# Patient Record
Sex: FEMALE | URBAN - METROPOLITAN AREA
[De-identification: names, ages, dates, MRNs, and addresses within clinical notes are randomized per-mention and may not be internally consistent; named-entity substitution may affect disease eponyms.]

---

## 2021-01-28 ENCOUNTER — IMMUNIZATIONS (OUTPATIENT)
Dept: FAMILY MEDICINE CLINIC | Facility: HOSPITAL | Age: 61
End: 2021-01-28

## 2021-01-28 DIAGNOSIS — Z23 ENCOUNTER FOR IMMUNIZATION: Primary | ICD-10-CM

## 2021-01-28 PROCEDURE — 91300 SARS-COV-2 / COVID-19 MRNA VACCINE (PFIZER-BIONTECH) 30 MCG: CPT

## 2021-01-28 PROCEDURE — 0001A SARS-COV-2 / COVID-19 MRNA VACCINE (PFIZER-BIONTECH) 30 MCG: CPT

## 2021-02-18 ENCOUNTER — IMMUNIZATIONS (OUTPATIENT)
Dept: FAMILY MEDICINE CLINIC | Facility: HOSPITAL | Age: 61
End: 2021-02-18

## 2021-02-18 DIAGNOSIS — Z23 ENCOUNTER FOR IMMUNIZATION: Primary | ICD-10-CM

## 2021-02-18 PROCEDURE — 91300 SARS-COV-2 / COVID-19 MRNA VACCINE (PFIZER-BIONTECH) 30 MCG: CPT

## 2021-02-18 PROCEDURE — 0002A SARS-COV-2 / COVID-19 MRNA VACCINE (PFIZER-BIONTECH) 30 MCG: CPT

## 2021-12-06 ENCOUNTER — IMMUNIZATIONS (OUTPATIENT)
Dept: FAMILY MEDICINE CLINIC | Facility: HOSPITAL | Age: 61
End: 2021-12-06

## 2021-12-06 DIAGNOSIS — Z23 ENCOUNTER FOR IMMUNIZATION: Primary | ICD-10-CM

## 2021-12-06 PROCEDURE — 91300 COVID-19 PFIZER VACC 0.3 ML: CPT

## 2021-12-06 PROCEDURE — 0001A COVID-19 PFIZER VACC 0.3 ML: CPT

## 2023-01-03 ENCOUNTER — OFFICE VISIT (OUTPATIENT)
Dept: URGENT CARE | Facility: CLINIC | Age: 63
End: 2023-01-03

## 2023-01-03 VITALS
DIASTOLIC BLOOD PRESSURE: 75 MMHG | WEIGHT: 259 LBS | OXYGEN SATURATION: 96 % | SYSTOLIC BLOOD PRESSURE: 189 MMHG | BODY MASS INDEX: 44.22 KG/M2 | HEIGHT: 64 IN | RESPIRATION RATE: 18 BRPM | TEMPERATURE: 99.8 F | HEART RATE: 83 BPM

## 2023-01-03 DIAGNOSIS — J06.9 VIRAL URI WITH COUGH: Primary | ICD-10-CM

## 2023-01-03 RX ORDER — LEVOTHYROXINE SODIUM 0.03 MG/1
25 TABLET ORAL DAILY
COMMUNITY
Start: 2022-10-22

## 2023-01-03 RX ORDER — ATENOLOL 50 MG/1
50 TABLET ORAL DAILY
COMMUNITY
Start: 2022-10-25

## 2023-01-03 RX ORDER — DIPHENOXYLATE HYDROCHLORIDE AND ATROPINE SULFATE 2.5; .025 MG/1; MG/1
1 TABLET ORAL DAILY
COMMUNITY

## 2023-01-03 RX ORDER — PANTOPRAZOLE SODIUM 40 MG/1
TABLET, DELAYED RELEASE ORAL
COMMUNITY
Start: 2022-12-03

## 2023-01-03 RX ORDER — FLECAINIDE ACETATE 50 MG/1
50 TABLET ORAL 2 TIMES DAILY
COMMUNITY
Start: 2022-09-12

## 2023-01-03 RX ORDER — FLUTICASONE PROPIONATE 50 MCG
2 SPRAY, SUSPENSION (ML) NASAL DAILY
Qty: 11.1 ML | Refills: 0 | Status: SHIPPED | OUTPATIENT
Start: 2023-01-03

## 2023-01-03 RX ORDER — BENZONATATE 200 MG/1
200 CAPSULE ORAL 3 TIMES DAILY PRN
Qty: 20 CAPSULE | Refills: 0 | Status: SHIPPED | OUTPATIENT
Start: 2023-01-03

## 2023-01-03 RX ORDER — AMLODIPINE BESYLATE 10 MG/1
10 TABLET ORAL DAILY
COMMUNITY
Start: 2022-11-14

## 2023-01-03 RX ORDER — LOSARTAN POTASSIUM 100 MG/1
TABLET ORAL
COMMUNITY
Start: 2022-10-25

## 2023-01-03 NOTE — PROGRESS NOTES
[facility] [facility]        NAME: Leona Matos is a 60 y o  female  : 1960    MRN: [id number]  DATE: January 3, 2023  TIME: 8:46 AM    Assessment and Plan   Viral URI with cough [J06 9]  1  Viral URI with cough  fluticasone (FLONASE) 50 mcg/act nasal spray    benzonatate (TESSALON) 200 MG capsule        Informed patient that symptoms are most likely due to viral origin  No signs of infection during examination  Lungs were clear to auscultation  Informed patient to continue with at home remedies and to take prescribed Flonase and Tessalon Perles  Informed to go to the ER symptoms worsen  Repeat blood pressure was 158/78 in the right arm  PT states that she did not take her blood pressure medication this morning  Patient Instructions     Take prescribed medications as instructed  Drink plenty of fluids and rest   Tea with honey for throat/cough relief  Daily multivitamin  Vitamin D3 2000 IU daily  Vitamin C 1000 mg twice daily  May try over-the-counter Mucinex for congestion  Follow up with PCP in 3-5 days  Proceed to  ER if symptoms worsen  Chief Complaint     Chief Complaint   Patient presents with   • Cough   • Fever     Cough is a dry coughing nothing coming up  Fevers have been 99 8 she did take 2 tylenol before coming as well  Symptoms started yesterday morning  History of Present Illness       16-year-old female presents with fever and dry cough that began yesterday morning  Fever was only 99 8F  PT took 2 Tylenol before coming in today  She also admits to a runny nose  PT has been drinking tea with honey  Denies any shortness of breath, chest pain, abdominal pain, nausea, vomiting, diarrhea, sore throat, ear pain  Review of Systems   Review of Systems   Constitutional: Positive for chills and fever  HENT: Positive for postnasal drip and rhinorrhea  Negative for congestion, ear discharge, ear pain, sinus pressure, sinus pain and sore throat      Eyes: Negative  Respiratory: Positive for cough  Negative for shortness of breath  Cardiovascular: Negative  Gastrointestinal: Negative  Musculoskeletal: Negative  Skin: Negative  Neurological: Negative  Current Medications       Current Outpatient Medications:   •  amLODIPine (NORVASC) 10 mg tablet, Take 10 mg by mouth daily, Disp: , Rfl:   •  atenolol (TENORMIN) 50 mg tablet, Take 50 mg by mouth daily, Disp: , Rfl:   •  benzonatate (TESSALON) 200 MG capsule, Take 1 capsule (200 mg total) by mouth 3 (three) times a day as needed for cough, Disp: 20 capsule, Rfl: 0  •  flecainide (TAMBOCOR) 50 mg tablet, Take 50 mg by mouth 2 (two) times a day, Disp: , Rfl:   •  fluticasone (FLONASE) 50 mcg/act nasal spray, 2 sprays into each nostril daily, Disp: 11 1 mL, Rfl: 0  •  levothyroxine 25 mcg tablet, Take 25 mcg by mouth daily, Disp: , Rfl:   •  losartan (COZAAR) 100 MG tablet, TAKE 1 TABLET BY MOUTH EVERY DAY AT NIGHT, Disp: , Rfl:   •  multivitamin (THERAGRAN) TABS, Take 1 tablet by mouth daily, Disp: , Rfl:   •  pantoprazole (PROTONIX) 40 mg tablet, TAKE 1 TABLET BY MOUTH ONCE A DAY 30 MINUTES BEFORE BREAKFAST, Disp: , Rfl:     Current Allergies     Allergies as of 01/03/2023   • (No Known Allergies)            The following portions of the patient's history were reviewed and updated as appropriate: allergies, current medications, past family history, past medical history, past social history, past surgical history and problem list      Past Medical History:   Diagnosis Date   • Disease of thyroid gland    • Hypertension        History reviewed  No pertinent surgical history  History reviewed  No pertinent family history  Medications have been verified          Objective   BP (!) 189/75   Pulse 83   Temp 99 8 °F (37 7 °C)   Resp 18   Ht 5' 4" (1 626 m)   Wt 117 kg (259 lb)   SpO2 96%   BMI 44 46 kg/m²        Physical Exam     Physical Exam  Constitutional:       General: She is not in acute distress  Appearance: Normal appearance  HENT:      Head: Normocephalic and atraumatic  Right Ear: Tympanic membrane, ear canal and external ear normal       Left Ear: Tympanic membrane, ear canal and external ear normal       Nose: Congestion and rhinorrhea present  Mouth/Throat:      Mouth: Mucous membranes are moist       Pharynx: Oropharynx is clear  No oropharyngeal exudate or posterior oropharyngeal erythema  Eyes:      Conjunctiva/sclera: Conjunctivae normal    Cardiovascular:      Rate and Rhythm: Normal rate and regular rhythm  Pulses: Normal pulses  Heart sounds: Normal heart sounds  No murmur heard  No friction rub  No gallop  Pulmonary:      Effort: Pulmonary effort is normal  No respiratory distress  Breath sounds: Normal breath sounds  No stridor  No wheezing, rhonchi or rales  Chest:      Chest wall: No tenderness  Musculoskeletal:      Cervical back: Normal range of motion and neck supple  Skin:     General: Skin is warm and dry  Capillary Refill: Capillary refill takes less than 2 seconds  Findings: No rash  Neurological:      General: No focal deficit present  Mental Status: She is alert

## 2023-01-03 NOTE — PATIENT INSTRUCTIONS
Take prescribed medications as instructed  Drink plenty of fluids and rest   Tea with honey for throat/cough relief  Daily multivitamin  Vitamin D3 2000 IU daily  Vitamin C 1000 mg twice daily  May try over-the-counter Mucinex for congestion  Follow up with PCP in 3-5 days  Proceed to  ER if symptoms worsen  Viral Syndrome   WHAT YOU NEED TO KNOW:   Viral syndrome is a term used for symptoms of an infection caused by a virus  Viruses are spread easily from person to person through the air and on shared items  DISCHARGE INSTRUCTIONS:   Call your local emergency number (911 in the [address] [place] [address],[address]) or have someone else call if:   You have a seizure  You cannot be woken  You have chest pain or trouble breathing  Return to the emergency department if:   You have a stiff neck, a bad headache, and sensitivity to light  You feel weak, dizzy, or confused  You stop urinating or urinate a lot less than usual     You cough up blood or thick yellow or green mucus  You have severe abdominal pain or your abdomen is larger than usual     Call your doctor if:   Your symptoms do not get better with treatment or get worse after 3 days  You have a rash or ear pain  You have burning when you urinate  You have questions or concerns about your condition or care  Medicines: You may  need any of the following:  Acetaminophen  decreases pain and fever  It is available without a doctor's order  Ask how much to take and how often to take it  Follow directions  Read the labels of all other medicines you are using to see if they also contain acetaminophen, or ask your doctor or pharmacist  Acetaminophen can cause liver damage if not taken correctly  Do not use more than 4 grams (4,000 milligrams) total of acetaminophen in one day  NSAIDs , such as ibuprofen, help decrease swelling, pain, and fever  NSAIDs can cause stomach bleeding or kidney problems in certain people   If you take blood thinner medicine, always ask your healthcare provider if NSAIDs are safe for you  Always read the medicine label and follow directions  Cold medicine  helps decrease swelling, control a cough, and relieve chest or nasal congestion  Saline nasal spray  helps decrease nasal congestion  Take your medicine as directed  Contact your healthcare provider if you think your medicine is not helping or if you have side effects  Tell him of her if you are allergic to any medicine  Keep a list of the medicines, vitamins, and herbs you take  Include the amounts, and when and why you take them  Bring the list or the pill bottles to follow-up visits  Carry your medicine list with you in case of an emergency  Manage your symptoms:   Drink liquids as directed to prevent dehydration  Ask how much liquid to drink each day and which liquids are best for you  Ask if you should drink an oral rehydration solution (ORS)  An ORS has the right amounts of water, salts, and sugar you need to replace body fluids  This may help prevent dehydration caused by vomiting or diarrhea  Do not drink liquids with caffeine  Liquids with caffeine can make dehydration worse  Get plenty of rest to help your body heal   Take naps throughout the day  Ask your healthcare provider when you can return to work and your normal activities  Use a cool mist humidifier to help you breathe easier  Ask your healthcare provider how to use a cool mist humidifier  Eat honey or use cough drops for a sore throat  Cough drops are available without a doctor's order  Follow directions for taking cough drops  Do not smoke or be close to anyone who is smoking  Nicotine and other chemicals in cigarettes and cigars can cause lung damage  Smoking can also delay healing  Ask your healthcare provider for information if you currently smoke and need help to quit  E-cigarettes or smokeless tobacco still contain nicotine   Talk to your healthcare provider before you use these products  Prevent the spread of germs:       Wash your hands often  Wash your hands several times each day  Wash after you use the bathroom, change a child's diaper, and before you prepare or eat food  Use soap and water every time  Rub your soapy hands together, lacing your fingers  Wash the front and back of your hands, and in between your fingers  Use the fingers of one hand to scrub under the fingernails of the other hand  Wash for at least 20 seconds  Rinse with warm, running water for several seconds  Then dry your hands with a clean towel or paper towel  Use hand  that contains alcohol if soap and water are not available  Do not touch your eyes, nose, or mouth without washing your hands first          Cover a sneeze or cough  Use a tissue that covers your mouth and nose  Throw the tissue away in a trash can right away  Use the bend of your arm if a tissue is not available  Wash your hands well with soap and water or use a hand   Stay away from others while you are sick  Avoid crowds as much as possible  Ask about vaccines you may need  Talk to your healthcare provider about your vaccine history  He or she will tell you which vaccines you need, and when to get them  Get the influenza (flu) vaccine as soon as recommended each year  The flu vaccine is available starting in September or October  Flu viruses change, so it is important to get a flu vaccine every year  Get the pneumonia vaccine if recommended  This vaccine is usually recommended every 5 years  Your provider will tell you when to get this vaccine, if needed  Follow up with your doctor as directed:  Write down your questions so you remember to ask them during your visits  © Copyright [employer] 2022 Information is for End User's use only and may not be sold, redistributed or otherwise used for commercial purposes   All illustrations and images included in CareNotes® are the copyrighted property of [employer]  or [employer]  The above information is an  only  It is not intended as medical advice for individual conditions or treatments  Talk to your doctor, nurse or pharmacist before following any medical regimen to see if it is safe and effective for you

## 2023-01-06 ENCOUNTER — OFFICE VISIT (OUTPATIENT)
Dept: URGENT CARE | Facility: CLINIC | Age: 63
End: 2023-01-06

## 2023-01-06 VITALS
HEART RATE: 78 BPM | RESPIRATION RATE: 19 BRPM | DIASTOLIC BLOOD PRESSURE: 70 MMHG | SYSTOLIC BLOOD PRESSURE: 160 MMHG | OXYGEN SATURATION: 98 % | TEMPERATURE: 100.3 F

## 2023-01-06 DIAGNOSIS — R05.1 ACUTE COUGH: Primary | ICD-10-CM

## 2023-01-06 RX ORDER — AMOXICILLIN 500 MG/1
500 CAPSULE ORAL EVERY 12 HOURS SCHEDULED
Qty: 10 CAPSULE | Refills: 0 | Status: SHIPPED | OUTPATIENT
Start: 2023-01-06 | End: 2023-01-11

## 2023-01-06 NOTE — LETTER
[facility] [address] [id number]  No information on file  January 6, 2023    Patient: Gerardo Cruz  YOB: 1960    Gerardo Cruz was seen and evaluated at our [facility]  Please note if Covid and Flu tests are negative, they may return to work when fever free for 24 hours without the use of a fever reducing agent  If Covid or Flu test is positive, they may return to work on 1/9/2023, as this is 5 days from the onset of symptoms  Upon return, they must then adhere to strict masking for an additional 5 days      Sincerely,    [employer]

## 2023-01-06 NOTE — PATIENT INSTRUCTIONS
If flu/covid negative start antibiotics  If positive DO NOT start the antibiotics  You have been prescribed an antibiotic  Take antibiotic as directed for the full duration  Do not stop the antibiotics just because you are feeling better  Side effects of antibiotics include diarrhea  Eat yogurt or take a probiotic or acidophilus tablet while taking this medication to help prevent diarrhea and replenish good gut bacteria  Influenza Home Care Guidelines    Your healthcare provider and/or public health staff have evaluated you and have determined that you do not need to remain in the hospital at this time  At this time you can be isolated at home where you will be monitored by staff from your local or state health department  You should carefully follow the prevention and isolation steps below until a healthcare provider or local or state health department says that you can return to your normal activities  Stay home except to get medical care    People who are mildly ill with influenza are able to isolate at home during their illness  You should restrict activities outside your home, except for getting medical care  Do not go to work, school, or public areas  Avoid using public transportation, ride-sharing, or taxis  Separate yourself from other people in your home    People: As much as possible, you should stay in a specific room and away from other people in your home  Also, you should use a separate bathroom, if available  Call ahead before visiting your doctor    If you have a medical appointment, call the healthcare provider and tell them that you have or may have influenza  This will help the healthcare provider’s office take steps to keep other people from getting infected or exposed  Wear a facemask    You should wear a facemask when you are around other people (e g , sharing a room or vehicle) or pets and before you enter a healthcare provider’s office   If you are not able to wear a facemask (for example, because it causes trouble breathing), then people who live with you should not stay in the same room with you, or they should wear a facemask if they enter your room  Cover your coughs and sneezes    Cover your mouth and nose with a tissue when you cough or sneeze  Throw used tissues in a lined trash can  Immediately wash your hands with soap and water for at least 20 seconds or, if soap and water are not available, clean your hands with an alcohol-based hand  that contains at least 60% alcohol  Clean your hands often    Wash your hands often with soap and water for at least 20 seconds, especially after blowing your nose, coughing, or sneezing; going to the bathroom; and before eating or preparing food  If soap and water are not readily available, use an alcohol-based hand  with at least 60% alcohol, covering all surfaces of your hands and rubbing them together until they feel dry  Soap and water are the best option if hands are visibly dirty  Avoid touching your eyes, nose, and mouth with unwashed hands  Avoid sharing personal household items    You should not share dishes, drinking glasses, cups, eating utensils, towels, or bedding with other people or pets in your home  After using these items, they should be washed thoroughly with soap and water  Clean all “high-touch” surfaces everyday    High touch surfaces include counters, tabletops, doorknobs, bathroom fixtures, toilets, phones, keyboards, tablets, and bedside tables  Also, clean any surfaces that may have blood, stool, or body fluids on them  Use a household cleaning spray or wipe, according to the label instructions  Labels contain instructions for safe and effective use of the cleaning product including precautions you should take when applying the product, such as wearing gloves and making sure you have good ventilation during use of the product      Monitor your symptoms    Seek prompt medical attention if your illness is worsening (e g , difficulty breathing)  Before seeking care, call your healthcare provider and tell them that you have, or are being evaluated for, influenza  Put on a facemask before you enter the facility  These steps will help the healthcare provider’s office to keep other people in the office or waiting room from getting infected or exposed  Ask your healthcare provider to call the local or [facility] health department  Persons who are placed under active monitoring or facilitated self-monitoring should follow instructions provided by their local health department or occupational health professionals, as appropriate  If you have a medical emergency and need to call 911, notify the dispatch personnel that you have, or are being evaluated for influenza  If possible, put on a facemask before emergency medical services arrive  Discontinuing home isolation    Patients with confirmed influenza should remain under home isolation precautions until the following conditions are met:    They have had no fever for at least 24 hours (that is one full day of no fever without the use medicine that reduces fevers i e: acetaminophen (Tylenol) and ibuprofen (motrin) )  AND  other symptoms have improved (for example, when their cough or shortness of breath have improved)

## 2023-01-06 NOTE — PROGRESS NOTES
[facility]        NAME: Jorge Mejia is a 60 y o  female  : 1960    MRN: [id number]  DATE: 2023  TIME: 5:46 PM    Assessment and Plan   Acute cough [R05 1]  1  Acute cough  Covid/Flu-Office Collect    amoxicillin (AMOXIL) 500 mg capsule        Will send COVID flu given patient's symptoms are consistent with influenza virus  If negative patient may start the amoxicillin as prescribed as symptoms could be from sinusitis  Patient Instructions       Follow up with PCP in 3-5 days  Proceed to  ER if symptoms worsen  Chief Complaint     Chief Complaint   Patient presents with   • Fever   • Sore Throat     Started 1/2 coughing productive (yellow), and sinus drainage (yellow)  No earache, vomiting, or diarrhea  Home COVID test 1/2 (negative)   OTC tylenol  Requests note for work   • Cough         History of Present Illness       Patient is a 60year old female who presents to the office today for nasal congestion  Was seen here on 1/3/2022 and was given nasal spray and coughing pills  States that yesterday the drainage changed from clear to yellow drainage  Also has fever now Tmax 103 4  states she has had fever since the 2nd  Had been taking tylenol  Also has cough, rhinorrhea, sore throat  Denies ear pain, n/v/d, headache  Denies sob or chest pain  Cough is non productive but in the morning it is productive  Denies sick contacts at home or at work  Taking tylenol, regular medications, flonase, tessalon  Review of Systems   Review of Systems   Constitutional: Positive for fever  Negative for chills  HENT: Positive for congestion, rhinorrhea and sore throat  Negative for ear pain  Respiratory: Positive for cough  Negative for shortness of breath and wheezing  Cardiovascular: Negative for chest pain and palpitations  Gastrointestinal: Negative for diarrhea, nausea, rectal pain and vomiting  Musculoskeletal: Positive for myalgias     Neurological: Negative for headaches  All other systems reviewed and are negative  Current Medications       Current Outpatient Medications:   •  amLODIPine (NORVASC) 10 mg tablet, Take 10 mg by mouth daily, Disp: , Rfl:   •  amoxicillin (AMOXIL) 500 mg capsule, Take 1 capsule (500 mg total) by mouth every 12 (twelve) hours for 5 days, Disp: 10 capsule, Rfl: 0  •  atenolol (TENORMIN) 50 mg tablet, Take 50 mg by mouth daily, Disp: , Rfl:   •  benzonatate (TESSALON) 200 MG capsule, Take 1 capsule (200 mg total) by mouth 3 (three) times a day as needed for cough, Disp: 20 capsule, Rfl: 0  •  flecainide (TAMBOCOR) 50 mg tablet, Take 50 mg by mouth 2 (two) times a day, Disp: , Rfl:   •  fluticasone (FLONASE) 50 mcg/act nasal spray, 2 sprays into each nostril daily, Disp: 11 1 mL, Rfl: 0  •  levothyroxine 25 mcg tablet, Take 25 mcg by mouth daily, Disp: , Rfl:   •  losartan (COZAAR) 100 MG tablet, TAKE 1 TABLET BY MOUTH EVERY DAY AT NIGHT, Disp: , Rfl:   •  multivitamin (THERAGRAN) TABS, Take 1 tablet by mouth daily, Disp: , Rfl:   •  pantoprazole (PROTONIX) 40 mg tablet, TAKE 1 TABLET BY MOUTH ONCE A DAY 30 MINUTES BEFORE BREAKFAST, Disp: , Rfl:   •  rivaroxaban (XARELTO) 20 mg tablet, Take 20 mg by mouth daily, Disp: , Rfl:     Current Allergies     Allergies as of 01/06/2023   • (No Known Allergies)            The following portions of the patient's history were reviewed and updated as appropriate: allergies, current medications, past family history, past medical history, past social history, past surgical history and problem list      Past Medical History:   Diagnosis Date   • Atrial fibrillation ([facility] [address] )    • Disease of thyroid gland    • Hypertension        History reviewed  No pertinent surgical history  History reviewed  No pertinent family history  Medications have been verified          Objective   /70   Pulse 78   Temp 100 3 °F (37 9 °C)   Resp 19   SpO2 98%        Physical Exam     Physical Exam  Vitals and nursing note reviewed  Constitutional:       General: She is not in acute distress  Appearance: She is well-developed and normal weight  She is not ill-appearing or toxic-appearing  HENT:      Right Ear: Tympanic membrane normal       Left Ear: Tympanic membrane normal       Nose: Congestion present  Right Turbinates: Enlarged  Left Turbinates: Enlarged  Right Sinus: No maxillary sinus tenderness or frontal sinus tenderness  Left Sinus: No maxillary sinus tenderness or frontal sinus tenderness  Mouth/Throat:      Mouth: Mucous membranes are moist       Pharynx: Posterior oropharyngeal erythema present  Comments: Posterior pharynx erythema with postnasal drip noted  No tonsillar swelling or exudate noted  Cardiovascular:      Rate and Rhythm: Normal rate  Rhythm irregular  Pulses: Normal pulses  Heart sounds: Normal heart sounds  Pulmonary:      Effort: Pulmonary effort is normal       Breath sounds: Normal breath sounds  Lymphadenopathy:      Cervical: No cervical adenopathy  Skin:     General: Skin is warm  Capillary Refill: Capillary refill takes less than 2 seconds  Neurological:      General: No focal deficit present  Mental Status: She is alert

## 2023-01-07 LAB
FLUAV RNA RESP QL NAA+PROBE: NEGATIVE
FLUBV RNA RESP QL NAA+PROBE: NEGATIVE
SARS-COV-2 RNA RESP QL NAA+PROBE: POSITIVE

## 2023-01-10 ENCOUNTER — TELEPHONE (OUTPATIENT)
Dept: URGENT CARE | Facility: CLINIC | Age: 63
End: 2023-01-10

## 2023-01-10 NOTE — TELEPHONE ENCOUNTER
Patient called to discuss positive COVID results  Informed patient that she is COVID-positive  Patient had questions about returning to work  Patient currently denies having a fever thus patient may return to work as indicated on the return to work note provided to her  Instructed patient that if she were to continue with fever which is a temperature greater than 100 8 °F she should call her family doctor for further evaluation and follow-up

## 2024-08-20 ENCOUNTER — HOSPITAL ENCOUNTER (INPATIENT)
Facility: HOSPITAL | Age: 64
LOS: 27 days | Discharge: HOME HEALTH | DRG: 057 | End: 2024-09-16
Attending: STUDENT IN AN ORGANIZED HEALTH CARE EDUCATION/TRAINING PROGRAM | Admitting: FAMILY MEDICINE
Payer: MEDICARE

## 2024-08-20 DIAGNOSIS — Z78.9 IMPAIRED MOBILITY AND ACTIVITIES OF DAILY LIVING: ICD-10-CM

## 2024-08-20 DIAGNOSIS — I10 PRIMARY HYPERTENSION: ICD-10-CM

## 2024-08-20 DIAGNOSIS — I48.0 PAROXYSMAL ATRIAL FIBRILLATION (HCC): ICD-10-CM

## 2024-08-20 DIAGNOSIS — Z91.89 AT RISK FOR RETENTION OF URINE: ICD-10-CM

## 2024-08-20 DIAGNOSIS — E03.9 HYPOTHYROIDISM, UNSPECIFIED TYPE: ICD-10-CM

## 2024-08-20 DIAGNOSIS — Z91.89 AT HIGH RISK FOR SKIN BREAKDOWN: ICD-10-CM

## 2024-08-20 DIAGNOSIS — Z74.09 IMPAIRED MOBILITY AND ACTIVITIES OF DAILY LIVING: ICD-10-CM

## 2024-08-20 DIAGNOSIS — Z91.89 AT RISK FOR VENOUS THROMBOEMBOLISM (VTE): ICD-10-CM

## 2024-08-20 DIAGNOSIS — Z91.89 AT RISK FOR CONSTIPATION: ICD-10-CM

## 2024-08-20 DIAGNOSIS — M25.511 RIGHT SHOULDER PAIN: ICD-10-CM

## 2024-08-20 DIAGNOSIS — I63.9 ACUTE CVA (CEREBROVASCULAR ACCIDENT) (HCC): Primary | ICD-10-CM

## 2024-08-20 DIAGNOSIS — K21.9 GASTROESOPHAGEAL REFLUX DISEASE WITHOUT ESOPHAGITIS: ICD-10-CM

## 2024-08-20 PROBLEM — D32.9 MENINGIOMA (HCC): Status: ACTIVE | Noted: 2024-08-20

## 2024-08-20 PROBLEM — A49.8 CLOSTRIDIUM DIFFICILE INFECTION: Status: ACTIVE | Noted: 2024-08-20

## 2024-08-20 PROBLEM — G47.33 OSA (OBSTRUCTIVE SLEEP APNEA): Status: ACTIVE | Noted: 2024-08-20

## 2024-08-20 PROCEDURE — 1124F ACP DISCUSS-NO DSCNMKR DOCD: CPT | Performed by: PHYSICIAN ASSISTANT

## 2024-08-20 PROCEDURE — 99222 1ST HOSP IP/OBS MODERATE 55: CPT | Performed by: FAMILY MEDICINE

## 2024-08-20 PROCEDURE — 99223 1ST HOSP IP/OBS HIGH 75: CPT | Performed by: PHYSICAL MEDICINE & REHABILITATION

## 2024-08-20 RX ORDER — ATORVASTATIN CALCIUM 40 MG/1
40 TABLET, FILM COATED ORAL EVERY EVENING
Status: DISCONTINUED | OUTPATIENT
Start: 2024-08-20 | End: 2024-09-16 | Stop reason: HOSPADM

## 2024-08-20 RX ORDER — LOSARTAN POTASSIUM 50 MG/1
100 TABLET ORAL DAILY
Status: DISCONTINUED | OUTPATIENT
Start: 2024-08-21 | End: 2024-09-16 | Stop reason: HOSPADM

## 2024-08-20 RX ORDER — ACETAMINOPHEN 325 MG/1
650 TABLET ORAL EVERY 6 HOURS PRN
Status: DISCONTINUED | OUTPATIENT
Start: 2024-08-20 | End: 2024-09-16 | Stop reason: HOSPADM

## 2024-08-20 RX ORDER — PANTOPRAZOLE SODIUM 40 MG/1
40 TABLET, DELAYED RELEASE ORAL
Status: DISCONTINUED | OUTPATIENT
Start: 2024-08-21 | End: 2024-09-16 | Stop reason: HOSPADM

## 2024-08-20 RX ORDER — LEVOTHYROXINE SODIUM 25 UG/1
25 TABLET ORAL
Status: DISCONTINUED | OUTPATIENT
Start: 2024-08-21 | End: 2024-09-16 | Stop reason: HOSPADM

## 2024-08-20 RX ORDER — NIFEDIPINE 30 MG/1
60 TABLET, EXTENDED RELEASE ORAL
Status: DISCONTINUED | OUTPATIENT
Start: 2024-08-20 | End: 2024-09-10

## 2024-08-20 RX ORDER — SPIRONOLACTONE 25 MG/1
25 TABLET ORAL DAILY
Status: DISCONTINUED | OUTPATIENT
Start: 2024-08-21 | End: 2024-09-16 | Stop reason: HOSPADM

## 2024-08-20 RX ORDER — CARVEDILOL 25 MG/1
25 TABLET ORAL 2 TIMES DAILY WITH MEALS
Status: DISCONTINUED | OUTPATIENT
Start: 2024-08-20 | End: 2024-09-10

## 2024-08-20 RX ORDER — VANCOMYCIN HYDROCHLORIDE 125 MG/1
125 CAPSULE ORAL 4 TIMES DAILY
Status: COMPLETED | OUTPATIENT
Start: 2024-08-20 | End: 2024-08-26

## 2024-08-20 RX ORDER — BISACODYL 10 MG
10 SUPPOSITORY, RECTAL RECTAL DAILY PRN
Status: DISCONTINUED | OUTPATIENT
Start: 2024-08-20 | End: 2024-09-16 | Stop reason: HOSPADM

## 2024-08-20 RX ADMIN — VANCOMYCIN HYDROCHLORIDE 125 MG: 125 CAPSULE ORAL at 18:00

## 2024-08-20 RX ADMIN — HYDRALAZINE HYDROCHLORIDE 75 MG: 50 TABLET ORAL at 17:29

## 2024-08-20 RX ADMIN — RIVAROXABAN 20 MG: 20 TABLET, FILM COATED ORAL at 17:29

## 2024-08-20 RX ADMIN — VANCOMYCIN HYDROCHLORIDE 125 MG: 125 CAPSULE ORAL at 20:50

## 2024-08-20 RX ADMIN — NIFEDIPINE 60 MG: 30 TABLET, FILM COATED, EXTENDED RELEASE ORAL at 17:29

## 2024-08-20 RX ADMIN — CARVEDILOL 25 MG: 25 TABLET, FILM COATED ORAL at 17:29

## 2024-08-20 RX ADMIN — HYDRALAZINE HYDROCHLORIDE 75 MG: 50 TABLET ORAL at 20:59

## 2024-08-20 RX ADMIN — ATORVASTATIN CALCIUM 40 MG: 40 TABLET, FILM COATED ORAL at 17:29

## 2024-08-20 NOTE — CONSULTS
PHYSICAL MEDICINE AND REHABILITATION [facility] REHAB CONSULT  Maye Lilly 64 y.o. female MRN: [id number]  Unit/Bed#: [facility] 969-01 Encounter: [id number]     Rehab Diagnosis: Impairment of mobility, safety and Activities of Daily Living (ADLs) due to Stroke:  01.2  Right Body Involvement (Left Brain)  Etiologic Diagnosis:     History of Present Illness:   Maye Lilly is a 64 y.o. female with PMH of afib (prev on Eliquis, now on Xarelto), HTN, hypothyroidism, DIANELYS, morbid obesity, GERD who presented to the [facility] with sudden onset R sided weakness with tingling down RUE. She also had slurred speech and a sudden headache and emesis. Denied chest pain, SOB, abd pain, visual deficits on admission. CBC, CMP, trop, urinalysis, tox screen negative. CTA of head and neck negative for LVO. CT C/A/P with  no acute abnormalities except mild sigmoid diverticulitis and thyromegaly. CTH wthout acute infarction or hemorrhage, though calcified meningioma noted along the falx without mass effect. MRI brain w/o contrast showed L pontine infarct. CT of thoracolumbar area without acute fx or dislocations.  Pt with significantly elevated BP on admission (230-240 systolic). No TNK given out of window and no obvious infarct + already on anticoagulation. She was continued on xarelto.  Hospital course complicate by diarrhea, pt found to be C Diff positive, started on vancomycin.    Subjective:   Pt endorses persistent complete weakness of the RUE and moderate weakness of the RLE, but otherwise denies acute complaints including headache, pain, loss of sensation, bowel/bladder incontinence, diarrhea, abd pain, chest pain, SOB. She notes diarrhea has improved since starting antibiotics and bowel movements are now soft, 1-2x per day. Pt notes that she was independent with ADLs prior to stroke, and that she can live with son or daughter nearby upon discharge if needed.    Review of Systems: A 10-point review of  systems was performed. Negative except as listed above.    Plan:     * Acute CVA (cerebrovascular accident) (HCC)  Assessment & Plan  - admitted to acute care with slurred speech, headache, tingling in R arm  - 0/5 RUE strength, 1-2/5 in RLE  - CTH with meningioma along falx, but no infarct/hemorrhage. CTA with no LVO. MRI with L pontine infarct.  - Did not receive tPA/TNKase, did not undergo thrombectomy  - echo with LVH, normal EF, mild diastolic dysfunction    Plan:  - fall precautions  - Monitor for neurogenic bowel and bladder  - Promote regular sleep/wake cycles and proper sleep hygiene  - Monitor closely for hemiparetic shoulder pain and post-stroke pain  -  Neutral resting wrist splint for the RUE and bilateral multipodus boots to be worn at night and while in bed during the day to prevent developing spasticity and contracture  - Maintain adequate nutrition  - Avoid sedating and anticholinergic medications as possible  - Maintain normotension, s/p permissive hypertension  - Continue Xarelto 20mg daily  - Continue Atorvastatin 40 mg qHs for secondary prevention        HTN (hypertension)  Assessment & Plan  - pt reportedly nonadherent to HTN med regimen due to financial constraints  - management per IM  - continue coreg 25mg BID, nifedipine 60mg daily, aldactone 25mg daily, valsartan 160 daily,     Paroxysmal atrial fibrillation (HCC)  Assessment & Plan  - prev on Eliquis  - switched to Xarelto in acute care    Plan:  - continue Xarelto 20mg daily  - continue coreg    Clostridium difficile infection  Assessment & Plan  - pt with diarrhea in acute care  - Cdiff positive PCR  - started PO vanc in acute care    Plan:  - continue PO vanc for total 2 week course  - continue contact isolation precautions  - monitor for BM    Hypothyroidism  Assessment & Plan  - continue levothyroxine 25mcg daily    Meningioma (HCC)  Assessment & Plan  - [id number] meningioma along falx, no mass effect or vasogenic edema  - continue to  monitor, outpatient follow up with neurology as needed.    GERD (gastroesophageal reflux disease)  Assessment & Plan  - continue protonix 40mg BID        Health Maintenance  #Delirium/Sleep: At risk. Optimize sleep/wake, pain, bowel, bladder management. Avoid deliriogenic meds and physical restraint. Environmental/behavioral interventions.   #Pain: Tylenol 975mg q8h  #BP: coreg 25mg BID, nifedipine 60mg daily, aldactone 25mg daily, valsartan 160 daily  #Bowel: senokot daily; hold in setting of Cdiff  #Bladder: NA  #Skin/Pressure Injury Prevention: Turn Q2hr in bed, with weight shifts [id number] in wheelchair. Float heels in bed.  #DVT Prophylaxis: Xarelto 20mg daily  #GI Prophylaxis: Protonix 40mg BID   Drug regimen reviewed, all potential adverse effects identified and addressed:    Scheduled Meds:  No current facility-administered medications on file prior to encounter.     Current Outpatient Medications on File Prior to Encounter   Medication Sig Dispense Refill    amLODIPine (NORVASC) 10 mg tablet Take 10 mg by mouth daily      atenolol (TENORMIN) 50 mg tablet Take 50 mg by mouth daily      benzonatate (TESSALON) 200 MG capsule Take 1 capsule (200 mg total) by mouth 3 (three) times a day as needed for cough 20 capsule 0    flecainide (TAMBOCOR) 50 mg tablet Take 50 mg by mouth 2 (two) times a day      fluticasone (FLONASE) 50 mcg/act nasal spray 2 sprays into each nostril daily 11.1 mL 0    levothyroxine 25 mcg tablet Take 25 mcg by mouth daily      losartan (COZAAR) 100 MG tablet TAKE 1 TABLET BY MOUTH EVERY DAY AT NIGHT      multivitamin (THERAGRAN) TABS Take 1 tablet by mouth daily      pantoprazole (PROTONIX) 40 mg tablet TAKE 1 TABLET BY MOUTH ONCE A DAY 30 MINUTES BEFORE BREAKFAST      rivaroxaban (XARELTO) 20 mg tablet Take 20 mg by mouth daily            Restrictions include:   Fall precautions      Functional History/Home Set-up - Prior to Admission:    Independent with mobility, transfers,  "ADLs    Functional Status Upon Admission to [facility]:  Mobility: max assist bed mobility  Transfers: max assist stairs, bed to chair  ADLs: mod assist bathing, dependent UBD/LBD,  min assist eating, total toileting     Physical Exam:  Temp:  [97.9 °F (36.6 °C)] 97.9 °F (36.6 °C)  HR:  [67] 67  Resp:  [19] 19  BP: (169)/(78) 169/78  SpO2:  [95 %] 95 %    General: GEN: Patient seen resting comfortably in bed, NAD  HEENT: NCAT; EOMI; mucous membranes moist  CV: no extremity edema  PULM: Breathing comfortably on room air  ABD: Non-distended  SKIN: mild erythematous, scaly rash on b/l feet (R > L), nontender, sometimes pruritic  NEURO:  A&Ox3, answering questions appropriately to context; able to follow multi-step commands with clear consistency  Speech is mildly dysarthric but organized  CN II-XII intact except for CNVII (Mild R sided facial droop), EOMI, PERRL, sensation to light touch in >2 locations on face intact and equal b/l, hearing intact to soft sound b/l, pharynx elevated with midline uvula, shoulder shrug intact, midline tongue  Sensation intact to light touch in large dermatomes b/l  Strength (MMT, R/L): See table below  Reflexes 3+  in RUE and RLE and 2+ in LUE and LLE, performed on biceps, triceps, brachioradialis, patella  5 beats ankle clonus on RLE  No dysmetria on finger-to-nose      MMT:   Strength:   Right  Left  Site  Right  Left  Site    0 5  S Ab: Shoulder Abductors  1  5  HF: Hip Flexors    0 5  EF: Elbow Flexors  2  5 KF: Knee Flexors    0  5  EE: Elbow Extensors  2  5  KE: Knee Extensors    0  5  WE: Wrist Extensors  1  5  DR: Dorsi Flexors    0  5  FF: Finger Flexors  1  5  PF: Plantar Flexors    0  5  HI: Hand Intrinsics  1  5  EHL: Extensor Hallucis Longus     Laboratory:          Invalid input(s): \"PLTCT\"  Results from last 7 days   Lab Units 08/16/24  1705   BUN mg/dL 24   SODIUM mmol/L 137   POTASSIUM mmol/L 4.2   CHLORIDE mmol/L 103   CREATININE mg/dL 0.79            Wt Readings from Last 1 " "Encounters:   08/20/24 123 kg (270 lb 11.6 oz)     Estimated body mass index is 46.47 kg/m² as calculated from the following:    Height as of this encounter: 5' 4\" (1.626 m).    Weight as of this encounter: 123 kg (270 lb 11.6 oz).    Imaging: reviewed     Rehabilitation Prognosis: good     Tolerance for three hours of therapy a day: good     Family/Patient Goals:  Patient/family's goals: Return to previous home/apartment.    Patient will receive PT and OT 90 minutes each per day, five days per week to achieve rehab goals or participate in 900 minutes of therapy within a 7 day week period.    Mobility Goals: [place]  Transfer Goals: [place]  Activities of Daily Living (ADLs) Goals: [place]    Discharge Planning:  Rehabilitation and discharge goals discussed with the patient and/or family.    Case Managment and Social Work to review patient/family resources and to coordinate Discharge Planning.    Estimated length of stay: 10 to 14 days    Patient and Family Education and Training:  Rehabilitation and discharge goals discussed with the patient and/or family.  Patient/family education/training needs to be discussed in weekly team meeting.    Equipment/DME needs: Physical Therapy to perform pressure mapping/wheelchair cushion evaluation when patient is able to sit. and Therapy teams to assess and evaluate for additional equipment/DME needs throughout rehabilitation stay    Past Medical History:   Past Surgical History:   Family History:   Social history:   Past Medical History:   Diagnosis Date    Atrial fibrillation (HCC)     Disease of thyroid gland     Hypertension     No past surgical history on file.  No family history on file.   Social History     Socioeconomic History    Marital status:      Spouse name: Not on file    Number of children: Not on file    Years of education: Not on file    Highest education level: Not on file   Occupational History    Not on file   Tobacco Use    Smoking " status: Never    Smokeless tobacco: Never   Vaping Use    Vaping status: Never Used   Substance and Sexual Activity    Alcohol use: Never    Drug use: Never    Sexual activity: Not on file   Other Topics Concern    Not on file   Social History Narrative    Not on file     Social Determinants of Health     Financial Resource Strain: Low Risk  (8/10/2024)    Received from [facility]    Overall Financial Resource Strain (CARDIA)     Difficulty of Paying Living Expenses: Not hard at all   Food Insecurity: No Food Insecurity (8/10/2024)    Received from [facility]    Hunger Vital Sign     Worried About Running Out of Food in the Last Year: Never true     Ran Out of Food in the Last Year: Never true   Transportation Needs: No Transportation Needs (8/10/2024)    Received from [facility]    PRAPARE - Transportation     Lack of Transportation (Medical): No     Lack of Transportation (Non-Medical): No   Physical Activity: Not on file   Stress: Not on file   Social Connections: Not on file   Intimate Partner Violence: Not At Risk (8/10/2024)    Received from [facility]    Humiliation, Afraid, Rape, and Kick questionnaire     Fear of Current or Ex-Partner: No     Emotionally Abused: No     Physically Abused: No     Sexually Abused: No   Housing Stability: High Risk (8/10/2024)    Received from [facility]    Housing Stability Vital Sign     Unable to Pay for Housing in the Last Year: Yes     Number of Times Moved in the Last Year: 1     Homeless in the Last Year: No          Current Medical Diagnosis Allergies   Patient Active Problem List   Diagnosis    Acute CVA (cerebrovascular accident) (HCC)    Clostridium difficile infection    Paroxysmal atrial fibrillation (HCC)    HTN (hypertension)    DIANELYS (obstructive sleep apnea)    Hypothyroidism    GERD (gastroesophageal reflux disease)    Meningioma (HCC)    No Known Allergies        Medical  Necessity Criteria for [facility] Admission:  atrial fibrillation, uncontrolled HTN, hypothyroidism, DIANELYS . In addition, the preadmission screen, post-admission physical evaluation, overall plan of care and admissions order demonstrate a reasonable expectation that the following criteria were met at the time of admission to the [facility].  The patient requires active and ongoing therapeutic intervention of multiple therapy disciplines (physical therapy, occupational therapy, speech-language pathology, or prosthetics/orthotics), one of which is physical or occupational therapy.    Patient requires an intensive rehabilitation therapy program, as defined in Chapter 1, section 110.2.2 of the CMS Medicare Policy Manual. This intensive rehabilitation therapy program will consist of at least 3 hours of therapy per day at least 5 days per week or at least 15 hours of intensive rehabilitation therapy within a 7 consecutive day period, beginning with the date of admission to the [facility].    The patient is reasonably expected to actively participate in, and benefit significantly from, the intensive rehabilitation therapy program as defined in Chapter 1, section 110.2.2 of the CMS Medicare Policy Manual at this time of admission to the [facility]. She can reasonably be expected to make measurable improvement (that will be of practical value to improve the patient’s functional capacity or adaptation to impairments) as a result of the rehabilitation treatment, as defined in section 110.3, and such improvement can be expected to be made within the prescribed period of time. As noted in the CMS Medicare Policy Manual, the patient need not be expected to achieve complete independence in the domain of self-care nor be expected to return to his or her prior level of functioning in order to meet this standard.  The patient must require physician supervision by a rehabilitation physician. As such, a rehabilitation physician will conduct face-to-face visits with  the patient at least 3 days per week throughout the patient’s stay in the AR[facility] to assess the patient both medically and functionally, as well as to modify the course of treatment as needed to maximize the patient’s capacity to benefit from the rehabilitation process.  The patient requires an intensive and coordinated interdisciplinary approach to providing rehabilitation, as defined in Chapter 1, section 110.2.5 of the CMS Medicare Policy Manual. This will be achieved through periodic team conferences, conducted at least once in a 7-day period, and comprising of an interdisciplinary team of medical professionals consisting of: a rehabilitation physician, registered nurse,  and/or , and a licensed/certified therapist from each therapy discipline involved in treating the patient.       Case discussed with attending Dr. Lanre King MD  Physical Medicine and Rehabilitation, PGY-2

## 2024-08-20 NOTE — ASSESSMENT & PLAN NOTE
- pt with diarrhea in acute care  - Cdiff positive PCR  - started PO vanc in acute care    Plan:  - PO vanc course completed   - continue contact isolation precautions per [facility] guidelines, but pt now able to go to [facility] gym

## 2024-08-20 NOTE — H&P
[facility]  H&P  Name: Maye Lilly 64 y.o. female I MRN: [id number]  Unit/Bed#: -01 I Date of Admission: 8/20/2024   Date of Service: 8/20/2024 I Hospital Day: 0      Assessment & Plan   * Acute CVA (cerebrovascular accident) (HCC)  Assessment & Plan  Presented with right sided weakness, slurred speech, headache and vomiting  MRI = left pontine infarct  Neuro felt CVA was from atrial fibrillation in setting of non-compliance with anticoagulation  Currently now on Xarelto 20mg qd, Lipitor 40mg qd  Followup with Neuro as OP.  Stevo Baires from [facility] from that group was listed on the DC summary    Clostridium difficile infection  Assessment & Plan  New during hospital stay  Continue Vancomycin 125mg QID x 23 more doses    Paroxysmal atrial fibrillation (HCC)  Assessment & Plan  Was seeing cardiologist in [place] and taking Eliquis but she has not seen him in 2 yrs and stopped all her meds >1 yr ago  EKG in the hospital showed NSR  Needs to followup as OP with Cardiology = Dr Beltre  Continue Coreg  Not to go back on Flecainide    HTN (hypertension)  Assessment & Plan  Home:  no meds  Here:  Coreg 25mg BID/Hydralazine 75 mg q8hrs/Nifedipine 60mg daily at dinner time, Aldactone 25mg daily, Valsartan 160 mg qd which will be changed to dose equivalent ARB that we have here.  Will watch BP  Followup with IM as OP.  On DC summary was listed to see Megan DORSEY from that group    DIANELYS (obstructive sleep apnea)  Assessment & Plan  In past was on CPAP    Hypothyroidism  Assessment & Plan  Continue Levothyroxine 25 mcg qd  This was the dose that she was supposed to be taking at home    GERD (gastroesophageal reflux disease)  Assessment & Plan  Continue Protonix    Meningioma (HCC)  Assessment & Plan  Incidental finding   OP followup         Subjective/HPI:    Maye Lilly is a 64 year old patient with history of HTN, PAF, hypothyroidism, GERD and DIANELYS who presented with  right sided weakness, slurred speech, headache and vomiting.CT head and CTA had no acute findings.  MRI revealed left pontine infarct.  She is supposed to be taking medications for her HTN and Eliquis as an anticoagulant for her PAF but she stopped taking her medications well over a year ago due to insurance loss.  Neurology saw in consult.  They felt the CVA occurred in the setting of PAF and medication noncompliance.  For her HTN, she was placed on Valsartan 160 mg daily, Coreg 25mg BID, Hydralazine 75 mg q 8 hours, Aldactone 25mg daily and Procardia 60mg daily at dinnertime.  ECHO showed a LVEF of 65% with mild diastolic dysfunction.  During the course of her admission, she developed diarrhea and tested positive for Cdiff.  She is currently on PO Vancomycin for treatment.      Patient is referred to the [facility] for ongoing rehabilitation needs.      Currently, patient denies CP, SOB, dizziness, N/V/D.  Says stools are soft now.        Review of Systems: A 10 point ROS was performed; negative except as noted above.       Social History:    Substance Use History:   Social History     Substance and Sexual Activity   Alcohol Use Never     Social History     Tobacco Use   Smoking Status Never   Smokeless Tobacco Never     Social History     Substance and Sexual Activity   Drug Use Never       Past Medical History:    Past Medical History:   Diagnosis Date    Atrial fibrillation (HCC)     Disease of thyroid gland     Hypertension          Review of Scheduled Meds:  Current Facility-Administered Medications   Medication Dose Route Frequency Provider Last Rate    acetaminophen  650 mg Oral Q6H PRN WENCESLAO Patel      atorvastatin  40 mg Oral QPM WENCESLAO Patel      bisacodyl  10 mg Rectal Daily PRN Dougie King MD      carvedilol  25 mg Oral BID With Meals WENCESLAO Patel      hydrALAZINE  75 mg Oral Q8H [facility] WENCESLAO Patel      [START ON 8/21/2024] levothyroxine  25  "mcg Oral Early Morning WENCESLAO Patel      [START ON 8/21/2024] losartan  100 mg Oral Daily WENCESLAO Patel      [START ON 8/21/2024] multivitamin stress formula  1 tablet Oral Daily WENCESLAO Patel      NIFEdipine  60 mg Oral Daily With Dinner WENCESLAO Patel      [START ON 8/21/2024] pantoprazole  40 mg Oral Early Morning WENCESLAO Patel      rivaroxaban  20 mg Oral Daily With Dinner WECNESLAO Patel      [START ON 8/21/2024] spironolactone  25 mg Oral Daily WENCESLAO Patel      vancomycin  125 mg Oral 4x Daily WENCESLAO Patel         Physical Exam:  Temp:  [97.9 °F (36.6 °C)] 97.9 °F (36.6 °C)  HR:  [67] 67  Resp:  [19] 19  BP: (169)/(78) 169/78  SpO2:  [95 %] 95 %    General Appearance: no distress, nontoxic appearing  HEENT:  External ear normal.  Nose normal w/o drainage. Mucous membranes are moist. Oropharynx is clear. Conjunctiva clear w/o icterus or redness.  Neck:  Supple, normal ROM  Lungs: BBS without crackles/wheeze/rhonchi; respirations unlabored with normal inspiratory/expiratory effort.  No retractions noted.  On RA  CV: regular rate and rhythm; no rubs/murmurs/gallops, PMI normal   ABD: Abdomen is soft.  Bowel sounds all quadrants.  Nontender with no distention.    EXT: no edema  Skin: normal turgor, normal texture.  Has small adam dry rash, non-itchy top of right foot and side of left foot  Psych: affect normal, mood normal  Neuro: AAO; +mild dysarthria.         Laboratory:          Invalid input(s): \"PLTCT\"   8/16:  Sodium 137, K+ 4.2, BUN/creat 24/0.79, , WBC 9.2, hemoglobin 13.6, platelet 232.    HbA1C 5.8 Results from last 7 days   Lab Units 08/16/24  1705   BUN mg/dL 24   SODIUM mmol/L 137   POTASSIUM mmol/L 4.2   CHLORIDE mmol/L 103   CREATININE mg/dL 0.79            Wt Readings from Last 1 Encounters:   08/20/24 123 kg (270 lb 11.6 oz)     Estimated body mass index is 46.47 kg/m² as " "calculated from the following:    Height as of this encounter: 5' 4\" (1.626 m).    Weight as of this encounter: 123 kg (270 lb 11.6 oz).    Imaging:  No orders to display       Level 1 - Full Code    Counseling / Coordination of Care:   Total floor / unit time spent today 70 minutes and greater than 50% of total time was spent with the patient and / or family counseling and / or coordination of care.  A description of the counseling / coordination of care: review of inpatient records, examination of the patient and explanation of the plan of care to the patient.      ** Please Note: Fluency Direct voice to text software may have been used in the creation of this document. **        "

## 2024-08-20 NOTE — ASSESSMENT & PLAN NOTE
- admitted to acute care with slurred speech, headache, tingling in R arm  - on admission: 0/5 RUE strength, 1-2/5 in RLE. On 8/29, pt seen wiggling fingers of R hand against at least gravity.  - CTH with meningioma along falx, but no infarct/hemorrhage. CTA with no LVO. MRI with L pontine infarct.  - Did not receive tPA/TNKase, did not undergo thrombectomy  - echo with LVH, normal EF, mild diastolic dysfunction    Plan:  - fall precautions  - Monitor for neurogenic bowel and bladder  - Promote regular sleep/wake cycles and proper sleep hygiene  - Monitor closely for hemiparetic shoulder pain and post-stroke pain  -  Neutral resting wrist splint for the RUE and bilateral multipodus boots to be worn at night and while in bed during the day to prevent developing spasticity and contracture  - Maintain adequate nutrition  - Avoid sedating and anticholinergic medications as possible  - Maintain normotension, s/p permissive hypertension  - Continue Xarelto 20mg daily  - Continue Atorvastatin 40 mg qHs for secondary prevention  - multipodus boots for RLE

## 2024-08-20 NOTE — ASSESSMENT & PLAN NOTE
Was seeing cardiologist in [place] and taking Eliquis but she has not seen him in 2 yrs and stopped all her meds >1 yr ago  EKG in the hospital showed NSR  Needs to followup as OP with Cardiology = Dr Beltre  Continue Coreg  Not to go back on Flecainide

## 2024-08-20 NOTE — ASSESSMENT & PLAN NOTE
- [id number] meningioma along falx, no mass effect or vasogenic edema  - continue to monitor, outpatient follow up with neurology as needed.

## 2024-08-20 NOTE — ASSESSMENT & PLAN NOTE
Presented with right sided weakness, slurred speech, headache and vomiting  MRI = left pontine infarct  Neuro felt CVA was from atrial fibrillation in setting of non-compliance with anticoagulation  Currently now on Xarelto 20mg qd, Lipitor 40mg qd  Followup with Neuro as OP.  [facility] from [facility] was listed on the DC summary

## 2024-08-20 NOTE — ASSESSMENT & PLAN NOTE
- prev on Eliquis  - switched to Xarelto in acute care    Plan:  - continue Xarelto 20mg daily  - continue coreg

## 2024-08-20 NOTE — ASSESSMENT & PLAN NOTE
Home:  no meds  Here:  Coreg 25mg BID/Hydralazine 75 mg q8hrs/Nifedipine 60mg daily at dinner time, Aldactone 25mg daily, Valsartan 160 mg qd which will be changed to dose equivalent ARB that we have here.  Will watch BP  Followup with IM as OP.  On DC summary was listed to see Megan DORSEY from that group

## 2024-08-21 PROCEDURE — 97530 THERAPEUTIC ACTIVITIES: CPT

## 2024-08-21 PROCEDURE — 92523 SPEECH SOUND LANG COMPREHEN: CPT

## 2024-08-21 PROCEDURE — 99232 SBSQ HOSP IP/OBS MODERATE 35: CPT | Performed by: PHYSICAL MEDICINE & REHABILITATION

## 2024-08-21 PROCEDURE — 97163 PT EVAL HIGH COMPLEX 45 MIN: CPT

## 2024-08-21 PROCEDURE — NC001 PR NO CHARGE: Performed by: PHYSICAL MEDICINE & REHABILITATION

## 2024-08-21 PROCEDURE — 97167 OT EVAL HIGH COMPLEX 60 MIN: CPT

## 2024-08-21 PROCEDURE — 94660 CPAP INITIATION&MGMT: CPT

## 2024-08-21 PROCEDURE — 99232 SBSQ HOSP IP/OBS MODERATE 35: CPT | Performed by: NURSE PRACTITIONER

## 2024-08-21 PROCEDURE — 97535 SELF CARE MNGMENT TRAINING: CPT

## 2024-08-21 PROCEDURE — 92522 EVALUATE SPEECH PRODUCTION: CPT

## 2024-08-21 PROCEDURE — 94760 N-INVAS EAR/PLS OXIMETRY 1: CPT

## 2024-08-21 RX ORDER — LIDOCAINE 50 MG/G
1 PATCH TOPICAL DAILY
Status: DISCONTINUED | OUTPATIENT
Start: 2024-08-21 | End: 2024-09-16 | Stop reason: HOSPADM

## 2024-08-21 RX ORDER — BENZOCAINE/MENTHOL 6 MG-10 MG
LOZENGE MUCOUS MEMBRANE 2 TIMES DAILY
Status: DISPENSED | OUTPATIENT
Start: 2024-08-21 | End: 2024-08-28

## 2024-08-21 RX ADMIN — HYDRALAZINE HYDROCHLORIDE 75 MG: 50 TABLET ORAL at 06:28

## 2024-08-21 RX ADMIN — VANCOMYCIN HYDROCHLORIDE 125 MG: 125 CAPSULE ORAL at 17:56

## 2024-08-21 RX ADMIN — HYDROCORTISONE: 1 CREAM TOPICAL at 18:02

## 2024-08-21 RX ADMIN — NIFEDIPINE 60 MG: 30 TABLET, FILM COATED, EXTENDED RELEASE ORAL at 17:56

## 2024-08-21 RX ADMIN — RIVAROXABAN 20 MG: 20 TABLET, FILM COATED ORAL at 17:56

## 2024-08-21 RX ADMIN — VANCOMYCIN HYDROCHLORIDE 125 MG: 125 CAPSULE ORAL at 09:26

## 2024-08-21 RX ADMIN — CARVEDILOL 25 MG: 25 TABLET, FILM COATED ORAL at 17:56

## 2024-08-21 RX ADMIN — ATORVASTATIN CALCIUM 40 MG: 40 TABLET, FILM COATED ORAL at 17:56

## 2024-08-21 RX ADMIN — B-COMPLEX W/ C & FOLIC ACID TAB 1 TABLET: TAB at 09:26

## 2024-08-21 RX ADMIN — SPIRONOLACTONE 25 MG: 25 TABLET, FILM COATED ORAL at 09:26

## 2024-08-21 RX ADMIN — VANCOMYCIN HYDROCHLORIDE 125 MG: 125 CAPSULE ORAL at 13:19

## 2024-08-21 RX ADMIN — VANCOMYCIN HYDROCHLORIDE 125 MG: 125 CAPSULE ORAL at 21:51

## 2024-08-21 RX ADMIN — HYDRALAZINE HYDROCHLORIDE 75 MG: 50 TABLET ORAL at 21:51

## 2024-08-21 RX ADMIN — CARVEDILOL 25 MG: 25 TABLET, FILM COATED ORAL at 09:26

## 2024-08-21 RX ADMIN — HYDRALAZINE HYDROCHLORIDE 75 MG: 50 TABLET ORAL at 13:19

## 2024-08-21 RX ADMIN — LOSARTAN POTASSIUM 100 MG: 50 TABLET, FILM COATED ORAL at 09:26

## 2024-08-21 RX ADMIN — PANTOPRAZOLE SODIUM 40 MG: 20 TABLET, DELAYED RELEASE ORAL at 06:28

## 2024-08-21 RX ADMIN — Medication 2 G: at 18:03

## 2024-08-21 RX ADMIN — LIDOCAINE 1 PATCH: 700 PATCH TOPICAL at 18:02

## 2024-08-21 RX ADMIN — LEVOTHYROXINE SODIUM 25 MCG: 25 TABLET ORAL at 06:28

## 2024-08-21 NOTE — PROGRESS NOTES
PT ARC GOALS        08/21/24 1600   PT Transfer Goal   Roll left and right Goal 04. Supervision or touching assistance- [place] provides VERBAL CUES or supervision throughout activity.   Sit to lying Goal 03. Partial/moderate assistance - [place] does less than half the effort. [place] lifts or holds trunk or limbs and provides more than half the effort.   Lying to sitting on side of bed Goal 03. Partial/moderate assistance - [place] does less than half the effort. [place] lifts or holds trunk or limbs and provides more than half the effort.   Sit to stand Goal 03. Partial/moderate assistance - [place] does less than half the effort. [place] lifts or holds trunk or limbs and provides more than half the effort.   Chair/bed-to-chair transfer Goal 03. Partial/moderate assistance - [place] does less than half the effort. [place] lifts or holds trunk or limbs and provides more than half the effort.   Car Transfer Goal 02. Substantial/maximal assistance - [place] does MORE THAN HALF the effort. [place] lifts or holds trunk or limbs and provides more than half the effort.   Assistive Device   (LRAD)   Status Target goal - four weeks   Locomotion Goal   Primary discharge mode of locomotion Both   Target Walk Distance 50 ft   Assist Device   (LRAD)   Walk 10 feet Goal 03. Partial/moderate assistance - [place] does less than half the effort. [place] lifts or holds trunk or limbs and provides more than half the effort.  ([place])   Walk 50 feet with 2 turns Goal 03. Partial/moderate assistance - [place] does less than half the effort. [place] lifts or holds trunk or limbs and provides more than half the effort.   Walk 150 feet Goal 88. Not attempted due to medical condition or safety concerns  ([place])   Walking 10 feet on uneven surface 88. Not attempted due to medical condition or safety concerns   Walking Goal Status Target goal - four weeks   Wheel 50 feet with 2 turns Goal 05. Setup or clean-up assistance - [place] SETS UP or CLEANS UP,  patient completes activity. [place] assists only prior to or following the activity.   Wheel 150 feet Goal 05. Setup or clean-up assistance - [place] SETS UP or CLEANS UP, patient completes activity. [place] assists only prior to or following the activity.   Wheelchair Goal Status Target goal - four weeks   Stairs Goal   1 step or curb goal 03. Partial/moderate assistance - [place] does less than half the effort. [place] lifts or holds trunk or limbs and provides more than half the effort.   4 steps Goal 03. Partial/moderate assistance - [place] does less than half the effort. [place] lifts or holds trunk or limbs and provides more than half the effort.   12 steps Goal 03. Partial/moderate assistance - [place] does less than half the effort. [place] lifts or holds trunk or limbs and provides more than half the effort.   Number of Stairs 12   Technique Non-reciprocal   Hand Rail   (single HR)   Status Target goal - four weeks   Object Retrieval Goal   Picking up object Goal 03. Partial/moderate assistance - [place] does less than half the effort. [place] lifts or holds trunk or limbs and provides more than half the effort.   Assistive Device  Reacher   Small Object Picked Up marker

## 2024-08-21 NOTE — PROGRESS NOTES
OCCUPATIONAL THERAPY   ACUTE REHAB EVALUATION TAA    Active Problem List:   Patient Active Problem List   Diagnosis    Acute CVA (cerebrovascular accident) (HCC)    Clostridium difficile infection    Paroxysmal atrial fibrillation (HCC)    HTN (hypertension)    DIANELYS (obstructive sleep apnea)    Hypothyroidism    GERD (gastroesophageal reflux disease)    Meningioma (HCC)     Past Medical Hx:   Past Medical History:   Diagnosis Date    Atrial fibrillation (HCC)     Disease of thyroid gland     Hypertension      Past Surgical Hx: No past surgical history on file.     08/21/24 3045   Patient Data   Rehab Impairment a   Support System   Name Pt reports that she resides alone. Has supportive local daughter, and son who lives in [place]. she states that her son will be moving here to assist and is able to work from home. he is coming next week. Pt states that his daughter and son in [facility] are bothe nurses and feel comfortable with performing self care. pt reports that she enjoys watching movies, and going out to eat with friends. pt reports that she was currently working at [employer].   Home Setup   Type of Home Single Level  ([facility] home)   Method of Entry Stairs   Number of Stairs   (4+4)   First Floor Bathroom Full;Tub;Shower;Combo   Prior IADL Participation   Money Management   (IND)   Meal Preparation Full Participation   Laundry Full Participation   Home Cleaning Full Participation   Prior Level of Function   Self-Care 3. Independent - Patient completed the activities by him/herself, with or without an assistive device, with no assistance from a helper.   Indoor-Mobility (Ambulation) 3. Independent - Patient completed the activities by him/herself, with or without an assistive device, with no assistance from a helper.   Stairs 3. Independent - Patient completed the activities by him/herself, with or without an assistive device, with no assistance from a helper.   Functional Cognition 3. Independent - Patient completed the  activities by him/herself, with or without an assistive device, with no assistance from a helper.   Falls in the Last Year   Number of falls in the past 12 months 0   Patient Preference   Nickname (Patient Preference) Nista   Restrictions/Precautions   Precautions Bed/chair alarms;Fall Risk;Contact/isolation;Supervision on toilet/commode   Pain Assessment   Pain Assessment Tool 0-10   Pain Score 2   Pain Location/Orientation Orientation: Right;Location: Knee   Hospital Pain Intervention(s) Repositioned   Eating Assessment   Type of Assistance Needed Physical assistance   Physical Assistance Level Total assistance   Comment in chair mode. R UE use. pt able to manage self feeding with L UE after set up.   Eating CARE Score 1   Oral Hygiene   Type of Assistance Needed Physical assistance   Physical Assistance Level Total assistance   Comment in chair mode. R UE use. pt able to manage oral care with L UE and set up and direct supervision   Oral Hygiene CARE Score 1   Shower/Bathe Self   Type of Assistance Needed Physical assistance   Physical Assistance Level Total assistance   Comment Ax2 bed level. pt able to wash face and abdomen. difficulty with cross body reaching to R UE   Shower/Bathe Self CARE Score 1   Dressing/Undressing Clothing   Type of Assistance Needed Physical assistance   Physical Assistance Level 76% or more   Comment bed level. able to bring shirt overhead with L UE after assitance for threading.   Upper Body Dressing CARE Score 2   Type of Assistance Needed Physical assistance   Physical Assistance Level Total assistance   Comment AX2 rolling for donning underwear   Lower Body Dressing CARE Score 1   Putting On/Taking Off Footwear   Type of Assistance Needed Physical assistance   Physical Assistance Level Total assistance   Putting On/Taking Off Footwear CARE Score 1   Toileting Hygiene   Type of Assistance Needed Physical assistance   Physical Assistance Level Total assistance   Toileting Hygiene  CARE Score 1   Toilet Transfer   Type of Assistance Needed Physical assistance   Physical Assistance Level Total assistance   Comment AX2 bed level bed pan   Toilet Transfer CARE Score 1   Transfer Bed/Chair/Wheelchair   Comment limited with time constraints to complete ADL. did utlize chair mode for all activites and eating.   Reason if not Attempted Safety concerns   Chair/Bed-to-Chair Transfer CARE Score 88   Roll Left and Right   Type of Assistance Needed Physical assistance   Physical Assistance Level Total assistance   Comment Ax2   Roll Left and Right CARE Score 1   Sit to Stand   Reason if not Attempted Safety concerns   Sit to Stand CARE Score 88   PROM - RUE   R Shoulder Flexion WFL   R Shoulder Extension WFL   R Shoulder ABduction WFL   R Shoulder ADduction WFL   R Shoulder Internal Rotation WFL   R Shoulder External Rotation WFL   R Elbow Extension WFL   R Elbow Extension WFL   R Wrist Flexion WFL   R Wrist Extension limited 1/2 range   R Digit Extension 3/4 range   Strength - RUE   R Gross Arm Strength 0/5  (able to compelte some mshoulder shrugs,)   R Shoulder Flexion 0/5   R Shoulder Extension 1/5   R Shoulder ABduction 0/5   R Shoulder ADduction 0/5   R Shoulder Internal Rotation 0/5   R Elbow Flexion 0/5   R Elbow Extension 0/5   R Wrist Flexion 0/5   R Wrist Extension 0/5   R Wrist ABduction 0/5   R Wrist ADduction 0/5   R Digit Flexion 0/5   R Digit Extension 0/5   Tone - RUE   R Wrist Flexion Hypertonia  (digits)   LUE Assessment   LUE Assessment WFL   Sensation   Light Touch Partial deficits in the RUE;Partial deficits in the RLE   Cognition   Overall Cognitive Status WFL   Vision   Vision Comments wears glasses all the time   Perception   Inattention/Neglect Cues to attend to right side of body   Objective Measure   OT Measure(s) a   Discharge Information   Patient's Rehab Expectations To walk again and be independent   Impressions Pt is a 64 y.o. female seen for OT evaluation following  admission to \[facility]\"" for  acute left pontine infarct . OT evaluation and assessment of strength, ADL function, and functional transfers completed. Prior to admission Pt was completing ADLs at independent with use of NO DME. Pt was completing IADLs independently. Pt lives with lives alone and is going to have family support but this may be limited. Entry to the home a large barrier. Personal factors affecting the patient at this time include: co morbidities/Other health conditions, inaccessible home, Lives alone, Decreased caregiver support, and Body habitus. Pt is currently limited due to the following deficits impacting occupational performance, Slow processing, Impaired standing balance, Impaired righting reactions, impaired motor planning, impaired trunk control, Impaired sitting balance, Impaired sitting tolerance, Unilateral weakness or paralysis of Upper limb , Unilateral weakness or paralysis of Lower limb ,  Reduced sensation, Ismael Neglect to body, Generalized weakness, impaired GMC/FMC, Impaired UE AROM, and Impaired UE strength. The patient has shown a decline from prior level of function. The patient participates in identification of personal goals to address in Occupational Therapy. Recommend skilled OT services for I/ADL retraining to support the ability to safely participate in daily occupations safely and independently in the most least restrictive way. Pt demonstrates Good rehab potential to meet LTG's of minimal assist with use of wheelchair. Anticipate  4week(s) length of stay. Occupational Therapy plan of care  will address the following areas: ADL re-training, fxnl xfers, fxnl attention, standing tolerance, standing balance, UE NMR, UE strengthening, UE endurance, FMC/GMC, FMS/GMS, DME training/education, family training/education, EC techniques/education, healthy coping education, leisure pursuits, body awareness, sitting balance, core/trunk control, and midline awareness   OT Therapy Minutes   OT  Time In 0715   OT Time Out 0840   OT Total Time (minutes) 85   OT Mode of treatment - Individual (minutes) 85   OT Mode of treatment - Concurrent (minutes) 0   OT Mode of treatment - Group (minutes) 0   OT Mode of treatment - Co-treat (minutes) 0   OT Mode of Treatment - Total time(minutes) 85 minutes   OT Cumulative Minutes 85   Cumulative Minutes   Cumulative therapy minutes 85

## 2024-08-21 NOTE — PCC PHYSICAL THERAPY
Date: 9/11/2024  Date of Evaluation: 8/1/2024  Estimated Length of Stay: 4 weeks     Barriers to Discharge Home: 8 ABEL at her daughter's home in [place], decreased caregiver support (will need to clarify with family availability to assist at discharge),   PT Interventions to address Barriers: therapeutic exercises, transfer training, Standing tolerance, identifying optimal equipment  Equipments Needs: TBD pending progress  PT Plan of Care for the Week: Progressed away from using AFOContinue to work on stair navigation each day as that is the biggest barrier to patient returning home. Son identified new alternate way of entry that may be more appropriate for patient. Also plan to reassess bed mobility and WC mobility. Continue to work on NPP/NMR  Family Training Needed: Occurred on Sunday 9/9 focusing on hands on training for car transfer, observation for stair management. Son coming in Friday, 9/13 for family training on stair management. Plan to schedule another family training  Discharge Planning: TBD pending progress, but patient's goal is to discharge to her daughter's home in [place].

## 2024-08-21 NOTE — PROGRESS NOTES
08/21/24 0930   Patient Data   Rehab Impairment Impairment of mobility, safety and Activities of Daily Living (ADLs) due to Stroke:  01.2  Right Body Involvement (Left Brain)   Etiologic Diagnosis CVA   Date of Onset 08/10/24   Support System   Name Viviane, daughter, who lives locally in [place] and is a RN at [employer]. Has son in [place] who will be flying int   Home Setup   Type of Home Single Level   Method of Entry Stairs   Number of Stairs 8   Home Modification Comment Patient reports she plans to discharge to her daughter's home in [place]. The above information reflects the daughter's set up. Pt's home is in [place] and she had 16 ABEL with single handrail. Reports having a first foor apartment that she has to fix up that she can eventually move into   [employer]   Vocation Work Full Time  (as a  at [email or web address])   Transportation    Prior Level of Function   Self-Care 3. Independent - Patient completed the activities by him/herself, with or without an assistive device, with no assistance from a helper.   Indoor-Mobility (Ambulation) 3. Independent - Patient completed the activities by him/herself, with or without an assistive device, with no assistance from a helper.   Stairs 3. Independent - Patient completed the activities by him/herself, with or without an assistive device, with no assistance from a helper.   Functional Cognition 3. Independent - Patient completed the activities by him/herself, with or without an assistive device, with no assistance from a helper.   Prior Assistance Needed for   (none)   Prior Device Used Z. None of the above   Falls in the Last Year   Number of falls in the past 12 months 0   Patient Preference   Nickname (Patient Preference) (S)  Nista   Restrictions/Precautions   Precautions Bed/chair alarms;Fall Risk;Contact/isolation;Supervision on toilet/commode  (C diff precautions. On antibiotics until Monday. Per policy, will need to be  treated in her room until then.)   Pain Assessment   Pain Assessment Tool 0-10   Pain Score No Pain   Transfer Bed/Chair/Wheelchair   Type of Assistance Needed Physical assistance   Physical Assistance Level Total assistance   Comment Mod/MaxAx2 for slide board transfer to the Left EOB<>tilit in space wheelchair attempted to transfer to the R multiple times but unsuccessful.   Chair/Bed-to-Chair Transfer CARE Score 1   Roll Left and Right   Type of Assistance Needed Physical assistance   Physical Assistance Level Total assistance   Comment Ax2   Roll Left and Right CARE Score 1   Sit to Lying   Type of Assistance Needed Physical assistance   Physical Assistance Level Total assistance   Comment MaxAx2 for management of trunk and LEs with HOB flat   Sit to Lying CARE Score 1   Lying to Sitting on Side of Bed   Type of Assistance Needed Physical assistance   Physical Assistance Level Total assistance   Comment MaxAx2 for management of trunk and LEs with HOB flat   Lying to Sitting on Side of Bed CARE Score 1   Sit to Stand   Type of Assistance Needed Physical assistance   Physical Assistance Level Total assistance   Comment ModAx2 but progressed to MaxAx2 as pt fatigued, R knee blocked, watch R ankle as it inverts in standing. would benefit from R AFO. Asked pt to have daughter bring in sneakers. Pt reports daughter will be going to her home on Friday to get some of her belongings   Sit to Stand CARE Score 1   Picking Up Object   Reason if not Attempted Safety concerns   Picking Up Object CARE Score 88   Car Transfer   Reason if not Attempted Safety concerns   Car Transfer CARE Score 88   Walk 10 Feet   Reason if not Attempted Safety concerns   Walk 10 Feet CARE Score 88   Walk 50 Feet with Two Turns   Reason if not Attempted Safety concerns   Walk 50 Feet with Two Turns CARE Score 88   Walk 150 Feet   Reason if not Attempted Safety concerns   Walk 150 Feet CARE Score 88   Walking 10 Feet on Uneven Surfaces   Reason if  not Attempted Safety concerns   Walking 10 Feet on Uneven Surfaces CARE Score 88   Wheel 50 Feet with Two Turns   Reason if not Attempted Safety concerns   Wheel 50 Feet with Two Turns CARE Score 88   Wheel 150 Feet   Reason if not Attempted Safety concerns   Wheel 150 Feet CARE Score 88   Curb or Single Stair   Reason if not Attempted Safety concerns   1 Step (Curb) CARE Score 88   4 Steps   Reason if not Attempted Safety concerns   4 Steps CARE Score 88   12 Steps   Reason if not Attempted Safety concerns   12 Steps CARE Score 88   Comprehension   QI: Comprehension 4. [place]: Clear comprehension without cues or repetitions   Expression   QI: Expression 4. Express complex messages without difficulty and with speech that is clear and easy to understan   RLE Assessment   RLE Assessment X   Strength RLE   R Hip Flexion 1/5   R Knee Flexion 2-/5   R Knee Extension 2-/5   LLE Assessment   LLE Assessment   (grossly 4/5)   Cognition   Overall Cognitive Status WFL   Arousal/Participation Alert;Cooperative   Attention Within functional limits   Orientation Level Oriented X4   Following Commands Follows all commands and directions without difficulty   Objective Measure   PT Findings Increased time required to identify appropriate equipment and rearrange room for mobility. Trialed use of tilit in space WC with roho cushion. Pt will benefit from wider chair for OOB schedule as lateral aspects of thighs come in contact with hard armrest surface. Requested order for Kreg Bed due to decreased mobility and risk for skin breakdown. Kreg Bed will always allow us to utilize chair mode and work on standing tolerance. Patient remains therapy only transfers for now. Plan for cotreat session tomorrow to further assess best transfer status for other staff members.   Therapeutic Exercise   Therapeutic Exercise/Activity Sitting EOB x 15 minutes with CGA level of assist; Standing EOB with ModA from therapist, proactively blocking R knee  VCs for upright posture ( hips under shoulders)   Discharge Information   Vocational Plan Full Time   Barriers to Return to Vocation Transportation Issues;Strength;Endurance   Patient's Discharge Plan To discharge to daughter's home upon discharge with assistance from family   Patient's Rehab Expectations To walk again. Get my independence back   Barriers to Discharge Home Unsafe Home Setup;Decreased Strength;Decreased Endurance;Safety Considerations   Impressions Patient is a 64 year old male/female who [facility] to [facility] following hospitalization for CVA. PMH significant for afib (prev on Eliquis, now on Xarelto), HTN, hypothyroidism, DIANELYS, morbid obesity, GERD . Prior to admission, patient was independent for ADLs, + ,  walking without assistive device. Patient lives with Alone in a apartment  with 16 ABEL. On initial evaluation, patient presents decreased strength, decreased ROM, imbalance, decreased endurance, fatigue, right hemiparesis, delayed righting reactions, and motor planning deficits resulting in increased assistance for all functional mobility. Patient requires Total Assistance  for bed mobility and Total Assistance  for transfers, and unable to safely assess ambulation or stair navigation at this time.  Patient is high risk for falls secondary to deficits found on initial evaluation and via outcome measures. Patient will benefit from physical therapy services to address the deficits identified on evaluation and to maximize safety and independence with all functional mobility and to decrease caregiver burden. Barriers to discharge home at this time include: limited caregiver support/ inaccessible home environment/high risk for falls. Patient is a good rehabilitation candidate. Patient's estimated length of stay is 4 weeks with goals set for  [place] . PT plan of care to focus on bed mobility/transfers/gait training/stair navigation/strength training/balance training/improving activity tolerance.    PT Therapy Minutes   PT Time In 0930   PT Time Out 1100   PT Total Time (minutes) 90   PT Mode of treatment - Individual (minutes) 90   PT Mode of treatment - Concurrent (minutes) 0   PT Mode of treatment - Group (minutes) 0   PT Mode of treatment - Co-treat (minutes) 0   PT Mode of Treatment - Total time(minutes) 90 minutes   PT Cumulative Minutes 90   Cumulative Minutes   Cumulative therapy minutes 175     Mirta Mccord, PT, DPT       Physical Therapy POC over next several days:   Patient will need to be treated in her room due to actively being treated for C diff. Patient is on antibiotics until Monday. Per hospital policy, pt can not be treated outside her room until antibiotics are finished.  -Co treat planned for 8/22 with goal of identifying safest transfer method for other staff members. Slide board vs Sophie vs Smooth  if and when pt is too fatigued. Assess swap out technique for toileting    - Family to bring in personal belongings on Friday or Saturday, which should include a pair of shoes. Once family brings in shoes, pt would benefit from R AFO as R foot inverts when in standing.  - Work with Occupational Therapy to create an out of bed schedule  - Requested Kreg Bed to be able to work on standing tolerance/weight bearing through RLE.   - Provide patient with supine HEP that she can complete in the room outside of therapy sessions

## 2024-08-21 NOTE — CONSULTS
See consult note dated 8/20/24.     Dk De Dios MD  Attending Physician  Physical Medicine and Rehabilitation  [facility]

## 2024-08-21 NOTE — ASSESSMENT & PLAN NOTE
Home:  no meds  Here:  Coreg 25mg BID/Hydralazine 75 mg q8hrs/Nifedipine 60mg daily at dinner time, Aldactone 25mg daily, Valsartan 160 mg qd which will be changed to dose equivalent ARB that we have here.  Stable; no changes today 8/21/24  Followup with IM as OP.  On DC summary was listed to see Megan DORSEY from that group

## 2024-08-21 NOTE — PROGRESS NOTES
SLP COGNITIVE AND MOTOR SPEECH EVALUATION      24 2216   Pain Assessment   Pain Assessment Tool 0-10   Pain Score No Pain   Restrictions/Precautions   Precautions Bed/chair alarms;Cognitive;Fall Risk;Contact/isolation;Supervision on toilet/commode   Cognitive Linguisitic Assessments   The [employer] [facility] ([facility]) Pt completed the [facility] cognitive assessment. Pt total score was 25 which as compared to those with high school education correlates with mild neurocognitive disorder and is indicative of dementia. Breakdown of scores as follows:    Orientation: 3/3  Functional problem solving with math: 3/3  Divergent naming (timed 1 minute- animals): 2/3 (12 animals total)  Word recall: 4/5  Mental flexibility with number order: 1/2  Clock Drawin/4   Following directions given shapes: 2/2  Story recall comprehension questions:     Total score: 25/30    Based on score, patient will benefit from further skilled SLP services to maximize overall cognitive lingusitic communication abilities for increased independence and decreased burden of care.    Comprehension   Assist Devices Glasses   Auditory Basic;Complex   Visual Basic;Complex   Findings Please see SLP tx note for further details.   QI: Comprehension 4. Undestands: Clear comprehension without cues or repetitions   Comprehension (FIM) 5 - Needs help/cues, repetition only RARELY ( < 10% of the time)   Expression   Verbal Basic   Non-Verbal Basic;Complex   Intelligibility Sentence  (noted difficulty at times at sentence level but can be understood for the most part in English; family reports harder to understand at sentence level in Arabic)   Findings Please see SLP tx note for further details.   QI: Expression 3. Exhibits some difficulty with expressing needs and ideas (e.g., some words or finishing thoughts) or speech is not clear   Expression (FIM) 3 - Needs to repeat parts of sentences   Social Interaction   Cooperation with staff    Participation Individual   Behaviors observed Appropriate   Findings Please see SLP tx note for further details.   Social Interaction (FIM) 5 - Interacts appropriately with others 90% of time   Problem Solving   Complex Manages finances;Manages discharge planning;Manages medications;Manages return to work   Routine Manages call bell;Manges precautions;Manages ADL   Findings Please see SLP tx note for further details.   Problem solving (FIM) 4 - Solves basic problems 75-89% of time   Memory   Remember Routine Yes   Initiates Tasks Yes   Short-Term Impaired   Long Term Intact   Recalls Precaution Yes   Findings Please see SLP tx note for further details.   Memory (FIM) 4 - Recognizes/recalls/performs 75-89%   Verbal Expression   Repetition Sentences;Impairment   Sentence Level Impairment   Error Types Delayed   Written Expression   Dominant Hand Right  (Using L hand due to decrease URE)   Written Expression X   Speech/Swallow Mechanism Exam   Labial Symmetry Abnormal symmetry right   Labial Strength WFL   Labial ROM Reduced right  (mildly reduced)   Facial Symmetry Right droop  (mild)   Facial Strength WFL   Facial ROM WFL   Lingual Symmetry WFL   Motor Speech Evaluation   Formal Motor Speech Evaluation Patient Name: Maye Lilly    Today's Date: 8/21/2024     Problem List  Principal Problem:    Acute CVA (cerebrovascular accident) (HCC)  Active Problems:    Clostridium difficile infection    Paroxysmal atrial fibrillation (HCC)    HTN (hypertension)    DIANELYS (obstructive sleep apnea)    Hypothyroidism    GERD (gastroesophageal reflux disease)    Meningioma (HCC)      Past Medical History  Past Medical History:   Diagnosis Date    Atrial fibrillation (HCC)     Disease of thyroid gland     Hypertension        Past Surgical History  No past surgical history on file.      Impressions:  At this time pt is demonstrating speech intelligibility to be mildly impaired at the sentence/conversational level. Pt noting to  elicit the following errors distorted speech sounds, decreased breath support and fast rate of speech.  Pt will benefit from SLP services at this time to improve overall speech intelligibility while on the acute rehab center.      HISTORY AND PHYSICAL:         Speech and Swallowing Mechanism Exam   Facial: right facial droop  Labial: decreased ROM right side  Lingual: WFL  Velum: unable to visualize  Mandible: adequate ROM  Dentition: adequate  Respiratory Support: on RA    Respiration and Phonation  Maximum Phonation Time  (Normal 15-20 seconds for healthy older adult with standard deviation of 5.5-6):  13 seconds  Able to sustain phonation counting from 1 to 20   Vocal Quality:  clear/adequate, breathy, and weak     Articulation:  Single Syllable Words: precise articulation  Multisyllabic Words: imprecise articulation (slurred blends noted for longer words)  Phrase Level:precise articulation  Sentence Level: precise articulation  Conversational Speech:  imprecise articulation    Diadochokinesis:   Puh puh puh (normal should be 3.0 to 5.5 repetitions/seconds): 9 repetitions in 8 seconds  tuh tuh tuh (normal should exceed 25 reps in 10 seconds):  15 repetitions in 11 seconds  kuh kuh kuh (normal should exceed 25 reps in 10 seconds): 27 repetitions in 18 seconds  puh tuh kuh (pattycake or buttercup)- (normal should exceed 8 reps in 10 seconds) 28 repetitions in 29 seconds.     Resonation: Normal nasality    S/S Oral apraxia:  None    S/S Verbal Apraxia:  None      INTELLIGIBILITY at the WORD LEVEL: intelligible       INTELLIGIBILITY at the PHRASE LEVEL:  intelligible     INTELLIGIBILITY at the SENTENCE LEVEL:  mild deficits noted; WWS and slurring of blends.      INTELLIGIBILITY in CONVERSATIONAL SPEECH:  mild deficits noted; slurring of blends and fast ROS    Conversation:  Decreased breath support for speech  Decreased coordination of respiration with speech attempts  Imprecise articulation  Rapid rate    Respiration/Phonation WFL   Vocal Quality WFL   Dysarthria Yes  (Mild)   Mild Dystharia Impaired resp support and coordination with speech   Intelligibility Intelligibility reduced   Intelligibility Rating 75%-89%;Conversation level intelligibility;Sentence level intelligibility;Word level intelligibility   Speech/Language/Cognition Assessment   Treatment Assessment Pt was awake and alert upon arrival of SLP for skilled ST evaluation assessing current cognitive linguistic skills. Pt demonstrated orientation x4 to month, year, current location and reason for hospitalization. Pt reports PTH, she was I with all ADLs including cooking, cleaning, driving, laundry, bill management and medication management. Pt voiced that she lived alone P[place] but daughter lives near to help. Pt reports now he daughter will be moving in to help her with daily ADLs and her son is coming from [place] to assist as well. Pt reports she was not taking her medication as she should have prior and is aware now of importance of medication management. Pt reports she was taking 7 pills a day and was using a 2x a day pill box. She also noted that she uses checks and online bill payment to complete her bills. Pt current works as a  at [employer] and notes her goal for ST is to 'get the words out better and get back to how I was before.' Pt reports she uses glasses daily and is R handed however, due to URE weakness, has been writing with her L hand.       The SLUMs was completed  and the pt had a total score of  25 which compared to those with high school education correlates with mild neurocognitive disorder and is indicative of mild neurocognitive disorder. Compared to the formalized testing and informal analysis, pt noted deficits with working memory, STM, word recall, critical thinking and higher level problem solving. Additionally, skilled ST can assist w/ ADL such as medication management using cognitive skills necessary to complete. Additionally,  motor speech testing was completed to assess pt's speech production due to pt vocalizing deficits w/ speech since stroke. Pt voiced that it has since improved since entering hospital however, does not feel it is up to how it was before. Pt noting to elicit the following errors distorted speech sounds, decreased breath support and fast rate of speech. At this time pt is demonstrating speech intelligibility to be mildly impaired at the sentence/conversational level. Pt voiced feeling 'much looser with my mouth muscles after doing all those things' after evaluation and SLP provided pt with OME's and reviewed with pt various exercises that she can do at her leisure as pt is showing increase independence and comprehension and can complete OME's on own. Will continue to monitor and provide support with OME's as well as provide strategies targeted at motor speech disorder.       Therapist educated pt on rehab process, goal setting and ELOS which pt verbalized understanding At this time, pt continues to benefit from ongoing skilled SLP services to maximize overall cognitive linguistic skills and motor speech skills in attempts to decrease caregiver burden over time.   SLP Therapy Minutes   SLP Time In 1330   SLP Time Out 1430   SLP Total Time (minutes) 60   SLP Mode of treatment - Individual (minutes) 60   SLP Mode of treatment - Concurrent (minutes) 0   SLP Mode of treatment - Group (minutes) 0   SLP Mode of treatment - Co-treat (minutes) 0   SLP Mode of Treatment - Total time(minutes) 60 minutes   SLP Cumulative Minutes 60   Therapy Time missed   Time missed? No

## 2024-08-21 NOTE — PROGRESS NOTES
Physical Medicine and Rehabilitation Progress Note  Maye Lilly 64 y.o. female MRN: [id number]  Unit/Bed#: -01 Encounter: [id number]    Etiology/Reason for Admission: Impairment of mobility, safety, Activities of Daily Living (ADLs), and cognitive/communication skills due to Stroke:  01.2  Right Body Involvement (Left Brain)    Interval History: Seen face-to-face at bedside. No acute events overnight. She is having some right knee pain, she describes a history of chronic right knee pain and knee osteoarthritis that she was received hyaluronic acid injections for in the outpatient setting. She is having some mild soreness at rest after therapies, but it is improved. She says she has used lidocaine patches with success in the past, this was ordered today. Vital signs reviewed, stable and WNL. Voiding spontaneously, continent, low PVRs, <50 cc consistently. Last BM 8/20/24, continent.     Assessment/Plan -     MsNic Lilly is a 63 yo woman with a past medical history notable for HTN and atrial fibrillation who presented to the [facility] on [facility]10[facility]24 with acute dysarthria, facial droop and right hemiparesis. A stroke alert was activated, CT head wo contrast was negative for acute findings, CTA of the head/neck showed no evidence of large vessel occlusion and patent cervical carotid and vertebral arteries. MRI of the brain revealed an acute left pontine infarct. Course complicated by severe hypertension requiring multiple agents to obtain control.     - Acute left pontine ischemic infarct:   Clinically presented on 8/10/24 with right hemiparesis, dysarthria and facial droop  Stroke etiology: HTN, small vessel disease  MR brain on 8/11/24 revealed a small area of acute/subacute ischemia within the central luz to the left of midline without acute hemorrhage  Did not receive tPA/TNKase, did not undergo thrombectomy  DAPT regimen: None  Atorvastatin 40 mg qHs for secondary prevention  Monitor for neurogenic  bowel and bladder  Promote regular sleep/wake cycles and proper sleep hygiene  Monitor closely for right hemiparetic shoulder pain and post-stroke pain  Neutral resting wrist splint for the and bilateral multipodus boots to be worn at night and while in bed during the day to prevent developing spasticity and contracture  Maintain adequate nutrition, nutrition consult  Avoid sedating and anticholinergic medications as possible  Maintain normotension, s/p permissive hypertension  Outpatient neurovascular neurology follow-up  - C. Difficile infection: OSH-acquired; vancomycin PO  - Paroxysmal atrial fibrillation: carvedilol for rate control, rivaroxaban for embolic stroke risk reduction; outpatient cardiology follow-up  - HTN: was not taking medications at home, reportedly due to cost; carvedilol, hydralazine, nifedipine, spironolactone, losartan; appreciate IM management  - Hypothyroidism: levothyroxine  - Therapies: PT, OT and SLP  - Right knee osteoarthritis: lidocaine patch daily, Voltaren topical BID, tylenol PRN  - Bowel: Monitor closely for neurogenic bowel. Will follow BMs and adjust bowel regimen to target soft, formed stools q1-2 days, per pt's regular schedule.  - Bladder: Monitor closely for neurogenic bladder. Will follow UOP and encourage spontaneous voids. If unable to void spontaneously, will monitor with PVR bladder scans and initiate ISC regimen.  - Skin: Monitor for breakdown, frequent turns, Kreg specialty bed for pressure offloading  - Sleep: Practice effective sleep hygiene (e.g., consistent night and morning routine, quiet, dark room at night, no electronic devices/screens in bed, avoid large meals/caffeine before bed)  - VTE prophylaxis: Rivaroxaban  - Disposition: Pending initial interdisciplinary team meeting, home with assist, ELOS 14-21 days    Dk De Dios MD  Attending Physician  Physical Medicine and Rehabilitation  [facility]    Review of Systems:  A 10 point  ROS was performed; negative except as noted above.       Current Facility-Administered Medications:     acetaminophen (TYLENOL) tablet 650 mg, 650 mg, Oral, Q6H PRN, WENCESLAO Patel    atorvastatin (LIPITOR) tablet 40 mg, 40 mg, Oral, QPM, WENCESLAO Patel, 40 mg at 08/20/24 1729    bisacodyl (DULCOLAX) rectal suppository 10 mg, 10 mg, Rectal, Daily PRN, Dougie King MD    carvedilol (COREG) tablet 25 mg, 25 mg, Oral, BID With Meals, WENCESLAO Patel, 25 mg at 08/21/24 0926    hydrALAZINE (APRESOLINE) tablet 75 mg, 75 mg, Oral, Q8H BARB, WENCESLAO Patel, 75 mg at 08/21/24 1319    hydrocortisone 1 % cream, , Topical, BID, WENCESLAO Patel    levothyroxine tablet 25 mcg, 25 mcg, Oral, Early Morning, WENCESLAO Patel, 25 mcg at 08/21/24 0628    losartan (COZAAR) tablet 100 mg, 100 mg, Oral, Daily, WENCESLAO Patel, 100 mg at 08/21/24 0926    multivitamin stress formula tablet 1 tablet, 1 tablet, Oral, Daily, WENCESLAO Patel, 1 tablet at 08/21/24 0926    NIFEdipine (PROCARDIA XL) 24 hr tablet 60 mg, 60 mg, Oral, Daily With Dinner, WENCESLAO Patel, 60 mg at 08/20/24 1729    pantoprazole (PROTONIX) EC tablet 40 mg, 40 mg, Oral, Early Morning, WENCESLAO Patel, 40 mg at 08/21/24 0628    rivaroxaban (XARELTO) tablet 20 mg, 20 mg, Oral, Daily With Dinner, WENCESLAO Patel, 20 mg at 08/20/24 1729    spironolactone (ALDACTONE) tablet 25 mg, 25 mg, Oral, Daily, WENCESLAO Patel, 25 mg at 08/21/24 0926    vancomycin (VANCOCIN) capsule 125 mg, 125 mg, Oral, 4x Daily, WENCESLAO Patel, 125 mg at 08/21/24 1319    Physical Exam:  Temp:  [97.9 °F (36.6 °C)-98.3 °F (36.8 °C)] 97.9 °F (36.6 °C)  HR:  [61-68] 63  Resp:  [16-18] 18  BP: (128-196)/(56-86) 128/56  SpO2:  [91 %-95 %] 93 %    GEN: Patient seen resting comfortably in hospital bed, NAD  HEENT: NCAT; right sided facial droop at  "rest, mucous membranes moist  CV: No extremity edema  PULM: Breathing comfortably on room air  ABD: Non-distended  SKIN: No obvious rashes or lesions on exposed skin  MSK: Mild tenderness to palpation along the right knee joint line bilaterally without overlying effusion, erythema.  NEURO:  Awake and alert, answering questions appropriately to context; able to follow simple commands with clear consistency  Speech is fluent and organized, but mildly dysarthric  RUE is flaccid, moving RLE spontaneously    Laboratory:  Labs reviewed, none new        Invalid input(s): \"PLTCT\"  Results from last 7 days   Lab Units 08/16/24  1705   BUN mg/dL 24   SODIUM mmol/L 137   POTASSIUM mmol/L 4.2   CHLORIDE mmol/L 103   CREATININE mg/dL 0.79            Diagnostic Studies: Reviewed, no new imaging   No orders to display     Total time spent:  35 minutes, with more than 50% spent counseling/coordinating care. Counseling includes discussion with patient re: progress in therapies, functional issues observed by therapy staff, and discussion with patient his/her current medical state/wellbeing. Coordination of patient's care was performed in conjunction with Internal Medicine service to monitor patient's labs, vitals, and management of their comorbidities.     "

## 2024-08-21 NOTE — CASE MANAGEMENT
Met w/pt and reviewed rehab routine and cm role. Pt resides alone in [place] but states if she is not able to return home alone she will dc to her daughter jonnie' home which is located at [address] [place] [place] [address].  Her daughter is an RN and works at [employer] but is currently on vacation. Pt has no prior experience with hhc or outpt therapy or dme. Pt works fulltime as a  at [employer]. Pt confirmed medicare as her primary insurance and states she was working on a secondary when the stroke occurred. Cm reviewed team mtg process and potential los. Pt uses [facility] for pharmacy services. Cm to follow up post team tomorrow.

## 2024-08-21 NOTE — ASSESSMENT & PLAN NOTE
Was seeing cardiologist in [place] and taking Eliquis but she has not seen him in 2 yrs and stopped all her meds >1 yr ago  EKG in the hospital showed NSR  Needs to followup as OP with Cardiology = Dr Patt Zhang Coreg  Was not placed back on Flecainide  Now on Xarelto and not Eliquis  RRR by exam today 8/21/24  Sent price check to her pharmacy today 8/21/24

## 2024-08-21 NOTE — ASSESSMENT & PLAN NOTE
New during hospital stay  Says no BMs so far today 8/21/24  Continue Vancomycin 125mg QID.  LD will be 8/26/24 0900

## 2024-08-21 NOTE — TREATMENT PLAN
Individualized Plan of Care - [facility] [place]  Maye Lilly 64 y.o. female MRN: [id number]  Unit/Bed#: -01 Encounter: [id number]     PATIENT INFORMATION  ADMISSION DATE: 8/20/2024  3:02 PM DENISE CATEGORY: Stroke:  01.2  Right Body Involvement (Left Brain)    ADMISSION DIAGNOSIS: Stroke due to stenosis of left posterior cerebral artery (HCC) [I63.532]  EXPECTED LOS: 14-21 days     MEDICAL/FUNCTIONAL PROGNOSIS  Based on my assessment of the patient's medical conditions and current functional status, the prognosis for attaining medical and functional goals or the IRF stay is:  Fair    Medical Goals: Patient will be medically stable for discharge to [facility] upon completion of rehab program and Patient will be able to manage medical conditions and comorbid conditions with medications and follow up upon completion of rehab program    ANTICIPATED DISCHARGE DISPOSITION AND SERVICES  COMMUNITY SETTING: Home - Assistance    Is a 24-hr caregiver available? No  Has discharge plan been discussed with primary caregiver?  No    ANTICIPATED FOLLOW-UP SERVICE:   Home Health Services: PT, OT, and SLP    DISCIPLINE SPECIFIC PLANS:  Required Disciplines & Services: Rehabillitation Nursing, Case Management, Dietay/Nutrition, and Psychology    REQUIRED THERAPY:  Therapy Hours per Day Days per Week Total Days   Physical Therapy 1 5-6 14-21   Occupational Therapy 1 5-6 14-21   Speech/Language Therapy 1 4-5 14-21   NOTE: Additional therapy time(s) may be added as appropriate to meet patient needs and to achieve functional goals.    ANTICIPATED FUNCTIONAL OUTCOMES:  ADL: Patient will require assist with ADLs with least restrictive device upon completion of rehab program   Bladder/Bowel:  Min assist with the least restrictive device     Transfers: Patient will require assist with transfers with least restrictive device upon completion of rehab program   Locomotion: Patient will require assist  with locomotion with least restrictive device upon completion of rehab program   Cognitive: Patient will require supervision for basic and complex tasks upon completion of rehab program     DISCHARGE PLANNING NEEDS  Equipment needs: Discharge needs to be reviewed with team    REHAB ANTICIPATED PARTICIPATION RESTRICTIONS:  None    Dk De Dios MD  Attending Physician  Physical Medicine and Rehabilitation  [facility]

## 2024-08-21 NOTE — ASSESSMENT & PLAN NOTE
Continue Levothyroxine 25 mcg qd  This was the dose that she was supposed to be taking at home  Will get TSH/free T4 reflex 8/22/24

## 2024-08-21 NOTE — PROGRESS NOTES
[facility]  Progress Note  Name: Maye Lilly I  MRN: [id number]  Unit/Bed#: -01 I Date of Admission: 8/20/2024   Date of Service: 8/21/2024 I Hospital Day: 1    Assessment & Plan   * Acute CVA (cerebrovascular accident) (HCC)  Assessment & Plan  Presented with right sided weakness, slurred speech, headache and vomiting  MRI = left pontine infarct  Neuro felt CVA was from atrial fibrillation in setting of non-compliance with anticoagulation  Currently now on Xarelto 20mg qd, Lipitor 40mg qd  Followup with Neuro as OP.  Stevo Baires from [facility] from that group was listed on the DC summary    Clostridium difficile infection  Assessment & Plan  New during hospital stay  Says no BMs so far today 8/21/24  Continue Vancomycin 125mg QID.  LD will be 8/26/24 0900    Paroxysmal atrial fibrillation (HCC)  Assessment & Plan  Was seeing cardiologist in [place] and taking Eliquis but she has not seen him in 2 yrs and stopped all her meds >1 yr ago  EKG in the hospital showed NSR  Needs to followup as OP with Cardiology = Dr Beltre  Continue Coreg  Was not placed back on Flecainide  Now on Xarelto and not Eliquis  RRR by exam today 8/21/24  Sent price check to her pharmacy today 8/21/24    HTN (hypertension)  Assessment & Plan  Home:  no meds  Here:  Coreg 25mg BID/Hydralazine 75 mg q8hrs/Nifedipine 60mg daily at dinner time, Aldactone 25mg daily, Valsartan 160 mg qd which will be changed to dose equivalent ARB that we have here.  Stable; no changes today 8/21/24  Followup with IM as OP.  On DC summary was listed to see Megan DORSEY from that group    DIANELYS (obstructive sleep apnea)  Assessment & Plan  In past was on CPAP  Can d/w PCP when discharged    Hypothyroidism  Assessment & Plan  Continue Levothyroxine 25 mcg qd  This was the dose that she was supposed to be taking at home  Will get TSH/free T4 reflex 8/22/24    GERD (gastroesophageal reflux disease)  Assessment &  Plan  Continue Protonix    Meningioma (HCC)  Assessment & Plan  Incidental finding   OP followup           The above assessment and plan was reviewed and updated as determined by my evaluation of the patient on 8/21/2024.    Labs:       Results from last 7 days   Lab Units 08/16/24  1705   SODIUM mmol/L 137   POTASSIUM mmol/L 4.2   CHLORIDE mmol/L 103   CO2 mmol/L 27   BUN mg/dL 24   CREATININE mg/dL 0.79   CALCIUM mg/dL 9.2                   Imaging  No orders to display       Review of Scheduled Meds:  Current Facility-Administered Medications   Medication Dose Route Frequency Provider Last Rate    acetaminophen  650 mg Oral Q6H PRN Lucila Schulz, WENCESLAO      atorvastatin  40 mg Oral QPM Lucila Schulz, WENCESLAO      bisacodyl  10 mg Rectal Daily PRN Dougie King MD      carvedilol  25 mg Oral BID With Meals WENCESLAO Patel      hydrALAZINE  75 mg Oral Q8H BARB Lucila Schulz, WENCESLAO      hydrocortisone   Topical BID WENCESLAO Patel      levothyroxine  25 mcg Oral Early Morning Lucila Schulz, CHADNP      losartan  100 mg Oral Daily Lucila Schulz, CHADNP      multivitamin stress formula  1 tablet Oral Daily Lucila Schulz, WENCESLAO      NIFEdipine  60 mg Oral Daily With Dinner Lucila Schulz, WENCESLAO      pantoprazole  40 mg Oral Early Morning Lucila Schulz, WENCESLAO      rivaroxaban  20 mg Oral Daily With Dinner Lucila Schulz, WENCESLAO      spironolactone  25 mg Oral Daily Lucila Schulz, CHADNP      vancomycin  125 mg Oral 4x Daily Lucila Schulz, WENCESLAO         Subjective/ HPI: Patient seen and examined. Patients overnight issues or events were reviewed with nursing staff. New or overnight issues include the following:     No new or overnight issues.  Offers no complaints    ROS:   A 10 point ROS was performed; negative except as noted above.        *Labs /Radiology studies Reviewed  *Medications  reviewed and reconciled as  needed  *Please refer to order section for additional ordered labs studies      Physical Examination:  Vitals:   Vitals:    08/21/24 0628 08/21/24 0629 08/21/24 0927 08/21/24 1319   BP: 142/62 142/62 160/72 136/64   BP Location:  Left arm Left arm Left arm   Pulse:  68  61   Resp:  16     Temp:  98.2 °F (36.8 °C)     TempSrc:  Tympanic     SpO2:  95%     Weight:       Height:           General Appearance: no distress, non toxic appearing  HEENT: PERRLA, conjuctiva normal; oropharynx clear; mucous membranes moist   Neck:  Supple, normal ROM  Lungs: CTA, normal respiratory effort, no retractions, expiratory effort normal  CV: regular rate and rhythm; no rubs/murmurs/gallops, PMI normal   ABD: soft; ND/NT; +BS  EXT: no edema  Skin: normal turgor, normal texture  Psych: affect normal, mood normal  Neuro: AAO           The above physical exam was reviewed and updated as determined by my evaluation of the patient on 8/21/2024.    Invasive Devices       None                      VTE Pharmacologic Prophylaxis: Xarelto  Code Status: Level 1 - Full Code  Current Length of Stay: 1 day(s)    Total floor / unit time spent today 30 minutes  Coordination of patient's care was performed in conjunction with consulting services. Time invested included review of patient's labs, vitals, and management of their comorbidities with continued monitoring, examination of patient as well as answering patient questions, documenting her findings and creating progress note in electronic medical record,  ordering appropriate diagnostic testing.       ** Please Note:  voice to text software may have been used in the creation of this document. Although proof errors in transcription or interpretation are a potential of such software**

## 2024-08-21 NOTE — PROGRESS NOTES
SLP TAA     08/21/24 5770   Patient Data   Rehab Impairment Impairment of mobility, safety and Activities of Daily Living (ADLs) due to Stroke:  01.2  Right Body Involvement (Left Brain)   Etiologic Diagnosis CVA   Date of Onset 08/10/24   Support System   Name Kyung Pan   Restrictions/Precautions   Precautions Bed/chair alarms;Cognitive;Fall Risk;Contact/isolation;Supervision on toilet/commode   Pain Assessment   Pain Assessment Tool 0-10   Pain Score No Pain   Comprehension   Assist Devices Glasses   Auditory Basic;Complex   Visual Basic;Complex   Findings Please see SLP tx note for further details.   QI: Comprehension 4. [place]: Clear comprehension without cues or repetitions   Comprehension (FIM) 5 - Needs help/cues, repetition only RARELY ( < 10% of the time)   Expression   Verbal Basic   Non-Verbal Basic;Complex   Intelligibility Sentence  (noted difficulty at times at sentence level but can be understood for the most part in English; family reports harder to understand at sentence level in [place])   Findings Please see SLP tx note for further details.   QI: Expression 3. Exhibits some difficulty with expressing needs and ideas (e.g., some words or finishing thoughts) or speech is not clear   Expression (FIM) 3 - Needs to repeat parts of sentences   Social Interaction   Cooperation with staff   Participation Individual   Behaviors observed Appropriate   Findings Please see SLP tx note for further details.   Social Interaction (FIM) 5 - Interacts appropriately with others 90% of time   Problem Solving   Complex Manages finances;Manages discharge planning;Manages medications;Manages return to work   Routine Manages call bell;[place] precautions;Manages ADL   Findings Please see SLP tx note for further details.   Problem solving (FIM) 4 - Solves basic problems 75-89% of time   Memory   Remember Routine Yes   Initiates Tasks Yes   Short-Term Impaired   Long Term Intact   Recalls Precaution Yes   Findings  Please see SLP tx note for further details.   Memory (FIM) 4 - Recognizes/recalls/performs 75-89%   Discharge Information   Vocational Plan Full Time;Return to work   Barriers to Return to Vocation Transportation Issues;Strength;Endurance   Patient's Discharge Plan home with family support and assistance   Patient's Rehab Expectations ''get the words out better and get back to how I was before'   Barriers to Discharge Home Unsafe Home Setup;Decreased Strength;Decreased Endurance;Safety Considerations;Pain   Impressions Pt is a 65 y/o female with a PMH of afib, HTN, hypothyroidism, DIANELYS, morbid obesity and GERD who presented to [facility]10[facility] due to R sided weakness with tingling down RUE, slurred speech, sudden headache and emesis. MRI of brain showed L pontine infarct and not TNK given as out of window and no obvious infarct + already on anticoagulation. Pt is being seen by skilled ST for cognitive and speech therapy to target overall cognitive linguistic communication skills and expressive speech skills.     Pt is a good rehab candidate to achieve supervision upon anticipated discharge home w/ family support/supervision. Current barriers include: mild dysarthria, working memory, STM, word recall, critical thinking and higher level problem solving  and decrease independence overall ADLs which will impact pt's safety awareness and functional mobility. Pt estimated length of stay ~3-4 weeks in [facility]. Additionally, skilled ST can assist w/ ADL such as medication management using cognitive skills necessary to complete. Therapist educated pt on rehab process, goal setting and ELOS which pt verbalized understanding. Pt will benefit from skilled SLP services to maximize overall speech and cognitive abilities at this time to decrease burden of care for family at time of discharge.    SLP Therapy Minutes   SLP Time In 1330   SLP Time Out 1430   SLP Total Time (minutes) 60   SLP Mode of treatment - Individual (minutes) 60    SLP Mode of treatment - Concurrent (minutes) 0   SLP Mode of treatment - Group (minutes) 0   SLP Mode of treatment - Co-treat (minutes) 0   SLP Mode of Treatment - Total time(minutes) 60 minutes   SLP Cumulative Minutes 60   Cumulative Minutes   Cumulative therapy minutes 235

## 2024-08-21 NOTE — ASSESSMENT & PLAN NOTE
Presented with right sided weakness, slurred speech, headache and vomiting  MRI = left pontine infarct  Neuro felt CVA was from atrial fibrillation in setting of non-compliance with anticoagulation  Currently now on Xarelto 20mg qd, Lipitor 40mg qd  Followup with Neuro as OP.  [facility] from [facility] group was listed on the DC summary

## 2024-08-21 NOTE — PCC SPEECH THERAPY
As of 8/21/2024: Pt was evaluated by skilled ST for cognitive linguistic skills and dysarthria. In regards to cognition, pt completed the SLUMS cognitive assessment upon evaluation. Pt total score was 25/30 which as compared to those with high school education correlates with mild neurocognitive disorder and is indicative of dementia.  Pt presenting with the following barriers: decrease STM, working memory, word recall, critical thinking and higher level problem solving thought organization and expressive language/word retrieval which, which impact pt's overall safety, functional cognitive communication skills as well as functional mobility. The following interventions are used to target these barriers, including verbal problem solving task, verbal working memory tasks, visual memory recall tasks, drawing conclusions activities, written sequencing tasks, verbal sequencing tasks, written financial management tasks, tangible money tasks, verbal review of current medications, written review of medications, tangible medication management task, written calendar tasks, tangible scheduling tasks , written health management tasks, verbal health management tasks, visual attention tasks, functional reading tasks , and family education/training, as well as verbal command following activities, written command following activities, reading comprehension at sentence level, reading comprehension at paragraph level, written expression at word level, written expression at sentence level , divergent naming: concrete, divergent naming: abstract , word deduction with phrase, and word deduction with clue words. In regards to dysarthria, pt is demonstrating speech intelligibility to be mildly impaired at the sentence/conversational level. Barrier include: distorted speech sounds, decreased breath support and fast rate of speech. Interventions to be targeted include: OME's, verbal repetition of sentence, orthographic repetition of  sentences, review of speech strategies and breath/support cueing.     This week, plan to target and implement cognitive and speech interventions as well as discussing baseline with daughter regarding ADLs and  pt's ability to complete IADL tasks such as medication management, finance management, ability to recognize and solve unsafe situations. Recommendations for discharge are pending pt progress but suspect some level of increased support to be needed, along with continued skilled SLP services (OP vs HHC) on discharge. Based on current evaluation, pt is recommended for skilled SLP services during acute rehab stay targeting both expressive/receptive language and cognitive linguistic skills to maximize functioning and level of independence on discharge.     Current level of function:  Comprehension: Supervision  Expression:Mod A  Social Interaction:Supervision  Problem Solving:Min A   Memory:Min A    Update from week: 8/27/2024: Pt continues to be followed for skilled SLP services focusing on cognitive linguistic skills where she is making progress towards goals this week. Continued cognitive linguistic and speech barriers which present include: decreased STM recall, working memory, attention, critical thinking, higher level thought organization, recall of multiple steps and processing, along with occasional word retrieval difficulties and minimal dysarthria.  Current deficits impact pt's overall safety, functional cognitive communication skills as well as functional mobility due to decreased carryover. The following interventions are used to target these barriers, including verbal problem solving task, verbal working memory tasks, visual memory recall tasks, drawing conclusions activities, written sequencing tasks, verbal sequencing tasks, verbal reasoning tasks, higher level deductive reasoning activities, written financial management tasks, tangible money tasks, verbal review of current medications, written  review of medications, tangible medication management task, written calendar tasks, tangible scheduling tasks ,  written health management tasks, verbal health management tasks, functional reading tasks , time management activities, and family education/training, as well as speech drills and word deduction tasks.    Current level of functioning:   Comprehension: supervision  Expression: supervision  Social Interaction: supervision to mod I  Problem Solving: min A to supervision  Memory: min A    This week, plan to continue to target executive functions skills including those required for completion of IADLs, higher level word retrieval tasks and speech drills. Continue family education as appropriate during rehab stay. At this time, pt is recommended for continued skilled SLP services focusing on higher level cognitive linguistic skills, along with speech and expressive language skills to maximize independence and safety.     Update from week: 9/3/204. Pt is being followed for cognitive linguistic tx and speech/apraxia tx sessions to where pt is making steady progress this week.     Continued cognitive barriers which present include: decreased attention, ST memory recall , problem solving, reasoning, sequencing, organization of thoughts, judgement, and slower processing, which still impacts pt's overall safety, functional cognitive communication skills as well as functional mobility. The following interventions are used to target these barriers, including verbal working memory tasks, drawing conclusions activities, categorization tasks , verbal reasoning tasks, tangible money tasks, verbal review of current medications, written review of medications, tangible medication management task, verbal health management tasks, functional reading tasks , time management activities, and family education/training. Continued speech barriers which present include: decreased intelligibility decreased at conversational level,  imprecise articulation , and faster rate of speech which still impacts pt's overall functional cognitive communication skills. The following interventions are used to target these barriers, including  ongoing review of OME's, speech drills as needed as well as engaging in spontaneous speech production and monitoring use of speech strategies .     Current level of functioning:   Comprehension: supervision  Expression: supervision  Social Interaction: mod I   Executive functions: min A   Memory: min A    For this upcoming week, it was established that pt would like to focus on targeting higher level cognitive linguistic tasks, but monitoring functional I ADL tasks, such as medication and financial management skills. Also will [place] back to review of speech strategies, OME's to maximize overall speech intelligibility. Will plan for family training/education when appropriate. At this time, pt will continue to benefit from skilled cognitive linguistic tx and speech tx session to maximize overall functional independence given overall cognitive linguistic skills and speech intelligibility in attempts to decreased caregiver burden over time.       Update from week: 9/10/2024. Pt is being followed for cognitive linguistic tx sessions to where pt is making steady and good progress this week.     Continued cognitive barriers which present include: decreased attention, ST memory recall , reasoning, organization of thoughts, and slower processing, which still impacts pt's overall safety, functional cognitive communication skills as well as functional mobility. The following interventions are used to target these barriers, including higher level deductive reasoning activities, written financial management tasks, verbal review of current medications, written review of medications, written calendar tasks, tangible scheduling tasks ,  written health management tasks, verbal health management tasks, and family education/training.      Current level of functioning:   Comprehension: supervision  Expression: supervision  Social Interaction: mod I   Executive functions: supervision-min A  Memory: min A    Family training/education has been initiated with pt's family (son, DIL, dtr's boyfriend) to where review of ST services was completed in addition to demonstration of how higher level cognitive linguistic skills are still impacted at this time. Pt would benefit from supervision and monitoring in compliance given I ADL tasks, including medication management, finances and calendar/scheduling skills upon discharge. While recommendations at time of discharge are for home services, pt would more so benefit from OP ST services and would initiate after completion of home services. This week, focus for continue to target higher level cognitive tasks, ongoing education and review of medications and Stroke education. At this time, pt will continue to benefit from skilled cognitive linguistic tx session to maximize overall functional independence given overall cognitive linguistic skills in attempts to decreased caregiver burden over time.

## 2024-08-21 NOTE — PROGRESS NOTES
Occupational Therapy Long Term Goals      08/21/24 0715   Rehab Team Goals   ADL Team Goal Patient will require assist with ADLs with least restrictive device upon completion of rehab program   Eating Goal   Eating Goal 06. Independent - Patient completes the activity by him/herself with no assistance from a helper.  (L hand use.)   Status Ongoing;Target goal - four weeks   Grooming Goal   Oral Hygiene Goal 05. Setup or clean-up assistance - [place] SETS UP or CLEANS UP, patient completes activity. [place] assists only prior to or following the activity.   Status Ongoing;Target goal - four weeks   Bathing Goal   Shower/bathe self Goal 03. Partial/moderate assistance - [place] does less than half the effort. [place] lifts or holds trunk or limbs and provides more than half the effort.   Status Ongoing;Target goal - four weeks   Upper Body Dressing Goal   Upper body dressing Goal 04. TOUCHING/ STEADYING assistance as patient completes activity.   Status Ongoing;Target goal - four weeks   Lower Body Dressing Goal   Lower body dressing Goal 03. Partial/moderate assistance - [place] does less than half the effort. [place] lifts or holds trunk or limbs and provides more than half the effort.   Putting on/taking off footwear Goal 03. Partial/moderate assistance - [place] does less than half the effort. [place] lifts or holds trunk or limbs and provides more than half the effort.   Status Ongoing;Target goal - four weeks   Toileting Transfer Goal   Toilet transfer Goal 03. Partial/moderate assistance - [place] does less than half the effort. [place] lifts or holds trunk or limbs and provides more than half the effort.   Status Ongoing;Target goal - four weeks   Toileting Goal   Toileting hygiene Goal 03. Partial/moderate assistance - [place] does less than half the effort. [place] lifts or holds trunk or limbs and provides more than half the effort.   Status Ongoing;Target goal - four weeks

## 2024-08-22 LAB
ANION GAP SERPL CALCULATED.3IONS-SCNC: 8 MMOL/L (ref 4–13)
BASOPHILS # BLD AUTO: 0.08 THOUSANDS/ΜL (ref 0–0.1)
BASOPHILS NFR BLD AUTO: 1 % (ref 0–1)
BUN SERPL-MCNC: 30 MG/DL (ref 5–25)
CALCIUM SERPL-MCNC: 9.4 MG/DL (ref 8.4–10.2)
CHLORIDE SERPL-SCNC: 105 MMOL/L (ref 96–108)
CO2 SERPL-SCNC: 26 MMOL/L (ref 21–32)
CREAT SERPL-MCNC: 0.79 MG/DL (ref 0.6–1.3)
EOSINOPHIL # BLD AUTO: 0.21 THOUSAND/ΜL (ref 0–0.61)
EOSINOPHIL NFR BLD AUTO: 2 % (ref 0–6)
ERYTHROCYTE [DISTWIDTH] IN BLOOD BY AUTOMATED COUNT: 13.5 % (ref 11.6–15.1)
GFR SERPL CREATININE-BSD FRML MDRD: 78 ML/MIN/1.73SQ M
GLUCOSE P FAST SERPL-MCNC: 129 MG/DL (ref 65–99)
GLUCOSE SERPL-MCNC: 129 MG/DL (ref 65–140)
HCT VFR BLD AUTO: 42.3 % (ref 34.8–46.1)
HGB BLD-MCNC: 13.6 G/DL (ref 11.5–15.4)
IMM GRANULOCYTES # BLD AUTO: 0.03 THOUSAND/UL (ref 0–0.2)
IMM GRANULOCYTES NFR BLD AUTO: 0 % (ref 0–2)
LYMPHOCYTES # BLD AUTO: 1.73 THOUSANDS/ΜL (ref 0.6–4.47)
LYMPHOCYTES NFR BLD AUTO: 20 % (ref 14–44)
MCH RBC QN AUTO: 30.1 PG (ref 26.8–34.3)
MCHC RBC AUTO-ENTMCNC: 32.2 G/DL (ref 31.4–37.4)
MCV RBC AUTO: 94 FL (ref 82–98)
MONOCYTES # BLD AUTO: 0.46 THOUSAND/ΜL (ref 0.17–1.22)
MONOCYTES NFR BLD AUTO: 5 % (ref 4–12)
NEUTROPHILS # BLD AUTO: 6.1 THOUSANDS/ΜL (ref 1.85–7.62)
NEUTS SEG NFR BLD AUTO: 72 % (ref 43–75)
NRBC BLD AUTO-RTO: 0 /100 WBCS
PLATELET # BLD AUTO: 297 THOUSANDS/UL (ref 149–390)
PMV BLD AUTO: 10.6 FL (ref 8.9–12.7)
POTASSIUM SERPL-SCNC: 4.3 MMOL/L (ref 3.5–5.3)
RBC # BLD AUTO: 4.52 MILLION/UL (ref 3.81–5.12)
SODIUM SERPL-SCNC: 139 MMOL/L (ref 135–147)
TSH SERPL DL<=0.05 MIU/L-ACNC: 3.96 UIU/ML (ref 0.45–4.5)
WBC # BLD AUTO: 8.61 THOUSAND/UL (ref 4.31–10.16)

## 2024-08-22 PROCEDURE — 94760 N-INVAS EAR/PLS OXIMETRY 1: CPT

## 2024-08-22 PROCEDURE — 97530 THERAPEUTIC ACTIVITIES: CPT

## 2024-08-22 PROCEDURE — 97542 WHEELCHAIR MNGMENT TRAINING: CPT

## 2024-08-22 PROCEDURE — 99233 SBSQ HOSP IP/OBS HIGH 50: CPT | Performed by: PHYSICAL MEDICINE & REHABILITATION

## 2024-08-22 PROCEDURE — 80048 BASIC METABOLIC PNL TOTAL CA: CPT | Performed by: NURSE PRACTITIONER

## 2024-08-22 PROCEDURE — 94660 CPAP INITIATION&MGMT: CPT

## 2024-08-22 PROCEDURE — 97130 THER IVNTJ EA ADDL 15 MIN: CPT

## 2024-08-22 PROCEDURE — 85025 COMPLETE CBC W/AUTO DIFF WBC: CPT | Performed by: NURSE PRACTITIONER

## 2024-08-22 PROCEDURE — 99232 SBSQ HOSP IP/OBS MODERATE 35: CPT | Performed by: NURSE PRACTITIONER

## 2024-08-22 PROCEDURE — 92507 TX SP LANG VOICE COMM INDIV: CPT

## 2024-08-22 PROCEDURE — 97535 SELF CARE MNGMENT TRAINING: CPT

## 2024-08-22 PROCEDURE — 97112 NEUROMUSCULAR REEDUCATION: CPT

## 2024-08-22 PROCEDURE — 97129 THER IVNTJ 1ST 15 MIN: CPT

## 2024-08-22 PROCEDURE — 84443 ASSAY THYROID STIM HORMONE: CPT | Performed by: NURSE PRACTITIONER

## 2024-08-22 RX ADMIN — LEVOTHYROXINE SODIUM 25 MCG: 25 TABLET ORAL at 06:15

## 2024-08-22 RX ADMIN — VANCOMYCIN HYDROCHLORIDE 125 MG: 125 CAPSULE ORAL at 08:15

## 2024-08-22 RX ADMIN — HYDRALAZINE HYDROCHLORIDE 75 MG: 50 TABLET ORAL at 21:37

## 2024-08-22 RX ADMIN — CARVEDILOL 25 MG: 25 TABLET, FILM COATED ORAL at 08:13

## 2024-08-22 RX ADMIN — NIFEDIPINE 60 MG: 30 TABLET, FILM COATED, EXTENDED RELEASE ORAL at 17:36

## 2024-08-22 RX ADMIN — ATORVASTATIN CALCIUM 40 MG: 40 TABLET, FILM COATED ORAL at 17:36

## 2024-08-22 RX ADMIN — VANCOMYCIN HYDROCHLORIDE 125 MG: 125 CAPSULE ORAL at 21:37

## 2024-08-22 RX ADMIN — RIVAROXABAN 20 MG: 20 TABLET, FILM COATED ORAL at 17:36

## 2024-08-22 RX ADMIN — LOSARTAN POTASSIUM 100 MG: 50 TABLET, FILM COATED ORAL at 08:13

## 2024-08-22 RX ADMIN — VANCOMYCIN HYDROCHLORIDE 125 MG: 125 CAPSULE ORAL at 17:36

## 2024-08-22 RX ADMIN — HYDRALAZINE HYDROCHLORIDE 75 MG: 50 TABLET ORAL at 06:15

## 2024-08-22 RX ADMIN — SPIRONOLACTONE 25 MG: 25 TABLET, FILM COATED ORAL at 08:13

## 2024-08-22 RX ADMIN — B-COMPLEX W/ C & FOLIC ACID TAB 1 TABLET: TAB at 08:13

## 2024-08-22 RX ADMIN — HYDROCORTISONE: 1 CREAM TOPICAL at 08:15

## 2024-08-22 RX ADMIN — Medication 2 G: at 08:15

## 2024-08-22 RX ADMIN — HYDRALAZINE HYDROCHLORIDE 75 MG: 50 TABLET ORAL at 13:45

## 2024-08-22 RX ADMIN — CARVEDILOL 25 MG: 25 TABLET, FILM COATED ORAL at 17:36

## 2024-08-22 RX ADMIN — PANTOPRAZOLE SODIUM 40 MG: 20 TABLET, DELAYED RELEASE ORAL at 06:15

## 2024-08-22 RX ADMIN — VANCOMYCIN HYDROCHLORIDE 125 MG: 125 CAPSULE ORAL at 11:48

## 2024-08-22 RX ADMIN — LIDOCAINE 1 PATCH: 700 PATCH TOPICAL at 17:38

## 2024-08-22 RX ADMIN — HYDROCORTISONE 1 APPLICATION: 1 CREAM TOPICAL at 17:39

## 2024-08-22 NOTE — CONSULTS
Patient reported she avoids added salt and sugar at home - stated she is familiar with heart healthy diet. Has Stroke Binder wtih Low Chol Low Sodium diet information- brief verbal review provided Declined any additional written diet education Pleased with meals. Patient was then sceduled for ST bedside evaluation - RD visit then concluded.

## 2024-08-22 NOTE — PROGRESS NOTES
"   08/22/24 0830   Pain Assessment   Pain Assessment Tool 0-10   Pain Score No Pain   Restrictions/Precautions   Precautions Bed/chair alarms;Fall Risk;Contact/isolation;Supervision on toilet/commode  (C-diff precautions)   Weight Bearing Restrictions No   ROM Restrictions No   Braces or Orthoses Other (Comment)  (AFO used this session +sneakers for all functional transfers; sling for transfers)   Lifestyle   Autonomy \"Today was so much better than yesterday\"   Shower/Bathe Self   Type of Assistance Needed Physical assistance   Physical Assistance Level Total assistance   Comment sponge bathing completed bed level in chair mode and supine. Ax2 when rolling to bathe daria/rear. pt able to bathe face, chest and RUE   Shower/Bathe Self CARE Score 1   Upper Body Dressing   Type of Assistance Needed Physical assistance   Physical Assistance Level 76% or more   Comment seated EOB, pt able to thread head; requires assist for threading RUE, GREG and adjusting over trunk; TA to don bra.; with feet flat on floor, pt able to sit supported via LUE or briefly unsupported with CS; no significant lateral lean noted and no LOB noted.   Upper Body Dressing CARE Score 2   Lower Body Dressing   Type of Assistance Needed Physical assistance   Physical Assistance Level Total assistance   Comment TA for donning tab brief; pt wore a dress today, however anticipate that pt would require Ax2 for rolling for CM if wearing pants   Lower Body Dressing CARE Score 1   Putting On/Taking Off Footwear   Type of Assistance Needed Physical assistance   Physical Assistance Level Total assistance   Comment to don socks and sneakers; R AFO   Putting On/Taking Off Footwear CARE Score 1   Sit to Stand   Type of Assistance Needed Physical assistance;Verbal cues   Physical Assistance Level Total assistance   Comment Mod A x2 (first STS was mod Ax1, Min Ax1 however by end of repetition, required consistent Mod Ax2) from tilt in space with use of bed rail; R " knee block and gait belt donned. VCs for anterior weight shifting   Sit to Stand CARE Score 1   Bed-Chair Transfer   Type of Assistance Needed Physical assistance   Physical Assistance Level Total assistance   Comment ModA x2 SBT bed to and from tilt in space w/c; did better today with anterior weight shifting and bumping across board. used sneakers and AFO which also made a big difference with pt reporting increased ease of transfers this date compared to yesterday.   Chair/Bed-to-Chair Transfer CARE Score 1   Functional Standing Tolerance   Time 1:15, 2 min and 3 min   Activity static standing at bedrail   Comments pt engages in static standing trials at bed rail in prep for use of BSC using swap out tech. pt was edu on reasoning behind standing at bed rail with focus on transitioning to use of commode vs bed pan during the day with pt very receptive and motivated to use BSC. discussed with pt that if pt remains consistent tomorrow with time trials during standing then pt can switch to swap out Ax2 for toileting IF pt OOB in tilt in space or recliner. Mod A x2 to complete STS from w/c to bed rail; gait belt donned; sneakers donned and R AFO donned.   Therapeutic Exercise - ROM   UE-ROM Yes   ROM- Right Upper Extremities   R Shoulder PROM;Flexion;Prolonged stretch;ABduction   R Elbow PROM;Prolonged stretch;Elbow flexion;Elbow extension   R Wrist PROM;Prolonged stretch;Wrist flexion;Wrist extension   R Hand PROM;Prolonged stretch;Thumb;Index finger;Long finger;Ring finger;Little finger   RUE ROM Comment supine in bed; tolerates brief PROM to RUE; slight ROM restrictions noted in digits possibly from increased edema   Neuromuscular Education   Comments (S)  would benefit from assessment/use of e-stim to RUE for motor recovery.   Cognition   Overall Cognitive Status WFL   Arousal/Participation Alert;Cooperative   Attention Within functional limits   Orientation Level Oriented X4   Memory Within functional limits    Following Commands Follows all commands and directions without difficulty   Additional Activities   Additional Activities Comments (S)  would benefit from OOB therapy schedule to increase pt OOB tolerance in recliner and/or tilt in space w/c.   Activity Tolerance   Activity Tolerance Patient tolerated treatment well   Assessment   Treatment Assessment pt engages in 90 minute co-treat session with PT focusing on bed level ADL, bed mobility, sitting balance, SBT to tilt in space w/c and standing tolerance at bed rail. see above for full func details. pt reports feeling down this morning when she woke, but after cotreat session, reports feeling so good and was pleased with tasks completed in session. pt very motivated to return home/daughters house; reports sneakers provided her with much needed support (she did not have yesterday) as well as use of R AFO for transfers and standing. completed static standing trials at bed rail in prep for use of commode via swap out tech. plan to complete actual swap out tech in tomorrow sessions in prep for progressing pt white board when OOB. plan to also incorporate an OOB schedule in order to build up pt tolerance to sitting OOB in recliner or tilt in space w/c. pt remains limited by R sided weakness, impaired strength/endurance/activity tolerance, impaired sitting/standing balance, warranting continued skilled care to focus on ADL retraining bed level vs chair mode, SBT, sitting payal/bal, core strengthening, standing tolerance for toileting tasks, RUE NMR, in order to decrease burden of care at d/c.   Prognosis Good   Problem List Decreased strength;Decreased range of motion;Decreased endurance;Impaired balance;Decreased mobility;Decreased coordination;Impaired tone;Obesity;Decreased skin integrity;Pain   Plan   Treatment/Interventions ADL retraining;Functional transfer training;Therapeutic exercise;Endurance training;Patient/family training;Equipment eval/education;Compensatory  technique education   OT Therapy Minutes   OT Time In 0830   OT Time Out 1000   OT Total Time (minutes) 90   OT Mode of treatment - Individual (minutes) 0   OT Mode of treatment - Concurrent (minutes) 0   OT Mode of treatment - Group (minutes) 0   OT Mode of treatment - Co-treat (minutes) 90   OT Mode of Treatment - Total time(minutes) 90 minutes   OT Cumulative Minutes 175   Therapy Time missed   Time missed? No

## 2024-08-22 NOTE — ASSESSMENT & PLAN NOTE
Home:  no meds  Here:  Coreg 25mg BID/Hydralazine 75 mg q8hrs/Nifedipine 60mg daily at dinner time, Aldactone 25mg daily, Valsartan 160 mg qd which will be changed to dose equivalent ARB that we have here.  Stable; no changes today 8/22/24  Followup with IM as OP.  On DC summary was listed to see Megan DORSEY from that group

## 2024-08-22 NOTE — TEAM CONFERENCE
Acute RehabilitationTeam Conference Note  Date: 8/22/2024   Time: 09:34 A.M.       Patient Name:  Maye Lilly       Medical Record Number: [id number]   YOB: 1960  Sex: Female          Room/Bed:  [facility]9/[facility] 969-01  Payor Info:  Payor: MEDICARE / Plan: MEDICARE A AND B / Product Type: Medicare A & B Fee for Service /      Admitting Diagnosis: Stroke due to stenosis of left posterior cerebral artery (HCC) [I63.532]   Admit Date/Time:  8/20/2024  3:02 PM  Admission Comments: No comment available     Primary Diagnosis:  Acute CVA (cerebrovascular accident) (HCC)  Principal Problem: Acute CVA (cerebrovascular accident) (HCC)    Patient Active Problem List    Diagnosis Date Noted    Acute CVA (cerebrovascular accident) (HCC) 08/20/2024    Clostridium difficile infection 08/20/2024    Paroxysmal atrial fibrillation (HCC) 08/20/2024    HTN (hypertension) 08/20/2024    DIANELYS (obstructive sleep apnea) 08/20/2024    Hypothyroidism 08/20/2024    GERD (gastroesophageal reflux disease) 08/20/2024    Meningioma (HCC) 08/20/2024       Physical Therapy:    Weight Bearing Status: Full Weight Bearing  Transfers: Maximum Assistance, Assist of 2  Bed Mobility: Assist of 2  Amulation Distance (ft):  (not able to safely assess)  Wheelchair Mobility Distance:  (not able to assess)  Assistive Device for Stairs:  (not bale to safely assess)  Discharge Recommendations:  (TBD pending progress, reteam)    Date: 28/22/2024  Date of Evaluation: 8/1/2024  Estimated Length of Stay: 4 weeks     Barriers to Discharge Home: 16 ABEL at her home in [facility], 8 ABEL at her daughter's home in [place], decreased caregiver support (will need to clarify with family availability to assist at discharge), significant assist for all functional mobility   PT Interventions to address Barriers: therapeutic exercises, transfer training, Standing tolerance, identifying optimal equipment  Equipments Needs: TBD pending progress  PT Plan of Care  for the Week: co treat with OT to identify safest transfer method for pt with other staff members, Utilize Kreg bed for standing tolerance.weight bearing through affected LLE, establish HEP pt can perform in her room,   Family Training Needed: Yes, but will not occur until closer to discharge   Discharge Planning: TBD pending progress, but patient's goal is to discharge to her daughter's home in [place].       Occupational Therapy:  Eating: Total Assistance  Grooming: Total Assistance, Assist of 2  Bathing: Total Assistance, Assist of 2  Bathing: Total Assistance, Assist of 2  Upper Body Dressing: Maximum Assistance  Lower Body Dressing: Total Assistance, Assist of 2  Toileting: Total Assistance, Assist of 2  Tub/Shower Transfer: Total Assistance, Assist of 2  Toilet Transfer: Total Assistance, Assist of 2  Cognition: Exceptions to WNL (scored mild in ST cogntive assessment)  Orientation: Person, Place, Situation  Discharge Recommendations: Other (home with assistance vs [id number])       08/22/24: Pt is a 64 y.o. female seen for OT evaluation following admission to [facility] for acute left pontine infarct . OT evaluation and assessment of strength, ADL function, and functional transfers completed. Prior to admission Pt was completing ADLs at independent with use of NO DME. Pt was completing IADLs independently. Pt lives with lives alone and is going to have family support but this may be limited. Entry to the home a large barrier. Personal factors affecting the patient at this time include: co morbidities/Other health conditions, inaccessible home, Lives alone, Decreased caregiver support, and Body habitus. Pt is currently limited due to the following deficits impacting occupational performance, Slow processing, Impaired standing balance, Impaired righting reactions, impaired motor planning, impaired trunk control, Impaired sitting balance, Impaired sitting tolerance, Unilateral weakness or paralysis of Upper limb ,  Unilateral weakness or paralysis of Lower limb , Reduced sensation, Ismael Neglect to body, Generalized weakness, impaired GMC/FMC, Impaired UE AROM, and Impaired UE strength. The patient has shown a decline from prior level of function. The patient participates in identification of personal goals to address in Occupational Therapy. Recommend skilled OT services for I/ADL retraining to support the ability to safely participate in daily occupations safely and independently in the most least restrictive way. Pt demonstrates Good rehab potential to meet LTG's of minimal assist with use of wheelchair. Anticipate 4week(s) length of stay. Occupational Therapy plan of care will address the following areas: ADL re-training, fxnl xfers, fxnl attention, standing tolerance, standing balance, UE NMR, UE strengthening, UE endurance, FMC/GMC, FMS/GMS, DME training/education, family training/education, EC techniques/education, healthy coping education, leisure pursuits, body awareness, sitting balance, core/trunk control, and midline awareness     Speech Therapy:  Mode of Communication: Verbal  Speech/Language: Dysarthia (mild dysarthria)  Cognition: Exceptions to WNL  Cognition: Decreased Memory, Decreased Executive Functions, Decreased Attention  Orientation: Person, Place, Time, Situation  Discharge Recommendations:  (pending patient progress)  As of 8/21/2024: Pt was evaluated by skilled ST for cognitive linguistic skills and dysarthria. In regards to cognition, pt completed the SLUMS cognitive assessment upon evaluation. Pt total score was 25/30 which as compared to those with high school education correlates with mild neurocognitive disorder and is indicative of dementia.  Pt presenting with the following barriers: decrease STM, working memory, word recall, critical thinking and higher level problem solving thought organization and expressive language/word retrieval which, which impact pt's overall safety, functional cognitive  communication skills as well as functional mobility. The following interventions are used to target these barriers, including verbal problem solving task, verbal working memory tasks, visual memory recall tasks, drawing conclusions activities, written sequencing tasks, verbal sequencing tasks, written financial management tasks, tangible money tasks, verbal review of current medications, written review of medications, tangible medication management task, written calendar tasks, tangible scheduling tasks , written health management tasks, verbal health management tasks, visual attention tasks, functional reading tasks , and family education/training, as well as verbal command following activities, written command following activities, reading comprehension at sentence level, reading comprehension at paragraph level, written expression at word level, written expression at sentence level , divergent naming: concrete, divergent naming: abstract , word deduction with phrase, and word deduction with clue words. In regards to dysarthria, pt is demonstrating speech intelligibility to be mildly impaired at the sentence/conversational level. Barrier include: distorted speech sounds, decreased breath support and fast rate of speech. Interventions to be targeted include: OME's, verbal repetition of sentence, orthographic repetition of sentences, review of speech strategies and breath/support cueing.     This week, plan to target and implement cognitive and speech interventions as well as discussing baseline with daughter regarding ADLs and  pt's ability to complete IADL tasks such as medication management, finance management, ability to recognize and solve unsafe situations. Recommendations for discharge are pending pt progress but suspect some level of increased support to be needed, along with continued skilled SLP services (OP vs HHC) on discharge. Based on current evaluation, pt is recommended for skilled SLP services  during acute rehab stay targeting both expressive/receptive language and cognitive linguistic skills to maximize functioning and level of independence on discharge.     Current level of function:  Comprehension: Supervision  Expression:Mod A  Social Interaction:Supervision  Problem Solving:Min A   Memory:Min A        Nursing Notes:  Appetite: Good  Diet Type: Cardiac, Other (Specify) (4GM NA)                      Diet Patient/Family Education Complete: No                         Type of Wound Patient/Family Education: No  Bladder: Incontinent     Bladder Patient/Family Education: No  Bowel: Incontinent     Bowel Patient/Family Education: No  Pain Location/Orientation: Orientation: Right, Location: Knee (Chronic knee arthritis)  Pain Score: 0                       Hospital Pain Intervention(s): Repositioned (AAROM)  Pain Patient/Family Education: No  Medication Management/Safety  Safe Administration: No  Reason for non-safe administration: will need further education  Medication Patient/Family Education Complete: No    64 year old patient with history of HTN, PAF, hypothyroidism, GERD and DIANELYS who presented with right sided weakness, slurred speech, headache and vomiting.CT head and CTA had no acute findings.  MRI revealed left pontine infarct.  She is supposed to be taking medications for her HTN and Eliquis as an anticoagulant for her PAF but she stopped taking her medications well over a year ago due to insurance loss.  Neurology saw in consult.  They felt the CVA occurred in the setting of PAF and medication noncompliance.  For her HTN, she was placed on Valsartan 160 mg daily, Coreg 25mg BID, Hydralazine 75 mg q 8 hours, Aldactone 25mg daily and Procardia 60mg daily at dinnertime.  ECHO showed a LVEF of 65% with mild diastolic dysfunction.  During the course of her admission, she developed diarrhea and tested positive for Cdiff.  She is currently on PO Vancomycin for treatment.        Case Management:      Discharge Planning  Living Arrangements: Lives Alone  Support Systems: Self, Son, Daughter  Assistance Needed: none prior  Type of Current Residence: Private residence  Current Home Care Services: No  Pt admitted s/p stroke and has deficits in her ue and le as well as speech. Pt lives alone and was working full time at a [employer] store. Pt has a supportive daughter who pt states she can stay with on dc. Pt aware of potential  los and cm is following to assist w/dc planning recommendations.     Is the patient actively participating in therapies? yes  List any modifications to the treatment plan:     Barriers Interventions   Lives alone, right in attention Progress to safest goal to return home, alternative living arrangement on dc, visual attention acitivities, vor, scanning activities   cdiff Vanco initiated    Ue and le hemiparesis effecting transfers Trialing afo,    Balance righitng reactions effecting standing and transfers Neuro re ed, kreg bed, use of ad as able, use of nu step   Activity tolerance Energy conservation education     Is the patient making expected progress toward goals? yes  List any update or changes to goals:     Medical Goals: Patient will be medically stable for discharge to [facility] restrictive envrionment upon completion of rehab program and Patient will be able to manage medical conditions and comorbid conditions with medications and follow up upon completion of rehab program    Weekly Team Goals:   Rehab Team Goals  ADL Team Goal: Patient will require assist with ADLs with least restrictive device upon completion of rehab program  Transfer Team Goal: Patient will require assist with transfers with least restrictive device upon completion of rehab program  Locomotion Team Goal: Patient will require assist with locomotion with least restrictive device upon completion of rehab program  Cognitive Team Goal: Patient will require supervision for basic and complex tasks upon completion of  rehab program    Discussion: pt presents with the above barriers and the team is utilizing the stated interventions to progress pts funcitonal abilities. In addition pt has dysarthria and speech is utilizing language exercises. Pt will undergo medication and financial mamgt exercises for potential independent living. Pt has 16 gus at home and 8 at her daughters to navigate on dc. Pt is currently a total a for adls at bed level only. Pt is total for transfers and is not ambulatory at this time. Team is estimating 4 week los with min to mod a goals.     Anticipated Discharge Date:  reteam  [facility] Team Members Present:The following team members are supervising care for this patient and were present during this Weekly Team Conference.    Physician: Dr. Lanre MD  : Wanda Hopkins MSW  Registered Nurse: Dottie Lang RN  Physical Therapist: Cinthia Lo DPT  Occupational Therapist: Megan Acuna MS, OTR/L  Speech Therapist: Rossy Rodríguez MA, CCC-SLP

## 2024-08-22 NOTE — PROGRESS NOTES
08/22/24 1400   Pain Assessment   Pain Assessment Tool 0-10   Pain Score No Pain   Restrictions/Precautions   Precautions Bed/chair alarms;Cognitive;Fall Risk;Supervision on toilet/commode;Contact/isolation  (c diff precautions; mild dysarthria)   Comprehension   Comprehension (FIM) 5 - Needs help/cues, repetition only RARELY ( < 10% of the time)   Expression   Expression (FIM) 3 - Needs to repeat parts of sentences   Social Interaction   Social Interaction (FIM) 5 - Interacts appropriately with others 90% of time   Problem Solving   Problem solving (FIM) 4 - Solves basic problems 75-89% of time   Memory   Memory (FIM) 4 - Recognizes/recalls/performs 75-89%   Speech/Language/Cognition Assessment   Treatment Assessment Pt was seen in room for skilled ST session targeting speech and cognitive linguistic communication skills. Upon entering room, pt was on the phone with her daughter. After ending call, pt voiced that she is returning from [facility] and will be back tomorrow and her son is set to return Saturday from [place]. Pt voiced that she is happy to have her kids near and is agreeable to have them help her with most ADLs. Focus of session was on pt's speech as well as cognitive linguistic skills.     Began session w/ review of OME's as SLP provided during evaluation and reviewed parts with pt. SLP and pt reviewed each exercise for lingual, labial and buccal muscles and SLP provided explanation at length regarding oral placement and movement. SLP also discussed at length with pt appropriate form and noted where pt may feel 'stretch' for each exercise. Pt asked questions regarding placement of articulators and if accuracy was being placed on correct  exercises. SLP provided education and noted to pt the importance of using mirror to help w/ visual feedback. After, SLP provided polysyllabic phrases for pt to practice which pt was noted to have difficulty w/ /s/ and /r/ blends w/ need for verbal repetition and phonemic  cues to sound out and each syllable of longer word. Pt noted difficulty at times w/ blends and over time, increase accuracy with slowing ROS and increasing pausing. SLP provided additional speech strategies orthographically such as using a normal volume when speaking, decrease background noise and promote a calm and quiet environment and giving pt more time to find the words such as taking advantage of alternative ways to communicate. Pt noted to have improvement over time with task and noted to benefit most from initial completed of OME's to 'stretch out mouth muscles.'    Next half of session targeted cognitive linguistic skills, specifically medication and bill management to assess pt's current skills with iADLs. Due to URE weakness as pt is R hand dominant, pt complete task w/ L hand. Pt noted to have difficulty writing words out and SLP noted that pt could [place] answers and write the number of each questions next to the answer. Pt noted to benefit from task as it worked on writing however, with nondominant hand pt noted increase frustration but was able to complete task. First tasks, pt was given 2 mock bills and asked to used visuals of mock bills to answer orthographic questions. Pt was able to complete task w/ 20/20 accuracy which no verbal cues needed. Pt voiced that mock bills were similar to her own at home and noted no difficulty reading or comprehension information. Then, pt was given a mock medication label and asked to answer questions given label. Pt was able to complete task w/ 6/7 accuracy which increased to 7/7 accuracy once given verbal cues. Pt noted improvement with task, noting fatigue by end of task requiring min A w/ verbal cues. Overall, pt showed accurate problem solving, reasoning and critical thinking. Will continue to review bill management with mock checks in future sessions as well as continued review with medication and tangible medication to ensure carryover of ADL.  At this time,  pt continues to benefit from ongoing skilled SLP services to maximize overall cognitive linguistic skills in attempts to decrease caregiver burden over time.   SLP Therapy Minutes   SLP Time In 1400   SLP Time Out 1500   SLP Total Time (minutes) 60   SLP Mode of treatment - Individual (minutes) 60   SLP Mode of treatment - Concurrent (minutes) 0   SLP Mode of treatment - Group (minutes) 0   SLP Mode of treatment - Co-treat (minutes) 0   SLP Mode of Treatment - Total time(minutes) 60 minutes   SLP Cumulative Minutes 120   Therapy Time missed   Time missed? No

## 2024-08-22 NOTE — PROGRESS NOTES
[facility]  Progress Note  Name: Maye Lilly I  MRN: [id number]  Unit/Bed#: -01 I Date of Admission: 8/20/2024   Date of Service: 8/22/2024 I Hospital Day: 2    Assessment & Plan   * Acute CVA (cerebrovascular accident) (HCC)  Assessment & Plan  Presented with right sided weakness, slurred speech, headache and vomiting  MRI = left pontine infarct  Neuro felt CVA was from atrial fibrillation in setting of non-compliance with anticoagulation  Currently now on Xarelto 20mg qd, Lipitor 40mg qd  Followup with Neuro as OP.  Stevo Baires from [facility] from that group was listed on the DC summary    Clostridium difficile infection  Assessment & Plan  New during hospital stay  Says no BMs so far today 8/21/24  Continue Vancomycin 125mg QID.  LD will be 8/26/24 0900    Paroxysmal atrial fibrillation (HCC)  Assessment & Plan  Was seeing cardiologist in [place] and taking Xarelto but she has not seen him in 2 yrs and stopped all her meds >1 yr ago  EKG in the hospital showed NSR  Needs to followup as OP with Cardiology = Dr Bletre  Continue Coreg  Was not placed back on Flecainide  Now on Xarelto   RRR by exam today 8/22/24  Sent price check to her pharmacy on 8/21/24 for the Xarelto and is $661.99  Will send price check on Eliquis but doubtful that will be much better.  If not, she will need to be converted to Coumadin with a therapeutic Lovenox bridge.  She did tell me she previously was on the Xarelto and had a coupon number so she could get it for $10.00/month.  Will try and see about getting that number    HTN (hypertension)  Assessment & Plan  Home:  no meds  Here:  Coreg 25mg BID/Hydralazine 75 mg q8hrs/Nifedipine 60mg daily at dinner time, Aldactone 25mg daily, Valsartan 160 mg qd which will be changed to dose equivalent ARB that we have here.  Stable; no changes today 8/22/24  Followup with IM as OP.  On DC summary was listed to see Megan DORSEY from that  group    DIANELYS (obstructive sleep apnea)  Assessment & Plan  In past was on CPAP  Can d/w PCP when discharged    Hypothyroidism  Assessment & Plan  Continue Levothyroxine 25 mcg qd  This was the dose that she was supposed to be taking at home and they placed her back on it in the hospital on 8/12/24 but there was no TSH drawn.  Had an abnormal TSH of 5.1 on 4/23/20 presumably not on Levothyroxine at that time since thereafter on 2/15/22 was normal  TSH 8/22/24 normal at 3.95  Will continue current dose and have her get a TSH in 6 weeks = @9/23    GERD (gastroesophageal reflux disease)  Assessment & Plan  Continue Protonix    Meningioma (HCC)  Assessment & Plan  Incidental finding   OP followup           The above assessment and plan was reviewed and updated as determined by my evaluation of the patient on 8/22/2024.    Labs:   Results from last 7 days   Lab Units 08/22/24  0657   WBC Thousand/uL 8.61   HEMOGLOBIN g/dL 13.6   HEMATOCRIT % 42.3   PLATELETS Thousands/uL 297     Results from last 7 days   Lab Units 08/22/24  0657 08/16/24  1705   SODIUM mmol/L 139 137   POTASSIUM mmol/L 4.3 4.2   CHLORIDE mmol/L 105 103   CO2 mmol/L 26 27   BUN mg/dL 30* 24   CREATININE mg/dL 0.79 0.79   CALCIUM mg/dL 9.4 9.2                   Imaging  No orders to display       Review of Scheduled Meds:  Current Facility-Administered Medications   Medication Dose Route Frequency Provider Last Rate    acetaminophen  650 mg Oral Q6H PRN WECNESLAO Patel      atorvastatin  40 mg Oral QPM WENCESLAO Patel      bisacodyl  10 mg Rectal Daily PRN Dougie King MD      carvedilol  25 mg Oral BID With Meals WENCESLAO Patel      Diclofenac Sodium  2 g Topical BID Dk De Dios MD      hydrALAZINE  75 mg Oral Q8H [facility] WENCESLAO Patel      hydrocortisone   Topical BID WENCESLAO Patel      levothyroxine  25 mcg Oral Early Morning WENCESLAO Patel      lidocaine  1 patch  Topical Daily Dk De Dios MD      losartan  100 mg Oral Daily WENCESLAO Patel      multivitamin stress formula  1 tablet Oral Daily WENCESLAO Patel      NIFEdipine  60 mg Oral Daily With Dinner WENCESLAO Patel      pantoprazole  40 mg Oral Early Morning WENCESLAO Patel      rivaroxaban  20 mg Oral Daily With Dinner WENCESLAO Patel      spironolactone  25 mg Oral Daily Lucila Schulz, WENCESLAO      vancomycin  125 mg Oral 4x Daily WENCESLAO Patel         Subjective/ HPI: Patient seen and examined. Patients overnight issues or events were reviewed with nursing staff. New or overnight issues include the following:     No new or overnight issues.  Offers no complaints    ROS:   A 10 point ROS was performed; negative except as noted above.        *Labs /Radiology studies Reviewed  *Medications  reviewed and reconciled as needed  *Please refer to order section for additional ordered labs studies      Physical Examination:  Vitals:   Vitals:    08/21/24 2353 08/22/24 0535 08/22/24 0814 08/22/24 1453   BP:  124/57 155/70 126/59   BP Location:  Right arm Left arm Left arm   Pulse:  65 73 66   Resp:  18  18   Temp:  98 °F (36.7 °C)  97.9 °F (36.6 °C)   TempSrc:  Oral  Oral   SpO2: 93% 96%  92%   Weight:       Height:           General Appearance: no distress, nontoxic appearing  HEENT:  External ear normal.  Nose normal w/o drainage. Mucous membranes are moist. Oropharynx is clear. Conjunctiva clear w/o icterus or redness.  Neck:  Supple, normal ROM  Lungs: BBS without crackles/wheeze/rhonchi; respirations unlabored with normal inspiratory/expiratory effort.  No retractions noted.  On RA  CV: regular rate and rhythm; no rubs/murmurs/gallops, PMI normal   ABD: Abdomen is soft.  Bowel sounds all quadrants.  Nontender with no distention.    EXT: no edema  Skin: normal turgor, normal texture  Psych: affect normal, mood normal  Neuro: AAO          The  above physical exam was reviewed and updated as determined by my evaluation of the patient on 8/22/2024.    Invasive Devices       None                      VTE Pharmacologic Prophylaxis: Xarelto  Code Status: Level 1 - Full Code  Current Length of Stay: 2 day(s)    Total floor / unit time spent today 30 minutes  Coordination of patient's care was performed in conjunction with consulting services. Time invested included review of patient's labs, vitals, and management of their comorbidities with continued monitoring, examination of patient as well as answering patient questions, documenting her findings and creating progress note in electronic medical record,  ordering appropriate diagnostic testing.       ** Please Note:  voice to text software may have been used in the creation of this document. Although proof errors in transcription or interpretation are a potential of such software**

## 2024-08-22 NOTE — ASSESSMENT & PLAN NOTE
Presented with right sided weakness, slurred speech, headache and vomiting  MRI = left pontine infarct  Neuro felt CVA was from atrial fibrillation in setting of non-compliance with anticoagulation  Currently now on Xarelto 20mg qd, Lipitor 40mg qd  Followup with Neuro as OP.  [facility] from [facility] was listed on the DC summary

## 2024-08-22 NOTE — PCC OCCUPATIONAL THERAPY
08/22/24: Pt is a 64 y.o. female seen for OT evaluation following admission to [facility] for acute left pontine infarct . OT evaluation and assessment of strength, ADL function, and functional transfers completed. Prior to admission Pt was completing ADLs at independent with use of NO DME. Pt was completing IADLs independently. Pt lives with lives alone and is going to have family support but this may be limited. Entry to the home a large barrier. Personal factors affecting the patient at this time include: co morbidities/Other health conditions, inaccessible home, Lives alone, Decreased caregiver support, and Body habitus. Pt is currently limited due to the following deficits impacting occupational performance, Slow processing, Impaired standing balance, Impaired righting reactions, impaired motor planning, impaired trunk control, Impaired sitting balance, Impaired sitting tolerance, Unilateral weakness or paralysis of Upper limb , Unilateral weakness or paralysis of Lower limb , Reduced sensation, Ismael Neglect to body, Generalized weakness, impaired GMC/FMC, Impaired UE AROM, and Impaired UE strength. The patient has shown a decline from prior level of function. The patient participates in identification of personal goals to address in Occupational Therapy. Recommend skilled OT services for I/ADL retraining to support the ability to safely participate in daily occupations safely and independently in the most least restrictive way. Pt demonstrates Good rehab potential to meet LTG's of minimal assist with use of wheelchair. Anticipate 4week(s) length of stay. Occupational Therapy plan of care will address the following areas: ADL re-training, fxnl xfers, fxnl attention, standing tolerance, standing balance, UE NMR, UE strengthening, UE endurance, FMC/GMC, FMS/GMS, DME training/education, family training/education, EC techniques/education, healthy coping education, leisure pursuits, body awareness, sitting balance,  core/trunk control, and midline awareness     08/27/24: Pt is demonstrating fair progress with occupational therapy and is progressing toward long term goals for ADL, IADL, and functional transfers/mobility. Pts long term goals for ADLs are Minimum assistance and Moderate assistance with Ismael-walker and wheelchair. Pt is currently Dependent  for ADLs. Pt continues to present with impairments in activity tolerance, endurance, standing balance/tolerance, sitting balance/tolerance, UE strength, UE ROM, FMC, and GMC. Occupational performance remains limited by fatigue, abnormal tone, (R) hemiparesis, decreased caregiver support, risk for falls, and home environment. Family training/education will be required prior to D/C. Pt will continue to benefit from skilled acute rehab OT services to address above mentioned barriers and maximize functional independence in baseline areas of occupation to meet established treatment goals with overall decreased burden of care. Plan of care to continue to focus on ADL Retraining , LB Dressing, UB dressing, Ismael Dressing Techniques, IADL training , Functional Transfers, Functional Cognition, Standing tolerance, Standing balance , UE NMR right, midline awareness, Fine motor coordination, Gross motor coordination, Fine motor strengthening , Gross motor strengthening , DME training/education, Family training/education, Energy conservation training/education, healthy coping education, Leisure and social pursuits, community re-integration, return to work simulation, sitting balance, and Core/trunk control/strengthening . Goals for the upcoming week are: intense NMR to RUE    Anticipate Re-team at this time.      9/3/24 update:  Pt is demonstrating good progress with occupational therapy and is progressing toward long term goals for ADL, IADL, and functional transfers/mobility. Pts long term goals for ADLs are Minimum-moderate assistance with Ismael-walker and wheelchair. Pt is currently  Substantial/maximal assistance  for ADLs. Pt continues to present with impairments in activity tolerance, endurance, standing balance/tolerance, UE strength, UE ROM, FMC, and GMC. Occupational performance remains limited by fatigue, pain, (R) hemiparesis, decreased caregiver support, risk for falls, and home environment. Family training/education will be required prior to D/C. Pt will continue to benefit from skilled acute rehab OT services to address above mentioned barriers and maximize functional independence in baseline areas of occupation to meet established treatment goals with overall decreased burden of care. Plan of care to continue to focus on ADL Retraining , Ismael Dressing Techniques, Functional Transfers, UE NMR right, Fine motor coordination, Gross motor coordination, Fine motor strengthening , Gross motor strengthening , DME training/education, Family training/education, healthy coping education, Leisure and social pursuits, and Core/trunk control/strengthening . Goals for the upcoming week are: progress pt to Moderate A for bathing/dressing as well as consistantly perform SPT with HW at Min-mod A x1 and to focus on toileting to increase independence with CM up/down and address func reach for hygiene.     Anticipate Discharge date to be set.       9/10/24 update:  Pt is demonstrating good progress with occupational therapy and is progressing toward long term goals for ADL, IADL, and functional transfers/mobility. Pts long term goals for ADLs are Minimum/moderate assistance with Ismael-walker. Pt is currently Partial/moderate assistance  for ADLs. Pt continues to present with impairments in activity tolerance, standing balance/tolerance, UE strength, UE ROM, FMC, and GMC. Occupational performance remains limited by fatigue, (R) hemiparesis, risk for falls, and home environment. Family training/education completed with pt's son and SERGO however may benefit from ongoing family training pending how pt  progresses. Pt will continue to benefit from skilled acute rehab OT services to address above mentioned barriers and maximize functional independence in baseline areas of occupation to meet established treatment goals with overall decreased burden of care. Plan of care to continue to focus on ADL Retraining , Ismael Dressing Techniques, Functional Transfers, Standing balance , UE NMR right, Fine motor coordination, Gross motor coordination, Fine motor strengthening , Gross motor strengthening , DME training/education, Family training/education, healthy coping education, Leisure and social pursuits, and community re-integration. Goals for the upcoming week are: continue to focus on RUE NMR, focus on toileting tasks specifically rear hygiene to increase IND as well as identify appropriate DME for safe d/c home.     Anticipate tentative d/c date for Monday, 9/16/24 pending stair negotiation--recommend home OT services

## 2024-08-22 NOTE — PROGRESS NOTES
Physical Medicine and Rehabilitation Progress Note  Maye Lilly 64 y.o. female MRN: [id number]  Unit/Bed#: -01 Encounter: [id number]    To Review:   Maye Lilly is a 64 y.o. female with PMH of afib (prev on Eliquis, now on Xarelto), HTN, hypothyroidism, DIANELYS, morbid obesity, GERD who presented to the [facility] with sudden onset R sided weakness with tingling down RUE. She also had slurred speech and a sudden headache and emesis. Denied chest pain, SOB, abd pain, visual deficits on admission. CBC, CMP, trop, urinalysis, tox screen negative. CTA of head and neck negative for LVO. CT C/A/P with  no acute abnormalities except mild sigmoid diverticulitis and thyromegaly. CTH wthout acute infarction or hemorrhage, though calcified meningioma noted along the falx without mass effect. MRI brain w/o contrast showed L pontine infarct. CT of thoracolumbar area without acute fx or dislocations.  Pt with significantly elevated BP on admission (230-240 systolic). No TNK given out of window and no obvious infarct + already on anticoagulation. She was continued on xarelto.  Hospital course complicate by diarrhea, pt found to be C Diff positive, started on vancomycin.    Chief Complaint: R-hemibody paresis    Interval History/Subjective:  Pt endorses stable paresis of RUE and RLE. Notes that bowel movements have improved to 1-2x/day and soft instead of watery in consistency now. She notes she is eating, sleeping, passing bowel/bladder movements without issue. She notes therapy is progressing well. Denies worsening weakness, loss of sensation, headache, bowel/bladder incontinence.    ROS:  As noted in subjective above    Today's Changes:  Multipodus boots ordered for RLE    Assessment/Plan:    * Acute CVA (cerebrovascular accident) (HCC)  Assessment & Plan  - admitted to acute care with slurred speech, headache, tingling in R arm  - 0/5 RUE strength, 1-2/5 in RLE  - CTH with meningioma  along falx, but no infarct/hemorrhage. CTA with no LVO. MRI with L pontine infarct.  - Did not receive tPA/TNKase, did not undergo thrombectomy  - echo with LVH, normal EF, mild diastolic dysfunction    Plan:  - fall precautions  - Monitor for neurogenic bowel and bladder  - Promote regular sleep/wake cycles and proper sleep hygiene  - Monitor closely for hemiparetic shoulder pain and post-stroke pain  -  Neutral resting wrist splint for the RUE and bilateral multipodus boots to be worn at night and while in bed during the day to prevent developing spasticity and contracture  - Maintain adequate nutrition  - Avoid sedating and anticholinergic medications as possible  - Maintain normotension, s/p permissive hypertension  - Continue Xarelto 20mg daily  - Continue Atorvastatin 40 mg qHs for secondary prevention  - multipodus boots for RLE        HTN (hypertension)  Assessment & Plan  - pt reportedly nonadherent to HTN med regimen due to financial constraints  - management per IM  - continue coreg 25mg BID, nifedipine 60mg daily, aldactone 25mg daily, valsartan 160 daily,     Paroxysmal atrial fibrillation (HCC)  Assessment & Plan  - prev on Eliquis  - switched to Xarelto in acute care    Plan:  - continue Xarelto 20mg daily  - continue coreg    Clostridium difficile infection  Assessment & Plan  - pt with diarrhea in acute care  - Cdiff positive PCR  - started PO vanc in acute care    Plan:  - continue PO vanc for total 2 week course  - continue contact isolation precautions  - monitor for BM    Hypothyroidism  Assessment & Plan  - continue levothyroxine 25mcg daily    Meningioma (HCC)  Assessment & Plan  - [id number] meningioma along falx, no mass effect or vasogenic edema  - continue to monitor, outpatient follow up with neurology as needed.    GERD (gastroesophageal reflux disease)  Assessment & Plan  - continue protonix 40mg BID            Objective:    Allergies per EMR    Physical Exam:  Temp:  [97.7 °F (36.5  °C)-98 °F (36.7 °C)] 98 °F (36.7 °C)  HR:  [63-73] 73  Resp:  [18] 18  BP: (124-155)/(56-70) 155/70  Oxygen Therapy  SpO2: 96 %      Gen: No acute distress, Well-nourished, well-appearing.  HEENT: Moist mucus membranes  Cardiovascular: No LE edema  Pulmonary: Non-labored beathing  : No dexter  GI: non-distended.   MSK: ROM is full in all extremities. No effusions or deformities. Bulk is symmetric.   Integumentary: Skin is warm, dry. No rashes or ulcers.  Neuro: Alert and oriented, Speech is mildly dysarthric but organized, comprehension are intact. Mild R sided facial droop.  Appropriate to questioning.   Psych: Normal mood and affect.     Diagnostic Studies: reviewed, no new imaging  No results found.    Laboratory:    Results from last 7 days   Lab Units 08/22/24  0657   HEMOGLOBIN g/dL 13.6   HEMATOCRIT % 42.3   WBC Thousand/uL 8.61     Results from last 7 days   Lab Units 08/22/24  0657 08/16/24  1705   BUN mg/dL 30* 24   POTASSIUM mmol/L 4.3 4.2   CHLORIDE mmol/L 105 103   CREATININE mg/dL 0.79 0.79            Patient Active Problem List   Diagnosis    Acute CVA (cerebrovascular accident) (HCC)    Clostridium difficile infection    Paroxysmal atrial fibrillation (HCC)    HTN (hypertension)    DIANELYS (obstructive sleep apnea)    Hypothyroidism    GERD (gastroesophageal reflux disease)    Meningioma (HCC)         Medications  Current Facility-Administered Medications   Medication Dose Route Frequency Provider Last Rate    acetaminophen  650 mg Oral Q6H PRN WENCESLAO Patel      atorvastatin  40 mg Oral QPM WENCESLAO Patel      bisacodyl  10 mg Rectal Daily PRN Dougie King MD      carvedilol  25 mg Oral BID With Meals WENCESLAO Patel      Diclofenac Sodium  2 g Topical BID Dk De Dios MD      hydrALAZINE  75 mg Oral Q8H [facility] WENCESLAO Patle      hydrocortisone   Topical BID WENCESLAO Patel      levothyroxine  25 mcg Oral Early Morning Lucila Aceves  WENCESLAO Schulz      lidocaine  1 patch Topical Daily Dk De Dios MD      losartan  100 mg Oral Daily WENCESLAO Patel      multivitamin stress formula  1 tablet Oral Daily WENCESLAO Patel      NIFEdipine  60 mg Oral Daily With Dinner WENCESLAO Patel      pantoprazole  40 mg Oral Early Morning WENCESLAO Patel      rivaroxaban  20 mg Oral Daily With Dinner WENCESLAO Patel      spironolactone  25 mg Oral Daily Lucila Schulz, WENCESLAO      vancomycin  125 mg Oral 4x Daily WENCESLAO Patel            Case discussed with attending Dr. Lanre King MD  Physical Medicine and Rehabilitation, PGY-2

## 2024-08-22 NOTE — PCC CARE MANAGEMENT
Pt is  making good progress towards goals. Family training has occurred one time with pts son and the team wishes to bring him in again as pt continues to advance.  Pt is hopeful to return  home and the current recommendation is for contd therapy at home. Cm following to assist w/dc  recommendations.

## 2024-08-22 NOTE — PROGRESS NOTES
"   08/22/24 1230   Pain Assessment   Pain Assessment Tool 0-10   Pain Score No Pain   Restrictions/Precautions   Precautions Bed/chair alarms;Fall Risk;Contact/isolation;Pain;Supervision on toilet/commode   Weight Bearing Restrictions No   ROM Restrictions No   Braces or Orthoses Other (Comment)  (AFO used this session +sneakers for all functional transfers; sling for transfers)   Lifestyle   Autonomy \"I had such a good day today\"   Upper Body Dressing   Type of Assistance Needed Physical assistance   Physical Assistance Level 76% or more   Comment pt able to assist with doffing dress by unthreading head and assisting in unthreading RUE; TA to doff bra.   Upper Body Dressing CARE Score 2   Lower Body Dressing   Type of Assistance Needed Physical assistance   Physical Assistance Level Total assistance   Comment TA to don tab brief via rolling side to side   Lower Body Dressing CARE Score 1   Putting On/Taking Off Footwear   Type of Assistance Needed Physical assistance   Physical Assistance Level Total assistance   Comment TA to don socks and sneakers   Putting On/Taking Off Footwear CARE Score 1   Bed-Chair Transfer   Type of Assistance Needed Physical assistance   Physical Assistance Level Total assistance   Comment Mod Ax2 SBT bed to and from manual w/c; PT obtained a 20in manual w/c to trial during therapy sessions; placed ROHO cushion on w/c and trialed for comfort as well as w/c propulsion. see PT note for more details regarding w/c propulsion. from vision/cog stand point, pt able to manage wide turns, U turns and straight aways with increased time and LUE/LLE for propulsion. plan to continue to use manual w/c during sessions, but if pt would like to sit out of bed--use tilt in space at this time. SBT TO L SIDE ONLY   Chair/Bed-to-Chair Transfer CARE Score 1   Cognition   Overall Cognitive Status WFL   Arousal/Participation Alert;Cooperative   Attention Within functional limits   Orientation Level Oriented X4 "   Memory Within functional limits   Following Commands Follows all commands and directions without difficulty   Activity Tolerance   Activity Tolerance Patient tolerated treatment well   Assessment   Treatment Assessment pt engages in brief 30 minute co-treat session with PT focusing on SBT, OOB tolerance in 20 in manual w/c and w/c propulsion. see above for full func details and see PT note for details regarding w/c propulsion. pt continues to demo slow progress toward OT goals. ongoing Ax2 for SBT however does engage in assistance when transferring to L side. do not transfer to R side at this time due to safety reasons. trialed use of 20 in manual w/c with pt tolerating short in it and during w/c propulsion. plan to continue to utilize during therapy sessions, but when pt is safe to sit OOB, plan to use recliner or tilt in space only. plan to trial swap out to [facility] during tomorrows session to assess safety and progression to complete with nursing staff. pt continues to report having a good day today and feels really good after the therapy sessions she's had today.  pt remains limited by R sided weakness, impaired strength/endurance/activity tolerance, impaired sitting/standing balance, warranting continued skilled care to focus on ADL retraining bed level vs chair mode, SBT, sitting payal/bal, core strengthening, standing tolerance for toileting tasks, RUE NMR, in order to decrease burden of care at d/c.   Prognosis Good   Problem List Decreased strength;Decreased endurance;Impaired balance;Decreased mobility;Decreased coordination;Pain   Plan   Treatment/Interventions ADL retraining;Functional transfer training;Therapeutic exercise;Endurance training;Patient/family training;Equipment eval/education;Compensatory technique education   OT Therapy Minutes   OT Time In 1230   OT Time Out 1300   OT Total Time (minutes) 30   OT Mode of treatment - Individual (minutes) 0   OT Mode of treatment - Concurrent (minutes) 0   OT  Mode of treatment - Group (minutes) 0   OT Mode of treatment - Co-treat (minutes) 30   OT Mode of Treatment - Total time(minutes) 30 minutes   OT Cumulative Minutes 205   Therapy Time missed   Time missed? No

## 2024-08-22 NOTE — PROGRESS NOTES
"   08/22/24 0831   Pain Assessment   Pain Score No Pain  (at rest. 8/10 pain on R knee with initial flexion, after reps of AAROM pain was better)   Pain Location/Orientation Orientation: Right;Location: Knee  (Chronic knee arthritis)   Hospital Pain Intervention(s) Repositioned  (AAROM)   Restrictions/Precautions   Precautions Bed/chair alarms;Fall Risk;Cognitive;Supervision on toilet/commode;Contact/isolation  (C-diff precautions)   Braces or Orthoses   (AFO used this session +sneakers for all functional transfers)   Cognition   Arousal/Participation Alert;Cooperative   Attention Within functional limits   Following Commands Follows all commands and directions without difficulty   Subjective   Subjective \"I feel that there is hope\"   Roll Left and Right   Type of Assistance Needed Physical assistance   Physical Assistance Level Total assistance   Comment total assist, with bedrail can be max A x 1   Roll Left and Right CARE Score 1   Sit to Lying   Type of Assistance Needed Physical assistance   Physical Assistance Level Total assistance   Comment A x 2   Sit to Lying CARE Score 1   Lying to Sitting on Side of Bed   Type of Assistance Needed Physical assistance   Physical Assistance Level Total assistance   Comment A x 2 for upperbody   Lying to Sitting on Side of Bed CARE Score 1   Sit to Stand   Type of Assistance Needed Physical assistance   Physical Assistance Level Total assistance   Comment mod A x 2 pulling up from bedrail, R knee bliosking provided but was able to engae quads with verbal cues . Stood X 3 trials   Sit to Stand CARE Score 1   Bed-Chair Transfer   Type of Assistance Needed Physical assistance   Physical Assistance Level Total assistance   Comment A x 2 slide board transfers to the L, one infront blocking and repositioning R LE and one behind helping to boost. Pt. able to intitiate movement. Used chuch pads over board to protect pt's skin   Chair/Bed-to-Chair Transfer CARE Score 1   Transfer " Bed/Chair/Wheelchair   Adaptive Equipment Transfer Board   Car Transfer   Reason if not Attempted Safety concerns   Car Transfer CARE Score 88   Walk 10 Feet   Reason if not Attempted Safety concerns   Walk 10 Feet CARE Score 88   Walk 50 Feet with Two Turns   Reason if not Attempted Safety concerns   Walk 50 Feet with Two Turns CARE Score 88   Walk 150 Feet   Reason if not Attempted Safety concerns   Walk 150 Feet CARE Score 88   Walking 10 Feet on Uneven Surfaces   Reason if not Attempted Safety concerns   Walking 10 Feet on Uneven Surfaces CARE Score 88   Ambulation   Primary Mode of Locomotion Prior to Admission Walk   Wheel 50 Feet with Two Turns   Reason if not Attempted Safety concerns   Wheel 50 Feet with Two Turns CARE Score 88   Wheel 150 Feet   Reason if not Attempted Safety concerns   Wheel 150 Feet CARE Score 88   Wheelchair mobility   Findings Pt. currently on tilt in space w/c, will assess sitting on regual w/c for next session if safe/ appropriate   Curb or Single Stair   Reason if not Attempted Safety concerns   1 Step (Curb) CARE Score 88   4 Steps   Reason if not Attempted Safety concerns   4 Steps CARE Score 88   12 Steps   Reason if not Attempted Safety concerns   12 Steps CARE Score 88   Picking Up Object   Reason if not Attempted Safety concerns   Picking Up Object CARE Score 88   Therapeutic Interventions   Flexibility AAROM hip + knee flex , severe pain on first bend then improved after several reps , gentle R heel cord stretching   Balance sitting EOB to complete upperbody dressing with OT and leaning outside of midline to place slide board and reposition heather pads. standing using bedrail for support, with standing tolerance as follows: 1 min and 15 secs, 2 mins and 3 mins .   Other Comments   Comments (S)  Pt. will benefit from a multipodus boot for bed to dec risk of contracture and skin breakdown. Nurse made aware. For the next PT sessions, PT will cont to focus on: core strengthening,  inc independence in SBT, R LE active movement , standing at // bars , pregait activities and w/c mobility using regualr w/c. Also worked on OOB sitting tolerance on titl in space up to 2 hrs.   Assessment   Treatment Assessment Pt. engaged in PT/OT session and was able to tolerate above activities . PT tx  focused on bed mobility while OT were completing ADL task with pt. , sitting balance at EOB and as well as functional transfers including SBT and sit to stand pulling from bedrail. Although pt. cont to need A x 2 to complete most transfers ,Pt. demonstrates improved initiation of bed mobility transfers as well as stability in sitting EOB as well as standing up to 3 mins . pt. also able to sit at EOM with CS at best without falling. Pt. expressed that she feels a lot better with the progress she is showing today. WIll cont to POC in the PM   Problem List Decreased strength;Decreased endurance;Impaired balance;Decreased mobility;Decreased coordination;Pain   Barriers to Discharge Inaccessible home environment;Decreased caregiver support   PT Barriers   Functional Limitation Car transfers;Ramp negotiation;Stair negotiation;Standing;Transfers;Walking;Wheelchair management   Plan   Treatment/Interventions Functional transfer training;LE strengthening/ROM;Elevations;Therapeutic exercise;Endurance training;Patient/family training;Equipment eval/education;Bed mobility;Gait training   Discharge Recommendation   Rehab Resource Intensity Level, PT   (TBD)   Equipment Recommended   (TBD)   PT Therapy Minutes   PT Time In 0830   PT Time Out 1000   PT Total Time (minutes) 90   PT Mode of treatment - Individual (minutes) 0   PT Mode of treatment - Concurrent (minutes) 0   PT Mode of treatment - Group (minutes) 0   PT Mode of treatment - Co-treat (minutes) 90   PT Mode of Treatment - Total time(minutes) 90 minutes   PT Cumulative Minutes 180   Therapy Time missed   Time missed? No

## 2024-08-22 NOTE — PROGRESS NOTES
"   08/22/24 1231   Pain Assessment   Pain Score No Pain   Restrictions/Precautions   Precautions Bed/chair alarms;Fall Risk;Cognitive;Supervision on toilet/commode;Contact/isolation  (C diff precautions)   Braces or Orthoses   (AFO used this session +sneakers for all functional transfers, multipodus boots when in bed.)   Cognition   Arousal/Participation Alert;Cooperative   Attention Within functional limits   Following Commands Follows all commands and directions without difficulty   Subjective   Subjective \"I feel tired in a good way\"   Sit to Lying   Type of Assistance Needed Physical assistance   Physical Assistance Level Total assistance   Comment A x 2   Sit to Lying CARE Score 1   Lying to Sitting on Side of Bed   Type of Assistance Needed Physical assistance   Physical Assistance Level Total assistance   Comment A x 2   Lying to Sitting on Side of Bed CARE Score 1   Bed-Chair Transfer   Type of Assistance Needed Physical assistance   Physical Assistance Level Total assistance   Comment SBT to the L ,   Chair/Bed-to-Chair Transfer CARE Score 1   Transfer Bed/Chair/Wheelchair   Adaptive Equipment Transfer Board   Wheel 50 Feet with Two Turns   Type of Assistance Needed Physical assistance   Physical Assistance Level 25% or less   Comment assist with obstacles/ turns but otherwise CS   Wheel 50 Feet with Two Turns CARE Score 3   Wheel 150 Feet   Type of Assistance Needed Physical assistance   Physical Assistance Level 25% or less   Comment assist with obstacles/ turns but otherwise CS   Wheel 150 Feet CARE Score 3   Wheelchair mobility   Does the patient use a wheelchair? 1. Yes   Type of Wheelchair Used 1. Manual   Method Left upper extremity;Left lower extremity   Assistance Provided For Remove Leg Rest;Replace Leg Rest;Remove armrests;Replace armrests;Locking Brakes   Distance Level Surface (feet) 150 ft   Findings (S)  Will use 20 x 16 w/c for therapy only, if sitting OOB in room , will need to use tilt in " space w/c.   Other Comments   Comments (S)  PT will cont to focus on: core strengthening, inc independence in SBT, R LE active movement , standing at // bars , pregait activities and w/c mobility using regualr w/c. Also worked on OOB sitting tolerance on titl in space up to 2 hrs.   Assessment   Treatment Assessment Pt. seen for short PT/OT treatment with focused again on OOB transfers using SB and to trial w/c propulsion using a manual regular 20 X1 6 w/c . Able to get pt out of room briefly for w/c mobility as long as she is not in contact with other objects/ people in the hallway. Pt. able to propel w/c using L UE and LE at CS level and only needing min A for turns. Pt. demonstrates some R LE motion and able to kick R LE to take socks off. Pt. again assisted back to bed at end of session with no complaints reported. Cont with POC as tolerated .   Problem List Decreased strength;Decreased endurance;Impaired balance;Decreased mobility;Decreased coordination;Pain   Barriers to Discharge Inaccessible home environment;Decreased caregiver support   PT Barriers   Functional Limitation Car transfers;Ramp negotiation;Stair negotiation;Standing;Transfers;Walking;Wheelchair management   Discharge Recommendation   Rehab Resource Intensity Level, PT   (TBD)   Equipment Recommended   (TBD)   PT Therapy Minutes   PT Time In 1230   PT Time Out 1300   PT Total Time (minutes) 30   PT Mode of treatment - Individual (minutes) 0   PT Mode of treatment - Concurrent (minutes) 0   PT Mode of treatment - Group (minutes) 0   PT Mode of treatment - Co-treat (minutes) 30   PT Mode of Treatment - Total time(minutes) 30 minutes   PT Cumulative Minutes 210   Therapy Time missed   Time missed? No

## 2024-08-22 NOTE — MALNUTRITION/BMI
This medical record reflects one or more clinical indicators suggestive of morbid obesity.                                  BMI Findings:  Adult BMI Classifications: Morbid Obesity 45-49.9        Body mass index is 46.47 kg/m².     See Nutrition note dated 08/22/2024   for additional details.  Completed nutrition assessment is viewable in the nutrition documentation.

## 2024-08-22 NOTE — ASSESSMENT & PLAN NOTE
Continue Levothyroxine 25 mcg qd  This was the dose that she was supposed to be taking at home and they placed her back on it in the hospital on 8/12/24 but there was no TSH drawn.  Had an abnormal TSH of 5.1 on 4/23/20 presumably not on Levothyroxine at that time since thereafter on 2/15/22 was normal  TSH 8/22/24 normal at 3.95  Will continue current dose and have her get a TSH in 6 weeks = @9/23

## 2024-08-22 NOTE — RESTORATIVE TECHNICIAN NOTE
Restorative Technician Note      Patient Name: Maye Lilly     Restorative Tech Visit Date: 08/22/24  Note Type: Bracing, Initial consult  Patient Position Upon Consult: Supine  Mobility / Activity Provided: assisted pt with applying multipodus boot on RLE; pt educated on multipodus boot  Brace Applied: Multipodous Boot  Additional Brace Ordered: No  Patient Position When Brace Applied: Supine  Bracing Recommendations: Recommendation (comment) (where multipodus boot while supine during day and night)  Education Provided: Yes  Patient Position at End of Consult: Supine; All needs within reach; Bed/Chair alarm activated  Nurse Communication: Nurse aware of consult, application of brace    Please contact [facility] Restorative tech on Epic Secure Chat  in regards to bracing instruction and/or adjustment.     Argentina Betancourt, Restorative Technician

## 2024-08-22 NOTE — PCC NURSING
64 year old patient with history of HTN, PAF, hypothyroidism, GERD and DIANELYS who presented with right sided weakness, slurred speech, headache and vomiting.CT head and CTA had no acute findings.  MRI revealed left pontine infarct.  She is supposed to be taking medications for her HTN and Eliquis as an anticoagulant for her PAF but she stopped taking her medications well over a year ago due to insurance loss.  Neurology saw in consult.  They felt the CVA occurred in the setting of PAF and medication noncompliance.  For her HTN, she was placed on Valsartan 160 mg daily, Coreg 25mg BID, Hydralazine 75 mg q 8 hours, Aldactone 25mg daily and Procardia 60mg daily at dinnertime.  ECHO showed a LVEF of 65% with mild diastolic dysfunction.  During the course of her admission, she developed diarrhea and tested positive for Cdiff.  She is currently on PO Vancomycin for treatment.    Acute CVA - Presented with right sided weakness, slurred speech, headache and vomiting. MRI = left pontine infarct. Neuro felt CVA was from atrial fibrillation in setting of non-compliance with anticoagulation. Currently now on Xarelto 20mg qd, Lipitor 40mg qd. Followup with Neuro as OP.  Stevo Baires from [facility] from that group was listed on the DC summary. Clostridium difficile infection - New during hospital stay. Says no BMs so far today 8/21/24. Continue Vancomycin 125mg QID.  LD will be 8/26/24 0900. Paroxysmal atrial fibrillation - Was seeing cardiologist in [place] and taking Xarelto but she has not seen him in 2 yrs and stopped all her meds >1 yr ago. EKG in the hospital showed NSR. Needs to followup as OP with Cardiology = Dr Beltre. Continue Coreg. Was not placed back on Flecainide. Now on Xarelto. RRR by exam today 8/22/24. Sent price check to her pharmacy on 8/21/24 for the Xarelto and is $661.99. Will send price check on Eliquis but doubtful that will be much better.  If not, she will need to be converted to Coumadin with a  therapeutic Lovenox bridge.  She did tell me she previously was on the Xarelto and had a coupon number so she could get it for $10.00/month.  Will try and see about getting that number. HTN - Home:  no meds. Here:  Coreg 25mg BID/Hydralazine 75 mg q8hrs/Nifedipine 60mg daily at dinner time, Aldactone 25mg daily, Valsartan 160 mg qd which will be changed to dose equivalent ARB that we have here. Stable; no changes today 8/22/24. Followup with IM as OP.  On DC summary was listed to see Megan DORSEY from that group. DIANELYS - In past was on CPAP. Can d/w PCP when discharged. Hypothyroidism - Continue Levothyroxine 25 mcg qd. This was the dose that she was supposed to be taking at home and they placed her back on it in the hospital on 8/12/24 but there was no TSH drawn.  Had an abnormal TSH of 5.1 on 4/23/20 presumably not on Levothyroxine at that time since thereafter on 2/15/22 was normal. TSH 8/22/24 normal at 3.95. Will continue current dose and have her get a TSH in 6 weeks = @9/23. GERD: Continue Protonix. Meningioma - Incidental finding. OP followup.    Pt was a new admission 8/20. Pt is [facility] for safety. Therapy transfers only. RUE is flaccid and RLE is semi-flaccid. Pt has residual R facial droop and slurred speech. Pt wears CPAP at night. _Pt is on contact for C. Difficle which she is on PO vanco. Pt is incontinent at times of bowel and bladder. Last bowel movement was 8/20. Remains on cardiac and 4 gram sodium diet with thin liquids - on aspiration precautions and requires chair mode for all meals. Pt has rash on b/l feet - hydrocortisone is ordered and has been helping. Pt is now on kreg bed.     8/28/24- This week we will encourage independence with ADLs.  We will continue to monitor labs and vital signs.  We will continue to  educate pt/family about repositioning to prevent skin breakdown.  We will assist w/repositioning and perform routine skin checks.  We will continue to monitor for adequate pain control.   We will monitor for constipation and medicate pt as ordered.  We will increase safety awareness and keep pt free from falls.    9/3/24- will cont to encourage independence with ADLs.  Will continue to monitor labs and vital signs.  Will cont with repositioning to prevent skin breakdown and decrease pain in RLE shoulder.  Will continue with Q2 repositioning  and skin checks.  Will continue to monitor for constipation and medicate as needed.  Will cont to improve safety awareness and keep pt free from falls.     9/10- Cont on xarelto.  Using cpap at hs.  Patient was not on any medications prior to admission. Since admission she has been started on Coreg 25mg BID, Hydralazine 75 mg q8hrs, Nifedipine 60mg daily, Aldactone 25mg daily and Losartan 100mg qd.  Pts shoulder pain much improved.

## 2024-08-22 NOTE — ASSESSMENT & PLAN NOTE
Was seeing cardiologist in [place] and taking Xarelto but she has not seen him in 2 yrs and stopped all her meds >1 yr ago  EKG in the hospital showed NSR  Needs to followup as OP with Cardiology = Dr Beltre  Continue Coreg  Was not placed back on Flecainide  Now on Xarelto   RRR by exam today 8/22/24  Sent price check to her pharmacy on 8/21/24 for the Xarelto and is $661.99  Will send price check on Eliquis but doubtful that will be much better.  If not, she will need to be converted to Coumadin with a therapeutic Lovenox bridge.  She did tell me she previously was on the Xarelto and had a coupon number so she could get it for $10.00/month.  Will try and see about getting that number

## 2024-08-23 PROCEDURE — 97112 NEUROMUSCULAR REEDUCATION: CPT

## 2024-08-23 PROCEDURE — 94760 N-INVAS EAR/PLS OXIMETRY 1: CPT

## 2024-08-23 PROCEDURE — 99232 SBSQ HOSP IP/OBS MODERATE 35: CPT | Performed by: PHYSICAL MEDICINE & REHABILITATION

## 2024-08-23 PROCEDURE — 97535 SELF CARE MNGMENT TRAINING: CPT

## 2024-08-23 PROCEDURE — 97130 THER IVNTJ EA ADDL 15 MIN: CPT

## 2024-08-23 PROCEDURE — 97530 THERAPEUTIC ACTIVITIES: CPT

## 2024-08-23 PROCEDURE — 94660 CPAP INITIATION&MGMT: CPT

## 2024-08-23 PROCEDURE — 99232 SBSQ HOSP IP/OBS MODERATE 35: CPT | Performed by: NURSE PRACTITIONER

## 2024-08-23 PROCEDURE — 92507 TX SP LANG VOICE COMM INDIV: CPT

## 2024-08-23 PROCEDURE — 97129 THER IVNTJ 1ST 15 MIN: CPT

## 2024-08-23 RX ADMIN — PANTOPRAZOLE SODIUM 40 MG: 20 TABLET, DELAYED RELEASE ORAL at 05:19

## 2024-08-23 RX ADMIN — LOSARTAN POTASSIUM 100 MG: 50 TABLET, FILM COATED ORAL at 08:26

## 2024-08-23 RX ADMIN — LEVOTHYROXINE SODIUM 25 MCG: 25 TABLET ORAL at 05:19

## 2024-08-23 RX ADMIN — CARVEDILOL 25 MG: 25 TABLET, FILM COATED ORAL at 16:18

## 2024-08-23 RX ADMIN — VANCOMYCIN HYDROCHLORIDE 125 MG: 125 CAPSULE ORAL at 08:27

## 2024-08-23 RX ADMIN — HYDRALAZINE HYDROCHLORIDE 75 MG: 50 TABLET ORAL at 05:19

## 2024-08-23 RX ADMIN — HYDROCORTISONE: 1 CREAM TOPICAL at 08:27

## 2024-08-23 RX ADMIN — RIVAROXABAN 20 MG: 20 TABLET, FILM COATED ORAL at 16:19

## 2024-08-23 RX ADMIN — Medication 2 G: at 08:28

## 2024-08-23 RX ADMIN — ATORVASTATIN CALCIUM 40 MG: 40 TABLET, FILM COATED ORAL at 17:19

## 2024-08-23 RX ADMIN — B-COMPLEX W/ C & FOLIC ACID TAB 1 TABLET: TAB at 08:26

## 2024-08-23 RX ADMIN — SPIRONOLACTONE 25 MG: 25 TABLET, FILM COATED ORAL at 08:27

## 2024-08-23 RX ADMIN — HYDRALAZINE HYDROCHLORIDE 75 MG: 50 TABLET ORAL at 20:48

## 2024-08-23 RX ADMIN — CARVEDILOL 25 MG: 25 TABLET, FILM COATED ORAL at 08:26

## 2024-08-23 RX ADMIN — HYDRALAZINE HYDROCHLORIDE 75 MG: 50 TABLET ORAL at 14:12

## 2024-08-23 RX ADMIN — VANCOMYCIN HYDROCHLORIDE 125 MG: 125 CAPSULE ORAL at 17:20

## 2024-08-23 RX ADMIN — NIFEDIPINE 60 MG: 30 TABLET, FILM COATED, EXTENDED RELEASE ORAL at 16:18

## 2024-08-23 RX ADMIN — LIDOCAINE 1 PATCH: 700 PATCH TOPICAL at 16:17

## 2024-08-23 RX ADMIN — Medication 2 G: at 20:42

## 2024-08-23 RX ADMIN — VANCOMYCIN HYDROCHLORIDE 125 MG: 125 CAPSULE ORAL at 12:20

## 2024-08-23 RX ADMIN — VANCOMYCIN HYDROCHLORIDE 125 MG: 125 CAPSULE ORAL at 20:48

## 2024-08-23 RX ADMIN — HYDROCORTISONE: 1 CREAM TOPICAL at 17:19

## 2024-08-23 NOTE — ASSESSMENT & PLAN NOTE
New during hospital stay  Says no BMs so far today 8/21/24  Continue Vancomycin 125mg QID.  LD will be 8/26/24 0900  Stools are currently soft; no diarrhea

## 2024-08-23 NOTE — PROGRESS NOTES
08/23/24 1031   Pain Assessment   Pain Assessment Tool 0-10   Pain Score No Pain   Restrictions/Precautions   Precautions Bed/chair alarms;Cognitive;Fall Risk;Supervision on toilet/commode;Contact/isolation  (c diff precautions)   Weight Bearing Restrictions No   ROM Restrictions No   Braces or Orthoses Other (Comment)  (No Value  AFO used this session +sneakers for all functional transfers, multipodus boots when in bed.)   Cognition   Overall Cognitive Status WFL   Arousal/Participation Alert;Cooperative   Attention Within functional limits   Orientation Level Oriented X4   Memory Within functional limits   Following Commands Follows all commands and directions without difficulty   Roll Left and Right   Type of Assistance Needed Physical assistance;Verbal cues;Adaptive equipment   Physical Assistance Level Total assistance   Roll Left and Right CARE Score 1   Lying to Sitting on Side of Bed   Type of Assistance Needed Physical assistance;Verbal cues;Adaptive equipment   Physical Assistance Level Total assistance   Comment A x2   Lying to Sitting on Side of Bed CARE Score 1   Sit to Stand   Type of Assistance Needed Physical assistance;Verbal cues;Adaptive equipment   Physical Assistance Level Total assistance   Comment ModAx2, R knee blocked, R AFO and B shoes donned at bed rail and hallway rail   Sit to Stand CARE Score 1   Bed-Chair Transfer   Type of Assistance Needed Physical assistance;Verbal cues;Adaptive equipment   Physical Assistance Level Total assistance   Comment SBT to L, swap out method standing at bed rail into WC/recliner   Chair/Bed-to-Chair Transfer CARE Score 1   Transfer Bed/Chair/Wheelchair   Adaptive Equipment Transfer Board   Car Transfer   Reason if not Attempted Safety concerns   Car Transfer CARE Score 88   Walk 10 Feet   Reason if not Attempted Safety concerns   Walk 10 Feet CARE Score 88   Ambulation   Primary Mode of Locomotion Prior to Admission Walk   Wheel 50 Feet with Two Turns    Type of Assistance Needed Verbal cues;Supervision;Physical assistance   Physical Assistance Level 25% or less   Comment assist with obstacles, overall CS   Wheel 50 Feet with Two Turns CARE Score 3   Wheelchair mobility   Does the patient use a wheelchair? 1. Yes   Type of Wheelchair Used 1. Manual   Method Left upper extremity;Left lower extremity   Assistance Provided For Locking Brakes;Remove Leg Rest;Replace Leg Rest;Remove armrests;Replace armrests   Distance Level Surface (feet) 50 ft  (x2)   Curb or Single Stair   Reason if not Attempted Safety concerns   1 Step (Curb) CARE Score 88   Toilet Transfer   Type of Assistance Needed Physical assistance;Verbal cues;Adaptive equipment   Physical Assistance Level Total assistance   Comment swap out method, pt stood holding bed rail, R knee blocked and second person placed BSC behind pt. A for clothing management and hygiene.   Toilet Transfer CARE Score 1   Toilet Transfer   Surface Assessed Bedside Commode   Limitations Noted In Endurance;Safety   Assessment   Treatment Assessment Pt participated in 60 min PT/OT co-treat with increased focus on standing tolerance, increased act tolerance, WC mobility, increased functional transfers and general conditioning. Pt noted increased ability to A with SBT this session with noted increased ant weight shift. A to RLE is required during transfer as pt is unable to engage it I at this time. Pt was able to perform a swap out to BSC/WC/recliner. Board updated to reflect pt's transfer status, PCA's updated on new transfer status as well by TONIE Solorio/NASEEM. Pt was left in her recliner end of session for lunch. Alarm was placed and feet on resting stool attached to recliner. Plan is to get pt back into bed after lunch with OT again. Cont POC as tolerated with cont focus on core strengthening, increased LE strengthening/ROM, increased act tolerance, increased standing tolerance and improved functional transfers to decrease burden  of care.   Problem List Decreased strength;Decreased endurance;Impaired balance;Decreased mobility;Decreased coordination;Pain   Barriers to Discharge Inaccessible home environment;Decreased caregiver support   PT Barriers   Functional Limitation Car transfers;Ramp negotiation;Stair negotiation;Standing;Transfers;Walking;Wheelchair management   Plan   Treatment/Interventions LE strengthening/ROM;Functional transfer training;Endurance training;Therapeutic exercise;Cognitive reorientation;Patient/family training;Bed mobility;Gait training   Progress Progressing toward goals   PT Therapy Minutes   PT Time In 1030   PT Time Out 1130   PT Total Time (minutes) 60   PT Mode of treatment - Individual (minutes) 0   PT Mode of treatment - Concurrent (minutes) 0   PT Mode of treatment - Group (minutes) 0   PT Mode of treatment - Co-treat (minutes) 60   PT Mode of Treatment - Total time(minutes) 60 minutes   PT Cumulative Minutes 270

## 2024-08-23 NOTE — PROGRESS NOTES
"   08/23/24 1030   Pain Assessment   Pain Assessment Tool 0-10   Pain Score No Pain   Restrictions/Precautions   Precautions Bed/chair alarms;Cognitive;Fall Risk;Contact/isolation;Supervision on toilet/commode   Weight Bearing Restrictions No   ROM Restrictions No   Braces or Orthoses Other (Comment)  (AFO used this session +sneakers for all functional transfers, multipodus boots when in bed.)   Lifestyle   Autonomy \"This is so great\" referring to use of BSC   Eating   Type of Assistance Needed Set-up / clean-up   Physical Assistance Level No physical assistance   Comment seated in recliner chair; set up lunch meal for pt and then pt able to self feed with LUE   Eating CARE Score 5   Putting On/Taking Off Footwear   Type of Assistance Needed Physical assistance   Physical Assistance Level Total assistance   Comment TA for donning footwear   Putting On/Taking Off Footwear CARE Score 1   Sit to Stand   Type of Assistance Needed Physical assistance   Physical Assistance Level Total assistance   Comment Mod A x2 for STS from EOB, w/c, commode   Sit to Stand CARE Score 1   Bed-Chair Transfer   Type of Assistance Needed Physical assistance   Physical Assistance Level Total assistance   Comment Mod A x2 SBT bed to manual standard w/c   Chair/Bed-to-Chair Transfer CARE Score 1   Toileting Hygiene   Type of Assistance Needed Physical assistance   Physical Assistance Level Total assistance   Comment TA x2 in stance with R knee block and gait belt donned; TA for hygiene and donning brief.   Toileting Hygiene CARE Score 1   Toilet Transfer   Type of Assistance Needed Physical assistance   Physical Assistance Level Total assistance   Comment TA x2 for swap out tech standing at bed rail. 1 person providing standing assist with R knee block and 1 person performing swap out of commode, w/c. pt able to satnd long enough for swap out; stands long enough for hygiene and donning brief, then sits briefly on commode to regain strength, " then stands additional time for swap out of commode back to w/c.   Toilet Transfer CARE Score 1   Cognition   Overall Cognitive Status WFL   Arousal/Participation Alert;Cooperative   Attention Within functional limits   Orientation Level Oriented X4   Memory Within functional limits   Following Commands Follows all commands and directions without difficulty   Activity Tolerance   Activity Tolerance Patient tolerated treatment well   Assessment   Treatment Assessment pt engages in 60 minute skilled OT/PT cotreat session focusing on toileting, SBT OOB, swap out transfer to [facility], RUE and positioning in recliner chair for lunch meal. see above for full func details. pt remains a good candidate for cotreat sessions as pt remains heavy assist for SBT, standing balance for self care tasks, significant R side weakness, warranting skilled clinicians for safe completion of sessions. would benefit from partial individual sessions for ADLs and then partial cotreats for OOB tasks/transfers/standing tasks. pt continues to demo slow and steady progress; trialed swap out tech to [facility] with good success, completed Ax2 and upgraded pt to complete with nursing staff WHEN OOB, otherwise can use bed pan when in bed at this time. plan to continue to focus on increasing OOB tolerance as pt remains highly motivated to sit in recliner during the day. RUE still remains flaccid, very minimal upper trap movement. also plan to trial estim in upcoming sessions for motor recovery with pt eager to complete. pt remains highly limited by RUE sided weakness, impaired standing payal/bal, impaired activity tolerance, warranting continued skilled care to focus on ADL retraining chair mode vs supine, SBT, static standing for toileting/dressing tasks, RUE NMR, stroke education, in order to increase strength/endurance for ADLs/transfers.   Prognosis Good   Problem List Decreased strength;Decreased endurance;Impaired balance;Decreased  mobility;Decreased coordination;Pain   Plan   Treatment/Interventions ADL retraining;Functional transfer training;Therapeutic exercise;Endurance training;Patient/family training;Equipment eval/education;Compensatory technique education   OT Therapy Minutes   OT Time In 1030   OT Time Out 1130   OT Total Time (minutes) 60   OT Mode of treatment - Individual (minutes) 0   OT Mode of treatment - Concurrent (minutes) 0   OT Mode of treatment - Group (minutes) 0   OT Mode of treatment - Co-treat (minutes) 60   OT Mode of Treatment - Total time(minutes) 60 minutes   OT Cumulative Minutes 265   Therapy Time missed   Time missed? No

## 2024-08-23 NOTE — ASSESSMENT & PLAN NOTE
Was seeing cardiologist in [place] and taking Xarelto but she has not seen him in 2 yrs and stopped all her meds >1 yr ago  EKG in the hospital showed NSR  Needs to followup as OP with Cardiology = Dr Beltre  Continue Coreg  Was not placed back on Flecainide  Now on Xarelto   RRR by exam today 24  Sent price check to her pharmacy on 24 for the Xarelto and is $661.99  Sent price check on Eliquis but doubtful that will be much better.  If not, she will possibly need to be converted to Coumadin with a therapeutic Lovenox bridge.  She did tell me she previously was on the Xarelto and had a coupon number so she could get it for $10.00/month.  also said the coupon number did not .  Will try and see about getting that number = d/w CM and they are looking in to it

## 2024-08-23 NOTE — PROGRESS NOTES
[facility]  Progress Note  Name: Maye Lilly I  MRN: [id number]  Unit/Bed#: -01 I Date of Admission: 2024   Date of Service: 2024 I Hospital Day: 3    Assessment & Plan   * Acute CVA (cerebrovascular accident) (HCC)  Assessment & Plan  Presented with right sided weakness, slurred speech, headache and vomiting  MRI = left pontine infarct  Neuro felt CVA was from atrial fibrillation in setting of non-compliance with anticoagulation  Currently now on Xarelto 20mg qd, Lipitor 40mg qd  Followup with Neuro as OP.  Stevo Baires from [facility] from that group was listed on the DC summary    Clostridium difficile infection  Assessment & Plan  New during hospital stay  Says no BMs so far today 24  Continue Vancomycin 125mg QID.  LD will be 24 0900  Stools are currently soft; no diarrhea    Paroxysmal atrial fibrillation (HCC)  Assessment & Plan  Was seeing cardiologist in [place] and taking Xarelto but she has not seen him in 2 yrs and stopped all her meds >1 yr ago  EKG in the hospital showed NSR  Needs to followup as OP with Cardiology = Dr Beltre  Continue Coreg  Was not placed back on Flecainide  Now on Xarelto   RRR by exam today 24  Sent price check to her pharmacy on 24 for the Xarelto and is $661.99  Sent price check on Eliquis but doubtful that will be much better.  If not, she will possibly need to be converted to Coumadin with a therapeutic Lovenox bridge.  She did tell me she previously was on the Xarelto and had a coupon number so she could get it for $10.00/month.  also said the coupon number did not .  Will try and see about getting that number = d/w CM and they are looking in to it    HTN (hypertension)  Assessment & Plan  Home:  no meds  Here:  Coreg 25mg BID/Hydralazine 75 mg q8hrs/Nifedipine 60mg daily at dinner time/Aldactone 25mg daily/Losartan 100mg qd  Stable; no changes today 24  Followup with IM as OP.  On DC  summary was listed to see Megan DORSEY from that group    DIANELYS (obstructive sleep apnea)  Assessment & Plan  In past was on CPAP  Can d/w PCP when discharged    Hypothyroidism  Assessment & Plan  Continue Levothyroxine 25 mcg qd  This was the dose that she was supposed to be taking at home and they placed her back on it in the hospital on 8/12/24 but there was no TSH drawn.  Had an abnormal TSH of 5.1 on 4/23/20 presumably not on Levothyroxine at that time since thereafter on 2/15/22 was normal  TSH 8/22/24 normal at 3.95  Will continue current dose and have her get a TSH in 6 weeks = @9/23    GERD (gastroesophageal reflux disease)  Assessment & Plan  Continue Protonix    Meningioma (HCC)  Assessment & Plan  Incidental finding   OP followup           The above assessment and plan was reviewed and updated as determined by my evaluation of the patient on 8/23/2024.    Labs:   Results from last 7 days   Lab Units 08/22/24  0657   WBC Thousand/uL 8.61   HEMOGLOBIN g/dL 13.6   HEMATOCRIT % 42.3   PLATELETS Thousands/uL 297     Results from last 7 days   Lab Units 08/22/24  0657 08/16/24  1705   SODIUM mmol/L 139 137   POTASSIUM mmol/L 4.3 4.2   CHLORIDE mmol/L 105 103   CO2 mmol/L 26 27   BUN mg/dL 30* 24   CREATININE mg/dL 0.79 0.79   CALCIUM mg/dL 9.4 9.2                   Imaging  No orders to display       Review of Scheduled Meds:  Current Facility-Administered Medications   Medication Dose Route Frequency Provider Last Rate    acetaminophen  650 mg Oral Q6H PRN WENCESLAO Patel      atorvastatin  40 mg Oral QPM WENCESLAO Patel      bisacodyl  10 mg Rectal Daily PRN Dougie King MD      carvedilol  25 mg Oral BID With Meals WENCESLAO Patel      Diclofenac Sodium  2 g Topical BID Dk De Dios MD      hydrALAZINE  75 mg Oral Q8H [facility] WENCESLAO Patel      hydrocortisone   Topical BID WENCESLAO Patel      levothyroxine  25 mcg Oral Early Morning  WENCESLAO Patel      lidocaine  1 patch Topical Daily Dk De Dios MD      losartan  100 mg Oral Daily WENCESLAO Patel      multivitamin stress formula  1 tablet Oral Daily WENCESLAO Patel      NIFEdipine  60 mg Oral Daily With Dinner WENCESLAO Patel      pantoprazole  40 mg Oral Early Morning WENCESLAO Patel      rivaroxaban  20 mg Oral Daily With Dinner WENCESLAO Patel      spironolactone  25 mg Oral Daily WENCESLAO Patel      vancomycin  125 mg Oral 4x Daily WENCESLAO Patel         Subjective/ HPI: Patient seen and examined. Patients overnight issues or events were reviewed with nursing staff. New or overnight issues include the following:     No new or overnight issues.  Offers no complaints    ROS:   A 10 point ROS was performed; negative except as noted above.        *Labs /Radiology studies Reviewed  *Medications  reviewed and reconciled as needed  *Please refer to order section for additional ordered labs studies      Physical Examination:  Vitals:   Vitals:    08/22/24 2052 08/22/24 2346 08/23/24 0422 08/23/24 0505   BP: 127/60   112/55   BP Location: Right arm   Left arm   Pulse: 73   71   Resp: 16   17   Temp: 98.3 °F (36.8 °C)   97.8 °F (36.6 °C)   TempSrc: Oral   Oral   SpO2: 96% 97% 98% 92%   Weight:       Height:           General Appearance: no distress, non toxic appearing  HEENT: PERRLA, conjuctiva normal; oropharynx clear; mucous membranes moist   Neck:  Supple, normal ROM  Lungs: CTA, normal respiratory effort, no retractions, expiratory effort normal  CV: regular rate and rhythm; no rubs/murmurs/gallops, PMI normal   ABD: soft; ND/NT; +BS  EXT: no edema  Skin: normal turgor, normal texture  Psych: affect normal, mood normal  Neuro: AAO           The above physical exam was reviewed and updated as determined by my evaluation of the patient on 8/23/2024.    Invasive Devices       None                       VTE Pharmacologic Prophylaxis: Xarelto  Code Status: Level 1 - Full Code  Current Length of Stay: 3 day(s)    Total floor / unit time spent today 30 minutes  Coordination of patient's care was performed in conjunction with consulting services. Time invested included review of patient's labs, vitals, and management of their comorbidities with continued monitoring, examination of patient as well as answering patient questions, documenting her findings and creating progress note in electronic medical record,  ordering appropriate diagnostic testing.       ** Please Note:  voice to text software may have been used in the creation of this document. Although proof errors in transcription or interpretation are a potential of such software**

## 2024-08-23 NOTE — ASSESSMENT & PLAN NOTE
Home:  no meds  Here:  Coreg 25mg BID/Hydralazine 75 mg q8hrs/Nifedipine 60mg daily at dinner time/Aldactone 25mg daily/Losartan 100mg qd  Stable; no changes today 8/23/24  Followup with IM as OP.  On DC summary was listed to see Megan DORSEY from that group

## 2024-08-23 NOTE — PROGRESS NOTES
08/23/24 1231   Pain Assessment   Pain Assessment Tool 0-10   Pain Score No Pain   Restrictions/Precautions   Precautions Bed/chair alarms;Cognitive;Fall Risk;Supervision on toilet/commode;Contact/isolation   Weight Bearing Restrictions No   ROM Restrictions No   Braces or Orthoses Other (Comment)  (AFO used this session +sneakers for all functional transfers, multipodus boots when in bed.)   Cognition   Overall Cognitive Status WFL   Arousal/Participation Alert;Cooperative   Attention Within functional limits   Orientation Level Oriented X4   Memory Within functional limits   Following Commands Follows all commands and directions without difficulty   Sit to Stand   Type of Assistance Needed Physical assistance;Verbal cues;Adaptive equipment   Physical Assistance Level Total assistance   Comment ModAx2, R knee blocked, R AFO and B shoes donned at bed rail   Sit to Stand CARE Score 1   Bed-Chair Transfer   Type of Assistance Needed Physical assistance;Verbal cues;Adaptive equipment   Physical Assistance Level Total assistance   Comment SBT OOB; to L side: mod A x2; to R side Max Ax2   Chair/Bed-to-Chair Transfer CARE Score 1   Transfer Bed/Chair/Wheelchair   Adaptive Equipment Transfer Board   Car Transfer   Reason if not Attempted Safety concerns   Car Transfer CARE Score 88   Walk 10 Feet   Reason if not Attempted Safety concerns   Walk 10 Feet CARE Score 88   Ambulation   Primary Mode of Locomotion Prior to Admission Walk   Does the patient walk? 1. No, and walking goal is clinically indicated.   Wheelchair mobility   Does the patient use a wheelchair? 1. Yes   Type of Wheelchair Used 1. Manual   Method Left upper extremity;Left lower extremity   Curb or Single Stair   Reason if not Attempted Safety concerns   1 Step (Curb) CARE Score 88   Picking Up Object   Reason if not Attempted Safety concerns   Picking Up Object CARE Score 88   Toilet Transfer   Type of Assistance Needed Physical assistance;Verbal  cues;Adaptive equipment   Physical Assistance Level Total assistance   Comment swap out method, pt stood holding bed rail, R knee blocked and second person placed BSC behind pt. Then sits briefly on commode to regain strength, then stands additional time for swap out of commode back to w/c.   Toilet Transfer CARE Score 1   Toilet Transfer   Limitations Noted In Endurance;Safety   Assessment   Treatment Assessment Brief 30 min session with co-treat with OT. Pt requested the BSC prior to transfer back to bed. Swap out method was performed with BSC and WC once finished on BSC. Pt performed a SBT to her R side from WC to get into bed. Pt required increased A with R side transfer as she has difficulty offloading on R hip. Pt cont to require A x2 for bed mobility as only trace contraction seen in RLE. Although pt's R knee is blocked when standing she prefers light pressure blocking 2/2 ongoing knee pain with pressure. Pt will cont POC as tolerated with increased focus on WC mobility, functional transfers, act tolerance, safety awareness and amb when able.   Problem List Decreased strength;Decreased endurance;Impaired balance;Decreased mobility;Decreased coordination;Pain   Barriers to Discharge Inaccessible home environment;Decreased caregiver support   PT Barriers   Functional Limitation Car transfers;Ramp negotiation;Stair negotiation;Standing;Transfers;Walking;Wheelchair management   Plan   Treatment/Interventions Functional transfer training;LE strengthening/ROM;Endurance training;Therapeutic exercise;Gait training;Patient/family training;Bed mobility   Progress Progressing toward goals   PT Therapy Minutes   PT Time In 1230   PT Time Out 1300   PT Total Time (minutes) 30   PT Mode of treatment - Individual (minutes) 0   PT Mode of treatment - Concurrent (minutes) 0   PT Mode of treatment - Group (minutes) 0   PT Mode of treatment - Co-treat (minutes) 30   PT Mode of Treatment - Total time(minutes) 30 minutes   PT  Cumulative Minutes 300   Therapy Time missed   Time missed? No

## 2024-08-23 NOTE — PLAN OF CARE
Problem: PAIN - ADULT  Goal: Verbalizes/displays adequate comfort level or baseline comfort level  Description: Interventions:  - Encourage patient to monitor pain and request assistance  - Assess pain using appropriate pain scale  - Administer analgesics based on type and severity of pain and evaluate response  - Implement non-pharmacological measures as appropriate and evaluate response  - Consider cultural and social influences on pain and pain management  - Notify physician/advanced practitioner if interventions unsuccessful or patient reports new pain  Outcome: Progressing     Problem: INFECTION - ADULT  Goal: Absence or prevention of progression during hospitalization  Description: INTERVENTIONS:  - Assess and monitor for signs and symptoms of infection  - Monitor lab/diagnostic results  - Monitor all insertion sites, i.e. indwelling lines, tubes, and drains  - Monitor endotracheal if appropriate and nasal secretions for changes in amount and color  - [place] appropriate cooling/warming therapies per order  - Administer medications as ordered  - Instruct and encourage patient and family to use good hand hygiene technique  - Identify and instruct in appropriate isolation precautions for identified infection/condition  Outcome: Progressing     Problem: SAFETY ADULT  Goal: Patient will remain free of falls  Description: INTERVENTIONS:  - Educate patient/family on patient safety including physical limitations  - Instruct patient to call for assistance with activity   - Consult OT/PT to assist with strengthening/mobility   - Keep Call bell within reach  - Keep bed low and locked with side rails adjusted as appropriate  - Keep care items and personal belongings within reach  - Initiate and maintain comfort rounds  - Make Fall Risk Sign visible to staff  - Offer Toileting every 2 Hours, in advance of need  - Initiate/Maintain bed/chair alarm  - Obtain necessary fall risk management equipment: alarm  - Apply  yellow socks and bracelet for high fall risk patients  - Consider moving patient to room near nurses station  Outcome: Progressing

## 2024-08-23 NOTE — PROGRESS NOTES
Physical Medicine and Rehabilitation Progress Note  Maye Lilly 64 y.o. female MRN: [id number]  Unit/Bed#: -01 Encounter: [id number]    To Review:   Maye Lilly is a 64 y.o. female with PMH of afib (prev on Eliquis, now on Xarelto), HTN, hypothyroidism, DIANELYS, morbid obesity, GERD who presented to the [facility] with sudden onset R sided weakness with tingling down RUE. She also had slurred speech and a sudden headache and emesis. Denied chest pain, SOB, abd pain, visual deficits on admission. CBC, CMP, trop, urinalysis, tox screen negative. CTA of head and neck negative for LVO. CT C/A/P with  no acute abnormalities except mild sigmoid diverticulitis and thyromegaly. CTH wthout acute infarction or hemorrhage, though calcified meningioma noted along the falx without mass effect. MRI brain w/o contrast showed L pontine infarct. CT of thoracolumbar area without acute fx or dislocations.  Pt with significantly elevated BP on admission (230-240 systolic). No TNK given out of window and no obvious infarct + already on anticoagulation. She was continued on xarelto.  Hospital course complicate by diarrhea, pt found to be C Diff positive, started on vancomycin.    Chief Complaint: R-hemibody paresis    Interval History/Subjective:  Pt endorses stable paresis of RUE and RLE. Notes that bowel movements have improved to 1-2x/day but notes she had 1 episode of watery stool this morning. She notes she is eating, sleeping, passing bowel/bladder movements without issue. She notes therapy is progressing well. Denies worsening weakness, loss of sensation, headache, bowel/bladder incontinence, choking/coughing with PO intake.  Pt adjusting to new bed but notes it is comfortable. Denies soreness along lumbosacral area or heels.    ROS:  As noted in subjective above    Today's Changes:  No changes.    Assessment/Plan:    * Acute CVA (cerebrovascular accident) (HCC)  Assessment & Plan  -  admitted to acute care with slurred speech, headache, tingling in R arm  - 0/5 RUE strength, 1-2/5 in RLE  - CTH with meningioma along falx, but no infarct/hemorrhage. CTA with no LVO. MRI with L pontine infarct.  - Did not receive tPA/TNKase, did not undergo thrombectomy  - echo with LVH, normal EF, mild diastolic dysfunction    Plan:  - fall precautions  - Monitor for neurogenic bowel and bladder  - Promote regular sleep/wake cycles and proper sleep hygiene  - Monitor closely for hemiparetic shoulder pain and post-stroke pain  -  Neutral resting wrist splint for the RUE and bilateral multipodus boots to be worn at night and while in bed during the day to prevent developing spasticity and contracture  - Maintain adequate nutrition  - Avoid sedating and anticholinergic medications as possible  - Maintain normotension, s/p permissive hypertension  - Continue Xarelto 20mg daily  - Continue Atorvastatin 40 mg qHs for secondary prevention  - multipodus boots for RLE        HTN (hypertension)  Assessment & Plan  - pt reportedly nonadherent to HTN med regimen due to financial constraints  - management per IM  - continue coreg 25mg BID, nifedipine 60mg daily, aldactone 25mg daily, valsartan 160 daily,     Paroxysmal atrial fibrillation (HCC)  Assessment & Plan  - prev on Eliquis  - switched to Xarelto in acute care    Plan:  - continue Xarelto 20mg daily  - continue coreg    Clostridium difficile infection  Assessment & Plan  - pt with diarrhea in acute care  - Cdiff positive PCR  - started PO vanc in acute care    Plan:  - continue PO vanc for total 2 week course  - continue contact isolation precautions  - monitor for BM    Hypothyroidism  Assessment & Plan  - continue levothyroxine 25mcg daily    Meningioma (HCC)  Assessment & Plan  - [id number] meningioma along falx, no mass effect or vasogenic edema  - continue to monitor, outpatient follow up with neurology as needed.    GERD (gastroesophageal reflux  disease)  Assessment & Plan  - continue protonix 40mg BID            Objective:    Allergies per EMR    Physical Exam:  Temp:  [97.8 °F (36.6 °C)-98.3 °F (36.8 °C)] 97.8 °F (36.6 °C)  HR:  [66-73] 71  Resp:  [16-18] 17  BP: (112-128)/(55-60) 112/55  Oxygen Therapy  SpO2: 92 %      Gen: No acute distress, Well-nourished, well-appearing.  HEENT: Moist mucus membranes  Cardiovascular: No LE edema  Pulmonary: Non-labored beathing  : No dexter  GI: non-distended.   MSK: ROM is full in all extremities. No effusions or deformities. Bulk is symmetric.   Integumentary: Skin is warm, dry. No rashes or ulcers.  Neuro: Alert and oriented, Speech is mildly dysarthric but organized, comprehension are intact. Mild R sided facial droop.  Appropriate to questioning.   Psych: Normal mood and affect.     Diagnostic Studies: reviewed, no new imaging  No results found.    Laboratory:    Results from last 7 days   Lab Units 08/22/24  0657   HEMOGLOBIN g/dL 13.6   HEMATOCRIT % 42.3   WBC Thousand/uL 8.61     Results from last 7 days   Lab Units 08/22/24  0657 08/16/24  1705   BUN mg/dL 30* 24   POTASSIUM mmol/L 4.3 4.2   CHLORIDE mmol/L 105 103   CREATININE mg/dL 0.79 0.79            Functional Update:  Physical Therapy Occupational Therapy Speech Therapy   Weight Bearing Status: Full Weight Bearing  Transfers: Maximum Assistance, Assist of 2  Bed Mobility: Assist of 2  Amulation Distance (ft):  (not able to safely assess)  Wheelchair Mobility Distance:  (not able to assess)  Assistive Device for Stairs:  (not bale to safely assess)  Discharge Recommendations:  (TBD pending progress, reteam)   Eating: Total Assistance  Grooming: Total Assistance, Assist of 2  Bathing: Total Assistance, Assist of 2  Bathing: Total Assistance, Assist of 2  Upper Body Dressing: Maximum Assistance  Lower Body Dressing: Total Assistance, Assist of 2  Toileting: Total Assistance, Assist of 2  Tub/Shower Transfer: Total Assistance, Assist of 2  Toilet Transfer:  Total Assistance, Assist of 2  Cognition: Exceptions to WNL (scored mild in ST cogntive assessment)  Orientation: Person, Place, Situation   Mode of Communication: Verbal  Speech/Language: Dysarthia (mild dysarthria)  Cognition: Exceptions to WNL  Cognition: Decreased Memory, Decreased Executive Functions, Decreased Attention  Orientation: Person, Place, Time, Situation  Discharge Recommendations:  (pending patient progress)         Patient Active Problem List   Diagnosis    Acute CVA (cerebrovascular accident) (HCC)    Clostridium difficile infection    Paroxysmal atrial fibrillation (HCC)    HTN (hypertension)    DIANELYS (obstructive sleep apnea)    Hypothyroidism    GERD (gastroesophageal reflux disease)    Meningioma (HCC)         Medications  Current Facility-Administered Medications   Medication Dose Route Frequency Provider Last Rate    acetaminophen  650 mg Oral Q6H PRN WENCESLAO Patel      atorvastatin  40 mg Oral QPM WENCESLAO Patel      bisacodyl  10 mg Rectal Daily PRN Dougie King MD      carvedilol  25 mg Oral BID With Meals WENCESLAO Patel      Diclofenac Sodium  2 g Topical BID Dk De Dios MD      hydrALAZINE  75 mg Oral Q8H BARB WENCESLAO Patel      hydrocortisone   Topical BID WENCESLAO Patel      levothyroxine  25 mcg Oral Early Morning WENCESLAO Patel      lidocaine  1 patch Topical Daily Dk De Dios MD      losartan  100 mg Oral Daily WENCESLAO Patel      multivitamin stress formula  1 tablet Oral Daily WENCESLAO Patel      NIFEdipine  60 mg Oral Daily With Dinner WENCESLAO Patel      pantoprazole  40 mg Oral Early Morning WENCESLAO Patel      rivaroxaban  20 mg Oral Daily With Dinner WENCESLAO Patel      spironolactone  25 mg Oral Daily WENCESLAO Patel      vancomycin  125 mg Oral 4x Daily WENCESLAO Patel            Case discussed  with attending Dr. Lanre King MD  Physical Medicine and Rehabilitation, PGY-2

## 2024-08-23 NOTE — PROGRESS NOTES
"   08/23/24 1230   Pain Assessment   Pain Assessment Tool 0-10   Pain Score No Pain   Restrictions/Precautions   Precautions Bed/chair alarms;Cognitive;Fall Risk;Contact/isolation;Spinal precautions   Weight Bearing Restrictions No   ROM Restrictions No   Braces or Orthoses Other (Comment)  (AFO used this session +sneakers for all functional transfers, multipodus boots when in bed.)   Lifestyle   Autonomy \"I think I need to have a bowel movement\"   Sit to Stand   Type of Assistance Needed Physical assistance;Verbal cues   Physical Assistance Level Total assistance   Comment Mod A x2 with R knee block and gait belt donned and sneakers   Sit to Stand CARE Score 1   Bed-Chair Transfer   Type of Assistance Needed Physical assistance   Physical Assistance Level Total assistance   Comment SBT OOB; to L side: mod A x2; to R side Max Ax2   Chair/Bed-to-Chair Transfer CARE Score 1   Toileting Hygiene   Type of Assistance Needed Physical assistance   Physical Assistance Level Total assistance   Comment TA x2 in stance with R knee block and gait belt donned; TA for hygiene and donning brief.   Toileting Hygiene CARE Score 1   Toilet Transfer   Type of Assistance Needed Physical assistance   Physical Assistance Level Total assistance   Comment TA x2 for swap out tech standing at bed rail. 1 person providing standing assist with R knee block and 1 person performing swap out of commode, w/c. pt able to satnd long enough for swap out; stands long enough for hygiene and donning brief, then sits briefly on commode to regain strength, then stands additional time for swap out of commode back to w/c.   Toilet Transfer CARE Score 1   Cognition   Overall Cognitive Status WFL   Arousal/Participation Alert;Cooperative   Attention Within functional limits   Orientation Level Oriented X4   Memory Within functional limits   Following Commands Follows all commands and directions without difficulty   Activity Tolerance   Activity Tolerance " Patient tolerated treatment well   Assessment   Treatment Assessment pt engages in brief 30 minute skilled cotreat with focus on toileting, SBT and swap out tech to BSC. see above for full func details. pt continues to demo slow and progress toward OT goals. trialed SBT to R side with pt requiring Max Ax2, does better going to L side at mod Ax2--improvement in clearing buttocks and anterior weight shifting. upgraded pt to Ax2 SBT to LEFT SIDE ONLY. updated white board and sticky note as well as nursing staff. recommend continued skilled care to focus on ADL retraining, SBT, standing for toileting tasks, RUE NMR, stroke education, core strengthening, in order to decrease burden of care at d/c. pt remains a good candidate for cotreat sessions as pt remains heavy assist for SBT, standing balance for self care tasks, significant R side weakness, warranting skilled clinicians for safe completion of sessions. would benefit from partial individual sessions for ADLs and then partial cotreats for OOB tasks/transfers/standing tasks.   Prognosis Good   Problem List Decreased strength;Decreased endurance;Impaired balance;Decreased mobility;Decreased coordination;Pain   Plan   Treatment/Interventions ADL retraining;Functional transfer training;Therapeutic exercise;Endurance training;Patient/family training;Equipment eval/education;Compensatory technique education   OT Therapy Minutes   OT Time In 1230   OT Time Out 1300   OT Total Time (minutes) 30   OT Mode of treatment - Individual (minutes) 0   OT Mode of treatment - Concurrent (minutes) 0   OT Mode of treatment - Group (minutes) 0   OT Mode of treatment - Co-treat (minutes) 30   OT Mode of Treatment - Total time(minutes) 30 minutes   OT Cumulative Minutes 295   Therapy Time missed   Time missed? No

## 2024-08-23 NOTE — ASSESSMENT & PLAN NOTE
Presented with right sided weakness, slurred speech, headache and vomiting  MRI = left pontine infarct  Neuro felt CVA was from atrial fibrillation in setting of non-compliance with anticoagulation  Currently now on Xarelto 20mg qd, Lipitor 40mg qd  Followup with Neuro as OP.  [facility]

## 2024-08-23 NOTE — CASE MANAGEMENT
Met w/pt and reviewed team mtg update and informed of potential 4 week los based on progress. Pt asked cm to contact [facility] directly to secure her discount number that the pharmacy uses for her prescription. Cm will attempt but due to HIPAA may not be able to get that info.     1348 cm phoned [facility] at [phone] and s/w orestes. Unfortunately cm is not permitted to receive info due to HIPAA.  Pt and or a representative that the patient has on her account needs to call. Cm provided phone number to pt for her to call and secure her card number for program assist.     1444 cm called to pts room who reported her program assistance plan is no longer active for the [facility]. Pt provided cm with website, [email or web address] to secure application which needs physician signature to apply for assistance. Cm printed application and is assisting in completion.

## 2024-08-23 NOTE — PROGRESS NOTES
ST Updates:   Recommendations: 24/hr supervision for and mobility due to safety concern; currently CS-mod I for money management;   Continued Services: yes-type TBD pending progress/discharge  Discharge Date: -  Family Education/Training: pending; daughter returning from [facility] on 8/23 and son arriving from [place] on 8/24  Continued Treatment Plan: higher level cognition (memory strategies, STM, 2-step directions, word finding, medication management, bill management )       08/23/24 0930   Pain Assessment   Pain Assessment Tool 0-10   Pain Score No Pain   Restrictions/Precautions   Precautions Bed/chair alarms;Cognitive;Fall Risk;Contact/isolation;Supervision on toilet/commode  (c diff precautions)   Comprehension   Comprehension (FIM) 5 - Needs help/cues, repetition only RARELY ( < 10% of the time)   Expression   Expression (FIM) 5 - Needs help/cues only RARELY (< 10% of the time)   Social Interaction   Social Interaction (FIM) 5 - Interacts appropriately with others 90% of time   Problem Solving   Problem solving (FIM) 4 - Solves basic problems 75-89% of time   Memory   Memory (FIM) 4 - Recognizes/recalls/performs 75-89%   Speech/Language/Cognition Assessment   Treatment Assessment Pt was seen in room for skilled ST session targeting cognitive linguistic communication skills and speech skills. Upon arrival, pt's daughter's boyfriend was present but left, with pt noting that the rest of her family is set to arrive in the next day.     First half of session targeted speech skills. Pt voiced that she continued to target OME's 1-2x a day and was able to review polysyllabic phrases. Pt voiced 'I began reading them to myself in my head but realized I had to practice aloud and that definitely was harder on my speech.' SLP and pt reviewed words that pt found most difficult as well as additional words on list and pt was ~75% accurate overall with production. Pt was able to recall 2/4 speech strategies previously reviewed in  last session and SLP provided verbal cues to increased to 4/4 accuracy. Pt noted to use strategies throughout task but needed frequent verbal cues to slow ROS, use breaks in between and pausing. Pt noted decrease breath/speech pattern requiring frequent verbal cues and mod A by SLP. Pt noted difficulty with blends such as /tr/, /kr/, /st/ and /sp/. Pt needed for verbal cues to implement dysarthria strategies such as articulation drills to enhance speech articulation, tongue strengthening to enhance tongue mobility and breath control practice to increase breath support. SLP also educated pt on use of incentive spirometer to assist with breathing pattern. At the end of the session, SLP provided pt with Nauruan polysyllabic phrases and sentences as this is pt's L2 and pt voiced dysarthria, Nauruan > english.     Next half of session focused on cognitive linguistic skills-memory strategies, LTM, STM, remote memory, problem solving and reasoning. SLP began with  introduction to memory strategies. SLP provided education to 4 memory strategies: writing, association, repetition and picture. SLP reviewed each memory strategy, a functional example for each in daily life, how each can be used in therapy and examples of memory strategies to improve STM and LTM. SLP discussed use of memory book to assisted in writing down information or writing down important appointments. Additionally, SLP discussed association, in which associating by linking it to a new thing or something you already know. SLP discussed repetition such as repeating aloud such as repeating directions PT or OT provides in therapy aloud or in head to ensure pt recalls. To 'picture it', SLP noted to pt that they can create a mental image, like a cartoon or a movie or try recall an image in their mind already in the past to reference and associate. Additional internal reminders discussed included rehearsal, chunking, acronyms and personal meaning. SLP provided  visualization and orthographic visuals for pt while educating pt on various memory strategies. Will continue to provide continued education on various memory strategies and implement memory strategies in structured tasks to functionally use.    After initial review of memory strategies, targeted functional use with following verbal directions with orthographic items. First task, pt was given a sheet with FO5 items from 1-10 and SLP provided verbal directions for each number for each different FO4 words. Pt was asked to recall verbal information and then follow directions by completing each 2-step direction. Pt was able to complete task by recalling the verbal 2-step directions in 7/10 trials requiring repetition to increase accuracy to 10/10 accuracy. Pt was then able to follow verbal 2-step directions for each task with 12/20 accuracy, increasing to 20/20 accuracy once given verbal cues. Pt noted difficulty with STM requiring min-mod A w/ repetition by SLP to recall information over time as initially, attention and recall was increased but over time in task, fatigued and had difficulty observed with recall. Additionally, comprehension was fleeting throughout task w/ pt requiring min-mod A by SLP to comprehend 2 step direction (Ex: Place a star above the fruit and a triangle below the items you wear on your feet). Pt noted to benefit most from increase time and repetition but with continued practice to increase independence, pt can improve. Next task targeted auditory verbal directions as well problem solving and reasoning. Pt was given verbal directions in which pt was asked to attend to directions and recall information as well as inference the correct answer based on the verbal directions (Ex: If green means go, [place] the items that are typically cold, if green does not, put a triangle to the right of the items that are typically hot.). Pt was observed to recall the verbal directions accurately, noting decrease  w/ STM with 6/10 accuracy, increasing to 10/10 accuracy once given verbal cues and repetition of auditory directions. Pt was able to choose the correct items based on what pt was able to recall and comprehend in 5/10 accuracy which increased to 10/10 once given semantic cues. As pt noted to have decrease STM, recalling verbally presented directions, pt noted to have difficulty comprehending higher level verbal directions, requiring verbal and semantic cues to increase accuracy and comprehension. Pt was noted to have increase PS and reasoning skills as pt was noted to present with an understanding of questions and directions verbally provided with 8/10 accuracy. Although pt presented with increase PS and reasoning, comprehension and memory were decreased which affected pt's recall and attention with task. Pt voiced enjoying of task as it was difficult and pt was required to do multiple steps at once, pt noted 'You made me really think and I know I can do better if I really used those strategies you gave me because I will admit I didn't really use them as much as I should have now looking at this.' SLP praised pt for awareness and noted continued ST to help increase overall cognitive skills.    Last task targeted higher level word finding as well as problem solving and reasoning. Pt was given a chart w/ 2 clues written on each side and asked to solve the clues. The word on the left will always have on letter that is not found in the word on the right. Pt was then asked to place the remaining letter in the middle column to spell a hidden word. Pt was able to complete task w/ 14/18 accuracy which increased to 18/18 accuracy once given verbal cues. Pt noted to show improvement in word finding and presented w/ mild deficits in problem solving. Pt benefit from min A w/ verbal and semantic cues overall. Task targeted higher level thinking skills and pt showed overall improvement over time in session. Recommend continued  targeting of word finding and higher level cognition.  At this time, pt continues to benefit from ongoing skilled SLP services to maximize overall cognitive linguistic skills in attempts to decrease caregiver burden over time.   SLP Therapy Minutes   SLP Time In 0930   SLP Time Out 1030   SLP Total Time (minutes) 60   SLP Mode of treatment - Individual (minutes) 60   SLP Mode of treatment - Concurrent (minutes) 0   SLP Mode of treatment - Group (minutes) 0   SLP Mode of treatment - Co-treat (minutes) 0   SLP Mode of Treatment - Total time(minutes) 60 minutes   SLP Cumulative Minutes 180   Therapy Time missed   Time missed? No

## 2024-08-24 PROCEDURE — 99232 SBSQ HOSP IP/OBS MODERATE 35: CPT | Performed by: PHYSICIAN ASSISTANT

## 2024-08-24 PROCEDURE — 97112 NEUROMUSCULAR REEDUCATION: CPT

## 2024-08-24 PROCEDURE — 94660 CPAP INITIATION&MGMT: CPT

## 2024-08-24 PROCEDURE — 94760 N-INVAS EAR/PLS OXIMETRY 1: CPT

## 2024-08-24 PROCEDURE — 97542 WHEELCHAIR MNGMENT TRAINING: CPT

## 2024-08-24 PROCEDURE — 97530 THERAPEUTIC ACTIVITIES: CPT

## 2024-08-24 PROCEDURE — 97535 SELF CARE MNGMENT TRAINING: CPT

## 2024-08-24 RX ADMIN — SPIRONOLACTONE 25 MG: 25 TABLET, FILM COATED ORAL at 08:21

## 2024-08-24 RX ADMIN — NIFEDIPINE 60 MG: 30 TABLET, FILM COATED, EXTENDED RELEASE ORAL at 17:25

## 2024-08-24 RX ADMIN — Medication 2 G: at 08:20

## 2024-08-24 RX ADMIN — VANCOMYCIN HYDROCHLORIDE 125 MG: 125 CAPSULE ORAL at 21:53

## 2024-08-24 RX ADMIN — CARVEDILOL 25 MG: 25 TABLET, FILM COATED ORAL at 08:19

## 2024-08-24 RX ADMIN — HYDROCORTISONE: 1 CREAM TOPICAL at 17:26

## 2024-08-24 RX ADMIN — HYDRALAZINE HYDROCHLORIDE 75 MG: 50 TABLET ORAL at 05:53

## 2024-08-24 RX ADMIN — ATORVASTATIN CALCIUM 40 MG: 40 TABLET, FILM COATED ORAL at 17:25

## 2024-08-24 RX ADMIN — LOSARTAN POTASSIUM 100 MG: 50 TABLET, FILM COATED ORAL at 08:19

## 2024-08-24 RX ADMIN — B-COMPLEX W/ C & FOLIC ACID TAB 1 TABLET: TAB at 08:19

## 2024-08-24 RX ADMIN — VANCOMYCIN HYDROCHLORIDE 125 MG: 125 CAPSULE ORAL at 12:37

## 2024-08-24 RX ADMIN — VANCOMYCIN HYDROCHLORIDE 125 MG: 125 CAPSULE ORAL at 17:26

## 2024-08-24 RX ADMIN — RIVAROXABAN 20 MG: 20 TABLET, FILM COATED ORAL at 17:25

## 2024-08-24 RX ADMIN — PANTOPRAZOLE SODIUM 40 MG: 20 TABLET, DELAYED RELEASE ORAL at 05:53

## 2024-08-24 RX ADMIN — HYDRALAZINE HYDROCHLORIDE 75 MG: 50 TABLET ORAL at 21:53

## 2024-08-24 RX ADMIN — HYDRALAZINE HYDROCHLORIDE 75 MG: 50 TABLET ORAL at 13:52

## 2024-08-24 RX ADMIN — HYDROCORTISONE: 1 CREAM TOPICAL at 08:20

## 2024-08-24 RX ADMIN — CARVEDILOL 25 MG: 25 TABLET, FILM COATED ORAL at 17:25

## 2024-08-24 RX ADMIN — LIDOCAINE 1 PATCH: 700 PATCH TOPICAL at 17:25

## 2024-08-24 RX ADMIN — LEVOTHYROXINE SODIUM 25 MCG: 25 TABLET ORAL at 05:53

## 2024-08-24 RX ADMIN — VANCOMYCIN HYDROCHLORIDE 125 MG: 125 CAPSULE ORAL at 08:19

## 2024-08-24 NOTE — PROGRESS NOTES
"   08/24/24 1231   Pain Assessment   Pain Score No Pain   Restrictions/Precautions   Precautions Bed/chair alarms;Fall Risk;Supervision on toilet/commode;Contact/isolation;Cognitive  (C diff precautions (have to be treated in room))   Braces or Orthoses   (sling on R UE and AFO + sneakers with all mobility, multipodus boot on in bed)   Cognition   Arousal/Participation Alert;Cooperative   Attention Within functional limits   Memory Within functional limits   Following Commands Follows all commands and directions without difficulty   Subjective   Subjective \"Putting weight on my right leg really tired me out\"   Sit to Lying   Type of Assistance Needed Physical assistance   Physical Assistance Level Total assistance   Comment A x 2 for LE and trunk   Sit to Lying CARE Score 1   Sit to Stand   Type of Assistance Needed Physical assistance   Physical Assistance Level Total assistance   Comment mod -max A x 1 pulling from bedrail for swap out with second person CGA for safety, soft R knee blocking. Also did standing at EOB using hemiwalker with mod A X 1 , standing tolerance of 1 minute X 2 rounds   Sit to Stand CARE Score 1   Bed-Chair Transfer   Type of Assistance Needed Physical assistance   Physical Assistance Level Total assistance   Comment SBT to the R, pt. did a lot of scooting by herself only needing min to mod A X 1 with 2nd person stabilizing equipement. Kre bed was deflated and on low position.   Chair/Bed-to-Chair Transfer CARE Score 1   Transfer Bed/Chair/Wheelchair   Adaptive Equipment Transfer Board   Car Transfer   Reason if not Attempted Safety concerns   Car Transfer CARE Score 88   Toilet Transfer   Type of Assistance Needed Physical assistance   Physical Assistance Level Total assistance   Comment swap out from recliner to BS with mod A X 1 and 2nd person CGA, pt. able to maintain standing with min A x 1 with 2nd person swapping equipement. Able to stand for toileting and hygiene with no LOB noted " with R knee soft blocking   Toilet Transfer CARE Score 1   Toilet Transfer   Surface Assessed Bedside Commode   Therapeutic Interventions   Balance Static standing tolerance using HW X 1 min X 2 rounds. Core strengthening sitting at EOM while OT is working R UE WB and motion   Other Comments   Comments (S)  PT will cont to focus on : R LE active movement, standing and pre gait activities. Will benefit from using BWSS when out of contact isolation as well as nu step. Also cont to work on  w/c mobility. Also cont to work on standing using HW and trial SPT to the L if safe   Assessment   Treatment Assessment Pt. cont to participate well this session although was tired from this morning activities. PT/OT cont with co treat with PT focused on standing balance and tolerance, SBT and core strengthening . While OT focused on toileting task, and UE dressing (see OT notes for details). Pt. initially was unsteady with standing for toilet transfers but after repositoning foot, did much better and was able to tolerate all standing for toileting task. Pt. also did well using HW standing from EOB with soft R knee blocking provided. Pt. able to complete SBT to the R with pt. only needing mod A X 1 with 2nd person stabilizing equipment. Overall pt. is demonstrating good progress in therapy. Pt. assisted back to bed at end of session with all needs in place. Cont with PT POC with focus treatment outlined above.   Problem List Decreased strength;Decreased endurance;Impaired balance;Decreased mobility;Decreased coordination   Barriers to Discharge Inaccessible home environment;Decreased caregiver support   PT Barriers   Functional Limitation Car transfers;Stair negotiation;Standing;Transfers;Walking;Wheelchair management   Discharge Recommendation   Rehab Resource Intensity Level, PT   (TBD pending progress)   Equipment Recommended   (TBD pending progress)   PT Therapy Minutes   PT Time In 1230   PT Time Out 1330   PT Total Time  (minutes) 60   PT Mode of treatment - Individual (minutes) 0   PT Mode of treatment - Concurrent (minutes) 0   PT Mode of treatment - Group (minutes) 0   PT Mode of treatment - Co-treat (minutes) 60   PT Mode of Treatment - Total time(minutes) 60 minutes   PT Cumulative Minutes 420   Therapy Time missed   Time missed? No

## 2024-08-24 NOTE — ASSESSMENT & PLAN NOTE
Presented with right sided weakness, slurred speech, headache and vomiting  MRI = left pontine infarct  Neuro felt CVA was from atrial fibrillation in setting of non-compliance with anticoagulation  Currently now on Xarelto 20mg qd, Lipitor 40mg qd  Followup with Neuro as OP.  [facility]

## 2024-08-24 NOTE — PROGRESS NOTES
"   08/24/24 1030   Pain Assessment   Pain Score No Pain   Restrictions/Precautions   Precautions Bed/chair alarms;Fall Risk;Supervision on toilet/commode;Contact/isolation;Cognitive   Braces or Orthoses   (sling on R UE and AFO + sneakers with all mobility, multipodus boot on in bed)   Cognition   Arousal/Participation Alert;Cooperative   Attention Within functional limits   Memory Within functional limits   Following Commands Follows all commands and directions without difficulty   Subjective   Subjective \"I am ready \"   Lying to Sitting on Side of Bed   Type of Assistance Needed Physical assistance   Physical Assistance Level 51%-75%   Comment with HOB elevated, pt. able to initiate moving R LE OOB going to the R side, with use bedrail pt. able to initiate sitting up with HOB elevated but still needs mod A to sit up completely   Lying to Sitting on Side of Bed CARE Score 2   Sit to Stand   Type of Assistance Needed Physical assistance   Physical Assistance Level Total assistance   Comment mostly can be completed mod to max A x 1 with 2nd person standby or CGA for safety, pulling fro bedrail using L UE, also able to push up from w/c but needed inc assistance with this.   Sit to Stand CARE Score 1   Bed-Chair Transfer   Type of Assistance Needed Physical assistance   Physical Assistance Level Total assistance   Comment SBT to the L verbal cues for technique, max A X 1 with 2nd person CGA and stabilizing equipement   Chair/Bed-to-Chair Transfer CARE Score 1   Transfer Bed/Chair/Wheelchair   Adaptive Equipment Transfer Board   Car Transfer   Reason if not Attempted Safety concerns   Car Transfer CARE Score 88   Walk 10 Feet   Reason if not Attempted Safety concerns   Walk 10 Feet CARE Score 88   Walk 50 Feet with Two Turns   Reason if not Attempted Safety concerns   Walk 50 Feet with Two Turns CARE Score 88   Walk 150 Feet   Reason if not Attempted Safety concerns   Walk 150 Feet CARE Score 88   Walking 10 Feet on " Uneven Surfaces   Reason if not Attempted Safety concerns   Walking 10 Feet on Uneven Surfaces CARE Score 88   Ambulation   Does the patient walk? 1. No, and walking goal is clinically indicated.   Wheel 50 Feet with Two Turns   Type of Assistance Needed Verbal cues;Physical assistance   Physical Assistance Level 25% or less   Comment CS except for turns needing min A   Wheel 50 Feet with Two Turns CARE Score 3   Wheelchair mobility   Does the patient use a wheelchair? 1. Yes   Type of Wheelchair Used 1. Manual   Method Left upper extremity;Left lower extremity   Assistance Provided For Locking Brakes;Obstacles;Remove Leg Rest;Replace Leg Rest;Remove armrests;Replace armrests   Distance Level Surface (feet) 100 ft   Curb or Single Stair   Reason if not Attempted Safety concerns   1 Step (Curb) CARE Score 88   4 Steps   Reason if not Attempted Safety concerns   4 Steps CARE Score 88   12 Steps   Reason if not Attempted Safety concerns   12 Steps CARE Score 88   Therapeutic Interventions   Neuromuscular Re-Education Pre gait activities standing bedside with L UE support on bedrail, weight shifting in standing with tactile cues and mild R knee block while OT holding on to gait belt for balance, Proceeeded to R LE advancement with L LE in stance AAROM to advance R foot forward (pt demonstrates some movement forward but max A to move foot backwards, then next was R LE in stance with max A for R LE blocking while advancing L LE forwards and back, did this circuit x 2 rounds but on 2nd round, pt. started to get fatigue with R knee bucling increasing needing to have rest and to reposition.   Assessment   Treatment Assessment Pt. engaged in 60 mins PT/OT session with PT focused on transfers training, w/c mobility and pre gait standing activities at bedside . Pt. tolerated session well but did demonstrae fatigue towards 2nd round of pre gait activities. Pt. cont to need max A in holding R LE in stance with max verbal cues for  core, gluts and quads activation. Pt. demonstrates improved sit to stand and standing balance overall with no buklcing noted if just standing in place. Did swap out at end of session to recliner for OOB sitting for lunch. WIll cont with POC in the PM.   Problem List Decreased strength;Decreased endurance;Impaired balance;Decreased mobility;Decreased coordination   PT Barriers   Functional Limitation Car transfers;Stair negotiation;Standing;Transfers;Walking;Wheelchair management   Plan   Treatment/Interventions Functional transfer training;LE strengthening/ROM;Elevations;Therapeutic exercise;Endurance training;Patient/family training;Equipment eval/education;Bed mobility;Gait training   Discharge Recommendation   Rehab Resource Intensity Level, PT   (TBD pending progress)   Equipment Recommended   (TBD pending progress)   PT Therapy Minutes   PT Time In 1030   PT Time Out 1130   PT Total Time (minutes) 60   PT Mode of treatment - Individual (minutes) 0   PT Mode of treatment - Concurrent (minutes) 0   PT Mode of treatment - Group (minutes) 0   PT Mode of treatment - Co-treat (minutes) 60   PT Mode of Treatment - Total time(minutes) 60 minutes   PT Cumulative Minutes 360   Therapy Time missed   Time missed? No

## 2024-08-24 NOTE — PROGRESS NOTES
[facility]  Progress Note  Name: Maye Lilly I  MRN: [id number]  Unit/Bed#: -01 I Date of Admission: 8/20/2024   Date of Service: 8/24/2024 I Hospital Day: 4    Assessment & Plan   * Acute CVA (cerebrovascular accident) (HCC)  Assessment & Plan  Presented with right sided weakness, slurred speech, headache and vomiting  MRI = left pontine infarct  Neuro felt CVA was from atrial fibrillation in setting of non-compliance with anticoagulation  Currently now on Xarelto 20mg qd, Lipitor 40mg qd  Followup with Neuro as OP.  Stevo Baires from [facility] from that group was listed on the DC summary    Meningioma (HCC)  Assessment & Plan  Incidental finding   OP followup    GERD (gastroesophageal reflux disease)  Assessment & Plan  Continue Protonix    Hypothyroidism  Assessment & Plan  Continue Levothyroxine 25 mcg qd  This was the dose that she was supposed to be taking at home and they placed her back on it in the hospital on 8/12/24 but there was no TSH drawn.  Had an abnormal TSH of 5.1 on 4/23/20 presumably not on Levothyroxine at that time since thereafter on 2/15/22 was normal  TSH 8/22/24 normal at 3.95  Will continue current dose and have her get a TSH in 6 weeks = @9/23    DIANELYS (obstructive sleep apnea)  Assessment & Plan  In past was on CPAP  Can d/w PCP when discharged    HTN (hypertension)  Assessment & Plan  Home:  no meds  Here:  Coreg 25mg BID/Hydralazine 75 mg q8hrs/Nifedipine 60mg daily at dinner time/Aldactone 25mg daily/Losartan 100mg qd  Stable; no changes today 8/23/24  Followup with IM as OP.  On DC summary was listed to see Megan DORSEY from that group    Paroxysmal atrial fibrillation (HCC)  Assessment & Plan  Was seeing cardiologist in [place] and taking Xarelto but she has not seen him in 2 yrs and stopped all her meds >1 yr ago  EKG in the hospital showed NSR  Needs to followup as OP with Cardiology = Dr Beltre  Continue Coreg  Was not placed  back on Flecainide  Now on Xarelto   RRR by exam today 8/23/24  Sent price check to her pharmacy on 8/21/24 for the Xarelto and is $661.99  Sent price check on Eliquis but doubtful that will be much better.  If not, she will possibly need to be converted to Coumadin with a therapeutic Lovenox bridge.  Coupon application to be completed. Determination of coverage should take 24 hours.    Clostridium difficile infection  Assessment & Plan  New during hospital stay  Says no BMs so far today 8/21/24  Continue Vancomycin 125mg QID.  LD will be 8/26/24 0900  Stools are currently soft; no diarrhea             The above assessment and plan was reviewed and updated as determined by my evaluation of the patient on 8/24/2024.    Labs:   Results from last 7 days   Lab Units 08/22/24  0657   WBC Thousand/uL 8.61   HEMOGLOBIN g/dL 13.6   HEMATOCRIT % 42.3   PLATELETS Thousands/uL 297     Results from last 7 days   Lab Units 08/22/24  0657   SODIUM mmol/L 139   POTASSIUM mmol/L 4.3   CHLORIDE mmol/L 105   CO2 mmol/L 26   BUN mg/dL 30*   CREATININE mg/dL 0.79   CALCIUM mg/dL 9.4                   Imaging  No orders to display       Review of Scheduled Meds:  Current Facility-Administered Medications   Medication Dose Route Frequency Provider Last Rate    acetaminophen  650 mg Oral Q6H PRN WENCESLAO Patel      atorvastatin  40 mg Oral QPM WENCESLAO Patel      bisacodyl  10 mg Rectal Daily PRN Dougie King MD      carvedilol  25 mg Oral BID With Meals WENCESLAO Patel      Diclofenac Sodium  2 g Topical BID Dk De Dios MD      hydrALAZINE  75 mg Oral Q8H formerly [facility] WENCESLAO Patel      hydrocortisone   Topical BID WENCESLAO Patel      levothyroxine  25 mcg Oral Early Morning WENCESLAO Patel      lidocaine  1 patch Topical Daily Dk De Dios MD      losartan  100 mg Oral Daily WENCESLAO Patel      multivitamin stress formula  1 tablet Oral Daily  WENCESLAO Patel      NIFEdipine  60 mg Oral Daily With Dinner WENCESLAO Patel      pantoprazole  40 mg Oral Early Morning WENCESLAO Patel      rivaroxaban  20 mg Oral Daily With Dinner Lucila Schulz, WENCESLAO      spironolactone  25 mg Oral Daily WENCESLAO Patel      vancomycin  125 mg Oral 4x Daily WENCESLAO Patel         Subjective/ HPI: Patient seen and examined. Patients overnight issues or events were reviewed with nursing staff. New or overnight issues include the following:     Pt seen in her room after therapy. She states that she is doing well.    ROS:   A 10 point ROS was performed; negative except as noted above.        *Labs /Radiology studies Reviewed  *Medications  reviewed and reconciled as needed  *Please refer to order section for additional ordered labs studies      Physical Examination:  Vitals:   Vitals:    08/24/24 0400 08/24/24 0550 08/24/24 0553 08/24/24 0820   BP:  142/67 142/67 151/67   BP Location:  Right arm     Pulse:  64  70   Resp:  18     Temp:  98.2 °F (36.8 °C)     TempSrc:  Oral     SpO2: 98% 100%     Weight:       Height:           General Appearance: NAD; pleasant  HEENT: PERRLA, conjuctiva normal; mucous membranes moist; face symmetrical  Neck:  Supple  Lungs: clear bilaterally, normal respiratory effort, no retractions, expiratory effort normal, on room air  CV: regular rate and rhythm, no murmurs rubs or gallops noted   ABD: soft non tender, +BS x4  EXT: DP pulses intact, no lymphadenopathy, no edema  Skin: normal turgor, normal texture, no rash  Psych: affect normal, mood normal  Neuro: Awake and alert. Rt hemiparesis. +dysarthria.     The above physical exam was reviewed and updated as determined by my evaluation of the patient on 8/24/2024.    Invasive Devices       None                      VTE Pharmacologic Prophylaxis: Xarelto  Code Status: Level 1 - Full Code  Current Length of Stay: 4 day(s)    Total floor  / unit time spent today  35 minutes   Coordination of patient's care was performed in conjunction with consulting services. Time invested included review of patient's labs, vitals, and management of their comorbidities with continued monitoring, examination of patient as well as answering patient questions, documenting her findings and creating progress note in electronic medical record,  ordering appropriate diagnostic testing.       ** Please Note:  voice to text software may have been used in the creation of this document. Although proof errors in transcription or interpretation are a potential of such software**

## 2024-08-24 NOTE — PROGRESS NOTES
08/24/24 1031   Pain Assessment   Pain Assessment Tool 0-10   Pain Score No Pain   Restrictions/Precautions   Precautions Bed/chair alarms;Fall Risk;Supervision on toilet/commode;Contact/isolation;Cognitive   Braces or Orthoses Other (Comment)  (sling on R UE and AFO + sneakers with all mobility, multipodus boot on in bed)   Putting On/Taking Off Footwear   Type of Assistance Needed Physical assistance   Physical Assistance Level Total assistance   Comment dependent to don socks/sneakers and R AFO   Putting On/Taking Off Footwear CARE Score 1   Lying to Sitting on Side of Bed   Type of Assistance Needed Physical assistance   Physical Assistance Level 51%-75%   Comment HOB slightly elevated, pt able to initiate some movement for advancing RLE out to right side of bed, use of bed rail and modA still for trunk support to bring self fully upright   Lying to Sitting on Side of Bed CARE Score 2   Sit to Stand   Type of Assistance Needed Physical assistance   Physical Assistance Level Total assistance   Comment modA/maxAx1 with 2nd person CGA, R knee blocked, pulling up from bed rail, trialed pushing from WC to stand and then hold onto bed rail to simulate movement that will be required for use of hemiwalker/AD as pt progresses, gait belt donned for all transfers   Sit to Stand CARE Score 1   Bed-Chair Transfer   Type of Assistance Needed Physical assistance   Physical Assistance Level Total assistance   Comment SBT to the L bed >WC, maxAx1 w/ 2nd person CGA and stabilizing equipment, maxAx1 swap out in stance at bed rail to sit in recliner at end of session to be up OOB for lunch   Chair/Bed-to-Chair Transfer CARE Score 1   Functional Standing Tolerance   Comments OT providing modA/maxA pending level of fatigue for pt to maintain standing balance while holding onto bed rail L hand and working to  R foot with PT Martha assisting RLE movement. Pt also engaged in weight shifting R/L with VC to shift to R side more.  Gait belt donned for safety, pt with max difficulty WB through RLE to  LLE and fatigues quickly after 10 reps. Pt seated and completes 2 trials in stance lifting RLE/LLE. See PT note for detail.   Cognition   Overall Cognitive Status WFL   Arousal/Participation Alert;Cooperative   Attention Within functional limits   Orientation Level Oriented X4   Memory Within functional limits   Following Commands Follows all commands and directions without difficulty   Activity Tolerance   Activity Tolerance Patient tolerated treatment well   Assessment   Treatment Assessment Pt participated in 60 min OT/PT co tx session with OT focus on standing tolerance/standing balance, fxnl transfers, and optimal positioning of RUE when seated OOB in recliner at end of session to eat lunch. Pt modA/maxAx1 with 2nd person for safety for all fxnl transfers. Pt continues to require VC for anterior weight shift and placement of L hand to assist to push up to stand. Pt fatigues easily though highly motivated to participate. Pt continues to benefit from OT services to maximize rehab potential and reduce caregiver burden.   Prognosis Good   Problem List Decreased strength;Decreased endurance;Impaired balance;Decreased mobility;Decreased coordination   Barriers to Discharge Inaccessible home environment;Decreased caregiver support   Plan   Treatment/Interventions ADL retraining;Functional transfer training;Endurance training;Patient/family training;Bed mobility   Progress Progressing toward goals   Discharge Recommendation   Rehab Resource Intensity Level, OT   (pending pt progress)   OT Therapy Minutes   OT Time In 1030   OT Time Out 1130   OT Total Time (minutes) 60   OT Mode of treatment - Individual (minutes) 0   OT Mode of treatment - Concurrent (minutes) 0   OT Mode of treatment - Group (minutes) 0   OT Mode of treatment - Co-treat (minutes) 60   OT Mode of Treatment - Total time(minutes) 60 minutes   OT Cumulative Minutes 355    Therapy Time missed   Time missed? No

## 2024-08-24 NOTE — ASSESSMENT & PLAN NOTE
Was seeing cardiologist in [place] and taking Xarelto but she has not seen him in 2 yrs and stopped all her meds >1 yr ago  EKG in the hospital showed NSR  Needs to followup as OP with Cardiology = Dr Beltre  Continue Coreg  Was not placed back on Flecainide  Now on Xarelto   RRR by exam today 8/23/24  Sent price check to her pharmacy on 8/21/24 for the Xarelto and is $661.99  Sent price check on Eliquis but doubtful that will be much better.  If not, she will possibly need to be converted to Coumadin with a therapeutic Lovenox bridge.  Coupon application to be completed. Determination of coverage should take 24 hours.

## 2024-08-24 NOTE — PROGRESS NOTES
"   08/24/24 1230   Pain Assessment   Pain Assessment Tool 0-10   Pain Score No Pain   Restrictions/Precautions   Precautions Bed/chair alarms;Cognitive;Fall Risk;Contact/isolation;Supervision on toilet/commode   Braces or Orthoses Other (Comment)  (sling on R UE and AFO + sneakers with all mobility, multipodus boot on in bed)   Lifestyle   Autonomy \"It feels good to go on the toilet, I feel so much better than using the bed pan\". \"exercising is the best part of my day, I look forward to this\".   Upper Body Dressing   Type of Assistance Needed Physical assistance   Physical Assistance Level 51%-75%   Comment [facility] with VC to drive L elbow back, inc time warranted though pt able to complete and encouraged to fully pull sleeve off of RUE. Pt able to doff sports bra with inc time in same manner. [facility] Ruperto to fully thread RUE through sleeve and VC for fwd flexion to allow gravity to A for dressing RUE. Pt would benefit from further instruction for hemiplegia dressing technique.   Upper Body Dressing CARE Score 2   Lower Body Dressing   Type of Assistance Needed Physical assistance   Physical Assistance Level Total assistance   Comment TA to don tab brief maxAx1 in stance at bed rail after soiling brief, 2nd person to safely manage brief   Lower Body Dressing CARE Score 1   Sit to Lying   Type of Assistance Needed Physical assistance   Physical Assistance Level Total assistance   Comment Ax2, assist for repositioning 2* limited R body proprioception   Sit to Lying CARE Score 1   Sit to Stand   Type of Assistance Needed Physical assistance   Physical Assistance Level Total assistance   Comment modA/maxAx1 standing from bed, trialed HW in stance, pt requires VC to push up with L hand from bed and then grab onto HW, sit to stand 2x and in stance with HW for prolonged period about 1-2 mins each stand   Sit to Stand CARE Score 1   Bed-Chair Transfer   Type of Assistance Needed " "Physical assistance   Physical Assistance Level Total assistance   Comment SBT WC>back to bed and positioned in chair mode at end of Tx session   Chair/Bed-to-Chair Transfer CARE Score 1   Toileting Hygiene   Type of Assistance Needed Physical assistance   Physical Assistance Level Total assistance   Comment TAx2 in stance at bed rail, dependent for rear hygiene following BM   Toileting Hygiene CARE Score 1   Toileting   Able to Pull Clothing down no, up no.   Manage Hygiene Bladder;Bowel   Limitations Noted In Balance;Safety;ROM;LE Strength;UE Strength   Toilet Transfer   Type of Assistance Needed Physical assistance   Physical Assistance Level Total assistance   Comment swap out with pt maxAx1 in stance at bed rail   Toilet Transfer CARE Score 1   Toilet Transfer   Surface Assessed Platform Commode   ROM- Right Upper Extremities   RUE ROM Comment seated EOB to complete 1x10 shoulder elevation/depression   Neuromuscular Education   Weight Bearing Technique Yes   RUE Weight Bearing Extended arm seated   Response to Weight Bearing Technique pt seated EOB R hand placed on SB surface, OT blocking at elbow to encourage elbow extension and WB through RUE while pt completed fxnl reach through all planes of motion with LUE, pt tolerates and notes \"I feel the muscles being used\" in RUE. Trace activation noted in tricep/bicep though distally pt remains severely limited.   Cognition   Overall Cognitive Status WFL   Arousal/Participation Alert;Cooperative   Attention Within functional limits   Orientation Level Oriented X4   Memory Within functional limits   Following Commands Follows all commands and directions without difficulty   Activity Tolerance   Activity Tolerance Patient tolerated treatment well   Assessment   Treatment Assessment Pt participated in 60 min skilled OT/PT co tx with focus on toileting, fxnl transfers, stand tolerance, and RUE NMR. See above note for detail. Pt demonstrating good progress toward goals. " Pt continues to fatigue and requires rest breaks to manage. Pt continues to benefit from ongoing OT services with focus on RUE NMR, stand tolerance, fxnl transfer training, standing balance, and hemiplegic dressing techniques to maximize pt rehab potential and reduce caregiver burden.   Prognosis Good   Problem List Decreased strength;Decreased range of motion;Decreased endurance;Impaired balance;Decreased mobility;Decreased coordination;Impaired sensation;Impaired tone   Barriers to Discharge Inaccessible home environment;Decreased caregiver support   Plan   Treatment/Interventions ADL retraining;Functional transfer training;Endurance training;Patient/family training;Bed mobility;Compensatory technique education   Progress Progressing toward goals   Discharge Recommendation   Rehab Resource Intensity Level, OT   (pending pt progress)   OT Therapy Minutes   OT Time In 1230   OT Time Out 1330   OT Total Time (minutes) 60   OT Mode of treatment - Individual (minutes) 0   OT Mode of treatment - Concurrent (minutes) 0   OT Mode of treatment - Group (minutes) 0   OT Mode of treatment - Co-treat (minutes) 60   OT Mode of Treatment - Total time(minutes) 60 minutes   OT Cumulative Minutes 415   Therapy Time missed   Time missed? No      4

## 2024-08-25 PROCEDURE — 99232 SBSQ HOSP IP/OBS MODERATE 35: CPT | Performed by: PHYSICIAN ASSISTANT

## 2024-08-25 PROCEDURE — 94660 CPAP INITIATION&MGMT: CPT

## 2024-08-25 PROCEDURE — 97530 THERAPEUTIC ACTIVITIES: CPT

## 2024-08-25 PROCEDURE — 94760 N-INVAS EAR/PLS OXIMETRY 1: CPT

## 2024-08-25 RX ADMIN — PANTOPRAZOLE SODIUM 40 MG: 20 TABLET, DELAYED RELEASE ORAL at 05:13

## 2024-08-25 RX ADMIN — HYDRALAZINE HYDROCHLORIDE 75 MG: 50 TABLET ORAL at 14:23

## 2024-08-25 RX ADMIN — VANCOMYCIN HYDROCHLORIDE 125 MG: 125 CAPSULE ORAL at 08:51

## 2024-08-25 RX ADMIN — LOSARTAN POTASSIUM 100 MG: 50 TABLET, FILM COATED ORAL at 08:51

## 2024-08-25 RX ADMIN — Medication 2 G: at 08:51

## 2024-08-25 RX ADMIN — B-COMPLEX W/ C & FOLIC ACID TAB 1 TABLET: TAB at 08:51

## 2024-08-25 RX ADMIN — HYDRALAZINE HYDROCHLORIDE 75 MG: 50 TABLET ORAL at 05:13

## 2024-08-25 RX ADMIN — HYDROCORTISONE: 1 CREAM TOPICAL at 08:51

## 2024-08-25 RX ADMIN — ATORVASTATIN CALCIUM 40 MG: 40 TABLET, FILM COATED ORAL at 17:21

## 2024-08-25 RX ADMIN — LIDOCAINE 1 PATCH: 700 PATCH TOPICAL at 16:02

## 2024-08-25 RX ADMIN — NIFEDIPINE 60 MG: 30 TABLET, FILM COATED, EXTENDED RELEASE ORAL at 16:02

## 2024-08-25 RX ADMIN — CARVEDILOL 25 MG: 25 TABLET, FILM COATED ORAL at 16:02

## 2024-08-25 RX ADMIN — VANCOMYCIN HYDROCHLORIDE 125 MG: 125 CAPSULE ORAL at 17:21

## 2024-08-25 RX ADMIN — HYDRALAZINE HYDROCHLORIDE 75 MG: 50 TABLET ORAL at 21:13

## 2024-08-25 RX ADMIN — SPIRONOLACTONE 25 MG: 25 TABLET, FILM COATED ORAL at 08:51

## 2024-08-25 RX ADMIN — VANCOMYCIN HYDROCHLORIDE 125 MG: 125 CAPSULE ORAL at 21:13

## 2024-08-25 RX ADMIN — LEVOTHYROXINE SODIUM 25 MCG: 25 TABLET ORAL at 05:13

## 2024-08-25 RX ADMIN — HYDROCORTISONE: 1 CREAM TOPICAL at 17:21

## 2024-08-25 RX ADMIN — ACETAMINOPHEN 650 MG: 325 TABLET ORAL at 07:52

## 2024-08-25 RX ADMIN — CARVEDILOL 25 MG: 25 TABLET, FILM COATED ORAL at 07:51

## 2024-08-25 RX ADMIN — VANCOMYCIN HYDROCHLORIDE 125 MG: 125 CAPSULE ORAL at 11:25

## 2024-08-25 RX ADMIN — RIVAROXABAN 20 MG: 20 TABLET, FILM COATED ORAL at 16:02

## 2024-08-25 NOTE — ASSESSMENT & PLAN NOTE
Presented with right sided weakness, slurred speech, headache and vomiting  MRI = left pontine infarct  Neuro felt CVA was from atrial fibrillation in setting of non-compliance with anticoagulation  Currently now on Xarelto 20mg qd, Lipitor 40mg qd  Followup with Neuro as OP.  [facility] summary

## 2024-08-25 NOTE — PROGRESS NOTES
"   08/25/24 1230   Pain Assessment   Pain Assessment Tool 0-10   Pain Score No Pain   Restrictions/Precautions   Precautions Bed/chair alarms;Cognitive;Fall Risk;Contact/isolation;Supervision on toilet/commode   Weight Bearing Restrictions No   ROM Restrictions No   Cognition   Overall Cognitive Status WFL   Arousal/Participation Alert;Cooperative   Attention Within functional limits   Orientation Level Oriented X4   Memory Within functional limits   Following Commands Follows all commands and directions without difficulty   Subjective   Subjective \"Do you think I could get on the commode?\"   Sit to Lying   Type of Assistance Needed Physical assistance   Physical Assistance Level Total assistance   Comment assist x2 for trunk and LE mgt   Sit to Lying CARE Score 1   Lying to Sitting on Side of Bed   Type of Assistance Needed Physical assistance   Physical Assistance Level 51%-75%   Comment HOB elevated with use of bed rails   Lying to Sitting on Side of Bed CARE Score 2   Sit to Stand   Type of Assistance Needed Physical assistance   Physical Assistance Level 76% or more   Comment maxA x1 from w/c and pulling on bed rail   Sit to Stand CARE Score 2   Bed-Chair Transfer   Type of Assistance Needed Physical assistance   Physical Assistance Level Total assistance   Comment sliding board transfer bed to w/c 2-person assist; stand pivot w/c to bed 2-person assist   Chair/Bed-to-Chair Transfer CARE Score 1   Transfer Bed/Chair/Wheelchair   Limitations Noted In Balance;LE Strength   Adaptive Equipment Transfer Board   Toilet Transfer   Type of Assistance Needed Physical assistance   Physical Assistance Level Total assistance   Comment commode swap from w/c to [facility] with modA x1, CGA for sit to stand, Ruperto for standing with 2nd person swapping commode; remianed standing for hygiene and CM   Toilet Transfer CARE Score 1   Toilet Transfer   Surface Assessed Bedside Commode   Limitations Noted In Balance;Endurance;Safety "   Assessment   Treatment Assessment Savannah presented in bed agreeable to PT, but requesting to use commode. PCA present to assist PT. Patient able to direct care. Sliding board transfer completed for bed to w/c with RUE sling and AFO donned. Pt completed multiple sit to stands at bed rail for hygiene and CM with modA x1. Ezio trialed stand pivot to her L for w/c to bed transfer requiring 2 person assist. Pt able to advance LLE, but requires maxA to move RLE. Patient positioned in bed at end of session with pillows and wedges and per pt request bed in chair mode with feet elevated. Savannah would cotninue to benefit from skilled PT interventions for transfer training, gfait training, LE strengthening, and abalnce training to reduce burden of care to allow for safe discahrge home.   Problem List Decreased strength;Decreased range of motion;Decreased endurance;Impaired balance;Decreased mobility;Decreased coordination   Barriers to Discharge Inaccessible home environment;Decreased caregiver support   Plan   Treatment/Interventions Functional transfer training;LE strengthening/ROM;Elevations;Therapeutic exercise;Endurance training;Patient/family training;Gait training   Progress Progressing toward goals   PT Therapy Minutes   PT Time In 1230   PT Time Out 1300   PT Total Time (minutes) 30   PT Mode of treatment - Individual (minutes) 30   PT Mode of treatment - Concurrent (minutes) 0   PT Mode of treatment - Group (minutes) 0   PT Mode of treatment - Co-treat (minutes) 0   PT Mode of Treatment - Total time(minutes) 30 minutes   PT Cumulative Minutes 450   Therapy Time missed   Time missed? No

## 2024-08-25 NOTE — PROGRESS NOTES
[facility]  Progress Note  Name: Maye Lilly I  MRN: [id number]  Unit/Bed#: -01 I Date of Admission: 8/20/2024   Date of Service: 8/25/2024 I Hospital Day: 5    Assessment & Plan   * Acute CVA (cerebrovascular accident) (HCC)  Assessment & Plan  Presented with right sided weakness, slurred speech, headache and vomiting  MRI = left pontine infarct  Neuro felt CVA was from atrial fibrillation in setting of non-compliance with anticoagulation  Currently now on Xarelto 20mg qd, Lipitor 40mg qd  Followup with Neuro as OP.  Stevo Baires from [facility] from that group was listed on the DC summary    Meningioma (HCC)  Assessment & Plan  Incidental finding   OP followup    GERD (gastroesophageal reflux disease)  Assessment & Plan  Continue Protonix    Hypothyroidism  Assessment & Plan  Continue Levothyroxine 25 mcg qd  This was the dose that she was supposed to be taking at home and they placed her back on it in the hospital on 8/12/24 but there was no TSH drawn.  Had an abnormal TSH of 5.1 on 4/23/20 presumably not on Levothyroxine at that time since thereafter on 2/15/22 was normal  TSH 8/22/24 normal at 3.95  Will continue current dose and have her get a TSH in 6 weeks = @9/23    DIANELYS (obstructive sleep apnea)  Assessment & Plan  In past was on CPAP  Can d/w PCP when discharged    HTN (hypertension)  Assessment & Plan  Home:  no meds  Here:  Coreg 25mg BID/Hydralazine 75 mg q8hrs/Nifedipine 60mg daily at dinner time/Aldactone 25mg daily/Losartan 100mg qd  Stable; no changes today  Followup with IM as OP.  On DC summary was listed to see Megan DORSEY from that group    Paroxysmal atrial fibrillation (HCC)  Assessment & Plan  Was seeing cardiologist in [place] and taking Xarelto but she has not seen him in 2 yrs and stopped all her meds >1 yr ago  EKG in the hospital showed NSR  Needs to followup as OP with Cardiology = Dr Beltre  Continue Coreg  Was not placed back on  Flecainide  Now on Xarelto   RRR by exam today 8/23/24  Sent price check to her pharmacy on 8/21/24 for the Xarelto and is $661.99  Sent price check on Eliquis but doubtful that will be much better.  If not, she will possibly need to be converted to Coumadin with a therapeutic Lovenox bridge.  Coupon application for Xarelto to be completed. Determination of coverage should take 24 hours.    Clostridium difficile infection  Assessment & Plan  New during hospital stay  Says no BMs so far today 8/21/24  Continue Vancomycin 125mg QID.  LD will be 8/26/24 0900  Stools are currently soft; no diarrhea             The above assessment and plan was reviewed and updated as determined by my evaluation of the patient on 8/25/2024.    Labs:   Results from last 7 days   Lab Units 08/22/24  0657   WBC Thousand/uL 8.61   HEMOGLOBIN g/dL 13.6   HEMATOCRIT % 42.3   PLATELETS Thousands/uL 297     Results from last 7 days   Lab Units 08/22/24  0657   SODIUM mmol/L 139   POTASSIUM mmol/L 4.3   CHLORIDE mmol/L 105   CO2 mmol/L 26   BUN mg/dL 30*   CREATININE mg/dL 0.79   CALCIUM mg/dL 9.4                   Imaging  No orders to display       Review of Scheduled Meds:  Current Facility-Administered Medications   Medication Dose Route Frequency Provider Last Rate    acetaminophen  650 mg Oral Q6H PRN WENCESLAO Patel      atorvastatin  40 mg Oral QPM WENCESLAO Patel      bisacodyl  10 mg Rectal Daily PRN Dougie King MD      carvedilol  25 mg Oral BID With Meals WENCESLAO Patel      Diclofenac Sodium  2 g Topical BID Dk De Dios MD      hydrALAZINE  75 mg Oral Q8H [facility] WENCESLAO Patel      hydrocortisone   Topical BID WENCESLAO Patel      levothyroxine  25 mcg Oral Early Morning WENCESLAO Patel      lidocaine  1 patch Topical Daily Dk De Dios MD      losartan  100 mg Oral Daily WENCESLAO Patel      multivitamin stress formula  1 tablet Oral  Daily WENCESLAO Patel      NIFEdipine  60 mg Oral Daily With Dinner WENCESLAO Patel      pantoprazole  40 mg Oral Early Morning WENCESLAO Patel      rivaroxaban  20 mg Oral Daily With Dinner Lucila Schulz, WENCESLAO      spironolactone  25 mg Oral Daily WENCESLAO Patel      vancomycin  125 mg Oral 4x Daily WENCESLAO Patel         Subjective/ HPI: Patient seen and examined. Patients overnight issues or events were reviewed with nursing staff. New or overnight issues include the following:     Pt seen in her room. She states that she is doing well. She denies any current complaints.    ROS:   A 10 point ROS was performed; negative except as noted above.        *Labs /Radiology studies Reviewed  *Medications  reviewed and reconciled as needed  *Please refer to order section for additional ordered labs studies      Physical Examination:  Vitals:   Vitals:    08/24/24 2056 08/25/24 0420 08/25/24 0511 08/25/24 1300   BP: 116/55  131/59 121/59   BP Location: Right arm  Left arm Left arm   Pulse: 67  68 68   Resp: 20  16 18   Temp: 98.1 °F (36.7 °C)  97.9 °F (36.6 °C) 98.9 °F (37.2 °C)   TempSrc: Oral  Oral Oral   SpO2: 92% 94% 93% 93%   Weight:       Height:           General Appearance: NAD; pleasant  HEENT: PERRLA, conjuctiva normal; mucous membranes moist; face symmetrical  Neck:  Supple  Lungs: clear bilaterally, normal respiratory effort, no retractions, expiratory effort normal, on room air  CV: regular rate and rhythm, no murmurs rubs or gallops noted   ABD: soft non tender, +BS x4  EXT: DP pulses intact, no lymphadenopathy, no edema  Skin: normal turgor, normal texture, no rash  Psych: affect normal, mood normal  Neuro: AAOx3. + dysarthria. Rt hemiparesis     The above physical exam was reviewed and updated as determined by my evaluation of the patient on 8/25/2024.    Invasive Devices       None                      VTE Pharmacologic Prophylaxis:  Xarelto  Code Status: Level 1 - Full Code  Current Length of Stay: 5 day(s)    Total floor / unit time spent today  35 minutes   Coordination of patient's care was performed in conjunction with consulting services. Time invested included review of patient's labs, vitals, and management of their comorbidities with continued monitoring, examination of patient as well as answering patient questions, documenting her findings and creating progress note in electronic medical record,  ordering appropriate diagnostic testing.       ** Please Note:  voice to text software may have been used in the creation of this document. Although proof errors in transcription or interpretation are a potential of such software**

## 2024-08-25 NOTE — ASSESSMENT & PLAN NOTE
Was seeing cardiologist in [place] and taking Xarelto but she has not seen him in 2 yrs and stopped all her meds >1 yr ago  EKG in the hospital showed NSR  Needs to followup as OP with Cardiology = Dr Beltre  Continue Coreg  Was not placed back on Flecainide  Now on Xarelto   RRR by exam today 8/23/24  Sent price check to her pharmacy on 8/21/24 for the Xarelto and is $661.99  Sent price check on Eliquis but doubtful that will be much better.  If not, she will possibly need to be converted to Coumadin with a therapeutic Lovenox bridge.  Coupon application for Xarelto to be completed. Determination of coverage should take 24 hours.

## 2024-08-25 NOTE — PROGRESS NOTES
"Occupational Therapy Treatment Note         08/25/24 1335   Pain Assessment   Pain Assessment Tool 0-10   Pain Score No Pain   Assessment   Treatment Assessment Skilled OT tx session, initiated stroke education. See below for details. Will need to continue to review stroke 101. Pt reports her mood has overall been well, she was feeling a little \"down\" one day but therapy participation made her feel better. Educated on pastoral care services as resource which currently she declined but pt states she will inform staff if she were to begin feeling depressed or anxious. Pt reporting her isolation should be removed tomorrow, will clarify with team as per notes last day will be tomorrow at 9 am. She is looking forward to coming to therapy gym. Continue OT plan of care with goals to initiate NMES R UE and neuromuscular re-education through weight bearing and mat work this week   OT Therapy Minutes   OT Time In 1335   OT Time Out 1405   OT Total Time (minutes) 30   OT Mode of treatment - Individual (minutes) 30   OT Mode of treatment - Concurrent (minutes) 0   OT Mode of treatment - Group (minutes) 0   OT Mode of treatment - Co-treat (minutes) 0   OT Mode of Treatment - Total time(minutes) 30 minutes   OT Cumulative Minutes 445   Therapy Time missed   Time missed? No         Stroke Education Series    Pt participated in skilled Stroke Education Series in an individual setting to address the topic of Purpose of Rehab post stroke in both verbal and written formats. Education within this session included a review of the individual roles of the rehab team, functions of the acute rehab center, and neuro rehabilitation treatment strategies. The goal of this education session was to provide the patient with an understanding of the overall importance of the therapy process. This section reviews neuroplasticity principles that includes how staff incorporate specificity, intensity, repetition and salience into their treatment plans, " and how patients can incorporate these same principles into their home exercise program. This allows the patient to connect neuro rehabilitation treatment strategies to his or her individual therapy process. The patients are engaged in conversation to incorporate activities they like to do into therapy sessions. Following education, the patient's response to education is: verbalizes understanding.      Additional topics to individualize this section of stroke education include: adapting to stroke , upper and lower extremity orthotics and/or bracing , and involvement of therapy reducing risk of another stroke.      Stroke Education Series    Began this - will need to continue review. Book page marked in pt's room.     Pt participated in skilled Stroke Education Series in an individual setting to address the topic of Stroke 101: Understanding the Basics of Stroke in both verbal and written formats. Education within this session reviewed the basic structural and functional components of the brain and included information on the causes of stroke, related signs/symptoms, risk factors, and the process of stroke rehabilitation. The goal of this education was to provide the patient with general understanding of how the brain functions and how a stroke can impact his/her functional mobility and independence. In addition, the intention of this education is to provide the patient with the information to reduce the risk of a second stroke. Following education, pt's response to education is: verbalizes understanding.    To individualize the education, the following topics were included based upon the patients' past and current medical history: atrial fibrillation.  Additional topics that were covered include weakness, decreased coordination, depression, and anxiety.

## 2024-08-25 NOTE — PLAN OF CARE
Problem: PAIN - ADULT  Goal: Verbalizes/displays adequate comfort level or baseline comfort level  Description: Interventions:  - Encourage patient to monitor pain and request assistance  - Assess pain using appropriate pain scale  - Administer analgesics based on type and severity of pain and evaluate response  - Implement non-pharmacological measures as appropriate and evaluate response  - Consider cultural and social influences on pain and pain management  - Notify physician/advanced practitioner if interventions unsuccessful or patient reports new pain  8/24/2024 2221 by Megan Ayoub RN  Outcome: Progressing  8/24/2024 2221 by Megan Ayoub RN  Outcome: Progressing

## 2024-08-25 NOTE — ASSESSMENT & PLAN NOTE
Home:  no meds  Here:  Coreg 25mg BID/Hydralazine 75 mg q8hrs/Nifedipine 60mg daily at dinner time/Aldactone 25mg daily/Losartan 100mg qd  Stable; no changes today  Followup with IM as OP.  On DC summary was listed to see Megan DORSEY from that group

## 2024-08-26 LAB
ANION GAP SERPL CALCULATED.3IONS-SCNC: 10 MMOL/L (ref 4–13)
BASOPHILS # BLD AUTO: 0.06 THOUSANDS/ΜL (ref 0–0.1)
BASOPHILS NFR BLD AUTO: 1 % (ref 0–1)
BUN SERPL-MCNC: 23 MG/DL (ref 5–25)
CALCIUM SERPL-MCNC: 10 MG/DL (ref 8.4–10.2)
CHLORIDE SERPL-SCNC: 103 MMOL/L (ref 96–108)
CO2 SERPL-SCNC: 27 MMOL/L (ref 21–32)
CREAT SERPL-MCNC: 0.69 MG/DL (ref 0.6–1.3)
EOSINOPHIL # BLD AUTO: 0.23 THOUSAND/ΜL (ref 0–0.61)
EOSINOPHIL NFR BLD AUTO: 3 % (ref 0–6)
ERYTHROCYTE [DISTWIDTH] IN BLOOD BY AUTOMATED COUNT: 13.5 % (ref 11.6–15.1)
GFR SERPL CREATININE-BSD FRML MDRD: 91 ML/MIN/1.73SQ M
GLUCOSE P FAST SERPL-MCNC: 92 MG/DL (ref 65–99)
GLUCOSE SERPL-MCNC: 92 MG/DL (ref 65–140)
HCT VFR BLD AUTO: 42.2 % (ref 34.8–46.1)
HGB BLD-MCNC: 13.6 G/DL (ref 11.5–15.4)
IMM GRANULOCYTES # BLD AUTO: 0.03 THOUSAND/UL (ref 0–0.2)
IMM GRANULOCYTES NFR BLD AUTO: 0 % (ref 0–2)
LYMPHOCYTES # BLD AUTO: 1.43 THOUSANDS/ΜL (ref 0.6–4.47)
LYMPHOCYTES NFR BLD AUTO: 18 % (ref 14–44)
MCH RBC QN AUTO: 29.6 PG (ref 26.8–34.3)
MCHC RBC AUTO-ENTMCNC: 32.2 G/DL (ref 31.4–37.4)
MCV RBC AUTO: 92 FL (ref 82–98)
MONOCYTES # BLD AUTO: 0.48 THOUSAND/ΜL (ref 0.17–1.22)
MONOCYTES NFR BLD AUTO: 6 % (ref 4–12)
NEUTROPHILS # BLD AUTO: 5.7 THOUSANDS/ΜL (ref 1.85–7.62)
NEUTS SEG NFR BLD AUTO: 72 % (ref 43–75)
NRBC BLD AUTO-RTO: 0 /100 WBCS
PLATELET # BLD AUTO: 293 THOUSANDS/UL (ref 149–390)
PMV BLD AUTO: 10.6 FL (ref 8.9–12.7)
POTASSIUM SERPL-SCNC: 4.3 MMOL/L (ref 3.5–5.3)
RBC # BLD AUTO: 4.6 MILLION/UL (ref 3.81–5.12)
SODIUM SERPL-SCNC: 140 MMOL/L (ref 135–147)
WBC # BLD AUTO: 7.93 THOUSAND/UL (ref 4.31–10.16)

## 2024-08-26 PROCEDURE — 97112 NEUROMUSCULAR REEDUCATION: CPT

## 2024-08-26 PROCEDURE — 80048 BASIC METABOLIC PNL TOTAL CA: CPT | Performed by: NURSE PRACTITIONER

## 2024-08-26 PROCEDURE — 97130 THER IVNTJ EA ADDL 15 MIN: CPT

## 2024-08-26 PROCEDURE — 94660 CPAP INITIATION&MGMT: CPT

## 2024-08-26 PROCEDURE — 94760 N-INVAS EAR/PLS OXIMETRY 1: CPT

## 2024-08-26 PROCEDURE — 99233 SBSQ HOSP IP/OBS HIGH 50: CPT | Performed by: PHYSICAL MEDICINE & REHABILITATION

## 2024-08-26 PROCEDURE — 97530 THERAPEUTIC ACTIVITIES: CPT

## 2024-08-26 PROCEDURE — 99232 SBSQ HOSP IP/OBS MODERATE 35: CPT | Performed by: NURSE PRACTITIONER

## 2024-08-26 PROCEDURE — 97129 THER IVNTJ 1ST 15 MIN: CPT

## 2024-08-26 PROCEDURE — 85025 COMPLETE CBC W/AUTO DIFF WBC: CPT | Performed by: NURSE PRACTITIONER

## 2024-08-26 RX ADMIN — NIFEDIPINE 60 MG: 30 TABLET, FILM COATED, EXTENDED RELEASE ORAL at 17:17

## 2024-08-26 RX ADMIN — CARVEDILOL 25 MG: 25 TABLET, FILM COATED ORAL at 17:17

## 2024-08-26 RX ADMIN — HYDROCORTISONE: 1 CREAM TOPICAL at 08:00

## 2024-08-26 RX ADMIN — ATORVASTATIN CALCIUM 40 MG: 40 TABLET, FILM COATED ORAL at 17:17

## 2024-08-26 RX ADMIN — LEVOTHYROXINE SODIUM 25 MCG: 25 TABLET ORAL at 05:38

## 2024-08-26 RX ADMIN — HYDRALAZINE HYDROCHLORIDE 75 MG: 50 TABLET ORAL at 05:38

## 2024-08-26 RX ADMIN — VANCOMYCIN HYDROCHLORIDE 125 MG: 125 CAPSULE ORAL at 08:06

## 2024-08-26 RX ADMIN — HYDRALAZINE HYDROCHLORIDE 75 MG: 50 TABLET ORAL at 14:16

## 2024-08-26 RX ADMIN — HYDRALAZINE HYDROCHLORIDE 75 MG: 50 TABLET ORAL at 21:11

## 2024-08-26 RX ADMIN — LIDOCAINE 1 PATCH: 700 PATCH TOPICAL at 17:17

## 2024-08-26 RX ADMIN — CARVEDILOL 25 MG: 25 TABLET, FILM COATED ORAL at 08:00

## 2024-08-26 RX ADMIN — SPIRONOLACTONE 25 MG: 25 TABLET, FILM COATED ORAL at 11:56

## 2024-08-26 RX ADMIN — HYDROCORTISONE: 1 CREAM TOPICAL at 17:17

## 2024-08-26 RX ADMIN — Medication 2 G: at 08:00

## 2024-08-26 RX ADMIN — ACETAMINOPHEN 650 MG: 325 TABLET ORAL at 09:34

## 2024-08-26 RX ADMIN — B-COMPLEX W/ C & FOLIC ACID TAB 1 TABLET: TAB at 08:00

## 2024-08-26 RX ADMIN — LOSARTAN POTASSIUM 100 MG: 50 TABLET, FILM COATED ORAL at 08:00

## 2024-08-26 RX ADMIN — RIVAROXABAN 20 MG: 20 TABLET, FILM COATED ORAL at 17:17

## 2024-08-26 RX ADMIN — PANTOPRAZOLE SODIUM 40 MG: 20 TABLET, DELAYED RELEASE ORAL at 05:38

## 2024-08-26 NOTE — PROGRESS NOTES
08/26/24 8070   Pain Assessment   Pain Assessment Tool 0-10   Pain Score No Pain   Restrictions/Precautions   Precautions Bed/chair alarms;Cognitive;Fall Risk;Supervision on toilet/commode   Comprehension   Comprehension (FIM) 5 - Needs help/cues, repetition only RARELY ( < 10% of the time)   Expression   Expression (FIM) 5 - Needs help/cues only RARELY (< 10% of the time)   Social Interaction   Social Interaction (FIM) 6 - Interacts appropriately with others BUT requires extra  time   Problem Solving   Problem solving (FIM) 4 - Solves basic problems 75-89% of time   Memory   Memory (FIM) 4 - Recognizes/recalls/performs 75-89%   Speech/Language/Cognition Assessment   Treatment Assessment Pt participated in skilled SLP session focusing on cognitive linguistic skills. As this SLP is new to pt's care team, engaged in brief rapport building which also focused on LTM recall and review of PLOF. Pt presented with functional expression and comprehension skills, as well as attention which was appropriate during discussion. Pt also recalled types of tasks that she previously completed during ST sessions and those which challenged her.     Targeting STM and working memory, engaged in review of memory strategies to which pt recalled strategy of making associations. SLP re-educated pt on additional strategies such as repetition, think aloud, writing down info and visualization, while also providing examples of how to incorporate these into daily routines. Pt receptive to all info. Verbally presented with Fo4 words, pt recalled words based on word order in 7/11 trials, improving to 9/11 when given another opportunity to respond and furthermore to 10/11 with repetition of stimuli. Given a mock menu to target reading comprehension and attention, pt independently read all info and answered 5/5 comprehension questions accurately, noting pt to self identify and correct an initial error in one instance. Engaged in a one-step  auditory directions task, pt was verbally presented with directions, along with written Fo5 words. Pt accurately identified all words based on directions for 8/10 trials, benefiting from min verbal cues in which she was then able to promptly locate remaining target words. Of note, pt was mod I for actually following the directions, as she required the cuing for the problem solving portion of task. Lastly, targeting higher level word retrieval and problem solving, pt engaged in a scrambled words task in which she was given scrambled letters, paired with three different descriptions per set of letters. Pt independently determined described items in 37/47 opportunities, benefiting primarily from additional semantic cues and at times from being provided with the first letter of a target word.     Across tasks, pt continues to present with good insight into her performance, as she is often able to verbalize which tasks were more difficult. At this time, pt is recommended for further skilled SLP services focusing on higher level cognitive linguistic and language skills in order to maximize level of independence and safety on discharge.    SLP Therapy Minutes   SLP Time In 1345   SLP Time Out 1445   SLP Total Time (minutes) 60   SLP Mode of treatment - Individual (minutes) 60   SLP Mode of treatment - Concurrent (minutes) 0   SLP Mode of treatment - Group (minutes) 0   SLP Mode of treatment - Co-treat (minutes) 0   SLP Mode of Treatment - Total time(minutes) 60 minutes   SLP Cumulative Minutes 240   Therapy Time missed   Time missed? No

## 2024-08-26 NOTE — PROGRESS NOTES
Physical Medicine and Rehabilitation Progress Note  Maye Lilly 64 y.o. female MRN: [id number]  Unit/Bed#: -01 Encounter: [id number]    To Review:   Maye Lilly is a 64 y.o. female with PMH of afib (prev on Eliquis, now on Xarelto), HTN, hypothyroidism, DIANELYS, morbid obesity, GERD who presented to [facility] with sudden onset R sided weakness with tingling down RUE. She also had slurred speech and a sudden headache and emesis. Denied chest pain, SOB, abd pain, visual deficits on admission. CBC, CMP, trop, urinalysis, tox screen negative. CTA of head and neck negative for LVO. CT C/A/P with  no acute abnormalities except mild sigmoid diverticulitis and thyromegaly. CTH wthout acute infarction or hemorrhage, though calcified meningioma noted along the falx without mass effect. MRI brain w/o contrast showed L pontine infarct. CT of thoracolumbar area without acute fx or dislocations.  Pt with significantly elevated BP on admission (230-240 systolic). No TNK given out of window and no obvious infarct + already on anticoagulation. She was continued on xarelto.  Hospital course complicate by diarrhea, pt found to be C Diff positive, started on vancomycin.    Chief Complaint: R-hemibody paresis    Interval History/Subjective:  Pt endorses stable/mildly improving paresis of RUE and RLE. She notes she is eating, sleeping, passing bowel/bladder movements without issue. She notes therapy is progressing well. Denies worsening weakness, loss of sensation, headache, bowel/bladder incontinence, choking/coughing with PO intake. Denies diarrhea at this time, PO vanc course completed.    ROS:  As noted in subjective above    Today's Changes:  No changes.    Assessment/Plan:    * Acute CVA (cerebrovascular accident) (HCC)  Assessment & Plan  - admitted to acute care with slurred speech, headache, tingling in R arm  - 0/5 RUE strength, 1-2/5 in RLE  - CTH with meningioma along falx, but no  infarct/hemorrhage. CTA with no LVO. MRI with L pontine infarct.  - Did not receive tPA/TNKase, did not undergo thrombectomy  - echo with LVH, normal EF, mild diastolic dysfunction    Plan:  - fall precautions  - Monitor for neurogenic bowel and bladder  - Promote regular sleep/wake cycles and proper sleep hygiene  - Monitor closely for hemiparetic shoulder pain and post-stroke pain  -  Neutral resting wrist splint for the RUE and bilateral multipodus boots to be worn at night and while in bed during the day to prevent developing spasticity and contracture  - Maintain adequate nutrition  - Avoid sedating and anticholinergic medications as possible  - Maintain normotension, s/p permissive hypertension  - Continue Xarelto 20mg daily  - Continue Atorvastatin 40 mg qHs for secondary prevention  - multipodus boots for RLE        HTN (hypertension)  Assessment & Plan  - pt reportedly nonadherent to HTN med regimen due to financial constraints  - management per IM  - continue coreg 25mg BID, nifedipine 60mg daily, aldactone 25mg daily, valsartan 160 daily,     Paroxysmal atrial fibrillation (HCC)  Assessment & Plan  - prev on Eliquis  - switched to Xarelto in acute care    Plan:  - continue Xarelto 20mg daily  - continue coreg    Clostridium difficile infection  Assessment & Plan  - pt with diarrhea in acute care  - Cdiff positive PCR  - started PO vanc in acute care    Plan:  - PO vanc course completed   - continue contact isolation precautions per [facility] guidelines, but pt now able to go to [facility] gym    Hypothyroidism  Assessment & Plan  - continue levothyroxine 25mcg daily    Meningioma (HCC)  Assessment & Plan  - [id number] meningioma along falx, no mass effect or vasogenic edema  - continue to monitor, outpatient follow up with neurology as needed.    GERD (gastroesophageal reflux disease)  Assessment & Plan  - continue protonix 40mg BID            Objective:    Allergies per EMR    Physical Exam:  Temp:  [97.5 °F (36.4  °C)-98.2 °F (36.8 °C)] 98.2 °F (36.8 °C)  HR:  [67-70] 67  Resp:  [18] 18  BP: (133-135)/(63-64) 135/64  Oxygen Therapy  SpO2: 97 %      Gen: No acute distress, Well-nourished, well-appearing.  HEENT: Moist mucus membranes  Cardiovascular: No LE edema  Pulmonary: Non-labored beathing  : No dexter  GI: non-distended.   MSK: ROM is full in all extremities. No effusions or deformities. Bulk is symmetric.   Integumentary: Skin is warm, dry. No rashes or ulcers.  Neuro: Alert and oriented, Speech is mildly dysarthric but organized, comprehension are intact. Mild R sided facial droop.  Appropriate to questioning.   Psych: Normal mood and affect.     Diagnostic Studies: reviewed, no new imaging  No results found.    Laboratory:    Results from last 7 days   Lab Units 08/26/24  0555 08/22/24  0657   HEMOGLOBIN g/dL 13.6 13.6   HEMATOCRIT % 42.2 42.3   WBC Thousand/uL 7.93 8.61     Results from last 7 days   Lab Units 08/26/24  0555 08/22/24  0657   BUN mg/dL 23 30*   POTASSIUM mmol/L 4.3 4.3   CHLORIDE mmol/L 103 105   CREATININE mg/dL 0.69 0.79            Functional Update:  Physical Therapy Occupational Therapy Speech Therapy   Weight Bearing Status: Full Weight Bearing  Transfers: Maximum Assistance, Assist of 2  Bed Mobility: Assist of 2  Amulation Distance (ft):  (not able to safely assess)  Wheelchair Mobility Distance:  (not able to assess)  Assistive Device for Stairs:  (not bale to safely assess)  Discharge Recommendations:  (TBD pending progress, reteam)   Eating: Total Assistance  Grooming: Total Assistance, Assist of 2  Bathing: Total Assistance, Assist of 2  Bathing: Total Assistance, Assist of 2  Upper Body Dressing: Maximum Assistance  Lower Body Dressing: Total Assistance, Assist of 2  Toileting: Total Assistance, Assist of 2  Tub/Shower Transfer: Total Assistance, Assist of 2  Toilet Transfer: Total Assistance, Assist of 2  Cognition: Exceptions to WNL (scored mild in ST cogntive assessment)  Orientation:  Person, Place, Situation   Mode of Communication: Verbal  Speech/Language: Dysarthia (mild dysarthria)  Cognition: Exceptions to WNL  Cognition: Decreased Memory, Decreased Executive Functions, Decreased Attention  Orientation: Person, Place, Time, Situation  Discharge Recommendations:  (pending patient progress)         Patient Active Problem List   Diagnosis    Acute CVA (cerebrovascular accident) (HCC)    Clostridium difficile infection    Paroxysmal atrial fibrillation (HCC)    HTN (hypertension)    DIANELYS (obstructive sleep apnea)    Hypothyroidism    GERD (gastroesophageal reflux disease)    Meningioma (HCC)         Medications  Current Facility-Administered Medications   Medication Dose Route Frequency Provider Last Rate    acetaminophen  650 mg Oral Q6H PRN WENCESLAO Patel      atorvastatin  40 mg Oral QPM WENCESLAO Patel      bisacodyl  10 mg Rectal Daily PRN Dougie King MD      carvedilol  25 mg Oral BID With Meals WENCESLAO Patel      Diclofenac Sodium  2 g Topical BID Dk De Dios MD      hydrALAZINE  75 mg Oral Q8H BARB WENCESLAO Patel      hydrocortisone   Topical BID WENCESLAO Patel      levothyroxine  25 mcg Oral Early Morning Lucila Schulz, WENCESLAO      lidocaine  1 patch Topical Daily Dk De Dios MD      losartan  100 mg Oral Daily WENCESLAO Patel      multivitamin stress formula  1 tablet Oral Daily WENCESLAO Patel      NIFEdipine  60 mg Oral Daily With Dinner WENCESLAO Patel      pantoprazole  40 mg Oral Early Morning WENCESLAO Patel      rivaroxaban  20 mg Oral Daily With Dinner WENCESLAO Patel      spironolactone  25 mg Oral Daily WENCESLAO Patel            Case discussed with attending Dr. Lanre King MD  Physical Medicine and Rehabilitation, PGY-2

## 2024-08-26 NOTE — PROGRESS NOTES
[facility] Occupational Therapy Daily Note  Patient Active Problem List   Diagnosis    Acute CVA (cerebrovascular accident) (HCC)    Clostridium difficile infection    Paroxysmal atrial fibrillation (HCC)    HTN (hypertension)    DIANELYS (obstructive sleep apnea)    Hypothyroidism    GERD (gastroesophageal reflux disease)    Meningioma (HCC)       Past Medical History:   Diagnosis Date    Atrial fibrillation (HCC)     Disease of thyroid gland     Hypertension      Occupational Therapy LTG's  Eating: Eating Goal: 06. Independent - Patient completes the activity by him/herself with no assistance from a helper. (L hand use.)  Oral Care: Oral Hygiene Goal: 05. Setup or clean-up assistance - [place] SETS UP or CLEANS UP, patient completes activity. [place] assists only prior to or following the activity.   Bathing: Shower/bathe self Goal: 03. Partial/moderate assistance - [place] does less than half the effort. [place] lifts or holds trunk or limbs and provides more than half the effort.  UB Dressing: Upper body dressing Goal: 04. TOUCHING/ STEADYING assistance as patient completes activity.  LB dressing:  Lower body dressing Goal: 03. Partial/moderate assistance - [place] does less than half the effort. [place] lifts or holds trunk or limbs and provides more than half the effort.  Footwear:  Putting on/taking off footwear Goal: 03. Partial/moderate assistance - [place] does less than half the effort. [place] lifts or holds trunk or limbs and provides more than half the effort.   Toileting: Toileting hygiene Goal: 03. Partial/moderate assistance - [place] does less than half the effort. [place] lifts or holds trunk or limbs and provides more than half the effort.  Toilet Transfer: Toilet transfer Goal: 03. Partial/moderate assistance - [place] does less than half the effort. [place] lifts or holds trunk or limbs and provides more than half the effort.  IADL's:    Medication management:      Discharge Plan:  Home with family/friends support   "24/7 support, vs SNF pending progress  DME:   Pt owns: NO DME  Recommended: TBD  Need to order: TBD  Ordered: TBD   08/26/24 1000   Pain Assessment   Pain Assessment Tool 0-10   Pain Score No Pain   Restrictions/Precautions   Precautions Bed/chair alarms;Fall Risk;Supervision on toilet/commode  (R UE shoudler precautions for hemiplegic shoudler, support or use sling)   Lifestyle   Autonomy \"this is so great.\"   Bed-Chair Transfer   Type of Assistance Needed Physical assistance   Physical Assistance Level Total assistance   Comment Ax2 present for slideboard to L . VALERIE, block R LE   Chair/Bed-to-Chair Transfer CARE Score 1   Toileting Hygiene   Type of Assistance Needed Physical assistance   Physical Assistance Level Total assistance   Comment Ax2 in stance at bed rail. pt dependent at this time   Toileting Hygiene CARE Score 1   Toilet Transfer   Type of Assistance Needed Physical assistance   Physical Assistance Level Total assistance   Comment Ax2 swap out at bed rail.   Toilet Transfer CARE Score 1   Neuromuscular Education   Functional Movement Patterns Initiated education on use of NMES with focus on neuroplastic principles. Pt agreeable at this time. In bed with shoulder supported NMES trialed for forearm extensors. Complete 2 lead channel, 10:10 upwards to 40Hz for muscular activation. Pt engages in visual feedback with opening hand. Progressed to cycling forearm extensors and forearm flexors with reciprocal setting, 10:10, 40 hz. Flexors requires some palpation/pressure to inc activation through adipose tissue. Completed 15 min cycling open close hand with PROM for facilitation of finger flexion. Still noted with stiff joints and limited PROM. NMES progressed to standing tasks at table top with focus on shoulder stabilizer on ant and post delt, co-contract with forearm extensors. Pt engages in standing session with PT counterpart focus piedad on shoudler shurgging in stance. Facilitation of  RUE weight bearing " in extension at table top. Progressed from added hand support to flat table top. 2 point of contact on elbow and hand. In stance pt engages in attempted shoulder flexion to hit table with her hand able to complete 2 times with great effort. NMES present for 20 min session. Tasks then progressed to use of mobile arm support. Gravity eliminated position allowed pt to complete forward thrust and extension. Cycled push pull 20 reps. Trialed biceps activation for hand to mouth, trace activation noted.   Comments Set pt up with guided imagery pod casts for increased neural recovery at times of rest. Set up 2 UE pod casts and 2 LB pod casts.   Splinting   Splinting Comments Dr De Dios present for session, educated potential need for resting hand splint at night. Will obtain orders.   Assessment   Treatment Assessment Pt participated in skilled OT treatment session with treatment focus on R attention, standing tolerance, standing balance, and UE NMR. Pt tolerated session well, with good participation. Today improved R shoulder AROM for elevation, abduction in gravity eliminated. Session still focusing on  R hand edema reduction tech with use of PROM, and NMES. Will cont to need to reduce hand swelling with cont positioning. Fluidizer pillow placed from tortious kit for R UE positioning and elevations. Cont to focus on proximal shoulder control in gravity eliminated positions, cont to facilitate distal control with NMES and PROM, and weight bearing. Will need to obtain resting hand splint for night time use for joint protection and PROM extension.   Prognosis Good   OT Therapy Minutes   OT Time In 1000   OT Time Out 1145   OT Total Time (minutes) 105   OT Mode of treatment - Individual (minutes) 30   OT Mode of treatment - Concurrent (minutes) 0   OT Mode of treatment - Group (minutes) 0   OT Mode of treatment - Co-treat (minutes) 75   OT Mode of Treatment - Total time(minutes) 105 minutes   OT Cumulative Minutes 550

## 2024-08-26 NOTE — ASSESSMENT & PLAN NOTE
Presented with right sided weakness, slurred speech, headache and vomiting  MRI = left pontine infarct  Neuro felt CVA was from atrial fibrillation in setting of non-compliance with anticoagulation  Currently now on Xarelto 20mg qd, Lipitor 40mg qd  Followup with Neuro as OP.  [facility] on the [facility] summary

## 2024-08-26 NOTE — PLAN OF CARE
Problem: PAIN - ADULT  Goal: Verbalizes/displays adequate comfort level or baseline comfort level  Description: Interventions:  - Encourage patient to monitor pain and request assistance  - Assess pain using appropriate pain scale  - Administer analgesics based on type and severity of pain and evaluate response  - Implement non-pharmacological measures as appropriate and evaluate response  - Consider cultural and social influences on pain and pain management  - Notify physician/advanced practitioner if interventions unsuccessful or patient reports new pain  Outcome: Progressing     Problem: INFECTION - ADULT  Goal: Absence or prevention of progression during hospitalization  Description: INTERVENTIONS:  - Assess and monitor for signs and symptoms of infection  - Monitor lab/diagnostic results  - Monitor all insertion sites, i.e. indwelling lines, tubes, and drains  - Monitor endotracheal if appropriate and nasal secretions for changes in amount and color  - [place] appropriate cooling/warming therapies per order  - Administer medications as ordered  - Instruct and encourage patient and family to use good hand hygiene technique  - Identify and instruct in appropriate isolation precautions for identified infection/condition  Outcome: Progressing     Problem: SAFETY ADULT  Goal: Patient will remain free of falls  Description: INTERVENTIONS:  - Educate patient/family on patient safety including physical limitations  - Instruct patient to call for assistance with activity   - Consult OT/PT to assist with strengthening/mobility   - Keep Call bell within reach  - Keep bed low and locked with side rails adjusted as appropriate  - Keep care items and personal belongings within reach  - Initiate and maintain comfort rounds  - Make Fall Risk Sign visible to staff  - Offer Toileting every 2 Hours, in advance of need  - Initiate/Maintain bed/chair alarm  - Obtain necessary fall risk management equipment: alarm  - Apply  yellow socks and bracelet for high fall risk patients  - Consider moving patient to room near nurses station  Outcome: Progressing

## 2024-08-26 NOTE — PROGRESS NOTES
[facility]  Progress Note  Name: Maye Lilly I  MRN: [id number]  Unit/Bed#: -01 I Date of Admission: 8/20/2024   Date of Service: 8/26/2024 I Hospital Day: 6    Assessment & Plan   * Acute CVA (cerebrovascular accident) (HCC)  Assessment & Plan  Presented with right sided weakness, slurred speech, headache and vomiting  MRI = left pontine infarct  Neuro felt CVA was from atrial fibrillation in setting of non-compliance with anticoagulation  Currently now on Xarelto 20mg qd, Lipitor 40mg qd  Followup with Neuro as OP.  Stevo Baires from [facility] from that group was listed on the DC summary    Clostridium difficile infection  Assessment & Plan  New during hospital stay  Says no BMs so far today 8/21/24  Continue Vancomycin 125mg QID.  LD was 8/26/24 at 0900  Diarrhea has resolved    Paroxysmal atrial fibrillation (HCC)  Assessment & Plan  Was seeing cardiologist in [place] and taking Xarelto but she has not seen him in 2 yrs and stopped all her meds >1 yr ago  EKG in the hospital showed NSR  Needs to followup as OP with Cardiology = Dr Beltre  Continue Coreg  Was not placed back on Flecainide  Now on Xarelto   RRR by exam today 8/26/24  Sent price check to her pharmacy on 8/21/24 for the Xarelto and is $661.99  Sent price check on Eliquis but doubtful that will be much better.  If not, she will possibly need to be converted to Coumadin with a therapeutic Lovenox bridge.  Coupon application for Xarelto to be completed. Determination of coverage should take 24 hours.    HTN (hypertension)  Assessment & Plan  Home:  no meds  Here:  Coreg 25mg BID/Hydralazine 75 mg q8hrs/Nifedipine 60mg daily at dinner time/Aldactone 25mg daily/Losartan 100mg qd  Stable; no changes today  Followup with IM as OP.  On DC summary was listed to see Megan DORSEY from that group    DIANELYS (obstructive sleep apnea)  Assessment & Plan  In past was on CPAP  Can d/w PCP when  discharged    Hypothyroidism  Assessment & Plan  Continue Levothyroxine 25 mcg qd  This was the dose that she was supposed to be taking at home and they placed her back on it in the hospital on 8/12/24 but there was no TSH drawn.  Had an abnormal TSH of 5.1 on 4/23/20 presumably not on Levothyroxine at that time since thereafter on 2/15/22 was normal  TSH 8/22/24 normal at 3.95  Will continue current dose and have her get a TSH in 6 weeks = @9/23    GERD (gastroesophageal reflux disease)  Assessment & Plan  Continue Protonix    Meningioma (HCC)  Assessment & Plan  Incidental finding   OP followup           The above assessment and plan was reviewed and updated as determined by my evaluation of the patient on 8/26/2024.    Labs:   Results from last 7 days   Lab Units 08/26/24  0555 08/22/24  0657   WBC Thousand/uL 7.93 8.61   HEMOGLOBIN g/dL 13.6 13.6   HEMATOCRIT % 42.2 42.3   PLATELETS Thousands/uL 293 297     Results from last 7 days   Lab Units 08/26/24  0555 08/22/24  0657   SODIUM mmol/L 140 139   POTASSIUM mmol/L 4.3 4.3   CHLORIDE mmol/L 103 105   CO2 mmol/L 27 26   BUN mg/dL 23 30*   CREATININE mg/dL 0.69 0.79   CALCIUM mg/dL 10.0 9.4                   Imaging  No orders to display       Review of Scheduled Meds:  Current Facility-Administered Medications   Medication Dose Route Frequency Provider Last Rate    acetaminophen  650 mg Oral Q6H PRN WENCESLAO Patel      atorvastatin  40 mg Oral QPM WENCESLAO Patel      bisacodyl  10 mg Rectal Daily PRN Dougie King MD      carvedilol  25 mg Oral BID With Meals WENCESLAO Patel      Diclofenac Sodium  2 g Topical BID Dk De Dios MD      hydrALAZINE  75 mg Oral Q8H BARB WENCESLAO Patel      hydrocortisone   Topical BID WENCESLAO Patel      levothyroxine  25 mcg Oral Early Morning WENCESLAO Patel      lidocaine  1 patch Topical Daily Dk De Dios MD      losartan  100 mg Oral Daily  WENCESLAO Patel      multivitamin stress formula  1 tablet Oral Daily WENCESLAO Patel      NIFEdipine  60 mg Oral Daily With Dinner WENCESLAO Patel      pantoprazole  40 mg Oral Early Morning WENCESLAO Patel      rivaroxaban  20 mg Oral Daily With Dinner WENCESLAO Patel      spironolactone  25 mg Oral Daily WENCESLAO Patel         Subjective/ HPI: Patient seen and examined. Patients overnight issues or events were reviewed with nursing staff. New or overnight issues include the following:     No new or overnight issues.  Offers no complaints    ROS:   A 10 point ROS was performed; negative except as noted above.        *Labs /Radiology studies Reviewed  *Medications  reviewed and reconciled as needed  *Please refer to order section for additional ordered labs studies      Physical Examination:  Vitals:   Vitals:    08/26/24 0405 08/26/24 0503 08/26/24 1416 08/26/24 1423   BP:  135/64 127/58 125/59   BP Location:  Left arm  Left arm   Pulse:  67  64   Resp:  18  19   Temp:  98.2 °F (36.8 °C)  98 °F (36.7 °C)   TempSrc:  Oral  Oral   SpO2: 96% 97%  91%   Weight:       Height:           General Appearance: no distress, non toxic appearing  HEENT: PERRLA, conjuctiva normal; oropharynx clear; mucous membranes moist   Neck:  Supple, normal ROM  Lungs: CTA, normal respiratory effort, no retractions, expiratory effort normal  CV: regular rate and rhythm; no rubs/murmurs/gallops, PMI normal   ABD: soft; ND/NT; +BS  EXT: no edema  Skin: normal turgor, normal texture  Psych: affect normal, mood normal  Neuro: AAO           The above physical exam was reviewed and updated as determined by my evaluation of the patient on 8/26/2024.    Invasive Devices       None                      VTE Pharmacologic Prophylaxis: Xarelto  Code Status: Level 1 - Full Code  Current Length of Stay: 6 day(s)    Total floor / unit time spent today 30 minutes  Coordination of  patient's care was performed in conjunction with consulting services. Time invested included review of patient's labs, vitals, and management of their comorbidities with continued monitoring, examination of patient as well as answering patient questions, documenting her findings and creating progress note in electronic medical record,  ordering appropriate diagnostic testing.       ** Please Note:  voice to text software may have been used in the creation of this document. Although proof errors in transcription or interpretation are a potential of such software**

## 2024-08-26 NOTE — PROGRESS NOTES
08/26/24 1030   Pain Assessment   Pain Assessment Tool 0-10   Pain Score No Pain   Restrictions/Precautions   Precautions Bed/chair alarms;Cognitive;Fall Risk;Contact/isolation;Pain;Supervision on toilet/commode   Weight Bearing Restrictions No   ROM Restrictions No   Braces or Orthoses Other (Comment)  (AFO used this session +sneakers for all functional transfers; sling for transfers)   Cognition   Overall Cognitive Status WFL   Arousal/Participation Alert;Cooperative   Attention Within functional limits   Orientation Level Oriented X4   Memory Within functional limits   Following Commands Follows all commands and directions without difficulty   Lying to Sitting on Side of Bed   Type of Assistance Needed Physical assistance;Verbal cues;Adaptive equipment   Physical Assistance Level 51%-75%   Comment A to RLE and at trunk   Lying to Sitting on Side of Bed CARE Score 2   Sit to Stand   Type of Assistance Needed Physical assistance;Verbal cues   Physical Assistance Level 26%-50%   Comment overall MODA   Sit to Stand CARE Score 3   Bed-Chair Transfer   Type of Assistance Needed Physical assistance;Verbal cues;Adaptive equipment   Physical Assistance Level Total assistance   Comment MIN/MODA with SB, second person A with RLE to L.   Chair/Bed-to-Chair Transfer CARE Score 1   Transfer Bed/Chair/Wheelchair   Adaptive Equipment Transfer Board   Car Transfer   Reason if not Attempted Safety concerns   Car Transfer CARE Score 88   Walk 10 Feet   Reason if not Attempted Safety concerns   Walk 10 Feet CARE Score 88   Ambulation   Primary Mode of Locomotion Prior to Admission Walk   Wheel 50 Feet with Two Turns   Type of Assistance Needed Verbal cues;Supervision   Wheel 50 Feet with Two Turns CARE Score 4   Wheelchair mobility   Does the patient use a wheelchair? 1. Yes   Type of Wheelchair Used 1. Manual   Method Left upper extremity;Left lower extremity   Assistance Provided For Locking Brakes;Obstacles;Remove Leg  Rest;Replace Leg Rest;Replace armrests;Remove armrests   Distance Level Surface (feet) 140 ft   Toilet Transfer   Type of Assistance Needed Physical assistance;Verbal cues   Physical Assistance Level Total assistance   Comment commode swap from w/c to C with modA x1, CGA for sit to stand, [place] for standing with 2nd person swapping commode; remianed standing for hygiene and CM   Toilet Transfer CARE Score 1   Toilet Transfer   Surface Assessed Bedside Commode   Therapeutic Interventions   Neuromuscular Re-Education standing tolerance at high/low table with LUE WB x8.5 min and x6 min 40 sec. While standing pt worked on weight shifting, unilateral support and LLE marching 3x5 reps. While standing OT A to RUE(see note for more detail).   Assessment   Treatment Assessment Pt participated in PT/OT co-treat. Pt noted improved standing balance as well as functional transfers this session. Pt's standing tolerance is slowly improving and now that we have clearance to leave her room we can begin attempting to amb in // bars. Pt will trail standing and amb next session. Plan is to see her capabilities and possibly attempt BWSS next session. Pt will cont POC as tolerated with cont focus on act tolerance, increased WB to RLE, trial gait as able, increased functional transfers and RLE.   Problem List Decreased strength;Decreased range of motion;Decreased endurance;Impaired balance;Decreased mobility;Decreased coordination   Barriers to Discharge Inaccessible home environment;Decreased caregiver support   PT Barriers   Functional Limitation Car transfers;Stair negotiation;Standing;Transfers;Walking;Wheelchair management   Plan   Treatment/Interventions Functional transfer training;LE strengthening/ROM;Therapeutic exercise;Endurance training;Patient/family training;Bed mobility;Gait training   Progress Progressing toward goals   PT Therapy Minutes   PT Time In 1030   PT Time Out 1145   PT Total Time (minutes) 75   PT Mode of  treatment - Individual (minutes) 0   PT Mode of treatment - Concurrent (minutes) 0   PT Mode of treatment - Group (minutes) 0   PT Mode of treatment - Co-treat (minutes) 75   PT Mode of Treatment - Total time(minutes) 75 minutes   PT Cumulative Minutes 525   Therapy Time missed   Time missed? No

## 2024-08-26 NOTE — ASSESSMENT & PLAN NOTE
New during hospital stay  Says no BMs so far today 8/21/24  Continue Vancomycin 125mg QID.  LD was 8/26/24 at 0900  Diarrhea has resolved

## 2024-08-26 NOTE — ASSESSMENT & PLAN NOTE
Was seeing cardiologist in [place] and taking Xarelto but she has not seen him in 2 yrs and stopped all her meds >1 yr ago  EKG in the hospital showed NSR  Needs to followup as OP with Cardiology = Dr Beltre  Continue Coreg  Was not placed back on Flecainide  Now on Xarelto   RRR by exam today 8/26/24  Sent price check to her pharmacy on 8/21/24 for the Xarelto and is $661.99  Sent price check on Eliquis but doubtful that will be much better.  If not, she will possibly need to be converted to Coumadin with a therapeutic Lovenox bridge.  Coupon application for Xarelto to be completed. Determination of coverage should take 24 hours.

## 2024-08-27 PROCEDURE — 94660 CPAP INITIATION&MGMT: CPT

## 2024-08-27 PROCEDURE — 94760 N-INVAS EAR/PLS OXIMETRY 1: CPT

## 2024-08-27 PROCEDURE — 97112 NEUROMUSCULAR REEDUCATION: CPT

## 2024-08-27 PROCEDURE — 97130 THER IVNTJ EA ADDL 15 MIN: CPT

## 2024-08-27 PROCEDURE — 99232 SBSQ HOSP IP/OBS MODERATE 35: CPT | Performed by: PHYSICAL MEDICINE & REHABILITATION

## 2024-08-27 PROCEDURE — 97530 THERAPEUTIC ACTIVITIES: CPT

## 2024-08-27 PROCEDURE — 97129 THER IVNTJ 1ST 15 MIN: CPT

## 2024-08-27 PROCEDURE — 99232 SBSQ HOSP IP/OBS MODERATE 35: CPT | Performed by: NURSE PRACTITIONER

## 2024-08-27 RX ADMIN — LEVOTHYROXINE SODIUM 25 MCG: 25 TABLET ORAL at 06:01

## 2024-08-27 RX ADMIN — HYDRALAZINE HYDROCHLORIDE 75 MG: 50 TABLET ORAL at 06:01

## 2024-08-27 RX ADMIN — SPIRONOLACTONE 25 MG: 25 TABLET, FILM COATED ORAL at 07:49

## 2024-08-27 RX ADMIN — HYDRALAZINE HYDROCHLORIDE 75 MG: 50 TABLET ORAL at 21:45

## 2024-08-27 RX ADMIN — CARVEDILOL 25 MG: 25 TABLET, FILM COATED ORAL at 07:48

## 2024-08-27 RX ADMIN — NIFEDIPINE 60 MG: 30 TABLET, FILM COATED, EXTENDED RELEASE ORAL at 16:23

## 2024-08-27 RX ADMIN — B-COMPLEX W/ C & FOLIC ACID TAB 1 TABLET: TAB at 07:49

## 2024-08-27 RX ADMIN — HYDROCORTISONE: 1 CREAM TOPICAL at 07:51

## 2024-08-27 RX ADMIN — ACETAMINOPHEN 650 MG: 325 TABLET ORAL at 11:51

## 2024-08-27 RX ADMIN — ATORVASTATIN CALCIUM 40 MG: 40 TABLET, FILM COATED ORAL at 17:16

## 2024-08-27 RX ADMIN — Medication 2 G: at 07:51

## 2024-08-27 RX ADMIN — HYDRALAZINE HYDROCHLORIDE 75 MG: 50 TABLET ORAL at 14:47

## 2024-08-27 RX ADMIN — HYDROCORTISONE: 1 CREAM TOPICAL at 17:17

## 2024-08-27 RX ADMIN — CARVEDILOL 25 MG: 25 TABLET, FILM COATED ORAL at 16:23

## 2024-08-27 RX ADMIN — LIDOCAINE 1 PATCH: 700 PATCH TOPICAL at 16:23

## 2024-08-27 RX ADMIN — PANTOPRAZOLE SODIUM 40 MG: 20 TABLET, DELAYED RELEASE ORAL at 06:01

## 2024-08-27 RX ADMIN — LOSARTAN POTASSIUM 100 MG: 50 TABLET, FILM COATED ORAL at 07:50

## 2024-08-27 RX ADMIN — RIVAROXABAN 20 MG: 20 TABLET, FILM COATED ORAL at 16:23

## 2024-08-27 NOTE — PROGRESS NOTES
"OT daily treatment note       08/27/24 1000   Pain Assessment   Pain Assessment Tool 0-10   Pain Score No Pain   Restrictions/Precautions   Precautions Bed/chair alarms;Cognitive;Fall Risk;Supervision on toilet/commode   Lifestyle   Autonomy \"This was a really good session\"   Eating   Type of Assistance Needed Set-up / clean-up   Physical Assistance Level No physical assistance   Eating CARE Score 5   Putting On/Taking Off Footwear   Type of Assistance Needed Physical assistance   Physical Assistance Level Total assistance   Comment socks, sneakers and R AFO. switched out AFO to wider medium sized one to allow for more comfort around the calf as original one was cutting in.   Putting On/Taking Off Footwear CARE Score 1   Lying to Sitting on Side of Bed   Type of Assistance Needed Physical assistance   Physical Assistance Level 51%-75%   Comment A for RLE mgmt with HOB slightly elevated   Lying to Sitting on Side of Bed CARE Score 2   Sit to Stand   Type of Assistance Needed Physical assistance   Physical Assistance Level 26%-50%   Comment min/mod Ax1 with R HHA, R knee block. no knee buckling noted.   Sit to Stand CARE Score 3   Bed-Chair Transfer   Type of Assistance Needed Physical assistance   Physical Assistance Level Total assistance   Comment Min A x1, CGA of 2nd SBT to the LEFT. at end of session completed swap out from WC > recliner via pull to stand at bedrail with Min Ax1 and 2nd person managing equipment.   Chair/Bed-to-Chair Transfer CARE Score 1   Neuromuscular Education   RUE Weight Bearing Extended arm standing   Response to Weight Bearing Technique pt engaged in standing activity at tabletop focusing on RUE WBing with elbow extended. Pt fearful of putting weight through R side of body but even with minimal weight shifting, G WBing was noted. Later in session, pt had NMES saebo GO attached on R triceps to further engage in elbow extension with better outcomes as pt was heavily WBing through the RUE. " on the 10 second down time, OT did have to provide mod A for elbow stabilization. this activity focused on NMR and NPP to increase pts UE ROM to improve IND with ADL tasks. task also foucsed on standing tolerance, standing balance with unilateral release and endurance training.   Comments pt s/u with saebo go on R wrist extendors and focused on repetitive wrist extension with guided imagery to help control wrist flexion when the rest break begins. completed ~50 reps focusing on the NPP of repetition.   Splinting   Splinting Comments (S)  order placed for RHS but Medium R RHS was not available. Need to f/u with restorative tech to obtain splint.   Cognition   Overall Cognitive Status WFL   Arousal/Participation Alert;Cooperative   Attention Within functional limits   Orientation Level Oriented X4   Memory Within functional limits   Following Commands Follows one step commands without difficulty   Additional Activities   Additional Activities Comments switched WC from 20x16 to 22 x 18 for better fit. pt appreciative of this and feels more comfortable with positioning.   Activity Tolerance   Activity Tolerance Patient tolerated treatment well   Assessment   Treatment Assessment Pt participated in skilled OT session with focus on functional transfer training, UE strengthening/ROM, and neuro re-ed. See flowsheet for details of session and current functional status. Pt is limited by weakness, decreased ROM, impaired balance, decreased endurance, increased fall risk, decreased ADLS, decreased IADLS, decreased activity tolerance, and decreased strength and requires skilled OT services to increase independence and safety with ADL completion in prep for DC home. Plan to continue ADL retraining, functional transfer training, UE strengthening/ROM, endurance training, Pt/caregiver education, equipment evaluation/education, neuro re-ed, fine motor coordination activities, compensatory technique education, continued education,  energy conservation, and activity engagement  to address barriers mentioned above.   Prognosis Good   Problem List Decreased strength;Decreased range of motion;Decreased endurance;Impaired balance;Decreased mobility;Decreased coordination   Barriers to Discharge Inaccessible home environment;Decreased caregiver support   Plan   Treatment/Interventions ADL retraining;Functional transfer training;Therapeutic exercise;Endurance training;Patient/family training;Compensatory technique education   Progress Progressing toward goals   OT Therapy Minutes   OT Time In 1000   OT Time Out 1130   OT Total Time (minutes) 90   OT Mode of treatment - Individual (minutes) 90   OT Mode of treatment - Concurrent (minutes) 0   OT Mode of treatment - Group (minutes) 0   OT Mode of treatment - Co-treat (minutes) 0   OT Mode of Treatment - Total time(minutes) 90 minutes   OT Cumulative Minutes 640   Therapy Time missed   Time missed? No

## 2024-08-27 NOTE — PROGRESS NOTES
Physical Medicine and Rehabilitation Progress Note  Maye Lilly 64 y.o. female MRN: [id number]  Unit/Bed#: -01 Encounter: [id number]    To Review:   Maye Lilly is a 64 y.o. female with PMH of afib (prev on Eliquis, now on Xarelto), HTN, hypothyroidism, DIANELYS, morbid obesity, GERD who presented to the [facility] with sudden onset R sided weakness with tingling down RUE. She also had slurred speech and a sudden headache and emesis. Denied chest pain, SOB, abd pain, visual deficits on admission. CBC, CMP, trop, urinalysis, tox screen negative. CTA of head and neck negative for LVO. CT C/A/P with  no acute abnormalities except mild sigmoid diverticulitis and thyromegaly. CTH wthout acute infarction or hemorrhage, though calcified meningioma noted along the falx without mass effect. MRI brain w/o contrast showed L pontine infarct. CT of thoracolumbar area without acute fx or dislocations.  Pt with significantly elevated BP on admission (230-240 systolic). No TNK given out of window and no obvious infarct + already on anticoagulation. She was continued on xarelto.  Hospital course complicate by diarrhea, pt found to be C Diff positive, started on vancomycin.    Chief Complaint: R-hemibody paresis    Interval History/Subjective:  Pt endorses stable/mildly improving paresis of RUE and RLE. She notes she is eating, sleeping, passing bowel/bladder movements without issue. She notes therapy is progressing well. Denies worsening weakness, loss of sensation, headache, bowel/bladder incontinence, choking/coughing with PO intake. Denies diarrhea at this time, PO vanc course completed.    ROS:  As noted in subjective above    Today's Changes:  No changes.    Assessment/Plan:    * Acute CVA (cerebrovascular accident) (HCC)  Assessment & Plan  - admitted to acute care with slurred speech, headache, tingling in R arm  - 0/5 RUE strength, 1-2/5 in RLE  - CTH with meningioma along falx, but no  infarct/hemorrhage. CTA with no LVO. MRI with L pontine infarct.  - Did not receive tPA/TNKase, did not undergo thrombectomy  - echo with LVH, normal EF, mild diastolic dysfunction    Plan:  - fall precautions  - Monitor for neurogenic bowel and bladder  - Promote regular sleep/wake cycles and proper sleep hygiene  - Monitor closely for hemiparetic shoulder pain and post-stroke pain  -  Neutral resting wrist splint for the RUE and bilateral multipodus boots to be worn at night and while in bed during the day to prevent developing spasticity and contracture  - Maintain adequate nutrition  - Avoid sedating and anticholinergic medications as possible  - Maintain normotension, s/p permissive hypertension  - Continue Xarelto 20mg daily  - Continue Atorvastatin 40 mg qHs for secondary prevention  - multipodus boots for RLE        HTN (hypertension)  Assessment & Plan  - pt reportedly nonadherent to HTN med regimen due to financial constraints  - management per IM  - continue coreg 25mg BID, nifedipine 60mg daily, aldactone 25mg daily, valsartan 160 daily,     Paroxysmal atrial fibrillation (HCC)  Assessment & Plan  - prev on Eliquis  - switched to Xarelto in acute care    Plan:  - continue Xarelto 20mg daily  - continue coreg    Clostridium difficile infection  Assessment & Plan  - pt with diarrhea in acute care  - Cdiff positive PCR  - started PO vanc in acute care    Plan:  - PO vanc course completed   - continue contact isolation precautions per [facility] guidelines, but pt now able to go to [facility] gym    Hypothyroidism  Assessment & Plan  - continue levothyroxine 25mcg daily    Meningioma (HCC)  Assessment & Plan  - [id number] meningioma along falx, no mass effect or vasogenic edema  - continue to monitor, outpatient follow up with neurology as needed.    GERD (gastroesophageal reflux disease)  Assessment & Plan  - continue protonix 40mg BID            Objective:    Allergies per EMR    Physical Exam:  Temp:  [97.9 °F (36.6  °C)-98 °F (36.7 °C)] 97.9 °F (36.6 °C)  HR:  [61-74] 74  Resp:  [18-19] 18  BP: (105-144)/(51-67) 144/67  Oxygen Therapy  SpO2: 94 %      Gen: No acute distress, Well-nourished, well-appearing.  HEENT: Moist mucus membranes  Cardiovascular: No LE edema  Pulmonary: Non-labored beathing  : No dexter  GI: non-distended.   MSK: ROM is full in all extremities. No effusions or deformities. Bulk is symmetric.   Integumentary: Skin is warm, dry. No rashes or ulcers.  Neuro: Alert and oriented, Speech is mildly dysarthric but organized, comprehension are intact. Mild R sided facial droop.  Appropriate to questioning.   Psych: Normal mood and affect.     Diagnostic Studies: reviewed, no new imaging  No results found.    Laboratory:    Results from last 7 days   Lab Units 08/26/24  0555 08/22/24  0657   HEMOGLOBIN g/dL 13.6 13.6   HEMATOCRIT % 42.2 42.3   WBC Thousand/uL 7.93 8.61     Results from last 7 days   Lab Units 08/26/24  0555 08/22/24  0657   BUN mg/dL 23 30*   POTASSIUM mmol/L 4.3 4.3   CHLORIDE mmol/L 103 105   CREATININE mg/dL 0.69 0.79            Functional Update:  Physical Therapy Occupational Therapy Speech Therapy   Weight Bearing Status: Full Weight Bearing  Transfers: Maximum Assistance, Assist of 2  Bed Mobility: Assist of 2  Amulation Distance (ft):  (not able to safely assess)  Wheelchair Mobility Distance:  (not able to assess)  Assistive Device for Stairs:  (not bale to safely assess)  Discharge Recommendations:  (TBD pending progress, reteam)   Eating: Set up  Grooming: Total Assistance, Assist of 2  Bathing: Total Assistance, Assist of 2  Bathing: Total Assistance, Assist of 2  Upper Body Dressing: Maximum Assistance  Lower Body Dressing: Total Assistance, Assist of 2  Toileting: Total Assistance, Assist of 2  Tub/Shower Transfer: Total Assistance, Assist of 2  Toilet Transfer: Total Assistance, Assist of 2  Cognition: Exceptions to WNL (scored mild in ST cogntive assessment)  Orientation: Person,  Place, Situation   Mode of Communication: Verbal  Speech/Language: Dysarthia (mild dysarthria)  Cognition: Exceptions to WNL  Cognition: Decreased Memory, Decreased Executive Functions, Decreased Attention  Orientation: Person, Place, Time, Situation  Discharge Recommendations:  (pending patient progress)         Patient Active Problem List   Diagnosis    Acute CVA (cerebrovascular accident) (HCC)    Clostridium difficile infection    Paroxysmal atrial fibrillation (HCC)    HTN (hypertension)    DIANELYS (obstructive sleep apnea)    Hypothyroidism    GERD (gastroesophageal reflux disease)    Meningioma (HCC)         Medications  Current Facility-Administered Medications   Medication Dose Route Frequency Provider Last Rate    acetaminophen  650 mg Oral Q6H PRN WENCESLAO Patel      atorvastatin  40 mg Oral QPM WENCESLAO Patel      bisacodyl  10 mg Rectal Daily PRN Dougie King MD      carvedilol  25 mg Oral BID With Meals WENCESLAO Patel      Diclofenac Sodium  2 g Topical BID Dk De Dios MD      hydrALAZINE  75 mg Oral Q8H BARB WENCESLAO Patel      hydrocortisone   Topical BID WENCESLAO Patel      levothyroxine  25 mcg Oral Early Morning Lucila Schulz, WENCESLAO      lidocaine  1 patch Topical Daily Dk De Dios MD      losartan  100 mg Oral Daily WENCESLAO Patel      multivitamin stress formula  1 tablet Oral Daily WENCESLAO Patel      NIFEdipine  60 mg Oral Daily With Dinner WENCESLAO Patel      pantoprazole  40 mg Oral Early Morning WENCESLAO Patel      rivaroxaban  20 mg Oral Daily With Dinner WENCESLAO Patel      spironolactone  25 mg Oral Daily WENCESLAO Patel            Case discussed with attending Dr. Lanre King MD  Physical Medicine and Rehabilitation, PGY-2

## 2024-08-27 NOTE — ASSESSMENT & PLAN NOTE
New during hospital stay  Says no BMs so far today 8/21/24  Continue Vancomycin 125mg QID.  LD was 8/26/24 at 0900  Diarrhea has resolved and stools are formed

## 2024-08-27 NOTE — HOSPITAL COURSE
Subjective/HPI:     Maye Lilly is a 64 year old patient with history of HTN, PAF, hypothyroidism, GERD and DIANELYS who presented with right sided weakness, slurred speech, headache and vomiting.CT head and CTA had no acute findings.  MRI revealed left pontine infarct.  She is supposed to be taking medications for her HTN and Eliquis as an anticoagulant for her PAF but she stopped taking her medications well over a year ago due to insurance loss.  Neurology saw in consult.  They felt the CVA occurred in the setting of PAF and medication noncompliance.  For her HTN, she was placed on Valsartan 160 mg daily, Coreg 25mg BID, Hydralazine 75 mg q 8 hours, Aldactone 25mg daily and Procardia 60mg daily at dinnertime.  ECHO showed a LVEF of 65% with mild diastolic dysfunction.  During the course of her admission, she developed diarrhea and tested positive for Cdiff.  She is s/p PO Vancomycin for treatment

## 2024-08-27 NOTE — PROGRESS NOTES
08/27/24 1230   Pain Assessment   Pain Assessment Tool 0-10   Pain Score No Pain   Restrictions/Precautions   Precautions Bed/chair alarms;Cognitive;Fall Risk;Supervision on toilet/commode   Weight Bearing Restrictions No   ROM Restrictions No   Braces or Orthoses Other (Comment)   General   Change In Medical/Functional Status AFO and sneakers for all transfes, RUE sling for transfers   Cognition   Overall Cognitive Status WFL   Arousal/Participation Alert;Cooperative   Attention Within functional limits   Orientation Level Oriented X4   Memory Within functional limits   Following Commands Follows one step commands without difficulty   Sit to Stand   Type of Assistance Needed Physical assistance   Physical Assistance Level 26%-50%   Comment Min/ModAx1 with L hemiwalker, VCs to push up from handrail; initially blocked R knee but no knee buckilng noted   Sit to Stand CARE Score 3   Bed-Chair Transfer   Type of Assistance Needed Physical assistance   Physical Assistance Level Total assistance   Comment Trialed SPT with no AD (therapist in front of pt, blocking R knee but no knee buckling noted) and SPT with hemiwalker. Requires ModA/MaxAx1 with second person providing Haile/CGA. Requiring Mod to Max A for RLE placement when turning, max VCs for sequencing of hemiwalker.   Chair/Bed-to-Chair Transfer CARE Score 1   Walk 10 Feet   Type of Assistance Needed Physical assistance   Physical Assistance Level Total assistance   Comment MaxA for advancing RLE with R AFO and black theraband flexion assist wrap and to prevent scissoring, WC follow for safety as patient fatigues quickly   Walk 10 Feet CARE Score 1   Walk 50 Feet with Two Turns   Reason if not Attempted Safety concerns   Walk 50 Feet with Two Turns CARE Score 88   Walk 150 Feet   Reason if not Attempted Safety concerns   Walk 150 Feet CARE Score 88   Walking 10 Feet on Uneven Surfaces   Reason if not Attempted Safety concerns   Walking 10 Feet on Uneven Surfaces  CARE Score 88   Ambulation   Primary Mode of Locomotion Prior to Admission Walk   Distance Walked (feet) 20 ft  (15 ft)   Assist Device Ismael Walker   Gait Pattern Ataxic;Inconsistant Jasmyne;Slow Jasmyne;Decreased foot clearance;R foot drag;R hemiparesis;R knee jonathan;Step to;Step through;Scissoring;Decreased R stance;Improper weight shift   Limitations Noted In Balance;Device Management;Endurance;Heel Strike;Posture;Safety;Sequencing;Speed;Strength;Swing   Provided Assistance with: Balance;Direction;Limb Advancement;Limb Stabilization   Walk Assist Level Maximum Assist;Chair Follow   Findings Initially started with ambulation along L handrail in hallway but progressed to ambulation with L hemiwalker. Along L handrail, patient ambulated 2x 20 ft Used R AFO and black theraband wrap for flexion assist of RLE. Attempted to create more tension on lateral aspect of wrap to pull leg into more abduction/to help minimize scissoring. Also trialed use of kota AFO vs plastic AFO. No major difference noted. Patient reports that Kota was too large for her shoe.   Does the patient walk? 2. Yes   Wheel 150 Feet   Reason if not Attempted Safety concerns   Wheel 150 Feet CARE Score 88   Curb or Single Stair   Reason if not Attempted Safety concerns   1 Step (Curb) CARE Score 88   4 Steps   Reason if not Attempted Safety concerns   4 Steps CARE Score 88   Picking Up Object   Reason if not Attempted Safety concerns   Picking Up Object CARE Score 88   Toilet Transfer   Type of Assistance Needed Physical assistance   Physical Assistance Level Total assistance   Comment Swap out technique at bed rail; Haile to stand from recliner at bed rail with second person swapping out for commode   Toilet Transfer CARE Score 1   Therapeutic Interventions   Neuromuscular Re-Education At bottom of  steps, had pt try to place RLE on 4 inch step. Pt required MaxA to place RLE on and off of step x5 trials   Other Multiple bouts of standing while  clothing management and tolieting hygiene were being completed. Initially blocked R knee in standing but not blucking noted.   Assessment   Treatment Assessment Patient participated in 90 minute skilled physical therapy session focusing on transfers and progression of gait training as tolerated. Pt was able to progress to ambulating with hemiwalker. Continues to require assistane of advancement of RLE and WC follow for safety. Pt fatigued quickly and requires frequent rest breaks. Assessed stand pivot transfers with no AD and SPT with hemiwalker as goal is to transition off the slide board with other staff, but it is not appropriate. PT to continue to work on SPT transfers. PT was left up in recliner with all needs within reach, call bell within reach and bed/chair alarm on. Plan is for patient to return to bed after PT session at 1500.   Problem List Decreased strength;Decreased range of motion;Decreased endurance;Impaired balance;Decreased mobility;Decreased coordination   Barriers to Discharge Inaccessible home environment;Decreased caregiver support   PT Barriers   Functional Limitation Car transfers;Stair negotiation;Standing;Transfers;Walking;Wheelchair management   Plan   Treatment/Interventions Functional transfer training;LE strengthening/ROM;Therapeutic exercise;Endurance training;Patient/family training;Equipment eval/education;Bed mobility;Gait training   Progress Progressing toward goals   Discharge Recommendation   Rehab Resource Intensity Level, PT   (TBD pending progress)   Equipment Recommended   (TBD pending progress)   PT Therapy Minutes   PT Time In 1230   PT Time Out 1400   PT Total Time (minutes) 90   PT Mode of treatment - Individual (minutes) 90   PT Mode of treatment - Concurrent (minutes) 0   PT Mode of treatment - Group (minutes) 0   PT Mode of treatment - Co-treat (minutes) 0   PT Mode of Treatment - Total time(minutes) 90 minutes   PT Cumulative Minutes 660   Therapy Time missed   Time  missed? No   Mirta Mccord, PT, DPT

## 2024-08-27 NOTE — ASSESSMENT & PLAN NOTE
Home:  no meds  Here:  Coreg 25mg BID/Hydralazine 75 mg q8hrs/Nifedipine 60mg daily at dinner time/Aldactone 25mg daily/Losartan 100mg qd  Stable; no changes today 8/27/24  Followup with IM as OP.  On DC summary was listed to see Megan DORSEY from that group

## 2024-08-27 NOTE — PROGRESS NOTES
[facility]  Progress Note  Name: Maye Lilly I  MRN: [id number]  Unit/Bed#: -01 I Date of Admission: 8/20/2024   Date of Service: 8/27/2024 I Hospital Day: 7    Assessment & Plan   * Acute CVA (cerebrovascular accident) (HCC)  Assessment & Plan  Presented with right sided weakness, slurred speech, headache and vomiting  MRI = left pontine infarct  Neuro felt CVA was from atrial fibrillation in setting of non-compliance with anticoagulation  Currently now on Xarelto 20mg qd, Lipitor 40mg qd  Followup with Neuro as OP.  Stevo Baires from [facility] from that group was listed on the DC summary    Clostridium difficile infection  Assessment & Plan  New during hospital stay  Says no BMs so far today 8/21/24  Continue Vancomycin 125mg QID.  LD was 8/26/24 at 0900  Diarrhea has resolved and stools are formed    Paroxysmal atrial fibrillation (HCC)  Assessment & Plan  Was seeing cardiologist in [place] and taking Xarelto but she has not seen him in 2 yrs and stopped all her meds >1 yr ago  EKG in the hospital showed NSR  Needs to followup as OP with Cardiology = Dr Beltre  Continue Coreg  Was not placed back on Flecainide  Now on Xarelto   RRR by exam today 8/26/24  Sent price check to her pharmacy on 8/21/24 for the Xarelto and is $661.99  Sent price check on Eliquis but doubtful that will be much better.  If not, she will possibly need to be converted to Coumadin with a therapeutic Lovenox bridge.  Coupon application for Xarelto supposedly completed. Pt says will be no cost.  Will have to give the coupon code number to the OP pharmacy.    HTN (hypertension)  Assessment & Plan  Home:  no meds  Here:  Coreg 25mg BID/Hydralazine 75 mg q8hrs/Nifedipine 60mg daily at dinner time/Aldactone 25mg daily/Losartan 100mg qd  Stable; no changes today 8/27/24  Followup with IM as OP.  On DC summary was listed to see Megan DORSEY from that group    DIANELYS (obstructive sleep  apnea)  Assessment & Plan  In past was on CPAP  Can d/w PCP when discharged    Hypothyroidism  Assessment & Plan  Continue Levothyroxine 25 mcg qd  This was the dose that she was supposed to be taking at home and they placed her back on it in the hospital on 8/12/24 but there was no TSH drawn.  Had an abnormal TSH of 5.1 on 4/23/20 presumably not on Levothyroxine at that time since thereafter on 2/15/22 was normal  TSH 8/22/24 normal at 3.95  Will continue current dose and have her get a TSH in 6 weeks = @9/23    GERD (gastroesophageal reflux disease)  Assessment & Plan  Continue Protonix    Meningioma (HCC)  Assessment & Plan  Incidental finding   OP followup           The above assessment and plan was reviewed and updated as determined by my evaluation of the patient on 8/27/2024.    Labs:   Results from last 7 days   Lab Units 08/26/24  0555 08/22/24  0657   WBC Thousand/uL 7.93 8.61   HEMOGLOBIN g/dL 13.6 13.6   HEMATOCRIT % 42.2 42.3   PLATELETS Thousands/uL 293 297     Results from last 7 days   Lab Units 08/26/24  0555 08/22/24  0657   SODIUM mmol/L 140 139   POTASSIUM mmol/L 4.3 4.3   CHLORIDE mmol/L 103 105   CO2 mmol/L 27 26   BUN mg/dL 23 30*   CREATININE mg/dL 0.69 0.79   CALCIUM mg/dL 10.0 9.4                   Imaging  No orders to display       Review of Scheduled Meds:  Current Facility-Administered Medications   Medication Dose Route Frequency Provider Last Rate    acetaminophen  650 mg Oral Q6H PRN WENCESLAO Patel      atorvastatin  40 mg Oral QPM WENCESLAO Patel      bisacodyl  10 mg Rectal Daily PRN Dougie King MD      carvedilol  25 mg Oral BID With Meals WENCESLAO Patel      Diclofenac Sodium  2 g Topical BID Dk De Dios MD      hydrALAZINE  75 mg Oral Q8H Replaced by [facility] WENCESLAO Patel      hydrocortisone   Topical BID WENCESLAO Patel      levothyroxine  25 mcg Oral Early Morning WENCESLAO Patel      lidocaine  1 patch  Topical Daily Dk De Dios MD      losartan  100 mg Oral Daily WENCESLAO Patel      multivitamin stress formula  1 tablet Oral Daily WENCESLAO Patel      NIFEdipine  60 mg Oral Daily With Dinner Lucila Schulz, WENCESLAO      pantoprazole  40 mg Oral Early Morning WENCESLAO Patel      rivaroxaban  20 mg Oral Daily With Dinner Lucila Schulz, WENCESLAO      spironolactone  25 mg Oral Daily WENCESLAO Patel         Subjective/ HPI: Patient seen and examined. Patients overnight issues or events were reviewed with nursing staff. New or overnight issues include the following:     No new or overnight issues.  Offers no complaints    ROS:   A 10 point ROS was performed; negative except as noted above.        *Labs /Radiology studies Reviewed  *Medications  reviewed and reconciled as needed  *Please refer to order section for additional ordered labs studies      Physical Examination:  Vitals:   Vitals:    08/26/24 2345 08/27/24 0405 08/27/24 0600 08/27/24 0744   BP:   138/62 144/67   BP Location:   Right arm Left arm   Pulse:   68 74   Resp:   18    Temp:   97.9 °F (36.6 °C)    TempSrc:   Oral    SpO2: 95% 93% 94%    Weight:       Height:           General Appearance: no distress, nontoxic appearing  HEENT:  External ear normal.  Nose normal w/o drainage. Mucous membranes are moist. Oropharynx is clear. Conjunctiva clear w/o icterus or redness.  Neck:  Supple, normal ROM  Lungs: BBS without crackles/wheeze/rhonchi; respirations unlabored with normal inspiratory/expiratory effort.  No retractions noted.  On RA  CV: regular rate and rhythm; no rubs/murmurs/gallops, PMI normal   ABD: Abdomen is soft.  Bowel sounds all quadrants.  Nontender with no distention.    EXT: no edema  Skin: normal turgor, normal texture  Psych: affect normal, mood normal  Neuro: AAO          The above physical exam was reviewed and updated as determined by my evaluation of the patient on  8/27/2024.    Invasive Devices       None                      VTE Pharmacologic Prophylaxis: Xarelto  Code Status: Level 1 - Full Code  Current Length of Stay: 7 day(s)    Total floor / unit time spent today 30 minutes  Coordination of patient's care was performed in conjunction with consulting services. Time invested included review of patient's labs, vitals, and management of their comorbidities with continued monitoring, examination of patient as well as answering patient questions, documenting her findings and creating progress note in electronic medical record,  ordering appropriate diagnostic testing.       ** Please Note:  voice to text software may have been used in the creation of this document. Although proof errors in transcription or interpretation are a potential of such software**

## 2024-08-27 NOTE — ASSESSMENT & PLAN NOTE
Was seeing cardiologist in [place] and taking Xarelto but she has not seen him in 2 yrs and stopped all her meds >1 yr ago  EKG in the hospital showed NSR  Needs to followup as OP with Cardiology = Dr Patt Zahng Coreg  Was not placed back on Flecainide  Now on Xarelto   RRR by exam today 8/26/24  Sent price check to her pharmacy on 8/21/24 for the Xarelto and is $661.99  Sent price check on Eliquis but doubtful that will be much better.  If not, she will possibly need to be converted to Coumadin with a therapeutic Lovenox bridge.  Coupon application for Xarelto supposedly completed. Pt says will be no cost.  Will have to give the coupon code number to the OP pharmacy.

## 2024-08-27 NOTE — PROGRESS NOTES
08/27/24 1500   Pain Assessment   Pain Assessment Tool 0-10   Pain Score No Pain   Restrictions/Precautions   Precautions Bed/chair alarms;Fall Risk;Supervision on toilet/commode   Weight Bearing Restrictions No   ROM Restrictions No   Cognition   Overall Cognitive Status WFL   Arousal/Participation Alert;Cooperative   Attention Within functional limits   Orientation Level Oriented X4   Memory Within functional limits   Following Commands Follows one step commands without difficulty   Sit to Lying   Type of Assistance Needed Physical assistance   Physical Assistance Level Total assistance   Comment Min/ModA for RLE management, second person guiding trunk to the bed.   Sit to Lying CARE Score 1   Sit to Stand   Type of Assistance Needed Physical assistance   Physical Assistance Level 25% or less   Comment Haile for STS from recliner with no AD, PT on R side   Sit to Stand CARE Score 3   Bed-Chair Transfer   Type of Assistance Needed Physical assistance   Physical Assistance Level Total assistance   Comment Initially attempted SPT with hemiwalker, but pt too fatigue and R knee buckling noted. Min/ModAx1 for slide board transfer with second person close stand by for safety   Chair/Bed-to-Chair Transfer CARE Score 1   Therapeutic Interventions   Other Sitting Balance EOB while PT assisted pt changing into hospital gown. CGA when sitting EOB able to maintain midline posture   Assessment   Treatment Assessment Patient participated in brief 30 minute skilled physical therapy session. Session focused on assisting patient back into bed therefore working on transfers and bed mobility. due to fatigue from early PT session, SBT was utilized as R knee buckling was noted in standing. Patient was returned to bed and positioned in chair mode with proper positioning of RUE and RLE. All needs where within patient's reach on L side and bed alarm on.   Problem List Decreased strength;Decreased range of motion;Decreased  endurance;Impaired balance;Decreased mobility;Decreased coordination   Barriers to Discharge Inaccessible home environment;Decreased caregiver support   PT Barriers   Functional Limitation Car transfers;Stair negotiation;Standing;Transfers;Walking;Wheelchair management   Plan   Treatment/Interventions Functional transfer training;LE strengthening/ROM;Elevations;Therapeutic exercise;Endurance training;Patient/family training;Equipment eval/education;Bed mobility;Gait training   Progress Progressing toward goals   Discharge Recommendation   Rehab Resource Intensity Level, PT   (TBD pending progress)   PT Therapy Minutes   PT Time In 1500   PT Time Out 1530   PT Total Time (minutes) 30   PT Mode of treatment - Individual (minutes) 30   PT Mode of treatment - Concurrent (minutes) 0   PT Mode of treatment - Group (minutes) 0   PT Mode of treatment - Co-treat (minutes) 0   PT Mode of Treatment - Total time(minutes) 30 minutes   PT Cumulative Minutes 690   Therapy Time missed   Time missed? No     Mirta Mccord, PT, DPT

## 2024-08-27 NOTE — PROGRESS NOTES
08/27/24 0805   Pain Assessment   Pain Assessment Tool 0-10   Pain Score No Pain   Restrictions/Precautions   Precautions Bed/chair alarms;Cognitive;Fall Risk;Supervision on toilet/commode   Comprehension   Comprehension (FIM) 5 - Needs help/cues, repetition only RARELY ( < 10% of the time)   Expression   Expression (FIM) 5 - Needs help/cues only RARELY (< 10% of the time)   Social Interaction   Social Interaction (FIM) 6 - Interacts appropriately with others BUT requires extra  time   Problem Solving   Problem solving (FIM) 5 - Solves basic problems 90% of time   Memory   Memory (FIM) 4 - Recognizes/recalls/performs 75-89%   Speech/Language/Cognition Assessment   Treatment Assessment Pt participated in skilled SLP session focusing on cognitive linguistic skills. Pt demo accurate STM recall of yesterday's session and previous SLP tasks. Pt also appears with good understanding of SLP role and rationale for therapy at this time. As pt was independent with medication management prior to admission, beginning of session focused on this area. Pt stated that she was previously taking prescription medications, however, ~8 months ago after a hospitalization, there was an issue with her insurance and that she lost insurance. She stated that after this time, she no longer had prescription coverage either, therefore, had stopped taking meds. She reports that once her insurance is in effect again, that she will be taking her medications. She reports previously using pill boxes and will continue to do so if recommended. When SLP reviewed pt's current prescription med list, pt with minimal recall/knowledge of purpose of each med. SLP reviewed the name, timing, frequency and purpose of each med with pt and also created a medication reference sheet with this information for pt to keep. Of note, currently, pt has medications to take 5x throughout the day. Plan to review pt's med list again later in pt's stay to check for any  changes with plan to later engage in mock tangible med management task, pending pt's physical progress of RUE as well. Pt demo appropriate attention and engagement during this task and also asked logical questions.  Presented with a mock prescription label, pt independently read comprehension questions and was 7/7 accurate in her responses and given another mock label, was 8/8 accurate in answering questions. Presented with a question regarding timing of meds based on written instructions, pt benefited from min verbal cues to problem solving.    Engaged in an auditory comprehension task which also required completion of multiple steps that were embedded with multiple concepts, pt was verbally presented with a two step direction and Fo5 words. Pt accurately completed all steps and concepts for 7/10 trials. For the three other trials, pt noted to miss one of the six concepts which were included in each trial. When the direction was repeated, pt able to identify and correct errors. Pt insightful into her errors but also recalled her progress with this task in comparison to completing the 2-step direction task a few sessions ago.Targeting higher level deduction, pt engaged in a logical solutions task where she was to determine a specific date given 5-6 written clues. Pt accurately determined 4/5 dates independently while demonstrating logical thought processing as she at times used think aloud strategy. In final trial, pt benefited from only min verbal cue x1 for guidance as she required a reminder of a clue that she had already completed to regain train of thought. Pt verbalized that she does enjoy the more challenging tasks as she recognizes that this will assist her in returning to her cognitive linguistic baseline.     Pt is making good progress towards goals and will continue to benefit from further skilled SLP services at this time targeting higher level cognitive linguistic skills, along with higher level  language/word retrieval skills with goal to return to baseline level of functioning by time of discharge.   SLP Therapy Minutes   SLP Time In 0805   SLP Time Out 0905   SLP Total Time (minutes) 60   SLP Mode of treatment - Individual (minutes) 60   SLP Mode of treatment - Concurrent (minutes) 0   SLP Mode of treatment - Group (minutes) 0   SLP Mode of treatment - Co-treat (minutes) 0   SLP Mode of Treatment - Total time(minutes) 60 minutes   SLP Cumulative Minutes 300   Therapy Time missed   Time missed? No

## 2024-08-27 NOTE — PLAN OF CARE
Problem: PAIN - ADULT  Goal: Verbalizes/displays adequate comfort level or baseline comfort level  Description: Interventions:  - Encourage patient to monitor pain and request assistance  - Assess pain using appropriate pain scale  - Administer analgesics based on type and severity of pain and evaluate response  - Implement non-pharmacological measures as appropriate and evaluate response  - Consider cultural and social influences on pain and pain management  - Notify physician/advanced practitioner if interventions unsuccessful or patient reports new pain  Outcome: Progressing     Problem: INFECTION - ADULT  Goal: Absence or prevention of progression during hospitalization  Description: INTERVENTIONS:  - Assess and monitor for signs and symptoms of infection  - Monitor lab/diagnostic results  - Monitor all insertion sites, i.e. indwelling lines, tubes, and drains  - Monitor endotracheal if appropriate and nasal secretions for changes in amount and color  - [place] appropriate cooling/warming therapies per order  - Administer medications as ordered  - Instruct and encourage patient and family to use good hand hygiene technique  - Identify and instruct in appropriate isolation precautions for identified infection/condition  Outcome: Progressing     Problem: SAFETY ADULT  Goal: Patient will remain free of falls  Description: INTERVENTIONS:  - Educate patient/family on patient safety including physical limitations  - Instruct patient to call for assistance with activity   - Consult OT/PT to assist with strengthening/mobility   - Keep Call bell within reach  - Keep bed low and locked with side rails adjusted as appropriate  - Keep care items and personal belongings within reach  - Initiate and maintain comfort rounds  - Make Fall Risk Sign visible to staff  - Offer Toileting every  Hours, in advance of need  - Initiate/Maintain alarm  - Obtain necessary fall risk management equipment:   - Apply yellow socks and  bracelet for high fall risk patients  - Consider moving patient to room near nurses station  Outcome: Progressing  Goal: Maintain or return to baseline ADL function  Description: INTERVENTIONS:  -  Assess patient's ability to carry out ADLs; assess patient's baseline for ADL function and identify physical deficits which impact ability to perform ADLs (bathing, care of mouth/teeth, toileting, grooming, dressing, etc.)  - Assess/evaluate cause of self-care deficits   - Assess range of motion  - Assess patient's mobility; develop plan if impaired  - Assess patient's need for assistive devices and provide as appropriate  - Encourage maximum independence but intervene and supervise when necessary  - Involve family in performance of ADLs  - Assess for home care needs following discharge   - Consider OT consult to assist with ADL evaluation and planning for discharge  - Provide patient education as appropriate  Outcome: Progressing  Goal: Maintains/Returns to pre admission functional level  Description: INTERVENTIONS:  - Perform AM-PAC 6 Click Basic Mobility/ Daily Activity assessment daily.  - Set and communicate daily mobility goal to care team and patient/family/caregiver.   - Collaborate with rehabilitation services on mobility goals if consulted  - Perform Range of Motion  times a day.  - Reposition patient every  hours.  - Dangle patient  times a day  - Stand patient  times a day  - Ambulate patient  times a day  - Out of bed to chair  times a day   - Out of bed for meals  times a day  - Out of bed for toileting  - Record patient progress and toleration of activity level   Outcome: Progressing     Problem: DISCHARGE PLANNING  Goal: Discharge to home or other facility with appropriate resources  Description: INTERVENTIONS:  - Identify barriers to discharge w/patient and caregiver  - Arrange for needed discharge resources and transportation as appropriate  - Identify discharge learning needs (meds, wound care, etc.)  -  Arrange for interpretive services to assist at discharge as needed  - Refer to Case Management Department for coordinating discharge planning if the patient needs post-hospital services based on physician/advanced practitioner order or complex needs related to functional status, cognitive ability, or social support system  Outcome: Progressing     Problem: Prexisting or High Potential for Compromised Skin Integrity  Goal: Skin integrity is maintained or improved  Description: INTERVENTIONS:  - Identify patients at risk for skin breakdown  - Assess and monitor skin integrity  - Assess and monitor nutrition and hydration status  - Monitor labs   - Assess for incontinence   - Turn and reposition patient  - Assist with mobility/ambulation  - Relieve pressure over bony prominences  - Avoid friction and shearing  - Provide appropriate hygiene as needed including keeping skin clean and dry  - Evaluate need for skin moisturizer/barrier cream  - Collaborate with interdisciplinary team   - Patient/family teaching  - Consider wound care consult   Outcome: Progressing

## 2024-08-27 NOTE — ASSESSMENT & PLAN NOTE
Presented with right sided weakness, slurred speech, headache and vomiting  MRI = left pontine infarct  Neuro felt CVA was from atrial fibrillation in setting of non-compliance with anticoagulation  Currently now on Xarelto 20mg qd, Lipitor 40mg qd  Followup with Neuro as OP.  [facility] the DC summary

## 2024-08-28 PROCEDURE — 97130 THER IVNTJ EA ADDL 15 MIN: CPT

## 2024-08-28 PROCEDURE — 97535 SELF CARE MNGMENT TRAINING: CPT

## 2024-08-28 PROCEDURE — 94760 N-INVAS EAR/PLS OXIMETRY 1: CPT

## 2024-08-28 PROCEDURE — 97112 NEUROMUSCULAR REEDUCATION: CPT

## 2024-08-28 PROCEDURE — 97530 THERAPEUTIC ACTIVITIES: CPT

## 2024-08-28 PROCEDURE — 99232 SBSQ HOSP IP/OBS MODERATE 35: CPT | Performed by: PHYSICAL MEDICINE & REHABILITATION

## 2024-08-28 PROCEDURE — 94660 CPAP INITIATION&MGMT: CPT

## 2024-08-28 PROCEDURE — 97129 THER IVNTJ 1ST 15 MIN: CPT

## 2024-08-28 RX ADMIN — RIVAROXABAN 20 MG: 20 TABLET, FILM COATED ORAL at 17:10

## 2024-08-28 RX ADMIN — NIFEDIPINE 60 MG: 30 TABLET, FILM COATED, EXTENDED RELEASE ORAL at 17:10

## 2024-08-28 RX ADMIN — LEVOTHYROXINE SODIUM 25 MCG: 25 TABLET ORAL at 05:16

## 2024-08-28 RX ADMIN — Medication 2 G: at 08:16

## 2024-08-28 RX ADMIN — SPIRONOLACTONE 25 MG: 25 TABLET, FILM COATED ORAL at 08:16

## 2024-08-28 RX ADMIN — ACETAMINOPHEN 650 MG: 325 TABLET ORAL at 11:57

## 2024-08-28 RX ADMIN — LOSARTAN POTASSIUM 100 MG: 50 TABLET, FILM COATED ORAL at 08:16

## 2024-08-28 RX ADMIN — HYDRALAZINE HYDROCHLORIDE 75 MG: 50 TABLET ORAL at 15:05

## 2024-08-28 RX ADMIN — HYDRALAZINE HYDROCHLORIDE 75 MG: 50 TABLET ORAL at 21:49

## 2024-08-28 RX ADMIN — CARVEDILOL 25 MG: 25 TABLET, FILM COATED ORAL at 08:16

## 2024-08-28 RX ADMIN — B-COMPLEX W/ C & FOLIC ACID TAB 1 TABLET: TAB at 08:16

## 2024-08-28 RX ADMIN — LIDOCAINE 1 PATCH: 700 PATCH TOPICAL at 17:10

## 2024-08-28 RX ADMIN — CARVEDILOL 25 MG: 25 TABLET, FILM COATED ORAL at 17:10

## 2024-08-28 RX ADMIN — HYDRALAZINE HYDROCHLORIDE 75 MG: 50 TABLET ORAL at 05:16

## 2024-08-28 RX ADMIN — PANTOPRAZOLE SODIUM 40 MG: 20 TABLET, DELAYED RELEASE ORAL at 05:16

## 2024-08-28 RX ADMIN — ATORVASTATIN CALCIUM 40 MG: 40 TABLET, FILM COATED ORAL at 17:10

## 2024-08-28 NOTE — PLAN OF CARE
Problem: PAIN - ADULT  Goal: Verbalizes/displays adequate comfort level or baseline comfort level  Description: Interventions:  - Encourage patient to monitor pain and request assistance  - Assess pain using appropriate pain scale  - Administer analgesics based on type and severity of pain and evaluate response  - Implement non-pharmacological measures as appropriate and evaluate response  - Consider cultural and social influences on pain and pain management  - Notify physician/advanced practitioner if interventions unsuccessful or patient reports new pain  Outcome: Progressing     Problem: INFECTION - ADULT  Goal: Absence or prevention of progression during hospitalization  Description: INTERVENTIONS:  - Assess and monitor for signs and symptoms of infection  - Monitor lab/diagnostic results  - Monitor all insertion sites, i.e. indwelling lines, tubes, and drains  - Monitor endotracheal if appropriate and nasal secretions for changes in amount and color  - [place] appropriate cooling/warming therapies per order  - Administer medications as ordered  - Instruct and encourage patient and family to use good hand hygiene technique  - Identify and instruct in appropriate isolation precautions for identified infection/condition  Outcome: Progressing     Problem: SAFETY ADULT  Goal: Patient will remain free of falls  Description: INTERVENTIONS:  - Educate patient/family on patient safety including physical limitations  - Instruct patient to call for assistance with activity   - Consult OT/PT to assist with strengthening/mobility   - Keep Call bell within reach  - Keep bed low and locked with side rails adjusted as appropriate  - Keep care items and personal belongings within reach  - Initiate and maintain comfort rounds  - Make Fall Risk Sign visible to staff  - Offer Toileting every  Hours, in advance of need  - Initiate/Maintain alarm  - Obtain necessary fall risk management equipment:   - Apply yellow socks and  bracelet for high fall risk patients  - Consider moving patient to room near nurses station  Outcome: Progressing  Goal: Maintain or return to baseline ADL function  Description: INTERVENTIONS:  -  Assess patient's ability to carry out ADLs; assess patient's baseline for ADL function and identify physical deficits which impact ability to perform ADLs (bathing, care of mouth/teeth, toileting, grooming, dressing, etc.)  - Assess/evaluate cause of self-care deficits   - Assess range of motion  - Assess patient's mobility; develop plan if impaired  - Assess patient's need for assistive devices and provide as appropriate  - Encourage maximum independence but intervene and supervise when necessary  - Involve family in performance of ADLs  - Assess for home care needs following discharge   - Consider OT consult to assist with ADL evaluation and planning for discharge  - Provide patient education as appropriate  Outcome: Progressing  Goal: Maintains/Returns to pre admission functional level  Description: INTERVENTIONS:  - Perform AM-PAC 6 Click Basic Mobility/ Daily Activity assessment daily.  - Set and communicate daily mobility goal to care team and patient/family/caregiver.   - Collaborate with rehabilitation services on mobility goals if consulted  - Perform Range of Motion  times a day.  - Reposition patient every  hours.  - Dangle patient  times a day  - Stand patient  times a day  - Ambulate patient  times a day  - Out of bed to chair  times a day   - Out of bed for meals  times a day  - Out of bed for toileting  - Record patient progress and toleration of activity level   Outcome: Progressing     Problem: DISCHARGE PLANNING  Goal: Discharge to home or other facility with appropriate resources  Description: INTERVENTIONS:  - Identify barriers to discharge w/patient and caregiver  - Arrange for needed discharge resources and transportation as appropriate  - Identify discharge learning needs (meds, wound care, etc.)  -  Arrange for interpretive services to assist at discharge as needed  - Refer to Case Management Department for coordinating discharge planning if the patient needs post-hospital services based on physician/advanced practitioner order or complex needs related to functional status, cognitive ability, or social support system  Outcome: Progressing     Problem: Prexisting or High Potential for Compromised Skin Integrity  Goal: Skin integrity is maintained or improved  Description: INTERVENTIONS:  - Identify patients at risk for skin breakdown  - Assess and monitor skin integrity  - Assess and monitor nutrition and hydration status  - Monitor labs   - Assess for incontinence   - Turn and reposition patient  - Assist with mobility/ambulation  - Relieve pressure over bony prominences  - Avoid friction and shearing  - Provide appropriate hygiene as needed including keeping skin clean and dry  - Evaluate need for skin moisturizer/barrier cream  - Collaborate with interdisciplinary team   - Patient/family teaching  - Consider wound care consult   Outcome: Progressing

## 2024-08-28 NOTE — RESTORATIVE TECHNICIAN NOTE
Restorative Technician Note      Patient Name: Maye Lilly     Restorative Tech Visit Date: 08/28/24  Note Type: Bracing, Initial consult  Patient Position Upon Consult: Bedside chair  Brace Applied: Resting Hand Splint (Right; medium)  Patient Position When Brace Applied: Seated  Education Provided: Yes; Family or social support of family present for education by provider  Patient Position at End of Consult: Bedside chair; All needs within reach  Nurse Communication: Nurse aware of consult, application of brace    Please contact [facility] PT Restorative tech on Epic Secure Chat  in regards to bracing instruction and/or adjustment.    Mp Crawford, Restorative Technician

## 2024-08-28 NOTE — PROGRESS NOTES
Physical Medicine and Rehabilitation Progress Note  Maye Lilly 64 y.o. female MRN: [id number]  Unit/Bed#: -01 Encounter: [id number]    Etiology/Reason for Admission: Impairment of mobility, safety, Activities of Daily Living (ADLs), and cognitive/communication skills due to Stroke:  01.2  Right Body Involvement (Left Brain)    Interval History: Seen face-to-face at bedside. No acute events overnight. No current concerns. She is currently feeling well and enjoyed therapy sessions this morning. Vital signs reviewed, stable and WNL. Voiding spontaneously, continent. Last BM 8/27/24, continent.     Assessment/Plan -     Functional Update:  Physical Therapy Occupational Therapy Speech Therapy   Weight Bearing Status: Full Weight Bearing  Transfers: Assist of 2 (Haile for slide board transfers with second person SBA for safety; ModA/MaxAx1 with second person Haile/CGA for SPT transfers)  Bed Mobility: Assist of 2  Amulation Distance (ft): 20 feet  Ambulation: Total Assistance (MaxA for advancemnet of RLE and to prevent scissoring with WC follow for safety)  Assistive Device for Ambulation: Ismael Walker  Wheelchair Mobility Distance: 150 ft  Wheelchair Mobility: Minimal Assistance  Assistive Device for Stairs:  (not bale to safely assess)  Stair Assistance:  (not appropriate at this time)  Discharge Recommendations:  (TBD pending progress/reteam)   Eating:  (full s/u)  Grooming: Total Assistance, Assist of 2  Bathing: Total Assistance, Assist of 2  Bathing: Total Assistance, Assist of 2  Upper Body Dressing: Moderate Assistance  Lower Body Dressing: Total Assistance, Assist of 2  Toileting: Total Assistance, Assist of 2  Tub/Shower Transfer: Total Assistance, Assist of 2  Toilet Transfer: Total Assistance, Assist of 2  Cognition: Exceptions to WNL  Cognition: Decreased Memory  Orientation: Person, Place, Situation   Mode of Communication: Verbal  Speech/Language: Dysarthia  Cognition: Exceptions to  WNL  Cognition: Decreased Memory, Decreased Executive Functions, Decreased Attention  Orientation: Person, Place, Time, Situation  Discharge Recommendations: Home with:  DC Home with:: 24 Hour Supervision, 24 Hour Assisteance, Family Support     Ms. Maye Lilly is a 65 yo woman with a past medical history notable for HTN and atrial fibrillation who presented to the [facility] on 8/10/24 with acute dysarthria, facial droop and right hemiparesis. A stroke alert was activated, CT head wo contrast was negative for acute findings, CTA of the head/neck showed no evidence of large vessel occlusion and patent cervical carotid and vertebral arteries. MRI of the brain revealed an acute left pontine infarct. Course complicated by severe hypertension requiring multiple agents to obtain control.     - Acute left pontine ischemic infarct:   Clinically presented on 8/10/24 with right hemiparesis, dysarthria and facial droop  Stroke etiology: HTN, small vessel disease  MR brain on 8/11/24 revealed a small area of acute/subacute ischemia within the central luz to the left of midline without acute hemorrhage  Did not receive tPA/TNKase, did not undergo thrombectomy  DAPT regimen: None  Atorvastatin 40 mg qHs for secondary prevention  Monitor for neurogenic bowel and bladder  Promote regular sleep/wake cycles and proper sleep hygiene  Monitor closely for right hemiparetic shoulder pain and post-stroke pain  Neutral resting wrist splint for the and RLE multipodus boot to be worn at night and while in bed during the day to prevent developing spasticity and contracture  Maintain adequate nutrition, nutrition consult  Avoid sedating and anticholinergic medications as possible  Maintain normotension, s/p permissive hypertension  Outpatient neurovascular neurology follow-up  - C. Difficile infection: OSH-acquired; vancomycin PO completed 8/26/24  - Paroxysmal atrial fibrillation: carvedilol for rate control, rivaroxaban for embolic stroke  risk reduction; outpatient cardiology follow-up  - HTN: was not taking medications at home, reportedly due to cost; carvedilol, hydralazine, nifedipine, spironolactone, losartan; appreciate IM management  - Hypothyroidism: levothyroxine  - Therapies: PT, OT and SLP  - Right knee osteoarthritis: lidocaine patch daily, Voltaren topical BID, tylenol PRN  - Bowel: Monitor closely for neurogenic bowel. Will follow BMs and adjust bowel regimen to target soft, formed stools q1-2 days, per pt's regular schedule.  - Bladder: Monitor closely for neurogenic bladder. Will follow UOP and encourage spontaneous voids. If unable to void spontaneously, will monitor with PVR bladder scans and initiate ISC regimen.  - Skin: Monitor for breakdown, frequent turns, Kreg specialty bed for pressure offloading  - Sleep: Practice effective sleep hygiene (e.g., consistent night and morning routine, quiet, dark room at night, no electronic devices/screens in bed, avoid large meals/caffeine before bed)  - VTE prophylaxis: Rivaroxaban  - Disposition: Pending follow-up (re-team) interdisciplinary team meeting, home with assist, ELOS 28 days    Dk De Dios MD  Attending Physician  Physical Medicine and Rehabilitation  [facility]    Review of Systems:  A 10 point ROS was performed; negative except as noted above.       Current Facility-Administered Medications:     acetaminophen (TYLENOL) tablet 650 mg, 650 mg, Oral, Q6H PRN, WENCESLAO Patel, 650 mg at 08/28/24 1157    atorvastatin (LIPITOR) tablet 40 mg, 40 mg, Oral, QPM, WENCESLAO Patel, 40 mg at 08/27/24 1716    bisacodyl (DULCOLAX) rectal suppository 10 mg, 10 mg, Rectal, Daily PRN, Dougie King MD    carvedilol (COREG) tablet 25 mg, 25 mg, Oral, BID With Meals, WENCESLAO Patel, 25 mg at 08/28/24 0816    Diclofenac Sodium (VOLTAREN) 1 % topical gel 2 g, 2 g, Topical, BID, Dk De Dios MD, 2 g at 08/28/24 0816     hydrALAZINE (APRESOLINE) tablet 75 mg, 75 mg, Oral, Q8H BARB, Lucila Schulz, CHADNP, 75 mg at 08/28/24 0516    levothyroxine tablet 25 mcg, 25 mcg, Oral, Early Morning, Lucila Schulz, CRNP, 25 mcg at 08/28/24 0516    lidocaine (LIDODERM) 5 % patch 1 patch, 1 patch, Topical, Daily, Dk De Dios MD, 1 patch at 08/27/24 1623    losartan (COZAAR) tablet 100 mg, 100 mg, Oral, Daily, Lucila Schulz, CHADNP, 100 mg at 08/28/24 0816    multivitamin stress formula tablet 1 tablet, 1 tablet, Oral, Daily, WENCESLAO Patel, 1 tablet at 08/28/24 0816    NIFEdipine (PROCARDIA XL) 24 hr tablet 60 mg, 60 mg, Oral, Daily With Dinner, WENCESLAO Patel, 60 mg at 08/27/24 1623    pantoprazole (PROTONIX) EC tablet 40 mg, 40 mg, Oral, Early Morning, Lucila Schulz, CHADNP, 40 mg at 08/28/24 0516    rivaroxaban (XARELTO) tablet 20 mg, 20 mg, Oral, Daily With Dinner, Lucila Schulz, CRNP, 20 mg at 08/27/24 1623    spironolactone (ALDACTONE) tablet 25 mg, 25 mg, Oral, Daily, Lucila Schulz CRNP, 25 mg at 08/28/24 0816    Physical Exam:  Temp:  [97.6 °F (36.4 °C)-98.3 °F (36.8 °C)] 97.6 °F (36.4 °C)  HR:  [63-67] 65  Resp:  [16] 16  BP: (113-137)/(54-63) 130/62  SpO2:  [94 %-96 %] 96 %    GEN: Patient seen resting comfortably in bedside recliner, NAD  HEENT: NCAT; right sided facial droop at rest, mucous membranes moist  CV: No extremity edema  PULM: Breathing comfortably on room air  ABD: Non-distended  SKIN: No obvious rashes or lesions on exposed skin  NEURO:  Awake and alert, answering questions appropriately to context; able to follow simple commands with clear consistency  Speech is fluent and organized, but mildly dysarthric  RUE with some proximal strength, no movement in the RUE distal to the elbow, moving RLE spontaneously    Laboratory:  Labs reviewed, none new  Results from last 7 days   Lab Units 08/26/24  0555 08/22/24  0657   HEMOGLOBIN g/dL 13.6 13.6   HEMATOCRIT  % 42.2 42.3   WBC Thousand/uL 7.93 8.61     Results from last 7 days   Lab Units 08/26/24  0555 08/22/24  0657   BUN mg/dL 23 30*   SODIUM mmol/L 140 139   POTASSIUM mmol/L 4.3 4.3   CHLORIDE mmol/L 103 105   CREATININE mg/dL 0.69 0.79            Diagnostic Studies: Reviewed, no new imaging   No orders to display     Total time spent:  35 minutes, with more than 50% spent counseling/coordinating care. Counseling includes discussion with patient re: progress in therapies, functional issues observed by therapy staff, and discussion with patient his/her current medical state/wellbeing. Coordination of patient's care was performed in conjunction with Internal Medicine service to monitor patient's labs, vitals, and management of their comorbidities.

## 2024-08-28 NOTE — PROGRESS NOTES
08/28/24 1030   Pain Assessment   Pain Assessment Tool 0-10   Comprehension   Comprehension (FIM) 5 - Understands basic directions and conversation   Expression   Expression (FIM) 5 - Needs help/cues only RARELY (< 10% of the time)   Social Interaction   Social Interaction (FIM) 6 - Interacts appropriately with others BUT requires extra  time   Problem Solving   Problem solving (FIM) 4 - Solves basic problems 75-89% of time   Memory   Memory (FIM) 4 - Recognizes/recalls/performs 75-89%   Speech/Language/Cognition Assessment   Treatment Assessment SLP entering while pt's daughter's SO present to visit the pt. SLP introduced self, role, and objectives. Pt agreeable to engage. SLP inquired first to assess pt's recent memory what she completed in prior ST sessions. Pt able to recall with ease. Pt and SLP then engaging in brief rapport as SLP is new to pt's care. Pt demonstrating adequate discourse using appropriate syntax, and demonstrating 100% intelligibility. She is observed to have a slightly increased ROS and slightly reduced rate of breath support, though not significant for interfering with communication success. She is observed to continue with mild R facial droop though also not interfering with speech clarity. Patient then engaged in cognitive flexibility and problem solving task, similar to ones she completed yesterday though with greater challenge. The first two sets she was given 3 clues to identify f4 pairs (e.g. pairing person to occupation or age based on provided clues). She was explained that this skill of problem solving and integrating information when making decision is vital and frequently needed in many fxl situations; SLP offering examples. Pt was able to complete set one with modA via verbal cues to cross off other options for the given name when identifying a definite match both vertically and horizontally on the problem solving grid. For set two, she was able to complete with Ruperto via  similar cues. The next set of cognitive puzzle continued with request for f4 match but where more vague and abstract clues were provided, as well as greater amount of clues (six). She required maxA to complete via verbal cues similarly used in earlier sets. Following this, pt and SLP engaged in stroke education: type of stroke, sxs (BE FAST), and risk factors (rajinder those specific to patient). Patient was aware of her type of stroke, stating this was something she did review already briefly with therapy team. SLP discussed the other major type of stroke as well, however. SLP then reviewed BE FAST, discussing which symptoms pt recalls experiencing herself more in depth. This provided a transition into patient's specific risk factors, as knowing these sxs is especially important given patient's increased risk for stroke given her recent incident. Patient expressing understanding regarding provided education. Finally, pt engaging in reasoning and problem solving task where she was given situation and asked to consider the factors in deciding how to handle the situation. She was only able to complete one set as time did not allow for additional items to be completed. Her response was vague and did not consider important factors that would need to be considered for a successful situation. Patient would continue to benefit from skilled ST services targeting reasoning and problem solving necessary for obtaining independence at the level she was at PTA and for reducing burden of care for family at time of discharge.    SLP Therapy Minutes   SLP Time In 1030   SLP Time Out 1130   SLP Total Time (minutes) 60   SLP Mode of treatment - Individual (minutes) 60   SLP Mode of treatment - Concurrent (minutes) 0   SLP Mode of treatment - Group (minutes) 0   SLP Mode of treatment - Co-treat (minutes) 0   SLP Mode of Treatment - Total time(minutes) 60 minutes   SLP Cumulative Minutes 360   Therapy Time missed   Time missed? No

## 2024-08-28 NOTE — TEAM CONFERENCE
Acute RehabilitationTeam Conference Note  Date: 8/28/2024   Time: 11:30 AM       Patient Name:  Maye Lilly       Medical Record Number: [id number]   YOB: 1960  Sex: Female          Room/Bed:  [facility]9/[facility] 969-01  Payor Info:  Payor: MEDICARE / Plan: MEDICARE A AND B / Product Type: Medicare A & B Fee for Service /      Admitting Diagnosis: Stroke due to stenosis of left posterior cerebral artery (HCC) [I63.532]   Admit Date/Time:  8/20/2024  3:02 PM  Admission Comments: No comment available     Primary Diagnosis:  Acute CVA (cerebrovascular accident) (HCC)  Principal Problem: Acute CVA (cerebrovascular accident) (HCC)    Patient Active Problem List    Diagnosis Date Noted    Acute CVA (cerebrovascular accident) (HCC) 08/20/2024    Clostridium difficile infection 08/20/2024    Paroxysmal atrial fibrillation (HCC) 08/20/2024    HTN (hypertension) 08/20/2024    DIANELYS (obstructive sleep apnea) 08/20/2024    Hypothyroidism 08/20/2024    GERD (gastroesophageal reflux disease) 08/20/2024    Meningioma (HCC) 08/20/2024       Physical Therapy:    Weight Bearing Status: Full Weight Bearing  Transfers: Assist of 2 (Haile for slide board transfers with second person SBA for safety; ModA/MaxAx1 with second person Haile/CGA for SPT transfers)  Bed Mobility: Assist of 2  Amulation Distance (ft): 20 feet  Ambulation: Total Assistance (MaxA for [employer] of RLE and to prevent scissoring with WC follow for safety)  Assistive Device for Ambulation: Ismael Walker  Wheelchair Mobility Distance: 150 ft  Wheelchair Mobility: Minimal Assistance  Assistive Device for Stairs:  (not bale to safely assess)  Stair Assistance:  (not appropriate at this time)  Discharge Recommendations:  (TBD pending progress/reteam)    Date: 8/28/2024  Date of Evaluation: 8/1/2024  Estimated Length of Stay: 4 weeks     Barriers to Discharge Home: 16 ABEL at her home in [place], 8 ABEL at her daughter's home in [facility] caregiver  support (will need to clarify with family availability to assist at discharge), significant assist for all functional mobility   PT Interventions to address Barriers: therapeutic exercises, transfer training, Standing tolerance, identifying optimal equipment  Equipments Needs: TBD pending progress  PT Plan of Care for the Week: continue to work on ambulation with hemiwalker, use of BWSS as time allows, RLE strengthening/stretching; transfer training with focus on SPT transfers   Family Training Needed: Yes, but will not occur until closer to discharge   Discharge Planning: TBD pending progress, but patient's goal is to discharge to her daughter's home in [place].       Occupational Therapy:  Eating:  (full s/u)  Grooming: Total Assistance, Assist of 2  Bathing: Total Assistance, Assist of 2  Bathing: Total Assistance, Assist of 2  Upper Body Dressing: Moderate Assistance  Lower Body Dressing: Total Assistance, Assist of 2  Toileting: Total Assistance, Assist of 2  Tub/Shower Transfer: Total Assistance, Assist of 2  Toilet Transfer: Total Assistance, Assist of 2  Cognition: Exceptions to WNL  Cognition: Decreased Memory  Orientation: Person, Place, Situation  Discharge Recommendations: Home with:  DC Home with:: 24 Hour Assistance, 24 Hour Supervision, Family Support       08/22/24: Pt is a 64 y.o. female seen for OT evaluation following admission to [facility] for acute left pontine infarct . OT evaluation and assessment of strength, ADL function, and functional transfers completed. Prior to admission Pt was completing ADLs at independent with use of NO DME. Pt was completing IADLs independently. Pt lives with lives alone and is going to have family support but this may be limited. Entry to the home a large barrier. Personal factors affecting the patient at this time include: co morbidities/Other health conditions, inaccessible home, Lives alone, Decreased caregiver support, and Body habitus. Pt is currently limited due  to the following deficits impacting occupational performance, Slow processing, Impaired standing balance, Impaired righting reactions, impaired motor planning, impaired trunk control, Impaired sitting balance, Impaired sitting tolerance, Unilateral weakness or paralysis of Upper limb , Unilateral weakness or paralysis of Lower limb , Reduced sensation, Ismael Neglect to body, Generalized weakness, impaired GMC/FMC, Impaired UE AROM, and Impaired UE strength. The patient has shown a decline from prior level of function. The patient participates in identification of personal goals to address in Occupational Therapy. Recommend skilled OT services for I/ADL retraining to support the ability to safely participate in daily occupations safely and independently in the most least restrictive way. Pt demonstrates Good rehab potential to meet LTG's of minimal assist with use of wheelchair. Anticipate 4week(s) length of stay. Occupational Therapy plan of care will address the following areas: ADL re-training, fxnl xfers, fxnl attention, standing tolerance, standing balance, UE NMR, UE strengthening, UE endurance, FMC/GMC, FMS/GMS, DME training/education, family training/education, EC techniques/education, healthy coping education, leisure pursuits, body awareness, sitting balance, core/trunk control, and midline awareness     08/27/24: Pt is demonstrating fair progress with occupational therapy and is progressing toward long term goals for ADL, IADL, and functional transfers/mobility. Pts long term goals for ADLs are Minimum assistance and Moderate assistance with Ismael-walker and wheelchair. Pt is currently Dependent  for ADLs. Pt continues to present with impairments in activity tolerance, endurance, standing balance/tolerance, sitting balance/tolerance, UE strength, UE ROM, FMC, and GMC. Occupational performance remains limited by fatigue, abnormal tone, (R) hemiparesis, decreased caregiver support, risk for falls, and home  environment. Family training/education will be required prior to D/C. Pt will continue to benefit from skilled acute rehab OT services to address above mentioned barriers and maximize functional independence in baseline areas of occupation to meet established treatment goals with overall decreased burden of care. Plan of care to continue to focus on ADL Retraining , LB Dressing, UB dressing, Ismael Dressing Techniques, IADL training , Functional Transfers, Functional Cognition, Standing tolerance, Standing balance , UE NMR right, midline awareness, Fine motor coordination, Gross motor coordination, Fine motor strengthening , Gross motor strengthening , DME training/education, Family training/education, Energy conservation training/education, healthy coping education, Leisure and social pursuits, community re-integration, return to work simulation, sitting balance, and Core/trunk control/strengthening . Goals for the upcoming week are: intense NMR to RUE    Anticipate Re-team at this time.      Speech Therapy:  Mode of Communication: Verbal  Speech/Language: Dysarthia  Cognition: Exceptions to WNL  Cognition: Decreased Memory, Decreased Executive Functions, Decreased Attention  Orientation: Person, Place, Time, Situation  Discharge Recommendations: Home with:  DC Home with:: 24 Hour Supervision, 24 Hour Assisteance, Family Support  As of 8/21/2024: Pt was evaluated by skilled ST for cognitive linguistic skills and dysarthria. In regards to cognition, pt completed the SLUMS cognitive assessment upon evaluation. Pt total score was 25/30 which as compared to those with high school education correlates with mild neurocognitive disorder and is indicative of dementia.  Pt presenting with the following barriers: decrease STM, working memory, word recall, critical thinking and higher level problem solving thought organization and expressive language/word retrieval which, which impact pt's overall safety, functional cognitive  communication skills as well as functional mobility. The following interventions are used to target these barriers, including verbal problem solving task, verbal working memory tasks, visual memory recall tasks, drawing conclusions activities, written sequencing tasks, verbal sequencing tasks, written financial management tasks, tangible money tasks, verbal review of current medications, written review of medications, tangible medication management task, written calendar tasks, tangible scheduling tasks , written health management tasks, verbal health management tasks, visual attention tasks, functional reading tasks , and family education/training, as well as verbal command following activities, written command following activities, reading comprehension at sentence level, reading comprehension at paragraph level, written expression at word level, written expression at sentence level , divergent naming: concrete, divergent naming: abstract , word deduction with phrase, and word deduction with clue words. In regards to dysarthria, pt is demonstrating speech intelligibility to be mildly impaired at the sentence/conversational level. Barrier include: distorted speech sounds, decreased breath support and fast rate of speech. Interventions to be targeted include: OME's, verbal repetition of sentence, orthographic repetition of sentences, review of speech strategies and breath/support cueing.     This week, plan to target and implement cognitive and speech interventions as well as discussing baseline with daughter regarding ADLs and  pt's ability to complete IADL tasks such as medication management, finance management, ability to recognize and solve unsafe situations. Recommendations for discharge are pending pt progress but suspect some level of increased support to be needed, along with continued skilled SLP services (OP vs HHC) on discharge. Based on current evaluation, pt is recommended for skilled SLP services  during acute rehab stay targeting both expressive/receptive language and cognitive linguistic skills to maximize functioning and level of independence on discharge.     Current level of function:  Comprehension: Supervision  Expression:Mod A  Social Interaction:Supervision  Problem Solving:Min A   Memory:Min A    Update from week: 8/27/2024: Pt continues to be followed for skilled SLP services focusing on cognitive linguistic skills where she is making progress towards goals this week. Continued cognitive linguistic and speech barriers which present include: decreased STM recall, working memory, attention, critical thinking, higher level thought organization, recall of multiple steps and processing, along with occasional word retrieval difficulties and minimal dysarthria.  Current deficits impact pt's overall safety, functional cognitive communication skills as well as functional mobility due to decreased carryover. The following interventions are used to target these barriers, including verbal problem solving task, verbal working memory tasks, visual memory recall tasks, drawing conclusions activities, written sequencing tasks, verbal sequencing tasks, verbal reasoning tasks, higher level deductive reasoning activities, written financial management tasks, tangible money tasks, verbal review of current medications, written review of medications, tangible medication management task, written calendar tasks, tangible scheduling tasks ,  written health management tasks, verbal health management tasks, functional reading tasks , time management activities, and family education/training, as well as speech drills and word deduction tasks.    Current level of functioning:   Comprehension: supervision  Expression: supervision  Social Interaction: supervision to mod I  Problem Solving: min A to supervision  Memory: min A    This week, plan to continue to target executive functions skills including those required for  completion of IADLs, higher level word retrieval tasks and speech drills. Continue family education as appropriate during rehab stay. At this time, pt is recommended for continued skilled SLP services focusing on higher level cognitive linguistic skills, along with speech and expressive language skills to maximize independence and safety.     Nursing Notes:  Appetite: Good  Diet Type: Cardiac, Other (Specify) (4 gram sodium)                      Diet Patient/Family Education Complete: No                         Type of Wound Patient/Family Education: No  Bladder: Incontinent     Bladder Patient/Family Education: No  Bowel: Incontinent     Bowel Patient/Family Education: No  Pain Location/Orientation: Orientation: Left, Location: Shoulder  Pain Score: 0                       Hospital Pain Intervention(s): Medication (See MAR), Repositioned, Elevated  Pain Patient/Family Education: No  Medication Management/Safety  Safe Administration: No  Reason for non-safe administration: will need further education  Medication Patient/Family Education Complete: No    64 year old patient with history of HTN, PAF, hypothyroidism, GERD and DIANELYS who presented with right sided weakness, slurred speech, headache and vomiting.CT head and CTA had no acute findings.  MRI revealed left pontine infarct.  She is supposed to be taking medications for her HTN and Eliquis as an anticoagulant for her PAF but she stopped taking her medications well over a year ago due to insurance loss.  Neurology saw in consult.  They felt the CVA occurred in the setting of PAF and medication noncompliance.  For her HTN, she was placed on Valsartan 160 mg daily, Coreg 25mg BID, Hydralazine 75 mg q 8 hours, Aldactone 25mg daily and Procardia 60mg daily at dinnertime.  ECHO showed a LVEF of 65% with mild diastolic dysfunction.  During the course of her admission, she developed diarrhea and tested positive for Cdiff.  She is currently on PO Vancomycin for  treatment.    Acute CVA - Presented with right sided weakness, slurred speech, headache and vomiting. MRI = left pontine infarct. Neuro felt CVA was from atrial fibrillation in setting of non-compliance with anticoagulation. Currently now on Xarelto 20mg qd, Lipitor 40mg qd. Followup with Neuro as OP.  Stevo Baires from [facility] from that group was listed on the DC summary. Clostridium difficile infection - New during hospital stay. Says no BMs so far today 8/21/24. Continue Vancomycin 125mg QID.  LD will be 8/26/24 0900. Paroxysmal atrial fibrillation - Was seeing cardiologist in [place] and taking Xarelto but she has not seen him in 2 yrs and stopped all her meds >1 yr ago. EKG in the hospital showed NSR. Needs to followup as OP with Cardiology = Dr Beltre. Continue Coreg. Was not placed back on Flecainide. Now on Xarelto. RRR by exam today 8/22/24. Sent price check to her pharmacy on 8/21/24 for the Xarelto and is $661.99. Will send price check on Eliquis but doubtful that will be much better.  If not, she will need to be converted to Coumadin with a therapeutic Lovenox bridge.  She did tell me she previously was on the Xarelto and had a coupon number so she could get it for $10.00/month.  Will try and see about getting that number. HTN - Home:  no meds. Here:  Coreg 25mg BID/Hydralazine 75 mg q8hrs/Nifedipine 60mg daily at dinner time, Aldactone 25mg daily, Valsartan 160 mg qd which will be changed to dose equivalent ARB that we have here. Stable; no changes today 8/22/24. Followup with IM as OP.  On DC summary was listed to see Megan DORSEY from that group. DIANELYS - In past was on CPAP. Can d/w PCP when discharged. Hypothyroidism - Continue Levothyroxine 25 mcg qd. This was the dose that she was supposed to be taking at home and they placed her back on it in the hospital on 8/12/24 but there was no TSH drawn.  Had an abnormal TSH of 5.1 on 4/23/20 presumably not on Levothyroxine at that time since thereafter on  2/15/22 was normal. TSH 8/22/24 normal at 3.95. Will continue current dose and have her get a TSH in 6 weeks = @9/23. GERD: Continue Protonix. Meningioma - Incidental finding. OP followup.    Pt was a new admission 8/20. Pt is AG for safety. Therapy transfers only. RUE is flaccid and RLE is semi-flaccid. Pt has residual R facial droop and slurred speech. Pt wears CPAP at night. _Pt is on contact for C. Difficle which she is on PO vanco. Pt is incontinent at times of bowel and bladder. Last bowel movement was 8/20. Remains on cardiac and 4 gram sodium diet with thin liquids - on aspiration precautions and requires chair mode for all meals. Pt has rash on b/l feet - hydrocortisone is ordered and has been helping. Pt is now on kreg bed.     8/28/24- This week we will encourage independence with ADLs.  We will continue to monitor labs and vital signs.  We will continue to  educate pt/family about repositioning to prevent skin breakdown.  We will assist w/repositioning and perform routine skin checks.  We will continue to monitor for adequate pain control.  We will monitor for constipation and medicate pt as ordered.  We will increase safety awareness and keep pt free from falls.    Case Management:     Discharge Planning  Living Arrangements: Lives Alone  Support Systems: Self, Son, Daughter  Assistance Needed: none prior  Type of Current Residence: Private residence  Current Home Care Services: No  Pt is participating well with therapy and making small gains. Pt is aware of dc options to go home or to her daughters home, however due to gus her daughters home pt may require subacute setting on dc. Cm working on application for pt to receive discounted/fee xeralto.     Is the patient actively participating in therapies? yes  List any modifications to the treatment plan:     Barriers Interventions   Lives alone, stairs at the daughters home, decreased caregiver support (son potentially arriving on dc to support) Stair  negotiation training when stable to do so.    Ue motor neurons on the right Use of splint, therapy exercises   Activity tolerance, endurance,  Energy conservation education, rest breaks   Higher level cog deficits, word finding deficits Med mgtm, education. Training, higher level word retrienval activities, and speech drills sec to dysarthria   Right kaylyn Nmes, neuro re ed     Is the patient making expected progress toward goals? yes  List any update or changes to goals:     Medical Goals: Patient will be medically stable for discharge to [facility] envrionment upon completion of rehab program and Patient will be able to manage medical conditions and comorbid conditions with medications and follow up upon completion of rehab program    Weekly Team Goals:   Rehab Team Goals  ADL Team Goal: Patient will require assist with ADLs with least restrictive device upon completion of rehab program  Transfer Team Goal: Patient will require assist with transfers with least restrictive device upon completion of rehab program  Locomotion Team Goal: Patient will require assist with locomotion with least restrictive device upon completion of rehab program  Cognitive Team Goal: Patient will require supervision for basic and complex tasks upon completion of rehab program    Discussion: pt presents with the above barriers and the team is utilizing the stated interventions to improve functional ability.  Pt had increased standing tolerance today to participate in bean bag toss. Pt has severe deficits from stroke resulting in overall total a x2 for adls and transfers. Pt is supervision to min a for cog function. Pt reports her son is scheduled to come from [place] on dc and stay with her at her daughters home to provide assist. However pt needs to navigate at least 8 steps to enter her daughters home.     Anticipated Discharge Date: reteam   [facility] Team Members Present:The following team members are supervising care for this patient and  were present during this Weekly Team Conference.    Physician: Dr. Lanre MD  : Wanda Hopkins MSW  Registered Nurse: Alessai Rivas RN  Physical Therapist: Mirta Mccord DPT  Occupational Therapist: Megan Acuna MS, OTR/L  Speech Therapist: Rossy Rodríguez MA, CCC-SLP

## 2024-08-28 NOTE — CASE MANAGEMENT
Met w/pt and reviewed team update and assisted in completing the xeralto medication assistance program. Pt confirmed her son is staying at her daughters home and has already arrived. Cm reviewed stairs as a big barrier to pts dc and she agreed. Pt stated her leg is moving for walking but not for lifting to do the stairs. Cm reviewed the option of going to a subacute setting if she is unable to do stairs by the time dc nears. Pt aware and reviewed the copay at day 21. Cm did explain medicare will pay 100% for the first 20 days. Pt aware, cm confirmed the first plan is for pt to dc to home. Pt aware another team meeting will be occurring next week .    [id number] patient assistance program application for xeralto completed and faxed to michelle at [phone]. A copy  made and provided to pt.

## 2024-08-28 NOTE — PLAN OF CARE
Problem: PAIN - ADULT  Goal: Verbalizes/displays adequate comfort level or baseline comfort level  Description: Interventions:  - Encourage patient to monitor pain and request assistance  - Assess pain using appropriate pain scale  - Administer analgesics based on type and severity of pain and evaluate response  - Implement non-pharmacological measures as appropriate and evaluate response  - Consider cultural and social influences on pain and pain management  - Notify physician/advanced practitioner if interventions unsuccessful or patient reports new pain  Outcome: Progressing     Problem: INFECTION - ADULT  Goal: Absence or prevention of progression during hospitalization  Description: INTERVENTIONS:  - Assess and monitor for signs and symptoms of infection  - Monitor lab/diagnostic results  - Monitor all insertion sites, i.e. indwelling lines, tubes, and drains  - Monitor endotracheal if appropriate and nasal secretions for changes in amount and color  - [place] appropriate cooling/warming therapies per order  - Administer medications as ordered  - Instruct and encourage patient and family to use good hand hygiene technique  - Identify and instruct in appropriate isolation precautions for identified infection/condition  Outcome: Progressing     Problem: SAFETY ADULT  Goal: Patient will remain free of falls  Description: INTERVENTIONS:  - Educate patient/family on patient safety including physical limitations  - Instruct patient to call for assistance with activity   - Consult OT/PT to assist with strengthening/mobility   - Keep Call bell within reach  - Keep bed low and locked with side rails adjusted as appropriate  - Keep care items and personal belongings within reach  - Initiate and maintain comfort rounds  - Make Fall Risk Sign visible to staff  - Offer Toileting every  Hours, in advance of need  - Initiate/Maintain alarm  - Obtain necessary fall risk management equipment:   - Apply yellow socks and  bracelet for high fall risk patients  - Consider moving patient to room near nurses station  Outcome: Progressing  Goal: Maintain or return to baseline ADL function  Description: INTERVENTIONS:  -  Assess patient's ability to carry out ADLs; assess patient's baseline for ADL function and identify physical deficits which impact ability to perform ADLs (bathing, care of mouth/teeth, toileting, grooming, dressing, etc.)  - Assess/evaluate cause of self-care deficits   - Assess range of motion  - Assess patient's mobility; develop plan if impaired  - Assess patient's need for assistive devices and provide as appropriate  - Encourage maximum independence but intervene and supervise when necessary  - Involve family in performance of ADLs  - Assess for home care needs following discharge   - Consider OT consult to assist with ADL evaluation and planning for discharge  - Provide patient education as appropriate  Outcome: Progressing  Goal: Maintains/Returns to pre admission functional level  Description: INTERVENTIONS:  - Perform AM-PAC 6 Click Basic Mobility/ Daily Activity assessment daily.  - Set and communicate daily mobility goal to care team and patient/family/caregiver.   - Collaborate with rehabilitation services on mobility goals if consulted  - Perform Range of Motion  times a day.  - Reposition patient every  hours.  - Dangle patient  times a day  - Stand patient  times a day  - Ambulate patient  times a day  - Out of bed to chair  times a day   - Out of bed for meals  times a day  - Out of bed for toileting  - Record patient progress and toleration of activity level   Outcome: Progressing     Problem: DISCHARGE PLANNING  Goal: Discharge to home or other facility with appropriate resources  Description: INTERVENTIONS:  - Identify barriers to discharge w/patient and caregiver  - Arrange for needed discharge resources and transportation as appropriate  - Identify discharge learning needs (meds, wound care, etc.)  -  Arrange for interpretive services to assist at discharge as needed  - Refer to Case Management Department for coordinating discharge planning if the patient needs post-hospital services based on physician/advanced practitioner order or complex needs related to functional status, cognitive ability, or social support system  Outcome: Progressing     Problem: Prexisting or High Potential for Compromised Skin Integrity  Goal: Skin integrity is maintained or improved  Description: INTERVENTIONS:  - Identify patients at risk for skin breakdown  - Assess and monitor skin integrity  - Assess and monitor nutrition and hydration status  - Monitor labs   - Assess for incontinence   - Turn and reposition patient  - Assist with mobility/ambulation  - Relieve pressure over bony prominences  - Avoid friction and shearing  - Provide appropriate hygiene as needed including keeping skin clean and dry  - Evaluate need for skin moisturizer/barrier cream  - Collaborate with interdisciplinary team   - Patient/family teaching  - Consider wound care consult   Outcome: Progressing

## 2024-08-28 NOTE — PROGRESS NOTES
"OT daily treatment note       08/28/24 0900   Pain Assessment   Pain Assessment Tool 0-10   Pain Score No Pain   Restrictions/Precautions   Precautions Bed/chair alarms;Cognitive;Fall Risk;Contact/isolation;Supervision on toilet/commode   Lifestyle   Autonomy \"It felt so good to sit at the edge of the bed like this\"   Oral Hygiene   Type of Assistance Needed Supervision   Physical Assistance Level No physical assistance   Comment seated in WC at sink,  required full setup of toothbrush. able to brush all parts of mouth w/o physical assist incl. rinsing out and wiping  mouth dry. LUE used throughout entirety of this activity.   Oral Hygiene CARE Score 4   Grooming   Findings assisted pt with using shampoo cap as per pt request   Shower/Bathe Self   Type of Assistance Needed Physical assistance   Physical Assistance Level Total assistance   Comment pt completed SB seated EOB which is an improvement compared to previous sessions that were completed bed level. pt able to bathe half of upper body and upper legs requiring A for L UB and distal LE. Ax2 required in stance with use of HW for daria bathing and donning pants over hips. most likely couldve been completed with Ax1 as pt was steady in stance but 2nd person present just from a safety perspective. if pt cont with G balance in stance, will attempt to complete with Ax1. pt is really hoping to be able to shower within the next week or so.   Shower/Bathe Self CARE Score 1   Upper Body Dressing   Type of Assistance Needed Physical assistance   Physical Assistance Level 51%-75%   Comment max A for donning overhead sports bra. introduced kaylyn dressing technique for donning shirt which pt was able to thread RUE but OT did need to assist with maintaining shirt on RUE. pt able to thread LUE and overhead with ease.   Upper Body Dressing CARE Score 2   Lower Body Dressing   Type of Assistance Needed Physical assistance   Physical Assistance Level Total assistance   Comment TA x " 2 to don tabbed brief, pants and to don over hips in stance with HW   Lower Body Dressing CARE Score 1   Putting On/Taking Off Footwear   Type of Assistance Needed Physical assistance   Physical Assistance Level Total assistance   Comment socks, sneakers and R AFO   Putting On/Taking Off Footwear CARE Score 1   Sit to Lying   Type of Assistance Needed Physical assistance   Physical Assistance Level 76% or more   Comment A for BLE and to reposition trunk   Sit to Lying CARE Score 2   Lying to Sitting on Side of Bed   Type of Assistance Needed Physical assistance   Physical Assistance Level 51%-75%   Comment RLE and trunk mgmt   Lying to Sitting on Side of Bed CARE Score 2   Sit to Stand   Type of Assistance Needed Physical assistance   Physical Assistance Level 25% or less   Comment R HHA, R knee block and HW. completed ~8 STS this session all at min A/CG. once in stance, pt even requested for OT to not hold onto R hand as she fills steady in stance. no R knee buckling noted but did maintain knee block just in case   Sit to Stand CARE Score 3   Bed-Chair Transfer   Type of Assistance Needed Physical assistance   Physical Assistance Level Total assistance   Comment Ax2 swap out; pt able to stand with HW with min A/CG and 2nd person manages equipment.   Chair/Bed-to-Chair Transfer CARE Score 1   Functional Standing Tolerance   Time 2 min x 3   Activity bean bag toss   Comments pt engaged in bean bag toss activity foucsing on RUE NMR, fxl standing tolerance, dynamic standing balance both with unilateral release and unsupported and endurance/fxl activity tolerance. pt used HW to stand with Min A/CG. RUE HHA initially but pt requested to let go but R knee block remained. OT would intermittently release RUE but OT wanted pt to have WBing for NMR.  pt then used LUE to toss bean bags onto board. pt with G balance, no LOB or knee buckling noted. pt completed x3 with good carryover. pt also reported this activity being fun and  meaninful to her.   Cognition   Overall Cognitive Status WFL   Arousal/Participation Alert;Cooperative   Attention Within functional limits   Orientation Level Oriented X4   Memory Within functional limits   Following Commands Follows one step commands without difficulty   Additional Activities   Additional Activities Comments applied R arm trough to WC to maintain G arm positioning at rest. OT also spoke to [employer] and they reported they will be present today to size pt for RHS.   Activity Tolerance   Activity Tolerance Patient tolerated treatment well   Assessment   Treatment Assessment Pt participated in skilled OT session with focus on ADL retraining, functional transfer training, and neuro re-ed. See flowsheet for details of session and current functional status. Pt is limited by weakness, decreased ROM, decreased endurance, increased fall risk, decreased ADLS, decreased IADLS, decreased activity tolerance, impaired tone, decreased cognition, and decreased strength and requires skilled OT services to increase independence and safety with ADL completion in prep for DC home. Plan to continue ADL retraining, functional transfer training, UE strengthening/ROM, endurance training, cognitive reorientation, Pt/caregiver education, equipment evaluation/education, neuro re-ed, compensatory technique education, continued education, energy conservation, and activity engagement  to address barriers mentioned above.   Prognosis Good   Problem List Decreased strength;Decreased range of motion;Decreased endurance;Impaired balance;Decreased mobility;Decreased coordination   Barriers to Discharge Inaccessible home environment;Decreased caregiver support   Plan   Treatment/Interventions ADL retraining;Functional transfer training;Therapeutic exercise;Endurance training;Patient/family training;Equipment eval/education;Compensatory technique education   Progress Progressing toward goals   OT Therapy Minutes   OT Time In  0900   OT Time Out 1030   OT Total Time (minutes) 90   OT Mode of treatment - Individual (minutes) 90   OT Mode of treatment - Concurrent (minutes) 0   OT Mode of treatment - Group (minutes) 0   OT Mode of treatment - Co-treat (minutes) 0   OT Mode of Treatment - Total time(minutes) 90 minutes   OT Cumulative Minutes 730   Therapy Time missed   Time missed? No

## 2024-08-28 NOTE — PROGRESS NOTES
08/28/24 1300   Pain Assessment   Pain Assessment Tool 0-10   Pain Score No Pain   Restrictions/Precautions   Precautions Bed/chair alarms;Cognitive;Fall Risk;Contact/isolation;Supervision on toilet/commode   Weight Bearing Restrictions No   ROM Restrictions No   Braces or Orthoses   (AFO with sneaker for all transfers, R UE sling)   Cognition   Overall Cognitive Status WFL   Arousal/Participation Alert;Cooperative   Attention Within functional limits   Orientation Level Oriented X4   Memory Within functional limits   Sit to Lying   Type of Assistance Needed Physical assistance   Physical Assistance Level Total assistance   Comment Min A at trunk, Min/ModA for management of LEs   Sit to Lying CARE Score 1   Sit to Stand   Type of Assistance Needed Physical assistance   Physical Assistance Level 25% or less   Comment Haile for STS from WC and EOB with L hemiwalker, therapist guarding on R side, no knee buckling   Sit to Stand CARE Score 3   Bed-Chair Transfer   Type of Assistance Needed Physical assistance   Physical Assistance Level Total assistance   Comment SPT with hemiwalker, ModAx1 with CGA of second person for safety, WC<>EOM, WC>EOB; also practiced ambulatory transfer with hemiwalker to the  while in BWSS; requires physical assistance for RLE management and to preven scissoring   Chair/Bed-to-Chair Transfer CARE Score 1   Car Transfer   Reason if not Attempted Safety concerns   Car Transfer CARE Score 88   Walk 10 Feet   Comment unbale to care score as it was performed in bodyweight support system   Walk 50 Feet with Two Turns   Comment unbale to care score as it was performed in bodyweight support system   Walk 150 Feet   Comment unbale to care score as it was performed in bodyweight support system   Ambulation   Primary Mode of Locomotion Prior to Admission Walk   Distance Walked (feet) 100 ft  (50 ft; 40 ft)   Assist Device Ismael Walker   Gait Pattern Ataxic;Inconsistant Jasmyne;Slow Jasmyne;Decreased  foot clearance;R foot drag;R hemiparesis;R knee jonathan;Step to;Step through;Scissoring;Decreased R stance;Improper weight shift   Limitations Noted In Balance;Coordination;Device Management;Endurance;Safety;Sequencing;Speed;Strength;Swing   Provided Assistance with: Balance;Direction;Limb Advancement;Weight Shift   Walk Assist Level Moderate Assist;Maximum Assist   Findings Focused on ambulating with hemiwalker while in BWSS; Initially pt required MaxA for advancement of RLE, but by last ambulation bout required ModA for RLE advancement; less scissoring noted this session compared to yesterday. VCs for sequencing/positioning of hemiwalker and VC and TCs to shift weight onto LLE when trying to advance RLE. At times, pt able to advance RLE with only Haile from therapist   Does the patient walk? 2. Yes   Curb or Single Stair   Reason if not Attempted Safety concerns   1 Step (Curb) CARE Score 88   4 Steps   Reason if not Attempted Safety concerns   4 Steps CARE Score 88   12 Steps   Reason if not Attempted Safety concerns   12 Steps CARE Score 88   Toilet Transfer   Type of Assistance Needed Physical assistance   Physical Assistance Level Total assistance   Comment Swap out technique recliner> commode; Haile to stand EOB   Toilet Transfer CARE Score 1   Toilet Transfer   Findings Patient able to stand briefly unsupported while toileting hygeine was being completed; Good Standing tolerance with toileting hygeine, no R knee buckling noted   Assessment   Treatment Assessment Patient participated in 116 minute skilled physical therapy session which focused on ambulation in BWSS to allow patient to ambulate longer distances and achieve higher intensity with walking. Initially she required MaxA to advance RLE but by end required ModA/Haile and demonstrated more fluidity in gait. At end of session, assisted pt into hospital gown and into bed with proper positioning. Call [facility] and all needs within reach. Pl an for PT to continue  to work on stand pivot transfers with hemiwalker with goal of progressing pt to SPT with other staff members. Continue with BWSS, gait training wiht hemiwalker, LE strengthening, transfer training. Pt remains slide board transfer outside of therapy sessions.   Problem List Decreased strength;Decreased range of motion;Decreased endurance;Impaired balance;Decreased mobility;Decreased coordination   Barriers to Discharge Inaccessible home environment;Decreased caregiver support   PT Barriers   Functional Limitation Car transfers;Stair negotiation;Standing;Transfers;Walking;Wheelchair management   Plan   Treatment/Interventions LE strengthening/ROM;Functional transfer training;Therapeutic exercise;Endurance training;Patient/family training;Equipment eval/education;Bed mobility;Gait training   Progress Progressing toward goals   PT Therapy Minutes   PT Time In 1300   PT Time Out 1456   PT Total Time (minutes) 116   PT Mode of treatment - Individual (minutes) 116   PT Mode of treatment - Concurrent (minutes) 0   PT Mode of treatment - Group (minutes) 0   PT Mode of treatment - Co-treat (minutes) 0   PT Mode of Treatment - Total time(minutes) 116 minutes   PT Cumulative Minutes 806   Therapy Time missed   Time missed? No     Mirta Mccord, PT, DPT

## 2024-08-29 LAB
ANION GAP SERPL CALCULATED.3IONS-SCNC: 8 MMOL/L (ref 4–13)
BASOPHILS # BLD AUTO: 0.06 THOUSANDS/ΜL (ref 0–0.1)
BASOPHILS NFR BLD AUTO: 1 % (ref 0–1)
BUN SERPL-MCNC: 24 MG/DL (ref 5–25)
CALCIUM SERPL-MCNC: 9.4 MG/DL (ref 8.4–10.2)
CHLORIDE SERPL-SCNC: 102 MMOL/L (ref 96–108)
CO2 SERPL-SCNC: 27 MMOL/L (ref 21–32)
CREAT SERPL-MCNC: 0.66 MG/DL (ref 0.6–1.3)
EOSINOPHIL # BLD AUTO: 0.23 THOUSAND/ΜL (ref 0–0.61)
EOSINOPHIL NFR BLD AUTO: 3 % (ref 0–6)
ERYTHROCYTE [DISTWIDTH] IN BLOOD BY AUTOMATED COUNT: 13.6 % (ref 11.6–15.1)
GFR SERPL CREATININE-BSD FRML MDRD: 92 ML/MIN/1.73SQ M
GLUCOSE P FAST SERPL-MCNC: 93 MG/DL (ref 65–99)
GLUCOSE SERPL-MCNC: 93 MG/DL (ref 65–140)
HCT VFR BLD AUTO: 39.5 % (ref 34.8–46.1)
HGB BLD-MCNC: 12.5 G/DL (ref 11.5–15.4)
IMM GRANULOCYTES # BLD AUTO: 0.03 THOUSAND/UL (ref 0–0.2)
IMM GRANULOCYTES NFR BLD AUTO: 0 % (ref 0–2)
LYMPHOCYTES # BLD AUTO: 1.47 THOUSANDS/ΜL (ref 0.6–4.47)
LYMPHOCYTES NFR BLD AUTO: 17 % (ref 14–44)
MCH RBC QN AUTO: 30 PG (ref 26.8–34.3)
MCHC RBC AUTO-ENTMCNC: 31.6 G/DL (ref 31.4–37.4)
MCV RBC AUTO: 95 FL (ref 82–98)
MONOCYTES # BLD AUTO: 0.45 THOUSAND/ΜL (ref 0.17–1.22)
MONOCYTES NFR BLD AUTO: 5 % (ref 4–12)
NEUTROPHILS # BLD AUTO: 6.39 THOUSANDS/ΜL (ref 1.85–7.62)
NEUTS SEG NFR BLD AUTO: 74 % (ref 43–75)
NRBC BLD AUTO-RTO: 0 /100 WBCS
PLATELET # BLD AUTO: 287 THOUSANDS/UL (ref 149–390)
PMV BLD AUTO: 10.6 FL (ref 8.9–12.7)
POTASSIUM SERPL-SCNC: 4.2 MMOL/L (ref 3.5–5.3)
RBC # BLD AUTO: 4.16 MILLION/UL (ref 3.81–5.12)
SODIUM SERPL-SCNC: 137 MMOL/L (ref 135–147)
WBC # BLD AUTO: 8.63 THOUSAND/UL (ref 4.31–10.16)

## 2024-08-29 PROCEDURE — 97530 THERAPEUTIC ACTIVITIES: CPT

## 2024-08-29 PROCEDURE — 99232 SBSQ HOSP IP/OBS MODERATE 35: CPT | Performed by: PHYSICAL MEDICINE & REHABILITATION

## 2024-08-29 PROCEDURE — 97535 SELF CARE MNGMENT TRAINING: CPT

## 2024-08-29 PROCEDURE — 94660 CPAP INITIATION&MGMT: CPT

## 2024-08-29 PROCEDURE — 94760 N-INVAS EAR/PLS OXIMETRY 1: CPT

## 2024-08-29 PROCEDURE — 80048 BASIC METABOLIC PNL TOTAL CA: CPT | Performed by: NURSE PRACTITIONER

## 2024-08-29 PROCEDURE — 97112 NEUROMUSCULAR REEDUCATION: CPT

## 2024-08-29 PROCEDURE — 99231 SBSQ HOSP IP/OBS SF/LOW 25: CPT | Performed by: NURSE PRACTITIONER

## 2024-08-29 PROCEDURE — 85025 COMPLETE CBC W/AUTO DIFF WBC: CPT | Performed by: NURSE PRACTITIONER

## 2024-08-29 PROCEDURE — 97130 THER IVNTJ EA ADDL 15 MIN: CPT

## 2024-08-29 PROCEDURE — 97129 THER IVNTJ 1ST 15 MIN: CPT

## 2024-08-29 RX ADMIN — HYDRALAZINE HYDROCHLORIDE 75 MG: 50 TABLET ORAL at 21:34

## 2024-08-29 RX ADMIN — B-COMPLEX W/ C & FOLIC ACID TAB 1 TABLET: TAB at 09:05

## 2024-08-29 RX ADMIN — LEVOTHYROXINE SODIUM 25 MCG: 25 TABLET ORAL at 05:44

## 2024-08-29 RX ADMIN — HYDRALAZINE HYDROCHLORIDE 75 MG: 50 TABLET ORAL at 05:44

## 2024-08-29 RX ADMIN — ACETAMINOPHEN 650 MG: 325 TABLET ORAL at 09:59

## 2024-08-29 RX ADMIN — SPIRONOLACTONE 25 MG: 25 TABLET, FILM COATED ORAL at 13:21

## 2024-08-29 RX ADMIN — NIFEDIPINE 60 MG: 30 TABLET, FILM COATED, EXTENDED RELEASE ORAL at 17:45

## 2024-08-29 RX ADMIN — RIVAROXABAN 20 MG: 20 TABLET, FILM COATED ORAL at 17:45

## 2024-08-29 RX ADMIN — CARVEDILOL 25 MG: 25 TABLET, FILM COATED ORAL at 17:45

## 2024-08-29 RX ADMIN — PANTOPRAZOLE SODIUM 40 MG: 20 TABLET, DELAYED RELEASE ORAL at 05:44

## 2024-08-29 RX ADMIN — Medication 2 G: at 09:05

## 2024-08-29 RX ADMIN — ATORVASTATIN CALCIUM 40 MG: 40 TABLET, FILM COATED ORAL at 17:45

## 2024-08-29 RX ADMIN — LOSARTAN POTASSIUM 100 MG: 50 TABLET, FILM COATED ORAL at 09:05

## 2024-08-29 RX ADMIN — CARVEDILOL 25 MG: 25 TABLET, FILM COATED ORAL at 09:05

## 2024-08-29 RX ADMIN — LIDOCAINE 1 PATCH: 700 PATCH TOPICAL at 17:45

## 2024-08-29 RX ADMIN — HYDRALAZINE HYDROCHLORIDE 75 MG: 50 TABLET ORAL at 15:24

## 2024-08-29 NOTE — PROGRESS NOTES
08/29/24 0830   Pain Assessment   Pain Assessment Tool 0-10   Restrictions/Precautions   Precautions Bed/chair alarms;Cognitive;Fall Risk;Contact/isolation;Supervision on toilet/commode   Weight Bearing Restrictions No   ROM Restrictions No   Cognition   Overall Cognitive Status WFL   Arousal/Participation Alert;Cooperative   Attention Within functional limits   Orientation Level Oriented X4   Memory Within functional limits   Following Commands Follows one step commands without difficulty   Assessment   Treatment Assessment Patient participated in 30 minute skilled physical therapy session focusing on stroke education. Please see separate note for stroke education provided. Patient will benefit from continued skilled PT services to work on maximizing safety and independence with functional mobility. PT to focus on gait training with hemiwalker, strengthening, BWSS, and NMR/NPP.   Problem List Decreased strength;Decreased range of motion;Decreased endurance;Impaired balance;Decreased mobility;Decreased coordination   Barriers to Discharge Inaccessible home environment;Decreased caregiver support   PT Barriers   Functional Limitation Car transfers;Stair negotiation;Standing;Transfers;Walking   Plan   Treatment/Interventions Functional transfer training;LE strengthening/ROM;Endurance training;Therapeutic exercise;Patient/family training;Bed mobility;Gait training   PT Therapy Minutes   PT Time In 0830   PT Time Out 0900   PT Total Time (minutes) 30   PT Mode of treatment - Individual (minutes) 30   PT Mode of treatment - Concurrent (minutes) 0   PT Mode of treatment - Group (minutes) 0   PT Mode of treatment - Co-treat (minutes) 0   PT Mode of Treatment - Total time(minutes) 30 minutes   PT Cumulative Minutes 836   Therapy Time missed   Time missed? No     Mirta Mccord, PT, DPT

## 2024-08-29 NOTE — PROGRESS NOTES
08/29/24 1000   Sit to Stand   Type of Assistance Needed Physical assistance   Physical Assistance Level 25% or less   Comment Min A from w/c with bed rail; HW and gait belt donned   Sit to Stand CARE Score 3   Bed-Chair Transfer   Type of Assistance Needed Physical assistance   Physical Assistance Level 26%-50%   Comment Min A for SBT to R and L sides; use of gait belt and partial R knee block. pt demo's great progress with bumping across board and hand placement. PT is still focusing on SPT with HW and will be goal for OT to complete as well in upcoming sessions.   Chair/Bed-to-Chair Transfer CARE Score 3   Toileting Hygiene   Type of Assistance Needed Physical assistance   Physical Assistance Level Total assistance   Comment 2nd person present however not needed. pt stands at bed rail with ability to stand unsupported and assist with CM up/down on L side. does require assist for R side CM. requires assist at this time for hygiene. pt reports she is R hand dominant and so hygiene is diffcult at this time and especially due to body habitus.   Toileting Hygiene CARE Score 1   Toilet Transfer   Type of Assistance Needed Physical assistance   Physical Assistance Level Total assistance   Comment swap out tech to PF DA BSC at bed rail. 2nd person assisting with swap out of commode/wc, and this SCHILLING assisting with standing balance. plan to progress pt to SBT to PF DA BSC only and complete toileting in stance with HW in upcoming sessions.   Toilet Transfer CARE Score 1   Neuromuscular Education   Functional Movement Patterns seated EOM, pt engages in RUE WBing with arm extended focusing on increasing tricep strength and motor control. pt demo's ability to functionally reach across midline with LUE while WBing thru RUE--demo's good control through RUE with no elbow support needed, able to decent and control movement while pushing back up to neutral position w/o assistance. did not utilize e-stim this session on triceps  during WBing. did use e-stim to R wrist flexors in order to increase flexibility in finger flexors and wrist flexors for increased hand control and finger movement. of note, pt demo's ability to wiggle fingers, however remains non functional at this time, but is huge as pt has not demo'd any AROM previously. plan to continue to focus on use of e-stim to shoulder muscles, biceps, and wrist flexors/extensors as well as use of MAS to increase proxinal strength/ROM, scapular movement/mobility.   Cognition   Overall Cognitive Status WFL   Arousal/Participation Alert;Cooperative   Attention Within functional limits   Orientation Level Oriented X4   Memory Within functional limits   Following Commands Follows one step commands without difficulty   Activity Tolerance   Activity Tolerance Patient tolerated treatment well   Assessment   Treatment Assessment pt engages in 90 minute skilled OT Session focusing on toileting, SBT to R and L side, RUE NMR. see above for full func details. pt continues to demo good progress toward OT goals especially func transfers via SB, completing at Min A to R and L sides with good control and bumping across board. PT continues to focus on use of HW and will incorporate when safe to do so. plan to progress toilet tomorrow to SBT to PF DA BSC and complete toileting in stance with HW vs swap out tech. of note, pt with no active ROM noted previously, however demo's ability to wiggle R fingers which was a mood booster for pt. plan to continue to incorporate use of e-stim and MAS to increase motor recovery and func use of RUE. continue OT POC to focus on ADL retraining from w/c level, SBT vs HW SPT, RUE NMR, stroke education, standing payal/bal, in order to decrease burden of care at d/c.   Prognosis Good   Problem List Decreased strength;Decreased range of motion;Decreased endurance;Impaired balance;Decreased mobility;Decreased coordination   Plan   Treatment/Interventions ADL retraining;Functional  transfer training;Therapeutic exercise;Endurance training;Cognitive reorientation;Patient/family training;Equipment eval/education;Compensatory technique education   OT Therapy Minutes   OT Time In 1000   OT Time Out 1130   OT Total Time (minutes) 90   OT Mode of treatment - Individual (minutes) 90   OT Mode of treatment - Concurrent (minutes) 0   OT Mode of treatment - Group (minutes) 0   OT Mode of treatment - Co-treat (minutes) 0   OT Mode of Treatment - Total time(minutes) 90 minutes   OT Cumulative Minutes 820   Therapy Time missed   Time missed? No

## 2024-08-29 NOTE — PHYSICAL THERAPY NOTE
Stroke Education Series    Pt participated in skilled Stroke Education Series in an individual setting to address the topic of Stroke 101: Understanding the Basics of Stroke in both verbal and written formats. Education within this session reviewed the basic structural and functional components of the brain and included information on the causes of stroke, related signs/symptoms, risk factors, and the process of stroke rehabilitation. The goal of this education was to provide the patient with general understanding of how the brain functions and how a stroke can impact his/her functional mobility and independence. In addition, the intention of this education is to provide the patient with the information to reduce the risk of a second stroke. Following education, pt's response to education is: verbalizes understanding.    To individualize the education, the following topics were included based upon the patients' past and current medical history: atrial fibrillation, hypertension, and obesity .  Additional topics that were covered include contractures, spasticity, weakness, dysarthria, dysphagia, cognitive changes, depression, anxiety, and prevention of new conditions and/or worsening condition.     In stroke booklet education patient on changes caused by stroke, complications after stroke and how therapy can management complications, risk factors for stroke,       Start Time: 830    End Time: 900

## 2024-08-29 NOTE — ASSESSMENT & PLAN NOTE
Was seeing cardiologist in [place] and taking Xarelto but she has not seen him in 2 yrs and stopped all her meds >1 yr ago  EKG in the hospital showed NSR  Needs to followup as OP with Cardiology = Dr Patt Zhang Coreg  Was not placed back on Flecainide  Now on Xarelto   Sent price check to her pharmacy on 8/21/24 for the Xarelto and is $661.99  Sent price check on Eliquis but doubtful that will be much better.  If not, she will possibly need to be converted to Coumadin with a therapeutic Lovenox bridge.  Coupon application for Xarelto supposedly completed. Pt says will be no cost.  Will have to give the coupon code number to the OP pharmacy.

## 2024-08-29 NOTE — PROGRESS NOTES
Physical Medicine and Rehabilitation Progress Note  Maye Lilly 64 y.o. female MRN: [id number]  Unit/Bed#: -01 Encounter: [id number]    To Review:   Maye Lilly is a 64 y.o. female with PMH of afib (prev on Eliquis, now on Xarelto), HTN, hypothyroidism, DIANELYS, morbid obesity, GERD who presented to the [facility] with sudden onset R sided weakness with tingling down RUE. She also had slurred speech and a sudden headache and emesis. Denied chest pain, SOB, abd pain, visual deficits on admission. CBC, CMP, trop, urinalysis, tox screen negative. CTA of head and neck negative for LVO. CT C/A/P with  no acute abnormalities except mild sigmoid diverticulitis and thyromegaly. CTH wthout acute infarction or hemorrhage, though calcified meningioma noted along the falx without mass effect. MRI brain w/o contrast showed L pontine infarct. CT of thoracolumbar area without acute fx or dislocations.  Pt with significantly elevated BP on admission (230-240 systolic). No TNK given out of window and no obvious infarct + already on anticoagulation. She was continued on xarelto.  Hospital course complicate by diarrhea, pt found to be C Diff positive, started on vancomycin.    Chief Complaint: R-hemibody paresis    Interval History/Subjective:  Pt endorses stable/mildly improving paresis of RUE and RLE, she is able to wiggle her fingers in the R hand today. She notes she is eating, sleeping, passing bowel/bladder movements without issue. She notes therapy is progressing well. Denies worsening weakness, loss of sensation, headache, bowel/bladder incontinence, choking/coughing with PO intake. Denies diarrhea at this time, PO vanc course completed.    ROS:  As noted in subjective above    Today's Changes:  No changes.    Assessment/Plan:    * Acute CVA (cerebrovascular accident) (HCC)  Assessment & Plan  - admitted to acute care with slurred speech, headache, tingling in R arm  - 0/5 RUE strength,  1-2/5 in RLE  - CTH with meningioma along falx, but no infarct/hemorrhage. CTA with no LVO. MRI with L pontine infarct.  - Did not receive tPA/TNKase, did not undergo thrombectomy  - echo with LVH, normal EF, mild diastolic dysfunction    Plan:  - fall precautions  - Monitor for neurogenic bowel and bladder  - Promote regular sleep/wake cycles and proper sleep hygiene  - Monitor closely for hemiparetic shoulder pain and post-stroke pain  -  Neutral resting wrist splint for the RUE and bilateral multipodus boots to be worn at night and while in bed during the day to prevent developing spasticity and contracture  - Maintain adequate nutrition  - Avoid sedating and anticholinergic medications as possible  - Maintain normotension, s/p permissive hypertension  - Continue Xarelto 20mg daily  - Continue Atorvastatin 40 mg qHs for secondary prevention  - multipodus boots for RLE        HTN (hypertension)  Assessment & Plan  - pt reportedly nonadherent to HTN med regimen due to financial constraints  - management per IM  - continue coreg 25mg BID, nifedipine 60mg daily, aldactone 25mg daily, valsartan 160 daily,     Paroxysmal atrial fibrillation (HCC)  Assessment & Plan  - prev on Eliquis  - switched to Xarelto in acute care    Plan:  - continue Xarelto 20mg daily  - continue coreg    Clostridium difficile infection  Assessment & Plan  - pt with diarrhea in acute care  - Cdiff positive PCR  - started PO vanc in acute care    Plan:  - PO vanc course completed   - continue contact isolation precautions per [facility] guidelines, but pt now able to go to [facility] gym    Hypothyroidism  Assessment & Plan  - continue levothyroxine 25mcg daily    Meningioma (HCC)  Assessment & Plan  - [id number] meningioma along falx, no mass effect or vasogenic edema  - continue to monitor, outpatient follow up with neurology as needed.    GERD (gastroesophageal reflux disease)  Assessment & Plan  - continue protonix 40mg  BID            Objective:    Allergies per EMR    Physical Exam:  Temp:  [97.8 °F (36.6 °C)-98.3 °F (36.8 °C)] 98.2 °F (36.8 °C)  HR:  [68-96] 96  Resp:  [16-18] 18  BP: (136-147)/(62-73) 143/73  Oxygen Therapy  SpO2: 94 %      Gen: No acute distress, Well-nourished, well-appearing.  HEENT: Moist mucus membranes  Cardiovascular: No LE edema  Pulmonary: Non-labored beathing  : No dexter  GI: non-distended.   MSK: ROM is full in all extremities. No effusions or deformities. Bulk is symmetric.   Integumentary: Skin is warm, dry. No rashes or ulcers.  Neuro: Alert and oriented, Speech is mildly dysarthric but organized, comprehension are intact. Mild R sided facial droop.  Appropriate to questioning.   Psych: Normal mood and affect.     Diagnostic Studies: reviewed, no new imaging  No results found.    Laboratory:    Results from last 7 days   Lab Units 08/29/24  0536 08/26/24  0555   HEMOGLOBIN g/dL 12.5 13.6   HEMATOCRIT % 39.5 42.2   WBC Thousand/uL 8.63 7.93     Results from last 7 days   Lab Units 08/29/24  0536 08/26/24  0555   BUN mg/dL 24 23   POTASSIUM mmol/L 4.2 4.3   CHLORIDE mmol/L 102 103   CREATININE mg/dL 0.66 0.69            Functional Update:  Physical Therapy Occupational Therapy Speech Therapy   Weight Bearing Status: Full Weight Bearing  Transfers: Assist of 2 (Haile for slide board transfers with second person SBA for safety; ModA/MaxAx1 with second person Haile/CGA for SPT transfers)  Bed Mobility: Assist of 2  Amulation Distance (ft): 20 feet  Ambulation: Total Assistance (MaxA for [employer] of RLE and to prevent scissoring with WC follow for safety)  Assistive Device for Ambulation: Ismael Walker  Wheelchair Mobility Distance: 150 ft  Wheelchair Mobility: Minimal Assistance  Assistive Device for Stairs:  (not bale to safely assess)  Stair Assistance:  (not appropriate at this time)  Discharge Recommendations:  (TBD pending progress/reteam)   Eating: Set up  Grooming: Total Assistance, Assist of  2  Bathing: Total Assistance, Assist of 2  Bathing: Total Assistance, Assist of 2  Upper Body Dressing: Maximum Assistance  Lower Body Dressing: Total Assistance, Assist of 2  Toileting: Total Assistance, Assist of 2  Tub/Shower Transfer: Total Assistance, Assist of 2  Toilet Transfer: Total Assistance, Assist of 2  Cognition: Exceptions to WNL (scored mild in ST cogntive assessment)  Orientation: Person, Place, Situation   Mode of Communication: Verbal  Speech/Language: Dysarthia  Cognition: Exceptions to WNL  Cognition: Decreased Memory, Decreased Executive Functions, Decreased Attention  Orientation: Person, Place, Time, Situation  Discharge Recommendations: Home with:  DC Home with:: 24 Hour Supervision, 24 Hour Assisteance, Family Support         Patient Active Problem List   Diagnosis    Acute CVA (cerebrovascular accident) (HCC)    Clostridium difficile infection    Paroxysmal atrial fibrillation (HCC)    HTN (hypertension)    DIANELYS (obstructive sleep apnea)    Hypothyroidism    GERD (gastroesophageal reflux disease)    Meningioma (HCC)         Medications  Current Facility-Administered Medications   Medication Dose Route Frequency Provider Last Rate    acetaminophen  650 mg Oral Q6H PRN WENCESLAO Patel      atorvastatin  40 mg Oral QPM WENCESLAO Patel      bisacodyl  10 mg Rectal Daily PRN Dougie King MD      carvedilol  25 mg Oral BID With Meals WENCESLAO Paetl      Diclofenac Sodium  2 g Topical BID Dk De Dios MD      hydrALAZINE  75 mg Oral Q8H [facility] WENCESLAO Patel      levothyroxine  25 mcg Oral Early Morning WENCESLAO Patel      lidocaine  1 patch Topical Daily Dk De Dios MD      losartan  100 mg Oral Daily WENCESLAO Patel      multivitamin stress formula  1 tablet Oral Daily WENCESLAO Patel      NIFEdipine  60 mg Oral Daily With Dinner WENCESLAO Patel      pantoprazole  40 mg Oral Early  Morning WENCESLAO Patel      rivaroxaban  20 mg Oral Daily With Dinner WENCESLAO Patel      spironolactone  25 mg Oral Daily WENCESLAO Patel            Case discussed with attending Dr. Lanre King MD  Physical Medicine and Rehabilitation, PGY-2

## 2024-08-29 NOTE — PROGRESS NOTES
[facility]  Progress Note  Name: Maye Lilly I  MRN: [id number]  Unit/Bed#: -01 I Date of Admission: 8/20/2024   Date of Service: 8/29/2024 I Hospital Day: 9    Assessment & Plan   * Acute CVA (cerebrovascular accident) (HCC)  Assessment & Plan  Presented with right sided weakness, slurred speech, headache and vomiting  MRI = left pontine infarct  Neuro felt CVA was from atrial fibrillation in setting of non-compliance with anticoagulation  Currently now on Xarelto 20mg qd, Lipitor 40mg qd  Followup with Neuro as OP.  Stevo Baires from [facility] from that group was listed on the DC summary    Clostridium difficile infection  Assessment & Plan  New during hospital stay  Says no BMs so far today 8/21/24  Continue Vancomycin 125mg QID.  Last dose was 8/26/24 at 0900  Diarrhea has resolved and stools are formed    HTN (hypertension)  Assessment & Plan  Home:  no meds  Here:  Coreg 25mg BID/Hydralazine 75 mg q8hrs/Nifedipine 60mg daily at dinner time/Aldactone 25mg daily/Losartan 100mg qd  Stable; no changes today 8/29/24  Followup with IM as OP.  On DC summary was listed to see Megan DORSEY from that group    Meningioma (HCC)  Assessment & Plan  Incidental finding   OP followup    Paroxysmal atrial fibrillation (HCC)  Assessment & Plan  Was seeing cardiologist in [place] and taking Xarelto but she has not seen him in 2 yrs and stopped all her meds >1 yr ago  EKG in the hospital showed NSR  Needs to followup as OP with Cardiology = Dr Beltre  Continue Coreg  Was not placed back on Flecainide  Now on Xarelto   Sent price check to her pharmacy on 8/21/24 for the Xarelto and is $661.99  Sent price check on Eliquis but doubtful that will be much better.  If not, she will possibly need to be converted to Coumadin with a therapeutic Lovenox bridge.  Coupon application for Xarelto supposedly completed. Pt says will be no cost.  Will have to give the coupon code number to the  OP pharmacy.             The above assessment and plan was reviewed and updated as determined by my evaluation of the patient on 8/29/2024.    Labs:   Results from last 7 days   Lab Units 08/29/24  0536 08/26/24  0555   WBC Thousand/uL 8.63 7.93   HEMOGLOBIN g/dL 12.5 13.6   HEMATOCRIT % 39.5 42.2   PLATELETS Thousands/uL 287 293     Results from last 7 days   Lab Units 08/29/24  0536 08/26/24  0555   SODIUM mmol/L 137 140   POTASSIUM mmol/L 4.2 4.3   CHLORIDE mmol/L 102 103   CO2 mmol/L 27 27   BUN mg/dL 24 23   CREATININE mg/dL 0.66 0.69   CALCIUM mg/dL 9.4 10.0                   Imaging  No orders to display       Review of Scheduled Meds:  Current Facility-Administered Medications   Medication Dose Route Frequency Provider Last Rate    acetaminophen  650 mg Oral Q6H PRN WENCESLAO Patel      atorvastatin  40 mg Oral QPM WENCESLAO Patel      bisacodyl  10 mg Rectal Daily PRN Dougie King MD      carvedilol  25 mg Oral BID With Meals WENCESLAO Patel      Diclofenac Sodium  2 g Topical BID Dk De Dios MD      hydrALAZINE  75 mg Oral Q8H BARB WENCESLAO Patel      levothyroxine  25 mcg Oral Early Morning WENCESLAO Patel      lidocaine  1 patch Topical Daily Dk De Dios MD      losartan  100 mg Oral Daily WENCESLAO Patel      multivitamin stress formula  1 tablet Oral Daily WENCESLAO Patel      NIFEdipine  60 mg Oral Daily With Dinner WENCESLAO Patel      pantoprazole  40 mg Oral Early Morning WENCESLAO Patel      rivaroxaban  20 mg Oral Daily With Dinner WENCESLAO Patel      spironolactone  25 mg Oral Daily WENCESLOA Patel         Subjective/ HPI: Patient seen and examined. Patients overnight issues or events were reviewed with nursing staff. New or overnight issues include the following:     Making progress she states. She was able to move her finger on her right hand  today and has sensation. Not able to life her right leg yet.     ROS:   A 10 point ROS was performed; negative except as noted above.        *Labs /Radiology studies Reviewed  *Medications  reviewed and reconciled as needed  *Please refer to order section for additional ordered labs studies      Physical Examination:  Vitals:   Vitals:    08/28/24 1437 08/28/24 2026 08/29/24 0350 08/29/24 0523   BP: 136/62 147/63  143/73   BP Location: Left arm Left arm  Left arm   Pulse: 68 74  96   Resp: 16 18  18   Temp: 98.3 °F (36.8 °C) 97.8 °F (36.6 °C)  98.2 °F (36.8 °C)   TempSrc: Oral Oral  Oral   SpO2: 97% 94% 92% 94%   Weight:       Height:           General Appearance: NAD; pleasant, OOB to chair   HEENT: conjuctiva normal; mucous membranes moist; face symmetrical  Neck:  Supple  Lungs: clear bilaterally, normal respiratory effort, no retractions, expiratory effort normal, on room air  CV: regular rate and rhythm, no murmurs rubs or gallops noted   ABD: soft non tender, +BS x4  EXT: DP pulses intact, no lymphadenopathy, no edema  Skin: normal turgor, normal texture, no rash  Psych: affect normal, mood normal  Neuro: AAOx3, right arm and leg weakness, right facial droop present      The above physical exam was reviewed and updated as determined by my evaluation of the patient on 8/29/2024.    Invasive Devices       None                      VTE Pharmacologic Prophylaxis: Xarelto  Code Status: Level 1 - Full Code  Current Length of Stay: 9 day(s)    Coordination of patient's care was performed in conjunction with consulting services. Time invested included review of patient's labs, vitals, and management of their comorbidities with continued monitoring, examination of patient as well as answering patient questions, documenting her findings and creating progress note in electronic medical record,  ordering appropriate diagnostic testing.       ** Please Note:  voice to text software may have been used in the creation of  this document. Although proof errors in transcription or interpretation are a potential of such software**

## 2024-08-29 NOTE — ASSESSMENT & PLAN NOTE
Home:  no meds  Here:  Coreg 25mg BID/Hydralazine 75 mg q8hrs/Nifedipine 60mg daily at dinner time/Aldactone 25mg daily/Losartan 100mg qd  Stable; no changes today 8/29/24  Followup with IM as OP.  On DC summary was listed to see Megan DORSEY from that group

## 2024-08-29 NOTE — PROGRESS NOTES
08/29/24 1400   Pain Assessment   Pain Assessment Tool 0-10   Pain Score No Pain   Restrictions/Precautions   Precautions Bed/chair alarms;Cognitive;Fall Risk;Contact/isolation;Supervision on toilet/commode   Comprehension   Comprehension (FIM) 5 - Understands basic directions and conversation   Expression   Expression (FIM) 5 - Needs help/cues only RARELY (< 10% of the time)   Social Interaction   Social Interaction (FIM) 6 - Interacts appropriately with others BUT requires extra  time   Problem Solving   Problem solving (FIM) 4 - Solves basic problems 75-89% of time   Memory   Memory (FIM) 4 - Recognizes/recalls/performs 75-89%   Speech/Language/Cognition Assessmetn   Treatment Assessment Patient seen for skilled ST to address cognitive linguistic skills.  Patient completing PT prior to skilled ST session.  Patient engaged in rapport building as patient new to this SLP's care.  Patient with good recall of benefit and purpose of skilled ST and previously completed tasks.  She recalled previous stroke education and BE FAST acronym.  Patient reports receiving an oral motor exercise program and completing daily.  Denies need for review or completion with SLP.  Slow rate of speech observed but overall speech was intelligible throughout session without need for clarifications or repetitions.  Initial structured task targeted drawing conclusions for identifying activities from descriptions.  Patient noted difficulty writing as she is right hand dominant.  She completed the task vis reading activity and providing answer verbally.  Task completed with 79% accuracy.  Mild verbal and gestural cues for expansion increased accuracy to 100%.  Next structured task targeted deduction puzzle requiring higher level problem solving skills.  Task completed via patient reading clues and verbally providing target for SLP to provide written answer in box.  Task completed with 100% accuracy.  Final task targeting IADL task of finance  management and problem solving for bill paying task.  Patient completed by circling correct answer and writing number of target question next to it as patient solution for difficulty writing answers.  Task completed with 80% accuracy; mod-max verbal and visual cues for scanning document increased accuracy to 100%.  Discussed POC to continue to target higher level problem solving and STM.  Patient in agreement.  Patient would continue to benefit from skilled ST services targeting reasoning and problem solving necessary for obtaining independence at the level she was at PTA and for reducing burden of care for family at time of discharge.   SLP Therapy Minutes   SLP Time In 1400   SLP Time Out 1500   SLP Total Time (minutes) 60   SLP Mode of treatment - Individual (minutes) 60   SLP Mode of treatment - Concurrent (minutes) 0   SLP Mode of treatment - Group (minutes) 0   SLP Mode of treatment - Co-treat (minutes) 0   SLP Mode of Treatment - Total time(minutes) 60 minutes   SLP Cumulative Minutes 420   Therapy Time missed   Time missed? No

## 2024-08-29 NOTE — ASSESSMENT & PLAN NOTE
New during hospital stay  Says no BMs so far today 8/21/24  Continue Vancomycin 125mg QID.  Last dose was 8/26/24 at 0900  Diarrhea has resolved and stools are formed

## 2024-08-29 NOTE — ASSESSMENT & PLAN NOTE
Presented with right sided weakness, slurred speech, headache and vomiting  MRI = left pontine infarct  Neuro felt CVA was from atrial fibrillation in setting of non-compliance with anticoagulation  Currently now on Xarelto 20mg qd, Lipitor 40mg qd  Followup with Neuro as OP.  [facility] on the DC summary

## 2024-08-30 PROCEDURE — 99231 SBSQ HOSP IP/OBS SF/LOW 25: CPT | Performed by: NURSE PRACTITIONER

## 2024-08-30 PROCEDURE — 97110 THERAPEUTIC EXERCISES: CPT

## 2024-08-30 PROCEDURE — 97530 THERAPEUTIC ACTIVITIES: CPT

## 2024-08-30 PROCEDURE — 97112 NEUROMUSCULAR REEDUCATION: CPT

## 2024-08-30 PROCEDURE — 97535 SELF CARE MNGMENT TRAINING: CPT

## 2024-08-30 PROCEDURE — 94760 N-INVAS EAR/PLS OXIMETRY 1: CPT

## 2024-08-30 PROCEDURE — 94660 CPAP INITIATION&MGMT: CPT

## 2024-08-30 PROCEDURE — 99232 SBSQ HOSP IP/OBS MODERATE 35: CPT | Performed by: PHYSICAL MEDICINE & REHABILITATION

## 2024-08-30 RX ADMIN — PANTOPRAZOLE SODIUM 40 MG: 20 TABLET, DELAYED RELEASE ORAL at 05:27

## 2024-08-30 RX ADMIN — LEVOTHYROXINE SODIUM 25 MCG: 25 TABLET ORAL at 05:27

## 2024-08-30 RX ADMIN — CARVEDILOL 25 MG: 25 TABLET, FILM COATED ORAL at 17:24

## 2024-08-30 RX ADMIN — NIFEDIPINE 60 MG: 30 TABLET, FILM COATED, EXTENDED RELEASE ORAL at 17:23

## 2024-08-30 RX ADMIN — HYDRALAZINE HYDROCHLORIDE 75 MG: 50 TABLET ORAL at 21:17

## 2024-08-30 RX ADMIN — LOSARTAN POTASSIUM 100 MG: 50 TABLET, FILM COATED ORAL at 08:03

## 2024-08-30 RX ADMIN — HYDRALAZINE HYDROCHLORIDE 75 MG: 50 TABLET ORAL at 14:47

## 2024-08-30 RX ADMIN — HYDRALAZINE HYDROCHLORIDE 75 MG: 50 TABLET ORAL at 05:27

## 2024-08-30 RX ADMIN — LIDOCAINE 1 PATCH: 700 PATCH TOPICAL at 17:25

## 2024-08-30 RX ADMIN — RIVAROXABAN 20 MG: 20 TABLET, FILM COATED ORAL at 17:24

## 2024-08-30 RX ADMIN — CARVEDILOL 25 MG: 25 TABLET, FILM COATED ORAL at 08:03

## 2024-08-30 RX ADMIN — Medication 2 G: at 08:06

## 2024-08-30 RX ADMIN — B-COMPLEX W/ C & FOLIC ACID TAB 1 TABLET: TAB at 08:03

## 2024-08-30 RX ADMIN — ATORVASTATIN CALCIUM 40 MG: 40 TABLET, FILM COATED ORAL at 17:24

## 2024-08-30 RX ADMIN — SPIRONOLACTONE 25 MG: 25 TABLET, FILM COATED ORAL at 08:04

## 2024-08-30 NOTE — PROGRESS NOTES
Physical Medicine and Rehabilitation Progress Note  Maye Lilly 64 y.o. female MRN: [id number]  Unit/Bed#: -01 Encounter: [id number]    To Review:   Maye Lilly is a 64 y.o. female with PMH of afib (prev on Eliquis, now on Xarelto), HTN, hypothyroidism, DIANELYS, morbid obesity, GERD who presented to the [facility] with sudden onset R sided weakness with tingling down RUE. She also had slurred speech and a sudden headache and emesis. Denied chest pain, SOB, abd pain, visual deficits on admission. CBC, CMP, trop, urinalysis, tox screen negative. CTA of head and neck negative for LVO. CT C/A/P with  no acute abnormalities except mild sigmoid diverticulitis and thyromegaly. CTH wthout acute infarction or hemorrhage, though calcified meningioma noted along the falx without mass effect. MRI brain w/o contrast showed L pontine infarct. CT of thoracolumbar area without acute fx or dislocations.  Pt with significantly elevated BP on admission (230-240 systolic). No TNK given out of window and no obvious infarct + already on anticoagulation. She was continued on xarelto.  Hospital course complicate by diarrhea, pt found to be C Diff positive, started on vancomycin.    Chief Complaint: R-hemibody paresis    Interval History/Subjective:  Pt endorses stable/mildly improving paresis of RUE and RLE, she is able to wiggle her fingers in the R hand today. She notes she is eating, sleeping, passing bowel/bladder movements without issue. She notes therapy is progressing well. Denies worsening weakness, loss of sensation, headache, bowel/bladder incontinence, choking/coughing with PO intake. Denies diarrhea at this time, PO vanc course completed.    ROS:  As noted in subjective above    Today's Changes:  No changes.    Assessment/Plan:    * Acute CVA (cerebrovascular accident) (HCC)  Assessment & Plan  - admitted to acute care with slurred speech, headache, tingling in R arm  - on admission: 0/5  RUE strength, 1-2/5 in RLE. On 8/29, pt seen wiggling fingers of R hand against at least gravity.  - CTH with meningioma along falx, but no infarct/hemorrhage. CTA with no LVO. MRI with L pontine infarct.  - Did not receive tPA/TNKase, did not undergo thrombectomy  - echo with LVH, normal EF, mild diastolic dysfunction    Plan:  - fall precautions  - Monitor for neurogenic bowel and bladder  - Promote regular sleep/wake cycles and proper sleep hygiene  - Monitor closely for hemiparetic shoulder pain and post-stroke pain  -  Neutral resting wrist splint for the RUE and bilateral multipodus boots to be worn at night and while in bed during the day to prevent developing spasticity and contracture  - Maintain adequate nutrition  - Avoid sedating and anticholinergic medications as possible  - Maintain normotension, s/p permissive hypertension  - Continue Xarelto 20mg daily  - Continue Atorvastatin 40 mg qHs for secondary prevention  - multipodus boots for RLE        HTN (hypertension)  Assessment & Plan  - pt reportedly nonadherent to HTN med regimen due to financial constraints  - management per IM  - continue coreg 25mg BID, nifedipine 60mg daily, aldactone 25mg daily, valsartan 160 daily,     Paroxysmal atrial fibrillation (HCC)  Assessment & Plan  - prev on Eliquis  - switched to Xarelto in acute care    Plan:  - continue Xarelto 20mg daily  - continue coreg    Clostridium difficile infection  Assessment & Plan  - pt with diarrhea in acute care  - Cdiff positive PCR  - started PO vanc in acute care    Plan:  - PO vanc course completed   - continue contact isolation precautions per [facility] guidelines, but pt now able to go to [facility] gym    Hypothyroidism  Assessment & Plan  - continue levothyroxine 25mcg daily    Meningioma (HCC)  Assessment & Plan  - [id number] meningioma along falx, no mass effect or vasogenic edema  - continue to monitor, outpatient follow up with neurology as needed.    GERD (gastroesophageal reflux  disease)  Assessment & Plan  - continue protonix 40mg BID            Objective:    Allergies per EMR    Physical Exam:  Temp:  [97.4 °F (36.3 °C)-98.1 °F (36.7 °C)] 98.1 °F (36.7 °C)  HR:  [68-71] 68  Resp:  [16-18] 18  BP: (116-124)/(55-60) 122/58  Oxygen Therapy  SpO2: 92 %      Gen: No acute distress, Well-nourished, well-appearing.  HEENT: Moist mucus membranes  Cardiovascular: No LE edema  Pulmonary: Non-labored beathing  : No dexter  GI: non-distended.   MSK: ROM is full in all extremities. No effusions or deformities. Bulk is symmetric.   Integumentary: Skin is warm, dry. No rashes or ulcers.  Neuro: Alert and oriented, Speech is mildly dysarthric but organized, comprehension are intact. Mild R sided facial droop.  Appropriate to questioning.   Psych: Normal mood and affect.     Diagnostic Studies: reviewed, no new imaging  No results found.    Laboratory:    Results from last 7 days   Lab Units 08/29/24  0536 08/26/24  0555   HEMOGLOBIN g/dL 12.5 13.6   HEMATOCRIT % 39.5 42.2   WBC Thousand/uL 8.63 7.93     Results from last 7 days   Lab Units 08/29/24  0536 08/26/24  0555   BUN mg/dL 24 23   POTASSIUM mmol/L 4.2 4.3   CHLORIDE mmol/L 102 103   CREATININE mg/dL 0.66 0.69            Functional Update:  Physical Therapy Occupational Therapy Speech Therapy   Weight Bearing Status: Full Weight Bearing  Transfers: Assist of 2 (Haile for slide board transfers with second person SBA for safety; ModA/MaxAx1 with second person Haile/CGA for SPT transfers)  Bed Mobility: Assist of 2  Amulation Distance (ft): 20 feet  Ambulation: Total Assistance (MaxA for [employer] of RLE and to prevent scissoring with WC follow for safety)  Assistive Device for Ambulation: Ismael Walker  Wheelchair Mobility Distance: 150 ft  Wheelchair Mobility: Minimal Assistance  Assistive Device for Stairs:  (not bale to safely assess)  Stair Assistance:  (not appropriate at this time)  Discharge Recommendations:  (TBD pending progress/reteam)    Eating: Set up  Grooming: Total Assistance, Assist of 2  Bathing: Total Assistance, Assist of 2  Bathing: Total Assistance, Assist of 2  Upper Body Dressing: Maximum Assistance  Lower Body Dressing: Total Assistance, Assist of 2  Toileting: Total Assistance, Assist of 2  Tub/Shower Transfer: Total Assistance, Assist of 2  Toilet Transfer: Total Assistance, Assist of 2  Cognition: Exceptions to WNL (scored mild in ST cogntive assessment)  Orientation: Person, Place, Situation   Mode of Communication: Verbal  Speech/Language: Dysarthia  Cognition: Exceptions to WNL  Cognition: Decreased Memory, Decreased Executive Functions, Decreased Attention  Orientation: Person, Place, Time, Situation  Discharge Recommendations: Home with:  DC Home with:: 24 Hour Supervision, 24 Hour Assisteance, Family Support         Patient Active Problem List   Diagnosis    Acute CVA (cerebrovascular accident) (HCC)    Clostridium difficile infection    Paroxysmal atrial fibrillation (HCC)    HTN (hypertension)    DIANELYS (obstructive sleep apnea)    Hypothyroidism    GERD (gastroesophageal reflux disease)    Meningioma (HCC)         Medications  Current Facility-Administered Medications   Medication Dose Route Frequency Provider Last Rate    acetaminophen  650 mg Oral Q6H PRN WENCESLAO Patel      atorvastatin  40 mg Oral QPM WENCESLAO Patel      bisacodyl  10 mg Rectal Daily PRN Dougie King MD      carvedilol  25 mg Oral BID With Meals WENCESLAO Patel      Diclofenac Sodium  2 g Topical BID Dk De Dios MD      hydrALAZINE  75 mg Oral Q8H [facility] WENCESLAO Patel      levothyroxine  25 mcg Oral Early Morning WENCESLAO Patel      lidocaine  1 patch Topical Daily Dk De Dios MD      losartan  100 mg Oral Daily WENCESLAO Patel      multivitamin stress formula  1 tablet Oral Daily WENCESLAO Patel      NIFEdipine  60 mg Oral Daily With Dinner Lucila Aceves  WENCESLAO Schulz      pantoprazole  40 mg Oral Early Morning WENCESLAO Patel      rivaroxaban  20 mg Oral Daily With Dinner WENCESLAO Patel      spironolactone  25 mg Oral Daily WENCESLAO Patel            Case discussed with attending Dr. Lanre King MD  Physical Medicine and Rehabilitation, PGY-2

## 2024-08-30 NOTE — PROGRESS NOTES
"   08/30/24 0901   Pain Assessment   Pain Assessment Tool 0-10   Pain Score No Pain   Restrictions/Precautions   Precautions Bed/chair alarms;Fall Risk;Contact/isolation;Supervision on toilet/commode   Weight Bearing Restrictions No   ROM Restrictions No   Braces or Orthoses Other (Comment)  (AFO with sneaker for all transfers, R UE sling)   Lifestyle   Autonomy \"I can move my fingers a little more than yesterday\"   Oral Hygiene   Type of Assistance Needed Set-up / clean-up   Physical Assistance Level No physical assistance   Comment seated in w/c at sink; set up assist to apply toothpaste on tooth brush   Oral Hygiene CARE Score 5   Grooming   Findings able to comb hair while seated in w/c at sink   Shower/Bathe Self   Type of Assistance Needed Physical assistance   Physical Assistance Level 76% or more   Comment full shower routine completed seated on shower bench. NO STANDING IN SHOWER AT THIS TIME. while SEATED, pt able to bathe chest, abdomen, under pannus, RUE, upper legs. requires assist for bathing LUE, lower legs/feet and daria/rear. (daria/rear bathing completed prior to showering during toileting in room). if pt were to  shower, anticipate Ax2 for safety due to inability to wear appropriate footwear (shoes/AFO)   Shower/Bathe Self CARE Score 2   Upper Body Dressing   Type of Assistance Needed Physical assistance   Physical Assistance Level 51%-75%   Comment pt able to assist in threading RUE through sports bra, able to thread head and LUE. requires TA to manage breasts into sports bra. pt able to assist in threading RUE through shirt, able to thread head and LUE, requires assist to adjust over trunk.   Upper Body Dressing CARE Score 2   Lower Body Dressing   Type of Assistance Needed Physical assistance   Physical Assistance Level 76% or more   Comment pt able to thread LUE into pant leg, requires assist for threading RLE and completing CM; TA for donning tab brief.   Lower Body Dressing CARE Score " 2   Putting On/Taking Off Footwear   Type of Assistance Needed Physical assistance   Physical Assistance Level Total assistance   Comment TA for managing footwear   Putting On/Taking Off Footwear CARE Score 1   Sit to Stand   Type of Assistance Needed Physical assistance   Physical Assistance Level 25% or less   Comment Min A for STS from varying surfaces to HW.   Sit to Stand CARE Score 3   Bed-Chair Transfer   Type of Assistance Needed Physical assistance   Physical Assistance Level Total assistance   Comment Min A for SBT to R and L sides;  trialed SPT with HW, giat belt donned at TA for SPT with HW--when transferring to L side SPT HW, Min A x1 for upper trunk balance and then max A for RLE management/control; when transferring to R side via SPT HW, Mod A for upper trunk balance, then Max A for RLE management/control.   Chair/Bed-to-Chair Transfer CARE Score 1   Toileting Hygiene   Type of Assistance Needed Physical assistance   Physical Assistance Level 76% or more   Comment plan was to trial toileting in stance with support from HW, however due to time constraints and pt not currently wearing pants (as she was scheduled for shower) assessment of toileting in stance did not occur. plan is to progress pt from swap outs/standing at bed rail for toileting to SBT to  [place] [facility] and toileting in stance with support from HW. pt requires assist for rear hygiene post BM with anterior lean.   Toileting Hygiene CARE Score 2   Toilet Transfer   Type of Assistance Needed Physical assistance   Physical Assistance Level 25% or less   Comment SBT to  DA BSC.   Toilet Transfer CARE Score 3   Neuromuscular Education   Functional Movement Patterns while seated, pt engages in RUE [facility] focusing on motor recovery with use of e-stim applied to R biceps. pt tolerates for 20 minutes with focus on passive elbow flexion, AAROM elbow extension with scapular mobility completing minimal scap retraction/protraction with arm extended. pt  jim's ability to perform minimal finger flexion and now minimal finger extension which is new from yesterday as well however remains non-functional at this time. pt remains highly motivated by NMES, MAS and tolerates well.   Cognition   Overall Cognitive Status WFL   Arousal/Participation Alert;Cooperative   Attention Within functional limits   Orientation Level Oriented X4   Memory Within functional limits   Following Commands Follows one step commands without difficulty   Activity Tolerance   Activity Tolerance Patient tolerated treatment well   Medical Staff Made Aware RN assessed R dried skin and rash on B/L feet   Assessment   Treatment Assessment pt engages in 120 minute skilled OT Session focusing on full shower routine, func transfers including SBT and SPT with HW, and RUE NMR. see above for full func details. pt continues to demo slow and steady progress toward OT Goals, remains limited in ADL scores due to ongoing R sided weakness however is jim'ing slow motor recovery more distally than proximally in her UE; plan to continue to use e-stim and MAS to increase AROM, strength with focus on motor recovery. trialed SPT with HW this session with ability to safely complete when transferring/leading with L side vs R side. plan to continue to assess SPT with HW as pt's goal is to get away from use of SB. pt remains highly motivated to participate in all therapies. recommend continued skilled care to focus on ADL retraining, func transfers SPT with HW Ax2 vs SBT, RUE NMR, stroke education, in order to decrease burden of care at d/c.   Prognosis Good   Problem List Decreased strength;Decreased range of motion;Decreased endurance;Impaired balance;Decreased mobility;Decreased coordination   Plan   Treatment/Interventions ADL retraining;Functional transfer training;Therapeutic exercise;Endurance training;Patient/family training;Equipment eval/education;Compensatory technique education   OT Therapy Minutes   OT Time  In 0900   OT Time Out 1100   OT Total Time (minutes) 120   OT Mode of treatment - Individual (minutes) 120   OT Mode of treatment - Concurrent (minutes) 0   OT Mode of treatment - Group (minutes) 0   OT Mode of treatment - Co-treat (minutes) 0   OT Mode of Treatment - Total time(minutes) 120 minutes   OT Cumulative Minutes 940   Therapy Time missed   Time missed? No

## 2024-08-30 NOTE — PLAN OF CARE
Problem: PAIN - ADULT  Goal: Verbalizes/displays adequate comfort level or baseline comfort level  Description: Interventions:  - Encourage patient to monitor pain and request assistance  - Assess pain using appropriate pain scale  - Administer analgesics based on type and severity of pain and evaluate response  - Implement non-pharmacological measures as appropriate and evaluate response  - Consider cultural and social influences on pain and pain management  - Notify physician/advanced practitioner if interventions unsuccessful or patient reports new pain  Outcome: Progressing     Problem: INFECTION - ADULT  Goal: Absence or prevention of progression during hospitalization  Description: INTERVENTIONS:  - Assess and monitor for signs and symptoms of infection  - Monitor lab/diagnostic results  - Monitor all insertion sites, i.e. indwelling lines, tubes, and drains  - Monitor endotracheal if appropriate and nasal secretions for changes in amount and color  - [place] appropriate cooling/warming therapies per order  - Administer medications as ordered  - Instruct and encourage patient and family to use good hand hygiene technique  - Identify and instruct in appropriate isolation precautions for identified infection/condition  Outcome: Progressing

## 2024-08-30 NOTE — ASSESSMENT & PLAN NOTE
Was seeing cardiologist in [place] and taking Xarelto but she has not seen him in 2 yrs and stopped all her meds >1 yr ago  EKG in the hospital showed NSR  Needs to followup as OP with Cardiology = Dr Patt Zhang Coreg  Was not placed back on Flecainide  Now on Xarelto   Sent price check to her pharmacy on 8/21/24 for the Xarelto and is $661.99  Sent price check on Eliquis but doubtful that will be much better.  If not, she will possibly need to be converted to Coumadin with a therapeutic Lovenox bridge.  Coupon application for Xarelto supposedly completed. Pt says will be no cost.  Will have to give the coupon code number to the OP pharmacy.

## 2024-08-30 NOTE — PROGRESS NOTES
08/30/24 1230   Pain Assessment   Pain Assessment Tool 0-10   Restrictions/Precautions   Precautions Bed/chair alarms;Fall Risk;Contact/isolation;Supervision on toilet/commode   Braces or Orthoses Other (Comment)  (AFO with sneaker for all transfers, R UE sling)   Subjective   Subjective Pt agreeable to perform skilled PT   Sit to Lying   Type of Assistance Needed Physical assistance   Physical Assistance Level 26%-50%   Comment Min/ModA for management of LEs   Sit to Lying CARE Score 3   Sit to Stand   Type of Assistance Needed Physical assistance   Physical Assistance Level 25% or less   Sit to Stand CARE Score 3   Bed-Chair Transfer   Type of Assistance Needed Physical assistance   Physical Assistance Level Total assistance   Comment L side SPT HW, Min A x1 for upper trunk balance and then max A for RLE management/control; when transferring to R side via SPT HW,   Chair/Bed-to-Chair Transfer CARE Score 1   Transfer Bed/Chair/Wheelchair   Adaptive Equipment Transfer Board;Ismael Walker   Car Transfer   Reason if not Attempted Safety concerns   Car Transfer CARE Score 88   Walk 10 Feet   Type of Assistance Needed Physical assistance;Adaptive equipment   Physical Assistance Level Total assistance   Comment Left Handrail and HW   Walk 10 Feet CARE Score 1   Walk 50 Feet with Two Turns   Type of Assistance Needed Physical assistance;Adaptive equipment   Physical Assistance Level Total assistance   Comment HW   Walk 50 Feet with Two Turns CARE Score 1   Walk 150 Feet   Reason if not Attempted Safety concerns   Walk 150 Feet CARE Score 88   Walking 10 Feet on Uneven Surfaces   Reason if not Attempted Safety concerns   Walking 10 Feet on Uneven Surfaces CARE Score 88   Ambulation   Primary Mode of Locomotion Prior to Admission Walk   Distance Walked (feet) 70 ft  (50 , 40 , 30 feet with HW)   Assist Device Ismael Walker   Does the patient walk? 2. Yes   Curb or Single Stair   Reason if not Attempted Safety concerns   1  Step (Curb) CARE Score 88   4 Steps   Reason if not Attempted Safety concerns   4 Steps CARE Score 88   12 Steps   Reason if not Attempted Safety concerns   12 Steps CARE Score 88   Picking Up Object   Reason if not Attempted Safety concerns   Picking Up Object CARE Score 88   Toilet Transfer   Type of Assistance Needed Physical assistance   Physical Assistance Level 25% or less   Comment BS   Toilet Transfer CARE Score 3   Therapeutic Interventions   Flexibility manual stretch right adductor   Balance static standing   Equipment Use   NuStep lvl 1 for 8 min   Assessment   Treatment Assessment pt perform skilled PT focus on SPT with RW , pt using AFO RLE , pt able to walking and ambulation using left HR and assist with RLE planing and VC for step sequence and gait pattern a little faster . Pt also using HW up 70 feet with assist and block RLE  overall pt improving , pt also will need cont work on  steps today blocking Right knee and LLE on top of  steps 2 inch 2x5 . Trail Nu Step bike to her tolerance , improving and cont working strengthening and conditioning and lateral WB on RLE , pt progressing toward DC goals [facility] and then supine in bed with all needs in reach . cont POC   Barriers to Discharge Inaccessible home environment;Decreased caregiver support   Plan   Progress Progressing toward goals   Discharge Recommendation   Rehab Resource Intensity Level, PT   (TBD pending progress)   PT Therapy Minutes   PT Time In 1230   PT Time Out 1400   PT Total Time (minutes) 90   PT Mode of treatment - Individual (minutes) 90   PT Mode of treatment - Concurrent (minutes) 0   PT Mode of treatment - Group (minutes) 0   PT Mode of treatment - Co-treat (minutes) 0   PT Mode of Treatment - Total time(minutes) 90 minutes   PT Cumulative Minutes 1021   Therapy Time missed   Time missed? No

## 2024-08-31 PROCEDURE — 94760 N-INVAS EAR/PLS OXIMETRY 1: CPT

## 2024-08-31 PROCEDURE — 97530 THERAPEUTIC ACTIVITIES: CPT

## 2024-08-31 PROCEDURE — 97535 SELF CARE MNGMENT TRAINING: CPT

## 2024-08-31 PROCEDURE — 97112 NEUROMUSCULAR REEDUCATION: CPT

## 2024-08-31 PROCEDURE — 94660 CPAP INITIATION&MGMT: CPT

## 2024-08-31 RX ADMIN — CARVEDILOL 25 MG: 25 TABLET, FILM COATED ORAL at 09:12

## 2024-08-31 RX ADMIN — PANTOPRAZOLE SODIUM 40 MG: 20 TABLET, DELAYED RELEASE ORAL at 06:20

## 2024-08-31 RX ADMIN — ACETAMINOPHEN 650 MG: 325 TABLET ORAL at 09:11

## 2024-08-31 RX ADMIN — CARVEDILOL 25 MG: 25 TABLET, FILM COATED ORAL at 17:12

## 2024-08-31 RX ADMIN — RIVAROXABAN 20 MG: 20 TABLET, FILM COATED ORAL at 17:12

## 2024-08-31 RX ADMIN — Medication 2 G: at 21:05

## 2024-08-31 RX ADMIN — B-COMPLEX W/ C & FOLIC ACID TAB 1 TABLET: TAB at 09:11

## 2024-08-31 RX ADMIN — SPIRONOLACTONE 25 MG: 25 TABLET, FILM COATED ORAL at 13:12

## 2024-08-31 RX ADMIN — HYDRALAZINE HYDROCHLORIDE 75 MG: 50 TABLET ORAL at 06:19

## 2024-08-31 RX ADMIN — NIFEDIPINE 60 MG: 30 TABLET, FILM COATED, EXTENDED RELEASE ORAL at 17:12

## 2024-08-31 RX ADMIN — HYDRALAZINE HYDROCHLORIDE 75 MG: 50 TABLET ORAL at 13:11

## 2024-08-31 RX ADMIN — Medication 2 G: at 09:26

## 2024-08-31 RX ADMIN — ACETAMINOPHEN 650 MG: 325 TABLET ORAL at 22:11

## 2024-08-31 RX ADMIN — ATORVASTATIN CALCIUM 40 MG: 40 TABLET, FILM COATED ORAL at 17:12

## 2024-08-31 RX ADMIN — LIDOCAINE 1 PATCH: 700 PATCH TOPICAL at 17:11

## 2024-08-31 RX ADMIN — LEVOTHYROXINE SODIUM 25 MCG: 25 TABLET ORAL at 06:20

## 2024-08-31 RX ADMIN — HYDRALAZINE HYDROCHLORIDE 75 MG: 50 TABLET ORAL at 21:16

## 2024-08-31 RX ADMIN — LOSARTAN POTASSIUM 100 MG: 50 TABLET, FILM COATED ORAL at 09:13

## 2024-08-31 NOTE — PLAN OF CARE
Problem: PAIN - ADULT  Goal: Verbalizes/displays adequate comfort level or baseline comfort level  Description: Interventions:  - Encourage patient to monitor pain and request assistance  - Assess pain using appropriate pain scale  - Administer analgesics based on type and severity of pain and evaluate response  - Implement non-pharmacological measures as appropriate and evaluate response  - Consider cultural and social influences on pain and pain management  - Notify physician/advanced practitioner if interventions unsuccessful or patient reports new pain  Outcome: Progressing     Problem: INFECTION - ADULT  Goal: Absence or prevention of progression during hospitalization  Description: INTERVENTIONS:  - Assess and monitor for signs and symptoms of infection  - Monitor lab/diagnostic results  - Monitor all insertion sites, i.e. indwelling lines, tubes, and drains  - Monitor endotracheal if appropriate and nasal secretions for changes in amount and color  - [place] appropriate cooling/warming therapies per order  - Administer medications as ordered  - Instruct and encourage patient and family to use good hand hygiene technique  - Identify and instruct in appropriate isolation precautions for identified infection/condition  Outcome: Progressing     Problem: SAFETY ADULT  Goal: Patient will remain free of falls  Description: INTERVENTIONS:  - Educate patient/family on patient safety including physical limitations  - Instruct patient to call for assistance with activity   - Consult OT/PT to assist with strengthening/mobility   - Keep Call bell within reach  - Keep bed low and locked with side rails adjusted as appropriate  - Keep care items and personal belongings within reach  - Initiate and maintain comfort rounds  - Make Fall Risk Sign visible to staff  - Offer Toileting every 2 Hours, in advance of need  - Initiate/Maintain alarm  - Obtain necessary fall risk management equipment:   - Apply yellow socks and  bracelet for high fall risk patients  - Consider moving patient to room near nurses station  Outcome: Progressing  Goal: Maintain or return to baseline ADL function  Description: INTERVENTIONS:  -  Assess patient's ability to carry out ADLs; assess patient's baseline for ADL function and identify physical deficits which impact ability to perform ADLs (bathing, care of mouth/teeth, toileting, grooming, dressing, etc.)  - Assess/evaluate cause of self-care deficits   - Assess range of motion  - Assess patient's mobility; develop plan if impaired  - Assess patient's need for assistive devices and provide as appropriate  - Encourage maximum independence but intervene and supervise when necessary  - Involve family in performance of ADLs  - Assess for home care needs following discharge   - Consider OT consult to assist with ADL evaluation and planning for discharge  - Provide patient education as appropriate  Outcome: Progressing  Goal: Maintains/Returns to pre admission functional level  Description: INTERVENTIONS:  - Perform AM-PAC 6 Click Basic Mobility/ Daily Activity assessment daily.  - Set and communicate daily mobility goal to care team and patient/family/caregiver.   - Collaborate with rehabilitation services on mobility goals if consulted  - Perform Range of Motion 4 times a day.  - Reposition patient every 2 hours.  - Ambulate patient 1 times a day  - Out of bed to chair 3 times a day   - Out of bed for meals 3 times a day  - Out of bed for toileting  - Record patient progress and toleration of activity level   Outcome: Progressing     Problem: DISCHARGE PLANNING  Goal: Discharge to home or other facility with appropriate resources  Description: INTERVENTIONS:  - Identify barriers to discharge w/patient and caregiver  - Arrange for needed discharge resources and transportation as appropriate  - Identify discharge learning needs (meds, wound care, etc.)  - Arrange for interpretive services to assist at  discharge as needed  - Refer to Case Management Department for coordinating discharge planning if the patient needs post-hospital services based on physician/advanced practitioner order or complex needs related to functional status, cognitive ability, or social support system  Outcome: Progressing     Problem: Prexisting or High Potential for Compromised Skin Integrity  Goal: Skin integrity is maintained or improved  Description: INTERVENTIONS:  - Identify patients at risk for skin breakdown  - Assess and monitor skin integrity  - Assess and monitor nutrition and hydration status  - Monitor labs   - Assess for incontinence   - Turn and reposition patient  - Assist with mobility/ambulation  - Relieve pressure over bony prominences  - Avoid friction and shearing  - Provide appropriate hygiene as needed including keeping skin clean and dry  - Evaluate need for skin moisturizer/barrier cream  - Collaborate with interdisciplinary team   - Patient/family teaching  - Consider wound care consult   Outcome: Progressing

## 2024-08-31 NOTE — PROGRESS NOTES
08/31/24 1200   Restrictions/Precautions   Precautions Bed/chair alarms;Fall Risk;Contact/isolation;Supervision on toilet/commode   Weight Bearing Restrictions No   ROM Restrictions No   Braces or Orthoses   (RUE sling, R AFO with sneakers with all transfers)   General   Change In Medical/Functional Status (S)  Updated patient's functional status as follows: SBT EOB<>WC or EOB<>recliner, ModAx1; Toileting Transfer from bed slide board to BSC and Standing with hemiwalker for toileting hygiene Ax2, guard and block R knee; Toileting Transfer from Recliner Swap Out Technique Ax2 for toileting hygiene, block R knee. communicted with PCA regarding changes and updated sign in patient room   Cognition   Overall Cognitive Status WFL   Arousal/Participation Alert;Cooperative   Attention Within functional limits   Orientation Level Oriented X4   Memory Within functional limits   Following Commands Follows one step commands without difficulty   Sit to Stand   Type of Assistance Needed Physical assistance   Physical Assistance Level 25% or less   Comment [place] with hemiwalker, more difficulty rising from standard height chair in gym vs recliner or WC   Sit to Stand CARE Score 3   Bed-Chair Transfer   Type of Assistance Needed Physical assistance   Physical Assistance Level 51%-75%   Comment ModA x1 for stand pivot transfer with use of hemiwalker, requires assistance for management of RLE; [place] for SBT recliner <>EOB, requires assist for repositioning RLE; multiple trials complete throughout the session (ambulatory transfers to chair, recliner<>WC)   Chair/Bed-to-Chair Transfer CARE Score 2   Walk 10 Feet   Type of Assistance Needed Physical assistance   Physical Assistance Level 51%-75%   Comment ModA to help advance and prevent RLE from scissoring with L hemiwalker   Walk 10 Feet CARE Score 2   Walk 50 Feet with Two Turns   Type of Assistance Needed Physical assistance   Physical Assistance Level 51%-75%   Comment ModA x1  with hemiwalker +  follow for safety; ModA to assist with turns   Walk 50 Feet with Two Turns CARE Score 2   Walk 150 Feet   Comment limited by fatigue   Reason if not Attempted Safety concerns   Walk 150 Feet CARE Score 88   Ambulation   Primary Mode of Locomotion Prior to Admission Walk   Distance Walked (feet) 50 ft  (45 ft, 2x 30 ft)   Assist Device Ismael Walker   Gait Pattern Ataxic;Inconsistant Jasmyne;Slow Jasmyne;Decreased foot clearance;R foot drag;R hemiparesis;Step to;Step through;Scissoring;Decreased R stance;Improper weight shift   Limitations Noted In Balance;Device Management;Endurance;Safety;Speed;Strength;Swing   Provided Assistance with: Balance;Direction;Limb Advancement;Limb Stabilization   Walk Assist Level Moderate Assist   Does the patient walk? 2. Yes   Curb or Single Stair   Reason if not Attempted Safety concerns   1 Step (Curb) CARE Score 88   4 Steps   Reason if not Attempted Safety concerns   4 Steps CARE Score 88   12 Steps   Reason if not Attempted Safety concerns   12 Steps CARE Score 88   Picking Up Object   Reason if not Attempted Safety concerns   Picking Up Object CARE Score 88   Assessment   Treatment Assessment Patient participated in 60 minute skilled physical therapy session focusing on updating in room transfer status and gait training with hemiwalker. Pt continues to make good progress in physical therapy. SPT with hemiwalker are much improved from last time this therapist saw patient on Thursday only requiring Ax1 person. Biggest barrier to returning home is stair management. Pt will benefit from continued PT services to work on maximizing safety and independence with functional mobility.   Problem List Decreased strength;Decreased endurance;Decreased range of motion;Impaired balance;Decreased mobility;Decreased coordination;Impaired tone;Pain   Barriers to Discharge Inaccessible home environment;Decreased caregiver support   PT Barriers   Functional Limitation Car  transfers;Stair negotiation;Transfers;Walking;Wheelchair management   Plan   Treatment/Interventions Functional transfer training;LE strengthening/ROM;Therapeutic exercise;Endurance training;Patient/family training;Bed mobility;Gait training   Progress Progressing toward goals   Discharge Recommendation   Rehab Resource Intensity Level, PT   (TBD pending progress)   PT Therapy Minutes   PT Time In 1200   PT Time Out 1300   PT Total Time (minutes) 60   PT Mode of treatment - Individual (minutes) 60   PT Mode of treatment - Concurrent (minutes) 0   PT Mode of treatment - Group (minutes) 0   PT Mode of treatment - Co-treat (minutes) 0   PT Mode of Treatment - Total time(minutes) 60 minutes   PT Cumulative Minutes 1081       Mirta Mccord, PT, DPT

## 2024-08-31 NOTE — PROGRESS NOTES
"OT Treatment Note       08/31/24 1000   Pain Assessment   Pain Assessment Tool 0-10   Pain Score 8   Pain Location/Orientation Orientation: Right;Location: Shoulder   Hospital Pain Intervention(s) Repositioned;Cold applied   Restrictions/Precautions   Precautions Bed/chair alarms;Fall Risk;Contact/isolation;Supervision on toilet/commode   Weight Bearing Restrictions No   ROM Restrictions No   Braces or Orthoses   (RUE sling, R AFO with sneakers with all transfers)   Lifestyle   Autonomy \"I think I can stand by myself,\" speaking of improved function day to day   Eating   Type of Assistance Needed Set-up / clean-up   Physical Assistance Level No physical assistance   Comment sitting in recliner end of session   Eating CARE Score 5   Putting On/Taking Off Footwear   Type of Assistance Needed Physical assistance   Physical Assistance Level Total assistance   Comment TA for socks/shoes and AFO   Putting On/Taking Off Footwear CARE Score 1   Lying to Sitting on Side of Bed   Type of Assistance Needed Physical assistance   Physical Assistance Level 51%-75%   Comment RLE and trunk management   Lying to Sitting on Side of Bed CARE Score 2   Sit to Stand   Type of Assistance Needed Physical assistance   Physical Assistance Level 25% or less   Comment depending on height of surface at most Ruperto, as little as CGA/CS using HW   Sit to Stand CARE Score 3   Bed-Chair Transfer   Type of Assistance Needed Physical assistance   Physical Assistance Level Total assistance   Comment Initially SPT using HW to L side with Ruperto of one for steadying assist, CGA/Ruperto of another for safety; to R side with ModA of one to assist with RLE placement and Ruperto of another for steadying assist at trunk using HW. Pt progressing during session to completing SPT with Ax1 both sides, Ruperto to L side and modA to R side for management of RLE placement during turn.   Chair/Bed-to-Chair Transfer CARE Score 1   Toileting Hygiene   Type of Assistance Needed " "Physical assistance   Physical Assistance Level 76% or more   Comment Completes in stance from DA [facility] with HW. TA for BM hygiene, maxA for CM in stance though pt assists in hiking over L hip with Ruperto for steadying in stance   Toileting Hygiene CARE Score 2   Toilet Transfer   Type of Assistance Needed Physical assistance   Physical Assistance Level 25% or less   Comment SBT with Ruperto to drop arm [facility]   Toilet Transfer CARE Score 3   Neuromuscular Education   Comments for increased LE strengthening, weightshifting and balance in prep for toileting/LBD tasks and transfers, pt completing modified deadlifts using 4\" stool and using 7#db LUE; completes 5x2 reps standing EOM with CGA. in stance with HW, pt functionally reaching around trunk and to knee level with LUE to retrieve clothespins and place on chair, completing with CGA and no LOB.   Cognition   Overall Cognitive Status WFL   Arousal/Participation Alert;Cooperative   Attention Within functional limits   Orientation Level Oriented X4   Memory Within functional limits   Following Commands Follows one step commands without difficulty   Activity Tolerance   Activity Tolerance Patient tolerated treatment well   Assessment   Treatment Assessment Pt seen for 60 min OT session focusing on primarily SPT using HW, balance and weightshifting activities and toileting. Pt tolerating session well, demo's improved function with SPT today and completing with ModAx1 end of session. OT to continue POC to maximize functional IND and safety prior to d/c.   Prognosis Good   Problem List Decreased strength;Decreased endurance;Decreased range of motion;Impaired balance;Decreased mobility;Decreased coordination;Impaired tone;Pain   Barriers to Discharge Inaccessible home environment;Decreased caregiver support   Plan   Treatment/Interventions ADL retraining;Functional transfer training;Therapeutic exercise;Endurance training;Patient/family training;Equipment eval/education;Bed " mobility;Gait training;Compensatory technique education   Progress Progressing toward goals   Discharge Recommendation   Rehab Resource Intensity Level, OT   (pending)   OT Therapy Minutes   OT Time In 1000   OT Time Out 1100   OT Total Time (minutes) 60   OT Mode of treatment - Individual (minutes) 60   OT Mode of treatment - Concurrent (minutes) 0   OT Mode of treatment - Group (minutes) 0   OT Mode of treatment - Co-treat (minutes) 0   OT Mode of Treatment - Total time(minutes) 60 minutes   OT Cumulative Minutes 1000   Therapy Time missed   Time missed? No

## 2024-09-01 ENCOUNTER — APPOINTMENT (INPATIENT)
Dept: RADIOLOGY | Facility: HOSPITAL | Age: 64
DRG: 057 | End: 2024-09-01
Payer: MEDICARE

## 2024-09-01 PROBLEM — M25.511 RIGHT SHOULDER PAIN: Status: ACTIVE | Noted: 2024-09-01

## 2024-09-01 PROCEDURE — 97530 THERAPEUTIC ACTIVITIES: CPT

## 2024-09-01 PROCEDURE — 97110 THERAPEUTIC EXERCISES: CPT

## 2024-09-01 PROCEDURE — 97112 NEUROMUSCULAR REEDUCATION: CPT

## 2024-09-01 PROCEDURE — 94760 N-INVAS EAR/PLS OXIMETRY 1: CPT

## 2024-09-01 PROCEDURE — 73030 X-RAY EXAM OF SHOULDER: CPT

## 2024-09-01 PROCEDURE — 99233 SBSQ HOSP IP/OBS HIGH 50: CPT | Performed by: PHYSICIAN ASSISTANT

## 2024-09-01 PROCEDURE — 94660 CPAP INITIATION&MGMT: CPT

## 2024-09-01 PROCEDURE — 99232 SBSQ HOSP IP/OBS MODERATE 35: CPT | Performed by: PHYSICAL MEDICINE & REHABILITATION

## 2024-09-01 RX ADMIN — LEVOTHYROXINE SODIUM 25 MCG: 25 TABLET ORAL at 05:30

## 2024-09-01 RX ADMIN — SPIRONOLACTONE 25 MG: 25 TABLET, FILM COATED ORAL at 07:42

## 2024-09-01 RX ADMIN — B-COMPLEX W/ C & FOLIC ACID TAB 1 TABLET: TAB at 07:40

## 2024-09-01 RX ADMIN — PANTOPRAZOLE SODIUM 40 MG: 20 TABLET, DELAYED RELEASE ORAL at 05:33

## 2024-09-01 RX ADMIN — RIVAROXABAN 20 MG: 20 TABLET, FILM COATED ORAL at 16:55

## 2024-09-01 RX ADMIN — CARVEDILOL 25 MG: 25 TABLET, FILM COATED ORAL at 07:41

## 2024-09-01 RX ADMIN — LIDOCAINE 1 PATCH: 700 PATCH TOPICAL at 16:55

## 2024-09-01 RX ADMIN — CARVEDILOL 25 MG: 25 TABLET, FILM COATED ORAL at 16:55

## 2024-09-01 RX ADMIN — HYDRALAZINE HYDROCHLORIDE 75 MG: 50 TABLET ORAL at 21:00

## 2024-09-01 RX ADMIN — LOSARTAN POTASSIUM 100 MG: 50 TABLET, FILM COATED ORAL at 07:41

## 2024-09-01 RX ADMIN — HYDRALAZINE HYDROCHLORIDE 75 MG: 50 TABLET ORAL at 13:18

## 2024-09-01 RX ADMIN — ATORVASTATIN CALCIUM 40 MG: 40 TABLET, FILM COATED ORAL at 16:55

## 2024-09-01 RX ADMIN — NIFEDIPINE 60 MG: 30 TABLET, FILM COATED, EXTENDED RELEASE ORAL at 16:55

## 2024-09-01 RX ADMIN — HYDRALAZINE HYDROCHLORIDE 75 MG: 50 TABLET ORAL at 05:30

## 2024-09-01 RX ADMIN — ACETAMINOPHEN 650 MG: 325 TABLET ORAL at 05:31

## 2024-09-01 RX ADMIN — Medication 2 G: at 07:42

## 2024-09-01 NOTE — PROGRESS NOTES
09/01/24 0830   Pain Assessment   Pain Assessment Tool 0-10   Pain Score 5   Pain Location/Orientation Orientation: Right;Location: Shoulder   Restrictions/Precautions   Precautions Bed/chair alarms;Fall Risk;Contact/isolation;Supervision on toilet/commode   Braces or Orthoses   (DANIEL sling, R AFO with sneakers with all transfers)   Subjective   Subjective pt agreeable to perform skilled PT   Lying to Sitting on Side of Bed   Type of Assistance Needed Physical assistance   Physical Assistance Level 51%-75%   Comment RLE and trunk management   Lying to Sitting on Side of Bed CARE Score 2   Sit to Stand   Type of Assistance Needed Physical assistance   Physical Assistance Level 25% or less   Sit to Stand CARE Score 3   Bed-Chair Transfer   Type of Assistance Needed Physical assistance   Physical Assistance Level 51%-75%   Comment ModA x1 for stand pivot transfer with use of hemiwalker, requires assistance for management of RLE   Chair/Bed-to-Chair Transfer CARE Score 2   Car Transfer   Reason if not Attempted Safety concerns   Car Transfer CARE Score 88   Walk 10 Feet   Type of Assistance Needed Physical assistance;Adaptive equipment   Physical Assistance Level 51%-75%   Comment HW WC follow   Walk 10 Feet CARE Score 2   Walk 50 Feet with Two Turns   Type of Assistance Needed Physical assistance;Adaptive equipment   Physical Assistance Level 51%-75%   Comment HW WC follow   Walk 50 Feet with Two Turns CARE Score 2   Walk 150 Feet   Reason if not Attempted Safety concerns   Walk 150 Feet CARE Score 88   Walking 10 Feet on Uneven Surfaces   Reason if not Attempted Safety concerns   Walking 10 Feet on Uneven Surfaces CARE Score 88   Ambulation   Primary Mode of Locomotion Prior to Admission Walk   Distance Walked (feet) 50 ft  (x2 HW)   Assist Device Ismael Walker   Does the patient walk? 2. Yes   Wheel 50 Feet with Two Turns   Type of Assistance Needed Supervision   Wheel 50 Feet with Two Turns CARE Score 4   Wheel 150  Feet   Type of Assistance Needed Supervision   Wheel 150 Feet CARE Score 4   Wheelchair mobility   Does the patient use a wheelchair? 1. Yes   Type of Wheelchair Used 1. Manual   Method Left upper extremity;Left lower extremity   Distance Level Surface (feet) 150 ft   Curb or Single Stair   Reason if not Attempted Safety concerns   1 Step (Curb) CARE Score 88   4 Steps   Reason if not Attempted Safety concerns   4 Steps CARE Score 88   12 Steps   Reason if not Attempted Safety concerns   12 Steps CARE Score 88   Picking Up Object   Reason if not Attempted Safety concerns   Picking Up Object CARE Score 88   Therapeutic Interventions   Strengthening STS weight shift on RLE performing step ups using LLE up on 4 inch step with inc WB to her RLE   Balance Standing balance with weight shifting   Assessment   Treatment Assessment pt engaged in 60 min skilled PT asnd focus on bed mobility, sitting balance, SBT to tilt in space w/c and standing tolerance at bed rail. Pt continues to make good progress in physical therapy. SPT with hemiwalker are much improved . Pt cont to need advance weight shift to her RLE and progressing with tolerance a little more today , Cont working in PM session with RLE hip mm and overall RLE functional activities .   Barriers to Discharge Decreased caregiver support;Inaccessible home environment   Plan   Progress Progressing toward goals   PT Therapy Minutes   PT Time In 0830   PT Time Out 0930   PT Total Time (minutes) 60   PT Mode of treatment - Individual (minutes) 60   PT Mode of treatment - Concurrent (minutes) 0   PT Mode of treatment - Group (minutes) 0   PT Mode of treatment - Co-treat (minutes) 0   PT Mode of Treatment - Total time(minutes) 60 minutes   PT Cumulative Minutes 1141   Therapy Time missed   Time missed? No

## 2024-09-01 NOTE — ASSESSMENT & PLAN NOTE
Was seeing cardiologist in [place] and taking Xarelto but she has not seen him in 2 yrs and stopped all her meds >1 yr ago  EKG in the hospital showed NSR  Needs to followup as OP with Cardiology = Dr Patt Zhang Coreg  Was not placed back on Flecainide  Now on Xarelto   Sent price check to her pharmacy on 8/21/24 for the Xarelto and is $661.99  Sent price check on Eliquis but doubtful that will be much better.  If not, she will possibly need to be converted to Coumadin with a therapeutic Lovenox bridge.  Coupon application for Xarelto supposedly completed. Pt says will be no cost.  Will have to give the coupon code number to the OP pharmacy.

## 2024-09-01 NOTE — PROGRESS NOTES
09/01/24 1330   Pain Assessment   Pain Assessment Tool 0-10   Pain Score 6   Restrictions/Precautions   Precautions Bed/chair alarms;Fall Risk;Contact/isolation;Supervision on toilet/commode  (RUE sling for transfers; R AFO with sneakers for all transfers)   Subjective   Subjective pt agreeable to perform skilled PT and son in her room   Sit to Stand   Type of Assistance Needed Physical assistance   Physical Assistance Level 25% or less   Sit to Stand CARE Score 3   Bed-Chair Transfer   Type of Assistance Needed Physical assistance   Physical Assistance Level 51%-75%   Comment ModA x1 for stand pivot transfer with use of hemiwalker, requires assistance for management of RLE   Chair/Bed-to-Chair Transfer CARE Score 2   Transfer Bed/Chair/Wheelchair   Adaptive Equipment Transfer Board;Ismael Walker   Walk 150 Feet   Reason if not Attempted Safety concerns   Walk 150 Feet CARE Score 88   Walking 10 Feet on Uneven Surfaces   Reason if not Attempted Safety concerns   Walking 10 Feet on Uneven Surfaces CARE Score 88   Ambulation   Primary Mode of Locomotion Prior to Admission Walk   Distance Walked (feet) 30 ft  (x3 Handrail to work on speed gait)   Does the patient walk? 2. Yes   Wheel 50 Feet with Two Turns   Type of Assistance Needed Supervision   Wheel 50 Feet with Two Turns CARE Score 4   Wheel 150 Feet   Type of Assistance Needed Supervision   Wheel 150 Feet CARE Score 4   Wheelchair mobility   Does the patient use a wheelchair? 1. Yes   Type of Wheelchair Used 1. Manual   Method Right upper extremity;Left lower extremity;Right lower extremity   Curb or Single Stair   Reason if not Attempted Safety concerns   1 Step (Curb) CARE Score 88   4 Steps   Reason if not Attempted Safety concerns   4 Steps CARE Score 88   12 Steps   Reason if not Attempted Safety concerns   12 Steps CARE Score 88   Therapeutic Interventions   Balance standing balance   Equipment Use   NuStep lvl 1 and 2 for 10 min SPM 80-90 nelida scale 12-18  needed   Other Comments   Comments pt needs more steps for NPP AND multipodus boot  on post rehab session in PM   Assessment   Treatment Assessment Pt ready for skilled PT , pt able to walk with handrail a little better and gait speed better with RLE and placement at this time , pt also perfomr bike Nu step at faster pace to inc HR , Pt  will cont NPP at her tolerance and example to her son about NPP .Pt will cont need HW training walking and pt requested about walking this session and feeling beter about rehab outcome , Cont POC   Barriers to Discharge Decreased caregiver support   Plan   Progress Progressing toward goals   PT Therapy Minutes   PT Time In 1330   PT Time Out 1430   PT Total Time (minutes) 60   PT Mode of treatment - Individual (minutes) 60   PT Mode of treatment - Concurrent (minutes) 0   PT Mode of treatment - Group (minutes) 0   PT Mode of treatment - Co-treat (minutes) 0   PT Mode of Treatment - Total time(minutes) 60 minutes   PT Cumulative Minutes 1201   Therapy Time missed   Time missed? No

## 2024-09-01 NOTE — PLAN OF CARE
Problem: PAIN - ADULT  Goal: Verbalizes/displays adequate comfort level or baseline comfort level  Description: Interventions:  - Encourage patient to monitor pain and request assistance  - Assess pain using appropriate pain scale  - Administer analgesics based on type and severity of pain and evaluate response  - Implement non-pharmacological measures as appropriate and evaluate response  - Consider cultural and social influences on pain and pain management  - Notify physician/advanced practitioner if interventions unsuccessful or patient reports new pain  Outcome: Progressing     Problem: INFECTION - ADULT  Goal: Absence or prevention of progression during hospitalization  Description: INTERVENTIONS:  - Assess and monitor for signs and symptoms of infection  - Monitor lab/diagnostic results  - Monitor all insertion sites, i.e. indwelling lines, tubes, and drains  - Monitor endotracheal if appropriate and nasal secretions for changes in amount and color  - [place] appropriate cooling/warming therapies per order  - Administer medications as ordered  - Instruct and encourage patient and family to use good hand hygiene technique  - Identify and instruct in appropriate isolation precautions for identified infection/condition  Outcome: Progressing

## 2024-09-01 NOTE — PROGRESS NOTES
Physical Medicine and Rehabilitation Progress Note  Maye Lilly 64 y.o. female MRN: [id number]  Unit/Bed#: -01 Encounter: [id number]    Chief Complaint:  R shoulder pain     Interval History: About 2 days ago developed acute R shoulder pain. She is quite weak on that R side, and therapy did some K-taping for support with improvement, but still aching and sore. No new injuries or falls. No new numbness/tingling. No new CP, SOB, fevers, chills, N/V, abdominal pain. She is overall happy with her care. Last BM 9/1.     Assessment/Plan - Continue plan of care as per primary attending, unless otherwise stated below:      Acute left pontine ischemic infarct:   Clinically presented on 8/10/24 with right hemiparesis, dysarthria and facial droop  Stroke etiology: HTN, small vessel disease  MR brain on 8/11/24 revealed a small area of acute/subacute ischemia within the central luz to the left of midline without acute hemorrhage  Did not receive tPA/TNKase, did not undergo thrombectomy  DAPT regimen: None  Atorvastatin 40 mg qHs for secondary prevention  Monitor for neurogenic bowel and bladder  Promote regular sleep/wake cycles and proper sleep hygiene  Monitor closely for right hemiparetic shoulder pain and post-stroke pain  Neutral resting wrist splint for the and RLE multipodus boot to be worn at night and while in bed during the day to prevent developing spasticity and contracture  Maintain adequate nutrition, nutrition consult  Avoid sedating and anticholinergic medications as possible  Maintain normotension, s/p permissive hypertension  Outpatient neurovascular neurology follow-up  Post-stroke shoulder pain: Examines more like impingement. K-taping is correcting for any subluxation, and she has subluxation precautions. Neers positive, matthew positive, pain with abduction. Pain is lateral. Will check an XR to look at bony anatomy, but low suspicion for osseous injury. May benefit from subacromial injection  during stay. Monitor. Can do topical voltaren to the area, and would provide support for shoulder in therapies and continue K-taping. Would avoid aggressive PROM/strengthening for shoulder at this time. Kidneys are fine, can also try anti-inflammatory for pain.   C. Difficile infection: OSH-acquired; vancomycin PO completed 8/26/24  Paroxysmal atrial fibrillation: carvedilol for rate control, rivaroxaban for embolic stroke risk reduction; outpatient cardiology follow-up  HTN: was not taking medications at home, reportedly due to cost; carvedilol, hydralazine, nifedipine, spironolactone, losartan; appreciate IM management  Hypothyroidism: levothyroxine  Therapies: PT, OT and SLP  Right knee osteoarthritis: lidocaine patch daily, Voltaren topical BID, tylenol PRN  Bowel: Monitor closely for neurogenic bowel. Will follow BMs and adjust bowel regimen to target soft, formed stools q1-2 days, per pt's regular schedule.  Bladder: Monitor closely for neurogenic bladder. Will follow UOP and encourage spontaneous voids. If unable to void spontaneously, will monitor with PVR bladder scans and initiate ISC regimen.  Skin: Monitor for breakdown, frequent turns, Kreg specialty bed for pressure offloading  Sleep: Practice effective sleep hygiene (e.g., consistent night and morning routine, quiet, dark room at night, no electronic devices/screens in bed, avoid large meals/caffeine before bed)  VTE prophylaxis: Rivaroxaban    Total visit time: 35 minutes, with more than 50% spent counseling/coordinating care. Counseling includes discussion with patient re: progress in therapies, functional issues observed by therapy staff, and discussion with patient regarding their current medical state and wellbeing. Coordination of patient's care was performed in conjunction with Internal Medicine service to monitor patient's labs, vitals, and management of their comorbidities.    Ashley de Padua, MD  Physical Medicine and  Rehabilitation      Review of Systems: A 10 point review of systems was negative except for what is noted in the HPI.        Current Facility-Administered Medications:     acetaminophen (TYLENOL) tablet 650 mg, 650 mg, Oral, Q6H PRN, WENCESLAO Patel, 650 mg at 09/01/24 0531    atorvastatin (LIPITOR) tablet 40 mg, 40 mg, Oral, QPM, WENCESLAO Patel, 40 mg at 08/31/24 1712    bisacodyl (DULCOLAX) rectal suppository 10 mg, 10 mg, Rectal, Daily PRN, Dougie King MD    carvedilol (COREG) tablet 25 mg, 25 mg, Oral, BID With Meals, WENCESLAO Patel, 25 mg at 09/01/24 0741    Diclofenac Sodium (VOLTAREN) 1 % topical gel 2 g, 2 g, Topical, BID, Dk De Dios MD, 2 g at 09/01/24 0742    hydrALAZINE (APRESOLINE) tablet 75 mg, 75 mg, Oral, Q8H BARB, WENCESLAO Patel, 75 mg at 09/01/24 0530    levothyroxine tablet 25 mcg, 25 mcg, Oral, Early Morning, WENCESLAO Patel, 25 mcg at 09/01/24 0530    lidocaine (LIDODERM) 5 % patch 1 patch, 1 patch, Topical, Daily, Dk De Dios MD, 1 patch at 08/31/24 1711    losartan (COZAAR) tablet 100 mg, 100 mg, Oral, Daily, WENCESLAO Patel, 100 mg at 09/01/24 0741    multivitamin stress formula tablet 1 tablet, 1 tablet, Oral, Daily, WENCESLAO Patel, 1 tablet at 09/01/24 0740    NIFEdipine (PROCARDIA XL) 24 hr tablet 60 mg, 60 mg, Oral, Daily With Dinner, WENCESLAO Patel, 60 mg at 08/31/24 1712    pantoprazole (PROTONIX) EC tablet 40 mg, 40 mg, Oral, Early Morning, WENCESLAO Patel, 40 mg at 09/01/24 0533    rivaroxaban (XARELTO) tablet 20 mg, 20 mg, Oral, Daily With Dinner, WENCESLAO Patel, 20 mg at 08/31/24 1712    spironolactone (ALDACTONE) tablet 25 mg, 25 mg, Oral, Daily, WENCESLAO Patel, 25 mg at 09/01/24 0742    Physical Exam:  Temp:  [97.3 °F (36.3 °C)-98.8 °F (37.1 °C)] 97.3 °F (36.3 °C)  HR:  [70-74] 73  Resp:  [16-18] 16  BP: (116-145)/(53-65)  136/61  SpO2:  [92 %-95 %] 93 %      Gen: No acute distress, Well-nourished, well-appearing.  HEENT: Moist mucus membranes, Normocephalic/Atraumatic  Cardiovascular: Distal pulses palpable  Pulmonary: Non-labored breathing.  GI: Soft, non-tender, non-distended.   MSK: R shoulder with weakness in ER/IR/Abduction related to CVA. Pain with passive range of motion starting just below neutral. Elida Friedman replicates pain. Unable to perform empty can/Eugenio's, but when arm placed in this position, causes pain. Unable to perform Speeds/Yergason, but flexion with supinated hand caused pain. Pain primarily lateral, subacromial. K-taping in place, no sulcus sign on exam today.   Integumentary: Skin is warm, dry.   Neuro: AAOx3, Speech is intact. Appropriate to questioning.   Psych: Normal mood and affect.     Laboratory:  Labs reviewed  Results from last 7 days   Lab Units 08/29/24  0536 08/26/24  0555   HEMOGLOBIN g/dL 12.5 13.6   HEMATOCRIT % 39.5 42.2   WBC Thousand/uL 8.63 7.93     Results from last 7 days   Lab Units 08/29/24  0536 08/26/24  0555   BUN mg/dL 24 23   SODIUM mmol/L 137 140   POTASSIUM mmol/L 4.2 4.3   CHLORIDE mmol/L 102 103   CREATININE mg/dL 0.66 0.69            Diagnostic Studies: Reviewed, no new imaging   No orders to display         ** Please Note: Fluency Direct voice to text software may have been used in the creation of this document. **

## 2024-09-01 NOTE — ASSESSMENT & PLAN NOTE
New complaint of right shoulder pain  Patient feels this is due to her residual right sided weakness from her recent stroke  Discussed with PMR who will eval patient

## 2024-09-01 NOTE — PROGRESS NOTES
[facility]  Progress Note  Name: Maye Lilly I  MRN: [id number]  Unit/Bed#: -01 I Date of Admission: 8/20/2024   Date of Service: 9/1/2024 I Hospital Day: 12    Assessment & Plan   Right shoulder pain  Assessment & Plan  New complaint of right shoulder pain  Patient feels this is due to her residual right sided weakness from her recent stroke  Discussed with PMR who will eval patient      Meningioma (HCC)  Assessment & Plan  Incidental finding   OP followup    GERD (gastroesophageal reflux disease)  Assessment & Plan  Continue Protonix    Hypothyroidism  Assessment & Plan  Continue Levothyroxine 25 mcg qd  This was the dose that she was supposed to be taking at home and they placed her back on it in the hospital on 8/12/24 but there was no TSH drawn.  Had an abnormal TSH of 5.1 on 4/23/20 presumably not on Levothyroxine at that time since thereafter on 2/15/22 was normal  TSH 8/22/24 normal at 3.95  Will continue current dose and have her get a TSH in 6 weeks = @9/23    DIANELYS (obstructive sleep apnea)  Assessment & Plan  In past was on CPAP  Can d/w PCP when discharged    HTN (hypertension)  Assessment & Plan  Home:  no meds  Here:  Coreg 25mg BID/Hydralazine 75 mg q8hrs/Nifedipine 60mg daily at dinner time/Aldactone 25mg daily/Losartan 100mg qd  Stable; continue current medication regimen  Followup with IM as OP.  On DC summary was listed to see Megan DORSEY from that group    Paroxysmal atrial fibrillation (HCC)  Assessment & Plan  Was seeing cardiologist in [place] and taking Xarelto but she has not seen him in 2 yrs and stopped all her meds >1 yr ago  EKG in the hospital showed NSR  Needs to followup as OP with Cardiology = Dr Beltre  Continue Coreg  Was not placed back on Flecainide  Now on Xarelto   Sent price check to her pharmacy on 8/21/24 for the Xarelto and is $661.99  Sent price check on Eliquis but doubtful that will be much better.  If not, she  will possibly need to be converted to Coumadin with a therapeutic Lovenox bridge.  Coupon application for Xarelto supposedly completed. Pt says will be no cost.  Will have to give the coupon code number to the OP pharmacy.    Clostridium difficile infection  Assessment & Plan  Diagnosed during hospital stay  Was ordered Vancomycin 125mg QID.  Last dose was 8/26/24 at 0900  Diarrhea has resolved and stools are formed    * Acute CVA (cerebrovascular accident) (HCC)  Assessment & Plan  Presented with right sided weakness, slurred speech, headache and vomiting  MRI = left pontine infarct  Neuro felt CVA was from atrial fibrillation in setting of non-compliance with anticoagulation  Currently now on Xarelto 20mg qd, Lipitor 40mg qd  Followup with Neuro as OP.  Stevo Baires from [facility] from that group was listed on the DC summary                 The above assessment and plan was reviewed and updated as determined by my evaluation of the patient on 9/1/2024.    Labs:   Results from last 7 days   Lab Units 08/29/24  0536 08/26/24  0555   WBC Thousand/uL 8.63 7.93   HEMOGLOBIN g/dL 12.5 13.6   HEMATOCRIT % 39.5 42.2   PLATELETS Thousands/uL 287 293     Results from last 7 days   Lab Units 08/29/24  0536 08/26/24  0555   SODIUM mmol/L 137 140   POTASSIUM mmol/L 4.2 4.3   CHLORIDE mmol/L 102 103   CO2 mmol/L 27 27   BUN mg/dL 24 23   CREATININE mg/dL 0.66 0.69   CALCIUM mg/dL 9.4 10.0                   Imaging  No orders to display       Review of Scheduled Meds:  Current Facility-Administered Medications   Medication Dose Route Frequency Provider Last Rate    acetaminophen  650 mg Oral Q6H PRN WENCESLAO Patel      atorvastatin  40 mg Oral QPM WENCESLAO Patel      bisacodyl  10 mg Rectal Daily PRN Dougie King MD      carvedilol  25 mg Oral BID With Meals WENCESLAO Patel      Diclofenac Sodium  2 g Topical BID Dk De Dios MD      hydrALAZINE  75 mg Oral Q8H [facility] Lucila Aceves  WENCESLAO Schulz      levothyroxine  25 mcg Oral Early Morning Lucila Schulz, WENCESLAO      lidocaine  1 patch Topical Daily Dk De Dios MD      losartan  100 mg Oral Daily WENCESLAO Patel      multivitamin stress formula  1 tablet Oral Daily Lucila Schulz, WENCESLAO      NIFEdipine  60 mg Oral Daily With Dinner WENCESLAO Patel      pantoprazole  40 mg Oral Early Morning Lucila Schulz, WENCESLAO      rivaroxaban  20 mg Oral Daily With Dinner Lucila Schulz, WENCESLAO      spironolactone  25 mg Oral Daily WENCESLAO Patel         Subjective/ HPI: Patient seen and examined. Patients overnight issues or events were reviewed with nursing staff. New or overnight issues include the following:     Patient laying in bed. Complaining of right posterior shoulder pain which is new. Has ice packs and took tylenol which she states are not helping. No other new complaints. No CP/SOB.     ROS:   A 10 point ROS was performed; negative except as noted above.        *Labs /Radiology studies Reviewed  *Medications  reviewed and reconciled as needed  *Please refer to order section for additional ordered labs studies      Physical Examination:  Vitals:   Vitals:    08/31/24 2105 08/31/24 2340 09/01/24 0320 09/01/24 0456   BP: 116/53   136/61   BP Location:    Right arm   Pulse: 72   73   Resp:    16   Temp:    (!) 97.3 °F (36.3 °C)   TempSrc:    Oral   SpO2:  95% 94% 93%   Weight:       Height:           General Appearance: NAD; pleasant  HEENT: PERRLA, conjuctiva normal; mucous membranes moist; face symmetrical  Neck:  Supple  Lungs: clear bilaterally, normal respiratory effort, no retractions, expiratory effort normal, on room air  CV: regular rate  ABD: soft non tender, +BS x4  EXT: DP pulses intact, no lymphadenopathy, pain to palpation right posterior shoulder area  Skin: normal turgor, normal texture, no rash  Psych: affect normal, mood normal  Neuro: AAOx3. Residual right sided  weakness from recent stroke     The above physical exam was reviewed and updated as determined by my evaluation of the patient on 9/1/2024.    Invasive Devices       None                      VTE Pharmacologic Prophylaxis: Xarelto  Code Status: Level 1 - Full Code  Current Length of Stay: 12 day(s)    Total floor / unit time spent today 20 minutes  Coordination of patient's care was performed in conjunction with consulting services. Time invested included review of patient's labs, vitals, and management of their comorbidities with continued monitoring, examination of patient as well as answering patient questions, documenting her findings and creating progress note in electronic medical record,  ordering appropriate diagnostic testing.       ** Please Note:  voice to text software may have been used in the creation of this document. Although proof errors in transcription or interpretation are a potential of such software**

## 2024-09-01 NOTE — ASSESSMENT & PLAN NOTE
Home:  no meds  Here:  Coreg 25mg BID/Hydralazine 75 mg q8hrs/Nifedipine 60mg daily at dinner time/Aldactone 25mg daily/Losartan 100mg qd  Stable; continue current medication regimen  Followup with IM as OP.  On DC summary was listed to see Megan DORSEY from that group

## 2024-09-01 NOTE — ASSESSMENT & PLAN NOTE
Diagnosed during hospital stay  Was ordered Vancomycin 125mg QID.  Last dose was 8/26/24 at 0900  Diarrhea has resolved and stools are formed

## 2024-09-01 NOTE — PROGRESS NOTES
09/01/24 1030   Pain Assessment   Pain Assessment Tool 0-10   Pain Score 8   Pain Location/Orientation Orientation: Right;Location: Shoulder   Hospital Pain Intervention(s) Cold applied;Repositioned   Restrictions/Precautions   Precautions Bed/chair alarms;Fall Risk;Contact/isolation;Supervision on toilet/commode  (RUE sling for transfers; R AFO with sneakers for all transfers)   Braces or Orthoses   (RUE sling, R AFO with sneakers with all transfers)   Neuromuscular Education   Comments See treatment assessment for details.   Cognition   Overall Cognitive Status WFL   Arousal/Participation Alert;Cooperative   Attention Within functional limits   Orientation Level Oriented X4   Memory Within functional limits   Following Commands Follows one step commands without difficulty   Assessment   Treatment Assessment Pt participated in 60 min OT session with fair activity tolerance with focus on RUE NM re-education, and therapeutic activities. Pt seated in bedside recliner upon therapist entering room. Pt agreeable to OT session. Pt reporting 8/10 pain in R shoulder at start of therapy session. Pt stating sudden onset 2 days prior with ice helping with pain relief however continues to hurt once ice warms. Pt R shoulder assessed with K-tape added at this time for shoulder support/pain relief. K-tape placed from middle deltoid to upper trape, with additional support strap. Pt reporting by end of therapy session decreased pain to 5/10, with pt re-assessed again at 14:00 with pt reporting decreased pain to 3/10 in R shoulder. Nurse, Olivia, made aware of K-tape and will add to report for skin checks and to remove as needed. In addition education provided to pt with 100% recall noted. OTR and pt discussed bed mobility and positioning of RUE with education provided and recommendation made for pt to place pillow under arm when rolling for additional support of shoulder joint and to allow to optimal positioning. Education provided  to nursing. Pt participated in PROM/AAROM of RUE with faciliated pattern movements/quick stretch for increased functional ROM in RUE. Pt initially noted to have no movement in R hand however following quick stretch pt noted to have trace mucle in fingers, wrist flex/ext, and elbow flexion with need for assistance to move through ROM. Pt education provided on bilateral motor tasks for increased grasp/release with improvement noted with bilateral grasp tasks with hand over hand assistance needed for grasp/release with R hand. Pt seated in bedside recliner at end of therapy session with chair alarm on, call bell in reach, and no further skilled OT needs. Pt would benefit from continued OT services to progress pt with RUE NMR, CVA education, functional transfer training, and ADL training. Continue with established POC at this time.   Barriers to Discharge Decreased caregiver support;Inaccessible home environment   Plan   Treatment/Interventions ADL retraining;Functional transfer training;Therapeutic exercise;Endurance training;Patient/family training;Equipment eval/education;Bed mobility;Compensatory technique education   OT Therapy Minutes   OT Time In 1030   OT Time Out 1130   OT Total Time (minutes) 60   OT Mode of treatment - Individual (minutes) 60   OT Mode of treatment - Concurrent (minutes) 0   OT Mode of treatment - Group (minutes) 0   OT Mode of treatment - Co-treat (minutes) 0   OT Mode of Treatment - Total time(minutes) 60 minutes   OT Cumulative Minutes 1060   Therapy Time missed   Time missed? No

## 2024-09-01 NOTE — ASSESSMENT & PLAN NOTE
Presented with right sided weakness, slurred speech, headache and vomiting  MRI = left pontine infarct  Neuro felt CVA was from atrial fibrillation in setting of non-compliance with anticoagulation  Currently now on Xarelto 20mg qd, Lipitor 40mg qd  Followup with Neuro as OP.  [facility] group was listed on the DC summary

## 2024-09-01 NOTE — NURSING NOTE
Patient refused full skin assesment because she did not want to be rolled because she is in a comfortable position. Patient also refused being repositioned by RN and PCA. Patient educated on the importance of repositioning and skin assessments

## 2024-09-02 PROCEDURE — 97112 NEUROMUSCULAR REEDUCATION: CPT | Performed by: PHYSICAL THERAPIST

## 2024-09-02 PROCEDURE — 97129 THER IVNTJ 1ST 15 MIN: CPT

## 2024-09-02 PROCEDURE — 97130 THER IVNTJ EA ADDL 15 MIN: CPT

## 2024-09-02 PROCEDURE — 94660 CPAP INITIATION&MGMT: CPT | Performed by: SOCIAL WORKER

## 2024-09-02 PROCEDURE — 94760 N-INVAS EAR/PLS OXIMETRY 1: CPT

## 2024-09-02 RX ADMIN — LEVOTHYROXINE SODIUM 25 MCG: 25 TABLET ORAL at 05:16

## 2024-09-02 RX ADMIN — B-COMPLEX W/ C & FOLIC ACID TAB 1 TABLET: TAB at 08:25

## 2024-09-02 RX ADMIN — RIVAROXABAN 20 MG: 20 TABLET, FILM COATED ORAL at 17:29

## 2024-09-02 RX ADMIN — HYDRALAZINE HYDROCHLORIDE 75 MG: 50 TABLET ORAL at 21:39

## 2024-09-02 RX ADMIN — ATORVASTATIN CALCIUM 40 MG: 40 TABLET, FILM COATED ORAL at 17:29

## 2024-09-02 RX ADMIN — HYDRALAZINE HYDROCHLORIDE 75 MG: 50 TABLET ORAL at 14:11

## 2024-09-02 RX ADMIN — HYDRALAZINE HYDROCHLORIDE 75 MG: 50 TABLET ORAL at 05:16

## 2024-09-02 RX ADMIN — SPIRONOLACTONE 25 MG: 25 TABLET, FILM COATED ORAL at 08:25

## 2024-09-02 RX ADMIN — CARVEDILOL 25 MG: 25 TABLET, FILM COATED ORAL at 17:29

## 2024-09-02 RX ADMIN — ACETAMINOPHEN 650 MG: 325 TABLET ORAL at 08:27

## 2024-09-02 RX ADMIN — CARVEDILOL 25 MG: 25 TABLET, FILM COATED ORAL at 08:25

## 2024-09-02 RX ADMIN — PANTOPRAZOLE SODIUM 40 MG: 20 TABLET, DELAYED RELEASE ORAL at 05:16

## 2024-09-02 RX ADMIN — LOSARTAN POTASSIUM 100 MG: 50 TABLET, FILM COATED ORAL at 08:25

## 2024-09-02 RX ADMIN — ACETAMINOPHEN 650 MG: 325 TABLET ORAL at 21:39

## 2024-09-02 RX ADMIN — Medication 2 G: at 08:26

## 2024-09-02 RX ADMIN — LIDOCAINE 1 PATCH: 700 PATCH TOPICAL at 17:29

## 2024-09-02 RX ADMIN — NIFEDIPINE 60 MG: 30 TABLET, FILM COATED, EXTENDED RELEASE ORAL at 17:29

## 2024-09-02 NOTE — PROGRESS NOTES
ST Updates:   Recommendations: 24/hr supervision for and mobility due to safety concern; currently CS-mod I for money management; supervision-med management   Continued Services: yes-type TBD pending progress/discharge  Discharge Date: -  Family Education/Training: pending  Continued Treatment Plan: higher level cognition (memory strategies, STM, 2-step directions, word finding, medication management-tangible medication review, bill management )     09/02/24 0830   Pain Assessment   Pain Assessment Tool 0-10   Pain Score No Pain   Restrictions/Precautions   Precautions Bed/chair alarms;Cognitive;Fall Risk;Contact/isolation;Supervision on toilet/commode   Comprehension   Comprehension (FIM) 5 - Needs help/cues, repetition only RARELY ( < 10% of the time)   Expression   Expression (FIM) 5 - Needs help/cues only RARELY (< 10% of the time)   Social Interaction   Social Interaction (FIM) 5 - Interacts appropriately with others 90% of time   Problem Solving   Problem solving (FIM) 4 - Solves basic problems 75-89% of time   Memory   Memory (FIM) 4 - Recognizes/recalls/performs 75-89%   Speech/Language/Cognition Assessment   Treatment Assessment Pt was seen in room for skilled ST session targeting cognitive linguistic communication skills. Upon entering room, RN was providing AM medication to which pt was able to recall to SLP previous session review of medication. As this SLP has not seen pt in over a week, participated in brief rapport building. Pt noted increase LTM recall as pt was able to recall previous OT and PT sessions as well as recalled activities previously targeted with other SLPs in previous ST sessions. Focus of session was on targeting higher level problem solving and comprehension task as well as beginning introduction to mock medication using tangible medication.     First task was a higher level deduction puzzle in which pt was given FO18 grid and 15 orthographic clues and asked to use orthographic clues  to decide the 3 areas (dog, city and type of collar) that goes with each owner. Pt was able to complete task in 9/15 accuracy which increased to 15/15 once given verbal and semantic cues. Pt noted to have difficulty with higher level problem solving and reasoning requiring mod A w/ verbal and semantic cues. Pt noted improvement once given cues and was aware of deficits with task. Pt required increase time to complete task due to decrease processing and comprehension. Overall, pt showing continued improvement with higher level tasks.     Last task targeted medication management with tangible medications. Began with reviewing medication list that included medication name, reason for taking medication and frequency. All medications were up to date with additional written cues for medications needed to be taken during meals. Once completed review, SLP provided tangible medication bottles and pill boxes (2x, 3x and 4x a day pill boxes). Pt was able to choose the correct 4x a day pill box and was asked to fill in each medication in the appropriate slot. Pt able to complete task w/ ~60% accuracy. Pt noted to have difficulty at times with placement as pt would place 'pills' twice in the same day and omit one day. Pt noted to complete pill box differently as pt would use medication handout and fill in based on each time. Pt began with early AM medications, then AM and so on. Pt voiced that it's easier for her so she can close the pill box and not have to open the pill box again. SLP educated pt on completing based on each bottle to ensure that pt is completing 1 medication at a time to ensure that medications are being repeated. Pt voiced 'I can do it the way you are asking me too, but when I go home, I'm going to do it my way.' SLP noted to pt to complete how she would do at home and with errors present, pt continued to voice that errors are 'common' and presents with deficits in insight to medication management. SLP  provided education on use of towel under pill box as pt continues to have weakness in URE and to assist with pouring medication on towel. SLP also educated pt on flipping bottles over that pt had completed. Pt voiced that she would flip over bottles of medications that she knew she had to continue to use as she fills them in based on time of day. SLP noted that rather than flip over bottles completed as to not confuse pt, SLP suggested putting medication completed and filled in, in a separate container to which pt agreed. Pt voiced that she prefers to complete 3 month pill boxes and voiced that she would like to continue this upon d/c. SLP provided examples of how to buy 1 month pill boxes as SLP could not find 3 month pill boxes. Pt in agreement that she would like to purchase 3 of these to have all medications organized. Pt voiced that based on initial completion of tangible medications, she agrees with SLP to have daughter or son near her to help w/ opening pill boxes and bottles due to URE weakness. At this time, it is recommended that pt have supervision for medication management to assist with opening/closing bottles and pill box as well as to ensure placement of medication due to URE weakness and mild cognitive deficits.  Recommend continued review with tangible medications in future sessions prior to d/c. At this time, pt continues to benefit from ongoing skilled SLP services to maximize overall cognitive linguistic skills in attempts to decrease caregiver burden over time.   SLP Therapy Minutes   SLP Time In 0830   SLP Time Out 0930   SLP Total Time (minutes) 60   SLP Mode of treatment - Individual (minutes) 60   SLP Mode of treatment - Concurrent (minutes) 0   SLP Mode of treatment - Group (minutes) 0   SLP Mode of treatment - Co-treat (minutes) 0   SLP Mode of Treatment - Total time(minutes) 60 minutes   SLP Cumulative Minutes 480   Therapy Time missed   Time missed? No

## 2024-09-02 NOTE — PLAN OF CARE
Problem: PAIN - ADULT  Goal: Verbalizes/displays adequate comfort level or baseline comfort level  Description: Interventions:  - Encourage patient to monitor pain and request assistance  - Assess pain using appropriate pain scale  - Administer analgesics based on type and severity of pain and evaluate response  - Implement non-pharmacological measures as appropriate and evaluate response  - Consider cultural and social influences on pain and pain management  - Notify physician/advanced practitioner if interventions unsuccessful or patient reports new pain  Outcome: Progressing     Problem: INFECTION - ADULT  Goal: Absence or prevention of progression during hospitalization  Description: INTERVENTIONS:  - Assess and monitor for signs and symptoms of infection  - Monitor lab/diagnostic results  - Monitor all insertion sites, i.e. indwelling lines, tubes, and drains  - Monitor endotracheal if appropriate and nasal secretions for changes in amount and color  - [place] appropriate cooling/warming therapies per order  - Administer medications as ordered  - Instruct and encourage patient and family to use good hand hygiene technique  - Identify and instruct in appropriate isolation precautions for identified infection/condition  Outcome: Progressing

## 2024-09-02 NOTE — QUICK NOTE
PMR Quick Note    Reviewed XR. Has some calcification at her humeral head laterally. Possible calcific tendinitis. May benefit from EPAT, but not available inpatient. Continue local pain control and K-taping. May benefit from subacromial injection. Continue to monitor pain for today. Final read of XR still pending.   Last BM 9/1    Otherwise continue current POC.    Ashley de Padua, MD  Physical Medicine and Rehabilitation

## 2024-09-02 NOTE — PROGRESS NOTES
09/02/24 1045   Pain Assessment   Pain Assessment Tool 0-10   Pain Score No Pain   Restrictions/Precautions   Precautions Bed/chair alarms;Fall Risk;Contact/isolation;Supervision on toilet/commode;Cognitive   Braces or Orthoses   (RUE sling, R AFO with sneaker for all transfers)   Cognition   Arousal/Participation Alert;Cooperative   Subjective   Subjective Pt agreeable to PT , no new complaints   Sit to Stand   Type of Assistance Needed Incidental touching   Physical Assistance Level No physical assistance   Sit to Stand CARE Score 4   Bed-Chair Transfer   Type of Assistance Needed Physical assistance;Adaptive equipment   Physical Assistance Level 25% or less   Comment stand pivot   Chair/Bed-to-Chair Transfer CARE Score 3   Walk 10 Feet   Type of Assistance Needed Physical assistance   Physical Assistance Level 26%-50%   Walk 10 Feet CARE Score 3   Walk 50 Feet with Two Turns   Type of Assistance Needed Physical assistance   Physical Assistance Level Total assistance   Walk 50 Feet with Two Turns CARE Score 1   Walk 150 Feet   Reason if not Attempted Safety concerns   Walk 150 Feet CARE Score 88   Walking 10 Feet on Uneven Surfaces   Reason if not Attempted Safety concerns   Walking 10 Feet on Uneven Surfaces CARE Score 88   Ambulation   Primary Mode of Locomotion Prior to Admission Walk   Distance Walked (feet) 60 ft   Assist Device Hand Hold   Gait Pattern Ataxic;Inconsistant Jasmyne;Decreased foot clearance;R hemiparesis;Improper weight shift   Limitations Noted In Balance;Coordination;Endurance;Speed;Swing;Strength   Provided Assistance with: Balance;Weight Shift   Walk Assist Level Maximum Assist   Findings ambulation along hallway railing 30 ft x 6 laps total with min A and w/c follow. Ambulation with HW 40 ft with min A and w/c follow. Ambualation with HHA on the L and 2nd person on her R side and w/c follow 60 ft.   Does the patient walk? 2. Yes   Equipment Use   NuStep 10 min total starting at level 1  and increasing to level 2 MCFP through, goal to keep SPM in 90's   Assessment   Treatment Assessment Pt participated in 60 min skilled PT session focusing on neuroplasticity and gait training. Utilized Nustep for neural priming with SPM goal > 90. She demonstrated decreased incidence of scissoring today and no episodes of R knee buckling. Overall RLE appears more stable during stance when walking along railing. Progressed to A at end of session and able to walk further distance and at a quicker pace. She is also progressing to  level for transfers using hemiwalker, excellent sequencing and safety awareness during. She requires continued skilled PT to maximize independence and safety with all functional mobility post CVA.   Barriers to Discharge Inaccessible home environment;Decreased caregiver support   PT Barriers   Physical Impairment Decreased strength;Decreased range of motion;Decreased endurance;Impaired balance;Decreased mobility;Decreased coordination;Impaired sensation;Impaired tone   Functional Limitation Car transfers;Stair negotiation;Transfers;Walking   Plan   Treatment/Interventions Functional transfer training;LE strengthening/ROM;Elevations;Therapeutic exercise;Endurance training;Patient/family training;Equipment eval/education;Bed mobility;Gait training   Progress Progressing toward goals   PT Therapy Minutes   PT Time In 1045   PT Time Out 1145   PT Total Time (minutes) 60   PT Mode of treatment - Individual (minutes) 60   PT Mode of treatment - Concurrent (minutes) 0   PT Mode of treatment - Group (minutes) 0   PT Mode of treatment - Co-treat (minutes) 0   PT Mode of Treatment - Total time(minutes) 60 minutes   PT Cumulative Minutes 1261   Therapy Time missed   Time missed? No

## 2024-09-03 PROCEDURE — 97112 NEUROMUSCULAR REEDUCATION: CPT

## 2024-09-03 PROCEDURE — 94660 CPAP INITIATION&MGMT: CPT

## 2024-09-03 PROCEDURE — 99232 SBSQ HOSP IP/OBS MODERATE 35: CPT | Performed by: PHYSICAL MEDICINE & REHABILITATION

## 2024-09-03 PROCEDURE — 97129 THER IVNTJ 1ST 15 MIN: CPT

## 2024-09-03 PROCEDURE — 97530 THERAPEUTIC ACTIVITIES: CPT

## 2024-09-03 PROCEDURE — 94760 N-INVAS EAR/PLS OXIMETRY 1: CPT

## 2024-09-03 PROCEDURE — 97130 THER IVNTJ EA ADDL 15 MIN: CPT

## 2024-09-03 PROCEDURE — 97116 GAIT TRAINING THERAPY: CPT

## 2024-09-03 PROCEDURE — 97535 SELF CARE MNGMENT TRAINING: CPT

## 2024-09-03 PROCEDURE — 99231 SBSQ HOSP IP/OBS SF/LOW 25: CPT | Performed by: PHYSICIAN ASSISTANT

## 2024-09-03 RX ADMIN — LEVOTHYROXINE SODIUM 25 MCG: 25 TABLET ORAL at 06:04

## 2024-09-03 RX ADMIN — B-COMPLEX W/ C & FOLIC ACID TAB 1 TABLET: TAB at 08:36

## 2024-09-03 RX ADMIN — CARVEDILOL 25 MG: 25 TABLET, FILM COATED ORAL at 16:26

## 2024-09-03 RX ADMIN — NIFEDIPINE 60 MG: 30 TABLET, FILM COATED, EXTENDED RELEASE ORAL at 16:26

## 2024-09-03 RX ADMIN — SPIRONOLACTONE 25 MG: 25 TABLET, FILM COATED ORAL at 08:38

## 2024-09-03 RX ADMIN — HYDRALAZINE HYDROCHLORIDE 75 MG: 50 TABLET ORAL at 21:00

## 2024-09-03 RX ADMIN — CARVEDILOL 25 MG: 25 TABLET, FILM COATED ORAL at 08:35

## 2024-09-03 RX ADMIN — PANTOPRAZOLE SODIUM 40 MG: 20 TABLET, DELAYED RELEASE ORAL at 06:04

## 2024-09-03 RX ADMIN — LIDOCAINE 1 PATCH: 700 PATCH TOPICAL at 21:00

## 2024-09-03 RX ADMIN — HYDRALAZINE HYDROCHLORIDE 75 MG: 50 TABLET ORAL at 06:04

## 2024-09-03 RX ADMIN — HYDRALAZINE HYDROCHLORIDE 75 MG: 50 TABLET ORAL at 14:12

## 2024-09-03 RX ADMIN — LOSARTAN POTASSIUM 100 MG: 50 TABLET, FILM COATED ORAL at 08:36

## 2024-09-03 RX ADMIN — ATORVASTATIN CALCIUM 40 MG: 40 TABLET, FILM COATED ORAL at 17:40

## 2024-09-03 RX ADMIN — RIVAROXABAN 20 MG: 20 TABLET, FILM COATED ORAL at 16:26

## 2024-09-03 NOTE — PLAN OF CARE
Problem: PAIN - ADULT  Goal: Verbalizes/displays adequate comfort level or baseline comfort level  Description: Interventions:  - Encourage patient to monitor pain and request assistance  - Assess pain using appropriate pain scale  - Administer analgesics based on type and severity of pain and evaluate response  - Implement non-pharmacological measures as appropriate and evaluate response  - Consider cultural and social influences on pain and pain management  - Notify physician/advanced practitioner if interventions unsuccessful or patient reports new pain  Outcome: Progressing     Problem: INFECTION - ADULT  Goal: Absence or prevention of progression during hospitalization  Description: INTERVENTIONS:  - Assess and monitor for signs and symptoms of infection  - Monitor lab/diagnostic results  - Monitor all insertion sites, i.e. indwelling lines, tubes, and drains  - Monitor endotracheal if appropriate and nasal secretions for changes in amount and color  - [place] appropriate cooling/warming therapies per order  - Administer medications as ordered  - Instruct and encourage patient and family to use good hand hygiene technique  - Identify and instruct in appropriate isolation precautions for identified infection/condition  Outcome: Progressing     Problem: SAFETY ADULT  Goal: Patient will remain free of falls  Description: INTERVENTIONS:  - Educate patient/family on patient safety including physical limitations  - Instruct patient to call for assistance with activity   - Consult OT/PT to assist with strengthening/mobility   - Keep Call bell within reach  - Keep bed low and locked with side rails adjusted as appropriate  - Keep care items and personal belongings within reach  - Initiate and maintain comfort rounds  - Make Fall Risk Sign visible to staff  - Offer Toileting every  Hours, in advance of need  - Initiate/Maintain alarm  - Obtain necessary fall risk management equipment:   - Apply yellow socks and  bracelet for high fall risk patients  - Consider moving patient to room near nurses station  Outcome: Progressing  Goal: Maintain or return to baseline ADL function  Description: INTERVENTIONS:  -  Assess patient's ability to carry out ADLs; assess patient's baseline for ADL function and identify physical deficits which impact ability to perform ADLs (bathing, care of mouth/teeth, toileting, grooming, dressing, etc.)  - Assess/evaluate cause of self-care deficits   - Assess range of motion  - Assess patient's mobility; develop plan if impaired  - Assess patient's need for assistive devices and provide as appropriate  - Encourage maximum independence but intervene and supervise when necessary  - Involve family in performance of ADLs  - Assess for home care needs following discharge   - Consider OT consult to assist with ADL evaluation and planning for discharge  - Provide patient education as appropriate  Outcome: Progressing  Goal: Maintains/Returns to pre admission functional level  Description: INTERVENTIONS:  - Perform AM-PAC 6 Click Basic Mobility/ Daily Activity assessment daily.  - Set and communicate daily mobility goal to care team and patient/family/caregiver.   - Collaborate with rehabilitation services on mobility goals if consulted  - Perform Range of Motion  times a day.  - Reposition patient every  hours.  - Dangle patient  times a day  - Stand patient  times a day  - Ambulate patient  times a day  - Out of bed to chair  times a day   - Out of bed for meals  times a day  - Out of bed for toileting  - Record patient progress and toleration of activity level   Outcome: Progressing     Problem: DISCHARGE PLANNING  Goal: Discharge to home or other facility with appropriate resources  Description: INTERVENTIONS:  - Identify barriers to discharge w/patient and caregiver  - Arrange for needed discharge resources and transportation as appropriate  - Identify discharge learning needs (meds, wound care, etc.)  -  Arrange for interpretive services to assist at discharge as needed  - Refer to Case Management Department for coordinating discharge planning if the patient needs post-hospital services based on physician/advanced practitioner order or complex needs related to functional status, cognitive ability, or social support system  Outcome: Progressing     Problem: Prexisting or High Potential for Compromised Skin Integrity  Goal: Skin integrity is maintained or improved  Description: INTERVENTIONS:  - Identify patients at risk for skin breakdown  - Assess and monitor skin integrity  - Assess and monitor nutrition and hydration status  - Monitor labs   - Assess for incontinence   - Turn and reposition patient  - Assist with mobility/ambulation  - Relieve pressure over bony prominences  - Avoid friction and shearing  - Provide appropriate hygiene as needed including keeping skin clean and dry  - Evaluate need for skin moisturizer/barrier cream  - Collaborate with interdisciplinary team   - Patient/family teaching  - Consider wound care consult   Outcome: Progressing

## 2024-09-03 NOTE — PLAN OF CARE
Problem: PAIN - ADULT  Goal: Verbalizes/displays adequate comfort level or baseline comfort level  Description: Interventions:  - Encourage patient to monitor pain and request assistance  - Assess pain using appropriate pain scale  - Administer analgesics based on type and severity of pain and evaluate response  - Implement non-pharmacological measures as appropriate and evaluate response  - Consider cultural and social influences on pain and pain management  - Notify physician/advanced practitioner if interventions unsuccessful or patient reports new pain  Outcome: Progressing     Problem: INFECTION - ADULT  Goal: Absence or prevention of progression during hospitalization  Description: INTERVENTIONS:  - Assess and monitor for signs and symptoms of infection  - Monitor lab/diagnostic results  - Monitor all insertion sites, i.e. indwelling lines, tubes, and drains  - Monitor endotracheal if appropriate and nasal secretions for changes in amount and color  - [place] appropriate cooling/warming therapies per order  - Administer medications as ordered  - Instruct and encourage patient and family to use good hand hygiene technique  - Identify and instruct in appropriate isolation precautions for identified infection/condition  Outcome: Progressing     Problem: SAFETY ADULT  Goal: Patient will remain free of falls  Description: INTERVENTIONS:  - Educate patient/family on patient safety including physical limitations  - Instruct patient to call for assistance with activity   - Consult OT/PT to assist with strengthening/mobility   - Keep Call bell within reach  - Keep bed low and locked with side rails adjusted as appropriate  - Keep care items and personal belongings within reach  - Initiate and maintain comfort rounds  - Make Fall Risk Sign visible to staff  - Offer Toileting every  Hours, in advance of need  - Initiate/Maintain alarm  - Obtain necessary fall risk management equipment:   - Apply yellow socks and  bracelet for high fall risk patients  - Consider moving patient to room near nurses station  Outcome: Progressing  Goal: Maintain or return to baseline ADL function  Description: INTERVENTIONS:  -  Assess patient's ability to carry out ADLs; assess patient's baseline for ADL function and identify physical deficits which impact ability to perform ADLs (bathing, care of mouth/teeth, toileting, grooming, dressing, etc.)  - Assess/evaluate cause of self-care deficits   - Assess range of motion  - Assess patient's mobility; develop plan if impaired  - Assess patient's need for assistive devices and provide as appropriate  - Encourage maximum independence but intervene and supervise when necessary  - Involve family in performance of ADLs  - Assess for home care needs following discharge   - Consider OT consult to assist with ADL evaluation and planning for discharge  - Provide patient education as appropriate  Outcome: Progressing  Goal: Maintains/Returns to pre admission functional level  Description: INTERVENTIONS:  - Perform AM-PAC 6 Click Basic Mobility/ Daily Activity assessment daily.  - Set and communicate daily mobility goal to care team and patient/family/caregiver.   - Collaborate with rehabilitation services on mobility goals if consulted  - Perform Range of Motion  times a day.  - Reposition patient every  hours.  - Dangle patient  times a day  - Stand patient  times a day  - Ambulate patient  times a day  - Out of bed to chair  times a day   - Out of bed for meals  times a day  - Out of bed for toileting  - Record patient progress and toleration of activity level   Outcome: Progressing     Problem: DISCHARGE PLANNING  Goal: Discharge to home or other facility with appropriate resources  Description: INTERVENTIONS:  - Identify barriers to discharge w/patient and caregiver  - Arrange for needed discharge resources and transportation as appropriate  - Identify discharge learning needs (meds, wound care, etc.)  -  Arrange for interpretive services to assist at discharge as needed  - Refer to Case Management Department for coordinating discharge planning if the patient needs post-hospital services based on physician/advanced practitioner order or complex needs related to functional status, cognitive ability, or social support system  Outcome: Progressing     Problem: Prexisting or High Potential for Compromised Skin Integrity  Goal: Skin integrity is maintained or improved  Description: INTERVENTIONS:  - Identify patients at risk for skin breakdown  - Assess and monitor skin integrity  - Assess and monitor nutrition and hydration status  - Monitor labs   - Assess for incontinence   - Turn and reposition patient  - Assist with mobility/ambulation  - Relieve pressure over bony prominences  - Avoid friction and shearing  - Provide appropriate hygiene as needed including keeping skin clean and dry  - Evaluate need for skin moisturizer/barrier cream  - Collaborate with interdisciplinary team   - Patient/family teaching  - Consider wound care consult   Outcome: Progressing

## 2024-09-03 NOTE — PROGRESS NOTES
09/03/24 1230   Pain Assessment   Pain Assessment Tool 0-10   Pain Score No Pain   Restrictions/Precautions   Precautions Bed/chair alarms;Cognitive;Fall Risk;Contact/isolation;Supervision on toilet/commode   Weight Bearing Restrictions No   ROM Restrictions No   Braces or Orthoses Other (Comment)  (AFO used this session +sneakers for all functional transfers; sling for transfers)   Cognition   Overall Cognitive Status WFL   Arousal/Participation Alert;Cooperative   Attention Within functional limits   Orientation Level Oriented X4   Memory Within functional limits   Following Commands Follows one step commands without difficulty   Sit to Stand   Type of Assistance Needed Incidental touching;Adaptive equipment   Comment CG with    Sit to Stand CARE Score 4   Bed-Chair Transfer   Type of Assistance Needed Adaptive equipment;Incidental touching;Physical assistance   Physical Assistance Level 26%-50%   Comment overall VALERIE with    Chair/Bed-to-Chair Transfer CARE Score 3   Transfer Bed/Chair/Wheelchair   Adaptive Equipment Ismael Walker   Walk 10 Feet   Type of Assistance Needed Physical assistance;Verbal cues;Adaptive equipment   Physical Assistance Level 26%-50%   Comment    Walk 10 Feet CARE Score 3   Ambulation   Primary Mode of Locomotion Prior to Admission Walk   Distance Walked (feet) 40 ft   Assist Device Ismael Walker   Gait Pattern Ataxic;Inconsistant Jasmyne;Decreased foot clearance;Slow Jasmyne;Forward Flexion;R foot drag;Wide OJ;Shuffle;Step to;Decreased R stance;Improper weight shift   Limitations Noted In Balance;Coordination;Endurance;Device Management;Posture;Speed;Strength;Swing   Provided Assistance with: Balance;Direction   Walk Assist Level Moderate Assist;Minimum Assist   Does the patient walk? 2. Yes   Wheelchair mobility   Does the patient use a wheelchair? 1. Yes   Type of Wheelchair Used 1. Manual   Toilet Transfer   Type of Assistance Needed Physical assistance;Verbal cues;Adaptive  equipment   Physical Assistance Level Total assistance   Comment entered room once pt was on BSC and perfomed swap out with A x2. She could be SPT with HW MIN to MODA   Toilet Transfer CARE Score 1   Toilet Transfer   Surface Assessed Bedside Commode   Therapeutic Interventions   Other Pt performed multiple SPT with HW in room recliner<>bed<>WC<>recliner. Pt had no overt LOB noted and maintained overall VALERIE. Little to no change in transfer R to L this session seen.   Assessment   Treatment Assessment Brief 30 min session with increased focus on room amb and SPT with HW. Pt noted good placement of R foot with gait and SPT's this session. No R knee buckling noted. She did require VC's for sequencing x1 and placement of HW through tight areas. Pt able to correct with VC. Pt will cont POC as tolerated with cont focus on increased functional transfers and gait. PT to determine if pt can be SPT with HW in room with A x2 next session.   Problem List Decreased strength;Decreased endurance;Decreased range of motion;Impaired balance;Decreased mobility;Decreased coordination;Impaired tone;Pain   Barriers to Discharge Inaccessible home environment;Decreased caregiver support   PT Barriers   Functional Limitation Car transfers;Stair negotiation;Transfers;Walking   Plan   Treatment/Interventions Functional transfer training;LE strengthening/ROM;Therapeutic exercise;Endurance training;Patient/family training;Equipment eval/education;Bed mobility;Gait training   Progress Progressing toward goals   PT Therapy Minutes   PT Time In 1230   PT Time Out 1300   PT Total Time (minutes) 30   PT Mode of treatment - Individual (minutes) 30   PT Mode of treatment - Concurrent (minutes) 0   PT Mode of treatment - Group (minutes) 0   PT Mode of treatment - Co-treat (minutes) 0   PT Mode of Treatment - Total time(minutes) 30 minutes   PT Cumulative Minutes 1291   Therapy Time missed   Time missed? No

## 2024-09-03 NOTE — ASSESSMENT & PLAN NOTE
New complaint of right shoulder pain on 9/1- states it feels better today  Patient feels this is due to her residual right sided weakness from her recent stroke  Being treated by PMR

## 2024-09-03 NOTE — PLAN OF CARE
Problem: SAFETY ADULT  Goal: Patient will remain free of falls  Description: INTERVENTIONS:  - Educate patient/family on patient safety including physical limitations  - Instruct patient to call for assistance with activity   - Consult OT/PT to assist with strengthening/mobility   - Keep Call bell within reach  - Keep bed low and locked with side rails adjusted as appropriate  - Keep care items and personal belongings within reach  - Initiate and maintain comfort rounds  - Make Fall Risk Sign visible to staff  - Offer Toileting every 2 Hours, in advance of need  - Initiate/Maintain bed/chair alarm  - Obtain necessary fall risk management equipment: non skid footwear  - Apply yellow socks and bracelet for high fall risk patients  - Consider moving patient to room near nurses station  Outcome: Progressing  Goal: Maintain or return to baseline ADL function  Description: INTERVENTIONS:  -  Assess patient's ability to carry out ADLs; assess patient's baseline for ADL function and identify physical deficits which impact ability to perform ADLs (bathing, care of mouth/teeth, toileting, grooming, dressing, etc.)  - Assess/evaluate cause of self-care deficits   - Assess range of motion  - Assess patient's mobility; develop plan if impaired  - Assess patient's need for assistive devices and provide as appropriate  - Encourage maximum independence but intervene and supervise when necessary  - Involve family in performance of ADLs  - Assess for home care needs following discharge   - Consider OT consult to assist with ADL evaluation and planning for discharge  - Provide patient education as appropriate  Outcome: Progressing  Goal: Maintains/Returns to pre admission functional level  Description: INTERVENTIONS:  - Perform AM-PAC 6 Click Basic Mobility/ Daily Activity assessment daily.  - Set and communicate daily mobility goal to care team and patient/family/caregiver.   - Collaborate with rehabilitation services on mobility  goals if consulted  - Perform Range of Motion 3 times a day.  - Reposition patient every 2 hours.  - Dangle patient 3 times a day  - Stand patient 3 times a day  - Ambulate patient 3 times a day  - Out of bed to chair 3 times a day   - Out of bed for meals 3 times a day  - Out of bed for toileting  - Record patient progress and toleration of activity level   Outcome: Progressing     Problem: Prexisting or High Potential for Compromised Skin Integrity  Goal: Skin integrity is maintained or improved  Description: INTERVENTIONS:  - Identify patients at risk for skin breakdown  - Assess and monitor skin integrity  - Assess and monitor nutrition and hydration status  - Monitor labs   - Assess for incontinence   - Turn and reposition patient  - Assist with mobility/ambulation  - Relieve pressure over bony prominences  - Avoid friction and shearing  - Provide appropriate hygiene as needed including keeping skin clean and dry  - Evaluate need for skin moisturizer/barrier cream  - Collaborate with interdisciplinary team   - Patient/family teaching  - Consider wound care consult   Outcome: Progressing

## 2024-09-03 NOTE — PROGRESS NOTES
"   09/03/24 1100   Pain Assessment   Pain Assessment Tool 0-10   Pain Score No Pain   Restrictions/Precautions   Precautions Bed/chair alarms;Cognitive;Fall Risk;Contact/isolation;Supervision on toilet/commode   Comprehension   Comprehension (FIM) 5 - Understands basic directions and conversation   Expression   Expression (FIM) 5 - Needs help/cues only RARELY (< 10% of the time)   Social Interaction   Social Interaction (FIM) 6 - Interacts appropriately with others BUT requires extra  time   Problem Solving   Problem solving (FIM) 4 - Solves basic problems 75-89% of time   Memory   Memory (FIM) 4 - Recognizes/recalls/performs 75-89%   Speech/Language/Cognition Assessmetn   Treatment Assessment Pt was awake, alert and cooperative for session in which current SLP is new to pt's care and engaged in brief rapport building, as well as reviewing w/ pt about tasks which prior ST team has targeted since pt's admission to the acute rehab unit. It was noted that pt was very insightful in her ability to recall daily events, more recent and remote therapy tasks which have been completed in prior sessions. It was noted that pt verbalized how she \"liked\" the challenges of deduction puzzles. Per pt's description of the tasks presented, it appeared as though pt was provided both inclusion puzzles as well as exclusion puzzles. SLP also probing pt w/ other higher level word deduction tasks which pt did report completing some version of this in prior sT sessions but will pull additional items for pt moving forward. Pt did verbalize the awareness to target more functional tasks (ie: review of medications and financial tasks) but does want to be pushed towards the higher level tasks as pt is aware of the challenge which presents in her attentions, processing, reasoning, organization of tasks, etc. Will plan moving forward to target these skills but in addition intermittently f/u w/ I ADL tasks to ensure carryover and independence w/ " tasks.     When inquiring about pt's overall speech intelligibility, pt is aware that she does have slurring when talking still, but did verbalize insight to knowing that she would require rest breaks when this occurs. Pt also confirming that she was provided OME's prior, but did confirm not completing them regularly. Might plan to review w/ pt again to monitor recall of how to execute for maximizing effort and function when completing. It was noted that pt was fully intelligible throughout session, but did observe that there was more difficulty given s-blend sounds or when rate of speech became slightly faster.     Otherwise, remainder of session was towards targeting higher level verbal problem solving/deduction tasks, where SLP provided pt w/ scenarios which challenged pt to evaluate the situation to where pt was provide supportive response to the answer verbalized. It was noted that pt's initial answer was accurate, but when attempting to elicit additional information for other considerations to make that decision required some mild probing questions to elicit additional information. As for the remaining questions which were presented, pt was able to elicit more than one answer/response for the remaining 3 questions presented (overall accuracy was 3/4 but increased given probing clues). At this time, pt will continue to benefit from ongoing skilled SLP services at this time to maximize independence of higher level cognitive linguistic skills towards decreasing the need for caregiver burden over time.    Eating   Type of Assistance Needed Set-up / clean-up   Eating CARE Score 5   SLP Therapy Minutes   SLP Time In 1100   SLP Time Out 1130   SLP Total Time (minutes) 30   SLP Mode of treatment - Individual (minutes) 30   SLP Mode of treatment - Concurrent (minutes) 0   SLP Mode of treatment - Group (minutes) 0   SLP Mode of treatment - Co-treat (minutes) 0   SLP Mode of Treatment - Total time(minutes) 30 minutes    SLP Cumulative Minutes 510   Therapy Time missed   Time missed? No

## 2024-09-03 NOTE — ASSESSMENT & PLAN NOTE
Was seeing cardiologist in [place] and taking Xarelto but she has not seen him in 2 yrs and stopped all her meds >1 yr ago  EKG in the hospital showed NSR  Needs to followup as OP with Cardiology = Dr Patt Zhang Coreg  Was not placed back on Flecainide  Now on Xarelto   Sent price check to her pharmacy on 8/21/24 for the Xarelto and is $661.99  Sent price check on Eliquis but doubtful that will be much better.  If not, she will possibly need to be converted to Coumadin with a therapeutic Lovenox bridge.  Coupon application for Xarelto supposedly completed. Pt says will be no cost.  Will have to give the coupon code number to the OP pharmacy.

## 2024-09-03 NOTE — PLAN OF CARE
Problem: SAFETY ADULT  Goal: Patient will remain free of falls  Description: INTERVENTIONS:  - Educate patient/family on patient safety including physical limitations  - Instruct patient to call for assistance with activity   - Consult OT/PT to assist with strengthening/mobility   - Keep Call bell within reach  - Keep bed low and locked with side rails adjusted as appropriate  - Keep care items and personal belongings within reach  - Initiate and maintain comfort rounds  - Make Fall Risk Sign visible to staff  - Offer Toileting every 2 Hours, in advance of need  - Initiate/Maintain bed/chair alarm  - Obtain necessary fall risk management equipment: SB  - Apply yellow socks and bracelet for high fall risk patients  - Consider moving patient to room near nurses station  Outcome: Progressing

## 2024-09-03 NOTE — PROGRESS NOTES
[facility]  Progress Note  Name: Maye Lilly I  MRN: [id number]  Unit/Bed#: -01 I Date of Admission: 8/20/2024   Date of Service: 9/3/2024 I Hospital Day: 14    Assessment & Plan   Right shoulder pain  Assessment & Plan  New complaint of right shoulder pain on 9/1- states it feels better today  Patient feels this is due to her residual right sided weakness from her recent stroke  Being treated by PMR        Meningioma (HCC)  Assessment & Plan  Incidental finding   OP followup    GERD (gastroesophageal reflux disease)  Assessment & Plan  Continue Protonix    Hypothyroidism  Assessment & Plan  Continue Levothyroxine 25 mcg qd  This was the dose that she was supposed to be taking at home and they placed her back on it in the hospital on 8/12/24 but there was no TSH drawn.  Had an abnormal TSH of 5.1 on 4/23/20 presumably not on Levothyroxine at that time since thereafter on 2/15/22 was normal  TSH 8/22/24 normal at 3.95  Will continue current dose and have her get a TSH in 6 weeks = @9/23    DIANELYS (obstructive sleep apnea)  Assessment & Plan  In past was on CPAP  Can d/w PCP when discharged    HTN (hypertension)  Assessment & Plan  Home:  no meds  Here:  Coreg 25mg BID/Hydralazine 75 mg q8hrs/Nifedipine 60mg daily at dinner time/Aldactone 25mg daily/Losartan 100mg qd  Stable; continue current medication regimen  Followup with IM as OP.  On DC summary was listed to see Megan DORSEY from that group    Paroxysmal atrial fibrillation (HCC)  Assessment & Plan  Was seeing cardiologist in [place] and taking Xarelto but she has not seen him in 2 yrs and stopped all her meds >1 yr ago  EKG in the hospital showed NSR  Needs to followup as OP with Cardiology = Dr Beltre  Continue Coreg  Was not placed back on Flecainide  Now on Xarelto   Sent price check to her pharmacy on 8/21/24 for the Xarelto and is $661.99  Sent price check on Eliquis but doubtful that will be much  better.  If not, she will possibly need to be converted to Coumadin with a therapeutic Lovenox bridge.  Coupon application for Xarelto supposedly completed. Pt says will be no cost.  Will have to give the coupon code number to the OP pharmacy.    Clostridium difficile infection  Assessment & Plan  Diagnosed during hospital stay  Was ordered Vancomycin 125mg QID.  Last dose was 8/26/24 at 0900  Diarrhea has resolved and stools are formed    * Acute CVA (cerebrovascular accident) (HCC)  Assessment & Plan  Presented with right sided weakness, slurred speech, headache and vomiting  MRI = left pontine infarct  Neuro felt CVA was from atrial fibrillation in setting of non-compliance with anticoagulation  Currently now on Xarelto 20mg qd, Lipitor 40mg qd  Followup with Neuro as OP.  Stevo Baires from [facility] from that group was listed on the DC summary                 The above assessment and plan was reviewed and updated as determined by my evaluation of the patient on 9/3/2024.    Labs:   Results from last 7 days   Lab Units 08/29/24  0536   WBC Thousand/uL 8.63   HEMOGLOBIN g/dL 12.5   HEMATOCRIT % 39.5   PLATELETS Thousands/uL 287     Results from last 7 days   Lab Units 08/29/24  0536   SODIUM mmol/L 137   POTASSIUM mmol/L 4.2   CHLORIDE mmol/L 102   CO2 mmol/L 27   BUN mg/dL 24   CREATININE mg/dL 0.66   CALCIUM mg/dL 9.4                   Imaging  XR shoulder 2+ vw right   Final Result by Zion Kuhn MD (09/03 0651)      No acute osseous abnormality.         Computerized Assisted Algorithm (CAA) may have been used to analyze all applicable images.         Workstation performed: [id number]             Review of Scheduled Meds:  Current Facility-Administered Medications   Medication Dose Route Frequency Provider Last Rate    acetaminophen  650 mg Oral Q6H PRN WENCESLAO Patel      atorvastatin  40 mg Oral QPM WENCESLAO Patel      bisacodyl  10 mg Rectal Daily PRN Dougie King MD       carvedilol  25 mg Oral BID With Meals WENCESLAO Patel      Diclofenac Sodium  2 g Topical BID Ashley Depadua, MD      hydrALAZINE  75 mg Oral Q8H BARB WENCESLAO Patel      levothyroxine  25 mcg Oral Early Morning Lucila Schulz, WENCESLAO      lidocaine  1 patch Topical Daily Dk De Dios MD      losartan  100 mg Oral Daily WENCESLAO Patel      multivitamin stress formula  1 tablet Oral Daily Lucila Schulz, WENCESLAO      NIFEdipine  60 mg Oral Daily With Dinner Lucila Schulz, WENCESLAO      pantoprazole  40 mg Oral Early Morning Lucila Schulz, WENCESLAO      rivaroxaban  20 mg Oral Daily With Dinner Lucila Schulz, WENCESLAO      spironolactone  25 mg Oral Daily WENCESLAO Patel         Subjective/ HPI: Patient seen and examined. Patients overnight issues or events were reviewed with nursing staff. New or overnight issues include the following:     Patient seen sitting up in chair eating lunch. States her right shoulder feels better today. Has significant right sided residual weakness. Otherwise feels well    ROS:   A 10 point ROS was performed; negative except as noted above.        *Labs /Radiology studies Reviewed  *Medications  reviewed and reconciled as needed  *Please refer to order section for additional ordered labs studies      Physical Examination:  Vitals:   Vitals:    09/03/24 0432 09/03/24 0556 09/03/24 0834 09/03/24 1252   BP:  127/55 140/60 132/63   BP Location:  Left arm Left arm Left arm   Pulse:  70 75 74   Resp:  16  16   Temp:  98.9 °F (37.2 °C)  97.8 °F (36.6 °C)   TempSrc:  Oral  Oral   SpO2: 92% 94%  93%   Weight:       Height:           General Appearance: NAD; pleasant  HEENT: PERRLA, conjuctiva normal; mucous membranes moist; face symmetrical  Neck:  Supple  Lungs: clear bilaterally, normal respiratory effort, no retractions, expiratory effort normal, on room air  CV: regular rate   ABD: soft non tender, +BS x4  EXT: DP pulses  intact, no lymphadenopathy  Skin: normal turgor, normal texture, no rash  Psych: affect normal, mood normal  Neuro: AAOx3  right sided UE and LE weakness     The above physical exam was reviewed and updated as determined by my evaluation of the patient on 9/3/2024.    Invasive Devices       None                      VTE Pharmacologic Prophylaxis: Xarelto  Code Status: Level 1 - Full Code  Current Length of Stay: 14 day(s)    Total floor / unit time spent today 15 minutes  Coordination of patient's care was performed in conjunction with consulting services. Time invested included review of patient's labs, vitals, and management of their comorbidities with continued monitoring, examination of patient as well as answering patient questions, documenting her findings and creating progress note in electronic medical record,  ordering appropriate diagnostic testing.       ** Please Note:  voice to text software may have been used in the creation of this document. Although proof errors in transcription or interpretation are a potential of such software**

## 2024-09-03 NOTE — PROGRESS NOTES
09/03/24 1415   Pain Assessment   Pain Assessment Tool 0-10   Pain Score No Pain   Restrictions/Precautions   Precautions Bed/chair alarms;Cognitive;Fall Risk;Contact/isolation;Supervision on toilet/commode;Pain   Braces or Orthoses Other (Comment)  (AFO used this session +sneakers for all functional transfers; sling for transfers)   Subjective   Subjective pt agreeable to perform skilled PT   Sit to Stand   Type of Assistance Needed Incidental touching;Adaptive equipment   Comment CG with HW and gait belt   Sit to Stand CARE Score 4   Bed-Chair Transfer   Type of Assistance Needed Physical assistance   Physical Assistance Level 51%-75%   Comment Min A x1 SPT with HW and gait belt donned for transferring to L side; Mod A x1 for SPT with HW and gait belt donned when transferring to R side; pt demo's improved control over RLE during SPT and no knee buckling noted.   Chair/Bed-to-Chair Transfer CARE Score 2   Transfer Bed/Chair/Wheelchair   Adaptive Equipment Ismael Walker   Car Transfer   Reason if not Attempted Safety concerns   Car Transfer CARE Score 88   Walk 10 Feet   Type of Assistance Needed Physical assistance   Physical Assistance Level Total assistance   Comment NPP/HIGT HHAX2   Walk 10 Feet CARE Score 1   Walk 50 Feet with Two Turns   Type of Assistance Needed Physical assistance   Physical Assistance Level Total assistance   Comment NPP/HIGT HHAX2   Walk 50 Feet with Two Turns CARE Score 1   Walk 150 Feet   Type of Assistance Needed Physical assistance   Physical Assistance Level Total assistance   Comment NPP/HIGT HHAX2   Walk 150 Feet CARE Score 1   Walking 10 Feet on Uneven Surfaces   Reason if not Attempted Safety concerns   Walking 10 Feet on Uneven Surfaces CARE Score 88   Ambulation   Primary Mode of Locomotion Prior to Admission Walk   Distance Walked (feet) 180 ft   Assist Device Hand Hold  (x2)   Does the patient walk? 2. Yes   Wheelchair mobility   Does the patient use a wheelchair? 1. Yes    Type of Wheelchair Used 1. Manual   Curb or Single Stair   Reason if not Attempted Safety concerns   1 Step (Curb) CARE Score 88   4 Steps   Reason if not Attempted Safety concerns   4 Steps CARE Score 88   12 Steps   Reason if not Attempted Safety concerns   12 Steps CARE Score 88   Picking Up Object   Reason if not Attempted Safety concerns   Picking Up Object CARE Score 88   Equipment Use   NuStep 10 min lvl2 for 1000 feet   Assessment   Treatment Assessment pt progressing with NPP /HIGT HHA x2 with WC follow , Barriers to Discharge Home: 16 ABEL at her home in [facility], 8 ABEL at her daughter's home in [place], decreased caregiver support (will need to clarify with family availability to assist at discharge), significant assist for all functional mobility   PT Interventions to address Barriers: therapeutic exercises, transfer training, Standing tolerance, identifying optimal equipment  Equipments Needs: TBD pending progress  PT Plan of Care for the Week: Pt advance to NPP/HGIT today HHA x2 with right shoulder sling on for 180 -200 feet ambulation and pt  limited right  hip and knee flexion, Pt needs to  inc ROM right Knee and hip flexion for  RLE and  mat program to inc hip flexion.  Cont working  on NPP/HIGT HHA x2 to inc functional mobility, motor, learning and planing to meet DC goals , Also cont with    RLE strengthening/stretching; transfer training with focus on SPT transfers for BSC see SCHILLING note. Pt instructed with step count per day and fitbud to show pt progress. Cont POC toward DC goals   Family Training Needed: Yes, but will not occur until closer to discharge   Discharge Planning: TBD pending progress, but patient's goal is to discharge to her daughter's home in [place].     Please schedule F/T ASAP for PT to go home if possible or talk to the family about ramp in the next few days d/t pt advance progress , possible DMEs HW / WC and AFO ?   Barriers to Discharge Inaccessible home  environment;Decreased caregiver support   Plan   Progress Progressing toward goals   Discharge Recommendation   Rehab Resource Intensity Level, PT   (TBD pending progress)   PT Therapy Minutes   PT Time In 1415   PT Time Out 1525   PT Total Time (minutes) 70   PT Mode of treatment - Individual (minutes) 70   PT Mode of treatment - Concurrent (minutes) 0   PT Mode of treatment - Group (minutes) 0   PT Mode of treatment - Co-treat (minutes) 0   PT Mode of Treatment - Total time(minutes) 70 minutes   PT Cumulative Minutes 1361   Therapy Time missed   Time missed? No

## 2024-09-03 NOTE — PROGRESS NOTES
Physical Medicine and Rehabilitation Progress Note  Maye Lilly 64 y.o. female MRN: [id number]  Unit/Bed#: -01 Encounter: [id number]    Etiology/Reason for Admission: Impairment of mobility, safety, Activities of Daily Living (ADLs), and cognitive/communication skills due to Stroke:  01.2  Right Body Involvement (Left Brain)    Interval History: Seen face-to-face at bedside. No acute events overnight. No current concerns. She did have some right sided shoulder pain over the weekend, she feels it is improving with placement of Kinesio-tape. Vital signs reviewed, stable and WNL. Voiding spontaneously, continent. Last BM 9/2/24, continent.     Assessment/Plan -     Functional Update:  Physical Therapy Occupational Therapy Speech Therapy   Weight Bearing Status: Full Weight Bearing  Transfers: Minimal Assistance, Moderate Assistance  Bed Mobility: Moderate Assistance, Minimal Assistance  Amulation Distance (ft): 60 feet  Ambulation: Moderate Assistance  Assistive Device for Ambulation: Ismael Walker  Wheelchair Mobility Distance: 150 ft  Wheelchair Mobility: Minimal Assistance, Incidental Touching  Number of Stairs: 4  Assistive Device for Stairs: Lehft Hand Rail  Stair Assistance: Maximum Assistance  Discharge Recommendations: Home with:  DC Home with:: 24 Hour Assisteance, Family Support, 24 Hour Supervision, First Floor Setup, Home Physical Therapy (pending outcome vs SNF)   Eating: Supervision (set up)  Grooming: Supervision (set up)  Bathing: Maximum Assistance  Bathing: Maximum Assistance  Upper Body Dressing: Moderate Assistance  Lower Body Dressing: Maximum Assistance  Toileting: Maximum Assistance  Tub/Shower Transfer: Total Assistance, Assist of 2  Toilet Transfer: Moderate Assistance (SPT with HW)  Cognition: Within Defined Limits  Cognition: Decreased Memory  Orientation: Person, Place, Time, Situation   Mode of Communication: Verbal  Speech/Language: Dysarthia  Cognition: Exceptions to  WNL  Cognition: Decreased Memory, Decreased Executive Functions, Decreased Attention, Decreased Comprehension  Orientation: Person, Place, Time, Situation  Discharge Recommendations: Home with:  DC Home with:: Family Support, 24 Hour Supervision ( services pending overall progress)     Ms. Maye Lilly is a 65 yo woman with a past medical history notable for HTN and atrial fibrillation who presented to the [facility] on [facility]/10[facility]24 with acute dysarthria, facial droop and right hemiparesis. A stroke alert was activated, CT head wo contrast was negative for acute findings, CTA of the head/neck showed no evidence of large vessel occlusion and patent cervical carotid and vertebral arteries. MRI of the brain revealed an acute left pontine infarct. Course complicated by severe hypertension requiring multiple agents to obtain control.     - Acute left pontine ischemic infarct:   Clinically presented on 8/10/24 with right hemiparesis, dysarthria and facial droop  Stroke etiology: HTN, small vessel disease  MR brain on 8/11/24 revealed a small area of acute/subacute ischemia within the central luz to the left of midline without acute hemorrhage  Did not receive tPA/TNKase, did not undergo thrombectomy  DAPT regimen: None  Atorvastatin 40 mg qHs for secondary prevention  Monitor for neurogenic bowel and bladder  Promote regular sleep/wake cycles and proper sleep hygiene  Monitor closely for right hemiparetic shoulder pain and post-stroke pain  Neutral resting wrist splint for the and RLE multipodus boot to be worn at night and while in bed during the day to prevent developing spasticity and contracture  Maintain adequate nutrition, nutrition consult  Avoid sedating and anticholinergic medications as possible  Maintain normotension, s/p permissive hypertension  Outpatient neurovascular neurology follow-up  - Right hemiparetic shoulder pain: in the setting of right sided hemiparesis from stroke, examination consistent with right  shoulder impingement, no concerns for adhesive capsulitis currently; taping, PT  - C. Difficile infection: OSH-acquired; vancomycin PO completed 8/26/24  - Paroxysmal atrial fibrillation: carvedilol for rate control, rivaroxaban for embolic stroke risk reduction; outpatient cardiology follow-up  - HTN: was not taking medications at home, reportedly due to cost; carvedilol, hydralazine, nifedipine, spironolactone, losartan; appreciate IM management  - Hypothyroidism: levothyroxine  - Therapies: PT, OT and SLP  - Right knee osteoarthritis: lidocaine patch daily, Voltaren topical BID, tylenol PRN  - Bowel: Monitor closely for neurogenic bowel. Will follow BMs and adjust bowel regimen to target soft, formed stools q1-2 days, per pt's regular schedule.  - Bladder: Monitor closely for neurogenic bladder. Will follow UOP and encourage spontaneous voids. If unable to void spontaneously, will monitor with PVR bladder scans and initiate ISC regimen.  - Skin: Monitor for breakdown, frequent turns, Kreg specialty bed for pressure offloading  - Sleep: Practice effective sleep hygiene (e.g., consistent night and morning routine, quiet, dark room at night, no electronic devices/screens in bed, avoid large meals/caffeine before bed)  - VTE prophylaxis: Rivaroxaban  - Disposition: Pending follow-up (re-team) interdisciplinary team meeting, home with assist, ELOS 28 days    Dk De Dios MD  Attending Physician  Physical Medicine and Rehabilitation  [facility]    Review of Systems:  A 10 point ROS was performed; negative except as noted above.       Current Facility-Administered Medications:     acetaminophen (TYLENOL) tablet 650 mg, 650 mg, Oral, Q6H PRN, WENCESLAO Patel, 650 mg at 09/02/24 2139    atorvastatin (LIPITOR) tablet 40 mg, 40 mg, Oral, QPM, WENCESLAO Patel, 40 mg at 09/02/24 1729    bisacodyl (DULCOLAX) rectal suppository 10 mg, 10 mg, Rectal, Daily PRN, Dougie  MD Fernando    carvedilol (COREG) tablet 25 mg, 25 mg, Oral, BID With Meals, WENCESLAO Patel, 25 mg at 09/03/24 0835    Diclofenac Sodium (VOLTAREN) 1 % topical gel 2 g, 2 g, Topical, BID, Ashley Depadua, MD, 2 g at 09/02/24 0826    hydrALAZINE (APRESOLINE) tablet 75 mg, 75 mg, Oral, Q8H BARB, WENCESLAO Patel, 75 mg at 09/03/24 1412    levothyroxine tablet 25 mcg, 25 mcg, Oral, Early Morning, WENCESLAO Patel, 25 mcg at 09/03/24 0604    lidocaine (LIDODERM) 5 % patch 1 patch, 1 patch, Topical, Daily, Dk De Dios MD, 1 patch at 09/02/24 1729    losartan (COZAAR) tablet 100 mg, 100 mg, Oral, Daily, WENCESLAO Patel, 100 mg at 09/03/24 0836    multivitamin stress formula tablet 1 tablet, 1 tablet, Oral, Daily, WENCESLAO Patel, 1 tablet at 09/03/24 0836    NIFEdipine (PROCARDIA XL) 24 hr tablet 60 mg, 60 mg, Oral, Daily With Dinner, WENCESLAO Patel, 60 mg at 09/02/24 1729    pantoprazole (PROTONIX) EC tablet 40 mg, 40 mg, Oral, Early Morning, WENCESLAO Patel, 40 mg at 09/03/24 0604    rivaroxaban (XARELTO) tablet 20 mg, 20 mg, Oral, Daily With Dinner, WENCESLAO Patel, 20 mg at 09/02/24 1729    spironolactone (ALDACTONE) tablet 25 mg, 25 mg, Oral, Daily, WENCESLAO Patel, 25 mg at 09/03/24 0838    Physical Exam:  Temp:  [97.8 °F (36.6 °C)-98.9 °F (37.2 °C)] 97.8 °F (36.6 °C)  HR:  [70-75] 74  Resp:  [15-17] 16  BP: (124-140)/(55-63) 132/63  SpO2:  [92 %-94 %] 93 %    GEN: Patient seen resting comfortably in bedside recliner, NAD  HEENT: NCAT; right sided facial droop at rest, mucous membranes moist  CV: No extremity edema  PULM: Breathing comfortably on room air  ABD: Non-distended  SKIN: No obvious rashes or lesions on exposed skin  MSK: Right shoulder normal inspection, no obvious subluxation of the humeral head. Mild posterior glenohumeral joint line tenderness. Hawkin's positive on the right, other  provocative maneuvers limited due to neurological weakness on the right side. Passive ROM is smooth.   NEURO:  Awake and alert, answering questions appropriately to context; able to follow simple commands with clear consistency  Speech is fluent and organized, but mildly dysarthric  RUE with some proximal strength, no movement in the RUE distal to the elbow, moving RLE spontaneously    Laboratory:  Labs reviewed, none new  Results from last 7 days   Lab Units 08/29/24  0536   HEMOGLOBIN g/dL 12.5   HEMATOCRIT % 39.5   WBC Thousand/uL 8.63     Results from last 7 days   Lab Units 08/29/24  0536   BUN mg/dL 24   SODIUM mmol/L 137   POTASSIUM mmol/L 4.2   CHLORIDE mmol/L 102   CREATININE mg/dL 0.66            Diagnostic Studies: Reviewed   XR shoulder 2+ vw right   Final Result by Zion Kuhn MD (09/03 0651)      No acute osseous abnormality.         Computerized Assisted Algorithm (CAA) may have been used to analyze all applicable images.         Workstation performed: [id number]           Total time spent:  35 minutes, with more than 50% spent counseling/coordinating care. Counseling includes discussion with patient re: progress in therapies, functional issues observed by therapy staff, and discussion with patient his/her current medical state/wellbeing. Coordination of patient's care was performed in conjunction with Internal Medicine service to monitor patient's labs, vitals, and management of their comorbidities.

## 2024-09-03 NOTE — PROGRESS NOTES
09/03/24 0930   Pain Assessment   Pain Assessment Tool 0-10   Pain Score No Pain   Restrictions/Precautions   Precautions Bed/chair alarms;Cognitive;Fall Risk;Contact/isolation;Supervision on toilet/commode   Weight Bearing Restrictions No   ROM Restrictions No   Braces or Orthoses Other (Comment)  (AFO used this session +sneakers for all functional transfers; sling for transfers)   Sit to Stand   Type of Assistance Needed Incidental touching   Physical Assistance Level No physical assistance   Comment CG with HW and gait belt donned   Sit to Stand CARE Score 4   Bed-Chair Transfer   Type of Assistance Needed Physical assistance   Physical Assistance Level 51%-75%   Comment Min A x1 SPT with HW and gait belt donned for transferring to L side; Mod A x1 for SPT with HW and gait belt donned when transferring to R side; pt demo's improved control over RLE during SPT and no knee buckling noted.   Chair/Bed-to-Chair Transfer CARE Score 2   Toileting Hygiene   Comment (S)  plan to reassess toileting in stance tomorrow as able.   Toilet Transfer   Comment (S)  plan to reassess SPT to PF DA BSC with HW tomorrow as able.   Neuromuscular Education   Functional Movement Patterns seated EOB, pt engages in RUE AAROM using MAS focusing on scapular retraction/protraction, elbow flex/ext and horizontal shoulder ab/adduction; then transitioned to table top arm skate focusing on same func movement patterns with pt reporting more difficulty vs MAS, did require increased AAROM. all func movement patterns completed for time (approx 2 min each exercise) vs reps. pt with no compensatory patterns noted,  good form and tech throughout.   Cognition   Overall Cognitive Status WFL   Arousal/Participation Alert;Cooperative   Attention Within functional limits   Orientation Level Oriented X4   Memory Within functional limits   Following Commands Follows one step commands without difficulty   Activity Tolerance   Activity Tolerance Patient  tolerated treatment well   Assessment   Treatment Assessment pt engages in 90 minute skilled OT Session focusing on RUE NMR and SPT with HW. see above for full func details. pt continues to demo slow and steady progress toward OT goals. demo's improvement in SPT with HW progressing to Ax1 with pt better control over RLE during transfers. slow improvements in RUE noted with use of MAS as well as arm skate with pt achieving minimal active elbow flex, scap retraction/protraction. pt remains limited by R sided weakness, warranting continued skilled care to focus on ADL retraining, focus on toileting tasks in stance with pt completing CM as able, SPT with HW and gait belt, RUE NMR, stroke education, in order to decrease burden of care at d/c.   Prognosis Good   Problem List Decreased strength;Decreased endurance;Decreased range of motion;Impaired balance;Decreased mobility;Decreased coordination;Impaired tone;Pain   Plan   Treatment/Interventions ADL retraining;Functional transfer training;Therapeutic exercise;Endurance training;Patient/family training;Equipment eval/education;Compensatory technique education   OT Therapy Minutes   OT Time In 0930   OT Time Out 1100   OT Total Time (minutes) 90   OT Mode of treatment - Individual (minutes) 90   OT Mode of treatment - Concurrent (minutes) 0   OT Mode of treatment - Group (minutes) 0   OT Mode of treatment - Co-treat (minutes) 0   OT Mode of Treatment - Total time(minutes) 90 minutes   OT Cumulative Minutes 1150   Therapy Time missed   Time missed? No

## 2024-09-03 NOTE — ASSESSMENT & PLAN NOTE
Presented with right sided weakness, slurred speech, headache and vomiting  MRI = left pontine infarct  Neuro felt CVA was from atrial fibrillation in setting of non-compliance with anticoagulation  Currently now on Xarelto 20mg qd, Lipitor 40mg qd  Followup with Neuro as OP.  [facility]

## 2024-09-03 NOTE — PROGRESS NOTES
09/03/24 1401   Pain Assessment   Pain Assessment Tool 0-10   Pain Score No Pain   Restrictions/Precautions   Precautions Bed/chair alarms;Cognitive;Fall Risk;Contact/isolation;Supervision on toilet/commode;Pain   Weight Bearing Restrictions No   ROM Restrictions No   Braces or Orthoses Other (Comment)  (AFO used this session +sneakers for all functional transfers; sling for transfers)   Sit to Stand   Type of Assistance Needed Incidental touching   Physical Assistance Level No physical assistance   Comment CG with HW and gait belt   Sit to Stand CARE Score 4   Bed-Chair Transfer   Type of Assistance Needed Physical assistance   Physical Assistance Level 51%-75%   Comment Min A x1 SPT with HW and gait belt donned for transferring to L side; Mod A x1 for SPT with HW and gait belt donned when transferring to R side; pt demo's improved control over RLE during SPT and no knee buckling noted.   Chair/Bed-to-Chair Transfer CARE Score 2   Toileting Hygiene   Type of Assistance Needed Physical assistance   Physical Assistance Level 76% or more   Comment in stance with R knee block, pt able to initiate CM down, does require assist for CM down on R side; while seated on commode, attempted to have pt perform func reach to rear post BM, however pt unable to func reach and complete w/o A. in stance with R knee block, pt able to assist in CM up on L side, continues to require assist on R   Toileting Hygiene CARE Score 2   Toilet Transfer   Type of Assistance Needed Physical assistance   Physical Assistance Level 51%-75%   Comment Min A when performing SPT to PF DA BSC to the LEFT w/ HW; Mod A when performing SPT to PF DA BSC to the RIGHT w/ HW. gait belt donned   Toilet Transfer CARE Score 2   Cognition   Overall Cognitive Status WFL   Arousal/Participation Alert;Cooperative   Attention Within functional limits   Orientation Level Oriented X4   Memory Within functional limits   Following Commands Follows one step commands  without difficulty   Activity Tolerance   Activity Tolerance Patient tolerated treatment well   Assessment   Treatment Assessment pt engages in brief 15 minute skilled OT session focusing on toileting and SPT with HW. see above for full func details. pt continues to demo slow and steady progress toward OT goals, however toileting still remains a barrier as pt with decreased func reach to rear as pt is not left hand dominant and due to body habitus. will continue to focus on standing balance for CM up/down. recommend continued skilled care to focus on ADL retraining, func transfers--SPT with HW, standing payal/bal, toileting, RUE NMR, stroke education, in order to decrease burden of care at d/c.   Prognosis Good   Problem List Decreased strength;Decreased endurance;Decreased range of motion;Impaired balance;Decreased mobility;Decreased coordination;Impaired tone;Pain   Plan   Treatment/Interventions ADL retraining;Functional transfer training;Therapeutic exercise;Endurance training;Patient/family training;Equipment eval/education;Compensatory technique education   OT Therapy Minutes   OT Time In 1400   OT Time Out 1415   OT Total Time (minutes) 15   OT Mode of treatment - Individual (minutes) 15   OT Mode of treatment - Concurrent (minutes) 0   OT Mode of treatment - Group (minutes) 0   OT Mode of treatment - Co-treat (minutes) 0   OT Mode of Treatment - Total time(minutes) 15 minutes   OT Cumulative Minutes 1165   Therapy Time missed   Time missed? No

## 2024-09-04 PROCEDURE — 99231 SBSQ HOSP IP/OBS SF/LOW 25: CPT

## 2024-09-04 PROCEDURE — 94660 CPAP INITIATION&MGMT: CPT

## 2024-09-04 PROCEDURE — 97530 THERAPEUTIC ACTIVITIES: CPT

## 2024-09-04 PROCEDURE — 97535 SELF CARE MNGMENT TRAINING: CPT

## 2024-09-04 PROCEDURE — 99232 SBSQ HOSP IP/OBS MODERATE 35: CPT | Performed by: PHYSICAL MEDICINE & REHABILITATION

## 2024-09-04 PROCEDURE — 97129 THER IVNTJ 1ST 15 MIN: CPT

## 2024-09-04 PROCEDURE — 97130 THER IVNTJ EA ADDL 15 MIN: CPT

## 2024-09-04 PROCEDURE — 97112 NEUROMUSCULAR REEDUCATION: CPT

## 2024-09-04 PROCEDURE — 94760 N-INVAS EAR/PLS OXIMETRY 1: CPT

## 2024-09-04 RX ADMIN — B-COMPLEX W/ C & FOLIC ACID TAB 1 TABLET: TAB at 10:06

## 2024-09-04 RX ADMIN — RIVAROXABAN 20 MG: 20 TABLET, FILM COATED ORAL at 17:54

## 2024-09-04 RX ADMIN — CARVEDILOL 25 MG: 25 TABLET, FILM COATED ORAL at 17:54

## 2024-09-04 RX ADMIN — ATORVASTATIN CALCIUM 40 MG: 40 TABLET, FILM COATED ORAL at 17:54

## 2024-09-04 RX ADMIN — LEVOTHYROXINE SODIUM 25 MCG: 25 TABLET ORAL at 06:04

## 2024-09-04 RX ADMIN — HYDRALAZINE HYDROCHLORIDE 75 MG: 50 TABLET ORAL at 14:32

## 2024-09-04 RX ADMIN — HYDRALAZINE HYDROCHLORIDE 75 MG: 50 TABLET ORAL at 21:53

## 2024-09-04 RX ADMIN — NIFEDIPINE 60 MG: 30 TABLET, FILM COATED, EXTENDED RELEASE ORAL at 17:54

## 2024-09-04 RX ADMIN — HYDRALAZINE HYDROCHLORIDE 75 MG: 50 TABLET ORAL at 06:04

## 2024-09-04 RX ADMIN — LOSARTAN POTASSIUM 100 MG: 50 TABLET, FILM COATED ORAL at 10:07

## 2024-09-04 RX ADMIN — LIDOCAINE 1 PATCH: 700 PATCH TOPICAL at 21:53

## 2024-09-04 RX ADMIN — SPIRONOLACTONE 25 MG: 25 TABLET, FILM COATED ORAL at 10:06

## 2024-09-04 RX ADMIN — PANTOPRAZOLE SODIUM 40 MG: 20 TABLET, DELAYED RELEASE ORAL at 06:04

## 2024-09-04 RX ADMIN — CARVEDILOL 25 MG: 25 TABLET, FILM COATED ORAL at 10:06

## 2024-09-04 NOTE — TEAM CONFERENCE
Acute RehabilitationTeam Conference Note  Date: 9/4/2024   Time: 11:23 AM       Patient Name:  Maye Lilly       Medical Record Number: [id number]   YOB: 1960  Sex: Female          Room/Bed:  /[facility] 969-01  Payor Info:  Payor: MEDICARE / Plan: MEDICARE A AND B / Product Type: Medicare A & B Fee for Service /      Admitting Diagnosis: Stroke due to stenosis of left posterior cerebral artery (HCC) [I63.532]   Admit Date/Time:  8/20/2024  3:02 PM  Admission Comments: No comment available     Primary Diagnosis:  Acute CVA (cerebrovascular accident) ([facility])  Principal Problem: Acute CVA (cerebrovascular accident) (HCC)    Patient Active Problem List    Diagnosis Date Noted    Right shoulder pain 09/01/2024    Acute CVA (cerebrovascular accident) ([facility]) 08/20/2024    Clostridium difficile infection 08/20/2024    Paroxysmal atrial fibrillation ([facility]) 08/20/2024    HTN (hypertension) 08/20/2024    DIANELYS (obstructive sleep apnea) 08/20/2024    Hypothyroidism 08/20/2024    GERD (gastroesophageal reflux disease) 08/20/2024    Meningioma (HCC) 08/20/2024       Physical Therapy:    Weight Bearing Status: Full Weight Bearing  Transfers: Minimal Assistance, Moderate Assistance  Bed Mobility: Moderate Assistance, Minimal Assistance  Amulation Distance (ft): 60 feet  Ambulation: Moderate Assistance  Assistive Device for Ambulation: Ismael Walker  Wheelchair Mobility Distance: 150 ft  Wheelchair Mobility: Minimal Assistance, Incidental Touching  Number of Stairs: 4  Assistive Device for Stairs: Lehft Hand Rail  Stair Assistance: Maximum Assistance  Discharge Recommendations: Home with:  DC Home with:: 24 Hour Assisteance, Family Support, 24 Hour Supervision, First Floor Setup, Home Physical Therapy (pending outcome vs SNF)    Date: 9/3/2024  Date of Evaluation: 8/1/2024  Estimated Length of Stay: 4 weeks     Barriers to Discharge Home: 16 ABEL at her home in [facility], 8 ABEL at her daughter's home in [place],  decreased caregiver support (will need to clarify with family availability to assist at discharge), significant assist for all functional mobility   PT Interventions to address Barriers: therapeutic exercises, transfer training, Standing tolerance, identifying optimal equipment  Equipments Needs: TBD pending progress  PT Plan of Care for the Week: Pt advance to NPP/HGIT today HHA x2 with right shoulder sling on for 180 -200 feet ambulation and pt  limited right  hip and knee flexion, Pt needs to  inc ROM right Knee and hip flexion for  RLE and  mat program to inc hip flexion.  Cont working  on NPP/HIGT HHA x2 to inc functional mobility, motor, learning and planing to meet DC goals , Also cont with    RLE strengthening/stretching; transfer training with focus on SPT transfers for BSC see SCHILLING note. Pt instructed with step count per day and fitbud to show pt progress. Cont POC toward DC goals   Family Training Needed: Yes, but will not occur until closer to discharge   Discharge Planning: TBD pending progress, but patient's goal is to discharge to her daughter's home in [place].     Please schedule F/T ASAP for PT to go home if possible or talk to the family about ramp in the next few days d/t pt advance progress , possible DMEs HW / WC and AFO ?      Occupational Therapy:  Eating: Supervision (set up)  Grooming: Supervision (set up)  Bathing: Maximum Assistance  Bathing: Maximum Assistance  Upper Body Dressing: Moderate Assistance  Lower Body Dressing: Maximum Assistance  Toileting: Maximum Assistance  Tub/Shower Transfer: Total Assistance, Assist of 2  Toilet Transfer: Moderate Assistance (SPT with HW)  Cognition: Within Defined Limits  Cognition: Decreased Memory  Orientation: Person, Place, Time, Situation  Discharge Recommendations: Home with:  DC Home with:: Family Support, Outpatient Occupational Therapy       08/22/24: Pt is a 64 y.o. female seen for OT evaluation following admission to [facility] for acute left  pontine infarct . OT evaluation and assessment of strength, ADL function, and functional transfers completed. Prior to admission Pt was completing ADLs at independent with use of NO DME. Pt was completing IADLs independently. Pt lives with lives alone and is going to have family support but this may be limited. Entry to the home a large barrier. Personal factors affecting the patient at this time include: co morbidities/Other health conditions, inaccessible home, Lives alone, Decreased caregiver support, and Body habitus. Pt is currently limited due to the following deficits impacting occupational performance, Slow processing, Impaired standing balance, Impaired righting reactions, impaired motor planning, impaired trunk control, Impaired sitting balance, Impaired sitting tolerance, Unilateral weakness or paralysis of Upper limb , Unilateral weakness or paralysis of Lower limb , Reduced sensation, Ismael Neglect to body, Generalized weakness, impaired GMC/FMC, Impaired UE AROM, and Impaired UE strength. The patient has shown a decline from prior level of function. The patient participates in identification of personal goals to address in Occupational Therapy. Recommend skilled OT services for I/ADL retraining to support the ability to safely participate in daily occupations safely and independently in the most least restrictive way. Pt demonstrates Good rehab potential to meet LTG's of minimal assist with use of wheelchair. Anticipate 4week(s) length of stay. Occupational Therapy plan of care will address the following areas: ADL re-training, fxnl xfers, fxnl attention, standing tolerance, standing balance, UE NMR, UE strengthening, UE endurance, FMC/GMC, FMS/GMS, DME training/education, family training/education, EC techniques/education, healthy coping education, leisure pursuits, body awareness, sitting balance, core/trunk control, and midline awareness     08/27/24: Pt is demonstrating fair progress with  occupational therapy and is progressing toward long term goals for ADL, IADL, and functional transfers/mobility. Pts long term goals for ADLs are Minimum assistance and Moderate assistance with Ismael-walker and wheelchair. Pt is currently Dependent  for ADLs. Pt continues to present with impairments in activity tolerance, endurance, standing balance/tolerance, sitting balance/tolerance, UE strength, UE ROM, FMC, and GMC. Occupational performance remains limited by fatigue, abnormal tone, (R) hemiparesis, decreased caregiver support, risk for falls, and home environment. Family training/education will be required prior to D/C. Pt will continue to benefit from skilled acute rehab OT services to address above mentioned barriers and maximize functional independence in baseline areas of occupation to meet established treatment goals with overall decreased burden of care. Plan of care to continue to focus on ADL Retraining , LB Dressing, UB dressing, Ismael Dressing Techniques, IADL training , Functional Transfers, Functional Cognition, Standing tolerance, Standing balance , UE NMR right, midline awareness, Fine motor coordination, Gross motor coordination, Fine motor strengthening , Gross motor strengthening , DME training/education, Family training/education, Energy conservation training/education, healthy coping education, Leisure and social pursuits, community re-integration, return to work simulation, sitting balance, and Core/trunk control/strengthening . Goals for the upcoming week are: intense NMR to RUE    Anticipate Re-team at this time.      9/3/24 update:  Pt is demonstrating good progress with occupational therapy and is progressing toward long term goals for ADL, IADL, and functional transfers/mobility. Pts long term goals for ADLs are Minimum-moderate assistance with Ismael-walker and wheelchair. Pt is currently Substantial/maximal assistance  for ADLs. Pt continues to present with impairments in activity  tolerance, endurance, standing balance/tolerance, UE strength, UE ROM, FMC, and GMC. Occupational performance remains limited by fatigue, pain, (R) hemiparesis, decreased caregiver support, risk for falls, and home environment. Family training/education will be required prior to D/C. Pt will continue to benefit from skilled acute rehab OT services to address above mentioned barriers and maximize functional independence in baseline areas of occupation to meet established treatment goals with overall decreased burden of care. Plan of care to continue to focus on ADL Retraining , Ismael Dressing Techniques, Functional Transfers, UE NMR right, Fine motor coordination, Gross motor coordination, Fine motor strengthening , Gross motor strengthening , DME training/education, Family training/education, healthy coping education, Leisure and social pursuits, and Core/trunk control/strengthening . Goals for the upcoming week are: progress pt to Moderate A for bathing/dressing as well as consistantly perform SPT with HW at Min-mod A x1 and to focus on toileting to increase independence with CM up/down and address func reach for hygiene.     Anticipate Discharge date to be set.       Speech Therapy:  Mode of Communication: Verbal  Speech/Language: Dysarthia  Cognition: Exceptions to WNL  Cognition: Decreased Memory, Decreased Executive Functions, Decreased Attention, Decreased Comprehension  Orientation: Person, Place, Time, Situation  Discharge Recommendations: Home with:  DC Home with:: Family Support, 24 Hour Supervision (ST services pending overall progress)  As of 8/21/2024: Pt was evaluated by skilled ST for cognitive linguistic skills and dysarthria. In regards to cognition, pt completed the SLUMS cognitive assessment upon evaluation. Pt total score was 25/30 which as compared to those with high school education correlates with mild neurocognitive disorder and is indicative of dementia.  Pt presenting with the following  barriers: decrease STM, working memory, word recall, critical thinking and higher level problem solving thought organization and expressive language/word retrieval which, which impact pt's overall safety, functional cognitive communication skills as well as functional mobility. The following interventions are used to target these barriers, including verbal problem solving task, verbal working memory tasks, visual memory recall tasks, drawing conclusions activities, written sequencing tasks, verbal sequencing tasks, written financial management tasks, tangible money tasks, verbal review of current medications, written review of medications, tangible medication management task, written calendar tasks, tangible scheduling tasks , written health management tasks, verbal health management tasks, visual attention tasks, functional reading tasks , and family education/training, as well as verbal command following activities, written command following activities, reading comprehension at sentence level, reading comprehension at paragraph level, written expression at word level, written expression at sentence level , divergent naming: concrete, divergent naming: abstract , word deduction with phrase, and word deduction with clue words. In regards to dysarthria, pt is demonstrating speech intelligibility to be mildly impaired at the sentence/conversational level. Barrier include: distorted speech sounds, decreased breath support and fast rate of speech. Interventions to be targeted include: OME's, verbal repetition of sentence, orthographic repetition of sentences, review of speech strategies and breath/support cueing.     This week, plan to target and implement cognitive and speech interventions as well as discussing baseline with daughter regarding ADLs and  pt's ability to complete IADL tasks such as medication management, finance management, ability to recognize and solve unsafe situations. Recommendations for discharge  are pending pt progress but suspect some level of increased support to be needed, along with continued skilled SLP services (OP vs HHC) on discharge. Based on current evaluation, pt is recommended for skilled SLP services during acute rehab stay targeting both expressive/receptive language and cognitive linguistic skills to maximize functioning and level of independence on discharge.     Current level of function:  Comprehension: Supervision  Expression:Mod A  Social Interaction:Supervision  Problem Solving:Min A   Memory:Min A    Update from week: 8/27/2024: Pt continues to be followed for skilled SLP services focusing on cognitive linguistic skills where she is making progress towards goals this week. Continued cognitive linguistic and speech barriers which present include: decreased STM recall, working memory, attention, critical thinking, higher level thought organization, recall of multiple steps and processing, along with occasional word retrieval difficulties and minimal dysarthria.  Current deficits impact pt's overall safety, functional cognitive communication skills as well as functional mobility due to decreased carryover. The following interventions are used to target these barriers, including verbal problem solving task, verbal working memory tasks, visual memory recall tasks, drawing conclusions activities, written sequencing tasks, verbal sequencing tasks, verbal reasoning tasks, higher level deductive reasoning activities, written financial management tasks, tangible money tasks, verbal review of current medications, written review of medications, tangible medication management task, written calendar tasks, tangible scheduling tasks ,  written health management tasks, verbal health management tasks, functional reading tasks , time management activities, and family education/training, as well as speech drills and word deduction tasks.    Current level of functioning:   Comprehension:  supervision  Expression: supervision  Social Interaction: supervision to mod I  Problem Solving: min A to supervision  Memory: min A    This week, plan to continue to target executive functions skills including those required for completion of IADLs, higher level word retrieval tasks and speech drills. Continue family education as appropriate during rehab stay. At this time, pt is recommended for continued skilled SLP services focusing on higher level cognitive linguistic skills, along with speech and expressive language skills to maximize independence and safety.     Update from week: 9/3/204. Pt is being followed for cognitive linguistic tx and speech/apraxia tx sessions to where pt is making steady progress this week.     Continued cognitive barriers which present include: decreased attention, ST memory recall , problem solving, reasoning, sequencing, organization of thoughts, judgement, and slower processing, which still impacts pt's overall safety, functional cognitive communication skills as well as functional mobility. The following interventions are used to target these barriers, including verbal working memory tasks, drawing conclusions activities, categorization tasks , verbal reasoning tasks, tangible money tasks, verbal review of current medications, written review of medications, tangible medication management task, verbal health management tasks, functional reading tasks , time management activities, and family education/training. Continued speech barriers which present include: decreased intelligibility decreased at conversational level, imprecise articulation , and faster rate of speech which still impacts pt's overall functional cognitive communication skills. The following interventions are used to target these barriers, including  ongoing review of OME's, speech drills as needed as well as engaging in spontaneous speech production and monitoring use of speech strategies .     Current level of  functioning:   Comprehension: supervision  Expression: supervision  Social Interaction: mod I   Executive functions: min A   Memory: min A    For this upcoming week, it was established that pt would like to focus on targeting higher level cognitive linguistic tasks, but monitoring functional I ADL tasks, such as medication and financial management skills. Also will [place] back to review of speech strategies, OME's to maximize overall speech intelligibility. Will plan for family training/education when appropriate. At this time, pt will continue to benefit from skilled cognitive linguistic tx and speech tx session to maximize overall functional independence given overall cognitive linguistic skills and speech intelligibility in attempts to decreased caregiver burden over time.       Nursing Notes:  Appetite: Good  Diet Type: 2 gram sodium diet                      Diet Patient/Family Education Complete: No                         Type of Wound Patient/Family Education: No  Bladder: Continent     Bladder Patient/Family Education: No  Bowel: Continent     Bowel Patient/Family Education: No  Pain Location/Orientation: Orientation: Right, Location: Shoulder  Pain Score: 0                       Hospital Pain Intervention(s): Repositioned  Pain Patient/Family Education: No  Medication Management/Safety  Safe Administration: No  Reason for non-safe administration: further education needed for d/c  Medication Patient/Family Education Complete: No    64 year old patient with history of HTN, PAF, hypothyroidism, GERD and DIANELYS who presented with right sided weakness, slurred speech, headache and vomiting.CT head and CTA had no acute findings.  MRI revealed left pontine infarct.  She is supposed to be taking medications for her HTN and Eliquis as an anticoagulant for her PAF but she stopped taking her medications well over a year ago due to insurance loss.  Neurology saw in consult.  They felt the CVA occurred in the setting of  PAF and medication noncompliance.  For her HTN, she was placed on Valsartan 160 mg daily, Coreg 25mg BID, Hydralazine 75 mg q 8 hours, Aldactone 25mg daily and Procardia 60mg daily at dinnertime.  ECHO showed a LVEF of 65% with mild diastolic dysfunction.  During the course of her admission, she developed diarrhea and tested positive for Cdiff.  She is currently on PO Vancomycin for treatment.    Acute CVA - Presented with right sided weakness, slurred speech, headache and vomiting. MRI = left pontine infarct. Neuro felt CVA was from atrial fibrillation in setting of non-compliance with anticoagulation. Currently now on Xarelto 20mg qd, Lipitor 40mg qd. Followup with Neuro as OP.  Stevo Baires from [facility] from that group was listed on the DC summary. Clostridium difficile infection - New during hospital stay. Says no BMs so far today 8/21/24. Continue Vancomycin 125mg QID.  LD will be 8/26/24 0900. Paroxysmal atrial fibrillation - Was seeing cardiologist in [place] and taking Xarelto but she has not seen him in 2 yrs and stopped all her meds >1 yr ago. EKG in the hospital showed NSR. Needs to followup as OP with Cardiology = Dr Beltre. Continue Coreg. Was not placed back on Flecainide. Now on Xarelto. RRR by exam today 8/22/24. Sent price check to her pharmacy on 8/21/24 for the Xarelto and is $661.99. Will send price check on Eliquis but doubtful that will be much better.  If not, she will need to be converted to Coumadin with a therapeutic Lovenox bridge.  She did tell me she previously was on the Xarelto and had a coupon number so she could get it for $10.00/month.  Will try and see about getting that number. HTN - Home:  no meds. Here:  Coreg 25mg BID/Hydralazine 75 mg q8hrs/Nifedipine 60mg daily at dinner time, Aldactone 25mg daily, Valsartan 160 mg qd which will be changed to dose equivalent ARB that we have here. Stable; no changes today 8/22/24. Followup with IM as OP.  On DC summary was listed to see  Megan DORSEY from that group. DIANELYS - In past was on CPAP. Can d/w PCP when discharged. Hypothyroidism - Continue Levothyroxine 25 mcg qd. This was the dose that she was supposed to be taking at home and they placed her back on it in the hospital on 8/12/24 but there was no TSH drawn.  Had an abnormal TSH of 5.1 on 4/23/20 presumably not on Levothyroxine at that time since thereafter on 2/15/22 was normal. TSH 8/22/24 normal at 3.95. Will continue current dose and have her get a TSH in 6 weeks = @9/23. GERD: Continue Protonix. Meningioma - Incidental finding. OP followup.    Pt was a new admission 8/20. Pt is [facility] for safety. Therapy transfers only. RUE is flaccid and RLE is semi-flaccid. Pt has residual R facial droop and slurred speech. Pt wears CPAP at night. _Pt is on contact for C. Difficle which she is on PO vanco. Pt is incontinent at times of bowel and bladder. Last bowel movement was 8/20. Remains on cardiac and 4 gram sodium diet with thin liquids - on aspiration precautions and requires chair mode for all meals. Pt has rash on b/l feet - hydrocortisone is ordered and has been helping. Pt is now on kreg bed.     8/28/24- This week we will encourage independence with ADLs.  We will continue to monitor labs and vital signs.  We will continue to  educate pt/family about repositioning to prevent skin breakdown.  We will assist w/repositioning and perform routine skin checks.  We will continue to monitor for adequate pain control.  We will monitor for constipation and medicate pt as ordered.  We will increase safety awareness and keep pt free from falls.    9/3/24- will cont to encourage independence with ADLs.  Will continue to monitor labs and vital signs.  Will cont with repositioning to prevent skin breakdown and decrease pain in RLE shoulder.  Will continue with Q2 repositioning  and skin checks.  Will continue to monitor for constipation and medicate as needed.  Will cont to improve safety awareness and  keep pt free from falls.       Case Management:     Discharge Planning  Living Arrangements: Lives Alone  Support Systems: Self, Son, Daughter  Assistance Needed: none prior  Type of Current Residence: Private residence  Current Home Care Services: No  Pt continues to make small gains and benefit from intensive acute rehab therapy services. ELOS 4 weeks. Pending pt's progress, d/c plan is to home versus subacute rehab    Is the patient actively participating in therapies? yes  List any modifications to the treatment plan: none    Barriers Interventions   R hemiparetic shoulder pain  Nonpharmacologic modalities, monitoring for subluxation    R UE spacticity development  Splinting    R hemiparesis  Neuromuscular re-education, NMES, mobile arm support, neuro priming, NuStep, high intensity gait training    Impaired executive functioning Cognitive retraining of executive functioning and higher level cognitive skills, medication management review, stroke education    Steps to enter Potential need for ramp, PT has begun education on this but may trial other methods for stairs.      Impaired standing balance Beginning trialing use of device for mobility, hemiwalker      Impaired activity tolerance and endurance Endurance training, energy conservation techniques      Is the patient making expected progress toward goals? yes  List any update or changes to goals: none    Medical Goals: Patient will be medically stable for discharge to [facility] upon completion of rehab program and Patient will be able to manage medical conditions and comorbid conditions with medications and follow up upon completion of rehab program    Weekly Team Goals:   Rehab Team Goals  ADL Team Goal: Patient will require assist with ADLs with least restrictive device upon completion of rehab program  Transfer Team Goal: Patient will require assist with transfers with least restrictive device upon completion of rehab  program  Locomotion Team Goal: Patient will require assist with locomotion with least restrictive device upon completion of rehab program  Cognitive Team Goal: Patient will require supervision for basic and complex tasks upon completion of rehab program    Discussion: Pt is making good progress towards goals. Currently pt requires mod/max assist x1 for ADLs and min assist for stand pivot transfers with hemiwalker - this is an improvement from max assist x2 on initial evaluations. Pt still requires mod assist for mobility and assist x2 for 1 ABEL. Pt requires min assist for higher level cognitive tasks. Pt has completed medication course for CDIFF. Plan for d/c home on Monday 9/16 to her dtr's house. Therapy will setup family training for the upcoming week. At this time plan for home OT/PT services, with plan to progress eventually to outpatient neuro     Anticipated Discharge Date:  9/16  [facility] Team Members Present:  The following team members are supervising care for this patient and were present during this Weekly Team Conference.    Physician: Dr. Lanre MD  : Ruba Fernandez MS, OTR/L  Registered Nurse: Amy Mcnamara RN  Physical Therapist: Mirta Mccord DPT  Occupational Therapist: Megan Acuna MS, OTR/L  Speech Therapist: Rossy Rodríguez MA, CCC-SLP

## 2024-09-04 NOTE — PROGRESS NOTES
Physical Medicine and Rehabilitation Progress Note  Maye Lilly 64 y.o. female MRN: [id number]  Unit/Bed#: -01 Encounter: [id number]    Etiology/Reason for Admission: Impairment of mobility, safety, Activities of Daily Living (ADLs), and cognitive/communication skills due to Stroke:  01.2  Right Body Involvement (Left Brain)    Interval History: Seen face-to-face at bedside. No acute events overnight. No current concerns. Her right shoulder is feeling better. Vital signs reviewed, stable and WNL. Voiding spontaneously, continent. Last BM 9/4/24, continent.     Assessment/Plan -     Functional Update:  Physical Therapy Occupational Therapy Speech Therapy   Weight Bearing Status: Full Weight Bearing  Transfers: Minimal Assistance, Moderate Assistance  Bed Mobility: Moderate Assistance, Minimal Assistance  Amulation Distance (ft): 60 feet  Ambulation: Moderate Assistance  Assistive Device for Ambulation: Ismael Walker  Wheelchair Mobility Distance: 150 ft  Wheelchair Mobility: Minimal Assistance, Incidental Touching  Number of Stairs: 4  Assistive Device for Stairs: [employer] Hand Rail  Stair Assistance: Maximum Assistance  Discharge Recommendations: Home with:  DC Home with:: 24 Hour Assisteance, Family Support, 24 Hour Supervision, First Floor Setup, Home Physical Therapy (pending outcome vs SNF)   Eating: Supervision (set up)  Grooming: Supervision (set up)  Bathing: Maximum Assistance  Bathing: Maximum Assistance  Upper Body Dressing: Moderate Assistance  Lower Body Dressing: Maximum Assistance  Toileting: Maximum Assistance  Tub/Shower Transfer: Total Assistance, Assist of 2  Toilet Transfer: Moderate Assistance (SPT with HW)  Cognition: Within Defined Limits  Cognition: Decreased Memory  Orientation: Person, Place, Time, Situation   Mode of Communication: Verbal  Speech/Language: Dysarthia  Cognition: Exceptions to WNL  Cognition: Decreased Memory, Decreased Executive Functions, Decreased Attention,  Decreased Comprehension  Orientation: Person, Place, Time, Situation  Discharge Recommendations: Home with:  DC Home with:: Family Support, 24 Hour Supervision ( services pending overall progress)     Ms. Maye Lilly is a 63 yo woman with a past medical history notable for HTN and atrial fibrillation who presented to the [facility] on [facility]10[facility] with acute dysarthria, facial droop and right hemiparesis. A stroke alert was activated, CT head wo contrast was negative for acute findings, CTA of the head/neck showed no evidence of large vessel occlusion and patent cervical carotid and vertebral arteries. MRI of the brain revealed an acute left pontine infarct. Course complicated by severe hypertension requiring multiple agents to obtain control.     - Acute left pontine ischemic infarct:   Clinically presented on 8/10/24 with right hemiparesis, dysarthria and facial droop  Stroke etiology: HTN, small vessel disease  MR brain on 8/11/24 revealed a small area of acute/subacute ischemia within the central luz to the left of midline without acute hemorrhage  Did not receive tPA/TNKase, did not undergo thrombectomy  DAPT regimen: None  Atorvastatin 40 mg qHs for secondary prevention  Monitor for neurogenic bowel and bladder  Promote regular sleep/wake cycles and proper sleep hygiene  Monitor closely for right hemiparetic shoulder pain and post-stroke pain  Neutral resting wrist splint for the and RLE multipodus boot to be worn at night and while in bed during the day to prevent developing spasticity and contracture  Maintain adequate nutrition, nutrition consult  Avoid sedating and anticholinergic medications as possible  Maintain normotension, s/p permissive hypertension  Outpatient neurovascular neurology follow-up  Outpatient PM&R follow-up for ongoing rehabilitation needs  - Right hemiparetic shoulder pain: in the setting of right sided hemiparesis from stroke, examination consistent with right shoulder impingement, no  concerns for adhesive capsulitis currently; taping, PT  - C. Difficile infection: OSH-acquired; vancomycin PO completed 8/26/24  - Paroxysmal atrial fibrillation: carvedilol for rate control, rivaroxaban for embolic stroke risk reduction; outpatient cardiology follow-up  - HTN: was not taking medications at home, reportedly due to cost; carvedilol, hydralazine, nifedipine, spironolactone, losartan; appreciate IM management  - Hypothyroidism: levothyroxine  - Therapies: PT, OT and SLP  - Right knee osteoarthritis: lidocaine patch daily, Voltaren topical QID PRN, tylenol PRN  - Bowel: Monitor closely for neurogenic bowel. Will follow BMs and adjust bowel regimen to target soft, formed stools q1-2 days, per pt's regular schedule.  - Bladder: Monitor closely for neurogenic bladder. Will follow UOP and encourage spontaneous voids. If unable to void spontaneously, will monitor with PVR bladder scans and initiate ISC regimen.  - Skin: Monitor for breakdown, frequent turns, Kreg specialty bed for pressure offloading  - Sleep: Practice effective sleep hygiene (e.g., consistent night and morning routine, quiet, dark room at night, no electronic devices/screens in bed, avoid large meals/caffeine before bed)  - VTE prophylaxis: Rivaroxaban  - Disposition: Anticipate discharge home with home PT and OT services on 9/16/24; will plan to add SLP when progressed to outpatient services    Dk De Dios MD  Attending Physician  Physical Medicine and Rehabilitation  [facility]    Review of Systems:  A 10 point ROS was performed; negative except as noted above.       Current Facility-Administered Medications:     acetaminophen (TYLENOL) tablet 650 mg, 650 mg, Oral, Q6H PRN, WENCESLAO Patel, 650 mg at 09/02/24 2139    atorvastatin (LIPITOR) tablet 40 mg, 40 mg, Oral, QPM, WENCESLAO Patel, 40 mg at 09/03/24 1740    bisacodyl (DULCOLAX) rectal suppository 10 mg, 10 mg, Rectal, Daily PRN,  Dougie King MD    carvedilol (COREG) tablet 25 mg, 25 mg, Oral, BID With Meals, WENCESLAO Patel, 25 mg at 09/04/24 1006    Diclofenac Sodium (VOLTAREN) 1 % topical gel 2 g, 2 g, Topical, 4x Daily PRN, Dk De Dios MD    hydrALAZINE (APRESOLINE) tablet 75 mg, 75 mg, Oral, Q8H BARB, WENCESLAO Patel, 75 mg at 09/04/24 1432    levothyroxine tablet 25 mcg, 25 mcg, Oral, Early Morning, WENCESLAO Patel, 25 mcg at 09/04/24 0604    lidocaine (LIDODERM) 5 % patch 1 patch, 1 patch, Topical, Daily, Dk De Dios MD, 1 patch at 09/03/24 2100    losartan (COZAAR) tablet 100 mg, 100 mg, Oral, Daily, WENCESLAO Patel, 100 mg at 09/04/24 1007    multivitamin stress formula tablet 1 tablet, 1 tablet, Oral, Daily, WENCESLAO Patel, 1 tablet at 09/04/24 1006    NIFEdipine (PROCARDIA XL) 24 hr tablet 60 mg, 60 mg, Oral, Daily With Dinner, WENCESLAO Patel, 60 mg at 09/03/24 1626    pantoprazole (PROTONIX) EC tablet 40 mg, 40 mg, Oral, Early Morning, WENCESLAO Patel, 40 mg at 09/04/24 0604    rivaroxaban (XARELTO) tablet 20 mg, 20 mg, Oral, Daily With Dinner, WENCESLAO Patel, 20 mg at 09/03/24 1626    spironolactone (ALDACTONE) tablet 25 mg, 25 mg, Oral, Daily, WENCESLAO Patel, 25 mg at 09/04/24 1006    Physical Exam:  Temp:  [98.1 °F (36.7 °C)-98.3 °F (36.8 °C)] 98.2 °F (36.8 °C)  HR:  [72-78] 72  Resp:  [17-19] 17  BP: (110-140)/(51-62) 140/62  SpO2:  [93 %-96 %] 94 %    GEN: Patient seen resting comfortably in bedside recliner, NAD  HEENT: NCAT; right sided facial droop at rest, mucous membranes moist  CV: No extremity edema  PULM: Breathing comfortably on room air  ABD: Non-distended  SKIN: No obvious rashes or lesions on exposed skin  NEURO:  Awake and alert, answering questions appropriately to context; able to follow simple commands with clear consistency  Speech is fluent and organized, but mildly  dysarthric    Laboratory:  Labs reviewed, none new  Results from last 7 days   Lab Units 08/29/24  0536   HEMOGLOBIN g/dL 12.5   HEMATOCRIT % 39.5   WBC Thousand/uL 8.63     Results from last 7 days   Lab Units 08/29/24  0536   BUN mg/dL 24   SODIUM mmol/L 137   POTASSIUM mmol/L 4.2   CHLORIDE mmol/L 102   CREATININE mg/dL 0.66            Diagnostic Studies: Reviewed, no new imaging   XR shoulder 2+ vw right   Final Result by Zion Kuhn MD (09/03 0651)      No acute osseous abnormality.         Computerized Assisted Algorithm (CAA) may have been used to analyze all applicable images.         Workstation performed: [id number]           Total time spent:  35 minutes, with more than 50% spent counseling/coordinating care. Counseling includes discussion with patient re: progress in therapies, functional issues observed by therapy staff, and discussion with patient his/her current medical state/wellbeing. Coordination of patient's care was performed in conjunction with Internal Medicine service to monitor patient's labs, vitals, and management of their comorbidities.

## 2024-09-04 NOTE — ASSESSMENT & PLAN NOTE
Home:  no meds  Here:  Coreg 25mg BID/Hydralazine 75 mg q8hrs/Nifedipine 60mg daily at dinner time/Aldactone 25mg daily/Losartan 100mg qd  Stable; continue current medication regimen  Followup with IM as OP.  On DC summary was listed to see Megan DORSEY from that group   Abdomen soft, nontender, nondistended, bowel sounds present in all 4 quadrants. Abdomen soft, nontender, nondistended, bowel sounds present in all 4 quadrants.

## 2024-09-04 NOTE — PLAN OF CARE
Problem: PAIN - ADULT  Goal: Verbalizes/displays adequate comfort level or baseline comfort level  Description: Interventions:  - Encourage patient to monitor pain and request assistance  - Assess pain using appropriate pain scale  - Administer analgesics based on type and severity of pain and evaluate response  - Implement non-pharmacological measures as appropriate and evaluate response  - Consider cultural and social influences on pain and pain management  - Notify physician/advanced practitioner if interventions unsuccessful or patient reports new pain  Outcome: Progressing     Problem: INFECTION - ADULT  Goal: Absence or prevention of progression during hospitalization  Description: INTERVENTIONS:  - Assess and monitor for signs and symptoms of infection  - Monitor lab/diagnostic results  - Monitor all insertion sites, i.e. indwelling lines, tubes, and drains  - Monitor endotracheal if appropriate and nasal secretions for changes in amount and color  - [place] appropriate cooling/warming therapies per order  - Administer medications as ordered  - Instruct and encourage patient and family to use good hand hygiene technique  - Identify and instruct in appropriate isolation precautions for identified infection/condition  Outcome: Progressing     Problem: SAFETY ADULT  Goal: Patient will remain free of falls  Description: INTERVENTIONS:  - Educate patient/family on patient safety including physical limitations  - Instruct patient to call for assistance with activity   - Consult OT/PT to assist with strengthening/mobility   - Keep Call bell within reach  - Keep bed low and locked with side rails adjusted as appropriate  - Keep care items and personal belongings within reach  - Initiate and maintain comfort rounds  - Make Fall Risk Sign visible to staff  - Offer Toileting every  Hours, in advance of need  - Initiate/Maintain alarm  - Obtain necessary fall risk management equipment:   - Apply yellow socks and  bracelet for high fall risk patients  - Consider moving patient to room near nurses station  Outcome: Progressing  Goal: Maintain or return to baseline ADL function  Description: INTERVENTIONS:  -  Assess patient's ability to carry out ADLs; assess patient's baseline for ADL function and identify physical deficits which impact ability to perform ADLs (bathing, care of mouth/teeth, toileting, grooming, dressing, etc.)  - Assess/evaluate cause of self-care deficits   - Assess range of motion  - Assess patient's mobility; develop plan if impaired  - Assess patient's need for assistive devices and provide as appropriate  - Encourage maximum independence but intervene and supervise when necessary  - Involve family in performance of ADLs  - Assess for home care needs following discharge   - Consider OT consult to assist with ADL evaluation and planning for discharge  - Provide patient education as appropriate  Outcome: Progressing  Goal: Maintains/Returns to pre admission functional level  Description: INTERVENTIONS:  - Perform AM-PAC 6 Click Basic Mobility/ Daily Activity assessment daily.  - Set and communicate daily mobility goal to care team and patient/family/caregiver.   - Collaborate with rehabilitation services on mobility goals if consulted  - Perform Range of Motion  times a day.  - Reposition patient every  hours.  - Dangle patient  times a day  - Stand patient  times a day  - Ambulate patient  times a day  - Out of bed to chair  times a day   - Out of bed for meals  times a day  - Out of bed for toileting  - Record patient progress and toleration of activity level   Outcome: Progressing     Problem: DISCHARGE PLANNING  Goal: Discharge to home or other facility with appropriate resources  Description: INTERVENTIONS:  - Identify barriers to discharge w/patient and caregiver  - Arrange for needed discharge resources and transportation as appropriate  - Identify discharge learning needs (meds, wound care, etc.)  -  Arrange for interpretive services to assist at discharge as needed  - Refer to Case Management Department for coordinating discharge planning if the patient needs post-hospital services based on physician/advanced practitioner order or complex needs related to functional status, cognitive ability, or social support system  Outcome: Progressing     Problem: Prexisting or High Potential for Compromised Skin Integrity  Goal: Skin integrity is maintained or improved  Description: INTERVENTIONS:  - Identify patients at risk for skin breakdown  - Assess and monitor skin integrity  - Assess and monitor nutrition and hydration status  - Monitor labs   - Assess for incontinence   - Turn and reposition patient  - Assist with mobility/ambulation  - Relieve pressure over bony prominences  - Avoid friction and shearing  - Provide appropriate hygiene as needed including keeping skin clean and dry  - Evaluate need for skin moisturizer/barrier cream  - Collaborate with interdisciplinary team   - Patient/family teaching  - Consider wound care consult   Outcome: Progressing

## 2024-09-04 NOTE — OCCUPATIONAL THERAPY NOTE
Stroke Education Series    Pt participated in skilled Stroke Education Series in an individualized setting to address the topic of What Comes Next post stroke in both verbal and written formats. Education within this session included information on recommendations and resources after leaving the [facility]. This education session links together what has been covered in previous stroke education classes to improve the patient's understanding of how managing risk factors for stroke, participating in therapy, working with family and friends in the rehab process, and reviewing their role in the rehab process will maximize outcomes and their functional gains. This education also incorporates what the patient wants to achieve and helps them to set realistic goals. To individualize this education the following topics were covered: continued therapy (home versus outpatient). Following education, pt's response to education is: verbalizes understanding and demonstrates understanding.      Start Time: 1245    End Time: 1300

## 2024-09-04 NOTE — PROGRESS NOTES
[facility]  Progress Note  Name: Maye Lilly I  MRN: [id number]  Unit/Bed#: -01 I Date of Admission: 8/20/2024   Date of Service: 9/4/2024 I Hospital Day: 15    Assessment & Plan   * Acute CVA (cerebrovascular accident) (HCC)  Assessment & Plan  Presented with right sided weakness, slurred speech, headache and vomiting  MRI = left pontine infarct  Neuro felt CVA was from atrial fibrillation in setting of non-compliance with anticoagulation  Currently now on Xarelto 20mg qd, Lipitor 40mg qd  Followup with Neuro as OP.  Stevo Baires from [facility] from that group was listed on the DC summary    Right shoulder pain  Assessment & Plan  New complaint of right shoulder pain on 9/1- states it feels better today  Patient feels this is due to her residual right sided weakness from her recent stroke  Being treated by PMR        Meningioma (HCC)  Assessment & Plan  Incidental finding   OP followup    GERD (gastroesophageal reflux disease)  Assessment & Plan  Continue Protonix    Hypothyroidism  Assessment & Plan  Continue Levothyroxine 25 mcg qd  This was the dose that she was supposed to be taking at home and they placed her back on it in the hospital on 8/12/24 but there was no TSH drawn.  Had an abnormal TSH of 5.1 on 4/23/20 presumably not on Levothyroxine at that time since thereafter on 2/15/22 was normal  TSH 8/22/24 normal at 3.95  Will continue current dose and have her get a TSH in 6 weeks = @9/23    DIANELYS (obstructive sleep apnea)  Assessment & Plan  In past was on CPAP  Can d/w PCP when discharged    HTN (hypertension)  Assessment & Plan  Home:  no meds  Here:  Coreg 25mg BID/Hydralazine 75 mg q8hrs/Nifedipine 60mg daily at dinner time/Aldactone 25mg daily/Losartan 100mg qd  Stable; continue current medication regimen  Followup with IM as OP.  On DC summary was listed to see Megan DORSEY from that group    Paroxysmal atrial fibrillation (HCC)  Assessment & Plan  Was  seeing cardiologist in [place] and taking Xarelto but she has not seen him in 2 yrs and stopped all her meds >1 yr ago  EKG in the hospital showed NSR  Needs to followup as OP with Cardiology = Dr Patt Zhang Coreg  Was not placed back on Flecainide  Now on Xarelto   Sent price check to her pharmacy on 8/21/24 for the Xarelto and is $661.99  Sent price check on Eliquis but doubtful that will be much better.  If not, she will possibly need to be converted to Coumadin with a therapeutic Lovenox bridge.  Coupon application for Xarelto supposedly completed. Pt says will be no cost.  Will have to give the coupon code number to the OP pharmacy.    Clostridium difficile infection  Assessment & Plan  Diagnosed during hospital stay  Was ordered Vancomycin 125mg QID.  Last dose was 8/26/24 at 0900  Diarrhea has resolved and stools are formed               The above assessment and plan was reviewed and updated as determined by my evaluation of the patient on 9/4/2024.    Labs:   Results from last 7 days   Lab Units 08/29/24  0536   WBC Thousand/uL 8.63   HEMOGLOBIN g/dL 12.5   HEMATOCRIT % 39.5   PLATELETS Thousands/uL 287     Results from last 7 days   Lab Units 08/29/24  0536   SODIUM mmol/L 137   POTASSIUM mmol/L 4.2   CHLORIDE mmol/L 102   CO2 mmol/L 27   BUN mg/dL 24   CREATININE mg/dL 0.66   CALCIUM mg/dL 9.4                   Imaging  XR shoulder 2+ vw right   Final Result by Zion Kuhn MD (09/03 0651)      No acute osseous abnormality.         Computerized Assisted Algorithm (CAA) may have been used to analyze all applicable images.         Workstation performed: [id number]             Review of Scheduled Meds:  Current Facility-Administered Medications   Medication Dose Route Frequency Provider Last Rate    acetaminophen  650 mg Oral Q6H PRN WENCESLAO Patel      atorvastatin  40 mg Oral QPM WENCESLAO Patel      bisacodyl  10 mg Rectal Daily PRN Dougie King MD       carvedilol  25 mg Oral BID With Meals WENCESLAO Patel      Diclofenac Sodium  2 g Topical BID Ashley Depadua, MD      hydrALAZINE  75 mg Oral Q8H BARB WENCESLAO Patel      levothyroxine  25 mcg Oral Early Morning Lucila Schulz, WENCESLAO      lidocaine  1 patch Topical Daily Dk De Dios MD      losartan  100 mg Oral Daily WENCESLAO Patel      multivitamin stress formula  1 tablet Oral Daily WENCESLAO Patel      NIFEdipine  60 mg Oral Daily With Dinner Lucila Schulz, WENCESLAO      pantoprazole  40 mg Oral Early Morning Lucila Schulz, WENCESLAO      rivaroxaban  20 mg Oral Daily With Dinner Lucila Schulz, WENCESLAO      spironolactone  25 mg Oral Daily WENCESLAO Patel         Subjective/ HPI: Patient seen and examined. Patients overnight issues or events were reviewed with nursing staff. New or overnight issues include the following:     Patient reports to be feeling well.  Bowel movements have improved however have been softer compared to the previous 2 or 3 days.  She remains to have an appetite and is eating well.  She believes she is doing well during her therapy sessions.  No abdominal pain    ROS:   A 10 point ROS was performed; negative except as noted above.        *Labs /Radiology studies Reviewed  *Medications  reviewed and reconciled as needed  *Please refer to order section for additional ordered labs studies      Physical Examination:  Vitals:   Vitals:    09/03/24 2338 09/04/24 0410 09/04/24 0450 09/04/24 0956   BP:   138/58 129/62   BP Location:   Left leg Left arm   Pulse:   72 78   Resp:   18    Temp:   98.3 °F (36.8 °C)    TempSrc:   Oral    SpO2: 94% 96% 95%    Weight:   125 kg (275 lb 2.2 oz)    Height:           General Appearance: NAD; pleasant  HEENT: PERRLA, conjuctiva normal; mucous membranes moist; face symmetrical  Neck:  Supple  Lungs: clear bilaterally, normal respiratory effort, no retractions, expiratory effort  normal, on room air  CV: regular rate and rhythm, no murmurs rubs or gallops noted   ABD: soft non tender, +BS x4  EXT: DP pulses intact, no lymphadenopathy, no edema  Skin: normal turgor, normal texture, no rash  Psych: affect normal, mood normal  Neuro: AAOx3       The above physical exam was reviewed and updated as determined by my evaluation of the patient on 9/4/2024.    Invasive Devices       None                      VTE Pharmacologic Prophylaxis: Xarelto  Code Status: Level 1 - Full Code  Current Length of Stay: 15 day(s)    Total floor / unit time spent today 20 minutes  Coordination of patient's care was performed in conjunction with consulting services. Time invested included review of patient's labs, vitals, and management of their comorbidities with continued monitoring, examination of patient as well as answering patient questions, documenting her findings and creating progress note in electronic medical record,  ordering appropriate diagnostic testing.       ** Please Note:  voice to text software may have been used in the creation of this document. Although proof errors in transcription or interpretation are a potential of such software**

## 2024-09-04 NOTE — PROGRESS NOTES
09/04/24 0978   Pain Assessment   Pain Assessment Tool 0-10   Pain Score No Pain   Restrictions/Precautions   Precautions Bed/chair alarms;Cognitive;Fall Risk;Contact/isolation;Supervision on toilet/commode   Comprehension   Comprehension (FIM) 5 - Understands basic directions and conversation   Expression   Expression (FIM) 5 - Needs help/cues only RARELY (< 10% of the time)   Social Interaction   Social Interaction (FIM) 6 - Interacts appropriately with others BUT requires extra  time   Problem Solving   Problem solving (FIM) 5 - Solves basic problems 90% of time   Memory   Memory (FIM) 4 - Recognizes/recalls/performs 75-89%   Speech/Language/Cognition Assessmetn   Treatment Assessment Pt was just finishing PT session, which upon return to room, pt requesting use of the commode. Pt was noted to be insightful to knowing the means to transfer given the swap out technique. SLP a bit more cautious in assisting w/ use of gait belt and guarding pt is stance but pt did state to SLP about having her too close to bed rail when standing. Overall, pt was able to appropriately verbalize when done w/ toileting but still does need help w/ toilet hygiene. It was noted that pt was able to complete longer stance for hygiene and clothing management w/o need for rest breaks. Once in recliner, able to then engage in higher level cognitive tasks presented by SLP.     Initiated letter logic task, which pt was presented w/ a line of information and then 2 target words which pt was to determine the letters switched between words to then spell out the target word. It was noted that pt was to determine the letter from the first word presented vs second word. Pt was noted to be able to complete all 4 items successfully, but there was an increase in clues from 4 at the beginning to then 8 to determine target words.     Next task, which SLP engaged in was higher level deduction puzzles to where SLP did provide directions to plug in the  information which is known and then can [place] back to the more ambiguous clues to determine the target words to be placed into puzzle accordingly. All puzzles contained 7 to 10 clues to place into a total 10 slots. Pt was able to complete all deduction puzzles w/ 10/10 accuracy (total of 4 completed) when provided increased time.     Last task completed was a higher level following directions task which initially had pt recognize incorrect answers. Pt was 4/4 accurate given this first part. The remaining page had pt complete a number of steps increasing in difficulty of directions and how the directions were worded to complete. However, pt was able to demonstrate 14/16 accurate in completing this task. It was noted that SLP did have to cue pt to look to the fine details to key into the directions provided which pt was able to recognize and correct. At this time, focus will continue to target higher level cognitive skills at this time to decreased the need for caregiver burden over time.     SLP Therapy Minutes   SLP Time In 0930   SLP Time Out 1030   SLP Total Time (minutes) 60   SLP Mode of treatment - Individual (minutes) 60   SLP Mode of treatment - Concurrent (minutes) 0   SLP Mode of treatment - Group (minutes) 0   SLP Mode of treatment - Co-treat (minutes) 0   SLP Mode of Treatment - Total time(minutes) 60 minutes   SLP Cumulative Minutes 570   Therapy Time missed   Time missed? No

## 2024-09-04 NOTE — ASSESSMENT & PLAN NOTE
Presented with right sided weakness, slurred speech, headache and vomiting  MRI = left pontine infarct  Neuro felt CVA was from atrial fibrillation in setting of non-compliance with anticoagulation  Currently now on Xarelto 20mg qd, Lipitor 40mg qd  Followup with Neuro as OP.  [facility] summary

## 2024-09-04 NOTE — ASSESSMENT & PLAN NOTE
Was seeing cardiologist in [place] and taking Xarelto but she has not seen him in 2 yrs and stopped all her meds >1 yr ago  EKG in the hospital showed NSR  Needs to followup as OP with Cardiology = Dr Patt Zhang Coreg  Was not placed back on Flecainide  Now on Xarelto   Sent price check to her pharmacy on 8/21/24 for the Xarelto and is $661.99  Sent price check on Eliquis but doubtful that will be much better.  If not, she will possibly need to be converted to Coumadin with a therapeutic Lovenox bridge.  Coupon application for Xarelto supposedly completed. Pt says will be no cost.  Will have to give the coupon code number to the OP pharmacy.

## 2024-09-04 NOTE — PROGRESS NOTES
09/04/24 1230   Pain Assessment   Pain Assessment Tool 0-10   Pain Score No Pain   Restrictions/Precautions   Precautions Bed/chair alarms;Cognitive;Fall Risk;Contact/isolation;Pain;Supervision on toilet/commode   Weight Bearing Restrictions No   ROM Restrictions No   Braces or Orthoses Other (Comment)  (AFO used this session +sneakers for all functional transfers; sling for transfers)   Sit to Stand   Type of Assistance Needed Incidental touching   Physical Assistance Level No physical assistance   Comment CG with HW and gait belt.   Sit to Stand CARE Score 4   Bed-Chair Transfer   Type of Assistance Needed Physical assistance   Physical Assistance Level 26%-50%   Comment Min A x1 SPT to R and L side with gait belt and HW   Chair/Bed-to-Chair Transfer CARE Score 3   Toileting Hygiene   Type of Assistance Needed Physical assistance   Physical Assistance Level 51%-75%   Comment pt able to manage CM down on L side, requires assist for R side; attempted again to complete rear hygiene while seated with [facility] reach of LUE however unable to fully reach-did trial back to front method (despite not recommended) while seated and again unable to fully reach so requires TA for bladder and rear hygiene. pt then in stance able to get pants up about 95% of the way requiring very minimal assist on R side, TA for managing tab brief up fully as tabs come undone.   Toileting Hygiene CARE Score 2   Toilet Transfer   Type of Assistance Needed Physical assistance   Physical Assistance Level 26%-50%   Comment Min A SPT with HW and gait belt donned to  [facility]   Toilet Transfer CARE Score 3   ROM- Right Upper Extremities   R Shoulder PROM;Flexion;Prolonged stretch;ABduction   R Elbow PROM;Prolonged stretch;Elbow flexion;Elbow extension   R Wrist PROM;Prolonged stretch;Wrist flexion;Wrist extension   R Hand PROM;Prolonged stretch;Thumb;Index finger;Long finger;Ring finger;Little finger   R Position Seated   RUE ROM Comment while seated,  "pt toleraets PROM to RUE focusing on elbow extension with pt reporting feeling a \"good stretch\" in her bicep muscle; ext time spent focusing on passive finger flexion as pt remains with mild restrictions in flexibility limiting AROM in finger flexors. pt reports no pain; time spent discussing retrograde massage as well as self PROM to finger flexors in order to increase flexibility in finger muscles/joints. additionally, pt engages in RUE A/AAROM seated parallel to table top focusing on scapular retraction/protraction, horiozontal shoulder abd/adduction. pt demo's ability to adduct with increased time, able to initiate abduction however requires AAROM to complete full ROM; tactile and VCs for scap retraction/protraction with continuous VCs for engaging/squeezing shoulder blades for optimal ROM.   Cognition   Overall Cognitive Status WFL   Arousal/Participation Alert;Cooperative   Attention Within functional limits   Orientation Level Oriented X4   Memory Within functional limits   Following Commands Follows one step commands without difficulty   Additional Activities   Additional Activities Comments at start of session, spent time setting up family training with pt, pt's son for sunday, 9/8/24 from 830-1130 with PT, OT, and SLP and placed on weekend schedule.   Activity Tolerance   Activity Tolerance Patient tolerated treatment well   Medical Staff Made Aware RN informed of skin tear on chest near [place] from pt itching, cleansed with band aid applied by RN. RN also informed of small reddended areas on top of R forearm from e-stim pads. red spots remain blanchable with pt denying pain. nursing staff also informed of pt progression to Ax2 for SPT with HW and gait belt donned; update pt sticky note and white board   Assessment   Treatment Assessment pt engages in 120 minute skilled OT session focusing on toileting, SPT with HW, RUE NMR and stroke education. see above for full func details. pt continues to demo slow " progress toward OT goals. set up family training with pt's son for sunday with OT, PT and SLP. progressed pt to Ax2 for SPT with HW and gait belt donned; Ax2 for toileting in stance with support from HW and gait belt donned. continues to demo slow motor recovery in RUE; does not utilize compensatory movement patterns which is good however motor recovery remains non functional at this time which is why pt continues to require increased assistance for ADLs/self care. informed pt of tentative D/C date and discussed recommendations post ARC to include OP OT Services with focus on increasing RUE strength/ROM/coordination for independence with self care, leisure tasks and overall func use. recommend continued skilled care to focus on ADL retraining from seated position (EOB, recliner, w/c), func transfers with HW, RUE NMR, stroke education, standing payal/bal with func reach to rear for increased independence with toileting tasks, in order to decrease burden of care at d/c. **For shower tomorrow, complete swap out tech in large shower, stand at GB with Ax1 and R knee block, 2nd person perform swap out of w/c and transfer bench; NO STANDING IN SHOWER at this time. Complete fully seated for bathing.    Prognosis Good   Problem List Decreased strength;Decreased endurance;Decreased range of motion;Impaired balance;Decreased mobility;Decreased coordination;Impaired tone;Pain   Plan   Treatment/Interventions ADL retraining;Functional transfer training;Therapeutic exercise;Endurance training;Patient/family training;Equipment eval/education;Compensatory technique education   OT Therapy Minutes   OT Time In 1230   OT Time Out 1430   OT Total Time (minutes) 120   OT Mode of treatment - Individual (minutes) 120   OT Mode of treatment - Concurrent (minutes) 0   OT Mode of treatment - Group (minutes) 0   OT Mode of treatment - Co-treat (minutes) 0   OT Mode of Treatment - Total time(minutes) 120 minutes   OT Cumulative Minutes 1285    Therapy Time missed   Time missed? No

## 2024-09-04 NOTE — PROGRESS NOTES
09/04/24 0830   Pain Assessment   Pain Assessment Tool 0-10   Pain Score No Pain   Restrictions/Precautions   Precautions Bed/chair alarms;Cognitive;Fall Risk;Contact/isolation;Supervision on toilet/commode   Weight Bearing Restrictions No   ROM Restrictions No   Braces or Orthoses   (AFO used this session +sneakers for all functional transfers; sling for transfers)   Cognition   Overall Cognitive Status WFL   Arousal/Participation Alert;Cooperative   Attention Within functional limits   Orientation Level Oriented X4   Memory Within functional limits   Sit to Stand   Type of Assistance Needed Incidental touching   Comment CGA with hemiwalker   Sit to Stand CARE Score 4   Bed-Chair Transfer   Type of Assistance Needed Physical assistance   Physical Assistance Level 26%-50%   Comment Haile x1 with hemiwalker transferring in either direction, improve advancement of RLE   Chair/Bed-to-Chair Transfer CARE Score 3   Car Transfer   Comment (S)  please assess next session  (plan to trial with personal car on Sunday with son)   Walk 10 Feet   Type of Assistance Needed Physical assistance   Physical Assistance Level Total assistance   Comment Without hemiwalker, requires Ax2, hand hold assist (ModA) on L side, 2nd person on R side to help with advance of RLE if need with hands on gait belt, WC follow for safety; with hemiwalker, Min/ModAx1   Walk 10 Feet CARE Score 1   Walk 50 Feet with Two Turns   Type of Assistance Needed Physical assistance   Physical Assistance Level Total assistance   Comment Without hemiwalker, requires Ax2, hand hold assist (ModA) on L side, 2nd person on R side to help with advance of RLE if need with hands on gait belt, WC follow for safety; with hemiwalker, Min/ModAx1   Walk 50 Feet with Two Turns CARE Score 1   Walk 150 Feet   Type of Assistance Needed Physical assistance   Physical Assistance Level Total assistance   Comment Without hemiwalker, requires Ax2, hand hold assist (ModA) on L side,  2nd person on R side to help with advance of RLE if need with hands on gait belt, WC follow for safety; with hemiwalker, Min/ModAx1   Walk 150 Feet CARE Score 1   Walking 10 Feet on Uneven Surfaces   Reason if not Attempted Safety concerns   Walking 10 Feet on Uneven Surfaces CARE Score 88   Ambulation   Primary Mode of Locomotion Prior to Admission Walk   Distance Walked (feet) 188 ft   Assist Device Hand Hold  (bilateral)   Gait Pattern Ataxic;Inconsistant Jasmyne;Decreased foot clearance;Slow Jasmyne;Forward Flexion;R foot drag;Wide OJ;Shuffle;Step to;Decreased R stance;Improper weight shift   Limitations Noted In Balance;Coordination;Device Management;Endurance;Speed;Strength;Swing   Provided Assistance with: Balance;Direction   Walk Assist Level Moderate Assist;Minimum Assist   Does the patient walk? 2. Yes   Curb or Single Stair   Style negotiated Single stair   Type of Assistance Needed Physical assistance   Physical Assistance Level Total assistance   Comment ModAx1 with second person providing CGA for safety to ascend/descend 6 inch  step with R handrail and lateral step to pattern leading with RLE. Practiced x3 trials   1 Step (Curb) CARE Score 1   4 Steps   Reason if not Attempted Safety concerns   4 Steps CARE Score 88   12 Steps   Reason if not Attempted Safety concerns   12 Steps CARE Score 88   Stairs   Findings (S)  Pt has 5 x5 inch steps to enter her daughter's home. Trialed single step with lateral step to pattern. Pt does well with sit to stand transfers so plan to trial tub transfer bench method for stair navigation in future sessions   Picking Up Object   Reason if not Attempted Safety concerns   Picking Up Object CARE Score 88   Therapeutic Interventions   Neuromuscular Re-Education 6 inch Steps Up with LLE x6 required MaxA for advancement on RLE onto step; 6 inch Step Ups with RLE, PT blocking R knee x 4 trials   Equipment Use   [employer] Level 2 x 12 minutes for 1000 steps   Assessment    Treatment Assessment Patient participated in 60 minute skilled physical therapy session focusing on HIGT and stair management. No disharge date set for pt. Pt would like to return home after stay at [facility] vs continued rehab at subacute facility. Pt is making good progress towards her goals but biggest barrier continues to be stair management. Plan to assess alternate methods for patient to enter/exit the home ( i.e. tub transfer bench). discussed with pt at discharge she will likely be WC level, but can walk with family members assistance. Requesting home measurements and provided pt with a list.   Problem List Decreased strength;Decreased endurance;Decreased range of motion;Impaired balance;Decreased mobility;Decreased coordination;Impaired tone;Pain   Barriers to Discharge Inaccessible home environment;Decreased caregiver support   PT Barriers   Physical Impairment Decreased strength;Decreased range of motion;Decreased endurance;Impaired balance;Decreased mobility;Decreased coordination;Impaired sensation;Impaired tone   Functional Limitation Car transfers;Stair negotiation;Transfers;Walking   Plan   Treatment/Interventions Functional transfer training;LE strengthening/ROM;Therapeutic exercise;Endurance training;Patient/family training;Equipment eval/education;Bed mobility;Gait training   Progress Progressing toward goals   Discharge Recommendation   Rehab Resource Intensity Level, PT   (HH PT initially then transitioing to OP PT)   PT Therapy Minutes   PT Time In 0830   PT Time Out 0930   PT Total Time (minutes) 60   PT Mode of treatment - Individual (minutes) 60   PT Mode of treatment - Concurrent (minutes) 0   PT Mode of treatment - Group (minutes) 0   PT Mode of treatment - Co-treat (minutes) 0   PT Mode of Treatment - Total time(minutes) 60 minutes   PT Cumulative Minutes 1421     Mirta Mccord, Pt,DPT

## 2024-09-05 PROBLEM — A49.8 CLOSTRIDIUM DIFFICILE INFECTION: Status: RESOLVED | Noted: 2024-08-20 | Resolved: 2024-09-05

## 2024-09-05 LAB
ERYTHROCYTE [DISTWIDTH] IN BLOOD BY AUTOMATED COUNT: 13.6 % (ref 11.6–15.1)
HCT VFR BLD AUTO: 36 % (ref 34.8–46.1)
HGB BLD-MCNC: 11.5 G/DL (ref 11.5–15.4)
MCH RBC QN AUTO: 30.1 PG (ref 26.8–34.3)
MCHC RBC AUTO-ENTMCNC: 31.9 G/DL (ref 31.4–37.4)
MCV RBC AUTO: 94 FL (ref 82–98)
PLATELET # BLD AUTO: 246 THOUSANDS/UL (ref 149–390)
PMV BLD AUTO: 10.4 FL (ref 8.9–12.7)
RBC # BLD AUTO: 3.82 MILLION/UL (ref 3.81–5.12)
WBC # BLD AUTO: 6.25 THOUSAND/UL (ref 4.31–10.16)

## 2024-09-05 PROCEDURE — 97530 THERAPEUTIC ACTIVITIES: CPT

## 2024-09-05 PROCEDURE — 94760 N-INVAS EAR/PLS OXIMETRY 1: CPT

## 2024-09-05 PROCEDURE — 97130 THER IVNTJ EA ADDL 15 MIN: CPT

## 2024-09-05 PROCEDURE — 99232 SBSQ HOSP IP/OBS MODERATE 35: CPT | Performed by: PHYSICAL MEDICINE & REHABILITATION

## 2024-09-05 PROCEDURE — 97112 NEUROMUSCULAR REEDUCATION: CPT

## 2024-09-05 PROCEDURE — 97535 SELF CARE MNGMENT TRAINING: CPT

## 2024-09-05 PROCEDURE — 97129 THER IVNTJ 1ST 15 MIN: CPT

## 2024-09-05 PROCEDURE — 85027 COMPLETE CBC AUTOMATED: CPT | Performed by: PHYSICIAN ASSISTANT

## 2024-09-05 PROCEDURE — 94660 CPAP INITIATION&MGMT: CPT

## 2024-09-05 RX ADMIN — RIVAROXABAN 20 MG: 20 TABLET, FILM COATED ORAL at 16:50

## 2024-09-05 RX ADMIN — SPIRONOLACTONE 25 MG: 25 TABLET, FILM COATED ORAL at 07:46

## 2024-09-05 RX ADMIN — HYDRALAZINE HYDROCHLORIDE 75 MG: 50 TABLET ORAL at 21:53

## 2024-09-05 RX ADMIN — NIFEDIPINE 60 MG: 30 TABLET, FILM COATED, EXTENDED RELEASE ORAL at 16:50

## 2024-09-05 RX ADMIN — PANTOPRAZOLE SODIUM 40 MG: 20 TABLET, DELAYED RELEASE ORAL at 06:06

## 2024-09-05 RX ADMIN — HYDRALAZINE HYDROCHLORIDE 75 MG: 50 TABLET ORAL at 14:57

## 2024-09-05 RX ADMIN — CARVEDILOL 25 MG: 25 TABLET, FILM COATED ORAL at 16:50

## 2024-09-05 RX ADMIN — LIDOCAINE 1 PATCH: 700 PATCH TOPICAL at 21:53

## 2024-09-05 RX ADMIN — ATORVASTATIN CALCIUM 40 MG: 40 TABLET, FILM COATED ORAL at 16:50

## 2024-09-05 RX ADMIN — LOSARTAN POTASSIUM 100 MG: 50 TABLET, FILM COATED ORAL at 07:44

## 2024-09-05 RX ADMIN — HYDRALAZINE HYDROCHLORIDE 75 MG: 50 TABLET ORAL at 06:06

## 2024-09-05 RX ADMIN — B-COMPLEX W/ C & FOLIC ACID TAB 1 TABLET: TAB at 07:44

## 2024-09-05 RX ADMIN — LEVOTHYROXINE SODIUM 25 MCG: 25 TABLET ORAL at 06:06

## 2024-09-05 RX ADMIN — CARVEDILOL 25 MG: 25 TABLET, FILM COATED ORAL at 07:44

## 2024-09-05 NOTE — ASSESSMENT & PLAN NOTE
Presented with right sided weakness, slurred speech, headache and vomiting  MRI = left pontine infarct  Neuro felt CVA was from atrial fibrillation in setting of non-compliance with anticoagulation  Currently now on Xarelto 20mg qd, Lipitor 40mg qd  Followup with Neuro as OP.  [facility] summary

## 2024-09-05 NOTE — PROGRESS NOTES
"   09/05/24 1015   Pain Assessment   Pain Assessment Tool 0-10   Pain Score No Pain   Restrictions/Precautions   Precautions Bed/chair alarms;Cognitive;Fall Risk;Contact/isolation;Pain;Supervision on toilet/commode   Braces or Orthoses   (AFO and sneakers for all transfers)   Lifestyle   Autonomy \"It feels so good to get a shower\"   Eating   Type of Assistance Needed Set-up / clean-up   Eating CARE Score 5   Oral Hygiene   Type of Assistance Needed Set-up / clean-up   Comment seated at sink using LUE   Oral Hygiene CARE Score 5   Shower/Bathe Self   Type of Assistance Needed Physical assistance   Physical Assistance Level 51%-75%   Comment shower completed primarily seated. While seated pt washes UB w/ Min. Washes to carrasco height w/ CGA. Assist for folds, lower legs/feet, LUE. CGA-Ruperto in stance at GB and assist for buttocks/groing   Shower/Bathe Self CARE Score 2   Bathing   Assessed Bath Style Shower   Tub/Shower Transfer   Findings Ruperto swap out at GB form WC<>tub bench   Upper Body Dressing   Type of Assistance Needed Physical assistance   Physical Assistance Level 51%-75%   Comment modA 2' RUE kaylyn for bra and shirt   Upper Body Dressing CARE Score 2   Lower Body Dressing   Type of Assistance Needed Physical assistance   Physical Assistance Level 76% or more   Comment maxA, pt able to partially assist for CM over hips   Lower Body Dressing CARE Score 2   Putting On/Taking Off Footwear   Type of Assistance Needed Physical assistance   Physical Assistance Level Total assistance   Comment TA socks, shoes, AFO   Putting On/Taking Off Footwear CARE Score 1   Sit to Stand   Type of Assistance Needed Incidental touching   Comment CGA to kaylyn walker or GB   Sit to Stand CARE Score 4   Bed-Chair Transfer   Type of Assistance Needed Physical assistance   Physical Assistance Level 25% or less   Comment Ruperto SPT w/ HW wc> recliner to R side   Chair/Bed-to-Chair Transfer CARE Score 3   Cognition   Overall Cognitive Status " WFL   Arousal/Participation Alert;Cooperative   Attention Within functional limits   Orientation Level Oriented X4   Memory Within functional limits   Following Commands Follows one step commands without difficulty   Activity Tolerance   Activity Tolerance Patient tolerated treatment well   Medical Staff Made Aware MD De Dios present to assess skin tear on chest, he reports plan to cover w/ band aid and monitor, OT applied bandaid and pt reports understanding   Assessment   Treatment Assessment OT session focusing on ADL retraining including shower routine. pt demo's significant progress w/ ADLs using compensatory techniques and modifications, equipment. She continues to remain great rehab candidate. OT to continue POC.   Prognosis Good   Problem List Decreased strength;Decreased range of motion;Decreased endurance;Impaired balance;Decreased mobility;Decreased coordination;Decreased cognition;Impaired judgement;Impaired sensation;Impaired tone;Obesity;Decreased skin integrity;Pain   Plan   Treatment/Interventions ADL retraining;Functional transfer training;Therapeutic exercise;Endurance training;Cognitive reorientation;Patient/family training;Equipment eval/education;Compensatory technique education;Continued evaluation   Progress Progressing toward goals   OT Therapy Minutes   OT Time In 1015   OT Time Out 1145   OT Total Time (minutes) 90   OT Mode of treatment - Individual (minutes) 90   OT Mode of treatment - Concurrent (minutes) 0   OT Mode of treatment - Group (minutes) 0   OT Mode of treatment - Co-treat (minutes) 0   OT Mode of Treatment - Total time(minutes) 90 minutes   OT Cumulative Minutes 1375   Therapy Time missed   Time missed? No

## 2024-09-05 NOTE — ASSESSMENT & PLAN NOTE
Was seeing cardiologist in [place] and taking Xarelto but she has not seen him in 2 yrs and stopped all her meds >1 yr ago  EKG in the hospital showed NSR  Needs to followup as OP with Cardiology = Dr Beltre  Continue Coreg  Was not placed back on Flecainide  Now on Xarelto, which will be supplied free of charge from [facility]

## 2024-09-05 NOTE — PLAN OF CARE
Problem: PAIN - ADULT  Goal: Verbalizes/displays adequate comfort level or baseline comfort level  Description: Interventions:  - Encourage patient to monitor pain and request assistance  - Assess pain using appropriate pain scale  - Administer analgesics based on type and severity of pain and evaluate response  - Implement non-pharmacological measures as appropriate and evaluate response  - Consider cultural and social influences on pain and pain management  - Notify physician/advanced practitioner if interventions unsuccessful or patient reports new pain  Outcome: Progressing     Problem: INFECTION - ADULT  Goal: Absence or prevention of progression during hospitalization  Description: INTERVENTIONS:  - Assess and monitor for signs and symptoms of infection  - Monitor lab/diagnostic results  - Monitor all insertion sites, i.e. indwelling lines, tubes, and drains  - Monitor endotracheal if appropriate and nasal secretions for changes in amount and color  - [place] appropriate cooling/warming therapies per order  - Administer medications as ordered  - Instruct and encourage patient and family to use good hand hygiene technique  - Identify and instruct in appropriate isolation precautions for identified infection/condition  Outcome: Progressing

## 2024-09-05 NOTE — QUICK NOTE
Updated patient's son, Kiko, over the phone. Provided medical update.    Dk De Dios MD  Attending Physician  Physical Medicine and Rehabilitation  [facility]

## 2024-09-05 NOTE — PROGRESS NOTES
ST Updates:   Recommendations for discharge: mostly need for some level of supervision given I ADL's (medication management, finances, scheduling)   Continued Services: potential for continue ST services but pending ongoing progress (if initiates w/ home, would wait till pt begins OP ST)  Discharge Date: 9/16   Family Education/Training: currently planned w/ son to be initiated on 9/8  Continued Treatment Plan: ongoing target given I ADL's as mentioned above, target higher level cognitive tasks     09/05/24 0830   Pain Assessment   Pain Assessment Tool 0-10   Pain Score No Pain   Restrictions/Precautions   Precautions Bed/chair alarms;Cognitive;Fall Risk;Contact/isolation;Pain;Supervision on toilet/commode   Comprehension   Comprehension (FIM) 5 - Understands basic directions and conversation   Expression   Expression (FIM) 5 - Needs help/cues only RARELY (< 10% of the time)   Social Interaction   Social Interaction (FIM) 6 - Interacts appropriately with others BUT requires extra  time   Problem Solving   Problem solving (FIM) 5 - Solves complex problems But requires cues from helper   Memory   Memory (FIM) 5 - Needs cueing reminders <10%   Speech/Language/Cognition Assessmetn   Treatment Assessment Pt awake, alert and in bed. Engaged in brief review from last session which pt did not offer complaints given difficulty sleeping, no pain, etc. Pt w/ good recall given items consumed at last meal (breakfast). Focus of session targeting pt's higher level cognitive linguistic skills given use of higher level logical solutions puzzles to where pt was to cancel out information to determine the target word to fit the clues. For these tasks a total of 5 items were completed. There was a total of 12 boxes given information to where pt was to determine the target word w/ choice of 4 people. The number of clues provided for each deduction puzzle ranged from 2 up to 4 clues. It was noted that the increased amount of clues  "provided were more complex to determine the pairing. Overall pt was able to determine 4/5 puzzles on own, only needing minimal verbal cues for 1 puzzle in which pt was then able to answer accordingly.     Pt also completing a higher level following directions task in which there was multi-step commands to complete to a list of words. Pt was noted to be 9/11 accurate w/ this task, noting decreased visual attention to one items which pt missed and then the following item was completed in the manner of the missed task. Pt was able to correct these items increasing to 11/11 accuracy. Will continue to target higher level auditory/visual comprehension skills in future sessions.     Last task completed was still another means of a higher level logical solutions task. For these items, the information to be placed in to a list of 7 and 8 slots for schedules was in paragraph form. This increased pt's overall processing time to complete this task. For the first schedule, pt was able to determine all 7/7 stop w/o cues needed from SLP. For the next logical solutions schedule, pt was noted to have increased difficulty in switching the stops on the schedule vs placement of items onto a furniture delivery (ie: item packed onto truck first vs item which was first stop). It was noted that initially pt was able to recognize this but as this organization was verbalized, pt was at a stopping point until SLP providing minimal verbal cues to determine one clue increasing ability to answer remaining information. Pt was noted to be 4/8 accurate initially but after providing minimal cues, pt was able to determine the remaining items to fit the schedule, increasing to 8/8 accuracy. It was noted that pt did verbalize \"getting tired\" to where SLP providing pt w/ education given mental fatigue when completing higher level cognitive tasks, but needing to allow for rest breaks, allowing time to recover and not push through as this is how mistakes " are likely to occur when translating over to completing money management, medication management, scheduling/calendar tasks. Pt verbalizing understanding given this education. Currently pt will continue to benefit from ongoing skilled SLP services targeting functional independence of cognitive linguistic skills in hopes for decreasing burden of care over time.    SLP Therapy Minutes   SLP Time In 0830   SLP Time Out 0930   SLP Total Time (minutes) 60   SLP Mode of treatment - Individual (minutes) 60   SLP Mode of treatment - Concurrent (minutes) 0   SLP Mode of treatment - Group (minutes) 0   SLP Mode of treatment - Co-treat (minutes) 0   SLP Mode of Treatment - Total time(minutes) 60 minutes   SLP Cumulative Minutes 630   Therapy Time missed   Time missed? No

## 2024-09-05 NOTE — PROGRESS NOTES
09/05/24 1230   Restrictions/Precautions   Precautions Bed/chair alarms;Cognitive;Fall Risk;Contact/isolation;Pain;Supervision on toilet/commode   Weight Bearing Restrictions No   ROM Restrictions No   Cognition   Overall Cognitive Status WFL   Arousal/Participation Alert;Cooperative   Attention Within functional limits   Orientation Level Oriented X4   Memory Within functional limits   Following Commands Follows one step commands without difficulty   Sit to Stand   Type of Assistance Needed Incidental touching;Adaptive equipment   Comment CGA with hemiwalker   Sit to Stand CARE Score 4   Bed-Chair Transfer   Type of Assistance Needed Physical assistance   Physical Assistance Level 25% or less   Comment Haile with hemiwalker when turning R or L.   Chair/Bed-to-Chair Transfer CARE Score 3   Walk 10 Feet   Type of Assistance Needed Physical assistance   Physical Assistance Level 25% or less   Comment Haile with hemiwalker   Walk 10 Feet CARE Score 3   Walk 50 Feet with Two Turns   Type of Assistance Needed Physical assistance   Physical Assistance Level 26%-50%   Comment Haile with hemiwalker, heavier Haile with turns, at times requires VCs for placement of hemiwalker   Walk 50 Feet with Two Turns CARE Score 3   Ambulation   Primary Mode of Locomotion Prior to Admission Walk   Distance Walked (feet) 136 ft  (please see additional distance walked under NMR)   Assist Device Ismael Walker   Gait Pattern Ataxic;Slow Jasmyne;Decreased foot clearance;R foot drag;Wide OJ;Decreased R stance;Improper weight shift;Step to   Limitations Noted In Balance;Coordination;Device Management;Endurance;Posture;Safety;Speed;Strength;Swing   Does the patient walk? 2. Yes   Equipment Use   Body Weight Support System While in the SS, pt completed the following: Forward Ambulation with L HHA x 80 ft progressed to light HHA 2x 160 ft and 1x 120 ft then progressed to no HHA x40 ft; Side Step Ambulation x160 ft with unilateral HHA, focusing on  shifting weight to the LLE to help advance RLE; Backwards Walking with L HHA x 40 ft. Pt did not require assistance any assistance from therapist to clear RLE. utilized R arm sling and R AFO when in BWSS   Assessment   Treatment Assessment Patient participated in 90 minute skilled physical therapy session focusing on gait training in BWSS to help increase intensity and the number of steps taken during a session. In BWSS, pt was able to briefly progress to no HHA. wihtout HHA, patient demonstrated more lateral sway. Following BWSS, patient ambulated 136 ft with heavier Ruperto and L hemiwalker. Patient demonstrating improved activity tolerance and endurance. Pt wanted to attempt stair navigation after all the walking but due to time constraint unable to do so. Plan to perform stairs during morning session tomorrow. Also plan to assess patient to walk to the bathroom with other staff member and she has been able to consistently ambulate 20 ft with hemiwalker and Min/ModA.   Problem List Decreased strength;Decreased range of motion;Decreased endurance;Impaired balance;Decreased mobility;Decreased coordination;Obesity;Decreased skin integrity;Pain   Barriers to Discharge Inaccessible home environment;Decreased caregiver support   PT Barriers   Functional Limitation Car transfers;Stair negotiation;Standing;Transfers;Walking   Plan   Treatment/Interventions Functional transfer training;LE strengthening/ROM;Endurance training;Therapeutic exercise;Patient/family training;Equipment eval/education;Bed mobility;Gait training   Progress Improving as expected   Discharge Recommendation   Rehab Resource Intensity Level, PT   (HH PT initially then transitioing to OP PT)   PT Therapy Minutes   PT Time In 1230   PT Time Out 1400   PT Total Time (minutes) 90   PT Mode of treatment - Individual (minutes) 90   PT Mode of treatment - Concurrent (minutes) 0   PT Mode of treatment - Group (minutes) 0   PT Mode of treatment - Co-treat  (minutes) 0   PT Mode of Treatment - Total time(minutes) 90 minutes   PT Cumulative Minutes 1511       Skin check performed on R foot/ankle to ensure no skin breakdown from AFO. No skin breakdown noted. Pt does have callous on lateral aspect of R fifth toe, but the brace does not hit that area.   Mirta Mccord, PT, DPT

## 2024-09-05 NOTE — CASE MANAGEMENT
Met w/pt and informed of the receipt of the approval fax from [employer] for her free xeralto. Copy made for pt and provided for her to take home. Made pt aware of the dc date of 9/16 and pt is asking if a request to medicare for a longer stay can be made. Pt explained medicare provides an approx los and we try to follow it as best as possible. Cm explained pts progress would be discussed in next week's team mtg and if an extension of a day or two is needed it will be discussed. Cm counseled she has a week and a half to go until dc and a lot can happen with her ability to navigate stairs.     Cm returned call to pts son Kiko and received voice mail. Mssg left awaiting call back.     1222 extensive 30 min phone call with pts son as he returned the call. Cm reviewed pts current functional ability, dc date, recommendations based on progress, medicare's recommended LOS, hhc services vs outpt services, insurance coverage for snf, hhc and outpt services. Son states snf is not off the table as he is concerned about being the only caregiver for pt. Cm reviewed need for pt to have a secondary insurance. Informed of approval for xeralto for one year under [employer]. Son inquired about pts los being extended if she needed more time. Cm was clear in saying if pt is making good progress and needs just a few days that is possible. But if pt is not making gains, medicare would suggest moving pt to subacute level of the family takes pt home in the functional state at that time. Son confirmed he will be in on Sunday and inquired if he should bring a low car vs high one. Cm stated given her limitations and height a lower one would most likely be better, but if different cm would have therapy phone  him. Son to be in contact with cm early next week.

## 2024-09-05 NOTE — ASSESSMENT & PLAN NOTE
Patient was not on any medications prior to admission.  Since admission she has been started on Coreg 25mg BID, Hydralazine 75 mg q8hrs, Nifedipine 60mg daily, Aldactone 25mg daily and Losartan 100mg qd  Followup with IM as OP.  On DC summary was listed to see Megan DORSEY from that group

## 2024-09-05 NOTE — QUICK NOTE
Patient was not physically seen or examined as she was working with therapy the several times I was on the unit however thorough chart review was performed including review of vitals and labs.  SLIM will assess the patient tomorrow.    * Acute CVA (cerebrovascular accident) (HCC)  Assessment & Plan  Presented with right sided weakness, slurred speech, headache and vomiting  MRI = left pontine infarct  Neuro felt CVA was from atrial fibrillation in setting of non-compliance with anticoagulation  Currently now on Xarelto 20mg qd, Lipitor 40mg qd  Followup with Neuro as OP.  Stevo Baires from [facility] from that group was listed on the DC summary    Hypothyroidism  Assessment & Plan  Continue Levothyroxine 25 mcg qd  This was the dose that she was supposed to be taking at home and they placed her back on it in the hospital on 8/12/24 but there was no TSH drawn.  Had an abnormal TSH of 5.1 on 4/23/20 presumably not on Levothyroxine at that time since thereafter on 2/15/22 was normal  TSH 8/22/24 normal at 3.95  Will continue current dose and have her get a TSH in 6 weeks = @9/23    DIANELYS (obstructive sleep apnea)  Assessment & Plan  In past was on CPAP.  Strongly encourage resuming CPAP usage, especially in light of new stroke.    HTN (hypertension)  Assessment & Plan  Patient was not on any medications prior to admission.  Since admission she has been started on Coreg 25mg BID, Hydralazine 75 mg q8hrs, Nifedipine 60mg daily, Aldactone 25mg daily and Losartan 100mg qd  Followup with IM as OP.  On DC summary was listed to see Megan DORSEY from that group    Paroxysmal atrial fibrillation (HCC)  Assessment & Plan  Was seeing cardiologist in [place] and taking Xarelto but she has not seen him in 2 yrs and stopped all her meds >1 yr ago  EKG in the hospital showed NSR  Needs to followup as OP with Cardiology = Dr Beltre  Continue Coreg  Was not placed back on Flecainide  Now on Xarelto, which will be supplied free of charge  from [facility]

## 2024-09-06 PROCEDURE — 97530 THERAPEUTIC ACTIVITIES: CPT

## 2024-09-06 PROCEDURE — 97116 GAIT TRAINING THERAPY: CPT

## 2024-09-06 PROCEDURE — 97112 NEUROMUSCULAR REEDUCATION: CPT

## 2024-09-06 PROCEDURE — 99231 SBSQ HOSP IP/OBS SF/LOW 25: CPT | Performed by: PHYSICIAN ASSISTANT

## 2024-09-06 PROCEDURE — 94760 N-INVAS EAR/PLS OXIMETRY 1: CPT

## 2024-09-06 PROCEDURE — 94660 CPAP INITIATION&MGMT: CPT

## 2024-09-06 RX ADMIN — LEVOTHYROXINE SODIUM 25 MCG: 25 TABLET ORAL at 06:11

## 2024-09-06 RX ADMIN — LOSARTAN POTASSIUM 100 MG: 50 TABLET, FILM COATED ORAL at 09:57

## 2024-09-06 RX ADMIN — NIFEDIPINE 60 MG: 30 TABLET, FILM COATED, EXTENDED RELEASE ORAL at 16:09

## 2024-09-06 RX ADMIN — SPIRONOLACTONE 25 MG: 25 TABLET, FILM COATED ORAL at 09:58

## 2024-09-06 RX ADMIN — LIDOCAINE 1 PATCH: 700 PATCH TOPICAL at 20:53

## 2024-09-06 RX ADMIN — PANTOPRAZOLE SODIUM 40 MG: 20 TABLET, DELAYED RELEASE ORAL at 06:11

## 2024-09-06 RX ADMIN — RIVAROXABAN 20 MG: 20 TABLET, FILM COATED ORAL at 16:09

## 2024-09-06 RX ADMIN — HYDRALAZINE HYDROCHLORIDE 75 MG: 50 TABLET ORAL at 14:29

## 2024-09-06 RX ADMIN — CARVEDILOL 25 MG: 25 TABLET, FILM COATED ORAL at 07:47

## 2024-09-06 RX ADMIN — HYDRALAZINE HYDROCHLORIDE 75 MG: 50 TABLET ORAL at 06:11

## 2024-09-06 RX ADMIN — HYDRALAZINE HYDROCHLORIDE 75 MG: 50 TABLET ORAL at 20:53

## 2024-09-06 RX ADMIN — ATORVASTATIN CALCIUM 40 MG: 40 TABLET, FILM COATED ORAL at 17:27

## 2024-09-06 RX ADMIN — B-COMPLEX W/ C & FOLIC ACID TAB 1 TABLET: TAB at 09:55

## 2024-09-06 RX ADMIN — CARVEDILOL 25 MG: 25 TABLET, FILM COATED ORAL at 16:09

## 2024-09-06 NOTE — PROGRESS NOTES
09/06/24 1230   Pain Assessment   Pain Assessment Tool 0-10   Pain Score No Pain   Restrictions/Precautions   Precautions Bed/chair alarms;Fall Risk;Contact/isolation;Supervision on toilet/commode   Weight Bearing Restrictions No   ROM Restrictions No   Braces or Orthoses Other (Comment)  (AFO used this session +sneakers for all functional transfers; sling for transfers)   Cognition   Overall Cognitive Status WFL   Arousal/Participation Alert;Cooperative   Attention Within functional limits   Orientation Level Oriented X4   Memory Within functional limits   Following Commands Follows one step commands with increased time or repetition   Sit to Stand   Type of Assistance Needed Incidental touching;Verbal cues;Adaptive equipment;Supervision   Comment CS/CGA with , VC' at times for hand placement   Sit to Stand CARE Score 4   Bed-Chair Transfer   Type of Assistance Needed Incidental touching;Adaptive equipment   Comment CGA with    Chair/Bed-to-Chair Transfer CARE Score 4   Transfer Bed/Chair/Wheelchair   Adaptive Equipment Ismael Walker   Walk 10 Feet   Type of Assistance Needed Physical assistance;Verbal cues;Adaptive equipment;Incidental touching   Physical Assistance Level 25% or less   Comment MIN/CGA in room setting.   Walk 10 Feet CARE Score 3   Walk 50 Feet with Two Turns   Type of Assistance Needed Physical assistance;Verbal cues;Adaptive equipment   Physical Assistance Level 25% or less   Comment with    Walk 50 Feet with Two Turns CARE Score 3   Walking 10 Feet on Uneven Surfaces   Reason if not Attempted Safety concerns   Walking 10 Feet on Uneven Surfaces CARE Score 88   Ambulation   Primary Mode of Locomotion Prior to Admission Walk   Distance Walked (feet) 160 ft  (120', 80', 10', 60')   Assist Device Roller Walker   Gait Pattern Ataxic;Slow Jasmyne;Decreased foot clearance;R foot drag;Forward Flexion;R hemiparesis;Wide OJ;Step to;Step through;Decreased R stance;Improper weight shift  "  Limitations Noted In Balance;Coordination;Device Management;Endurance;Heel Strike;Posture;Safety;Speed;Strength;Swing   Provided Assistance with: Balance;Direction   Walk Assist Level Minimum Assist;Contact Guard   Does the patient walk? 2. Yes   Wheelchair mobility   Does the patient use a wheelchair? 1. Yes   Type of Wheelchair Used 1. Manual   Toilet Transfer   Type of Assistance Needed Incidental touching;Physical assistance;Verbal cues;Adaptive equipment   Physical Assistance Level 25% or less   Comment MIN/CGA with HW into bathroom   Toilet Transfer CARE Score 3   Toilet Transfer   Surface Assessed Standard Toilet   Therapeutic Interventions   Neuromuscular Re-Education toe taps to 6\" curb BLE with HW and MIN/MODA x10 reps   Equipment Use   NuStep L2 x10 min   Body Weight Support System no AD but light LHHA x80', 160' and 120'. LLE taps to 6\" step with HW. Rest breaks required and pt unable to increase gait speed at this time.   Assessment   Treatment Assessment Pt participated in skilled PT session with increased focus on gait in BWSS, general strengthening and balance. Pt cont to feel safe with light LHHA when amb in BWSS 2/2 fear. Pt had no noted buckling in R knee with all gait. In BWSS she had some LOB but was able to correct it , not relying on the support system. Pt cont to make gains daily as seen by improved gait, increased act tolerance and decreased A required. Pt will cont POC as tolerated with cont focus on gait, balance, coordination, increased righting reactions and WC to RLE to decrease burden of care.   Problem List Decreased strength;Decreased range of motion;Impaired balance;Decreased endurance;Decreased mobility;Decreased coordination;Impaired sensation;Impaired tone;Obesity;Pain;Decreased skin integrity   Barriers to Discharge Inaccessible home environment;Decreased caregiver support   PT Barriers   Functional Limitation Car transfers;Stair negotiation;Standing;Transfers;Walking   Plan "   Treatment/Interventions Functional transfer training;LE strengthening/ROM;Therapeutic exercise;Endurance training;Patient/family training;Gait training   Progress Progressing toward goals   PT Therapy Minutes   PT Time In 1230   PT Time Out 1400   PT Total Time (minutes) 90   PT Mode of treatment - Individual (minutes) 90   PT Mode of treatment - Concurrent (minutes) 0   PT Mode of treatment - Group (minutes) 0   PT Mode of treatment - Co-treat (minutes) 0   PT Mode of Treatment - Total time(minutes) 90 minutes   PT Cumulative Minutes 1661   Therapy Time missed   Time missed? No

## 2024-09-06 NOTE — PROGRESS NOTES
"   09/06/24 1000   Pain Assessment   Pain Assessment Tool 0-10   Pain Score No Pain   Restrictions/Precautions   Precautions Bed/chair alarms;Fall Risk;Contact/isolation;Supervision on toilet/commode   Weight Bearing Restrictions No   ROM Restrictions No   Braces or Orthoses   (AFO used this session +sneakers for all functional transfers; sling for transfers)   Lifestyle   Autonomy \"I feel really good that I can do this\"   Eating   Type of Assistance Needed Set-up / clean-up   Eating CARE Score 5   Sit to Stand   Type of Assistance Needed Incidental touching   Comment CGA to stand to HW   Sit to Stand CARE Score 4   Bed-Chair Transfer   Type of Assistance Needed Incidental touching   Comment SPT CGA w/ HW   Chair/Bed-to-Chair Transfer CARE Score 4   Neuromuscular Education   Comments RUE NMR pt seated a table top. Pt expressing interest in improving RUE function. She also reports enjoying treatments w/ NMES and believes it has improved her function. Set up fxnl reach activity involving entire RUE, pt to  object, grasp, lift over small barrier at midline and place on other side, then release. Pt then to repeat. NMES [place] applied to RUE forearm/wrist/digit extensors. Tolerates amplitude of up to 50 on extensors, and 45 on flexors. Ramp 2s, interval 10/10, reciprocal 2 leads, 50bbps burst. Use of LookItBO mobile arm support applied to RUE forearm. OT assisting at mobile arm support to assist w/ lateral movements at shoulder and well as  lifting UE from table to clear small barrier at midline. Initially trialed task w/ golf balls but increased difficulty picking up from table top, required some hand over hand assist in addition to mobile arm support and NMES. Then replaced golf balls w/ pink sponge, pt w/ much greater success using foam sponge. Completed 15min NMES, pt then rested, then an additional 20min NMES. Pt very pleased w/ her ability to complete task in this set up. She is excited w/ her progress. OT " to continue NMR rue w/ pt.   Cognition   Overall Cognitive Status WFL   Arousal/Participation Alert;Cooperative   Attention Within functional limits   Orientation Level Oriented X4   Memory Within functional limits   Following Commands Follows one step commands with increased time or repetition   Activity Tolerance   Activity Tolerance Patient tolerated treatment well   Assessment   OT Family training done with: OT session focusing on RUE NMR and fxnl transfers. Pt responded well to NMR and remains highly motivated to challenge RUE to regain fxn on R side. pt overall extrememly motivated and has made great gains on unit. Despite recent progress she continues to require skilled hands on assist in order to maintain her safety.   Prognosis Good   Problem List Decreased strength;Decreased range of motion;Impaired balance;Decreased endurance;Decreased mobility;Decreased coordination;Impaired sensation;Impaired tone;Obesity;Pain;Decreased skin integrity   Plan   Treatment/Interventions ADL retraining;Functional transfer training;Therapeutic exercise;Endurance training;Patient/family training;Equipment eval/education;Compensatory technique education;Continued evaluation   Progress Progressing toward goals   OT Therapy Minutes   OT Time In 1000   OT Time Out 1140   OT Total Time (minutes) 100   OT Mode of treatment - Individual (minutes) 100   OT Mode of treatment - Concurrent (minutes) 0   OT Mode of treatment - Group (minutes) 0   OT Mode of treatment - Co-treat (minutes) 0   OT Mode of Treatment - Total time(minutes) 100 minutes   OT Cumulative Minutes 1475   Therapy Time missed   Time missed? No

## 2024-09-06 NOTE — PLAN OF CARE
Problem: INFECTION - ADULT  Goal: Absence or prevention of progression during hospitalization  Description: INTERVENTIONS:  - Assess and monitor for signs and symptoms of infection  - Monitor lab/diagnostic results  - Monitor all insertion sites, i.e. indwelling lines, tubes, and drains  - Monitor endotracheal if appropriate and nasal secretions for changes in amount and color  - [place] appropriate cooling/warming therapies per order  - Administer medications as ordered  - Instruct and encourage patient and family to use good hand hygiene technique  - Identify and instruct in appropriate isolation precautions for identified infection/condition  Outcome: Progressing

## 2024-09-06 NOTE — PROGRESS NOTES
09/06/24 0830   Pain Assessment   Pain Assessment Tool 0-10   Pain Score No Pain   Restrictions/Precautions   Precautions Bed/chair alarms;Fall Risk;Contact/isolation;Supervision on toilet/commode   Weight Bearing Restrictions No   ROM Restrictions No   General   Change In Medical/Functional Status (S)  Pt updated to amb in room VALERIE x1 with HW with staff for toileting. If fatigued pt is to use BSC and perfomr a SPT with A x1 to BSC.   Cognition   Overall Cognitive Status WFL   Arousal/Participation Alert;Cooperative   Attention Within functional limits   Orientation Level Oriented X4   Memory Within functional limits   Following Commands Follows one step commands without difficulty   Sit to Stand   Type of Assistance Needed Incidental touching;Supervision;Verbal cues;Adaptive equipment   Comment CS/CGA with VC's for hand placement   Sit to Stand CARE Score 4   Bed-Chair Transfer   Type of Assistance Needed Physical assistance;Incidental touching;Adaptive equipment   Physical Assistance Level 25% or less   Comment MIN/CGA with HW   Chair/Bed-to-Chair Transfer CARE Score 3   Transfer Bed/Chair/Wheelchair   Adaptive Equipment Ismael Walker   Walk 10 Feet   Type of Assistance Needed Physical assistance;Verbal cues;Adaptive equipment   Physical Assistance Level 25% or less   Comment MIN/CGA in room setting.   Walk 10 Feet CARE Score 3   Walk 50 Feet with Two Turns   Type of Assistance Needed Physical assistance;Verbal cues;Adaptive equipment   Physical Assistance Level 25% or less   Comment VALERIE but no noted LOB   Walk 50 Feet with Two Turns CARE Score 3   Walking 10 Feet on Uneven Surfaces   Reason if not Attempted Safety concerns   Walking 10 Feet on Uneven Surfaces CARE Score 88   Ambulation   Primary Mode of Locomotion Prior to Admission Walk   Distance Walked (feet) 140 ft  (15' x2)   Assist Device Ismael Walker   Gait Pattern Ataxic;Inconsistant Jasmyne;Slow Jasmyne;Decreased foot clearance;R foot drag;Forward  "Flexion;R hemiparesis;R knee jonathan;Wide OJ;Step to;Step through;Decreased R stance;Improper weight shift   Limitations Noted In Balance;Device Management;Coordination;Endurance;Heel Strike;Midline Orientation;Safety;Posture;Strength;Swing;Speed   Provided Assistance with: Balance;Direction   Walk Assist Level Minimum Assist;Contact Guard   Does the patient walk? 2. Yes   Wheelchair mobility   Does the patient use a wheelchair? 1. Yes   Type of Wheelchair Used 1. Manual   Curb or Single Stair   Style negotiated Curb   Type of Assistance Needed Physical assistance;Verbal cues;Adaptive equipment   Physical Assistance Level Total assistance   Comment MODA x1 with CS/CGA of second person for safety on 6\" curb with HW. Retro descend.   1 Step (Curb) CARE Score 1   4 Steps   Type of Assistance Needed Physical assistance;Verbal cues;Adaptive equipment   Physical Assistance Level Total assistance   Comment MODA x2 with B hands on RHR sideways approach. A to R hand given. X4 then x5 steps on 6\"  steps   4 Steps CARE Score 1   12 Steps   Reason if not Attempted Safety concerns   12 Steps CARE Score 88   Stairs   Type Stairs;Curb   # of Steps 4  (+5)   Weight Bearing Precautions Fall Risk   Assist Devices Single Rail   Findings A to RLE as needed for proper placement   Picking Up Object   Reason if not Attempted Safety concerns   Picking Up Object CARE Score 88   Toilet Transfer   Type of Assistance Needed Incidental touching;Physical assistance;Verbal cues;Adaptive equipment   Physical Assistance Level 25% or less   Comment MIN/CGA with HW into bathroom   Toilet Transfer CARE Score 3   Toilet Transfer   Surface Assessed Standard Toilet   Findings increased A to stand from toilet, A for hygiene/clothing management   Assessment   Treatment Assessment Pt participated in skilled PT session with increased focus on long distance gait, stair management and gait within room. Pt has shown consistency with amb with HW and was " "updated to amb to bathroom during the day with A x1. Pt must have shoe and brace on if amb. Overnight pt may cont use of bed pan or SPT to BSC with HW. Pt was able to amb 10' feet short of PT door this session. Pt also completed 6\" steps this session. Initially she required increased A with ascending steps but with verbal/visual cues she required more of an A x1. She still required A for R foot placement most of the time, especially when descending. Pt will cont POC as tolerated with increased focus on BSS next session.   Problem List Decreased strength;Decreased range of motion;Decreased endurance;Impaired balance;Decreased mobility;Decreased coordination;Obesity;Decreased skin integrity;Pain   Barriers to Discharge Inaccessible home environment;Decreased caregiver support   PT Barriers   Functional Limitation Car transfers;Stair negotiation;Standing;Transfers;Walking   Plan   Treatment/Interventions Functional transfer training;LE strengthening/ROM;Endurance training;Therapeutic exercise;Equipment eval/education;Patient/family training;Gait training   Progress Progressing toward goals   PT Therapy Minutes   PT Time In 0830   PT Time Out 0930   PT Total Time (minutes) 60   PT Mode of treatment - Individual (minutes) 60   PT Mode of treatment - Concurrent (minutes) 0   PT Mode of treatment - Group (minutes) 0   PT Mode of treatment - Co-treat (minutes) 0   PT Mode of Treatment - Total time(minutes) 60 minutes   PT Cumulative Minutes 1571   Therapy Time missed   Time missed? No     "

## 2024-09-07 PROCEDURE — 94660 CPAP INITIATION&MGMT: CPT

## 2024-09-07 PROCEDURE — 99232 SBSQ HOSP IP/OBS MODERATE 35: CPT | Performed by: PHYSICIAN ASSISTANT

## 2024-09-07 RX ADMIN — HYDRALAZINE HYDROCHLORIDE 75 MG: 50 TABLET ORAL at 06:52

## 2024-09-07 RX ADMIN — SPIRONOLACTONE 25 MG: 25 TABLET, FILM COATED ORAL at 09:38

## 2024-09-07 RX ADMIN — LIDOCAINE 1 PATCH: 700 PATCH TOPICAL at 21:07

## 2024-09-07 RX ADMIN — PANTOPRAZOLE SODIUM 40 MG: 20 TABLET, DELAYED RELEASE ORAL at 06:53

## 2024-09-07 RX ADMIN — RIVAROXABAN 20 MG: 20 TABLET, FILM COATED ORAL at 15:59

## 2024-09-07 RX ADMIN — CARVEDILOL 25 MG: 25 TABLET, FILM COATED ORAL at 07:37

## 2024-09-07 RX ADMIN — CARVEDILOL 25 MG: 25 TABLET, FILM COATED ORAL at 15:59

## 2024-09-07 RX ADMIN — LOSARTAN POTASSIUM 100 MG: 50 TABLET, FILM COATED ORAL at 09:34

## 2024-09-07 RX ADMIN — HYDRALAZINE HYDROCHLORIDE 75 MG: 50 TABLET ORAL at 21:05

## 2024-09-07 RX ADMIN — ATORVASTATIN CALCIUM 40 MG: 40 TABLET, FILM COATED ORAL at 18:00

## 2024-09-07 RX ADMIN — LEVOTHYROXINE SODIUM 25 MCG: 25 TABLET ORAL at 06:53

## 2024-09-07 RX ADMIN — HYDRALAZINE HYDROCHLORIDE 75 MG: 50 TABLET ORAL at 15:06

## 2024-09-07 RX ADMIN — NIFEDIPINE 60 MG: 30 TABLET, FILM COATED, EXTENDED RELEASE ORAL at 15:59

## 2024-09-07 RX ADMIN — B-COMPLEX W/ C & FOLIC ACID TAB 1 TABLET: TAB at 09:32

## 2024-09-07 NOTE — ASSESSMENT & PLAN NOTE
Presented with right sided weakness, slurred speech, headache and vomiting  MRI = left pontine infarct  Neuro felt CVA was from atrial fibrillation in setting of non-compliance with anticoagulation  Currently now on Xarelto 20mg qd, Lipitor 40mg qd  Followup with Neuro as OP.  [facility] on the DC summary

## 2024-09-07 NOTE — PROGRESS NOTES
[facility]  Progress Note  Name: Maye Lilly I  MRN: [id number]  Unit/Bed#: -01 I Date of Admission: 8/20/2024   Date of Service: 9/7/2024 I Hospital Day: 18    Assessment & Plan   * Acute CVA (cerebrovascular accident) (HCC)  Assessment & Plan  Presented with right sided weakness, slurred speech, headache and vomiting  MRI = left pontine infarct  Neuro felt CVA was from atrial fibrillation in setting of non-compliance with anticoagulation  Currently now on Xarelto 20mg qd, Lipitor 40mg qd  Followup with Neuro as OP.  Stevo Baires from [facility] from that group was listed on the DC summary    Meningioma (HCC)  Assessment & Plan  Incidental finding   OP followup    GERD (gastroesophageal reflux disease)  Assessment & Plan  Continue Protonix    Hypothyroidism  Assessment & Plan  Continue Levothyroxine 25 mcg qd  This was the dose that she was supposed to be taking at home and they placed her back on it in the hospital on 8/12/24 but there was no TSH drawn.  Had an abnormal TSH of 5.1 on 4/23/20 presumably not on Levothyroxine at that time since thereafter on 2/15/22 was normal  TSH 8/22/24 normal at 3.95  Will continue current dose and have her get a TSH in 6 weeks = @9/23    DIANELYS (obstructive sleep apnea)  Assessment & Plan  In past was on CPAP.  Strongly encourage resuming CPAP usage, especially in light of new stroke.    HTN (hypertension)  Assessment & Plan  Patient was not on any medications prior to admission.  Since admission she has been started on Coreg 25mg BID, Hydralazine 75 mg q8hrs, Nifedipine 60mg daily, Aldactone 25mg daily and Losartan 100mg qd  Followup with IM as OP.  On DC summary was listed to see Megan DORSEY from that group    Paroxysmal atrial fibrillation (HCC)  Assessment & Plan  Was seeing cardiologist in [place] and taking Xarelto but she has not seen him in 2 yrs and stopped all her meds >1 yr ago  EKG in the hospital showed NSR  Needs to  "followup as OP with Cardiology = Dr Beltre  Continue Coreg  Was not placed back on Flecainide  Now on Xarelto, which will be supplied free of charge from [facility]             The above assessment and plan was reviewed and updated as determined by my evaluation of the patient on 9/7/2024.    Labs:   Results from last 7 days   Lab Units 09/05/24  0604   WBC Thousand/uL 6.25   HEMOGLOBIN g/dL 11.5   HEMATOCRIT % 36.0   PLATELETS Thousands/uL 246           Invalid input(s): \"LABGLOM\", \"CMP\"                Imaging  XR shoulder 2+ vw right   Final Result by Zion Kuhn MD (09/03 0651)      No acute osseous abnormality.         Computerized Assisted Algorithm (CAA) may have been used to analyze all applicable images.         Workstation performed: [id number]             Review of Scheduled Meds:  Current Facility-Administered Medications   Medication Dose Route Frequency Provider Last Rate    acetaminophen  650 mg Oral Q6H PRN EWNCESLAO Patel      atorvastatin  40 mg Oral QPM WENCESLAO Patel      bisacodyl  10 mg Rectal Daily PRN Dougie King MD      carvedilol  25 mg Oral BID With Meals WENCESLAO Patel      Diclofenac Sodium  2 g Topical 4x Daily PRN Dk De Dios MD      hydrALAZINE  75 mg Oral Q8H [facility] WENCESLAO Patel      levothyroxine  25 mcg Oral Early Morning WENCESLAO Patel      lidocaine  1 patch Topical Daily Dk De Dios MD      losartan  100 mg Oral Daily WENCESLAO Patel      multivitamin stress formula  1 tablet Oral Daily WENCESLAO Patel      NIFEdipine  60 mg Oral Daily With Dinner WENCESLAO Patel      pantoprazole  40 mg Oral Early Morning WENCESLAO Patel      rivaroxaban  20 mg Oral Daily With Dinner WENCESLAO Patel      spironolactone  25 mg Oral Daily WENCESLAO Patel         Subjective/ HPI: Patient seen and examined. Patients overnight issues or events " were reviewed with nursing staff. New or overnight issues include the following:     Pt seen in her room with family present. She states that she is doing well. She denies any current complaints.    ROS:   A 10 point ROS was performed; negative except as noted above.        *Labs /Radiology studies Reviewed  *Medications  reviewed and reconciled as needed  *Please refer to order section for additional ordered labs studies      Physical Examination:  Vitals:   Vitals:    09/06/24 2053 09/06/24 2100 09/07/24 0613 09/07/24 1345   BP: 133/60  120/56 124/60   BP Location:   Left arm Left arm   Pulse: 73  69 70   Resp: 20  19 16   Temp: 98.7 °F (37.1 °C)  98.3 °F (36.8 °C) 98.2 °F (36.8 °C)   TempSrc: Oral  Oral Oral   SpO2: 93% 94% 95% 95%   Weight:       Height:           General Appearance: NAD; pleasant  HEENT: PERRLA, conjuctiva normal; mucous membranes moist; face symmetrical  Neck:  Supple  Lungs: clear bilaterally, normal respiratory effort, no retractions, expiratory effort normal, on room air  CV: regular rate and rhythm, no murmurs rubs or gallops noted   ABD: soft non tender, +BS x4  EXT: DP pulses intact, no lymphadenopathy, no edema  Skin: normal turgor, normal texture, no rash  Psych: affect normal, mood normal  Neuro: AAOx3. Rt sided weakness     The above physical exam was reviewed and updated as determined by my evaluation of the patient on 9/7/2024.    Invasive Devices       None                      VTE Pharmacologic Prophylaxis: Xarelto  Code Status: Level 1 - Full Code  Current Length of Stay: 18 day(s)    Total floor / unit time spent today  35 minutes   Coordination of patient's care was performed in conjunction with consulting services. Time invested included review of patient's labs, vitals, and management of their comorbidities with continued monitoring, examination of patient as well as answering patient questions, documenting her findings and creating progress note in electronic medical  record,  ordering appropriate diagnostic testing.       ** Please Note:  voice to text software may have been used in the creation of this document. Although proof errors in transcription or interpretation are a potential of such software**

## 2024-09-08 PROCEDURE — 94660 CPAP INITIATION&MGMT: CPT

## 2024-09-08 PROCEDURE — 97530 THERAPEUTIC ACTIVITIES: CPT

## 2024-09-08 PROCEDURE — 97130 THER IVNTJ EA ADDL 15 MIN: CPT

## 2024-09-08 PROCEDURE — 94760 N-INVAS EAR/PLS OXIMETRY 1: CPT

## 2024-09-08 PROCEDURE — 99232 SBSQ HOSP IP/OBS MODERATE 35: CPT | Performed by: PHYSICIAN ASSISTANT

## 2024-09-08 PROCEDURE — 97116 GAIT TRAINING THERAPY: CPT

## 2024-09-08 PROCEDURE — 97129 THER IVNTJ 1ST 15 MIN: CPT

## 2024-09-08 RX ADMIN — LIDOCAINE 1 PATCH: 700 PATCH TOPICAL at 21:01

## 2024-09-08 RX ADMIN — B-COMPLEX W/ C & FOLIC ACID TAB 1 TABLET: TAB at 07:54

## 2024-09-08 RX ADMIN — LEVOTHYROXINE SODIUM 25 MCG: 25 TABLET ORAL at 06:31

## 2024-09-08 RX ADMIN — SPIRONOLACTONE 25 MG: 25 TABLET, FILM COATED ORAL at 07:54

## 2024-09-08 RX ADMIN — HYDRALAZINE HYDROCHLORIDE 75 MG: 50 TABLET ORAL at 06:30

## 2024-09-08 RX ADMIN — RIVAROXABAN 20 MG: 20 TABLET, FILM COATED ORAL at 17:01

## 2024-09-08 RX ADMIN — CARVEDILOL 25 MG: 25 TABLET, FILM COATED ORAL at 07:52

## 2024-09-08 RX ADMIN — HYDRALAZINE HYDROCHLORIDE 75 MG: 50 TABLET ORAL at 14:12

## 2024-09-08 RX ADMIN — CARVEDILOL 25 MG: 25 TABLET, FILM COATED ORAL at 17:00

## 2024-09-08 RX ADMIN — LOSARTAN POTASSIUM 100 MG: 50 TABLET, FILM COATED ORAL at 07:54

## 2024-09-08 RX ADMIN — ACETAMINOPHEN 650 MG: 325 TABLET ORAL at 07:52

## 2024-09-08 RX ADMIN — PANTOPRAZOLE SODIUM 40 MG: 20 TABLET, DELAYED RELEASE ORAL at 06:31

## 2024-09-08 RX ADMIN — HYDRALAZINE HYDROCHLORIDE 75 MG: 50 TABLET ORAL at 21:01

## 2024-09-08 RX ADMIN — NIFEDIPINE 60 MG: 30 TABLET, FILM COATED, EXTENDED RELEASE ORAL at 17:01

## 2024-09-08 RX ADMIN — ATORVASTATIN CALCIUM 40 MG: 40 TABLET, FILM COATED ORAL at 17:01

## 2024-09-08 NOTE — ASSESSMENT & PLAN NOTE
Presented with right sided weakness, slurred speech, headache and vomiting  MRI = left pontine infarct  Neuro felt CVA was from atrial fibrillation in setting of non-compliance with anticoagulation  Currently now on Xarelto 20mg qd, Lipitor 40mg qd  Followup with Neuro as OP.  [facility] the DC summary

## 2024-09-08 NOTE — PLAN OF CARE
Problem: PAIN - ADULT  Goal: Verbalizes/displays adequate comfort level or baseline comfort level  Description: Interventions:  - Encourage patient to monitor pain and request assistance  - Assess pain using appropriate pain scale  - Administer analgesics based on type and severity of pain and evaluate response  - Implement non-pharmacological measures as appropriate and evaluate response  - Consider cultural and social influences on pain and pain management  - Notify physician/advanced practitioner if interventions unsuccessful or patient reports new pain  Outcome: Progressing     Problem: INFECTION - ADULT  Goal: Absence or prevention of progression during hospitalization  Description: INTERVENTIONS:  - Assess and monitor for signs and symptoms of infection  - Monitor lab/diagnostic results  - Monitor all insertion sites, i.e. indwelling lines, tubes, and drains  - Monitor endotracheal if appropriate and nasal secretions for changes in amount and color  - [place] appropriate cooling/warming therapies per order  - Administer medications as ordered  - Instruct and encourage patient and family to use good hand hygiene technique  - Identify and instruct in appropriate isolation precautions for identified infection/condition  Outcome: Progressing

## 2024-09-08 NOTE — PROGRESS NOTES
"   09/08/24 1000   Pain Assessment   Pain Assessment Tool 0-10   Pain Score No Pain   Restrictions/Precautions   Precautions Fall Risk;Contact/isolation;Supervision on toilet/commode  (pt removed from bed/chair alarms)   Weight Bearing Restrictions No   ROM Restrictions No   Braces or Orthoses Other (Comment)  (AFO used this session +sneakers for all functional transfers; sling for transfers)   Lifestyle   Autonomy \"I don't try to get up on my own\"   Sit to Stand   Type of Assistance Needed Incidental touching   Physical Assistance Level No physical assistance   Comment CG with HW and gait belt   Sit to Stand CARE Score 4   Bed-Chair Transfer   Type of Assistance Needed Incidental touching   Physical Assistance Level No physical assistance   Comment CG with HW for short distances   Chair/Bed-to-Chair Transfer CARE Score 4   Toileting Hygiene   Comment (S)  please continue to focus on toileting tasks, especially rear hygiene as func reach remains a barrier to pt independence.   Toilet Transfer   Comment (S)  please assess toileting/toilet transfer to WIDE DA BSC in prep for identifying appropriate DME for d/c home. wide DA BSC placed in pt bathroom to trial next session.   Cognition   Overall Cognitive Status WFL   Arousal/Participation Alert;Cooperative   Attention Within functional limits   Orientation Level Oriented X4   Memory Within functional limits   Following Commands Follows one step commands with increased time or repetition   Additional Activities   Additional Activities Comments (S)  please assess use of kaylyn sling (pt currently using hospital arm sling) during PT/OT sessions in prep for identifying needs for d/c home. from discussion, pt VERY interested in kaylyn sling and would benefit from trialing while on [facility]; will provide pt with [employer] hand out if kaylyn sling works well for pt and/or pt likes use of kaylyn sling.   Activity Tolerance   Activity Tolerance Patient tolerated treatment well   Assessment "   Treatment Assessment pt engages in 60 minute skilled OT Session focusing on family training with pt, pt's son and pt's SERGO. see above for full func details. see below for details regarding family training. pt continues to make steady gains toward OT goals, completing short distance transfers with HW at CG level, very insightful to overall safety during transfers; demo's good standing balance with no R knee buckling noted. continues with slow motor recovery in RUE which limits pt independence with self care tasks however does well with self ROM exercises and remains motivated for therapy and NMR in RUE. recommend continued skilled care to focus on ADL retraining with kaylyn dressing tech, toileting with func reach to rear with LUE, RUE NMR, standing payal/bal, DRY tub/shower transfer to simulate pt home set up (although transfer bench may not fit in bathroom due to sizing), stroke education and any additional family training as needed in order to decrease burden of care at d/c.   OT Family training done with: pt, pt's son Kiko and pt's SERGO Rohan   Assessment of family training pt, pt's son and pt's SERGO engage in family training session focusing on CLOF, DME recommendations and education on positioning/UE sling for risk of sublux reduction. discussed current LOF regarding self care tasks including assistance for managing sports bra and anticipate that pt will require significant assist to don if she chooses to wear one at time of d/c (current recommendation is to not wear one when home, but when going out to appointments, community, etc); will require assist for kaylyn dressing tech for donning shirt; currently pt requires assist for threading underwear/pants over RLE, can thread LLE and requires minimal assist to manage clothing over R hip (did discuss wearing dresses at time of d/c to decrease need for CM--pt agreeable to wearing only dresses, no underwear unless going out in the commmunity as well however anticipate  pt may be able to manage underwear at time of d/c); will need full assistance with footwear management at this time. pt and SERGO in agreement with CLOF and recommendations. discussed toileting and how rear hygiene is a barrier--SERGO reports he has a bidet attachment already on the toilet which would help pt with hygiene but, did edu that pt may still require assistance as needed for thoroughness. son provided pic of bathroom with toilet appearing slightly lower, discussed [facility] ordering BSC for use over toilet during daytime and then to use at bedside for over night hours. pt, pt's son and SERGO in agreement with DME recommendation. discussed alternatives to bathing as bathroom appears on the smaller side and tub bench may not fit in bathroom; pt agreeable to sponge bathe and then stand at sink with assist from son to wash hair as needed; plan to have home OT assess bathroom and tub/shower transfer at time of d/c. disussed proper positioning in recliner, bed with wedge and 2 pillows under RUE to protect shoulder joint as well as discussed use of arm sling vs kaylyn sling; plan to trial kaylyn sling as pt reports great interest in kaylyn sling vs arm sling. pt's son and SERGO in agreement to all recommendations and pleased with pt progress. answered all questions at this time. depending upon pt progress, may benefit from shortened family training session one additional time prior to d/c.   Prognosis Good   Problem List Decreased strength;Decreased endurance;Decreased mobility;Impaired balance;Decreased coordination;Pain;Obesity;Decreased safety awareness;Impaired judgement   Plan   Treatment/Interventions ADL retraining;Functional transfer training;Therapeutic exercise;Endurance training;Patient/family training;Equipment eval/education;Compensatory technique education   OT Therapy Minutes   OT Time In 1000   OT Time Out 1100   OT Total Time (minutes) 60   OT Mode of treatment - Individual (minutes) 60   OT Mode of treatment -  Concurrent (minutes) 0   OT Mode of treatment - Group (minutes) 0   OT Mode of treatment - Co-treat (minutes) 0   OT Mode of Treatment - Total time(minutes) 60 minutes   OT Cumulative Minutes 1535   Therapy Time missed   Time missed? No

## 2024-09-08 NOTE — ASSESSMENT & PLAN NOTE
Was seeing cardiologist in [place] and taking Xarelto but she has not seen him in 2 yrs and stopped all her meds >1 yr ago  EKG in the hospital showed NSR  Needs to followup as OP with Cardiology = Dr Beltre  Continue Coreg  Was not placed back on Flecainide  Now on Xarelto, which will be supplied free of charge from [facility]

## 2024-09-08 NOTE — PROGRESS NOTES
09/08/24 1100   Pain Assessment   Pain Assessment Tool 0-10   Pain Score No Pain   Restrictions/Precautions   Precautions Fall Risk;Contact/isolation;Supervision on toilet/commode   Weight Bearing Restrictions No   ROM Restrictions No   Braces or Orthoses   (AFO used this session +sneakers for all functional transfers; sling for transfers)   Comprehension   Comprehension (FIM) 5 - Needs help/cues, repetition only RARELY ( < 10% of the time)   Expression   Expression (FIM) 5 - Needs help/cues only RARELY (< 10% of the time)   Social Interaction   Social Interaction (FIM) 6 - Interacts appropriately with others BUT requires extra  time   Problem Solving   Problem solving (FIM) 5 - Solves complex problems But requires cues from helper   Memory   Memory (FIM) 5 - Recalls/performs request 90% of time   Speech/Language/Cognition Assessmetn   Treatment Assessment Pt participated in skilled SLP session targeting higher level cognitive linguistic skills. Pt's son and SERGO (p's daughter's boyfriend) were present throughout session. SLP provided brief education on current areas targeted in SLP sessions. Pt's son asked questions about continued ST following D/C from [facility] and activities that can be done at home following D/C. SLP provided information on outpatient SLP services and reported that activities could be pulled to take home upon D/C; however, pt will be challenged to use functional cognitive linguistic skills at home (e.g. sequence steps for getting ready, remembering to take medications, etc) and it will be beneficial for family to ensure safety and appropriate cognitive skills within these tasks. Pt' son verbalized understanding. Pt completed deduction puzzle, where she was able to organize items in kitchen shelves. She was able to demonstrate appropriate awareness to errors, but requested Ruperto x1 due to difficulty. Pt was able to correctly input items in 8/12 trials independently, increasing to 2/12 given min  contextual cues. Pt also benefited from increased time to complete this task. Lastly, pt answered tricky questions in 4/5 trials independently, increasing to 5/5 given repetition of question. Recommend continued skilled SLP services to target higher level cognitive linguistic skills in order to maximize level of independence and safety to reduce burden of care.   SLP Therapy Minutes   SLP Time In 1100   SLP Time Out 1130   SLP Total Time (minutes) 30   SLP Mode of treatment - Individual (minutes) 30   SLP Mode of treatment - Concurrent (minutes) 0   SLP Mode of treatment - Group (minutes) 0   SLP Mode of treatment - Co-treat (minutes) 0   SLP Mode of Treatment - Total time(minutes) 30 minutes   SLP Cumulative Minutes 660   Therapy Time missed   Time missed? No

## 2024-09-08 NOTE — PROGRESS NOTES
09/08/24 0830   Pain Assessment   Pain Assessment Tool 0-10   Pain Score 6   Pain Location/Orientation Location: Back   Restrictions/Precautions   Precautions Bed/chair alarms;Fall Risk;Contact/isolation;Supervision on toilet/commode   Weight Bearing Restrictions No   ROM Restrictions No   Braces or Orthoses Other (Comment)  (AFO and shoes for upright mobility)   Cognition   Overall Cognitive Status WFL   Arousal/Participation Alert;Cooperative   Attention Within functional limits   Orientation Level Oriented X4   Memory Within functional limits   Following Commands Follows one step commands with increased time or repetition   Subjective   Subjective Pt ready for PT   Sit to Stand   Type of Assistance Needed Incidental touching;Supervision   Physical Assistance Level No physical assistance   Comment CS/CGA with HW and gait belt   Sit to Stand CARE Score 4   Bed-Chair Transfer   Type of Assistance Needed Incidental touching;Supervision   Physical Assistance Level No physical assistance   Comment CS/CGA with HW   Chair/Bed-to-Chair Transfer CARE Score 4   Transfer Bed/Chair/Wheelchair   Findings (S)  FAMILY TRAINING: x2 trials 1 with son observing and 1 with son hands on. stand pivot from w/c to car (sedan) with CG/CS and gait belt. Car to w/c stand pivot with kaylyn walker and gait belt needs upto modA For force production from low seat height. Also needs assist for RLE assist OUT of car. Pt able to complete stand pivot with CG once in standing position from car to w/c   Car Transfer   Type of Assistance Needed Physical assistance   Physical Assistance Level 26%-50%   Comment kaylyn walker and gait belt   Car Transfer CARE Score 3   Walk 10 Feet   Type of Assistance Needed Incidental touching;Supervision   Physical Assistance Level No physical assistance   Comment CS/CG HW gait belt   Walk 10 Feet CARE Score 4   Walk 50 Feet with Two Turns   Type of Assistance Needed Incidental touching;Supervision   Physical  Assistance Level No physical assistance   Comment CS/CG HW gait belt   Walk 50 Feet with Two Turns CARE Score 4   Walk 150 Feet   Comment limited by decreased endurance   Ambulation   Primary Mode of Locomotion Prior to Admission Walk   Distance Walked (feet) 100 ft  (x2)   Assist Device Ismael Walker   Gait Pattern Inconsistant Jasmyne;Decreased foot clearance;Forward Flexion;Step to;Step through;Decreased R stance;Improper weight shift   Limitations Noted In Coordination;Balance;Endurance;Sequencing;Strength;Speed   Provided Assistance with: Balance;Direction   Walk Assist Level Close Supervision;Contact Guard   Findings (S)  1 bout with therapist, 1 bout with son guarding CG/CS- needs follow up guarding practice. Pt at times resistant to therapist assist/hands on despite therapist education about guarding for safety   Does the patient walk? 2. Yes   Curb or Single Stair   Style negotiated Single stair   Type of Assistance Needed Physical assistance   Physical Assistance Level 25% or less   1 Step (Curb) CARE Score 3   Stairs   Type Stairs   # of Steps 2  (x2)   Assist Devices Single Rail   Findings (S)  Son observed no hands on: pt completed  steps with LUE on RHR to simulate home ascending/descending sideways. Valerie for steadying and RLE placement descending. 2 trials with improvement on 2nd trial progressing to CG however still recommend VALERIE at this time. Needed seated rest break in between trials   Therapeutic Interventions   Strengthening LAQ RLE 1 x10 with 2 sec isometric hold   Assessment   Treatment Assessment Demario participated in 90 min skilled PT session with her son and SERGO present to observe, son participated in hands on training for ambulation on even surfaces with ismael walker and car transfers. Son observed stair negotiation, pt using LUE on RHR to simulate home environment. Educated and communicated with son and SERGO about recommendations for manual w/c upon d/c with family ordering gait belt  and kaylyn walker, potentially off [employer]. Encouraged pt's son to come in for family training as much as possible before d/c. Son wants to bring mini SUV next time to trial if pt is able to transfer into that easier/with less assistance. Pt's son needs further training for guarding on even surfaces. Pt at times resistant to hands on assistance despite therapist education on assist level needed, demonstrating decreased insight by patient on safety awareness. Communicated with family recommendation for family assist for transfers and ambulation (short distance) at this time, not to transfer independently at this time. Pt's son willing to come in next weekend AND during week (he works from home but can come in for an hour during the day). Please communicate with son to set this up not through patient.   Family/Caregiver Present yes, son and SERGO   PT Family training done with: yes, son and SERGO   Problem List Decreased strength;Decreased endurance;Decreased mobility;Impaired balance;Decreased coordination;Pain;Obesity;Decreased safety awareness;Impaired judgement   Barriers to Discharge Decreased caregiver support;Inaccessible home environment   PT Barriers   Functional Limitation Car transfers;Stair negotiation;Standing;Walking;Transfers   Plan   Treatment/Interventions ADL retraining;Functional transfer training;LE strengthening/ROM;Therapeutic exercise;Elevations;Endurance training;Patient/family training;Equipment eval/education;Bed mobility;Gait training   Progress Progressing toward goals   PT Therapy Minutes   PT Time In 0830   PT Time Out 1000   PT Total Time (minutes) 90   PT Mode of treatment - Individual (minutes) 90   PT Mode of treatment - Concurrent (minutes) 0   PT Mode of treatment - Group (minutes) 0   PT Mode of treatment - Co-treat (minutes) 0   PT Mode of Treatment - Total time(minutes) 90 minutes   PT Cumulative Minutes 1751   Therapy Time missed   Time missed? No

## 2024-09-08 NOTE — PROGRESS NOTES
[facility]  Progress Note  Name: Maye Llily I  MRN: [id number]  Unit/Bed#: -01 I Date of Admission: 8/20/2024   Date of Service: 9/8/2024 I Hospital Day: 19    Assessment & Plan   * Acute CVA (cerebrovascular accident) (HCC)  Assessment & Plan  Presented with right sided weakness, slurred speech, headache and vomiting  MRI = left pontine infarct  Neuro felt CVA was from atrial fibrillation in setting of non-compliance with anticoagulation  Currently now on Xarelto 20mg qd, Lipitor 40mg qd  Followup with Neuro as OP.  Stevo Baires from [facility] from that group was listed on the DC summary    Meningioma (HCC)  Assessment & Plan  Incidental finding   OP followup    GERD (gastroesophageal reflux disease)  Assessment & Plan  Continue Protonix    Hypothyroidism  Assessment & Plan  Continue Levothyroxine 25 mcg qd  This was the dose that she was supposed to be taking at home and they placed her back on it in the hospital on 8/12/24 but there was no TSH drawn.  Had an abnormal TSH of 5.1 on 4/23/20 presumably not on Levothyroxine at that time since thereafter on 2/15/22 was normal  TSH 8/22/24 normal at 3.95  Will continue current dose and have her get a TSH in 6 weeks = @9/23    DIANELYS (obstructive sleep apnea)  Assessment & Plan  In past was on CPAP.  Strongly encourage resuming CPAP usage, especially in light of new stroke.    HTN (hypertension)  Assessment & Plan  Patient was not on any medications prior to admission.  Since admission she has been started on Coreg 25mg BID, Hydralazine 75 mg q8hrs, Nifedipine 60mg daily, Aldactone 25mg daily and Losartan 100mg qd  Followup with IM as OP.  On DC summary was listed to see Megan DORSEY from that group    Paroxysmal atrial fibrillation (HCC)  Assessment & Plan  Was seeing cardiologist in [place] and taking Xarelto but she has not seen him in 2 yrs and stopped all her meds >1 yr ago  EKG in the hospital showed NSR  Needs to  "followup as OP with Cardiology = Dr Beltre  Continue Coreg  Was not placed back on Flecainide  Now on Xarelto, which will be supplied free of charge from [facility]             The above assessment and plan was reviewed and updated as determined by my evaluation of the patient on 9/8/2024.    Labs:   Results from last 7 days   Lab Units 09/05/24  0604   WBC Thousand/uL 6.25   HEMOGLOBIN g/dL 11.5   HEMATOCRIT % 36.0   PLATELETS Thousands/uL 246           Invalid input(s): \"LABGLOM\", \"CMP\"                Imaging  XR shoulder 2+ vw right   Final Result by Zion Kuhn MD (09/03 0651)      No acute osseous abnormality.         Computerized Assisted Algorithm (CAA) may have been used to analyze all applicable images.         Workstation performed: [id number]             Review of Scheduled Meds:  Current Facility-Administered Medications   Medication Dose Route Frequency Provider Last Rate    acetaminophen  650 mg Oral Q6H PRN WENCESLAO Patel      atorvastatin  40 mg Oral QPM WENCESLAO Patel      bisacodyl  10 mg Rectal Daily PRN Dougie King MD      carvedilol  25 mg Oral BID With Meals WENCESLAO Patel      Diclofenac Sodium  2 g Topical 4x Daily PRN Dk De Dios MD      hydrALAZINE  75 mg Oral Q8H [facility] WENCESLAO Patel      levothyroxine  25 mcg Oral Early Morning WENCESLAO Patel      lidocaine  1 patch Topical Daily Dk De Dios MD      losartan  100 mg Oral Daily WENCESLAO Patel      multivitamin stress formula  1 tablet Oral Daily WENCESLAO Patel      NIFEdipine  60 mg Oral Daily With Dinner WENCESLAO Patel      pantoprazole  40 mg Oral Early Morning WENCESLAO Patel      rivaroxaban  20 mg Oral Daily With Dinner WENCESLAO Patel      spironolactone  25 mg Oral Daily WENCESLAO Patel         Subjective/ HPI: Patient seen and examined. Patients overnight issues or events " were reviewed with nursing staff. New or overnight issues include the following:     Pt seen in her room walking back from the bathroom. She states that she is doing well. She denies any current complaints.    ROS:   A 10 point ROS was performed; negative except as noted above.        *Labs /Radiology studies Reviewed  *Medications  reviewed and reconciled as needed  *Please refer to order section for additional ordered labs studies      Physical Examination:  Vitals:   Vitals:    09/07/24 2107 09/08/24 0420 09/08/24 0537 09/08/24 0630   BP: 120/58  134/61 138/70   BP Location:   Left arm    Pulse: 67  66    Resp:   16    Temp:   98.1 °F (36.7 °C)    TempSrc:   Oral    SpO2:  95% 95%    Weight:       Height:           General Appearance: NAD; pleasant  HEENT: PERRLA, conjuctiva normal; mucous membranes moist; face symmetrical  Neck:  Supple  Lungs: clear bilaterally, normal respiratory effort, no retractions, expiratory effort normal, on room air  CV: regular rate and rhythm, no murmurs rubs or gallops noted   ABD: soft non tender, +BS x4  EXT: DP pulses intact, no lymphadenopathy, no edema  Skin: normal turgor, normal texture, no rash  Psych: affect normal, mood normal  Neuro: AAOx3. Rt hemiparesis       The above physical exam was reviewed and updated as determined by my evaluation of the patient on 9/8/2024.    Invasive Devices       None                      VTE Pharmacologic Prophylaxis: Xarelto  Code Status: Level 1 - Full Code  Current Length of Stay: 19 day(s)    Total floor / unit time spent today 30 minutes  Coordination of patient's care was performed in conjunction with consulting services. Time invested included review of patient's labs, vitals, and management of their comorbidities with continued monitoring, examination of patient as well as answering patient questions, documenting her findings and creating progress note in electronic medical record,  ordering appropriate diagnostic testing.       **  Please Note:  voice to text software may have been used in the creation of this document. Although proof errors in transcription or interpretation are a potential of such software**

## 2024-09-09 PROCEDURE — 97129 THER IVNTJ 1ST 15 MIN: CPT

## 2024-09-09 PROCEDURE — 97116 GAIT TRAINING THERAPY: CPT

## 2024-09-09 PROCEDURE — 94660 CPAP INITIATION&MGMT: CPT

## 2024-09-09 PROCEDURE — 97530 THERAPEUTIC ACTIVITIES: CPT

## 2024-09-09 PROCEDURE — 97110 THERAPEUTIC EXERCISES: CPT

## 2024-09-09 PROCEDURE — 99232 SBSQ HOSP IP/OBS MODERATE 35: CPT | Performed by: PHYSICAL MEDICINE & REHABILITATION

## 2024-09-09 PROCEDURE — 99231 SBSQ HOSP IP/OBS SF/LOW 25: CPT | Performed by: PHYSICIAN ASSISTANT

## 2024-09-09 PROCEDURE — 97112 NEUROMUSCULAR REEDUCATION: CPT

## 2024-09-09 PROCEDURE — 97130 THER IVNTJ EA ADDL 15 MIN: CPT

## 2024-09-09 RX ADMIN — NIFEDIPINE 60 MG: 30 TABLET, FILM COATED, EXTENDED RELEASE ORAL at 17:02

## 2024-09-09 RX ADMIN — LIDOCAINE 1 PATCH: 700 PATCH TOPICAL at 21:12

## 2024-09-09 RX ADMIN — HYDRALAZINE HYDROCHLORIDE 75 MG: 50 TABLET ORAL at 14:33

## 2024-09-09 RX ADMIN — ATORVASTATIN CALCIUM 40 MG: 40 TABLET, FILM COATED ORAL at 17:02

## 2024-09-09 RX ADMIN — CARVEDILOL 25 MG: 25 TABLET, FILM COATED ORAL at 08:20

## 2024-09-09 RX ADMIN — RIVAROXABAN 20 MG: 20 TABLET, FILM COATED ORAL at 17:02

## 2024-09-09 RX ADMIN — SPIRONOLACTONE 25 MG: 25 TABLET, FILM COATED ORAL at 08:20

## 2024-09-09 RX ADMIN — HYDRALAZINE HYDROCHLORIDE 75 MG: 50 TABLET ORAL at 21:12

## 2024-09-09 RX ADMIN — B-COMPLEX W/ C & FOLIC ACID TAB 1 TABLET: TAB at 08:11

## 2024-09-09 RX ADMIN — LEVOTHYROXINE SODIUM 25 MCG: 25 TABLET ORAL at 05:17

## 2024-09-09 RX ADMIN — LOSARTAN POTASSIUM 100 MG: 50 TABLET, FILM COATED ORAL at 08:20

## 2024-09-09 RX ADMIN — CARVEDILOL 25 MG: 25 TABLET, FILM COATED ORAL at 17:01

## 2024-09-09 RX ADMIN — PANTOPRAZOLE SODIUM 40 MG: 20 TABLET, DELAYED RELEASE ORAL at 05:17

## 2024-09-09 RX ADMIN — HYDRALAZINE HYDROCHLORIDE 75 MG: 50 TABLET ORAL at 05:17

## 2024-09-09 NOTE — ASSESSMENT & PLAN NOTE
Was seeing cardiologist in [place] and taking Xarelto but she has not seen him in 2 yrs and stopped all her meds >1 yr ago  EKG in the hospital showed NSR  Needs to followup as OP with Cardiology = Dr Beltre  Continue Coreg  Was not placed back on Flecainide  Now on Xarelto, which will be supplied free of charge from [facility]

## 2024-09-09 NOTE — ASSESSMENT & PLAN NOTE
Presented with right sided weakness, slurred speech, headache and vomiting  MRI = left pontine infarct  Neuro felt CVA was from atrial fibrillation in setting of non-compliance with anticoagulation  Currently now on Xarelto 20mg qd, Lipitor 40mg qd  Followup with Neuro as OP.  [facility] was listed on the DC summary

## 2024-09-09 NOTE — PROGRESS NOTES
[facility]  Progress Note  Name: Maye Lilly I  MRN: [id number]  Unit/Bed#: -01 I Date of Admission: 8/20/2024   Date of Service: 9/9/2024 I Hospital Day: 20    Assessment & Plan   * Acute CVA (cerebrovascular accident) (HCC)  Assessment & Plan  Presented with right sided weakness, slurred speech, headache and vomiting  MRI = left pontine infarct  Neuro felt CVA was from atrial fibrillation in setting of non-compliance with anticoagulation  Currently now on Xarelto 20mg qd, Lipitor 40mg qd  Followup with Neuro as OP.  Stevo Baires from [facility] from that group was listed on the DC summary    Hypothyroidism  Assessment & Plan  Continue Levothyroxine 25 mcg qd  This was the dose that she was supposed to be taking at home and they placed her back on it in the hospital on 8/12/24 but there was no TSH drawn.  Had an abnormal TSH of 5.1 on 4/23/20 presumably not on Levothyroxine at that time since thereafter on 2/15/22 was normal  TSH 8/22/24 normal at 3.95  Will continue current dose and have her get a TSH in 6 weeks = @9/23    DIANELYS (obstructive sleep apnea)  Assessment & Plan  In past was on CPAP.  Strongly encourage resuming CPAP usage, especially in light of new stroke.    HTN (hypertension)  Assessment & Plan  Patient was not on any medications prior to admission.  Since admission she has been started on Coreg 25mg BID, Hydralazine 75 mg q8hrs, Nifedipine 60mg daily, Aldactone 25mg daily and Losartan 100mg qd  Followup with IM as OP.  On DC summary was listed to see Megan DORSEY from that group    Paroxysmal atrial fibrillation (HCC)  Assessment & Plan  Was seeing cardiologist in [place] and taking Xarelto but she has not seen him in 2 yrs and stopped all her meds >1 yr ago  EKG in the hospital showed NSR  Needs to followup as OP with Cardiology = Dr Patt Zhang Coreg  Was not placed back on Flecainide  Now on Xarelto, which will be supplied free of charge  from [facility]        The above assessment and plan was reviewed and updated as determined by my evaluation of the patient on 9/9/2024.    Labs:   Results from last 7 days   Lab Units 09/05/24  0604   WBC Thousand/uL 6.25   HEMOGLOBIN g/dL 11.5   HEMATOCRIT % 36.0   PLATELETS Thousands/uL 246     Imaging  XR shoulder 2+ vw right   Final Result by Zion Kuhn MD (09/03 0651)      No acute osseous abnormality.         Computerized Assisted Algorithm (CAA) may have been used to analyze all applicable images.         Workstation performed: [id number]           Review of Scheduled Meds:  Current Facility-Administered Medications   Medication Dose Route Frequency Provider Last Rate    acetaminophen  650 mg Oral Q6H PRN WENCESLAO Patel      atorvastatin  40 mg Oral QPM WENCESLAO Patel      bisacodyl  10 mg Rectal Daily PRN Dougie King MD      carvedilol  25 mg Oral BID With Meals WENCESLAO Patel      Diclofenac Sodium  2 g Topical 4x Daily PRN Dk De Dios MD      hydrALAZINE  75 mg Oral Q8H [facility] WENCESLAO Patel      levothyroxine  25 mcg Oral Early Morning Lucila Schulz, WENCESLAO      lidocaine  1 patch Topical Daily Dk De Dios MD      losartan  100 mg Oral Daily Lucila Schulz, WENCESLAO      multivitamin stress formula  1 tablet Oral Daily WENCESLAO Patel      NIFEdipine  60 mg Oral Daily With Dinner WENCESLAO Patel      pantoprazole  40 mg Oral Early Morning Lucila Schulz, WENCESLAO      rivaroxaban  20 mg Oral Daily With Dinner Lucila Schulz, WENCESLAO      spironolactone  25 mg Oral Daily WENCESLAO Patel         Subjective/ HPI: Patient seen and examined. Patient seen and evaluated while sitting in her bedside chair.  She has no new issues.    ROS:   A 10 point ROS was performed; negative except as noted above.      *Labs /Radiology studies Reviewed  *Medications  reviewed and reconciled as  needed  *Please refer to order section for additional ordered labs studies      Physical Examination:  Vitals:   Vitals:    09/08/24 2018 09/09/24 0516 09/09/24 0621 09/09/24 1426   BP: 128/59 140/70  125/58   BP Location: Left arm Left arm  Left arm   Pulse: 72  66 81   Resp: 16  18 19   Temp: 97.6 °F (36.4 °C)  98.2 °F (36.8 °C) 98 °F (36.7 °C)   TempSrc: Oral  Oral Oral   SpO2: 93%  94% 96%   Weight:       Height:           General Appearance: NAD; pleasant  HEENT: mucous membranes moist; face symmetrical  Lungs: clear bilaterally, normal respiratory effort, on room air  CV: regular rate and rhythm, no murmurs rubs or gallops noted   ABD: soft non tender, +BS x4  EXT: no edema  Skin: no rash  Psych: affect normal, mood normal  Neuro: AAOx3       The above physical exam was reviewed and updated as determined by my evaluation of the patient on 9/9/2024.    Invasive Devices       None                  VTE Pharmacologic Prophylaxis: Xarelto  Code Status: Level 1 - Full Code  Current Length of Stay: 20 day(s)    Total floor / unit time spent today   Coordination of patient's care was performed in conjunction with consulting services. Time invested included review of patient's labs, vitals, and management of their comorbidities with continued monitoring, examination of patient as well as answering patient questions, documenting her findings and creating progress note in electronic medical record,  ordering appropriate diagnostic testing.       ** Please Note:  voice to text software may have been used in the creation of this document. Although proof errors in transcription or interpretation are a potential of such software**

## 2024-09-09 NOTE — PROGRESS NOTES
Physical Medicine and Rehabilitation Progress Note  Maye Lilly 64 y.o. female MRN: [id number]  Unit/Bed#: -01 Encounter: [id number]    Etiology/Reason for Admission: Impairment of mobility, safety, Activities of Daily Living (ADLs), and cognitive/communication skills due to Stroke:  01.2  Right Body Involvement (Left Brain)    Interval History: Seen face-to-face at bedside. No acute events overnight. No current concerns. Right shoulder pain is currently well controlled. Vital signs reviewed, stable and WNL. Voiding spontaneously, continent. Last BM 9/9/24, continent.     Assessment/Plan -     Functional Update:  Physical Therapy Occupational Therapy Speech Therapy   Weight Bearing Status: Full Weight Bearing  Transfers: Minimal Assistance, Moderate Assistance  Bed Mobility: Moderate Assistance, Minimal Assistance  Amulation Distance (ft): 60 feet  Ambulation: Moderate Assistance  Assistive Device for Ambulation: Ismael Walker  Wheelchair Mobility Distance: 150 ft  Wheelchair Mobility: Minimal Assistance, Incidental Touching  Number of Stairs: 4  Assistive Device for Stairs: LeGROUNDBOOTHft Hand Rail  Stair Assistance: Maximum Assistance  Discharge Recommendations: Home with:  DC Home with:: 24 Hour Assisteance, Family Support, 24 Hour Supervision, First Floor Setup, Home Physical Therapy (pending outcome vs SNF)   Eating: Supervision (set up)  Grooming: Supervision (set up)  Bathing: Maximum Assistance  Bathing: Maximum Assistance  Upper Body Dressing: Moderate Assistance  Lower Body Dressing: Maximum Assistance  Toileting: Maximum Assistance  Tub/Shower Transfer: Total Assistance, Assist of 2  Toilet Transfer: Moderate Assistance (SPT with HW)  Cognition: Within Defined Limits  Cognition: Decreased Memory  Orientation: Person, Place, Time, Situation   Mode of Communication: Verbal  Speech/Language: Dysarthia  Cognition: Exceptions to WNL  Cognition: Decreased Memory, Decreased Executive Functions, Decreased  Attention, Decreased Comprehension  Orientation: Person, Place, Time, Situation  Discharge Recommendations: Home with:  DC Home with:: Family Support, 24 Hour Supervision ( services pending overall progress)     Ms. Maye Lilly is a 65 yo woman with a past medical history notable for HTN and atrial fibrillation who presented to the [facility] on 8/10/24 with acute dysarthria, facial droop and right hemiparesis. A stroke alert was activated, CT head wo contrast was negative for acute findings, CTA of the head/neck showed no evidence of large vessel occlusion and patent cervical carotid and vertebral arteries. MRI of the brain revealed an acute left pontine infarct. Course complicated by severe hypertension requiring multiple agents to obtain control.     - Acute left pontine ischemic infarct:   Clinically presented on 8/10/24 with right hemiparesis, dysarthria and facial droop  Stroke etiology: HTN, small vessel disease  MR brain on 8/11/24 revealed a small area of acute/subacute ischemia within the central luz to the left of midline without acute hemorrhage  Did not receive tPA/TNKase, did not undergo thrombectomy  DAPT regimen: None  Atorvastatin 40 mg qHs for secondary prevention  Monitor for neurogenic bowel and bladder  Promote regular sleep/wake cycles and proper sleep hygiene  Monitor closely for right hemiparetic shoulder pain and post-stroke pain  Neutral resting wrist splint for the and RLE multipodus boot to be worn at night and while in bed during the day to prevent developing spasticity and contracture  Maintain adequate nutrition, nutrition consult  Avoid sedating and anticholinergic medications as possible  Maintain normotension, s/p permissive hypertension  Outpatient neurovascular neurology follow-up  Outpatient PM&R follow-up for ongoing rehabilitation needs  - Right hemiparetic shoulder pain: in the setting of right sided hemiparesis from stroke, examination consistent with right shoulder  impingement, no concerns for adhesive capsulitis currently; taping, PT  - C. Difficile infection: OSH-acquired; vancomycin PO completed 8/26/24  - Paroxysmal atrial fibrillation: carvedilol for rate control, rivaroxaban for embolic stroke risk reduction; outpatient cardiology follow-up  - HTN: was not taking medications at home, reportedly due to cost; carvedilol, hydralazine, nifedipine, spironolactone, losartan; appreciate IM management  - Hypothyroidism: levothyroxine  - Therapies: PT, OT and SLP  - Right knee osteoarthritis: lidocaine patch daily, Voltaren topical QID PRN, tylenol PRN  - Bowel: Monitor closely for neurogenic bowel. Will follow BMs and adjust bowel regimen to target soft, formed stools q1-2 days, per pt's regular schedule.  - Bladder: Monitor closely for neurogenic bladder. Will follow UOP and encourage spontaneous voids. If unable to void spontaneously, will monitor with PVR bladder scans and initiate ISC regimen.  - Skin: Monitor for breakdown, frequent turns, Kreg specialty bed for pressure offloading  - Sleep: Practice effective sleep hygiene (e.g., consistent night and morning routine, quiet, dark room at night, no electronic devices/screens in bed, avoid large meals/caffeine before bed)  - VTE prophylaxis: Rivaroxaban  - Disposition: Anticipate discharge home with home PT and OT services on 9/16/24; family training completed on 9/8/24; will plan to add SLP when progressed to outpatient services    Dk De Dios MD  Attending Physician  Physical Medicine and Rehabilitation  [facility]    Review of Systems:  A 10 point ROS was performed; negative except as noted above.       Current Facility-Administered Medications:     acetaminophen (TYLENOL) tablet 650 mg, 650 mg, Oral, Q6H PRN, WENCESLAO Patel, 650 mg at 09/08/24 0752    atorvastatin (LIPITOR) tablet 40 mg, 40 mg, Oral, QPM, WENCESLAO Patel, 40 mg at 09/09/24 1702    bisacodyl (DULCOLAX)  rectal suppository 10 mg, 10 mg, Rectal, Daily PRN, Dougie King MD    carvedilol (COREG) tablet 25 mg, 25 mg, Oral, BID With Meals, WENCESLAO Patel, 25 mg at 09/09/24 1701    Diclofenac Sodium (VOLTAREN) 1 % topical gel 2 g, 2 g, Topical, 4x Daily PRN, Dk De Dios MD    hydrALAZINE (APRESOLINE) tablet 75 mg, 75 mg, Oral, Q8H BARB, WENCESLAO Patel, 75 mg at 09/09/24 1433    levothyroxine tablet 25 mcg, 25 mcg, Oral, Early Morning, WENCESLAO Patel, 25 mcg at 09/09/24 0517    lidocaine (LIDODERM) 5 % patch 1 patch, 1 patch, Topical, Daily, Dk De Dios MD, 1 patch at 09/08/24 2101    losartan (COZAAR) tablet 100 mg, 100 mg, Oral, Daily, WENCESLAO Patel, 100 mg at 09/09/24 0820    multivitamin stress formula tablet 1 tablet, 1 tablet, Oral, Daily, WENCESLAO Patel, 1 tablet at 09/09/24 0811    NIFEdipine (PROCARDIA XL) 24 hr tablet 60 mg, 60 mg, Oral, Daily With Dinner, WENCESLAO Patel, 60 mg at 09/09/24 1702    pantoprazole (PROTONIX) EC tablet 40 mg, 40 mg, Oral, Early Morning, WENCESLAO Patel, 40 mg at 09/09/24 0517    rivaroxaban (XARELTO) tablet 20 mg, 20 mg, Oral, Daily With Dinner, WENCESLAO Patel, 20 mg at 09/09/24 1702    spironolactone (ALDACTONE) tablet 25 mg, 25 mg, Oral, Daily, WENCESLAO Patel, 25 mg at 09/09/24 0820    Physical Exam:  Temp:  [97.6 °F (36.4 °C)-98.2 °F (36.8 °C)] 98 °F (36.7 °C)  HR:  [66-81] 81  Resp:  [16-19] 19  BP: (125-140)/(58-70) 125/58  SpO2:  [93 %-96 %] 96 %    GEN: Patient seen resting comfortably in bedside recliner, NAD  HEENT: NCAT; right sided facial droop at rest, mucous membranes moist  CV: No extremity edema  PULM: Breathing comfortably on room air  ABD: Non-distended  SKIN: No obvious rashes or lesions on exposed skin  NEURO:  Awake and alert, answering questions appropriately to context; able to follow simple commands with clear consistency  Speech is  "fluent and organized, but mildly dysarthric  MMT (R/L): B 3/5; T 3/5; WE 1/5; FAbd 1/5; FF 2/5    Laboratory:  Labs reviewed, none new  Results from last 7 days   Lab Units 09/05/24  0604   HEMOGLOBIN g/dL 11.5   HEMATOCRIT % 36.0   WBC Thousand/uL 6.25           Invalid input(s): \"CA\"           Diagnostic Studies: Reviewed, no new imaging   XR shoulder 2+ vw right   Final Result by Zion Kuhn MD (09/03 0651)      No acute osseous abnormality.         Computerized Assisted Algorithm (CAA) may have been used to analyze all applicable images.         Workstation performed: [id number]           Total time spent:  35 minutes, with more than 50% spent counseling/coordinating care. Counseling includes discussion with patient re: progress in therapies, functional issues observed by therapy staff, and discussion with patient his/her current medical state/wellbeing. Coordination of patient's care was performed in conjunction with Internal Medicine service to monitor patient's labs, vitals, and management of their comorbidities.     "

## 2024-09-09 NOTE — PROGRESS NOTES
OT Treatment Note       09/09/24 1900   Pain Assessment   Pain Assessment Tool 0-10   Pain Score No Pain   Restrictions/Precautions   Precautions Fall Risk;Supervision on toilet/commode;Contact/isolation   Weight Bearing Restrictions No   ROM Restrictions No   Braces or Orthoses   (trialing sessions without AFO donned per PT- will continue to assess depending on fatigue throughout day; (+) sneakers, UE sling)   Lifestyle   Autonomy Pt agreeable to OT session   Sit to Stand   Type of Assistance Needed Incidental touching;Adaptive equipment   Physical Assistance Level No physical assistance   Comment CGA using HW   Sit to Stand CARE Score 4   Bed-Chair Transfer   Type of Assistance Needed Incidental touching;Adaptive equipment   Physical Assistance Level No physical assistance   Comment CGA using HW   Chair/Bed-to-Chair Transfer CARE Score 4   ROM- Right Upper Extremities   RUE ROM Comment pt edu re: self-ROM RUE to maintain muscle/joint extensibility using elevated mat for shoulder flexion, shoulder abduction, elbow flex/ext, wrist flexion/ext, supination/pronation, and digit flexion/extension, completing 10 reps each. pt able to return demo of each exercise with min vc's. edu re: stop any exercise and make therapist aware if it should cause pain, verbalized understanding   Cognition   Overall Cognitive Status WFL   Arousal/Participation Alert;Cooperative   Attention Within functional limits   Orientation Level Oriented X4   Memory Within functional limits   Following Commands Follows one step commands with increased time or repetition   Activity Tolerance   Activity Tolerance Patient tolerated treatment well   Assessment   Treatment Assessment Pt seen for brief 35 min OT session focusing on functional transfers and self-ROM RUE. Pt tolerated session well, see above for further details. Plan for OT to trial R kaylyn sling in next session, and continue working on functional transfers/mobility, RUE NMR, standing balance  and activity tolerance to maximize functional IND and safety prior to d/c.   Prognosis Good   Problem List Decreased strength;Decreased range of motion;Decreased endurance;Impaired balance;Decreased mobility;Decreased coordination;Impaired tone;Obesity   Barriers to Discharge Inaccessible home environment;Decreased caregiver support   Plan   Treatment/Interventions ADL retraining;Functional transfer training;Therapeutic exercise;Endurance training;Patient/family training;Equipment eval/education;Bed mobility;Gait training;Compensatory technique education   Progress Progressing toward goals   OT Therapy Minutes   OT Time In 1330   OT Time Out 1405   OT Total Time (minutes) 35   OT Mode of treatment - Individual (minutes) 35   OT Mode of treatment - Concurrent (minutes) 0   OT Mode of treatment - Group (minutes) 0   OT Mode of treatment - Co-treat (minutes) 0   OT Mode of Treatment - Total time(minutes) 35 minutes   OT Cumulative Minutes 1630   Therapy Time missed   Time missed? No

## 2024-09-09 NOTE — PROGRESS NOTES
09/09/24 0901   Pain Assessment   Pain Assessment Tool 0-10   Pain Score No Pain   Restrictions/Precautions   Precautions Fall Risk;Contact/isolation;Supervision on toilet/commode   Weight Bearing Restrictions No   ROM Restrictions No   Cognition   Overall Cognitive Status WFL   Arousal/Participation Alert;Cooperative   Attention Within functional limits   Orientation Level Oriented X4   Memory Within functional limits   Following Commands Follows one step commands with increased time or repetition   Roll Left and Right   Comment (S)  please reassess next session   Sit to Lying   Comment (S)  please reassess next session   Lying to Sitting on Side of Bed   Comment (S)  please reassess next session   Sit to Stand   Type of Assistance Needed Incidental touching;Adaptive equipment   Comment CGA with hemiwalker   Sit to Stand CARE Score 4   Bed-Chair Transfer   Type of Assistance Needed Incidental touching;Adaptive equipment   Comment CGA with hemiwalker   Chair/Bed-to-Chair Transfer CARE Score 4   Walk 10 Feet   Type of Assistance Needed Incidental touching;Adaptive equipment   Comment CGA/CS with hemiwalker, gait belt   Walk 10 Feet CARE Score 4   Walk 50 Feet with Two Turns   Type of Assistance Needed Incidental touching;Adaptive equipment   Comment CGA with hemiwalker, gait belt   Walk 50 Feet with Two Turns CARE Score 4   Walk 150 Feet   Type of Assistance Needed Physical assistance   Physical Assistance Level Total assistance   Comment CGA with gait belt/hemiwalker, WC follow for safety   Walk 150 Feet CARE Score 1   Walking 10 Feet on Uneven Surfaces   Comment (S)  please assess next session   Ambulation   Primary Mode of Locomotion Prior to Admission Walk   Distance Walked (feet) 150 ft  (x2)   Assist Device Ismael Walker   Gait Pattern Inconsistant Jasmyne;Slow Jasmyne;R hemiparesis;Improper weight shift;Decreased R stance;Step through   Limitations Noted In Balance;Coordination;Device  Management;Speed;Strength;Swing   Provided Assistance with: Direction;Balance   Walk Assist Level Contact Guard   Findings First bout of ambulation, completed with AFO on. Second bout of ambulation, trialed without AFO. Without AFO, pt was still able to clear RLE and ankle did not supinate. AFO may no longer be needed. PLAN TO TRIAL MOBILITY WITHOUT AFO DURING THERAPY SESSIONS, BUT PUT IT BACK ON FOR ALL TRANSFERS OUTSIDE OF THERAPY. If all goes well and pt can continue to clear RLE when fatigued, we can look at discontinuimng use of AFO.   Does the patient walk? 2. Yes   Curb or Single Stair   Style negotiated Single stair   Type of Assistance Needed Physical assistance   Physical Assistance Level Total assistance   Comment trialed new method with hemiwalker in front of patient and split between two steps. Required ModAx1 with CGA of second person for safety. Performed 4 trials.   1 Step (Curb) CARE Score 1   12 Steps   Reason if not Attempted Safety concerns   12 Steps CARE Score 88   Stairs   Findings (S)  Son sent measurements and pictures of the steps. Pt thought she had a railing for the back 5 steps but she DOES NOT. Emailed son back and requested depth measurement of the steps. Trialed various other methods, but ultimately best method would be for the hemiwalker to be placed on step with front legs short and back legs long. PLEASE SEE PICTURE IN PT GYM REGARDING SET UP. Inquired whether landlord could install handrail at daughter's home. Pt to email daughter to have her contact the landlord to see if rail installation is possible. Educated on the importance of handrail installiation for future use if unable to have installed by discharge.   Picking Up Object   Reason if not Attempted Safety concerns   Picking Up Object CARE Score 88   Assessment   Treatment Assessment Patient participated in 60 minute skilled physical therapy session focusing on gait training and stair management. This PT received  measurements and pictures from patient's son. Back step DO NOT have a handrail as previously thought. Stair are the biggest barrier to patient returning home. Stairs need to be priority for all sessions going forward. Trialed new method for patient to get into her home and she required Ax2. With continued practice anticipate she will be able to complete with less assistance. Tomorrow please trial new stair method on the  steps on the fourth floor.   Problem List Decreased strength;Decreased range of motion;Decreased endurance;Impaired balance;Decreased mobility;Obesity   Barriers to Discharge Decreased caregiver support;Inaccessible home environment   PT Barriers   Physical Impairment Decreased strength;Decreased range of motion;Decreased endurance;Impaired balance;Decreased mobility;Decreased coordination;Impaired sensation;Impaired tone   Functional Limitation Car transfers;Stair negotiation;Standing;Walking;Transfers   Plan   Treatment/Interventions Functional transfer training;LE strengthening/ROM;Therapeutic exercise;Endurance training;Patient/family training;Bed mobility;Gait training   Progress Progressing toward goals   PT Therapy Minutes   PT Time In 0900   PT Time Out 1000   PT Total Time (minutes) 60   PT Mode of treatment - Individual (minutes) 60   PT Mode of treatment - Concurrent (minutes) 0   PT Mode of treatment - Group (minutes) 0   PT Mode of treatment - Co-treat (minutes) 0   PT Mode of Treatment - Total time(minutes) 60 minutes   PT Cumulative Minutes 1811   Therapy Time missed   Time missed? No

## 2024-09-09 NOTE — PROGRESS NOTES
"   09/09/24 1100   Pain Assessment   Pain Assessment Tool 0-10   Pain Score No Pain   Restrictions/Precautions   Precautions Fall Risk;Contact/isolation;Supervision on toilet/commode   Comprehension   Comprehension (FIM) 5 - Understands basic directions and conversation   Expression   Expression (FIM) 5 - Needs help/cues only RARELY (< 10% of the time)   Social Interaction   Social Interaction (FIM) 6 - Interacts appropriately with others BUT requires extra  time   Problem Solving   Problem solving (FIM) 5 - Solves basic problems 90% of time   Memory   Memory (FIM) 4 - Recognizes/recalls/performs 75-89%   Speech/Language/Cognition Assessmetn   Treatment Assessment Pt was just finishing OT session where pt was agreeable to engage in ST session. Since current SLP hasn't seen pt since end of last week, reviewed daily events to where pt reported that FT went well, but did not report about needing any more f/u FT prior to discharge, which is still planned for ext Monday, 9/16. SLP did re-educate to pt about when discharge, home ST would no be recommended but when pt is able to transition to OP, then would continue to recommend services. Pt was appropriately asking SLP about \"books\" which contain deduction puzzles which have been targeted in more recent sessions. SLP did educate pt that will plan to look up more appropriate items and provide handouts as to where to purchase. Will f/u in next session given this information. Pt was aware that over the weekend, her attention was decreased when completing the logical solutions puzzles due to family being present and discussions being held where pt verbalized difficulty in attention to task. Ongoing education provided to pt how that \"divided\" attention can be effected given noise factors, other events occurring, fatigue, etc.     In addition to items reviewed as above, SLP did review current medication list but it was noted that pt did have decreased recall as to the reasons " "for why taking most medications. SLP did ask if pt recalls a prior \"cheat sheet\" list given, which pt did state that there is one in her room. Plan will be to provide pt w/ updated list to provide not only pt but pt's family about the medications, dosages, frequency and overall reason for taking. Since pt was on an increased amount of BP medications, SLP did inquire to pt about using or having a BP cuff at home. Pt did confirm that she does have one at home, to which this lead to ongoing Stroke Education and review of how HBP can continue to increase risk for stroke recurrence. Pt was aware of normal BP range, but pt did appropriately state that she wishes to have a list given BP ranges so she is aware of what is \"too high.\" SLP to provide the chart which is located in the Stroke Binder which are provided to pt's. Pt does want to keep the BP ranges attached to the cuff at home.     Lastly, SLP engaged pt in completing logical solutions puzzles, to where pt was provided a chart which was initially for 10 items to be placed pending clues provided. Of note, pt did not full read all the information at first to where SLP did have to provide cue to look at the paragraph. Once that occurred pt was able to complete task w/ 10/10 accuracy. Next deduction puzzle completed did increase the amount of items to place into puzzle. Total of 8 clues were provided w/ 12 target words to place into puzzle. Pt again benefiting from 1 verbal cue to where pt was 10/12 accurate, but increased to 12/12 upon review of 1 clue. At this time, will continue to benefit from ongoing skilled SLP services targeting functional independence of cognitive linguistic skills in hopes for decreasing burden of care over time.   SLP Therapy Minutes   SLP Time In 1100   SLP Time Out 1130   SLP Total Time (minutes) 30   SLP Mode of treatment - Individual (minutes) 30   SLP Mode of treatment - Concurrent (minutes) 0   SLP Mode of treatment - Group (minutes) 0 "   SLP Mode of treatment - Co-treat (minutes) 0   SLP Mode of Treatment - Total time(minutes) 30 minutes   SLP Cumulative Minutes 690   Therapy Time missed   Time missed? No

## 2024-09-09 NOTE — PLAN OF CARE
Problem: PAIN - ADULT  Goal: Verbalizes/displays adequate comfort level or baseline comfort level  Description: Interventions:  - Encourage patient to monitor pain and request assistance  - Assess pain using appropriate pain scale  - Administer analgesics based on type and severity of pain and evaluate response  - Implement non-pharmacological measures as appropriate and evaluate response  - Consider cultural and social influences on pain and pain management  - Notify physician/advanced practitioner if interventions unsuccessful or patient reports new pain  Outcome: Progressing     Problem: INFECTION - ADULT  Goal: Absence or prevention of progression during hospitalization  Description: INTERVENTIONS:  - Assess and monitor for signs and symptoms of infection  - Monitor lab/diagnostic results  - Monitor all insertion sites, i.e. indwelling lines, tubes, and drains  - Monitor endotracheal if appropriate and nasal secretions for changes in amount and color  - [place] appropriate cooling/warming therapies per order  - Administer medications as ordered  - Instruct and encourage patient and family to use good hand hygiene technique  - Identify and instruct in appropriate isolation precautions for identified infection/condition  Outcome: Progressing     Problem: SAFETY ADULT  Goal: Patient will remain free of falls  Description: INTERVENTIONS:  - Educate patient/family on patient safety including physical limitations  - Instruct patient to call for assistance with activity   - Consult OT/PT to assist with strengthening/mobility   - Keep Call bell within reach  - Keep bed low and locked with side rails adjusted as appropriate  - Keep care items and personal belongings within reach  - Initiate and maintain comfort rounds  - Make Fall Risk Sign visible to staff  - Offer Toileting every 2 Hours, in advance of need  - Initiate/Maintain bed/chair alarm  - Obtain necessary fall risk management equipment: alarms  - Apply  yellow socks and bracelet for high fall risk patients  - Consider moving patient to room near nurses station  Outcome: Progressing  Goal: Maintain or return to baseline ADL function  Description: INTERVENTIONS:  -  Assess patient's ability to carry out ADLs; assess patient's baseline for ADL function and identify physical deficits which impact ability to perform ADLs (bathing, care of mouth/teeth, toileting, grooming, dressing, etc.)  - Assess/evaluate cause of self-care deficits   - Assess range of motion  - Assess patient's mobility; develop plan if impaired  - Assess patient's need for assistive devices and provide as appropriate  - Encourage maximum independence but intervene and supervise when necessary  - Involve family in performance of ADLs  - Assess for home care needs following discharge   - Consider OT consult to assist with ADL evaluation and planning for discharge  - Provide patient education as appropriate  Outcome: Progressing  Goal: Maintains/Returns to pre admission functional level  Description: INTERVENTIONS:  - Perform AM-PAC 6 Click Basic Mobility/ Daily Activity assessment daily.  - Set and communicate daily mobility goal to care team and patient/family/caregiver.   - Collaborate with rehabilitation services on mobility goals if consulted  - Perform Range of Motion 3 times a day.  - Reposition patient every 2 hours.  - Dangle patient 3 times a day  - Stand patient 3 times a day  - Ambulate patient 3 times a day  - Out of bed to chair 3 times a day   - Out of bed for meals 3 times a day  - Out of bed for toileting  - Record patient progress and toleration of activity level   Outcome: Progressing     Problem: DISCHARGE PLANNING  Goal: Discharge to home or other facility with appropriate resources  Description: INTERVENTIONS:  - Identify barriers to discharge w/patient and caregiver  - Arrange for needed discharge resources and transportation as appropriate  - Identify discharge learning needs  (meds, wound care, etc.)  - Arrange for interpretive services to assist at discharge as needed  - Refer to Case Management Department for coordinating discharge planning if the patient needs post-hospital services based on physician/advanced practitioner order or complex needs related to functional status, cognitive ability, or social support system  Outcome: Progressing     Problem: Prexisting or High Potential for Compromised Skin Integrity  Goal: Skin integrity is maintained or improved  Description: INTERVENTIONS:  - Identify patients at risk for skin breakdown  - Assess and monitor skin integrity  - Assess and monitor nutrition and hydration status  - Monitor labs   - Assess for incontinence   - Turn and reposition patient  - Assist with mobility/ambulation  - Relieve pressure over bony prominences  - Avoid friction and shearing  - Provide appropriate hygiene as needed including keeping skin clean and dry  - Evaluate need for skin moisturizer/barrier cream  - Collaborate with interdisciplinary team   - Patient/family teaching  - Consider wound care consult   Outcome: Progressing

## 2024-09-09 NOTE — PROGRESS NOTES
"OT Treatment Note       09/09/24 1000   Pain Assessment   Pain Assessment Tool 0-10   Pain Score No Pain   Restrictions/Precautions   Precautions Fall Risk;Supervision on toilet/commode;Contact/isolation   Weight Bearing Restrictions No   ROM Restrictions No   Braces or Orthoses   (per PT - trialing sessions without AFO, but will continue to assess with fatigue throughout the day. (+) sneakers and UE sling for transfers)   Lifestyle   Autonomy \"I need to do more for my arm\"   Sit to Stand   Type of Assistance Needed Incidental touching;Adaptive equipment   Physical Assistance Level No physical assistance   Comment CGA using HW   Sit to Stand CARE Score 4   Bed-Chair Transfer   Type of Assistance Needed Incidental touching;Adaptive equipment   Physical Assistance Level No physical assistance   Comment CGA using HW   Chair/Bed-to-Chair Transfer CARE Score 4   Neuromuscular Education   Comments Focused on RUE NMR this session. NMES used at [id number] (Russian estim, 45mA, ramp 2sec - however unable to achieve full motion 2* pain) during lat forearm pushups in prep for bed mobility. NMES also applied to R wrist extensors x25min ([place] estim, 59mA, ramp 2 sec, 5/10 and 10/10 on/off depending on activity) while OT assisting with funcitonal reaching/gross grasp activities: initially reaching to target encouraging shoulder ER with OT providing support at elbow and wrist. Then pt picking up/transfering objects using gross grasp, and also using [place] taping technique with tape applied between DIP/PIP and OT providing assist to maintain grasp on cup as pt attempts to repeatedly bring cup back and forth between mouth and tabletop. OT also using [place] taping tehcnique to assist pt in completing 3jaw heather grasp to /place objects into cup. Without NMES or [place] taping technique, OT edu pt re: using RUE as stabilizer during functional tasks i.e. during grooming and eating tasks and having pt trial using hand as a stabilizer " when opening kitchen containers (pb container, juice container), requires min support to maintain  on containers. Also edu pt re: mental practice technique for RUE, and recommended pt pick one or two simple tasks she aims to complete with RUE in future and complete mental practice of those tasks during downtime. Pt verbalized understanding   Cognition   Overall Cognitive Status WFL   Arousal/Participation Alert;Cooperative   Attention Within functional limits   Orientation Level Oriented X4   Memory Within functional limits   Following Commands Follows one step commands with increased time or repetition   Activity Tolerance   Activity Tolerance Patient tolerated treatment well   Assessment   Treatment Assessment Pt seen for 60 min OT session focusing on functional SPT and NMR RUE. Pt tolerated session well, very motivated to maximize function of RUE. OT to continue POC.   Prognosis Good   Problem List Decreased strength;Decreased range of motion;Decreased endurance;Impaired balance;Decreased mobility;Decreased coordination;Impaired tone;Obesity   Barriers to Discharge Inaccessible home environment;Decreased caregiver support   Plan   Treatment/Interventions ADL retraining;Functional transfer training;Therapeutic exercise;Endurance training;Patient/family training;Equipment eval/education;Bed mobility;Gait training;Compensatory technique education   Progress Progressing toward goals   OT Therapy Minutes   OT Time In 1000   OT Time Out 1100   OT Total Time (minutes) 60   OT Mode of treatment - Individual (minutes) 60   OT Mode of treatment - Concurrent (minutes) 0   OT Mode of treatment - Group (minutes) 0   OT Mode of treatment - Co-treat (minutes) 0   OT Mode of Treatment - Total time(minutes) 60 minutes   OT Cumulative Minutes 1595   Therapy Time missed   Time missed? No

## 2024-09-10 PROCEDURE — 97129 THER IVNTJ 1ST 15 MIN: CPT

## 2024-09-10 PROCEDURE — 94660 CPAP INITIATION&MGMT: CPT

## 2024-09-10 PROCEDURE — 99232 SBSQ HOSP IP/OBS MODERATE 35: CPT | Performed by: PHYSICAL MEDICINE & REHABILITATION

## 2024-09-10 PROCEDURE — 97530 THERAPEUTIC ACTIVITIES: CPT

## 2024-09-10 PROCEDURE — 99231 SBSQ HOSP IP/OBS SF/LOW 25: CPT | Performed by: PHYSICIAN ASSISTANT

## 2024-09-10 PROCEDURE — 97112 NEUROMUSCULAR REEDUCATION: CPT

## 2024-09-10 PROCEDURE — 97535 SELF CARE MNGMENT TRAINING: CPT

## 2024-09-10 PROCEDURE — 97116 GAIT TRAINING THERAPY: CPT

## 2024-09-10 PROCEDURE — 97130 THER IVNTJ EA ADDL 15 MIN: CPT

## 2024-09-10 PROCEDURE — 94760 N-INVAS EAR/PLS OXIMETRY 1: CPT

## 2024-09-10 RX ORDER — CARVEDILOL 25 MG/1
25 TABLET ORAL 2 TIMES DAILY WITH MEALS
Status: DISCONTINUED | OUTPATIENT
Start: 2024-09-10 | End: 2024-09-16 | Stop reason: HOSPADM

## 2024-09-10 RX ORDER — NIFEDIPINE 30 MG/1
60 TABLET, EXTENDED RELEASE ORAL
Status: DISCONTINUED | OUTPATIENT
Start: 2024-09-10 | End: 2024-09-16 | Stop reason: HOSPADM

## 2024-09-10 RX ADMIN — HYDRALAZINE HYDROCHLORIDE 75 MG: 50 TABLET ORAL at 22:19

## 2024-09-10 RX ADMIN — CARVEDILOL 25 MG: 25 TABLET, FILM COATED ORAL at 07:58

## 2024-09-10 RX ADMIN — B-COMPLEX W/ C & FOLIC ACID TAB 1 TABLET: TAB at 08:01

## 2024-09-10 RX ADMIN — ATORVASTATIN CALCIUM 40 MG: 40 TABLET, FILM COATED ORAL at 17:00

## 2024-09-10 RX ADMIN — LEVOTHYROXINE SODIUM 25 MCG: 25 TABLET ORAL at 05:13

## 2024-09-10 RX ADMIN — LOSARTAN POTASSIUM 100 MG: 50 TABLET, FILM COATED ORAL at 08:00

## 2024-09-10 RX ADMIN — LIDOCAINE 1 PATCH: 700 PATCH TOPICAL at 22:20

## 2024-09-10 RX ADMIN — HYDRALAZINE HYDROCHLORIDE 75 MG: 50 TABLET ORAL at 14:27

## 2024-09-10 RX ADMIN — SPIRONOLACTONE 25 MG: 25 TABLET, FILM COATED ORAL at 08:00

## 2024-09-10 RX ADMIN — NIFEDIPINE 60 MG: 30 TABLET, FILM COATED, EXTENDED RELEASE ORAL at 16:58

## 2024-09-10 RX ADMIN — HYDRALAZINE HYDROCHLORIDE 75 MG: 50 TABLET ORAL at 05:13

## 2024-09-10 RX ADMIN — PANTOPRAZOLE SODIUM 40 MG: 20 TABLET, DELAYED RELEASE ORAL at 05:15

## 2024-09-10 RX ADMIN — RIVAROXABAN 20 MG: 20 TABLET, FILM COATED ORAL at 16:58

## 2024-09-10 RX ADMIN — CARVEDILOL 25 MG: 25 TABLET, FILM COATED ORAL at 16:58

## 2024-09-10 NOTE — PROGRESS NOTES
Progress Note - Hospitalist   Name: Maye Lilly 64 y.o. female I MRN: [id number]  Unit/Bed#: -01 I Date of Admission: 8/20/2024   Date of Service: 9/10/2024 I Hospital Day: 21    Assessment & Plan  Acute CVA (cerebrovascular accident) (HCC)  Presented with right sided weakness, slurred speech, headache and vomiting  MRI = left pontine infarct  Neuro felt CVA was from atrial fibrillation in setting of non-compliance with anticoagulation  Currently now on Xarelto 20mg qd, Lipitor 40mg qd  Followup with Neuro as OP.  [facility]  Paroxysmal atrial fibrillation (HCC)  Was seeing cardiologist in [place] and taking Xarelto but she has not seen him in 2 yrs and stopped all her meds >1 yr ago  EKG in the hospital showed NSR  Needs to followup as OP with Cardiology = Dr Patt Zhang Coreg  Was not placed back on Flecainide  Now on Xarelto, which will be supplied free of charge from [facility]  HTN (hypertension)  Patient was not on any medications prior to admission.  Since admission she has been started on Coreg 25mg BID, Hydralazine 75 mg q8hrs, Nifedipine 60mg daily, Aldactone 25mg daily and Losartan 100mg qd  Followup with IM as OP.  On DC summary was listed to see Megan DORSEY from that group  DIANELYS (obstructive sleep apnea)  In past was on CPAP.  Strongly encourage resuming CPAP usage, especially in light of new stroke.  Hypothyroidism  Continue Levothyroxine 25 mcg qd  This was the dose that she was supposed to be taking at home and they placed her back on it in the hospital on 8/12/24 but there was no TSH drawn.  Had an abnormal TSH of 5.1 on 4/23/20 presumably not on Levothyroxine at that time since thereafter on 2/15/22 was normal  TSH 8/22/24 normal at 3.95  Will continue current dose and have her get a TSH in 6 weeks = @9/23  GERD (gastroesophageal reflux disease)  Continue Protonix  Meningioma (HCC)  Incidental finding   OP followup  Right  shoulder pain  New complaint of right shoulder pain on 9/1- much improved at this point  Patient feels this is due to her residual right sided weakness from her recent stroke  Being treated by PMR        The above assessment and plan was reviewed and updated as determined by my evaluation of the patient on 9/10/2024.    History of Present Illness   Patient seen and examined. Patients overnight issues or events were reviewed with nursing staff. New or overnight issues include the following:   Patient seen sitting comfortably in chair. Denies shoulder pain. She has better strength in her right hand. No CP/SOB. Appetite good    Review of Systems    Objective     Vitals:    09/09/24 2101 09/10/24 0603 09/10/24 1427 09/10/24 1429   BP: 130/60 140/65 141/63 141/63   BP Location: Right arm Left arm  Left arm   Pulse: 71 71  67   Resp: 16 18  18   Temp: 98.1 °F (36.7 °C) 98.2 °F (36.8 °C)  98.2 °F (36.8 °C)   TempSrc: Oral Oral  Oral   SpO2: 94% 95%  94%   Weight:       Height:         Invasive Devices       None                 Physical Exam  Vitals reviewed.   Constitutional:       General: She is not in acute distress.     Appearance: She is not ill-appearing or diaphoretic.   HENT:      Head: Normocephalic and atraumatic.      Nose: Nose normal.      Mouth/Throat:      Pharynx: Oropharynx is clear.   Cardiovascular:      Rate and Rhythm: Normal rate.   Pulmonary:      Effort: Pulmonary effort is normal. No respiratory distress.   Abdominal:      General: There is no distension.      Palpations: Abdomen is soft.      Tenderness: There is no abdominal tenderness.   Musculoskeletal:         General: No deformity or signs of injury.   Skin:     General: Skin is warm and dry.   Neurological:      Mental Status: She is alert and oriented to person, place, and time.      Comments: Right UE weakness secondary to CVA          The above physical exam was reviewed and updated as determined by my evaluation of the patient on  "9/10/2024.      Lab Results: I have reviewed the following results:   Results from last 7 days   Lab Units 09/05/24  0604   WBC Thousand/uL 6.25   HEMOGLOBIN g/dL 11.5   HEMATOCRIT % 36.0   PLATELETS Thousands/uL 246           Invalid input(s): \"LABGLOM\", \"CMP\"                    Review of Scheduled Meds: Medications  reviewed and reconciled as needed  Current Facility-Administered Medications   Medication Dose Route Frequency Provider Last Rate    acetaminophen  650 mg Oral Q6H PRN WENCESLAO Patel      atorvastatin  40 mg Oral QPM WENCESLAO Patel      bisacodyl  10 mg Rectal Daily PRN Dougie King MD      carvedilol  25 mg Oral BID With Meals Leeann Ordoñez PA-C      Diclofenac Sodium  2 g Topical 4x Daily PRN Dk De Dios MD      hydrALAZINE  75 mg Oral Q8H BARB Leeann Ordoñez PA-C      levothyroxine  25 mcg Oral Early Morning WENCESLAO Patel      lidocaine  1 patch Topical Daily Dk De Dios MD      losartan  100 mg Oral Daily WENCESLAO Patel      multivitamin stress formula  1 tablet Oral Daily WENCESLAO Patel      NIFEdipine  60 mg Oral Daily With Dinner Leeann Ordoñez PA-C      pantoprazole  40 mg Oral Early Morning WENCESLAO Patel      rivaroxaban  20 mg Oral Daily With Dinner WENCESLAO Patel      spironolactone  25 mg Oral Daily WENCESLAO Patel         VTE Pharmacologic Prophylaxis: xarelto  Code Status: Level 1 - Full Code  Current Length of Stay: 21 day(s)      ** Please Note:  voice to text software may have been used in the creation of this document. Although proof errors in transcription or interpretation are a potential of such software**  " 25-Jun-2024 21:46

## 2024-09-10 NOTE — PROGRESS NOTES
09/10/24 1330   Pain Assessment   Pain Assessment Tool 0-10   Pain Score No Pain   Restrictions/Precautions   Precautions Fall Risk;Contact/isolation;Supervision on toilet/commode   Comprehension   Comprehension (FIM) 5 - Understands basic directions and conversation   Expression   Expression (FIM) 5 - Needs help/cues only RARELY (< 10% of the time)   Social Interaction   Social Interaction (FIM) 6 - Interacts appropriately with others BUT requires extra  time   Problem Solving   Problem solving (FIM) 4 - Solves basic problems 75-89% of time   Memory   Memory (FIM) 4 - Recognizes/recalls/performs 75-89%   Speech/Language/Cognition Assessmetn   Treatment Assessment Pt was finishing OT session today where again, session was completed in therapy gym. SLP did set pt up w/ activity which she has completed in prior sessions, to allow SLP to retrieve information for pt which was reviewed in yesterday's session. SLP providing pt w/ higher level cognitive task given deduction puzzles to where pt was to complete cancellation given the clues provided. Each tasks presented was a grid containing 4 items and then a total of 12 additional items for pt to eliminate into 4 possible matches given the 4 static items. The total number of clues provided for each puzzle ranged from 3 up to 6 clues. Of note each clue did at times contain more than 1 piece of information to deduce into determining the matches. Pt was not able to complete any of the deduction puzzles on own. However, pt was able to determine at least 2 items per each puzzle by self, but when reviewing w/ SLP the additional clues which were more complex/challenging, even w/ probing clues from SLP, pt was having difficulty in comprehending the information requiring moderate verbal prompts to determine the remaining matches per task. Pt did verbalize increased difficulty w/ these cancellation deduction puzzles today, but question due to fatigue as ST session was last for  the day.    Otherwise, SLP reviewed the updated cheat sheet medication list which was verbally completed in yesterday's session. Additionally, as per pt's request, SLP providing pt w/ the blood pressure chart which is provided in the patient resource for Stroke Education. SLP and pt reviewing the guideline which are considered for normal BP, elevated BP's, HBP stage 1 vs HBP stage 2 as well as then hypertensive emergency. Pt is to attach this chart to own BP cuff to reference when taking BP's at home. At this time, pt will continue to benefit from ongoing skilled SLP services targeting functional independence of cognitive linguistic skills in hopes for decreasing burden of care over time.   SLP Therapy Minutes   SLP Time In 1330   SLP Time Out 1400   SLP Total Time (minutes) 30   SLP Mode of treatment - Individual (minutes) 30   SLP Mode of treatment - Concurrent (minutes) 0   SLP Mode of treatment - Group (minutes) 0   SLP Mode of treatment - Co-treat (minutes) 0   SLP Mode of Treatment - Total time(minutes) 30 minutes   SLP Cumulative Minutes 720   Therapy Time missed   Time missed? No

## 2024-09-10 NOTE — PROGRESS NOTES
"   09/10/24 1230   Pain Assessment   Pain Assessment Tool 0-10   Pain Score No Pain   Restrictions/Precautions   Precautions Fall Risk;Contact/isolation;Supervision on toilet/commode   Weight Bearing Restrictions No   ROM Restrictions No   Braces or Orthoses   (did not use AFO; please use kaylyn arm sling during therapies and mobilities)   Lifestyle   Autonomy \"i like this one, it's supportive and I can still use my arm and hand\" referring to kaylyn arm sling   Sit to Stand   Type of Assistance Needed Incidental touching   Physical Assistance Level No physical assistance   Comment CG/CS with HW and gait belt donned   Sit to Stand CARE Score 4   Bed-Chair Transfer   Type of Assistance Needed Incidental touching   Physical Assistance Level No physical assistance   Comment CG/CS with HW   Chair/Bed-to-Chair Transfer CARE Score 4   Toileting Hygiene   Type of Assistance Needed Physical assistance   Physical Assistance Level 51%-75%   Comment pt able to initiate CM down on L hip, requires assist for R hip. continues to require assist for rear/bladder hygiene. initiates CM up on L side, requires assist for on R side   Toileting Hygiene CARE Score 2   Toilet Transfer   Type of Assistance Needed Incidental touching   Physical Assistance Level No physical assistance   Comment (S)  CG/CS with HW to WIDE DA BSC over toilet to simulate possible home set up; *will need to order WIDE DA BSC for home use*   Toilet Transfer CARE Score 4   Neuromuscular Education   Functional Movement Patterns while seated at table top and with use of MAS as well as e-stim to wrist flexors, pt engages in func reach task of grasping bean bags from table top then moving them off table focusing on scap retraction/protraction. pt completes for approx 10 min then transitions to use of MAS only (no estim) with focus on tapping top of cone in order to increase shoulder flexion and shoulder flexion. pt demo's ability to perform shoulder flexion with active " assist from MAS but also demo's ability to perform shoulder extension against slight resistance.   Cognition   Overall Cognitive Status WFL   Arousal/Participation Alert;Cooperative   Attention Within functional limits   Orientation Level Oriented X4   Memory Within functional limits   Following Commands Follows one step commands with increased time or repetition   Activity Tolerance   Activity Tolerance Patient tolerated treatment well   Assessment   Treatment Assessment pt engages in 60 minute skilled OT Session focusing on ongoing assessment of DME needs, toileting on WIDE DA BSC, RUE NMR and fitting for kaylyn arm sling. see above for full func details. discussed home set up via the pictures pt's son provided. it was determined that pt WILL NOT be showering at time of d/c as bathroom space is too small for tub transfer bench and so pt will sponge bathe and wash hair via sink in stance or over tub ledge with SUPERVISION. also discussed toileting at home as it is uncertain whether or not the BSC will fit within bathroom space. pt still wants a commode for night time and so trialed WIDE DA BSC with good outcome and will order prior to d/c. discussed with pt regarding home health OT assessing bathroom space to see if commode will fit in bathroom, if pt herself will fit in bathroom with use of HW or if pt will need to use BSC as freestanding commode. pt in agreement with all the above and to have HHOT assess for safety. fitted pt with kaylyn arm sling with good outcome and provided pt with [employer] handout to self purchase. recommend continued skilled care to focus on ADL retraining with func reach to feet/rear for increased IND with self care tasks, func transfers with HW, RUE NMR including HEP, and any remaining stroke education, in order to decrease burden of care at d/c.   Prognosis Good   Problem List Decreased strength;Decreased range of motion;Decreased endurance;Impaired balance;Decreased mobility;Decreased  coordination;Impaired tone;Obesity   Plan   Treatment/Interventions ADL retraining;Functional transfer training;Therapeutic exercise;Endurance training;Cognitive reorientation;Patient/family training;Equipment eval/education;Compensatory technique education   OT Therapy Minutes   OT Time In 1230   OT Time Out 1330   OT Total Time (minutes) 60   OT Mode of treatment - Individual (minutes) 60   OT Mode of treatment - Concurrent (minutes) 0   OT Mode of treatment - Group (minutes) 0   OT Mode of treatment - Co-treat (minutes) 0   OT Mode of Treatment - Total time(minutes) 60 minutes   OT Cumulative Minutes 1690   Therapy Time missed   Time missed? No

## 2024-09-10 NOTE — PHYSICAL THERAPY NOTE
Spoke with patient's son regarding stairs. Son reports that there is another alternate option that he thought of for the stairs. Pt could use the neighbors steps which has a handrail on the R side and per son shorter steps. Son to take pictures and measurements and send over to PT tonight. Son will be coming in for family training for stair management on Friday from 8:30 - 9:30am. Plan to follow up with him regarding another family training session for Saturday or Sunday to practice the stairs and do a car transfer with a different car. Also emailed son handouts for gait belt, hemiwalker, and intermediate step.    Followed up with son re subacute rehabilitation as pt felt her son wanted her to go there. Son reported to therapist he no longer felt she needed to go to a subacute facility.

## 2024-09-10 NOTE — ASSESSMENT & PLAN NOTE
Presented with right sided weakness, slurred speech, headache and vomiting  MRI = left pontine infarct  Neuro felt CVA was from atrial fibrillation in setting of non-compliance with anticoagulation  Currently now on Xarelto 20mg qd, Lipitor 40mg qd  Followup with Neuro as OP.  [facility]

## 2024-09-10 NOTE — PROGRESS NOTES
09/10/24 0950   Roll Left and Right   Comment (S)  please reassess next session   Sit to Lying   Comment (S)  please reassess next session   Lying to Sitting on Side of Bed   Comment (S)  please reassess next session   Sit to Stand   Type of Assistance Needed Incidental touching   Comment CGA/CS with hemiwalker   Sit to Stand CARE Score 4   Bed-Chair Transfer   Type of Assistance Needed Incidental touching   Comment CGA/CS with hemiwalker   Chair/Bed-to-Chair Transfer CARE Score 4   Curb or Single Stair   Style negotiated Single stair   Type of Assistance Needed Physical assistance   Physical Assistance Level Total assistance   Comment ModA x2 with bilateral UE support via R handrail, ascend forward with LLE, descend backwards with RLE.   1 Step (Curb) CARE Score 1   4 Steps   Type of Assistance Needed Physical assistance   Physical Assistance Level Total assistance   Comment ModA x2 with bilateral UE support via R handrail, ascend forward with LLE, descend backwards with RLE. Performed 2 trials x4 steps, as she fatigued slight R knee buckling but pt was able to self corret, PT was blocking knee just incase.   4 Steps CARE Score 1   12 Steps   Reason if not Attempted Safety concerns   12 Steps CARE Score 88   Stairs   Type Stairs   # of Steps 4  (x2, 1x3)   Findings Took patient down to the fourth floor stairwell. Discussed and trialed various methods (use of shower chair, use of hemiwalker with different positions) but ultimately best choice is for installation of R hand rail in the back hallway.   Therapeutic Interventions   Neuromuscular Re-Education 4 Inch Step Ups with RLE for NMR/strengthening x5 with L handrail, HIGT with 3#AW and L HHA 2x130 ft   Assessment   Treatment Assessment Patient patiented in 100 minute skilled physical therapy session with primary focus on stair navigation and trialing various methods. Pt will need either a ramp or a handrail installed to enter/exit her home as the use of the  hemiwalker is no longer an option as it did not transition well from  steps to stairwell. Patient no longer needs to use R AFO as she is able to properly clear RLE without it. Plan to follow up with son to schedule additional family training. Tomorrow please assess bed mobility, WC mobility and focus on stair navigation with technique described above. Pt would also like to trial ascending with L handrail.   Problem List Decreased strength;Decreased range of motion;Decreased endurance;Impaired balance;Decreased mobility;Decreased coordination;Impaired tone;Obesity   Barriers to Discharge Inaccessible home environment;Decreased caregiver support   PT Barriers   Physical Impairment Decreased strength;Decreased range of motion;Decreased endurance;Impaired balance;Decreased mobility;Decreased coordination;Impaired sensation;Impaired tone   Functional Limitation Car transfers;Stair negotiation;Transfers;Walking   Plan   Treatment/Interventions Functional transfer training;LE strengthening/ROM;Therapeutic exercise;Endurance training;Patient/family training;Equipment eval/education;Bed mobility;Gait training   Progress Progressing toward goals   Discharge Recommendation   Rehab Resource Intensity Level, PT   (HH PT initially then transitioing to OP PT)   Equipment Recommended   (TBD pending progress)   PT Therapy Minutes   PT Time In 0950   PT Time Out 1130   PT Total Time (minutes) 100   PT Mode of treatment - Individual (minutes) 100   PT Mode of treatment - Concurrent (minutes) 0   PT Mode of treatment - Group (minutes) 0   PT Mode of treatment - Co-treat (minutes) 0   PT Mode of Treatment - Total time(minutes) 100 minutes   PT Cumulative Minutes 1911     Mirta Mccord, PT, DPT

## 2024-09-10 NOTE — PLAN OF CARE
Problem: PAIN - ADULT  Goal: Verbalizes/displays adequate comfort level or baseline comfort level  Description: Interventions:  - Encourage patient to monitor pain and request assistance  - Assess pain using appropriate pain scale  - Administer analgesics based on type and severity of pain and evaluate response  - Implement non-pharmacological measures as appropriate and evaluate response  - Consider cultural and social influences on pain and pain management  - Notify physician/advanced practitioner if interventions unsuccessful or patient reports new pain  Outcome: Progressing     Problem: INFECTION - ADULT  Goal: Absence or prevention of progression during hospitalization  Description: INTERVENTIONS:  - Assess and monitor for signs and symptoms of infection  - Monitor lab/diagnostic results  - Monitor all insertion sites, i.e. indwelling lines, tubes, and drains  - Monitor endotracheal if appropriate and nasal secretions for changes in amount and color  - [place] appropriate cooling/warming therapies per order  - Administer medications as ordered  - Instruct and encourage patient and family to use good hand hygiene technique  - Identify and instruct in appropriate isolation precautions for identified infection/condition  Outcome: Progressing     Problem: SAFETY ADULT  Goal: Patient will remain free of falls  Description: INTERVENTIONS:  - Educate patient/family on patient safety including physical limitations  - Instruct patient to call for assistance with activity   - Consult OT/PT to assist with strengthening/mobility   - Keep Call bell within reach  - Keep bed low and locked with side rails adjusted as appropriate  - Keep care items and personal belongings within reach  - Initiate and maintain comfort rounds  - Make Fall Risk Sign visible to staff  - Offer Toileting every 2 Hours, in advance of need  - Initiate/Maintain bed/chair alarm  - Obtain necessary fall risk management equipment: alarms  - Apply  yellow socks and bracelet for high fall risk patients  - Consider moving patient to room near nurses station  Outcome: Progressing  Goal: Maintain or return to baseline ADL function  Description: INTERVENTIONS:  -  Assess patient's ability to carry out ADLs; assess patient's baseline for ADL function and identify physical deficits which impact ability to perform ADLs (bathing, care of mouth/teeth, toileting, grooming, dressing, etc.)  - Assess/evaluate cause of self-care deficits   - Assess range of motion  - Assess patient's mobility; develop plan if impaired  - Assess patient's need for assistive devices and provide as appropriate  - Encourage maximum independence but intervene and supervise when necessary  - Involve family in performance of ADLs  - Assess for home care needs following discharge   - Consider OT consult to assist with ADL evaluation and planning for discharge  - Provide patient education as appropriate  Outcome: Progressing  Goal: Maintains/Returns to pre admission functional level  Description: INTERVENTIONS:  - Perform AM-PAC 6 Click Basic Mobility/ Daily Activity assessment daily.  - Set and communicate daily mobility goal to care team and patient/family/caregiver.   - Collaborate with rehabilitation services on mobility goals if consulted  - Perform Range of Motion 3 times a day.  - Reposition patient every 2 hours.  - Dangle patient 3 times a day  - Stand patient 3 times a day  - Ambulate patient 3 times a day  - Out of bed to chair 3 times a day   - Out of bed for meals 3 times a day  - Out of bed for toileting  - Record patient progress and toleration of activity level   Outcome: Progressing     Problem: DISCHARGE PLANNING  Goal: Discharge to home or other facility with appropriate resources  Description: INTERVENTIONS:  - Identify barriers to discharge w/patient and caregiver  - Arrange for needed discharge resources and transportation as appropriate  - Identify discharge learning needs  (meds, wound care, etc.)  - Arrange for interpretive services to assist at discharge as needed  - Refer to Case Management Department for coordinating discharge planning if the patient needs post-hospital services based on physician/advanced practitioner order or complex needs related to functional status, cognitive ability, or social support system  Outcome: Progressing     Problem: Prexisting or High Potential for Compromised Skin Integrity  Goal: Skin integrity is maintained or improved  Description: INTERVENTIONS:  - Identify patients at risk for skin breakdown  - Assess and monitor skin integrity  - Assess and monitor nutrition and hydration status  - Monitor labs   - Assess for incontinence   - Turn and reposition patient  - Assist with mobility/ambulation  - Relieve pressure over bony prominences  - Avoid friction and shearing  - Provide appropriate hygiene as needed including keeping skin clean and dry  - Evaluate need for skin moisturizer/barrier cream  - Collaborate with interdisciplinary team   - Patient/family teaching  - Consider wound care consult   Outcome: Progressing

## 2024-09-10 NOTE — PROGRESS NOTES
Physical Medicine and Rehabilitation Progress Note  Maye Lilly 64 y.o. female MRN: [id number]  Unit/Bed#: -01 Encounter: [id number]    Etiology/Reason for Admission: Impairment of mobility, safety, Activities of Daily Living (ADLs), and cognitive/communication skills due to Stroke:  01.2  Right Body Involvement (Left Brain)    Interval History: Seen face-to-face at bedside. No acute events overnight. No current concerns. Discussed discharge planning with patient regarding home setup and family training with her son, Kiko. Right shoulder pain is currently well controlled. Vital signs reviewed, stable and WNL. Voiding spontaneously, continent. Last BM 9/10/24, continent.     Assessment/Plan -     Functional Update:  Physical Therapy Occupational Therapy Speech Therapy   Weight Bearing Status: Full Weight Bearing  Transfers: Minimal Assistance, Moderate Assistance  Bed Mobility: Moderate Assistance, Minimal Assistance  Amulation Distance (ft): 60 feet  Ambulation: Moderate Assistance  Assistive Device for Ambulation: Ismael Walker  Wheelchair Mobility Distance: 150 ft  Wheelchair Mobility: Minimal Assistance, Incidental Touching  Number of Stairs: 4  Assistive Device for Stairs: [place]hft Hand Rail  Stair Assistance: Maximum Assistance  Discharge Recommendations: Home with:  DC Home with:: 24 Hour Assisteance, Family Support, 24 Hour Supervision, First Floor Setup, Home Physical Therapy (pending outcome vs SNF)   Eating: Supervision (set up)  Grooming: Supervision (set up)  Bathing: Moderate Assistance  Bathing: Moderate Assistance  Upper Body Dressing: Moderate Assistance  Lower Body Dressing: Maximum Assistance  Toileting: Moderate Assistance  Tub/Shower Transfer: Total Assistance, Assist of 2  Toilet Transfer: Incidental Touching  Cognition: Within Defined Limits  Cognition: Decreased Memory  Orientation: Person, Place, Time, Situation   Mode of Communication: Verbal  Speech/Language:  Dysarthia  Cognition: Exceptions to WNL  Cognition: Decreased Memory, Decreased Executive Functions, Decreased Attention, Decreased Comprehension  Orientation: Person, Place, Time, Situation  Discharge Recommendations: Home with:  DC Home with:: Family Support, 24 Hour Supervision ( services pending overall progress)     Ms. Maye Lilly is a 65 yo woman with a past medical history notable for HTN and atrial fibrillation who presented to the [facility] on 8/10/24 with acute dysarthria, facial droop and right hemiparesis. A stroke alert was activated, CT head wo contrast was negative for acute findings, CTA of the head/neck showed no evidence of large vessel occlusion and patent cervical carotid and vertebral arteries. MRI of the brain revealed an acute left pontine infarct. Course complicated by severe hypertension requiring multiple agents to obtain control.     - Acute left pontine ischemic infarct:   Clinically presented on 8/10/24 with right hemiparesis, dysarthria and facial droop  Stroke etiology: HTN, small vessel disease  MR brain on 8/11/24 revealed a small area of acute/subacute ischemia within the central luz to the left of midline without acute hemorrhage  Did not receive tPA/TNKase, did not undergo thrombectomy  DAPT regimen: None  Atorvastatin 40 mg qHs for secondary prevention  Monitor for neurogenic bowel and bladder  Promote regular sleep/wake cycles and proper sleep hygiene  Monitor closely for right hemiparetic shoulder pain and post-stroke pain  Neutral resting wrist splint for the and RLE multipodus boot to be worn at night and while in bed during the day to prevent developing spasticity and contracture  Maintain adequate nutrition, nutrition consult  Avoid sedating and anticholinergic medications as possible  Maintain normotension, s/p permissive hypertension  Outpatient neurovascular neurology follow-up  Outpatient PM&R follow-up for ongoing rehabilitation needs  - Right hemiparetic shoulder  pain: in the setting of right sided hemiparesis from stroke, examination consistent with right shoulder impingement, no concerns for adhesive capsulitis currently; taping, PT  - C. Difficile infection: OSH-acquired; vancomycin PO completed 8/26/24  - Paroxysmal atrial fibrillation: carvedilol for rate control, rivaroxaban for embolic stroke risk reduction; outpatient cardiology follow-up  - HTN: was not taking medications at home, reportedly due to cost; carvedilol, hydralazine, nifedipine, spironolactone, losartan; appreciate IM management  - Hypothyroidism: levothyroxine  - Therapies: PT, OT and SLP  - Right knee osteoarthritis: lidocaine patch daily, Voltaren topical QID PRN, tylenol PRN  - Bowel: Monitor closely for neurogenic bowel. Will follow BMs and adjust bowel regimen to target soft, formed stools q1-2 days, per pt's regular schedule.  - Bladder: Monitor closely for neurogenic bladder. Will follow UOP and encourage spontaneous voids. If unable to void spontaneously, will monitor with PVR bladder scans and initiate ISC regimen.  - Skin: Monitor for breakdown, frequent turns, Kreg specialty bed for pressure offloading  - Sleep: Practice effective sleep hygiene (e.g., consistent night and morning routine, quiet, dark room at night, no electronic devices/screens in bed, avoid large meals/caffeine before bed)  - VTE prophylaxis: Rivaroxaban  - Disposition: Anticipate discharge home with home PT and OT services on 9/16/24; family training completed on 9/8/24; will plan to add SLP when progressed to outpatient services    Dk De Dios MD  Attending Physician  Physical Medicine and Rehabilitation  [facility]    Review of Systems:  A 10 point ROS was performed; negative except as noted above.       Current Facility-Administered Medications:     acetaminophen (TYLENOL) tablet 650 mg, 650 mg, Oral, Q6H PRN, WENCESLAO Patel, 650 mg at 09/08/24 0752    atorvastatin (LIPITOR)  tablet 40 mg, 40 mg, Oral, QPM, WENCESLAO Patel, 40 mg at 09/09/24 1702    bisacodyl (DULCOLAX) rectal suppository 10 mg, 10 mg, Rectal, Daily PRN, Dougie King MD    carvedilol (COREG) tablet 25 mg, 25 mg, Oral, BID With Meals, Leeann Ordoñez PA-C    Diclofenac Sodium (VOLTAREN) 1 % topical gel 2 g, 2 g, Topical, 4x Daily PRN, Dk De Dios MD    hydrALAZINE (APRESOLINE) tablet 75 mg, 75 mg, Oral, Q8H BARB, Leeann Ordoñez PA-C, 75 mg at 09/10/24 1427    levothyroxine tablet 25 mcg, 25 mcg, Oral, Early Morning, WENCESLAO Patel, 25 mcg at 09/10/24 0513    lidocaine (LIDODERM) 5 % patch 1 patch, 1 patch, Topical, Daily, Dk De Dios MD, 1 patch at 09/09/24 2112    losartan (COZAAR) tablet 100 mg, 100 mg, Oral, Daily, WENCESLAO Patel, 100 mg at 09/10/24 0800    multivitamin stress formula tablet 1 tablet, 1 tablet, Oral, Daily, WENCESLAO Patel, 1 tablet at 09/10/24 0801    NIFEdipine (PROCARDIA XL) 24 hr tablet 60 mg, 60 mg, Oral, Daily With Dinner, Leeann Ordoñez PA-C    pantoprazole (PROTONIX) EC tablet 40 mg, 40 mg, Oral, Early Morning, WENCESLAO Patel, 40 mg at 09/10/24 0515    rivaroxaban (XARELTO) tablet 20 mg, 20 mg, Oral, Daily With Dinner, WENCESLAO Patel, 20 mg at 09/09/24 1702    spironolactone (ALDACTONE) tablet 25 mg, 25 mg, Oral, Daily, WENCESLAO Patel, 25 mg at 09/10/24 0800    Physical Exam:  Temp:  [98.1 °F (36.7 °C)-98.2 °F (36.8 °C)] 98.2 °F (36.8 °C)  HR:  [67-71] 67  Resp:  [16-18] 18  BP: (130-141)/(60-65) 141/63  SpO2:  [94 %-95 %] 94 %    GEN: Patient seen resting comfortably in bedside recliner, NAD  HEENT: NCAT; mild right sided facial droop at rest, mucous membranes moist  CV: No extremity edema  PULM: Breathing comfortably on room air  ABD: Non-distended  SKIN: No obvious rashes or lesions on exposed skin  NEURO:  Awake and alert, answering questions appropriately to context; able to follow  "simple commands with clear consistency  Speech is fluent and organized, but mildly dysarthric  Right hemiparesis, moving all extremities spontaneously in chair    Laboratory:  Labs reviewed, none new  Results from last 7 days   Lab Units 09/05/24  0604   HEMOGLOBIN g/dL 11.5   HEMATOCRIT % 36.0   WBC Thousand/uL 6.25           Invalid input(s): \"CA\"           Diagnostic Studies: Reviewed, no new imaging   XR shoulder 2+ vw right   Final Result by Zion Kuhn MD (09/03 0651)      No acute osseous abnormality.         Computerized Assisted Algorithm (CAA) may have been used to analyze all applicable images.         Workstation performed: [id number]           Total time spent:  35 minutes, with more than 50% spent counseling/coordinating care. Counseling includes discussion with patient re: progress in therapies, functional issues observed by therapy staff, and discussion with patient his/her current medical state/wellbeing. Coordination of patient's care was performed in conjunction with Internal Medicine service to monitor patient's labs, vitals, and management of their comorbidities.     "

## 2024-09-10 NOTE — PROGRESS NOTES
09/10/24 0830   Pain Assessment   Pain Assessment Tool 0-10   Pain Score No Pain   Restrictions/Precautions   Precautions Fall Risk;Supervision on toilet/commode;Contact/isolation   Weight Bearing Restrictions No   ROM Restrictions No   Braces or Orthoses   (did not use AFO)   Cognition   Overall Cognitive Status WFL   Arousal/Participation Alert;Cooperative   Attention Within functional limits   Orientation Level Oriented X4   Memory Within functional limits   Following Commands Follows one step commands with increased time or repetition   Sit to Stand   Type of Assistance Needed Incidental touching   Comment CGA/CS with hemiwalker   Sit to Stand CARE Score 4   Bed-Chair Transfer   Type of Assistance Needed Incidental touching   Comment CGA with hemiwalker   Chair/Bed-to-Chair Transfer CARE Score 4   Walk 10 Feet   Type of Assistance Needed Incidental touching   Comment CGA/CS with hemiwalker   Walk 10 Feet CARE Score 4   Walk 50 Feet with Two Turns   Type of Assistance Needed Incidental touching   Comment CGA with hemiwalker   Walk 50 Feet with Two Turns CARE Score 4   Walk 150 Feet   Type of Assistance Needed Incidental touching   Comment CGA/CS with hemiwalker   Walk 150 Feet CARE Score 4   Ambulation   Primary Mode of Locomotion Prior to Admission Walk   Distance Walked (feet) 150 ft   Assist Device Ismael Walker   Limitations Noted In Device Management;Speed;Strength;Swing   Provided Assistance with: Direction   Walk Assist Level Contact Guard   Does the patient walk? 2. Yes   Assessment   Treatment Assessment Patient participated in brief 30 minute skilled physical therapy session focusing on transfers and ambulation with hemiwalker. Informed pt that PT did receive email with updated measurements from her son. Per pt, son feels that she would benefit from subacute setting for further recovery but pt still desires to discharge to daughter's home. Biggest barrier at this time continues to be stair management  which will be the main focus of our session this afternoon. Attempted to ambulate with quad and or SPC, but they did not offer the pt enough support.   Problem List Decreased strength;Decreased range of motion;Decreased endurance;Impaired balance;Decreased mobility;Decreased coordination;Impaired tone;Obesity   Barriers to Discharge Inaccessible home environment;Decreased caregiver support   PT Barriers   Physical Impairment Decreased strength;Decreased range of motion;Decreased endurance;Impaired balance;Decreased mobility;Decreased coordination;Impaired sensation;Impaired tone   Functional Limitation Car transfers;Stair negotiation;Standing;Walking;Transfers   Plan   Treatment/Interventions Functional transfer training;LE strengthening/ROM;Therapeutic exercise;Endurance training;Patient/family training;Bed mobility;Gait training   Progress Progressing toward goals   Discharge Recommendation   Rehab Resource Intensity Level, PT   (HH PT initally then transitioing to OP PT)   PT Therapy Minutes   PT Time In 0830   PT Time Out 0900   PT Total Time (minutes) 30   PT Mode of treatment - Individual (minutes) 30   PT Mode of treatment - Concurrent (minutes) 0   PT Mode of treatment - Group (minutes) 0   PT Mode of treatment - Co-treat (minutes) 0   PT Mode of Treatment - Total time(minutes) 30 minutes   PT Cumulative Minutes 2332     Mirta Mccodr, PT, DPT

## 2024-09-10 NOTE — ASSESSMENT & PLAN NOTE
Was seeing cardiologist in [place] and taking Xarelto but she has not seen him in 2 yrs and stopped all her meds >1 yr ago  EKG in the hospital showed NSR  Needs to followup as OP with Cardiology = Dr Beltre  Continue Coreg  Was not placed back on Flecainide  Now on Xarelto, which will be supplied free of charge from [facility]

## 2024-09-10 NOTE — ASSESSMENT & PLAN NOTE
New complaint of right shoulder pain on 9/1- much improved at this point  Patient feels this is due to her residual right sided weakness from her recent stroke  Being treated by PMR

## 2024-09-11 ENCOUNTER — HOME HEALTH ADMISSION (OUTPATIENT)
Dept: HOME HEALTH SERVICES | Facility: HOME HEALTHCARE | Age: 64
End: 2024-09-11
Payer: MEDICARE

## 2024-09-11 PROCEDURE — 97110 THERAPEUTIC EXERCISES: CPT

## 2024-09-11 PROCEDURE — 99232 SBSQ HOSP IP/OBS MODERATE 35: CPT | Performed by: PHYSICAL MEDICINE & REHABILITATION

## 2024-09-11 PROCEDURE — 97112 NEUROMUSCULAR REEDUCATION: CPT

## 2024-09-11 PROCEDURE — 99231 SBSQ HOSP IP/OBS SF/LOW 25: CPT | Performed by: PHYSICIAN ASSISTANT

## 2024-09-11 PROCEDURE — 97535 SELF CARE MNGMENT TRAINING: CPT

## 2024-09-11 PROCEDURE — 97129 THER IVNTJ 1ST 15 MIN: CPT

## 2024-09-11 PROCEDURE — 97130 THER IVNTJ EA ADDL 15 MIN: CPT

## 2024-09-11 PROCEDURE — 94760 N-INVAS EAR/PLS OXIMETRY 1: CPT

## 2024-09-11 PROCEDURE — 97116 GAIT TRAINING THERAPY: CPT

## 2024-09-11 PROCEDURE — 97530 THERAPEUTIC ACTIVITIES: CPT

## 2024-09-11 PROCEDURE — 94660 CPAP INITIATION&MGMT: CPT

## 2024-09-11 RX ADMIN — RIVAROXABAN 20 MG: 20 TABLET, FILM COATED ORAL at 17:47

## 2024-09-11 RX ADMIN — CARVEDILOL 25 MG: 25 TABLET, FILM COATED ORAL at 10:03

## 2024-09-11 RX ADMIN — HYDRALAZINE HYDROCHLORIDE 75 MG: 50 TABLET ORAL at 21:00

## 2024-09-11 RX ADMIN — B-COMPLEX W/ C & FOLIC ACID TAB 1 TABLET: TAB at 10:02

## 2024-09-11 RX ADMIN — PANTOPRAZOLE SODIUM 40 MG: 20 TABLET, DELAYED RELEASE ORAL at 06:29

## 2024-09-11 RX ADMIN — LIDOCAINE 1 PATCH: 700 PATCH TOPICAL at 20:59

## 2024-09-11 RX ADMIN — LEVOTHYROXINE SODIUM 25 MCG: 25 TABLET ORAL at 06:29

## 2024-09-11 RX ADMIN — SPIRONOLACTONE 25 MG: 25 TABLET, FILM COATED ORAL at 13:42

## 2024-09-11 RX ADMIN — LOSARTAN POTASSIUM 100 MG: 50 TABLET, FILM COATED ORAL at 10:03

## 2024-09-11 RX ADMIN — CARVEDILOL 25 MG: 25 TABLET, FILM COATED ORAL at 17:46

## 2024-09-11 RX ADMIN — ATORVASTATIN CALCIUM 40 MG: 40 TABLET, FILM COATED ORAL at 17:47

## 2024-09-11 RX ADMIN — NIFEDIPINE 60 MG: 30 TABLET, FILM COATED, EXTENDED RELEASE ORAL at 17:46

## 2024-09-11 RX ADMIN — HYDRALAZINE HYDROCHLORIDE 75 MG: 50 TABLET ORAL at 13:41

## 2024-09-11 RX ADMIN — ACETAMINOPHEN 650 MG: 325 TABLET ORAL at 06:30

## 2024-09-11 RX ADMIN — HYDRALAZINE HYDROCHLORIDE 75 MG: 50 TABLET ORAL at 06:30

## 2024-09-11 NOTE — TEAM CONFERENCE
Acute RehabilitationTeam Conference Note  Date: 9/11/2024   Time: 11:05 AM       Patient Name:  Maye Lilly       Medical Record Number: [id number]   YOB: 1960  Sex: Female          Room/Bed:  /[facility] 969-01  Payor Info:  Payor: MEDICARE / Plan: MEDICARE A AND B / Product Type: Medicare A & B Fee for Service /      Admitting Diagnosis: Stroke due to stenosis of left posterior cerebral artery (HCC) [I63.532]   Admit Date/Time:  8/20/2024  3:02 PM  Admission Comments: No comment available     Primary Diagnosis:  Acute CVA (cerebrovascular accident) (HCC)  Principal Problem: Acute CVA (cerebrovascular accident) (HCC)    Patient Active Problem List    Diagnosis Date Noted    Right shoulder pain 09/01/2024    Acute CVA (cerebrovascular accident) (HCC) 08/20/2024    Paroxysmal atrial fibrillation (HCC) 08/20/2024    HTN (hypertension) 08/20/2024    DIANELYS (obstructive sleep apnea) 08/20/2024    Hypothyroidism 08/20/2024    GERD (gastroesophageal reflux disease) 08/20/2024    Meningioma (HCC) 08/20/2024       Physical Therapy:    Weight Bearing Status: Full Weight Bearing  Transfers: Incidental Touching, Supervision  Bed Mobility: Moderate Assistance, Minimal Assistance  Amulation Distance (ft): 150 feet  Ambulation: Incidental Touching  Assistive Device for Ambulation: Ismael Walker  Wheelchair Mobility Distance: 150 ft  Wheelchair Mobility: Minimal Assistance, Incidental Touching  Number of Stairs: 4  Assistive Device for Stairs: Right Hand Rail  Stair Assistance: Assist of 2  Discharge Recommendations: Home with:  DC Home with:: Family Support, First Floor Setup, Home Physical Therapy    Date: 9/11/2024  Date of Evaluation: 8/1/2024  Estimated Length of Stay: 4 weeks     Barriers to Discharge Home: 8 ABEL at her daughter's home in [place], decreased caregiver support (will need to clarify with family availability to assist at discharge),   PT Interventions to address Barriers: therapeutic  exercises, transfer training, Standing tolerance, identifying optimal equipment  Equipments Needs: TBD pending progress  PT Plan of Care for the Week: Progressed away from using AFOContinue to work on stair navigation each day as that is the biggest barrier to patient returning home. Son identified new alternate way of entry that may be more appropriate for patient. Also plan to reassess bed mobility and WC mobility. Continue to work on NPP/NMR  Family Training Needed: Occurred on Sunday 9/9 focusing on hands on training for car transfer, observation for stair management. Son coming in Friday, 9/13 for family training on stair management. Plan to schedule another family training  Discharge Planning: TBD pending progress, but patient's goal is to discharge to her daughter's home in [place].         Occupational Therapy:  Eating: Supervision (set up)  Grooming: Supervision (set up)  Bathing: Moderate Assistance  Bathing: Moderate Assistance  Upper Body Dressing: Moderate Assistance  Lower Body Dressing: Maximum Assistance  Toileting: Moderate Assistance  Tub/Shower Transfer: Total Assistance, Assist of 2  Toilet Transfer: Incidental Touching  Cognition: Within Defined Limits  Cognition: Decreased Memory  Orientation: Person, Place, Time, Situation  Discharge Recommendations:  (pending ability to complete stair negotiation)  MD Home with:: Family Support, Outpatient Occupational Therapy       08/22/24: Pt is a 64 y.o. female seen for OT evaluation following admission to [facility] for acute left pontine infarct . OT evaluation and assessment of strength, ADL function, and functional transfers completed. Prior to admission Pt was completing ADLs at independent with use of NO DME. Pt was completing IADLs independently. Pt lives with lives alone and is going to have family support but this may be limited. Entry to the home a large barrier. Personal factors affecting the patient at this time include: co morbidities/Other health  conditions, inaccessible home, Lives alone, Decreased caregiver support, and Body habitus. Pt is currently limited due to the following deficits impacting occupational performance, Slow processing, Impaired standing balance, Impaired righting reactions, impaired motor planning, impaired trunk control, Impaired sitting balance, Impaired sitting tolerance, Unilateral weakness or paralysis of Upper limb , Unilateral weakness or paralysis of Lower limb , Reduced sensation, Ismael Neglect to body, Generalized weakness, impaired GMC/FMC, Impaired UE AROM, and Impaired UE strength. The patient has shown a decline from prior level of function. The patient participates in identification of personal goals to address in Occupational Therapy. Recommend skilled OT services for I/ADL retraining to support the ability to safely participate in daily occupations safely and independently in the most least restrictive way. Pt demonstrates Good rehab potential to meet LTG's of minimal assist with use of wheelchair. Anticipate 4week(s) length of stay. Occupational Therapy plan of care will address the following areas: ADL re-training, fxnl xfers, fxnl attention, standing tolerance, standing balance, UE NMR, UE strengthening, UE endurance, FMC/GMC, FMS/GMS, DME training/education, family training/education, EC techniques/education, healthy coping education, leisure pursuits, body awareness, sitting balance, core/trunk control, and midline awareness     08/27/24: Pt is demonstrating fair progress with occupational therapy and is progressing toward long term goals for ADL, IADL, and functional transfers/mobility. Pts long term goals for ADLs are Minimum assistance and Moderate assistance with Ismael-walker and wheelchair. Pt is currently Dependent  for ADLs. Pt continues to present with impairments in activity tolerance, endurance, standing balance/tolerance, sitting balance/tolerance, UE strength, UE ROM, FMC, and GMC. Occupational  performance remains limited by fatigue, abnormal tone, (R) hemiparesis, decreased caregiver support, risk for falls, and home environment. Family training/education will be required prior to D/C. Pt will continue to benefit from skilled acute rehab OT services to address above mentioned barriers and maximize functional independence in baseline areas of occupation to meet established treatment goals with overall decreased burden of care. Plan of care to continue to focus on ADL Retraining , LB Dressing, UB dressing, Ismael Dressing Techniques, IADL training , Functional Transfers, Functional Cognition, Standing tolerance, Standing balance , UE NMR right, midline awareness, Fine motor coordination, Gross motor coordination, Fine motor strengthening , Gross motor strengthening , DME training/education, Family training/education, Energy conservation training/education, healthy coping education, Leisure and social pursuits, community re-integration, return to work simulation, sitting balance, and Core/trunk control/strengthening . Goals for the upcoming week are: intense NMR to RUE    Anticipate Re-team at this time.      9/3/24 update:  Pt is demonstrating good progress with occupational therapy and is progressing toward long term goals for ADL, IADL, and functional transfers/mobility. Pts long term goals for ADLs are Minimum-moderate assistance with Ismael-walker and wheelchair. Pt is currently Substantial/maximal assistance  for ADLs. Pt continues to present with impairments in activity tolerance, endurance, standing balance/tolerance, UE strength, UE ROM, FMC, and GMC. Occupational performance remains limited by fatigue, pain, (R) hemiparesis, decreased caregiver support, risk for falls, and home environment. Family training/education will be required prior to D/C. Pt will continue to benefit from skilled acute rehab OT services to address above mentioned barriers and maximize functional independence in baseline areas of  occupation to meet established treatment goals with overall decreased burden of care. Plan of care to continue to focus on ADL Retraining , Ismael Dressing Techniques, Functional Transfers, UE NMR right, Fine motor coordination, Gross motor coordination, Fine motor strengthening , Gross motor strengthening , DME training/education, Family training/education, healthy coping education, Leisure and social pursuits, and Core/trunk control/strengthening . Goals for the upcoming week are: progress pt to Moderate A for bathing/dressing as well as consistantly perform SPT with HW at Min-mod A x1 and to focus on toileting to increase independence with CM up/down and address func reach for hygiene.     Anticipate Discharge date to be set.       9/10/24 update:  Pt is demonstrating good progress with occupational therapy and is progressing toward long term goals for ADL, IADL, and functional transfers/mobility. Pts long term goals for ADLs are Minimum/moderate assistance with Ismael-walker. Pt is currently Partial/moderate assistance  for ADLs. Pt continues to present with impairments in activity tolerance, standing balance/tolerance, UE strength, UE ROM, FMC, and GMC. Occupational performance remains limited by fatigue, (R) hemiparesis, risk for falls, and home environment. Family training/education completed with pt's son and SERGO however may benefit from ongoing family training pending how pt progresses. Pt will continue to benefit from skilled acute rehab OT services to address above mentioned barriers and maximize functional independence in baseline areas of occupation to meet established treatment goals with overall decreased burden of care. Plan of care to continue to focus on ADL Retraining , Ismael Dressing Techniques, Functional Transfers, Standing balance , UE NMR right, Fine motor coordination, Gross motor coordination, Fine motor strengthening , Gross motor strengthening , DME training/education, Family  training/education, healthy coping education, Leisure and social pursuits, and community re-integration. Goals for the upcoming week are: continue to focus on RUE NMR, focus on toileting tasks specifically rear hygiene to increase IND as well as identify appropriate DME for safe d/c home.     Anticipate tentative d/c date for Monday, 9/16/24 pending stair negotiation--recommend home OT services      Speech Therapy:  Mode of Communication: Verbal  Speech/Language: Dysarthia (minimal)  Cognition: Exceptions to WNL  Cognition: Decreased Executive Functions, Decreased Attention, Decreased Comprehension, Decreased Memory  Orientation: Person, Place, Time, Situation  Discharge Recommendations: Home with:  DC Home with:: 24 Hour Supervision, Family Support, Outpatient Speech Therapy  As of 8/21/2024: Pt was evaluated by skilled ST for cognitive linguistic skills and dysarthria. In regards to cognition, pt completed the SLUMS cognitive assessment upon evaluation. Pt total score was 25/30 which as compared to those with high school education correlates with mild neurocognitive disorder and is indicative of dementia.  Pt presenting with the following barriers: decrease STM, working memory, word recall, critical thinking and higher level problem solving thought organization and expressive language/word retrieval which, which impact pt's overall safety, functional cognitive communication skills as well as functional mobility. The following interventions are used to target these barriers, including verbal problem solving task, verbal working memory tasks, visual memory recall tasks, drawing conclusions activities, written sequencing tasks, verbal sequencing tasks, written financial management tasks, tangible money tasks, verbal review of current medications, written review of medications, tangible medication management task, written calendar tasks, tangible scheduling tasks , written health management tasks, verbal health  management tasks, visual attention tasks, functional reading tasks , and family education/training, as well as verbal command following activities, written command following activities, reading comprehension at sentence level, reading comprehension at paragraph level, written expression at word level, written expression at sentence level , divergent naming: concrete, divergent naming: abstract , word deduction with phrase, and word deduction with clue words. In regards to dysarthria, pt is demonstrating speech intelligibility to be mildly impaired at the sentence/conversational level. Barrier include: distorted speech sounds, decreased breath support and fast rate of speech. Interventions to be targeted include: OME's, verbal repetition of sentence, orthographic repetition of sentences, review of speech strategies and breath/support cueing.     This week, plan to target and implement cognitive and speech interventions as well as discussing baseline with daughter regarding ADLs and  pt's ability to complete IADL tasks such as medication management, finance management, ability to recognize and solve unsafe situations. Recommendations for discharge are pending pt progress but suspect some level of increased support to be needed, along with continued skilled SLP services (OP vs HHC) on discharge. Based on current evaluation, pt is recommended for skilled SLP services during acute rehab stay targeting both expressive/receptive language and cognitive linguistic skills to maximize functioning and level of independence on discharge.     Current level of function:  Comprehension: Supervision  Expression:Mod A  Social Interaction:Supervision  Problem Solving:Min A   Memory:Min A    Update from week: 8/27/2024: Pt continues to be followed for skilled SLP services focusing on cognitive linguistic skills where she is making progress towards goals this week. Continued cognitive linguistic and speech barriers which present  include: decreased STM recall, working memory, attention, critical thinking, higher level thought organization, recall of multiple steps and processing, along with occasional word retrieval difficulties and minimal dysarthria.  Current deficits impact pt's overall safety, functional cognitive communication skills as well as functional mobility due to decreased carryover. The following interventions are used to target these barriers, including verbal problem solving task, verbal working memory tasks, visual memory recall tasks, drawing conclusions activities, written sequencing tasks, verbal sequencing tasks, verbal reasoning tasks, higher level deductive reasoning activities, written financial management tasks, tangible money tasks, verbal review of current medications, written review of medications, tangible medication management task, written calendar tasks, tangible scheduling tasks ,  written health management tasks, verbal health management tasks, functional reading tasks , time management activities, and family education/training, as well as speech drills and word deduction tasks.    Current level of functioning:   Comprehension: supervision  Expression: supervision  Social Interaction: supervision to mod I  Problem Solving: min A to supervision  Memory: min A    This week, plan to continue to target executive functions skills including those required for completion of IADLs, higher level word retrieval tasks and speech drills. Continue family education as appropriate during rehab stay. At this time, pt is recommended for continued skilled SLP services focusing on higher level cognitive linguistic skills, along with speech and expressive language skills to maximize independence and safety.     Update from week: 9/3/204. Pt is being followed for cognitive linguistic tx and speech/apraxia tx sessions to where pt is making steady progress this week.     Continued cognitive barriers which present include:  decreased attention, ST memory recall , problem solving, reasoning, sequencing, organization of thoughts, judgement, and slower processing, which still impacts pt's overall safety, functional cognitive communication skills as well as functional mobility. The following interventions are used to target these barriers, including verbal working memory tasks, drawing conclusions activities, categorization tasks , verbal reasoning tasks, tangible money tasks, verbal review of current medications, written review of medications, tangible medication management task, verbal health management tasks, functional reading tasks , time management activities, and family education/training. Continued speech barriers which present include: decreased intelligibility decreased at conversational level, imprecise articulation , and faster rate of speech which still impacts pt's overall functional cognitive communication skills. The following interventions are used to target these barriers, including  ongoing review of OME's, speech drills as needed as well as engaging in spontaneous speech production and monitoring use of speech strategies .     Current level of functioning:   Comprehension: supervision  Expression: supervision  Social Interaction: mod I   Executive functions: min A   Memory: min A    For this upcoming week, it was established that pt would like to focus on targeting higher level cognitive linguistic tasks, but monitoring functional I ADL tasks, such as medication and financial management skills. Also will [place] back to review of speech strategies, OME's to maximize overall speech intelligibility. Will plan for family training/education when appropriate. At this time, pt will continue to benefit from skilled cognitive linguistic tx and speech tx session to maximize overall functional independence given overall cognitive linguistic skills and speech intelligibility in attempts to decreased caregiver burden over time.        Update from week: 9/10/2024. Pt is being followed for cognitive linguistic tx sessions to where pt is making steady and good progress this week.     Continued cognitive barriers which present include: decreased attention, ST memory recall , reasoning, organization of thoughts, and slower processing, which still impacts pt's overall safety, functional cognitive communication skills as well as functional mobility. The following interventions are used to target these barriers, including higher level deductive reasoning activities, written financial management tasks, verbal review of current medications, written review of medications, written calendar tasks, tangible scheduling tasks ,  written health management tasks, verbal health management tasks, and family education/training.     Current level of functioning:   Comprehension: supervision  Expression: supervision  Social Interaction: mod I   Executive functions: supervision-min A  Memory: min A    Family training/education has been initiated with pt's family (son, DIL, dtr's boyfriend) to where review of ST services was completed in addition to demonstration of how higher level cognitive linguistic skills are still impacted at this time. Pt would benefit from supervision and monitoring in compliance given I ADL tasks, including medication management, finances and calendar/scheduling skills upon discharge. While recommendations at time of discharge are for home services, pt would more so benefit from OP ST services and would initiate after completion of home services. This week, focus for continue to target higher level cognitive tasks, ongoing education and review of medications and Stroke education. At this time, pt will continue to benefit from skilled cognitive linguistic tx session to maximize overall functional independence given overall cognitive linguistic skills in attempts to decreased caregiver burden over time.     Nursing Notes:  Appetite:  Good  Diet Type: Cardiac                      Diet Patient/Family Education Complete: Yes                         Type of Wound Patient/Family Education: No  Bladder: Continent     Bladder Patient/Family Education: Yes  Bowel: Continent     Bowel Patient/Family Education: Yes  Pain Location/Orientation: Location: Generalized  Pain Score: 6                       Hospital Pain Intervention(s): Repositioned  Pain Patient/Family Education: Yes  Medication Management/Safety  Safe Administration: Yes (with family support)  Reason for non-safe administration: further education needed for d/c  Medication Patient/Family Education Complete: Yes (ongoing)    64 year old patient with history of HTN, PAF, hypothyroidism, GERD and DIANELYS who presented with right sided weakness, slurred speech, headache and vomiting.CT head and CTA had no acute findings.  MRI revealed left pontine infarct.  She is supposed to be taking medications for her HTN and Eliquis as an anticoagulant for her PAF but she stopped taking her medications well over a year ago due to insurance loss.  Neurology saw in consult.  They felt the CVA occurred in the setting of PAF and medication noncompliance.  For her HTN, she was placed on Valsartan 160 mg daily, Coreg 25mg BID, Hydralazine 75 mg q 8 hours, Aldactone 25mg daily and Procardia 60mg daily at dinnertime.  ECHO showed a LVEF of 65% with mild diastolic dysfunction.  During the course of her admission, she developed diarrhea and tested positive for Cdiff.  She is currently on PO Vancomycin for treatment.    Acute CVA - Presented with right sided weakness, slurred speech, headache and vomiting. MRI = left pontine infarct. Neuro felt CVA was from atrial fibrillation in setting of non-compliance with anticoagulation. Currently now on Xarelto 20mg qd, Lipitor 40mg qd. Followup with Neuro as OP.  [facility] on [facility] summary. Clostridium difficile infection - New during  hospital stay. Says no BMs so far today 8/21/24. Continue Vancomycin 125mg QID.  LD will be 8/26/24 0900. Paroxysmal atrial fibrillation - Was seeing cardiologist in [place] and taking Xarelto but she has not seen him in 2 yrs and stopped all her meds >1 yr ago. EKG in the hospital showed NSR. Needs to followup as OP with Cardiology = Dr Beltre. Continue Coreg. Was not placed back on Flecainide. Now on Xarelto. RRR by exam today 8/22/24. Sent price check to her pharmacy on 8/21/24 for the Xarelto and is $661.99. Will send price check on Eliquis but doubtful that will be much better.  If not, she will need to be converted to Coumadin with a therapeutic Lovenox bridge.  She did tell me she previously was on the Xarelto and had a coupon number so she could get it for $10.00/month.  Will try and see about getting that number. HTN - Home:  no meds. Here:  Coreg 25mg BID/Hydralazine 75 mg q8hrs/Nifedipine 60mg daily at dinner time, Aldactone 25mg daily, Valsartan 160 mg qd which will be changed to dose equivalent ARB that we have here. Stable; no changes today 8/22/24. Followup with IM as OP.  On DC summary was listed to see Megan DORSEY from that group. DIANELYS - In past was on CPAP. Can d/w PCP when discharged. Hypothyroidism - Continue Levothyroxine 25 mcg qd. This was the dose that she was supposed to be taking at home and they placed her back on it in the hospital on 8/12/24 but there was no TSH drawn.  Had an abnormal TSH of 5.1 on 4/23/20 presumably not on Levothyroxine at that time since thereafter on 2/15/22 was normal. TSH 8/22/24 normal at 3.95. Will continue current dose and have her get a TSH in 6 weeks = @9/23. GERD: Continue Protonix. Meningioma - Incidental finding. OP followup.    Pt was a new admission 8/20. Pt is [facility] for safety. Therapy transfers only. RUE is flaccid and RLE is semi-flaccid. Pt has residual R facial droop and slurred speech. Pt wears CPAP at night. _Pt is on contact for LORETTA Cervantes  which she is on PO vanco. Pt is incontinent at times of bowel and bladder. Last bowel movement was 8/20. Remains on cardiac and 4 gram sodium diet with thin liquids - on aspiration precautions and requires chair mode for all meals. Pt has rash on b/l feet - hydrocortisone is ordered and has been helping. Pt is now on kreg bed.     8/28/24- This week we will encourage independence with ADLs.  We will continue to monitor labs and vital signs.  We will continue to  educate pt/family about repositioning to prevent skin breakdown.  We will assist w/repositioning and perform routine skin checks.  We will continue to monitor for adequate pain control.  We will monitor for constipation and medicate pt as ordered.  We will increase safety awareness and keep pt free from falls.    9/3/24- will cont to encourage independence with ADLs.  Will continue to monitor labs and vital signs.  Will cont with repositioning to prevent skin breakdown and decrease pain in RLE shoulder.  Will continue with Q2 repositioning  and skin checks.  Will continue to monitor for constipation and medicate as needed.  Will cont to improve safety awareness and keep pt free from falls.     9/10- Cont on xarelto.  Using cpap at hs.  Patient was not on any medications prior to admission. Since admission she has been started on Coreg 25mg BID, Hydralazine 75 mg q8hrs, Nifedipine 60mg daily, Aldactone 25mg daily and Losartan 100mg qd.  Pts shoulder pain much improved.        Case Management:     Discharge Planning  Living Arrangements: Lives Alone  Support Systems: Self, Son, Daughter  Assistance Needed: none prior  Type of Current Residence: Private residence  Current Home Care Services: No  Pt is  making good progress towards goals. Family training has occurred one time with pts son and the team wishes to bring him in again as pt continues to advance.  Pt is hopeful to return  home and the current recommendation is for contd therapy at home. Cm following  to assist w/dc  recommendations.     Is the patient actively participating in therapies? yes  List any modifications to the treatment plan:     Barriers Interventions   stairs Therapy stair training, family eduation                     Is the patient making expected progress toward goals? yes  List any update or changes to goals:     Medical Goals: Patient will be medically stable for discharge to [facility] upon completion of rehab program and Patient will be able to manage medical conditions and comorbid conditions with medications and follow up upon completion of rehab program    Weekly Team Goals:   Rehab Team Goals  ADL Team Goal: Patient will require assist with ADLs with least restrictive device upon completion of rehab program  Transfer Team Goal: Patient will require assist with transfers with least restrictive device upon completion of rehab program  Locomotion Team Goal: Patient will require assist with locomotion with least restrictive device upon completion of rehab program  Cognitive Team Goal: Patient will require supervision for basic and complex tasks upon completion of rehab program    Discussion: pt has made good progress and continues to work with therapy to address overall funcitonal mobility and stair navigation to return home. Pt has received return in her right ue and continues to receive estim and occupational therapy exercises to strengthen. Pts arm is not yet functional but improving. Pt is now able to ambulate to ambulate with the use of a  kaylyn walker but pt is going to receive a w/c on dc. Pt will be w/c level when alone at home for safe functional mobility. Pt has made good strides in overall cog and speech deficits and would benefit from family providing some supervision. Son is attending another session of family training on Friday and over the wknd with plan to dc home on Monday. Recommendations are for contd [facility] for pt ot and speech.     Anticipated Discharge  Date:  9/16/24  [facility] Team Members Present:The following team members are supervising care for this patient and were present during this Weekly Team Conference.    Physician: Dr. Lanre MD  : SHANNON Flores  Registered Nurse: Amy Mcnamara RN  Physical Therapist: Mirta Mccord DPT  Occupational Therapist: Megan Acuna MS, OTR/L  Speech Therapist: Rossy Rodríguez MA, CCC-SLP

## 2024-09-11 NOTE — PLAN OF CARE
Problem: PAIN - ADULT  Goal: Verbalizes/displays adequate comfort level or baseline comfort level  Description: Interventions:  - Encourage patient to monitor pain and request assistance  - Assess pain using appropriate pain scale  - Administer analgesics based on type and severity of pain and evaluate response  - Implement non-pharmacological measures as appropriate and evaluate response  - Consider cultural and social influences on pain and pain management  - Notify physician/advanced practitioner if interventions unsuccessful or patient reports new pain  Outcome: Progressing     Problem: INFECTION - ADULT  Goal: Absence or prevention of progression during hospitalization  Description: INTERVENTIONS:  - Assess and monitor for signs and symptoms of infection  - Monitor lab/diagnostic results  - Monitor all insertion sites, i.e. indwelling lines, tubes, and drains  - Monitor endotracheal if appropriate and nasal secretions for changes in amount and color  - [place] appropriate cooling/warming therapies per order  - Administer medications as ordered  - Instruct and encourage patient and family to use good hand hygiene technique  - Identify and instruct in appropriate isolation precautions for identified infection/condition  Outcome: Progressing     Problem: SAFETY ADULT  Goal: Patient will remain free of falls  Description: INTERVENTIONS:  - Educate patient/family on patient safety including physical limitations  - Instruct patient to call for assistance with activity   - Consult OT/PT to assist with strengthening/mobility   - Keep Call bell within reach  - Keep bed low and locked with side rails adjusted as appropriate  - Keep care items and personal belongings within reach  - Initiate and maintain comfort rounds  - Make Fall Risk Sign visible to staff  - Offer Toileting every  Hours, in advance of need  - Initiate/Maintain alarm  - Obtain necessary fall risk management equipment:   - Apply yellow socks and  bracelet for high fall risk patients  - Consider moving patient to room near nurses station  Outcome: Progressing  Goal: Maintain or return to baseline ADL function  Description: INTERVENTIONS:  -  Assess patient's ability to carry out ADLs; assess patient's baseline for ADL function and identify physical deficits which impact ability to perform ADLs (bathing, care of mouth/teeth, toileting, grooming, dressing, etc.)  - Assess/evaluate cause of self-care deficits   - Assess range of motion  - Assess patient's mobility; develop plan if impaired  - Assess patient's need for assistive devices and provide as appropriate  - Encourage maximum independence but intervene and supervise when necessary  - Involve family in performance of ADLs  - Assess for home care needs following discharge   - Consider OT consult to assist with ADL evaluation and planning for discharge  - Provide patient education as appropriate  Outcome: Progressing  Goal: Maintains/Returns to pre admission functional level  Description: INTERVENTIONS:  - Perform AM-PAC 6 Click Basic Mobility/ Daily Activity assessment daily.  - Set and communicate daily mobility goal to care team and patient/family/caregiver.   - Collaborate with rehabilitation services on mobility goals if consulted  - Perform Range of Motion  times a day.  - Reposition patient every  hours.  - Dangle patient  times a day  - Stand patient  times a day  - Ambulate patient  times a day  - Out of bed to chair  times a day   - Out of bed for meals  times a day  - Out of bed for toileting  - Record patient progress and toleration of activity level   Outcome: Progressing     Problem: DISCHARGE PLANNING  Goal: Discharge to home or other facility with appropriate resources  Description: INTERVENTIONS:  - Identify barriers to discharge w/patient and caregiver  - Arrange for needed discharge resources and transportation as appropriate  - Identify discharge learning needs (meds, wound care, etc.)  -  Arrange for interpretive services to assist at discharge as needed  - Refer to Case Management Department for coordinating discharge planning if the patient needs post-hospital services based on physician/advanced practitioner order or complex needs related to functional status, cognitive ability, or social support system  Outcome: Progressing     Problem: Prexisting or High Potential for Compromised Skin Integrity  Goal: Skin integrity is maintained or improved  Description: INTERVENTIONS:  - Identify patients at risk for skin breakdown  - Assess and monitor skin integrity  - Assess and monitor nutrition and hydration status  - Monitor labs   - Assess for incontinence   - Turn and reposition patient  - Assist with mobility/ambulation  - Relieve pressure over bony prominences  - Avoid friction and shearing  - Provide appropriate hygiene as needed including keeping skin clean and dry  - Evaluate need for skin moisturizer/barrier cream  - Collaborate with interdisciplinary team   - Patient/family teaching  - Consider wound care consult   Outcome: Progressing

## 2024-09-11 NOTE — ASSESSMENT & PLAN NOTE
Presented with right sided weakness, slurred speech, headache and vomiting  MRI = left pontine infarct  Neuro felt CVA was from atrial fibrillation in setting of non-compliance with anticoagulation  Currently now on Xarelto 20mg qd, Lipitor 40mg qd  Followup with Neuro as OP.  [facility] summary

## 2024-09-11 NOTE — ASSESSMENT & PLAN NOTE
Was seeing cardiologist in [place] and taking Xarelto but she has not seen him in 2 yrs and stopped all her meds >1 yr ago  EKG in the hospital showed NSR  Needs to followup as OP with Cardiology = Dr Beltre  Continue Coreg  Was not placed back on Flecainide  Now on Xarelto, which will be supplied free of charge from [employer]

## 2024-09-11 NOTE — PROGRESS NOTES
PT Treatment Note    PLAN OF CARE OVER NEXT SEVERAL DAYS UNTIL DISCHARGE on Monday 9/16:     (1) FT with son on Friday 830-930 focusing primarily on stair navigation and instruction on how to properly guard his mom during ambulation and Sunday focusing on car transfer, stair navigation, DC recommendations  (2) Recommendation fors discharge pt will be WC level if no one is home. Can walk with CS/CGA of family members with hemiwalker  (3) Capture all care scores over the next several sessions as discharge will be on Monday. Please make sure to capture bed mobility and WC level mobility.   (4) Focus on stair management during each session if possible   (5) Pt would like to focus on walking with ankle weight during future sessions.       09/11/24 1400   Pain Assessment   Pain Assessment Tool 0-10   Pain Score No Pain   Restrictions/Precautions   Precautions Fall Risk;Contact/isolation;Supervision on toilet/commode   Weight Bearing Restrictions No   ROM Restrictions No   Cognition   Overall Cognitive Status WFL   Arousal/Participation Alert;Cooperative   Attention Within functional limits   Orientation Level Oriented X4   Memory Within functional limits   Following Commands Follows one step commands with increased time or repetition   Sit to Stand   Type of Assistance Needed Supervision;Adaptive equipment   Comment CS with hemiwalker   Sit to Stand CARE Score 4   Bed-Chair Transfer   Type of Assistance Needed Supervision;Adaptive equipment   Comment CS with hemiwalker   Chair/Bed-to-Chair Transfer CARE Score 4   Wheel 50 Feet with Two Turns   Type of Assistance Needed Supervision   Wheel 50 Feet with Two Turns CARE Score 4   Wheel 150 Feet   Comment limited WC mobility distance this session as patient wanted to save her energy for the stairs.   Reason if not Attempted Safety concerns   Wheel 150 Feet CARE Score 88   Wheelchair mobility   Does the patient use a wheelchair? 1. Yes   Type of Wheelchair Used 1. Manual    Method Right upper extremity;Left lower extremity;Right lower extremity   Distance Level Surface (feet) 110 ft   Findings (S)  Submitted order for 22 x18 in WC standard height (19.5 inches). The WC in patient's room in 21 inches tall and her feet do not touch the ground. Practiced WC mobility in 20 x 18 inch chair with 19.5 inch height. WC to practice mobility with will be in the PT gym.   Curb or Single Stair   Style negotiated Single stair   Type of Assistance Needed Physical assistance   Physical Assistance Level Total assistance   Comment Min/ModAx1 person with CGA of second person for safetey   1 Step (Curb) CARE Score 1   4 Steps   Type of Assistance Needed Physical assistance   Physical Assistance Level Total assistance   Comment Min/ModAx1 person with CGA of second person for safety, ascends with  lateral/angled step to pattern leading with LLE with LUE support via R handrail, descends backwards with step to pattern leading with RLE. Ascending with angled step to pattern allows for her R arm to not get in the way. Initially, therapist blocking R knee due to slight R knee bend but no overt buckling. As she completed multiple rounds did not require the R knee to be blocked. Performed on P8 stairwell   4 Steps CARE Score 1   12 Steps   Reason if not Attempted Safety concerns   12 Steps CARE Score 88   Stairs   Type Stairs   # of Steps 5  (x3)   Weight Bearing Precautions Fall Risk   Assist Devices Single Rail   Findings To enter her home, the first step is 10 inches. Son is purchasing an intermediate step. To prep for intermediate step, practiced pt stepping up on 3.5 inch box (located in patient's room). Practice having hemiwalker to the side of the step and behind the step. Multiple trials performed with pt requiring Haile Please see pictures next to the first computer next   Equipment Use   NuStep L 2 x12  minutes (1100 steps)   Other Comments   Comments While pt was on NuStep contacted son regarding family  training . FT to occur Friday and Sunday. Friday with PT only to practice stair management. Sunday 8:30am-11:00 with OT and PT.   Assessment   Treatment Assessment Patient participated in 90 minute skilled physical therapy session with main focus on stair management. Pt making significant improvements with stair navigation. Progressed from ModAx2 to Min/ModAx1 with CGA of second person for safety. please see above for best method for the stairs. STAIR MANAGEMENT SHOULD BE PRACTICED EACH PT SESSION IF TIME ALLOWS. Next session, pt would also like to work on ambulating with ankle weight on RLE in addition to stair management. Plan is for patient to discharge home Monday with supportive Son and HH services. Provided pt with information on OP PT   Problem List Decreased strength;Decreased range of motion;Decreased endurance;Impaired balance;Decreased mobility;Decreased coordination;Impaired tone;Obesity   Barriers to Discharge Inaccessible home environment;Decreased caregiver support   PT Barriers   Functional Limitation Car transfers;Stair negotiation   Plan   Treatment/Interventions Functional transfer training;LE strengthening/ROM;Therapeutic exercise;Endurance training;Patient/family training;Equipment eval/education;Bed mobility;Gait training   Progress Progressing toward goals   Discharge Recommendation   Rehab Resource Intensity Level, PT   (HH PT then transition to OP PT)   Equipment Recommended   (provided CM with equipment list)   PT Therapy Minutes   PT Time In 1400   PT Time Out 1530   PT Total Time (minutes) 90   PT Mode of treatment - Individual (minutes) 90   PT Mode of treatment - Concurrent (minutes) 0   PT Mode of treatment - Group (minutes) 0   PT Mode of treatment - Co-treat (minutes) 0   PT Mode of Treatment - Total time(minutes) 90 minutes   PT Cumulative Minutes 2091     Mirta Mccord, PT, DPT

## 2024-09-11 NOTE — PROGRESS NOTES
"   09/11/24 4509   Pain Assessment   Pain Assessment Tool 0-10   Restrictions/Precautions   Precautions Fall Risk;Contact/isolation;Supervision on toilet/commode   Comprehension   Comprehension (FIM) 5 - Understands basic directions and conversation   Expression   Expression (FIM) 5 - Needs help/cues only RARELY (< 10% of the time)   Social Interaction   Social Interaction (FIM) 6 - Interacts appropriately with others BUT requires extra  time   Problem Solving   Problem solving (FIM) 5 - Solves basic problems 90% of time  (min A for complex tasks)   Memory   Memory (FIM) 5 - Loses track of time   Speech/Language/Cognition Assessmetn   Treatment Assessment Pt was just finishing PT session to where pt was willing to participate in therapy gym for more challenge given overall attention and focus. SLP initiating review of the deduction puzzles which were targeted for adults, which are all a starting point for pt to purchase upon discharge home in addition to providing additional materials to locate more puzzles over time until initiation of OP ST. Otherwise, SLP still targeting higher level deduction puzzles in which pt was to complete cancellation given the clues provided. Each tasks presented was a grid containing 4 items and then a total of 12 additional items for pt to eliminate into 4 possible matches given the 4 static items. The total number of clues was 4 provided but multiple information was provided in each clue. Pt was able to determine 1 clue on own and then required increased cues to determine all remaining items, or pt to then determine the remaining 3 items to match per puzzle. Given the increased complexity given this task, pt again verbalizing increased difficulty w/ these cancellation deduction puzzles to where even w/ verbal cueing levels required increased amounts to \"get\" the clues provided.     Last task completed was a higher level auditory comprehension task given a total of 10 lines which " contained 5 words per line. SLP providing pt w/ 2-step verbal direction in which the challenge given this task today was to only have SLP provide the verbal direction once to complete direction to each list of words. It was noted that pt's ability to recall the 2 separate steps was 20/20 accurate but the execution was 19/20 in completing due to difficulty in 1 step verbally presented. SLP did state that it would have been more of a challenge in completing this if the therapy gym was noisier which SLP suspecting will impact her performance in this task. Overall, pt continuing to benefit from skilled SLP services targeting functional independence of higher level cognitive skills in attempts for decreasing burden of care over time.    SLP Therapy Minutes   SLP Time In 0930   SLP Time Out 1000   SLP Total Time (minutes) 30   SLP Mode of treatment - Individual (minutes) 30   SLP Mode of treatment - Concurrent (minutes) 0   SLP Mode of treatment - Group (minutes) 0   SLP Mode of treatment - Co-treat (minutes) 0   SLP Mode of Treatment - Total time(minutes) 30 minutes   SLP Cumulative Minutes 750   Therapy Time missed   Time missed? No

## 2024-09-11 NOTE — PROGRESS NOTES
09/11/24 0830   Pain Assessment   Pain Assessment Tool 0-10   Pain Score No Pain   Restrictions/Precautions   Precautions Fall Risk;Contact/isolation;Supervision on toilet/commode   Cognition   Overall Cognitive Status WFL   Arousal/Participation Alert;Cooperative   Attention Within functional limits   Orientation Level Oriented X4   Memory Within functional limits   Following Commands Follows one step commands with increased time or repetition   Roll Left and Right   Type of Assistance Needed Supervision   Roll Left and Right CARE Score 4   Sit to Lying   Type of Assistance Needed Supervision   Sit to Lying CARE Score 4   Lying to Sitting on Side of Bed   Type of Assistance Needed Incidental touching   Comment HOB flat did not use bed rails, allow increased time to perform. educated pt that she can purchase a bed rail/bed cane for home if she gets home and does need one   Lying to Sitting on Side of Bed CARE Score 4   Sit to Stand   Type of Assistance Needed Supervision   Comment CS with hemiwalker   Sit to Stand CARE Score 4   Bed-Chair Transfer   Type of Assistance Needed Adaptive equipment;Incidental touching   Comment CGA/CS with RW   Chair/Bed-to-Chair Transfer CARE Score 4   Walk 10 Feet   Type of Assistance Needed Incidental touching;Adaptive equipment   Comment CGA/CS with RW   Walk 10 Feet CARE Score 4   Walk 50 Feet with Two Turns   Type of Assistance Needed Incidental touching;Adaptive equipment   Comment CGA/CS with RW   Walk 50 Feet with Two Turns CARE Score 4   Walk 150 Feet   Type of Assistance Needed Incidental touching;Adaptive equipment   Comment CGA with RW   Walk 150 Feet CARE Score 4   Ambulation   Primary Mode of Locomotion Prior to Admission Walk   Distance Walked (feet) 150 ft   Assist Device Ismael Walker   Gait Pattern Inconsistant Jasmyne;Slow Jasmyne;R hemiparesis;Narrow OJ;Step to;Improper weight shift;Decreased R stance   Limitations Noted In Safety;Speed;Strength;Swing   Provided  Assistance with: Direction   Does the patient walk? 2. Yes   Wheelchair mobility   Findings Pt currently in 22 inch x 18 inch WC, but it is too high for pt to propel herself. Trialed 20 inch by 18 inch but width is too small. Called [employer] and spoke with Lance. Confirmed that 22 x 18 inch wheelchair comes in hemiheight and or standard height. (19.5 inches). Lance confirmed it can come in standard height. Pt prefers standard height. Assessed patient's ability to lock WC brakes. Had difficulty locking R brake, trialed use of brake extender and it worked well. Allow increased time to complete on her own.   Other Comments   Comments Updated pt that this PT spoke to her son and we have family training planned for Friday to focus on stair management and will schedule an additional session either Saturday or Sunday. Emailed son with links for purchasing hemiwalker, gait belt.   Assessment   Treatment Assessment Patient participated in 60 minute skilled physical therapy session focusing on reassessing bed mobility for discharge and WC level mobility. Pt has progressed to CS/CGA for bed mobility. Plan for this afternoon is to work on WC mobility and stair navigation in preparation for discharge home   Problem List Decreased strength;Decreased range of motion;Decreased endurance;Impaired balance;Decreased mobility;Decreased coordination;Impaired tone;Obesity   PT Barriers   Functional Limitation Car transfers;Stair negotiation   Plan   Treatment/Interventions Functional transfer training;LE strengthening/ROM;Therapeutic exercise;Endurance training;Patient/family training;Bed mobility;Gait training;Equipment eval/education   Discharge Recommendation   Rehab Resource Intensity Level, PT   (HH PT then transitionig to OP PT)   Equipment Recommended Wheelchair;Other (Comment)  (hemiwalker, gait belt)   PT Therapy Minutes   PT Time In 0830   PT Time Out 0930   PT Total Time (minutes) 60   PT Mode of treatment - Individual  (minutes) 60   PT Mode of treatment - Concurrent (minutes) 0   PT Mode of treatment - Group (minutes) 0   PT Mode of treatment - Co-treat (minutes) 0   PT Mode of Treatment - Total time(minutes) 60 minutes   PT Cumulative Minutes 2001     Mirta Mccord, PT, DPT

## 2024-09-11 NOTE — CASE MANAGEMENT
Met w/pt and reviewed team update and confirmed sons schedule training this Friday and confirmed dc for Monday 9/16 to her daughters home. Reviewed recommendations for contd therapy services at home and pt is in agreement with [facility]. Referral made via [facility] for pt ot and speech.

## 2024-09-11 NOTE — PROGRESS NOTES
"OT Treatment Note       09/11/24 0715   Pain Assessment   Pain Assessment Tool 0-10   Pain Score No Pain   Restrictions/Precautions   Precautions Fall Risk;Contact/isolation;Supervision on toilet/commode   Weight Bearing Restrictions No   ROM Restrictions No   Braces or Orthoses   (did not use AFO, kaylyn sling and sneakers donned)   Lifestyle   Autonomy \"I'll do more when I'm at home,\" re: attempting more with ADLs   Eating   Type of Assistance Needed Set-up / clean-up   Physical Assistance Level No physical assistance   Comment sitting in recliner end of session   Eating CARE Score 5   Oral Hygiene   Type of Assistance Needed Set-up / clean-up   Physical Assistance Level No physical assistance   Comment attempted to edu on use of RUE as stabilizer during task and importance of incorporating RUE into functional tasks as able to. Pt resistant to use and requesting OT to do complete setup of task for her, but ultimately agreeable to using R hand as stabilizer with min support from OT to maintain  on toothpaste   Oral Hygiene CARE Score 5   Grooming   Findings pelaez hair with setup   Shower/Bathe Self   Type of Assistance Needed Physical assistance   Physical Assistance Level 51%-75%   Comment requires assist to wash LUE, wash under R armpit, under folds, distal LEs, groin and buttocks. Pt resistant to attempting certain tasks despite being reminded of increased functional IND with increased AROM RUE, improved balance, etc. and directs OT to assist.   Shower/Bathe Self CARE Score 2   Upper Body Dressing   Type of Assistance Needed Physical assistance   Physical Assistance Level 26%-50%   Comment pt requires mod/maxA with sports bra, able to don [place] tshirt with SUP today   Upper Body Dressing CARE Score 3   Lower Body Dressing   Type of Assistance Needed Physical assistance   Physical Assistance Level 76% or more   Comment TA for brief, maxA for pants - assists in hiking pants up over L hip with CS in stance   Lower " Body Dressing CARE Score 2   Putting On/Taking Off Footwear   Comment already donned start of session, declines need to change socks   Sit to Stand   Type of Assistance Needed Supervision   Physical Assistance Level No physical assistance   Comment CS with HW   Sit to Stand CARE Score 4   Toileting Hygiene   Comment declines need for toileting this morning   Cognition   Overall Cognitive Status WFL   Arousal/Participation Alert;Cooperative   Attention Within functional limits   Orientation Level Oriented X4   Memory Within functional limits   Following Commands Follows one step commands with increased time or repetition   Activity Tolerance   Activity Tolerance Patient tolerated treatment well   Assessment   Treatment Assessment Pt seen for 45 min OT session focusing on ADL routine. Pt demo's increased IND with UBD tasks this morning, able to don OH tshirt with SUP however still requires assist for sports bra. Pt good at self directing care, however also somewhat self-limiting and resistant to attempting ADL tasks. Pt edu re: improved RUE function and balance and importance of taking opportunity here in rehab [facility] during therapies to incorporate RUE into functional tasks as able even as stabilizer, and attempting these tasks and finding ways to complete ADL tasks with increased ease/IND prior d/c. OT to continue POC to focus on RUE NMR and balance next session.   Prognosis Good   Problem List Decreased strength;Decreased range of motion;Decreased endurance;Impaired balance;Decreased mobility;Decreased coordination;Impaired tone;Obesity   Barriers to Discharge Decreased caregiver support;Inaccessible home environment   Plan   Treatment/Interventions ADL retraining;Functional transfer training;Therapeutic exercise;Endurance training;Patient/family training;Equipment eval/education;Bed mobility;Gait training;Compensatory technique education   Progress Progressing toward goals   OT Therapy Minutes   OT Time In 0715   OT  Time Out 0800   OT Total Time (minutes) 45   OT Mode of treatment - Individual (minutes) 45   OT Mode of treatment - Concurrent (minutes) 0   OT Mode of treatment - Group (minutes) 0   OT Mode of treatment - Co-treat (minutes) 0   OT Mode of Treatment - Total time(minutes) 45 minutes   OT Cumulative Minutes 1735   Therapy Time missed   Time missed? No

## 2024-09-11 NOTE — PROGRESS NOTES
"OT Treatment Note       09/11/24 1000   Pain Assessment   Pain Assessment Tool 0-10   Pain Score No Pain   Restrictions/Precautions   Precautions Fall Risk;Contact/isolation;Supervision on toilet/commode   Weight Bearing Restrictions No   ROM Restrictions No   Braces or Orthoses   (did not use AFO, (+) sneakers, (+) R kaylyn sling)   Lifestyle   Autonomy \"This was a good session.\"   Sit to Stand   Type of Assistance Needed Supervision;Adaptive equipment   Physical Assistance Level No physical assistance   Comment CS using HW   Sit to Stand CARE Score 4   Bed-Chair Transfer   Type of Assistance Needed Supervision;Adaptive equipment   Physical Assistance Level No physical assistance   Comment CS using HW to both R & L   Chair/Bed-to-Chair Transfer CARE Score 4   ROM- Right Upper Extremities   RUE ROM Comment in prep for therapy session, pt completing A/AROM seated at table for shoulder flexion and horizontal abd/add (visual target used), and shoulder abd; skater board used to increase ROM, using 5sec hold with each rep for stretch; 10 reps each.   Neuromuscular Education   Comments Pt participating in various activities for increased RUE/LE motor control for increased participation and IND with ADLs: standing at tabletop WB through extended R wrist, and functionally reaching to 6\" stool with LUE to /place items on tabletop.  In prep for toileting and LBD tasks, pt also reaching posteriorly with LUE to retrieve items from seat behind her and place on tabletop. Pt completing modified deadlifts standing EOM using 5# weight LUE and placing onto 6\" stool, 10x2 reps with min external cues for technique. Pt completing all tasks with CGA/CS and no knee buckling or LOB noted. Seated at tabletop with forearm supported on tabletop, pt removing squigz from table with extended time and effort RUE primarily using more of a gross grasp rather than tripod. Pt also working on gross grasp RUE, picking up various sized balls " ranging from golf ball sized (able to better  non-shiny ones) to saebo sized, with min assist to extend thumb; extended time and effort requried to then slide items along tabletop and place into bin on both sides to encourage ER and supination. Pt provided with OT bin for room including 2 squigz, golf ball, larger stress ball, resistive sponge to practice activities in room, instructing pt to practice picking up and handing balls to L hand with R.   Cognition   Overall Cognitive Status WFL   Arousal/Participation Alert;Cooperative   Attention Within functional limits   Orientation Level Oriented X4   Memory Within functional limits   Following Commands Follows one step commands with increased time or repetition   Activity Tolerance   Activity Tolerance Patient tolerated treatment well   Assessment   Treatment Assessment Pt seen for 55 min OT session focusing on functional transfers, standing balance, and RUE NMR. Pt tolerated session well, continues to demo increased voluntary motor control RUE. OT to continue POC to focus on maximizing IND with BADLs, functional transfers, toileting, standing balance/tolerance, and RUE NMR to maximize IND and decrease caregiver burden upon d/c.   Prognosis Good   Problem List Decreased strength;Decreased range of motion;Decreased endurance;Impaired balance;Decreased mobility;Decreased coordination;Impaired tone;Obesity   Barriers to Discharge Inaccessible home environment;Decreased caregiver support   Plan   Treatment/Interventions ADL retraining;Functional transfer training;Therapeutic exercise;Endurance training;Patient/family training;Equipment eval/education;Bed mobility;Gait training;Compensatory technique education;OT   Progress Progressing toward goals   OT Therapy Minutes   OT Time In 1000   OT Time Out 1055   OT Total Time (minutes) 55   OT Mode of treatment - Individual (minutes) 55   OT Mode of treatment - Concurrent (minutes) 0   OT Mode of treatment - Group  (minutes) 0   OT Mode of treatment - Co-treat (minutes) 0   OT Mode of Treatment - Total time(minutes) 55 minutes   OT Cumulative Minutes 1790   Therapy Time missed   Time missed? No

## 2024-09-11 NOTE — ASSESSMENT & PLAN NOTE
----- Message from Tami Mehta Incidental finding   OP followup

## 2024-09-11 NOTE — PROGRESS NOTES
Physical Medicine and Rehabilitation Progress Note  Maye Lilly 64 y.o. female MRN: [id number]  Unit/Bed#: -01 Encounter: [id number]    Etiology/Reason for Admission: Impairment of mobility, safety, Activities of Daily Living (ADLs), and cognitive/communication skills due to Stroke:  01.2  Right Body Involvement (Left Brain)    Interval History: Seen face-to-face at bedside. No acute events overnight. No current concerns. Right shoulder pain is currently well controlled. Vital signs reviewed, stable and WNL. Voiding spontaneously, continent. Last BM 9/11/24, continent.     Assessment/Plan -     Functional Update:  Physical Therapy Occupational Therapy Speech Therapy   Weight Bearing Status: Full Weight Bearing  Transfers: Incidental Touching, Supervision  Bed Mobility: Moderate Assistance, Minimal Assistance  Amulation Distance (ft): 150 feet  Ambulation: Incidental Touching  Assistive Device for Ambulation: Ismael Walker  Wheelchair Mobility Distance: 150 ft  Wheelchair Mobility: Minimal Assistance, Incidental Touching  Number of Stairs: 4  Assistive Device for Stairs: Right Hand Rail  Stair Assistance: Assist of 2  Discharge Recommendations: Home with:  DC Home with:: Family Support, First Floor Setup, Home Physical Therapy   Eating: Supervision (set up)  Grooming: Supervision (set up)  Bathing: Moderate Assistance  Bathing: Moderate Assistance  Upper Body Dressing: Moderate Assistance  Lower Body Dressing: Maximum Assistance  Toileting: Moderate Assistance  Tub/Shower Transfer: Total Assistance, Assist of 2  Toilet Transfer: Incidental Touching  Cognition: Within Defined Limits  Cognition: Decreased Memory  Orientation: Person, Place, Time, Situation   Mode of Communication: Verbal  Speech/Language: Dysarthia (minimal)  Cognition: Exceptions to WNL  Cognition: Decreased Executive Functions, Decreased Attention, Decreased Comprehension, Decreased Memory  Orientation: Person, Place, Time,  Situation  Discharge Recommendations: Home with:  DC Home with:: 24 Hour Supervision, Family Support, Outpatient Speech Therapy     Ms. Maye Lilly is a 65 yo woman with a past medical history notable for HTN and atrial fibrillation who presented to the [facility] on 8/10/24 with acute dysarthria, facial droop and right hemiparesis. A stroke alert was activated, CT head wo contrast was negative for acute findings, CTA of the head/neck showed no evidence of large vessel occlusion and patent cervical carotid and vertebral arteries. MRI of the brain revealed an acute left pontine infarct. Course complicated by severe hypertension requiring multiple agents to obtain control.     - Acute left pontine ischemic infarct:   Clinically presented on 8/10/24 with right hemiparesis, dysarthria and facial droop  Stroke etiology: HTN, small vessel disease  MR brain on 8/11/24 revealed a small area of acute/subacute ischemia within the central luz to the left of midline without acute hemorrhage  Did not receive tPA/TNKase, did not undergo thrombectomy  DAPT regimen: None  Atorvastatin 40 mg qHs for secondary prevention  Monitor for neurogenic bowel and bladder  Promote regular sleep/wake cycles and proper sleep hygiene  Monitor closely for right hemiparetic shoulder pain and post-stroke pain  Neutral resting wrist splint for the and RLE multipodus boot to be worn at night and while in bed during the day to prevent developing spasticity and contracture  Maintain adequate nutrition, nutrition consult  Avoid sedating and anticholinergic medications as possible  Maintain normotension, s/p permissive hypertension  Outpatient neurovascular neurology follow-up  Outpatient PM&R follow-up for ongoing rehabilitation needs  - Right hemiparetic shoulder pain: in the setting of right sided hemiparesis from stroke, examination consistent with right shoulder impingement, no concerns for adhesive capsulitis currently; taping, PT  - C. Difficile  infection: OSH-acquired; vancomycin PO completed 8/26/24  - Paroxysmal atrial fibrillation: carvedilol for rate control, rivaroxaban for embolic stroke risk reduction; outpatient cardiology follow-up  - HTN: was not taking medications at home, reportedly due to cost; carvedilol, hydralazine, nifedipine, spironolactone, losartan; appreciate IM management  - Hypothyroidism: levothyroxine  - Therapies: PT, OT and SLP  - Right knee osteoarthritis: lidocaine patch daily, Voltaren topical QID PRN, tylenol PRN  - Bowel: Monitor closely for neurogenic bowel. Will follow BMs and adjust bowel regimen to target soft, formed stools q1-2 days, per pt's regular schedule.  - Bladder: Monitor closely for neurogenic bladder. Will follow UOP and encourage spontaneous voids. If unable to void spontaneously, will monitor with PVR bladder scans and initiate ISC regimen.  - Skin: Monitor for breakdown, frequent turns, Kreg specialty bed for pressure offloading  - Sleep: Practice effective sleep hygiene (e.g., consistent night and morning routine, quiet, dark room at night, no electronic devices/screens in bed, avoid large meals/caffeine before bed)  - VTE prophylaxis: Rivaroxaban  - Disposition: Anticipate discharge home with home PT and OT services on 9/16/24; family training completed on 9/8/24, also scheduled for 9/13/24 and this weekend prior to discharge; will plan to add SLP when progressed to outpatient services    Dk De Dios MD  Attending Physician  Physical Medicine and Rehabilitation  [facility]    Review of Systems:  A 10 point ROS was performed; negative except as noted above.       Current Facility-Administered Medications:     acetaminophen (TYLENOL) tablet 650 mg, 650 mg, Oral, Q6H PRN, WENCESLAO Patel, 650 mg at 09/11/24 0630    atorvastatin (LIPITOR) tablet 40 mg, 40 mg, Oral, QPM, WENCESLAO Patel, 40 mg at 09/10/24 1700    bisacodyl (DULCOLAX) rectal suppository 10  mg, 10 mg, Rectal, Daily PRN, Dougie King MD    carvedilol (COREG) tablet 25 mg, 25 mg, Oral, BID With Meals, Leeann Ordoñez PA-C, 25 mg at 09/11/24 1003    Diclofenac Sodium (VOLTAREN) 1 % topical gel 2 g, 2 g, Topical, 4x Daily PRN, Dk De Dios MD    hydrALAZINE (APRESOLINE) tablet 75 mg, 75 mg, Oral, Q8H BARB, Leeann Ordoñez PA-C, 75 mg at 09/11/24 1341    levothyroxine tablet 25 mcg, 25 mcg, Oral, Early Morning, WENCESLAO Patel, 25 mcg at 09/11/24 0629    lidocaine (LIDODERM) 5 % patch 1 patch, 1 patch, Topical, Daily, Dk De Dios MD, 1 patch at 09/10/24 2220    losartan (COZAAR) tablet 100 mg, 100 mg, Oral, Daily, WENCESLAO Patel, 100 mg at 09/11/24 1003    multivitamin stress formula tablet 1 tablet, 1 tablet, Oral, Daily, WENCESLAO Patel, 1 tablet at 09/11/24 1002    NIFEdipine (PROCARDIA XL) 24 hr tablet 60 mg, 60 mg, Oral, Daily With Dinner, Leeann Ordoñez PA-C, 60 mg at 09/10/24 1658    pantoprazole (PROTONIX) EC tablet 40 mg, 40 mg, Oral, Early Morning, WENCESLAO Ptael, 40 mg at 09/11/24 0629    rivaroxaban (XARELTO) tablet 20 mg, 20 mg, Oral, Daily With Dinner, WENCESLAO Patel, 20 mg at 09/10/24 1658    spironolactone (ALDACTONE) tablet 25 mg, 25 mg, Oral, Daily, WENCESLAO Patel, 25 mg at 09/11/24 1342    Physical Exam:  Temp:  [98.2 °F (36.8 °C)] 98.2 °F (36.8 °C)  HR:  [62-72] 62  Resp:  [19] 19  BP: (116-140)/(57-70) 125/60  SpO2:  [93 %-96 %] 96 %    GEN: Patient seen resting comfortably in WC, NAD  HEENT: NCAT; mild right sided facial droop at rest, mucous membranes moist  CV: No extremity edema  PULM: Breathing comfortably on room air  ABD: Non-distended  SKIN: No obvious rashes or lesions on exposed skin  NEURO:  Awake and alert, answering questions appropriately to context; able to follow simple commands with clear consistency  Speech is fluent and organized, but mildly dysarthric  Right hemiparesis,  "moving all extremities spontaneously in chair    Laboratory:  Labs reviewed, none new  Results from last 7 days   Lab Units 09/05/24  0604   HEMOGLOBIN g/dL 11.5   HEMATOCRIT % 36.0   WBC Thousand/uL 6.25           Invalid input(s): \"CA\"           Diagnostic Studies: Reviewed, no new imaging   XR shoulder 2+ vw right   Final Result by Zion Kuhn MD (09/03 0651)      No acute osseous abnormality.         Computerized Assisted Algorithm (CAA) may have been used to analyze all applicable images.         Workstation performed: [id number]           Total time spent:  35 minutes, with more than 50% spent counseling/coordinating care. Counseling includes discussion with patient re: progress in therapies, functional issues observed by therapy staff, and discussion with patient his/her current medical state/wellbeing. Coordination of patient's care was performed in conjunction with Internal Medicine service to monitor patient's labs, vitals, and management of their comorbidities.     "

## 2024-09-12 LAB
ANION GAP SERPL CALCULATED.3IONS-SCNC: 10 MMOL/L (ref 4–13)
BUN SERPL-MCNC: 16 MG/DL (ref 5–25)
CALCIUM SERPL-MCNC: 9.7 MG/DL (ref 8.4–10.2)
CHLORIDE SERPL-SCNC: 103 MMOL/L (ref 96–108)
CO2 SERPL-SCNC: 26 MMOL/L (ref 21–32)
CREAT SERPL-MCNC: 0.61 MG/DL (ref 0.6–1.3)
ERYTHROCYTE [DISTWIDTH] IN BLOOD BY AUTOMATED COUNT: 13.5 % (ref 11.6–15.1)
GFR SERPL CREATININE-BSD FRML MDRD: 95 ML/MIN/1.73SQ M
GLUCOSE P FAST SERPL-MCNC: 88 MG/DL (ref 65–99)
GLUCOSE SERPL-MCNC: 88 MG/DL (ref 65–140)
HCT VFR BLD AUTO: 36.2 % (ref 34.8–46.1)
HGB BLD-MCNC: 11.7 G/DL (ref 11.5–15.4)
MCH RBC QN AUTO: 30 PG (ref 26.8–34.3)
MCHC RBC AUTO-ENTMCNC: 32.3 G/DL (ref 31.4–37.4)
MCV RBC AUTO: 93 FL (ref 82–98)
PLATELET # BLD AUTO: 248 THOUSANDS/UL (ref 149–390)
PMV BLD AUTO: 10.4 FL (ref 8.9–12.7)
POTASSIUM SERPL-SCNC: 3.7 MMOL/L (ref 3.5–5.3)
RBC # BLD AUTO: 3.9 MILLION/UL (ref 3.81–5.12)
SODIUM SERPL-SCNC: 139 MMOL/L (ref 135–147)
WBC # BLD AUTO: 7.24 THOUSAND/UL (ref 4.31–10.16)

## 2024-09-12 PROCEDURE — 97110 THERAPEUTIC EXERCISES: CPT

## 2024-09-12 PROCEDURE — 94760 N-INVAS EAR/PLS OXIMETRY 1: CPT

## 2024-09-12 PROCEDURE — 94660 CPAP INITIATION&MGMT: CPT

## 2024-09-12 PROCEDURE — 99232 SBSQ HOSP IP/OBS MODERATE 35: CPT | Performed by: PHYSICAL MEDICINE & REHABILITATION

## 2024-09-12 PROCEDURE — 97116 GAIT TRAINING THERAPY: CPT

## 2024-09-12 PROCEDURE — 80048 BASIC METABOLIC PNL TOTAL CA: CPT | Performed by: PHYSICIAN ASSISTANT

## 2024-09-12 PROCEDURE — 97535 SELF CARE MNGMENT TRAINING: CPT

## 2024-09-12 PROCEDURE — 97129 THER IVNTJ 1ST 15 MIN: CPT

## 2024-09-12 PROCEDURE — 99231 SBSQ HOSP IP/OBS SF/LOW 25: CPT | Performed by: PHYSICIAN ASSISTANT

## 2024-09-12 PROCEDURE — 85027 COMPLETE CBC AUTOMATED: CPT | Performed by: PHYSICIAN ASSISTANT

## 2024-09-12 PROCEDURE — 97130 THER IVNTJ EA ADDL 15 MIN: CPT

## 2024-09-12 RX ADMIN — LIDOCAINE 1 PATCH: 700 PATCH TOPICAL at 21:24

## 2024-09-12 RX ADMIN — ACETAMINOPHEN 650 MG: 325 TABLET ORAL at 08:40

## 2024-09-12 RX ADMIN — LEVOTHYROXINE SODIUM 25 MCG: 25 TABLET ORAL at 06:05

## 2024-09-12 RX ADMIN — HYDRALAZINE HYDROCHLORIDE 75 MG: 50 TABLET ORAL at 06:05

## 2024-09-12 RX ADMIN — RIVAROXABAN 20 MG: 20 TABLET, FILM COATED ORAL at 16:37

## 2024-09-12 RX ADMIN — SPIRONOLACTONE 25 MG: 25 TABLET, FILM COATED ORAL at 08:36

## 2024-09-12 RX ADMIN — CARVEDILOL 25 MG: 25 TABLET, FILM COATED ORAL at 06:59

## 2024-09-12 RX ADMIN — NIFEDIPINE 60 MG: 30 TABLET, FILM COATED, EXTENDED RELEASE ORAL at 16:37

## 2024-09-12 RX ADMIN — LOSARTAN POTASSIUM 100 MG: 50 TABLET, FILM COATED ORAL at 08:36

## 2024-09-12 RX ADMIN — B-COMPLEX W/ C & FOLIC ACID TAB 1 TABLET: TAB at 08:35

## 2024-09-12 RX ADMIN — ATORVASTATIN CALCIUM 40 MG: 40 TABLET, FILM COATED ORAL at 17:10

## 2024-09-12 RX ADMIN — CARVEDILOL 25 MG: 25 TABLET, FILM COATED ORAL at 16:37

## 2024-09-12 RX ADMIN — PANTOPRAZOLE SODIUM 40 MG: 20 TABLET, DELAYED RELEASE ORAL at 06:05

## 2024-09-12 RX ADMIN — HYDRALAZINE HYDROCHLORIDE 75 MG: 50 TABLET ORAL at 13:53

## 2024-09-12 NOTE — PROGRESS NOTES
09/12/24 0930   Pain Assessment   Pain Score No Pain   Restrictions/Precautions   Precautions Fall Risk;Contact/isolation;Supervision on toilet/commode   Braces or Orthoses   (R ismael sling)   Cognition   Arousal/Participation Alert;Cooperative   Attention Within functional limits   Memory Within functional limits   Following Commands Follows one step commands with increased time or repetition   Subjective   Subjective No new complaints reported, Agreed to do first steps prior to other activities.   Sit to Stand   Type of Assistance Needed Supervision   Comment CS with HW   Sit to Stand CARE Score 4   Bed-Chair Transfer   Type of Assistance Needed Supervision;Adaptive equipment   Comment CS with HW   Chair/Bed-to-Chair Transfer CARE Score 4   Transfer Bed/Chair/Wheelchair   Adaptive Equipment Ismael Walker   Walk 10 Feet   Type of Assistance Needed Incidental touching;Adaptive equipment   Comment CGA with HW + gait belt   Walk 10 Feet CARE Score 4   Walk 50 Feet with Two Turns   Type of Assistance Needed Incidental touching;Adaptive equipment   Comment CGA with HW +gait belt   Walk 50 Feet with Two Turns CARE Score 4   Walk 150 Feet   Comment limited by fatigue as pt. walked after she did 3 sets of 5 steps   Walking 10 Feet on Uneven Surfaces   Comment (S)  will need to assess next session   Ambulation   Primary Mode of Locomotion Prior to Admission Walk   Distance Walked (feet) 120 ft   Assist Device Ismael Walker   Gait Pattern Inconsistant Jasmyne;Decreased foot clearance;Improper weight shift;Narrow OJ;Decreased R stance   Limitations Noted In Safety;Speed;Strength;Swing   Walk Assist Level Contact Guard   Findings use 3# wts on R LE for proprioceptive feedback   Does the patient walk? 2. Yes   Curb or Single Stair   Style negotiated Single stair   Type of Assistance Needed Physical assistance   Physical Assistance Level Total assistance   Comment min A X 1 with second person CGA for safety, on NW8 steps using L  hand on R handrail, verbal cues for posture and R foot placement especially descending steps .   1 Step (Curb) CARE Score 1   4 Steps   Type of Assistance Needed Physical assistance   Physical Assistance Level Total assistance   Comment min A X 1 with second person CGA for safety, on NW8 steps using L hand on R handrail, verbal cues for posture and R foot placement especially descending steps .   4 Steps CARE Score 1   12 Steps   Reason if not Attempted Safety concerns   12 Steps CARE Score 88   Stairs   Type Stairs   # of Steps 5  (X 3 sets with rest break in between sets)   Weight Bearing Precautions Fall Risk   Assist Devices Single Rail   Picking Up Object   Reason if not Attempted Safety concerns   Picking Up Object CARE Score 88   Therapeutic Interventions   Flexibility B hamstring and gastroc stretching   Equipment Use   NuStep L2 X 15 mins with R hand strap.   Assessment   Treatment Assessment Pt. engaged in 60 mins session and was able to tolerate above activities with focused on stairs management at the beginning of session right away which pt. complete 5 consecutive steps up and down with min A for wt. shifting and cues for posture as she did lean more towards rail/ wall while completing first set of steps. Pt. able to ambulate 120 feet but limited due to fatigue after steps and from earlier shower. No major LOB noted. Cont with POC and for FT this coming weekend with focused on steps management, car transfers and overall safety in functional mobility.   Problem List Decreased strength;Decreased endurance;Impaired balance;Decreased mobility;Decreased coordination;Impaired sensation;Obesity   Barriers to Discharge Inaccessible home environment   Barriers to Discharge Comments stated that son from [place] will be staying with her at her daughter's house in the area and he works from home   PT Barriers   Functional Limitation Car transfers;Stair negotiation;Transfers;Walking;Wheelchair management;Standing    Plan   Treatment/Interventions Functional transfer training;LE strengthening/ROM;Elevations;Therapeutic exercise;Endurance training;Patient/family training;Equipment eval/education;Bed mobility;Gait training   Discharge Recommendation   Rehab Resource Intensity Level, PT   (Home health PT then transition to out patient PT)   Equipment Recommended Wheelchair  (HW)   PT Therapy Minutes   PT Time In 0930   PT Time Out 1030   PT Total Time (minutes) 60   PT Mode of treatment - Individual (minutes) 60   PT Mode of treatment - Concurrent (minutes) 0   PT Mode of treatment - Group (minutes) 0   PT Mode of treatment - Co-treat (minutes) 0   PT Mode of Treatment - Total time(minutes) 60 minutes   PT Cumulative Minutes 2151   Therapy Time missed   Time missed? No

## 2024-09-12 NOTE — PROGRESS NOTES
09/12/24 0930   Pain Assessment   Pain Score No Pain   Restrictions/Precautions   Precautions Fall Risk;Contact/isolation;Supervision on toilet/commode   Braces or Orthoses   (R ismael sling)   Cognition   Arousal/Participation Alert;Cooperative   Attention Within functional limits   Memory Within functional limits   Following Commands Follows one step commands with increased time or repetition   Subjective   Subjective No new complaints reported, Agreed to do first steps prior to other activities.   Sit to Stand   Type of Assistance Needed Supervision   Comment CS with HW   Sit to Stand CARE Score 4   Bed-Chair Transfer   Type of Assistance Needed Supervision;Adaptive equipment   Comment CS with HW   Chair/Bed-to-Chair Transfer CARE Score 4   Transfer Bed/Chair/Wheelchair   Adaptive Equipment Ismael Walker   Walk 10 Feet   Type of Assistance Needed Incidental touching;Adaptive equipment   Comment CGA with HW + gait belt   Walk 10 Feet CARE Score 4   Walk 50 Feet with Two Turns   Type of Assistance Needed Incidental touching;Adaptive equipment   Comment CGA with HW +gait belt   Walk 50 Feet with Two Turns CARE Score 4   Walk 150 Feet   Comment limited by fatigue as pt. walked after she did 3 sets of 5 steps   Walking 10 Feet on Uneven Surfaces   Comment (S)  will need to assess next session   Ambulation   Primary Mode of Locomotion Prior to Admission Walk   Distance Walked (feet) 120 ft   Assist Device Ismael Walker   Gait Pattern Inconsistant Jasmyne;Decreased foot clearance;Improper weight shift;Narrow OJ;Decreased R stance   Limitations Noted In Safety;Speed;Strength;Swing   Walk Assist Level Contact Guard   Findings use 3# wts on R LE for proprioceptive feedback   Does the patient walk? 2. Yes   Curb or Single Stair   Style negotiated Single stair   Type of Assistance Needed Physical assistance   Physical Assistance Level Total assistance   Comment min A X 1 with second person CGA for safety, on NW8 steps using L  hand on R handrail, verbal cues for posture and R foot placement especially descending steps .   1 Step (Curb) CARE Score 1   4 Steps   Type of Assistance Needed Physical assistance   Physical Assistance Level Total assistance   Comment min A X 1 with second person CGA for safety, on NW8 steps using L hand on R handrail, verbal cues for posture and R foot placement especially descending steps .   4 Steps CARE Score 1   12 Steps   Reason if not Attempted Safety concerns   12 Steps CARE Score 88   Stairs   Type Stairs   # of Steps 5  (X 3 sets with rest break in between sets)   Weight Bearing Precautions Fall Risk   Assist Devices Single Rail   Picking Up Object   Reason if not Attempted Safety concerns   Picking Up Object CARE Score 88   Therapeutic Interventions   Flexibility B hamstring and gastroc stretching   Assessment   Treatment Assessment Pt. engaged in 60 mins session and was able to tolerate above activities with focused on stairs management at the beginning of session right away which pt. complete 5 consecutive steps up and down with min A for wt. shifting and cues for posture as she did lean more towards rail/ wall while completing first set of steps. Pt. able to ambulate 120 feet but limited due to fatigue after steps and from earlier shower. No major LOB noted. Cont with POC and for FT this coming weekend with focused on steps management, car transfers and overall safety in functional mobility.   Problem List Decreased strength;Decreased endurance;Impaired balance;Decreased mobility;Decreased coordination;Impaired sensation;Obesity   Barriers to Discharge Inaccessible home environment   Barriers to Discharge Comments stated that son from [place] will be staying with her at her daughter's house in the area and he works from home   PT Barriers   Functional Limitation Car transfers;Stair negotiation;Transfers;Walking;Wheelchair management;Standing   Plan   Treatment/Interventions Functional transfer  training;LE strengthening/ROM;Elevations;Therapeutic exercise;Endurance training;Patient/family training;Equipment eval/education;Bed mobility;Gait training   Discharge Recommendation   Rehab Resource Intensity Level, PT   (Home health PT then transition to out patient PT)   Equipment Recommended Wheelchair  (HW)   PT Therapy Minutes   PT Time In 0930   PT Time Out 1030   PT Total Time (minutes) 60   PT Mode of treatment - Individual (minutes) 60   PT Mode of treatment - Concurrent (minutes) 0   PT Mode of treatment - Group (minutes) 0   PT Mode of treatment - Co-treat (minutes) 0   PT Mode of Treatment - Total time(minutes) 60 minutes   PT Cumulative Minutes 2151   Therapy Time missed   Time missed? No

## 2024-09-12 NOTE — PROGRESS NOTES
Physical Medicine and Rehabilitation Progress Note  Maye Lilly 64 y.o. female MRN: [id number]  Unit/Bed#: -01 Encounter: [id number]    Etiology/Reason for Admission: Impairment of mobility, safety, Activities of Daily Living (ADLs), and cognitive/communication skills due to Stroke:  01.2  Right Body Involvement (Left Brain)    Interval History: Seen face-to-face at bedside. No acute events overnight. No current concerns. Right shoulder pain is currently well controlled. Vital signs reviewed, stable and WNL. Voiding spontaneously, continent. Last BM 9/12/24, continent.     Assessment/Plan -     Functional Update:  Physical Therapy Occupational Therapy Speech Therapy   Weight Bearing Status: Full Weight Bearing  Transfers: Incidental Touching, Supervision  Bed Mobility: Moderate Assistance, Minimal Assistance  Amulation Distance (ft): 150 feet  Ambulation: Incidental Touching  Assistive Device for Ambulation: Ismael Walker  Wheelchair Mobility Distance: 150 ft  Wheelchair Mobility: Minimal Assistance, Incidental Touching  Number of Stairs: 4  Assistive Device for Stairs: Right Hand Rail  Stair Assistance: Assist of 2  Discharge Recommendations: Home with:  DC Home with:: Family Support, First Floor Setup, Home Physical Therapy   Eating: Supervision (set up)  Grooming: Supervision (set up)  Bathing: Moderate Assistance  Bathing: Moderate Assistance  Upper Body Dressing: Moderate Assistance  Lower Body Dressing: Maximum Assistance  Toileting: Moderate Assistance  Tub/Shower Transfer: Total Assistance, Assist of 2  Toilet Transfer: Incidental Touching  Cognition: Within Defined Limits  Cognition: Decreased Memory  Orientation: Person, Place, Time, Situation   Mode of Communication: Verbal  Speech/Language: Dysarthia (minimal)  Cognition: Exceptions to WNL  Cognition: Decreased Executive Functions, Decreased Attention, Decreased Comprehension, Decreased Memory  Orientation: Person, Place, Time,  Situation  Discharge Recommendations: Home with:  DC Home with:: 24 Hour Supervision, Family Support, Outpatient Speech Therapy     Ms. Maye Lilly is a 65 yo woman with a past medical history notable for HTN and atrial fibrillation who presented to the [facility] on 8/10/24 with acute dysarthria, facial droop and right hemiparesis. A stroke alert was activated, CT head wo contrast was negative for acute findings, CTA of the head/neck showed no evidence of large vessel occlusion and patent cervical carotid and vertebral arteries. MRI of the brain revealed an acute left pontine infarct. Course complicated by severe hypertension requiring multiple agents to obtain control.     - Acute left pontine ischemic infarct:   Clinically presented on 8/10/24 with right hemiparesis, dysarthria and facial droop  Stroke etiology: HTN, small vessel disease  MR brain on 8/11/24 revealed a small area of acute/subacute ischemia within the central luz to the left of midline without acute hemorrhage  Did not receive tPA/TNKase, did not undergo thrombectomy  DAPT regimen: None  Atorvastatin 40 mg qHs for secondary prevention  Monitor for neurogenic bowel and bladder  Promote regular sleep/wake cycles and proper sleep hygiene  Monitor closely for right hemiparetic shoulder pain and post-stroke pain  Neutral resting wrist splint for the and RLE multipodus boot to be worn at night and while in bed during the day to prevent developing spasticity and contracture  Maintain adequate nutrition, nutrition consult  Avoid sedating and anticholinergic medications as possible  Maintain normotension, s/p permissive hypertension  Outpatient neurovascular neurology follow-up  Outpatient PM&R follow-up for ongoing rehabilitation needs  - Right hemiparetic shoulder pain: in the setting of right sided hemiparesis from stroke, examination consistent with right shoulder impingement, no concerns for adhesive capsulitis currently; taping, PT  - C. Difficile  infection: OSH-acquired; vancomycin PO completed 8/26/24  - Paroxysmal atrial fibrillation: carvedilol for rate control, rivaroxaban for embolic stroke risk reduction; outpatient cardiology follow-up  - HTN: was not taking medications at home, reportedly due to cost; carvedilol, hydralazine, nifedipine, spironolactone, losartan; appreciate IM management  - Hypothyroidism: levothyroxine  - Therapies: PT, OT and SLP  - Right knee osteoarthritis: lidocaine patch daily, Voltaren topical QID PRN, tylenol PRN  - Bowel: Monitor closely for neurogenic bowel. Will follow BMs and adjust bowel regimen to target soft, formed stools q1-2 days, per pt's regular schedule.  - Bladder: Monitor closely for neurogenic bladder. Will follow UOP and encourage spontaneous voids. If unable to void spontaneously, will monitor with PVR bladder scans and initiate ISC regimen.  - Skin: Monitor for breakdown, frequent turns, Kreg specialty bed for pressure offloading  - Sleep: Practice effective sleep hygiene (e.g., consistent night and morning routine, quiet, dark room at night, no electronic devices/screens in bed, avoid large meals/caffeine before bed)  - VTE prophylaxis: Rivaroxaban  - Disposition: Anticipate discharge home with home PT and OT services on 9/16/24; family training completed on 9/8/24, also scheduled for 9/13/24 and this weekend prior to discharge; will plan to add SLP when progressed to outpatient services    Dk De Dios MD  Attending Physician  Physical Medicine and Rehabilitation  [facility]    Review of Systems:  A 10 point ROS was performed; negative except as noted above.       Current Facility-Administered Medications:     acetaminophen (TYLENOL) tablet 650 mg, 650 mg, Oral, Q6H PRN, WENCESLAO Patel, 650 mg at 09/12/24 0840    atorvastatin (LIPITOR) tablet 40 mg, 40 mg, Oral, QPM, WENCESLAO Patel, 40 mg at 09/11/24 1747    bisacodyl (DULCOLAX) rectal suppository 10  mg, 10 mg, Rectal, Daily PRN, Dougie iKng MD    carvedilol (COREG) tablet 25 mg, 25 mg, Oral, BID With Meals, [place]ann Ordoñez PA-C, 25 mg at 09/12/24 0659    Diclofenac Sodium (VOLTAREN) 1 % topical gel 2 g, 2 g, Topical, 4x Daily PRN, Dk De Dios MD    hydrALAZINE (APRESOLINE) tablet 75 mg, 75 mg, Oral, Q8H BARB, [place]ann Ordoñez PA-C, 75 mg at 09/12/24 1353    levothyroxine tablet 25 mcg, 25 mcg, Oral, Early Morning, WENCESLAO Patel, 25 mcg at 09/12/24 0605    lidocaine (LIDODERM) 5 % patch 1 patch, 1 patch, Topical, Daily, Dk De Dios MD, 1 patch at 09/11/24 2059    losartan (COZAAR) tablet 100 mg, 100 mg, Oral, Daily, WENCESLAO Patel, 100 mg at 09/12/24 0836    multivitamin stress formula tablet 1 tablet, 1 tablet, Oral, Daily, WENCESLAO Patel, 1 tablet at 09/12/24 0835    NIFEdipine (PROCARDIA XL) 24 hr tablet 60 mg, 60 mg, Oral, Daily With Dinner, [place] Ordoñez PA-C, 60 mg at 09/11/24 1746    pantoprazole (PROTONIX) EC tablet 40 mg, 40 mg, Oral, Early Morning, WENCESLAO Patel, 40 mg at 09/12/24 0605    rivaroxaban (XARELTO) tablet 20 mg, 20 mg, Oral, Daily With Dinner, WENCESLAO Patel, 20 mg at 09/11/24 1747    spironolactone (ALDACTONE) tablet 25 mg, 25 mg, Oral, Daily, WENCESLAO Patel, 25 mg at 09/12/24 0836    Physical Exam:  Temp:  [97.9 °F (36.6 °C)-98 °F (36.7 °C)] 98 °F (36.7 °C)  HR:  [64-68] 67  Resp:  [17-18] 18  BP: (124-140)/(58-64) 137/63  SpO2:  [92 %-94 %] 93 %    GEN: Patient seen resting comfortably in bedside chair, NAD  HEENT: NCAT; mild right sided facial droop at rest, mucous membranes moist  CV: No extremity edema  PULM: Breathing comfortably on room air  ABD: Non-distended  SKIN: No obvious rashes or lesions on exposed skin  NEURO:  Awake and alert, answering questions appropriately to context; able to follow simple commands with clear consistency  Speech is fluent and organized, but mildly  dysarthric  Right hemiparesis, moving all extremities spontaneously in chair    Laboratory:  Labs reviewed  Results from last 7 days   Lab Units 09/12/24  0550   HEMOGLOBIN g/dL 11.7   HEMATOCRIT % 36.2   WBC Thousand/uL 7.24     Results from last 7 days   Lab Units 09/12/24  0550   BUN mg/dL 16   SODIUM mmol/L 139   POTASSIUM mmol/L 3.7   CHLORIDE mmol/L 103   CREATININE mg/dL 0.61              Diagnostic Studies: Reviewed, no new imaging   XR shoulder 2+ vw right   Final Result by Zion Kuhn MD (09/03 0651)      No acute osseous abnormality.         Computerized Assisted Algorithm (CAA) may have been used to analyze all applicable images.         Workstation performed: [id number]           Total visit time: 25 minutes, with more than 50% spent counseling/coordinating care. Counseling includes discussion with patient re: progress in therapies, functional issues observed by therapy staff, and discussion with patient regarding their current medical state and wellbeing. Coordination of patient's care was performed in conjunction with Internal Medicine service to monitor patient's labs, vitals, and management of their comorbidities.

## 2024-09-12 NOTE — PROGRESS NOTES
Progress Note - Hospitalist   Name: Maye Lilly 64 y.o. female I MRN: [id number]  Unit/Bed#: -01 I Date of Admission: 8/20/2024   Date of Service: 9/12/2024 I Hospital Day: 23    Assessment & Plan  Acute CVA (cerebrovascular accident) (HCC)  Presented with right sided weakness, slurred speech, headache and vomiting  MRI = left pontine infarct  Neuro felt CVA was from atrial fibrillation in setting of non-compliance with anticoagulation  Currently now on Xarelto 20mg qd, Lipitor 40mg qd  Followup with Neuro as OP.  [facility] summary  Paroxysmal atrial fibrillation (HCC)  Was seeing cardiologist in [place] and taking Xarelto but she has not seen him in 2 yrs and stopped all her meds >1 yr ago  EKG in the hospital showed NSR  Needs to followup as OP with Cardiology = Dr Patt Zhang Coreg  Was not placed back on Flecainide  Now on Xarelto, which will be supplied free of charge from [facility]  HTN (hypertension)  Patient was not on any medications prior to admission.  Since admission she has been started on Coreg 25mg BID, Hydralazine 75 mg q8hrs, Nifedipine 60mg daily, Aldactone 25mg daily and Losartan 100mg qd  Followup with IM as OP.  On DC summary was listed to see Megan DORSEY from that group  DIANELYS (obstructive sleep apnea)  In past was on CPAP.  Strongly encourage resuming CPAP usage, especially in light of new stroke.  Hypothyroidism  Continue Levothyroxine 25 mcg qd  This was the dose that she was supposed to be taking at home and they placed her back on it in the hospital on 8/12/24 but there was no TSH drawn.  Had an abnormal TSH of 5.1 on 4/23/20 presumably not on Levothyroxine at that time since thereafter on 2/15/22 was normal  TSH 8/22/24 normal at 3.95  Will continue current dose and have her get a TSH in 6 weeks = @9/23  GERD (gastroesophageal reflux disease)  Continue Protonix  Meningioma (HCC)  Incidental finding   OP followup  Right  shoulder pain  New complaint of right shoulder pain on 9/1- much improved at this point  Patient feels this is due to her residual right sided weakness from her recent stroke  Being treated by PMR        The above assessment and plan was reviewed and updated as determined by my evaluation of the patient on 9/12/2024.    History of Present Illness   Patient seen and examined. Patients overnight issues or events were reviewed with nursing staff. New or overnight issues include the following:   Patient feels well, sitting OOB in chair. Pain controlled. No CP/SOB. Appetite good    Review of Systems    Objective     Vitals:    09/12/24 0257 09/12/24 0544 09/12/24 0700 09/12/24 1342   BP:  131/61 124/58 137/63   BP Location:  Left arm Right arm Left arm   Pulse:  68  67   Resp:  18  18   Temp:  97.9 °F (36.6 °C)  98 °F (36.7 °C)   TempSrc:  Oral  Oral   SpO2: 93% 94%  93%   Weight:       Height:         Invasive Devices       None                   Physical Exam  General Appearance: NAD; pleasant  HEENT: PERRLA, conjuctiva normal; mucous membranes moist; face symmetrical  Neck:  Supple  Lungs: clear bilaterally, normal respiratory effort, no retractions, expiratory effort normal, on room air  CV: regular rate   ABD: soft non tender, +BS x4  EXT: DP pulses intact, no lymphadenopathy  Skin: normal turgor, normal texture, no rash  Psych: affect normal, mood normal  Neuro: AAOx3, right sided hemiparesis  The above physical exam was reviewed and updated as determined by my evaluation of the patient on 9/12/2024.      Lab Results: I have reviewed the following results:   Results from last 7 days   Lab Units 09/12/24  0550   WBC Thousand/uL 7.24   HEMOGLOBIN g/dL 11.7   HEMATOCRIT % 36.2   PLATELETS Thousands/uL 248     Results from last 7 days   Lab Units 09/12/24  0550   SODIUM mmol/L 139   POTASSIUM mmol/L 3.7   CHLORIDE mmol/L 103   CO2 mmol/L 26   BUN mg/dL 16   CREATININE mg/dL 0.61   CALCIUM mg/dL 9.7                        Review of Scheduled Meds: Medications  reviewed and reconciled as needed  Current Facility-Administered Medications   Medication Dose Route Frequency Provider Last Rate    acetaminophen  650 mg Oral Q6H PRN WENCESLAO Patel      atorvastatin  40 mg Oral QPM WENCESLAO Patel      bisacodyl  10 mg Rectal Daily PRN Dougie King MD      carvedilol  25 mg Oral BID With Meals [place]ann Ordoñez PA-C      Diclofenac Sodium  2 g Topical 4x Daily PRN Dk De Dios MD      hydrALAZINE  75 mg Oral Q8H [facility] [place] Ordoñez PA-C      levothyroxine  25 mcg Oral Early Morning WENCESLAO Patel      lidocaine  1 patch Topical Daily Dk De Dios MD      losartan  100 mg Oral Daily WENCESLAO Patel      multivitamin stress formula  1 tablet Oral Daily WENCESLAO Patel      NIFEdipine  60 mg Oral Daily With Dinner [place] Ordoñez PA-C      pantoprazole  40 mg Oral Early Morning WENCESLAO Patel      rivaroxaban  20 mg Oral Daily With Dinner WENCESLAO Patel      spironolactone  25 mg Oral Daily WENCESLAO Patel         VTE Pharmacologic Prophylaxis: xarelto  Code Status: Level 1 - Full Code  Current Length of Stay: 23 day(s)    Administrative Statements   I have spent a total time of 15 minutes in caring for this patient on the day of the visit/encounter including Impressions, Counseling / Coordination of care, Documenting in the medical record, Reviewing / ordering tests, medicine, procedures  , and Obtaining or reviewing history  .  ** Please Note:  voice to text software may have been used in the creation of this document. Although proof errors in transcription or interpretation are a potential of such software**

## 2024-09-12 NOTE — ASSESSMENT & PLAN NOTE
Was seeing cardiologist in [place] and taking Xarelto but she has not seen him in 2 yrs and stopped all her meds >1 yr ago  EKG in the hospital showed NSR  Needs to followup as OP with Cardiology = Dr Beltre  Continue Coreg  Was not placed back on Flecainide  Now on Xarelto, which will be supplied free of charge from [facility]

## 2024-09-12 NOTE — PROGRESS NOTES
"   09/12/24 0131   Pain Assessment   Pain Assessment Tool 0-10   Pain Score No Pain   Restrictions/Precautions   Precautions Fall Risk;Cognitive;Contact/isolation;Supervision on toilet/commode   Comprehension   Comprehension (FIM) 6 - Has only MILD difficulty with complex/abstract info   Expression   Expression (FIM) 6 - Has only MILD difficulty with complex/abstract info   Social Interaction   Social Interaction (FIM) 6 - Interacts appropriately with others BUT requires extra  time   Problem Solving   Problem solving (FIM) 6 - Solves complex problems BUT requires extra time   Memory   Memory (FIM) 6 - Recognizes with extra time   Speech/Language/Cognition Assessmetn   Treatment Assessment Patient seen for skilled St session to address cognitive linguistic skills.  Patient notes persisting deficits in higher level thinking and motor speech.  Patient notes struggling to complete daily OME program d/t inability to \"set-up\" for exercise program and keeping papers easily accessible.  Discussed strategies for home setting to increase ease of completion and recall to increase adherence to daily exercise program.   Overall min-mild OM weakness persist resulting in min-mild decrease in precision of articulation that increases with fatigue.  Initial structured task targeted 4 step sequencing of daily activities via picture cards.  Patient with good attention to detail for accurate sequencing in 30 out of 32 opportunities.  Mild verbal cues to attend to additional details and clarifications increased accuracy to 100%. Final task targeted deduction puzzle for higher level executive function skills.   Task completed independently in 4 out of 12 opportunities.  Mild assistance for correct completion of task.  Initiated education or discharge and home daily cognitive communication exercise program.  Patient with good comprehension. Overall, pt continuing to benefit from skilled SLP services targeting functional independence of " higher level cognitive skills in attempts for decreasing burden of care over time.   SLP Therapy Minutes   SLP Time In 1330   SLP Time Out 1430   SLP Total Time (minutes) 60   SLP Mode of treatment - Individual (minutes) 60   SLP Mode of treatment - Concurrent (minutes) 0   SLP Mode of treatment - Group (minutes) 0   SLP Mode of treatment - Co-treat (minutes) 0   SLP Mode of Treatment - Total time(minutes) 60 minutes   SLP Cumulative Minutes 810   Therapy Time missed   Time missed? No

## 2024-09-12 NOTE — PROGRESS NOTES
"   09/12/24 1557   Pain Assessment   Pain Assessment Tool 0-10   Pain Score No Pain   Restrictions/Precautions   Precautions Fall Risk;Cognitive;Contact/isolation;Supervision on toilet/commode   Comprehension   Comprehension (FIM) 6 - Has only MILD difficulty with complex/abstract info   Expression   Expression (FIM) 6 - Has only MILD difficulty with complex/abstract info   Social Interaction   Social Interaction (FIM) 6 - Interacts appropriately with others BUT requires extra  time   Problem Solving   Problem solving (FIM) 6 - Solves complex problems BUT requires extra time   Memory   Memory (FIM) 6 - Recognizes with extra time   Speech/Language/Cognition Assessmetn   Treatment Assessment Patient seen for skilled St session to address cognitive linguistic skills.  Patient notes persisting deficits in higher level thinking and motor speech.  Patient notes struggling to complete daily OME program d/t inability to \"set-up\" for exercise program and keeping papers easily accessible.  Discussed strategies for home setting to increase ease of completion and recall to increase adherence to daily exercise program.   Overall min-mild OM weakness persist resulting in min-mild decrease in precision of articulation that increases with fatigue.  Initial structured task targeted 4 step sequencing of daily activities via picture cards.  Patient with good attention to detail for accurate sequencing in 30 out of 32 opportunities.  Mild verbal cues to attend to additional details and clarifications increased accuracy to 100%. Final task targeted deduction puzzle for higher level executive function skills.   Task completed independently in 4 out of 12 opportunities.  Mild assistance for correct completion of task.  Initiated education or discharge and home daily cognitive communication exercise program.  Patient with good comprehension. Overall, pt continuing to benefit from skilled SLP services targeting functional independence of " higher level cognitive skills in attempts for decreasing burden of care over time.   SLP Therapy Minutes   SLP Time In 1330   SLP Time Out 1430   SLP Total Time (minutes) 60   SLP Mode of treatment - Individual (minutes) 60   SLP Mode of treatment - Concurrent (minutes) 0   SLP Mode of treatment - Group (minutes) 0   SLP Mode of treatment - Co-treat (minutes) 0   SLP Mode of Treatment - Total time(minutes) 60 minutes   SLP Cumulative Minutes 810   Therapy Time missed   Time missed? No

## 2024-09-12 NOTE — PROGRESS NOTES
"   09/12/24 0700   Pain Assessment   Pain Assessment Tool 0-10   Pain Score No Pain   Pain Location/Orientation Location: Knee   Pain Onset/Description Descriptor: Sore   Restrictions/Precautions   Precautions Fall Risk;Contact/isolation;Supervision on toilet/commode   Lifestyle   Autonomy \"My knee is sore but I will wait to ask for tylenol til after the shower. I think the shower will help it too\"   Oral Hygiene   Type of Assistance Needed Set-up / clean-up   Comment seated   Oral Hygiene CARE Score 5   Grooming   Findings menezes seated   Shower/Bathe Self   Type of Assistance Needed Physical assistance   Physical Assistance Level 25% or less   Comment Shower completed w/ overall Ruperto to wash bottoms of feet, LUE while seated. In stance at GB w/ CS for balance, assist to wash buttocks   Shower/Bathe Self CARE Score 3   Bathing   Assessed Bath Style Shower   Tub/Shower Transfer   Findings CGA swap out at shower using GB, next session would be appropriate to trial SPT w/ HW to tub bench, d/t wet environment and exiting shower w/o sneakers pt may continue to requrie swap out for exiting shower 2' safety concerns.   Upper Body Dressing   Type of Assistance Needed Physical assistance   Physical Assistance Level 26%-50%   Comment Partial assist for SB and tshirt. additional assist required 2' post shower mositure/added friction on skin.   Upper Body Dressing CARE Score 3   Lower Body Dressing   Type of Assistance Needed Physical assistance   Physical Assistance Level 51%-75%   Comment TA for brief, use of reacher for threading leggings, requiring partial assist and steadying in stance for CM over hips   Lower Body Dressing CARE Score 2   Putting On/Taking Off Footwear   Type of Assistance Needed Physical assistance   Physical Assistance Level 76% or more   Comment maxA use of sock aid and LHSH   Putting On/Taking Off Footwear CARE Score 2   Sit to Stand   Type of Assistance Needed Supervision   Comment HW from armed surfaces "   Sit to Stand CARE Score 4   Transfer Bed/Chair/Wheelchair   Findings CGA-CS SPT w/ kaylyn walker recliner<>WC   Toileting   Findings (S)  Confirmed that standard drop arm BSC from medical equipment storage is identical to one in pts room, which has been determined to fit in her bathroom space at home. Pt agreeable for [facility] to order her a standard drop arm BSC (confirmed weight limit up to 350lbs)   Cognition   Overall Cognitive Status WFL   Arousal/Participation Alert;Cooperative   Attention Within functional limits   Orientation Level Oriented X4   Memory Within functional limits   Following Commands Follows one step commands with increased time or repetition   Activity Tolerance   Activity Tolerance Patient tolerated treatment well   Assessment   Treatment Assessment OT session focusing on ADL retraining. Encouraged pt to use LHAE this session as is appropriate to trial considering her improved function w/ RLE and RUE. Pt relucant to trial, requires edu, despite trialing LHAE pt continues to require assist w/ these tasks 2' R kaylyn affected, although it does improve her ind from TA to mod-max. Pt continues to make progress towards OT goals and remains motivated to participate. OT to continue POC.   Prognosis Good   Problem List Decreased strength;Decreased range of motion;Impaired balance;Decreased mobility;Decreased coordination;Impaired sensation;Impaired tone;Obesity;Pain   Plan   Treatment/Interventions ADL retraining;Functional transfer training;Therapeutic exercise;Endurance training;Patient/family training;Equipment eval/education;Compensatory technique education;Continued evaluation   Progress Progressing toward goals   Discharge Recommendation   Equipment Recommended (S)  Bedside commode  (Will need to order standard drop arm BSC for pt, confirmed this will fit in her home and she is within weight limit)   OT Therapy Minutes   OT Time In 0700   OT Time Out 0830   OT Total Time (minutes) 90   OT Mode of  treatment - Individual (minutes) 90   OT Mode of treatment - Concurrent (minutes) 0   OT Mode of treatment - Group (minutes) 0   OT Mode of treatment - Co-treat (minutes) 0   OT Mode of Treatment - Total time(minutes) 90 minutes   OT Cumulative Minutes 1880   Therapy Time missed   Time missed? No

## 2024-09-13 LAB
DME PARACHUTE DELIVERY DATE EXPECTED: NORMAL
DME PARACHUTE DELIVERY DATE REQUESTED: NORMAL
DME PARACHUTE ITEM DESCRIPTION: NORMAL
DME PARACHUTE ORDER STATUS: NORMAL
DME PARACHUTE SUPPLIER NAME: NORMAL
DME PARACHUTE SUPPLIER PHONE: NORMAL

## 2024-09-13 PROCEDURE — 97116 GAIT TRAINING THERAPY: CPT

## 2024-09-13 PROCEDURE — 94760 N-INVAS EAR/PLS OXIMETRY 1: CPT

## 2024-09-13 PROCEDURE — 97112 NEUROMUSCULAR REEDUCATION: CPT

## 2024-09-13 PROCEDURE — 97130 THER IVNTJ EA ADDL 15 MIN: CPT

## 2024-09-13 PROCEDURE — 97530 THERAPEUTIC ACTIVITIES: CPT

## 2024-09-13 PROCEDURE — 94660 CPAP INITIATION&MGMT: CPT

## 2024-09-13 PROCEDURE — 97110 THERAPEUTIC EXERCISES: CPT

## 2024-09-13 PROCEDURE — 97129 THER IVNTJ 1ST 15 MIN: CPT

## 2024-09-13 PROCEDURE — 99231 SBSQ HOSP IP/OBS SF/LOW 25: CPT | Performed by: PHYSICIAN ASSISTANT

## 2024-09-13 PROCEDURE — 97535 SELF CARE MNGMENT TRAINING: CPT

## 2024-09-13 RX ADMIN — HYDRALAZINE HYDROCHLORIDE 75 MG: 50 TABLET ORAL at 13:42

## 2024-09-13 RX ADMIN — HYDRALAZINE HYDROCHLORIDE 75 MG: 50 TABLET ORAL at 06:14

## 2024-09-13 RX ADMIN — LEVOTHYROXINE SODIUM 25 MCG: 25 TABLET ORAL at 06:14

## 2024-09-13 RX ADMIN — LOSARTAN POTASSIUM 100 MG: 50 TABLET, FILM COATED ORAL at 08:02

## 2024-09-13 RX ADMIN — RIVAROXABAN 20 MG: 20 TABLET, FILM COATED ORAL at 16:04

## 2024-09-13 RX ADMIN — ACETAMINOPHEN 650 MG: 325 TABLET ORAL at 06:15

## 2024-09-13 RX ADMIN — SPIRONOLACTONE 25 MG: 25 TABLET, FILM COATED ORAL at 08:03

## 2024-09-13 RX ADMIN — PANTOPRAZOLE SODIUM 40 MG: 20 TABLET, DELAYED RELEASE ORAL at 06:15

## 2024-09-13 RX ADMIN — ATORVASTATIN CALCIUM 40 MG: 40 TABLET, FILM COATED ORAL at 17:16

## 2024-09-13 RX ADMIN — CARVEDILOL 25 MG: 25 TABLET, FILM COATED ORAL at 16:04

## 2024-09-13 RX ADMIN — CARVEDILOL 25 MG: 25 TABLET, FILM COATED ORAL at 08:01

## 2024-09-13 RX ADMIN — HYDRALAZINE HYDROCHLORIDE 75 MG: 50 TABLET ORAL at 21:13

## 2024-09-13 RX ADMIN — NIFEDIPINE 60 MG: 30 TABLET, FILM COATED, EXTENDED RELEASE ORAL at 16:04

## 2024-09-13 RX ADMIN — B-COMPLEX W/ C & FOLIC ACID TAB 1 TABLET: TAB at 08:02

## 2024-09-13 RX ADMIN — LIDOCAINE 1 PATCH: 700 PATCH TOPICAL at 21:12

## 2024-09-13 NOTE — ASSESSMENT & PLAN NOTE
Was seeing cardiologist in [place] and taking Xarelto but she has not seen him in 2 yrs and stopped all her meds >1 yr ago  EKG in the hospital showed NSR  Needs to followup as OP with Cardiology = Dr Beltre  Continue Coreg  Was not placed back on Flecainide  Now on Xarelto, which will be supplied free of charge from [facility]

## 2024-09-13 NOTE — PROGRESS NOTES
09/13/24 0830   Pain Assessment   Pain Assessment Tool 0-10   Pain Score No Pain   Restrictions/Precautions   Precautions Fall Risk;Contact/isolation;Supervision on toilet/commode   Weight Bearing Restrictions No   ROM Restrictions No   Cognition   Overall Cognitive Status WFL   Arousal/Participation Alert;Cooperative   Attention Within functional limits   Orientation Level Oriented X4   Memory Within functional limits   Following Commands Follows one step commands with increased time or repetition   Sit to Stand   Type of Assistance Needed Supervision;Verbal cues;Adaptive equipment   Comment S/CS with HW, VC's for hand placement at times   Sit to Stand CARE Score 4   Bed-Chair Transfer   Type of Assistance Needed Supervision;Adaptive equipment   Comment CS with HW   Chair/Bed-to-Chair Transfer CARE Score 4   Transfer Bed/Chair/Wheelchair   Adaptive Equipment Ismael Walker   Car Transfer   Comment will perform Sunday with son present   Walk 10 Feet   Type of Assistance Needed Incidental touching;Supervision;Adaptive equipment   Comment CS/CGA with HW   Walk 10 Feet CARE Score 4   Walk 50 Feet with Two Turns   Type of Assistance Needed Incidental touching;Adaptive equipment   Comment CGA with HW   Walk 50 Feet with Two Turns CARE Score 4   Ambulation   Primary Mode of Locomotion Prior to Admission Walk   Distance Walked (feet) 130 ft  (100' with 3# AW to RLE)   Assist Device Ismael Walker   Gait Pattern Inconsistant Jasmyne;Slow Jasmyne;Decreased foot clearance;R foot drag;R hemiparesis;Step to;Decreased R stance;Improper weight shift   Limitations Noted In Speed;Strength;Swing;Balance   Provided Assistance with: Direction   Walk Assist Level Close Supervision;Contact Guard   Does the patient walk? 2. Yes   Wheelchair mobility   Does the patient use a wheelchair? 1. Yes   Type of Wheelchair Used 1. Manual   Curb or Single Stair   Style negotiated Curb   Type of Assistance Needed Physical assistance;Verbal  "cues;Adaptive equipment   Physical Assistance Level 26%-50%   Comment 6\" curb step, fwd descend with HW. 4\" step was also completed with HW slightly behind pt in anticipation of HW no fitting next to pt on first step. P's son was hand on with both steps performed, A was needed to bring R foot down by end of session 2/2 fatigue. After looking at measurements of step purchased, it seems as though her HW will fit next her off of step for block step purchased. Once up on that step pt should be able to turn and reach for the RHR. Son also practices HHA on L incase pt is unable to reach rail from block step.   1 Step (Curb) CARE Score 3   4 Steps   Type of Assistance Needed Physical assistance;Verbal cues;Adaptive equipment   Physical Assistance Level 26%-50%   Comment 5 steps with L hand on RHR, retro descend. Son completed 2 out of 3 trials with pt. VC's to maintain close to pt needed but otherwise training went well.   4 Steps CARE Score 3   Stairs   Type Curb;Stairs   # of Steps 5  (x3, 6\" curb x2 reps, 4\"block x1 with HW and x1 with HHA on L)   Weight Bearing Precautions Fall Risk   Assist Devices Single Rail;Ismael Walker   Findings (S)  intermediate step that pt's son purchased has the option of being 6\" or 4\" high. Pt feels it should be placed at 4\" and once she has ahold of the rail she will be ok performing the rest of the steps.   Picking Up Object   Reason if not Attempted Safety concerns   Picking Up Object CARE Score 88   Assessment   Treatment Assessment Pt and pt's son present for FT this session. Main focus on today was stair management with son assisting pt. After a visual of pt amb on steps and curb step, pt's son performed hands on. Based on the assist given he has a good grasps on what needs to be provided to pt for home. Increased time was spent figuring out the intermediate step to cut 10\" step down and make it easier for pt. Measurements of step purchased were looked at and pt's son will measure " "length of other steps to ensure HW can maintain next to pt. Based on visuals it seems there will be enough room, it was measured at 15 1'2 inches needed and son made aware. Pt will have that first step brought down to 4\" vs 6\" as previously discussed. Pt did require assist to R foot when descending by end of session and son was shown how to assist with his foot. He plans to be back in [place] 9/15 for car transfer and he should have an accurate measurement of step width to determine if the HW will be ok next to pt ion step. Pt will cont POC as tolerated with cont focus on stair management and gait to decrease burden of care.   Family/Caregiver Present yes, son   PT Family training done with: son   Problem List Decreased strength;Decreased endurance;Impaired balance;Decreased mobility;Decreased coordination;Impaired sensation;Obesity   Barriers to Discharge Inaccessible home environment   PT Barriers   Functional Limitation Car transfers;Stair negotiation   Plan   Treatment/Interventions Functional transfer training;LE strengthening/ROM;Therapeutic exercise;Endurance training;Gait training   Progress Progressing toward goals   Discharge Recommendation   Equipment Recommended Wheelchair   PT Therapy Minutes   PT Time In 0830   PT Time Out 1000   PT Total Time (minutes) 90   PT Mode of treatment - Individual (minutes) 90   PT Mode of treatment - Concurrent (minutes) 0   PT Mode of treatment - Group (minutes) 0   PT Mode of treatment - Co-treat (minutes) 0   PT Mode of Treatment - Total time(minutes) 90 minutes   PT Cumulative Minutes 2241   Therapy Time missed   Time missed? No     "

## 2024-09-13 NOTE — PROGRESS NOTES
"   09/13/24 1230   Pain Assessment   Pain Assessment Tool 0-10   Pain Score No Pain   Comprehension   Comprehension (FIM) 6 - Has only MILD difficulty with complex/abstract info   Expression   Expression (FIM) 6 - Has only MILD difficulty with complex/abstract info   Social Interaction   Social Interaction (FIM) 6 - Interacts appropriately with others BUT requires extra  time   Problem Solving   Problem solving (FIM) 5 - Solves complex problems But requires cues from helper   Memory   Memory (FIM) 6 - Recognizes with extra time   Speech/Language/Cognition Assessmetn   Treatment Assessment Patient sitting in recliner chair, agreeable to skilled ST session targeting higher level problem solving, reasoning, deduction, and insight, as well as short term auditory memory. Patient given task she initiated in previous session where she was asked to deduce set of 15 hints to complete a logical puzzle outlining F05 names, matched each with their own # and type of animals, favorite drink, and type of preferred clothing. Patient performing with slow processing speed and less than 50% accuracy. SLP reviewed errors and discussed strategies to improve such as double checking, and asking for assistance before guessing if it's a \"toss up.\" SLP then discussing task completed in previous session that targeted STM for spoken 2-step directions containing four components each. Pt previously performed well, but suspected she would do poorly should there be increased visual and auditory distraction. SLP attempted similar task but with patient's door open to allow for natural distraction from the hallway. During task, distractions were plentiful given passerby's. Patient was only able to complete 1/5 sets successfully (when broken down into one-step, 5/10 accurately). Patient noted to complete parts of task correctly, but inaccurately completing other components. She also demonstrated omissions, as if completely forgetting to complete part " of the direction given. Patient acknowledging increased difficulty with distraction. SLP discussed strategies to improve accuracy in future, fxl situations such as limit distractions if able, or waiting until later time to complete task as hand until distractions have dissipated. Pt expressing understanding and agreement. Next, pt engaging in new logical puzzle, creating a family tree from given hints/clues. Albeit slow, she was able to complete with 100% accuracy. She requested to attempt similar logical puzzle like the first one she completed this session, to which this was granted. However, time did not permit completion of puzzle. SLP informed pt that she can complete in subsequent session if therapist sees fit as next 1-2 sessions may prioritize review of things to know prior to DC Monday, September 16th. Patient expressing understanding. Patient would continue to benefit from continued ST services whether HH or OP to further optimize her independence by maximizing her cognitive linguistic skills.   SLP Therapy Minutes   SLP Time In 1230   SLP Time Out 1330   SLP Total Time (minutes) 60   SLP Mode of treatment - Individual (minutes) 60   SLP Mode of treatment - Concurrent (minutes) 0   SLP Mode of treatment - Group (minutes) 0   SLP Mode of treatment - Co-treat (minutes) 0   SLP Mode of Treatment - Total time(minutes) 60 minutes   SLP Cumulative Minutes 870   Therapy Time missed   Time missed? No

## 2024-09-13 NOTE — PLAN OF CARE
Problem: PAIN - ADULT  Goal: Verbalizes/displays adequate comfort level or baseline comfort level  Description: Interventions:  - Encourage patient to monitor pain and request assistance  - Assess pain using appropriate pain scale  - Administer analgesics based on type and severity of pain and evaluate response  - Implement non-pharmacological measures as appropriate and evaluate response  - Consider cultural and social influences on pain and pain management  - Notify physician/advanced practitioner if interventions unsuccessful or patient reports new pain  Outcome: Progressing     Problem: INFECTION - ADULT  Goal: Absence or prevention of progression during hospitalization  Description: INTERVENTIONS:  - Assess and monitor for signs and symptoms of infection  - Monitor lab/diagnostic results  - Monitor all insertion sites, i.e. indwelling lines, tubes, and drains  - Monitor endotracheal if appropriate and nasal secretions for changes in amount and color  - [place] appropriate cooling/warming therapies per order  - Administer medications as ordered  - Instruct and encourage patient and family to use good hand hygiene technique  - Identify and instruct in appropriate isolation precautions for identified infection/condition  Outcome: Progressing     Problem: SAFETY ADULT  Goal: Patient will remain free of falls  Description: INTERVENTIONS:  - Educate patient/family on patient safety including physical limitations  - Instruct patient to call for assistance with activity   - Consult OT/PT to assist with strengthening/mobility   - Keep Call bell within reach  - Keep bed low and locked with side rails adjusted as appropriate  - Keep care items and personal belongings within reach  - Initiate and maintain comfort rounds  - Make Fall Risk Sign visible to staff  - Offer Toileting every  Hours, in advance of need  - Initiate/Maintain alarm  - Obtain necessary fall risk management equipment:   - Apply yellow socks and  bracelet for high fall risk patients  - Consider moving patient to room near nurses station  Outcome: Progressing  Goal: Maintain or return to baseline ADL function  Description: INTERVENTIONS:  -  Assess patient's ability to carry out ADLs; assess patient's baseline for ADL function and identify physical deficits which impact ability to perform ADLs (bathing, care of mouth/teeth, toileting, grooming, dressing, etc.)  - Assess/evaluate cause of self-care deficits   - Assess range of motion  - Assess patient's mobility; develop plan if impaired  - Assess patient's need for assistive devices and provide as appropriate  - Encourage maximum independence but intervene and supervise when necessary  - Involve family in performance of ADLs  - Assess for home care needs following discharge   - Consider OT consult to assist with ADL evaluation and planning for discharge  - Provide patient education as appropriate  Outcome: Progressing  Goal: Maintains/Returns to pre admission functional level  Description: INTERVENTIONS:  - Perform AM-PAC 6 Click Basic Mobility/ Daily Activity assessment daily.  - Set and communicate daily mobility goal to care team and patient/family/caregiver.   - Collaborate with rehabilitation services on mobility goals if consulted  - Perform Range of Motion  times a day.  - Reposition patient every  hours.  - Dangle patient  times a day  - Stand patient  times a day  - Ambulate patient  times a day  - Out of bed to chair  times a day   - Out of bed for meals  times a day  - Out of bed for toileting  - Record patient progress and toleration of activity level   Outcome: Progressing     Problem: DISCHARGE PLANNING  Goal: Discharge to home or other facility with appropriate resources  Description: INTERVENTIONS:  - Identify barriers to discharge w/patient and caregiver  - Arrange for needed discharge resources and transportation as appropriate  - Identify discharge learning needs (meds, wound care, etc.)  -  Arrange for interpretive services to assist at discharge as needed  - Refer to Case Management Department for coordinating discharge planning if the patient needs post-hospital services based on physician/advanced practitioner order or complex needs related to functional status, cognitive ability, or social support system  Outcome: Progressing     Problem: Prexisting or High Potential for Compromised Skin Integrity  Goal: Skin integrity is maintained or improved  Description: INTERVENTIONS:  - Identify patients at risk for skin breakdown  - Assess and monitor skin integrity  - Assess and monitor nutrition and hydration status  - Monitor labs   - Assess for incontinence   - Turn and reposition patient  - Assist with mobility/ambulation  - Relieve pressure over bony prominences  - Avoid friction and shearing  - Provide appropriate hygiene as needed including keeping skin clean and dry  - Evaluate need for skin moisturizer/barrier cream  - Collaborate with interdisciplinary team   - Patient/family teaching  - Consider wound care consult   Outcome: Progressing

## 2024-09-13 NOTE — PROGRESS NOTES
"   09/13/24 0504   Pain Assessment   Pain Assessment Tool 0-10   Pain Score No Pain   Restrictions/Precautions   Precautions Fall Risk;Contact/isolation;Supervision on toilet/commode   Weight Bearing Restrictions No   ROM Restrictions No   Braces or Orthoses   (kaylyn sling for transfers/mobility)   Lifestyle   Autonomy \"this is so much more comfortable\" referring to kaylyn sling   Sit to Stand   Type of Assistance Needed Supervision   Physical Assistance Level No physical assistance   Comment CS with HW   Sit to Stand CARE Score 4   Bed-Chair Transfer   Type of Assistance Needed Supervision   Physical Assistance Level No physical assistance   Comment CS with HW and kaylyn sling with gait belt donned   Chair/Bed-to-Chair Transfer CARE Score 4   Toileting Hygiene   Type of Assistance Needed Physical assistance   Physical Assistance Level 51%-75%   Comment pt demo's ability to complete daria hygiene while seated with func reach back to front (ideal method is front to back however unable to complete due to bodyu habitus and decreased func reach); pt continues to consistently perform CM down on L side, requires assist for R side   Toileting Hygiene CARE Score 2   Toilet Transfer   Type of Assistance Needed Supervision   Physical Assistance Level No physical assistance   Comment CS with HW to standard toilet with gait belt donned   Toilet Transfer CARE Score 4   Neuromuscular Education   Functional Movement Patterns various ROM exercises completed w/o use of MAS or e-stim focusing on increasing ROM and strength for increased func use. pt demo's ability to achieve 60 degrees of active shoulder abduction, 50 degrees with minimal upper trap compensation during shoulder flexion, 55 degrees of active elbow flexion and 20 degrees of active wrist flexion (unable to achieve wrist extension past neutral position). during AAROM to elbow flexion, pt was instructed to sustain gross  on small cone with focus on increasing hand strength " as well as performing ROM task in prep for self feeding as pt is R handed. using same small cone, pt engages in AAROM forearm pronation/supination as well as wrist flexion and extension. of note, did trial  strength assessment with pt demo'ing 1-2 pounds of gross  strength.   Cognition   Overall Cognitive Status WFL   Arousal/Participation Alert;Cooperative   Attention Within functional limits   Orientation Level Oriented X4   Memory Within functional limits   Following Commands Follows one step commands with increased time or repetition   Additional Activities   Additional Activities Comments pt's son purchased kaylyn arm sling and brought in to be fitted to pt. donned sling to pt and adjusted velcro strapping with pt reporting increased comfort and likes how it feels when donned adding that it provides much support to her proximal shoulder.   Activity Tolerance   Activity Tolerance Patient tolerated treatment well   Assessment   Treatment Assessment pt engages in 60 minute skilled OT session focusing on toileting, func transfers with HW, RUE NMR and ROM assessment. see above for full func details. pt continues to demo slow and steady progress toward OT goals. pt continues to demo increased AROM to R shoulder, elbow and wrist, however does remain non functional at this time. see measurements above for more details. pt remains on track for d/c home on monday with support from family for ADLS and IADLs. plan to complete additional 30 minutes of OT FT to focus on edu of kaylyn sling and further recommendations as far as ADLs go, followed by D/C ADL routine. continue OT POC to focus on ongoing RUE NMR without MAS and e-stim to increase func use of UE for increased independence with ADLS/IADLS in order to decrease burden of care at d/c.   Prognosis Good   Problem List Decreased strength;Decreased endurance;Impaired balance;Decreased mobility;Decreased safety awareness;Obesity   Plan   Treatment/Interventions ADL  retraining;Functional transfer training;Therapeutic exercise;Endurance training;Patient/family training;Equipment eval/education;Compensatory technique education   OT Therapy Minutes   OT Time In 1330   OT Time Out 1430   OT Total Time (minutes) 60   OT Mode of treatment - Individual (minutes) 60   OT Mode of treatment - Concurrent (minutes) 0   OT Mode of treatment - Group (minutes) 0   OT Mode of treatment - Co-treat (minutes) 0   OT Mode of Treatment - Total time(minutes) 60 minutes   OT Cumulative Minutes 1940   Therapy Time missed   Time missed? No

## 2024-09-13 NOTE — PROGRESS NOTES
09/13/24 1000   Pain Assessment   Pain Assessment Tool 0-10   Subjective   Subjective pt agreeable to participate in PT tx. Handoff from PTA Darlyn who just completed family trainign with son   Sit to Stand   Type of Assistance Needed Supervision   Comment Cs with hemiwalker   Sit to Stand CARE Score 4   Bed-Chair Transfer   Type of Assistance Needed Supervision;Adaptive equipment   Comment CS with HW   Chair/Bed-to-Chair Transfer CARE Score 4   Walk 10 Feet   Type of Assistance Needed Incidental touching;Adaptive equipment   Comment CGA with HW   Walk 10 Feet CARE Score 4   Ambulation   Distance Walked (feet) 15 ft  (only completed short distance gait this session as she ambulated increased distance during family training)   Assist Device Ismael Walker   Gait Pattern Inconsistant Jasmyne;Decreased foot clearance;R foot drag;R hemiparesis;Decreased R stance;Improper weight shift   Limitations Noted In Balance;Endurance;Heel Strike;Safety;Speed;Strength;Swing   Provided Assistance with: Balance;Weight Shift   Walk Assist Level Contact Guard   Findings CGA with HW   Does the patient walk? 2. Yes   Toilet Transfer   Type of Assistance Needed Incidental touching;Verbal cues;Adaptive equipment   Comment CGA to [facility] over toilet with hemiwalker   Toilet Transfer CARE Score 4   Equipment Use   NuStep level 2 x 15 minutes, 3# wt RLE and RUE strap   Assessment   Treatment Assessment pt participated in brief 30 minute PT tx following family training session with son. She completed 15 minutes on NuStep for strengthening, endurance, and NPP. She ambulated short distances with HW and CGA. She will continue to benefit from skilled PT interventions to address deficits, reduce risk for falls, and maximize functional independence   Problem List Decreased strength;Decreased endurance;Impaired balance;Decreased mobility;Decreased safety awareness;Obesity   Barriers to Discharge Inaccessible home environment   Plan    Treatment/Interventions Functional transfer training;LE strengthening/ROM;Elevations;Therapeutic exercise;Endurance training;Patient/family training;Equipment eval/education;Bed mobility;Gait training   Progress Progressing toward goals   PT Therapy Minutes   PT Time In 1000   PT Time Out 1030   PT Total Time (minutes) 30   PT Mode of treatment - Individual (minutes) 30   PT Mode of treatment - Concurrent (minutes) 0   PT Mode of treatment - Group (minutes) 0   PT Mode of treatment - Co-treat (minutes) 0   PT Mode of Treatment - Total time(minutes) 30 minutes   PT Cumulative Minutes 2181   Therapy Time missed   Time missed? No

## 2024-09-13 NOTE — ASSESSMENT & PLAN NOTE
Presented with right sided weakness, slurred speech, headache and vomiting  MRI = left pontine infarct  Neuro felt CVA was from atrial fibrillation in setting of non-compliance with anticoagulation  Currently now on Xarelto 20mg qd, Lipitor 40mg qd  Followup with Neuro as OP.  [facility] summary

## 2024-09-13 NOTE — NURSING NOTE
Pt's BP was taken twice- 111/53 and 111/51. Contacted on call about whether to give hydralazine. Order to hold med given.

## 2024-09-13 NOTE — PROGRESS NOTES
Progress Note - Hospitalist   Name: Maye Lilly 64 y.o. female I MRN: [id number]  Unit/Bed#: -01 I Date of Admission: 8/20/2024   Date of Service: 9/13/2024 I Hospital Day: 24    Assessment & Plan  Acute CVA (cerebrovascular accident) (HCC)  Presented with right sided weakness, slurred speech, headache and vomiting  MRI = left pontine infarct  Neuro felt CVA was from atrial fibrillation in setting of non-compliance with anticoagulation  Currently now on Xarelto 20mg qd, Lipitor 40mg qd  Followup with Neuro as OP.  [facility] summary  Paroxysmal atrial fibrillation (HCC)  Was seeing cardiologist in [place] and taking Xarelto but she has not seen him in 2 yrs and stopped all her meds >1 yr ago  EKG in the hospital showed NSR  Needs to followup as OP with Cardiology = Dr Patt Zhang Coreg  Was not placed back on Flecainide  Now on Xarelto, which will be supplied free of charge from [employer]  HTN (hypertension)  Patient was not on any medications prior to admission.  Since admission she has been started on Coreg 25mg BID, Hydralazine 75 mg q8hrs, Nifedipine 60mg daily, Aldactone 25mg daily and Losartan 100mg qd  Followup with IM as OP.  On DC summary was listed to see Megan DORSEY from that group  DIANELYS (obstructive sleep apnea)  In past was on CPAP.  Strongly encourage resuming CPAP usage, especially in light of new stroke.  Hypothyroidism  Continue Levothyroxine 25 mcg qd  This was the dose that she was supposed to be taking at home and they placed her back on it in the hospital on 8/12/24 but there was no TSH drawn.  Had an abnormal TSH of 5.1 on 4/23/20 presumably not on Levothyroxine at that time since thereafter on 2/15/22 was normal  TSH 8/22/24 normal at 3.95  Will continue current dose and have her get a TSH in 6 weeks = @9/23  GERD (gastroesophageal reflux disease)  Continue Protonix  Meningioma (HCC)  Incidental finding   OP followup  Right  shoulder pain  New complaint of right shoulder pain on 9/1- much improved at this point  Patient feels this is due to her residual right sided weakness from her recent stroke  Being treated by PMR        The above assessment and plan was reviewed and updated as determined by my evaluation of the patient on 9/13/2024.    History of Present Illness   Patient seen and examined. Patients overnight issues or events were reviewed with nursing staff. New or overnight issues include the following:   Patient seen sitting comfortably in chair. States she feels tired today from therapy. Planning discharge home Monday. No pain. No SOB    Review of Systems    Objective     Vitals:    09/12/24 2306 09/13/24 0330 09/13/24 0545 09/13/24 0801   BP:   139/63 122/57   BP Location:   Left arm Left arm   Pulse:   69 65   Resp:   18    Temp:   97.9 °F (36.6 °C)    TempSrc:   Oral    SpO2: 95% 95% 94%    Weight:       Height:         Invasive Devices       None                   Physical Exam  General Appearance: NAD; pleasant  HEENT: PERRLA, conjuctiva normal; mucous membranes moist; face symmetrical  Neck:  Supple  Lungs: clear bilaterally, normal respiratory effort, no retractions, expiratory effort normal, on room air  CV: regular rate   ABD: soft non tender, +BS x4  EXT: DP pulses intact, no lymphadenopathy, no edema  Skin: normal turgor, normal texture, no rash  Psych: affect normal, mood normal  Neuro: AAOx3, residual right sided weakness from stroke  The above physical exam was reviewed and updated as determined by my evaluation of the patient on 9/13/2024.      Lab Results: I have reviewed the following results:   Results from last 7 days   Lab Units 09/12/24  0550   WBC Thousand/uL 7.24   HEMOGLOBIN g/dL 11.7   HEMATOCRIT % 36.2   PLATELETS Thousands/uL 248     Results from last 7 days   Lab Units 09/12/24  0550   SODIUM mmol/L 139   POTASSIUM mmol/L 3.7   CHLORIDE mmol/L 103   CO2 mmol/L 26   BUN mg/dL 16   CREATININE mg/dL  0.61   CALCIUM mg/dL 9.7                       Review of Scheduled Meds: Medications  reviewed and reconciled as needed  Current Facility-Administered Medications   Medication Dose Route Frequency Provider Last Rate    acetaminophen  650 mg Oral Q6H PRN WENCESLAO Patel      atorvastatin  40 mg Oral QPM WENCESLAO Patel      bisacodyl  10 mg Rectal Daily PRN Dougie King MD      carvedilol  25 mg Oral BID With Meals Leeann Ordoñez PA-C      Diclofenac Sodium  2 g Topical 4x Daily PRN Dk De Dios MD      hydrALAZINE  75 mg Oral Q8H [facility] Leeann Ordoñez PA-C      levothyroxine  25 mcg Oral Early Morning WENCESLAO Patel      lidocaine  1 patch Topical Daily Dk De Dios MD      losartan  100 mg Oral Daily WENCESLAO Patel      multivitamin stress formula  1 tablet Oral Daily WENCESLAO Patel      NIFEdipine  60 mg Oral Daily With Dinner Leeann Ordoñez PA-C      pantoprazole  40 mg Oral Early Morning WENCESLAO Patel      rivaroxaban  20 mg Oral Daily With Dinner WENCESLAO Patel      spironolactone  25 mg Oral Daily WENCESLAO Patel         VTE Pharmacologic Prophylaxis: xarelto  Code Status: Level 1 - Full Code  Current Length of Stay: 24 day(s)    Administrative Statements   I have spent a total time of 15 minutes in caring for this patient on the day of the visit/encounter including Impressions, Counseling / Coordination of care, Documenting in the medical record, Reviewing / ordering tests, medicine, procedures  , Obtaining or reviewing history  , and Communicating with other healthcare professionals .  ** Please Note:  voice to text software may have been used in the creation of this document. Although proof errors in transcription or interpretation are a potential of such software**

## 2024-09-13 NOTE — CASE MANAGEMENT
"Order placed for a 22\" hi strength lite weight w/c and drop arm commode. [place] does not recognize hi strength w/c's in 22\", cm was directed to order a heavy duty 22\" chair but comment it is to be a hi strength. Zia De Dios, [facility] health liaison aware.  Items are to be delivered to pts room on Monday prior to dc.   "

## 2024-09-14 PROCEDURE — 94760 N-INVAS EAR/PLS OXIMETRY 1: CPT

## 2024-09-14 PROCEDURE — 97530 THERAPEUTIC ACTIVITIES: CPT

## 2024-09-14 PROCEDURE — 97112 NEUROMUSCULAR REEDUCATION: CPT

## 2024-09-14 PROCEDURE — 94660 CPAP INITIATION&MGMT: CPT

## 2024-09-14 RX ADMIN — HYDRALAZINE HYDROCHLORIDE 75 MG: 50 TABLET ORAL at 14:24

## 2024-09-14 RX ADMIN — SPIRONOLACTONE 25 MG: 25 TABLET, FILM COATED ORAL at 08:10

## 2024-09-14 RX ADMIN — LOSARTAN POTASSIUM 100 MG: 50 TABLET, FILM COATED ORAL at 08:09

## 2024-09-14 RX ADMIN — CARVEDILOL 25 MG: 25 TABLET, FILM COATED ORAL at 17:03

## 2024-09-14 RX ADMIN — CARVEDILOL 25 MG: 25 TABLET, FILM COATED ORAL at 08:09

## 2024-09-14 RX ADMIN — PANTOPRAZOLE SODIUM 40 MG: 20 TABLET, DELAYED RELEASE ORAL at 05:05

## 2024-09-14 RX ADMIN — LEVOTHYROXINE SODIUM 25 MCG: 25 TABLET ORAL at 05:05

## 2024-09-14 RX ADMIN — RIVAROXABAN 20 MG: 20 TABLET, FILM COATED ORAL at 17:03

## 2024-09-14 RX ADMIN — HYDRALAZINE HYDROCHLORIDE 75 MG: 50 TABLET ORAL at 05:05

## 2024-09-14 RX ADMIN — B-COMPLEX W/ C & FOLIC ACID TAB 1 TABLET: TAB at 08:09

## 2024-09-14 RX ADMIN — HYDRALAZINE HYDROCHLORIDE 75 MG: 50 TABLET ORAL at 21:06

## 2024-09-14 RX ADMIN — NIFEDIPINE 60 MG: 30 TABLET, FILM COATED, EXTENDED RELEASE ORAL at 17:03

## 2024-09-14 RX ADMIN — LIDOCAINE 1 PATCH: 700 PATCH TOPICAL at 21:06

## 2024-09-14 RX ADMIN — ATORVASTATIN CALCIUM 40 MG: 40 TABLET, FILM COATED ORAL at 17:03

## 2024-09-14 NOTE — PROGRESS NOTES
09/14/24 0830   Pain Assessment   Pain Assessment Tool 0-10   Pain Score No Pain   Restrictions/Precautions   Precautions Fall Risk;Contact/isolation;Supervision on toilet/commode   Weight Bearing Restrictions No   ROM Restrictions No   Braces or Orthoses Other (Comment)  (ismael sling for transfers/mobility)   Cognition   Overall Cognitive Status WFL   Arousal/Participation Alert;Cooperative   Attention Within functional limits   Orientation Level Oriented X4   Memory Within functional limits   Following Commands Follows one step commands with increased time or repetition   Sit to Stand   Type of Assistance Needed Supervision;Adaptive equipment   Comment CS with HW   Sit to Stand CARE Score 4   Bed-Chair Transfer   Type of Assistance Needed Adaptive equipment;Supervision   Comment CS with HW and ismael sling with gait belt donned   Chair/Bed-to-Chair Transfer CARE Score 4   Transfer Bed/Chair/Wheelchair   Adaptive Equipment Ismael Walker   Walk 10 Feet   Type of Assistance Needed Supervision;Adaptive equipment   Comment CS with HW   Walk 10 Feet CARE Score 4   Walk 50 Feet with Two Turns   Type of Assistance Needed Incidental touching;Supervision;Adaptive equipment   Comment CS/CGA with HW   Walk 50 Feet with Two Turns CARE Score 4   Ambulation   Primary Mode of Locomotion Prior to Admission Walk   Distance Walked (feet) 120 ft   Assist Device Ismael Walker   Gait Pattern Inconsistant Jasmyne;Decreased foot clearance;R foot drag;Forward Flexion;R hemiparesis;Wide OJ;Step to;Step through;Decreased R stance;Improper weight shift;Trendelenburg   Limitations Noted In Coordination;Endurance;Heel Strike;Speed;Swing   Provided Assistance with: Direction   Walk Assist Level Close Supervision;Contact Guard   Does the patient walk? 2. Yes   Wheelchair mobility   Does the patient use a wheelchair? 1. Yes   Type of Wheelchair Used 1. Manual   Curb or Single Stair   Style negotiated Curb   Type of Assistance Needed Physical  "assistance;Verbal cues;Adaptive equipment   Physical Assistance Level 26%-50%   Comment 6\" curb step with HW on step, retro desscend x2 reps   1 Step (Curb) CARE Score 3   4 Steps   Type of Assistance Needed Incidental touching;Physical assistance;Verbal cues;Adaptive equipment   Physical Assistance Level 26%-50%   Comment VALERIE ascending and CGA/VALERIE descending L hand on RHR, retro descend   4 Steps CARE Score 3   Stairs   Type Stairs;Curb   # of Steps 5  (x2)   Weight Bearing Precautions Fall Risk   Assist Devices Single Rail;Ismael Walker   Therapeutic Interventions   Neuromuscular Re-Education NPP gait with LHHA x30 x2, x30 feet x2 with L hand grazing the wall as pt was too afraid to not have something to grab if needed.   Other Pt was not dressed upon entering room. Pt was set up with basin and water to begin washing. Assist given to wash pt's back creases and daria anal area. Once washed A was given for donning her shirt and pants.   Equipment Use   NuStep L2 x15 min, 1360 steps   Assessment   Treatment Assessment Pt participated in skilled PT session with increased focus on NPP gait with less assist, increased stair management and ADL start of session. Pt encouraged to amb with no HHA this session. 2/2 fear she was not agreeable but agreed to trial amb next to rail and graze LUE along wall for security. Pt anticipates car transfer tomorrow 9/15 with so present. Pt will cont POC as tolerated with cont focus on stair management, increased gait with ess assist and improved HH dist amb to decrease burden of care.   Problem List Decreased strength;Decreased endurance;Impaired balance;Decreased mobility;Decreased safety awareness;Obesity   Barriers to Discharge Inaccessible home environment   PT Barriers   Functional Limitation Car transfers;Stair negotiation   Plan   Progress Progressing toward goals   Discharge Recommendation   Equipment Recommended Walker   PT Therapy Minutes   PT Time In 0830   PT Time Out 1000 "   PT Total Time (minutes) 90   PT Mode of treatment - Individual (minutes) 90   PT Mode of treatment - Concurrent (minutes) 0   PT Mode of treatment - Group (minutes) 0   PT Mode of treatment - Co-treat (minutes) 0   PT Mode of Treatment - Total time(minutes) 90 minutes   PT Cumulative Minutes 2368   Therapy Time missed   Time missed? No

## 2024-09-14 NOTE — PLAN OF CARE
Problem: PAIN - ADULT  Goal: Verbalizes/displays adequate comfort level or baseline comfort level  Description: Interventions:  - Encourage patient to monitor pain and request assistance  - Assess pain using appropriate pain scale  - Administer analgesics based on type and severity of pain and evaluate response  - Implement non-pharmacological measures as appropriate and evaluate response  - Consider cultural and social influences on pain and pain management  - Notify physician/advanced practitioner if interventions unsuccessful or patient reports new pain  Outcome: Progressing     Problem: INFECTION - ADULT  Goal: Absence or prevention of progression during hospitalization  Description: INTERVENTIONS:  - Assess and monitor for signs and symptoms of infection  - Monitor lab/diagnostic results  - Monitor all insertion sites, i.e. indwelling lines, tubes, and drains  - Monitor endotracheal if appropriate and nasal secretions for changes in amount and color  - [place] appropriate cooling/warming therapies per order  - Administer medications as ordered  - Instruct and encourage patient and family to use good hand hygiene technique  - Identify and instruct in appropriate isolation precautions for identified infection/condition  Outcome: Progressing     Problem: SAFETY ADULT  Goal: Patient will remain free of falls  Description: INTERVENTIONS:  - Educate patient/family on patient safety including physical limitations  - Instruct patient to call for assistance with activity   - Consult OT/PT to assist with strengthening/mobility   - Keep Call bell within reach  - Keep bed low and locked with side rails adjusted as appropriate  - Keep care items and personal belongings within reach  - Initiate and maintain comfort rounds  - Make Fall Risk Sign visible to staff  - Offer Toileting every 2 Hours, in advance of need  - Initiate/Maintain bed/chair alarm  - Obtain necessary fall risk management equipment: alarms  - Apply  yellow socks and bracelet for high fall risk patients  - Consider moving patient to room near nurses station  Outcome: Progressing  Goal: Maintain or return to baseline ADL function  Description: INTERVENTIONS:  -  Assess patient's ability to carry out ADLs; assess patient's baseline for ADL function and identify physical deficits which impact ability to perform ADLs (bathing, care of mouth/teeth, toileting, grooming, dressing, etc.)  - Assess/evaluate cause of self-care deficits   - Assess range of motion  - Assess patient's mobility; develop plan if impaired  - Assess patient's need for assistive devices and provide as appropriate  - Encourage maximum independence but intervene and supervise when necessary  - Involve family in performance of ADLs  - Assess for home care needs following discharge   - Consider OT consult to assist with ADL evaluation and planning for discharge  - Provide patient education as appropriate  Outcome: Progressing  Goal: Maintains/Returns to pre admission functional level  Description: INTERVENTIONS:  - Perform AM-PAC 6 Click Basic Mobility/ Daily Activity assessment daily.  - Set and communicate daily mobility goal to care team and patient/family/caregiver.   - Collaborate with rehabilitation services on mobility goals if consulted  - Perform Range of Motion 3 times a day.  - Reposition patient every 2 hours.  - Dangle patient 3 times a day  - Stand patient 3 times a day  - Ambulate patient 3 times a day  - Out of bed to chair 3 times a day   - Out of bed for meals 3 times a day  - Out of bed for toileting  - Record patient progress and toleration of activity level   Outcome: Progressing     Problem: DISCHARGE PLANNING  Goal: Discharge to home or other facility with appropriate resources  Description: INTERVENTIONS:  - Identify barriers to discharge w/patient and caregiver  - Arrange for needed discharge resources and transportation as appropriate  - Identify discharge learning needs  (meds, wound care, etc.)  - Arrange for interpretive services to assist at discharge as needed  - Refer to Case Management Department for coordinating discharge planning if the patient needs post-hospital services based on physician/advanced practitioner order or complex needs related to functional status, cognitive ability, or social support system  Outcome: Progressing     Problem: Prexisting or High Potential for Compromised Skin Integrity  Goal: Skin integrity is maintained or improved  Description: INTERVENTIONS:  - Identify patients at risk for skin breakdown  - Assess and monitor skin integrity  - Assess and monitor nutrition and hydration status  - Monitor labs   - Assess for incontinence   - Turn and reposition patient  - Assist with mobility/ambulation  - Relieve pressure over bony prominences  - Avoid friction and shearing  - Provide appropriate hygiene as needed including keeping skin clean and dry  - Evaluate need for skin moisturizer/barrier cream  - Collaborate with interdisciplinary team   - Patient/family teaching  - Consider wound care consult   Outcome: Progressing

## 2024-09-14 NOTE — PLAN OF CARE
Problem: SAFETY ADULT  Goal: Patient will remain free of falls  Description: INTERVENTIONS:  - Educate patient/family on patient safety including physical limitations  - Instruct patient to call for assistance with activity   - Consult OT/PT to assist with strengthening/mobility   - Keep Call bell within reach  - Keep bed low and locked with side rails adjusted as appropriate  - Keep care items and personal belongings within reach  - Initiate and maintain comfort rounds  - Make Fall Risk Sign visible to staff  - Offer Toileting every 2 Hours, in advance of need  - Initiate/Maintain alarm  - Obtain necessary fall risk management equipment:   - Apply yellow socks and bracelet for high fall risk patients  - Consider moving patient to room near nurses station  9/13/2024 2035 by Christy Babcock, RN  Outcome: Progressing  9/13/2024 2035 by Christy Babcock, RN  Outcome: Progressing

## 2024-09-15 PROCEDURE — 99231 SBSQ HOSP IP/OBS SF/LOW 25: CPT | Performed by: PHYSICIAN ASSISTANT

## 2024-09-15 PROCEDURE — 97530 THERAPEUTIC ACTIVITIES: CPT

## 2024-09-15 PROCEDURE — 97535 SELF CARE MNGMENT TRAINING: CPT

## 2024-09-15 PROCEDURE — 94760 N-INVAS EAR/PLS OXIMETRY 1: CPT

## 2024-09-15 PROCEDURE — 97129 THER IVNTJ 1ST 15 MIN: CPT

## 2024-09-15 PROCEDURE — 97130 THER IVNTJ EA ADDL 15 MIN: CPT

## 2024-09-15 PROCEDURE — 94660 CPAP INITIATION&MGMT: CPT

## 2024-09-15 RX ORDER — SPIRONOLACTONE 25 MG/1
25 TABLET ORAL DAILY
Qty: 30 TABLET | Refills: 0 | Status: SHIPPED | OUTPATIENT
Start: 2024-09-15

## 2024-09-15 RX ORDER — LEVOTHYROXINE SODIUM 25 UG/1
25 TABLET ORAL
Qty: 30 TABLET | Refills: 0 | Status: SHIPPED | OUTPATIENT
Start: 2024-09-16

## 2024-09-15 RX ORDER — NIFEDIPINE 30 MG/1
60 TABLET, EXTENDED RELEASE ORAL
Qty: 30 TABLET | Refills: 0 | Status: SHIPPED | OUTPATIENT
Start: 2024-09-15

## 2024-09-15 RX ORDER — HYDRALAZINE HYDROCHLORIDE 25 MG/1
75 TABLET, FILM COATED ORAL EVERY 8 HOURS SCHEDULED
Qty: 90 TABLET | Refills: 0 | Status: SHIPPED | OUTPATIENT
Start: 2024-09-15

## 2024-09-15 RX ORDER — ACETAMINOPHEN 325 MG/1
650 TABLET ORAL EVERY 6 HOURS PRN
Start: 2024-09-15

## 2024-09-15 RX ORDER — LOSARTAN POTASSIUM 100 MG/1
100 TABLET ORAL DAILY
Qty: 30 TABLET | Refills: 0 | Status: SHIPPED | OUTPATIENT
Start: 2024-09-15

## 2024-09-15 RX ORDER — CARVEDILOL 25 MG/1
25 TABLET ORAL 2 TIMES DAILY WITH MEALS
Qty: 60 TABLET | Refills: 0 | Status: SHIPPED | OUTPATIENT
Start: 2024-09-15

## 2024-09-15 RX ORDER — PANTOPRAZOLE SODIUM 40 MG/1
40 TABLET, DELAYED RELEASE ORAL
Qty: 30 TABLET | Refills: 0 | Status: SHIPPED | OUTPATIENT
Start: 2024-09-16

## 2024-09-15 RX ORDER — ATORVASTATIN CALCIUM 40 MG/1
40 TABLET, FILM COATED ORAL EVERY EVENING
Qty: 30 TABLET | Refills: 0 | Status: SHIPPED | OUTPATIENT
Start: 2024-09-15

## 2024-09-15 RX ADMIN — LOSARTAN POTASSIUM 100 MG: 50 TABLET, FILM COATED ORAL at 07:50

## 2024-09-15 RX ADMIN — RIVAROXABAN 20 MG: 20 TABLET, FILM COATED ORAL at 17:14

## 2024-09-15 RX ADMIN — NIFEDIPINE 60 MG: 30 TABLET, FILM COATED, EXTENDED RELEASE ORAL at 17:15

## 2024-09-15 RX ADMIN — LIDOCAINE 1 PATCH: 700 PATCH TOPICAL at 21:16

## 2024-09-15 RX ADMIN — CARVEDILOL 25 MG: 25 TABLET, FILM COATED ORAL at 07:50

## 2024-09-15 RX ADMIN — HYDRALAZINE HYDROCHLORIDE 75 MG: 50 TABLET ORAL at 21:16

## 2024-09-15 RX ADMIN — SPIRONOLACTONE 25 MG: 25 TABLET, FILM COATED ORAL at 07:50

## 2024-09-15 RX ADMIN — PANTOPRAZOLE SODIUM 40 MG: 20 TABLET, DELAYED RELEASE ORAL at 05:49

## 2024-09-15 RX ADMIN — LEVOTHYROXINE SODIUM 25 MCG: 25 TABLET ORAL at 05:49

## 2024-09-15 RX ADMIN — B-COMPLEX W/ C & FOLIC ACID TAB 1 TABLET: TAB at 07:49

## 2024-09-15 RX ADMIN — HYDRALAZINE HYDROCHLORIDE 75 MG: 50 TABLET ORAL at 14:43

## 2024-09-15 RX ADMIN — CARVEDILOL 25 MG: 25 TABLET, FILM COATED ORAL at 17:15

## 2024-09-15 RX ADMIN — ACETAMINOPHEN 650 MG: 325 TABLET ORAL at 05:52

## 2024-09-15 RX ADMIN — ATORVASTATIN CALCIUM 40 MG: 40 TABLET, FILM COATED ORAL at 17:15

## 2024-09-15 RX ADMIN — HYDRALAZINE HYDROCHLORIDE 75 MG: 50 TABLET ORAL at 05:49

## 2024-09-15 NOTE — PROGRESS NOTES
09/15/24 1235   Pain Assessment   Pain Assessment Tool 0-10   Pain Score No Pain   Restrictions/Precautions   Precautions Fall Risk;Contact/isolation;Supervision on toilet/commode   Weight Bearing Restrictions No   ROM Restrictions No   Braces or Orthoses Other (Comment)  (R kaylyn sling)   Eating   Type of Assistance Needed Set-up / clean-up   Physical Assistance Level No physical assistance   Eating CARE Score 5   Oral Hygiene   Type of Assistance Needed Set-up / clean-up   Physical Assistance Level No physical assistance   Comment seated at sink to simulate home set up   Oral Hygiene CARE Score 5   Shower/Bathe Self   Type of Assistance Needed Physical assistance   Physical Assistance Level 26%-50%   Comment shower completed with pt able to bathe 6/10 requiring assist for LUE, lower legs/feet and rear. pt plans on sponge bathing at time of d/c due to inability to safely transfer in/out of tub/shower.   Shower/Bathe Self CARE Score 3   Upper Body Dressing   Type of Assistance Needed Physical assistance   Physical Assistance Level 26%-50%   Comment Max A to don bra; assist to thread RUE past elbow, pt able to thread LUE, head and adjust over trunk   Upper Body Dressing CARE Score 3   Lower Body Dressing   Type of Assistance Needed Physical assistance   Physical Assistance Level 51%-75%   Comment TA to don tab brief; pt able to thread LLE into pant leg, requires assist for RLE, able to assist in CM up on L hip, requires assist for R hip. recommended pt wear dresses at time of d/c, no underwear to increase IND.   Lower Body Dressing CARE Score 2   Putting On/Taking Off Footwear   Type of Assistance Needed Physical assistance   Physical Assistance Level Total assistance   Comment TA to don/doff socks and sneakers   Putting On/Taking Off Footwear CARE Score 1   Sit to Stand   Type of Assistance Needed Supervision   Physical Assistance Level No physical assistance   Comment HW with gait belt donned and kaylyn sling.    Sit to Stand CARE Score 4   Bed-Chair Transfer   Type of Assistance Needed Supervision   Physical Assistance Level No physical assistance   Comment HW with gait belt donned and kaylyn sling.   Chair/Bed-to-Chair Transfer CARE Score 4   Toileting Hygiene   Type of Assistance Needed Physical assistance   Physical Assistance Level 51%-75%   Comment pt demo's ability to complete daria hygiene while seated with func reach back to front (ideal method is front to back however unable to complete due to body habitus and decreased func reach); pt continues to consistently perform CM down on L side, requires assist for R side   Toileting Hygiene CARE Score 2   Toilet Transfer   Type of Assistance Needed Supervision   Physical Assistance Level No physical assistance   Comment HW with gait belt donned and kaylyn sling.   Toilet Transfer CARE Score 4   Cognition   Overall Cognitive Status WFL   Arousal/Participation Alert;Cooperative   Attention Within functional limits   Orientation Level Oriented X4   Memory Within functional limits   Following Commands Follows one step commands with increased time or repetition   Activity Tolerance   Activity Tolerance Patient tolerated treatment well   Assessment   Treatment Assessment pt engages in 100 minute skilled OT session focusing on D/C ADL retraining and func transfers. see above for full func details. pt demo's good progress since IE progressing from Ax2 to supervision level for basic func transfers and short distance mobility with HW; Ax2 for self care tasks to grossly mod/max A especially for LE self care tasks and overall mod A for toileting. pt continues to require ongoing assistance for bathing, dressing and toileting tasks due to ongoing R sided weakness. demo's good progress with func transfers at pt consistently has demo'd supervision level with HW and kaylyn sling. despite minimal RUE motor recovery, pt UE remains non functional at this time which warrants increased assistance.  "family training has been completed with pt, pt's son and pt's SERGO and remains on track for d/c home tomorrow with supportive family and recommendations of home OT services progressing to OP OT services. informal UE HEP provided to pt with instructions placed in \"discharge instructions\" tab of chart with main focus being on self ROM to maintain joint and muscle flexibility as well as gross grasp and release.   Prognosis Good   Problem List Decreased strength;Decreased endurance;Impaired balance;Decreased mobility;Decreased safety awareness;Obesity   Plan   Treatment/Interventions ADL retraining;Functional transfer training;Therapeutic exercise;Endurance training;Patient/family training;Equipment eval/education;Compensatory technique education   OT Therapy Minutes   OT Time In 1235   OT Time Out 1415   OT Total Time (minutes) 100   OT Mode of treatment - Individual (minutes) 100   OT Mode of treatment - Concurrent (minutes) 0   OT Mode of treatment - Group (minutes) 0   OT Mode of treatment - Co-treat (minutes) 0   OT Mode of Treatment - Total time(minutes) 100 minutes   OT Cumulative Minutes 2080   Therapy Time missed   Time missed? No     "

## 2024-09-15 NOTE — ASSESSMENT & PLAN NOTE
Presented with right sided weakness, slurred speech, headache and vomiting  MRI = left pontine infarct  Neuro felt CVA was from atrial fibrillation in setting of non-compliance with anticoagulation  Currently now on Xarelto 20mg qd, Lipitor 40mg qd  Followup with Neuro as OP.  Stevo Baires from [facility] from that group was listed on the DC summary  Patient has been progressing with therapy. Family in for training today. Planning discharge home tomorrow

## 2024-09-15 NOTE — PROGRESS NOTES
Progress Note - Hospitalist   Name: Maye Lilly 64 y.o. female I MRN: [id number]  Unit/Bed#: -01 I Date of Admission: 8/20/2024   Date of Service: 9/15/2024 I Hospital Day: 26    Assessment & Plan  Acute CVA (cerebrovascular accident) (HCC)  Presented with right sided weakness, slurred speech, headache and vomiting  MRI = left pontine infarct  Neuro felt CVA was from atrial fibrillation in setting of non-compliance with anticoagulation  Currently now on Xarelto 20mg qd, Lipitor 40mg qd  Followup with Neuro as OP.  [facility] summary  Patient has been progressing with therapy. Family in for training today. Planning discharge home tomorrow  Paroxysmal atrial fibrillation (HCC)  Was seeing cardiologist in [place] and taking Xarelto but she has not seen him in 2 yrs and stopped all her meds >1 yr ago  EKG in the hospital showed NSR  Needs to followup as OP with Cardiology = Dr Beltre  Continue Coreg  Was not placed back on Flecainide  Now on Xarelto, which will be supplied free of charge from [facility]  HTN (hypertension)  Patient was not on any medications prior to admission.  Since admission she has been started on Coreg 25mg BID, Hydralazine 75 mg q8hrs, Nifedipine 60mg daily, Aldactone 25mg daily and Losartan 100mg qd  Followup with IM as OP.  On DC summary was listed to see Megan DORSEY from that group  DIANELYS (obstructive sleep apnea)  In past was on CPAP.  Strongly encourage resuming CPAP usage, especially in light of new stroke.  Hypothyroidism  Continue Levothyroxine 25 mcg qd  This was the dose that she was supposed to be taking at home and they placed her back on it in the hospital on 8/12/24 but there was no TSH drawn.  Had an abnormal TSH of 5.1 on 4/23/20 presumably not on Levothyroxine at that time since thereafter on 2/15/22 was normal  TSH 8/22/24 normal at 3.95  Will continue current dose and have her get a TSH in 4-6 weeks = @9/23  GERD  (gastroesophageal reflux disease)  Continue Protonix  Meningioma (HCC)  Incidental finding   OP followup  Right shoulder pain  New complaint of right shoulder pain on 9/1- much improved at this point  Patient feels this is due to her residual right sided weakness from her recent stroke  Being treated by PMR  Continue PT/OT      The above assessment and plan was reviewed and updated as determined by my evaluation of the patient on 9/15/2024.    History of Present Illness   Patient seen and examined. Patients overnight issues or events were reviewed with nursing staff. New or overnight issues include the following:   Patient sitting up comfortably in chair. Overall feels well but feeling down about her ongoing right arm deficits. She is planning to be discharged home tomorrow. Family coming in today for training    Review of Systems    Objective     Vitals:    09/14/24 1449 09/14/24 1703 09/14/24 2020 09/15/24 0534   BP: 122/65 144/65 135/61 117/60   BP Location: Left arm Left arm Left arm Left arm   Pulse: 68 71 70 69   Resp: 18  18 17   Temp: 97.7 °F (36.5 °C)  98.1 °F (36.7 °C) 97.8 °F (36.6 °C)   TempSrc: Oral  Oral Oral   SpO2: 93%  94% 94%   Weight:       Height:         Invasive Devices       None                   Physical Exam  General Appearance: NAD; pleasant  HEENT: PERRLA, conjuctiva normal; mucous membranes moist; face symmetrical  Neck:  Supple  Lungs: clear bilaterally, normal respiratory effort, no retractions, expiratory effort normal, on room air  CV: regular rate    ABD: soft non tender, +BS x4  EXT: DP pulses intact, no lymphadenopathy, no edema  Skin: normal turgor, normal texture, no rash  Psych: affect normal, mood normal  Neuro: AAOx3, residual right sided weakness from recent stroke.   The above physical exam was reviewed and updated as determined by my evaluation of the patient on 9/15/2024.      Lab Results: I have reviewed the following results:   Results from last 7 days   Lab Units  09/12/24  0550   WBC Thousand/uL 7.24   HEMOGLOBIN g/dL 11.7   HEMATOCRIT % 36.2   PLATELETS Thousands/uL 248     Results from last 7 days   Lab Units 09/12/24  0550   SODIUM mmol/L 139   POTASSIUM mmol/L 3.7   CHLORIDE mmol/L 103   CO2 mmol/L 26   BUN mg/dL 16   CREATININE mg/dL 0.61   CALCIUM mg/dL 9.7                       Review of Scheduled Meds: Medications  reviewed and reconciled as needed  Current Facility-Administered Medications   Medication Dose Route Frequency Provider Last Rate    acetaminophen  650 mg Oral Q6H PRN WENCESLAO Patel      atorvastatin  40 mg Oral QPM WENCESLAO Patel      bisacodyl  10 mg Rectal Daily PRN Dougie King MD      carvedilol  25 mg Oral BID With Meals Leeann Ordoñez PA-C      Diclofenac Sodium  2 g Topical 4x Daily PRN Dk De Dios MD      hydrALAZINE  75 mg Oral Q8H BARB Leeann Ordoñez PA-C      levothyroxine  25 mcg Oral Early Morning WENCESLAO Patel      lidocaine  1 patch Topical Daily Dk De Dios MD      losartan  100 mg Oral Daily WENCESLAO Patel      multivitamin stress formula  1 tablet Oral Daily WENCESLAO Patel      NIFEdipine  60 mg Oral Daily With Dinner Leeann Ordoñez PA-C      pantoprazole  40 mg Oral Early Morning WENCESLAO Patel      rivaroxaban  20 mg Oral Daily With Dinner WENCESLAO Patel      spironolactone  25 mg Oral Daily WENCESLAO Patel         VTE Pharmacologic Prophylaxis: xarelto  Code Status: Level 1 - Full Code  Current Length of Stay: 26 day(s)    Administrative Statements   I have spent a total time of 20 minutes in caring for this patient on the day of the visit/encounter including Impressions, Counseling / Coordination of care, Documenting in the medical record, Reviewing / ordering tests, medicine, procedures  , Obtaining or reviewing history  , and Communicating with other healthcare professionals .  ** Please Note:  voice to text  software may have been used in the creation of this document. Although proof errors in transcription or interpretation are a potential of such software**

## 2024-09-15 NOTE — DISCHARGE INSTR - OTHER ORDERS
OT discharge instructions:    Feeding: Demario will require set up of meal pending what she is eating--may need meat cut up, sandwiches cut in half, drink containers opened for her, etc, otherwise she can feed herself once she is set up accordingly.    Brushing teeth, combing hair: Demario may require assistance with applying tooth paste on toothbrush depending upon how tight the tooth paste cap is. Again, once her tooth brush is set up, she can proceed to brush her own teeth. Demario can comb her own hair.     Putting on her bra and shirt: If Demario is wearing a bra, she will need significant amount of assistance due to tight nature of bra-- she will need assist threading her R arm in through the bra as well as adjusting her chest within the bra. Similarly, Demario will require some assistance with putting on a shirt (or dress) with threading her R arm through the arm hole of the shirt/dress, then she should be able to thread the L arm and her head. WEARING A DRESS WILL BE THE EASIEST FOR HER AS WELL AS NOT WEARING A BRA, WHATEVER HER COMFORT LEVEL IS DEPENDS THEN ON HOW MUCH ASSIST SHE WILL NEED (WEARING A BRA VS NOT WEARING A BRA).     Putting on underwear/pants: Again, the recommendation is for Demario to wear dresses/house coats as this will allow her the most independence at this time as it is was discussed to not wear underwear/pants unless Demario prefers, in which she will require assist for threading underwear/pants as well as clothing management on R side.    Footwear: Demario will require assistance for putting on her socks and sneakers    Toileting: Demario is able to complete bladder hygiene after urinating, she still requires assistance for rear hygiene after bowel movement. She is able to assist in getting her underwear/pants up/down on the L side, does require assistance on the R side however if she were to wear dresses and no underwear, then this would greatly increase her independence with toileting tasks. We also  discussed the use of the bidet which is on the toilet, have the home OT/PT assess to see if the BSC will fit over the toilet as well as if Demario can safely transfer in/out of the bathroom with the use of her kaylyn-walker.     Toilet transfer: Therapy ordered a bed side commode to be used at Demario's bedside at night time, however she may need to use it similarly during the day if the commode does not fit in the bathroom/over the toilet. When using the commode, place a grocery store plastic bag in the bucket (just like you would like a bathroom trash can) and place a few pieces of paper towels in the bottom of the bag, this will help to soak up the urine/bowel and make for easy disposal. If it is just urine, then the bucket (without the grocery bag and paper towel) can be dumped into the toilet and then wiped out with a clorox wipe.    R arm exercises: We discussed weight bearing through her R arm extended or on her forearm, either to be completed at the kitchen counter or at the coffee table, pending assessment of the home OT; discussed using her tray table to complete towel slides with focus on increasing R shoulder ROM/strengthening. Discussed hand strengthening with use of red foam as well as retrieving small varying sized/weighted balls from [employer] store for increased grasp/release in prep for func tasks; again to be assessed by home OT as pt with slow return in R arm however does remains significantly non functional at this time. Emphasis on maintaining loose joints and muscles via self ROM to reduce risk of contractures which pt has been doing independently.

## 2024-09-15 NOTE — PROGRESS NOTES
09/15/24 1000   Pain Assessment   Pain Assessment Tool 0-10   Restrictions/Precautions   Precautions Fall Risk;Contact/isolation;Supervision on toilet/commode   Comprehension   Comprehension (FIM) 6 - Has only MILD difficulty with complex/abstract info   Expression   Expression (FIM) 5 - Needs help/cues only RARELY (< 10% of the time)   Social Interaction   Social Interaction (FIM) 6 - Interacts appropriately with others BUT requires extra  time   Problem Solving   Problem solving (FIM) 5 - Solves complex problems But requires cues from helper   Memory   Memory (FIM) 5 - Needs cueing reminders <10%   Speech/Language/Cognition Assessmetn   Treatment Assessment Pt was just finishing PT session, to where session was held in therapy gym. Of note, focus of session was review and ongoing education given family training w/ pt and pt's son. SLP introducing self to pt's son, as this was the first time meeting him and provided an update to son about role of services and progress being made given speech/cognitive skills. Pt's son asking SLP about tasks/activities which can be completed w/ pt at home, which pt was able to initiate conversation about completing OME's targeting lips, face and tongue muscles. SLP educating son that there is a handout w/ these exercises, reviewed the recommendations for completing each exercise 10x/each and a total of 2-3 times a day. Additionally provided son w/ how SLP had printed out some workbooks which target pt's higher level cognitive skills, which SLP will place into a Speech folder to keep all these items together. SLP showing son examples of deduction puzzles which pt completed throughout sessions,  but also providing education to son about how pt does exhibit difficulty given the more complex a task given. Pt will demonstrate mental fatigue which if pt is demonstrating trouble given task, pt should take a break and allow time to not focus on task at hand and then come back to it w/ a  fresh set of eyes. Pt and pt's son verbalizing understanding of these recommendations towards HEP activities for cognitive skills. Also, SLP reviewed w/ pt and son current medication list. SLP providing recommendations for continued use of pill box, which pt reports having. SLP providing education for son to complete pill box management and likely to provide reminders to pt to take medications for carryover and compliance. Lastly reviewed w/ both son and pt about how since pt will begin w/ home services, no ST needed but when transitioning to OP, would recommend to continue ST services. At this time, pt is planned for discharge home w/ family support/supervision on 9/16/2024 w/ continued home OT/PT, no home ST but when transition to OP, initiate ST services.    SLP Therapy Minutes   SLP Time In 1000   SLP Time Out 1030   SLP Total Time (minutes) 30   SLP Mode of treatment - Individual (minutes) 30   SLP Mode of treatment - Concurrent (minutes) 0   SLP Mode of treatment - Group (minutes) 0   SLP Mode of treatment - Co-treat (minutes) 0   SLP Mode of Treatment - Total time(minutes) 30 minutes   SLP Cumulative Minutes 900   Therapy Time missed   Time missed? No

## 2024-09-15 NOTE — ASSESSMENT & PLAN NOTE
Was seeing cardiologist in [place] and taking Xarelto but she has not seen him in 2 yrs and stopped all her meds >1 yr ago  EKG in the hospital showed NSR  Needs to followup as OP with Cardiology = Dr Beltre  Continue Coreg  Was not placed back on Flecainide  Now on Xarelto, which will be supplied free of charge from [facility]

## 2024-09-15 NOTE — ASSESSMENT & PLAN NOTE
Presented with right sided weakness, slurred speech, headache and vomiting  MRI = left pontine infarct  Neuro felt CVA was from atrial fibrillation in setting of non-compliance with anticoagulation  Currently now on Xarelto 20mg qd, Lipitor 40mg qd  Followup with Neuro as OP.  Stevo Baires from [facility] from that group was listed on the DC summary  Patient has been progressing with therapy. Family in for training today. Planning discharge home tomorrow

## 2024-09-15 NOTE — PLAN OF CARE
Problem: PAIN - ADULT  Goal: Verbalizes/displays adequate comfort level or baseline comfort level  Description: Interventions:  - Encourage patient to monitor pain and request assistance  - Assess pain using appropriate pain scale  - Administer analgesics based on type and severity of pain and evaluate response  - Implement non-pharmacological measures as appropriate and evaluate response  - Consider cultural and social influences on pain and pain management  - Notify physician/advanced practitioner if interventions unsuccessful or patient reports new pain  Outcome: Progressing     Problem: INFECTION - ADULT  Goal: Absence or prevention of progression during hospitalization  Description: INTERVENTIONS:  - Assess and monitor for signs and symptoms of infection  - Monitor lab/diagnostic results  - Monitor all insertion sites, i.e. indwelling lines, tubes, and drains  - Monitor endotracheal if appropriate and nasal secretions for changes in amount and color  - [place] appropriate cooling/warming therapies per order  - Administer medications as ordered  - Instruct and encourage patient and family to use good hand hygiene technique  - Identify and instruct in appropriate isolation precautions for identified infection/condition  Outcome: Progressing     Problem: SAFETY ADULT  Goal: Patient will remain free of falls  Description: INTERVENTIONS:  - Educate patient/family on patient safety including physical limitations  - Instruct patient to call for assistance with activity   - Consult OT/PT to assist with strengthening/mobility   - Keep Call bell within reach  - Keep bed low and locked with side rails adjusted as appropriate  - Keep care items and personal belongings within reach  - Initiate and maintain comfort rounds  - Make Fall Risk Sign visible to staff  - Offer Toileting every   Hours, in advance of need  - Initiate/Maintain  alarm  - Obtain necessary fall risk management equipment:    - Apply yellow socks and  bracelet for high fall risk patients  - Consider moving patient to room near nurses station  Outcome: Progressing  Goal: Maintain or return to baseline ADL function  Description: INTERVENTIONS:  -  Assess patient's ability to carry out ADLs; assess patient's baseline for ADL function and identify physical deficits which impact ability to perform ADLs (bathing, care of mouth/teeth, toileting, grooming, dressing, etc.)  - Assess/evaluate cause of self-care deficits   - Assess range of motion  - Assess patient's mobility; develop plan if impaired  - Assess patient's need for assistive devices and provide as appropriate  - Encourage maximum independence but intervene and supervise when necessary  - Involve family in performance of ADLs  - Assess for home care needs following discharge   - Consider OT consult to assist with ADL evaluation and planning for discharge  - Provide patient education as appropriate  Outcome: Progressing  Goal: Maintains/Returns to pre admission functional level  Description: INTERVENTIONS:  - Perform AM-PAC 6 Click Basic Mobility/ Daily Activity assessment daily.  - Set and communicate daily mobility goal to care team and patient/family/caregiver.   - Collaborate with rehabilitation services on mobility goals if consulted  - Perform Range of Motion   times a day.  - Reposition patient every   hours.  - Dangle patient   times a day  - Stand patient   times a day  - Ambulate patient   times a day  - Out of bed to chair   times a day   - Out of bed for meals   times a day  - Out of bed for toileting  - Record patient progress and toleration of activity level   Outcome: Progressing     Problem: DISCHARGE PLANNING  Goal: Discharge to home or other facility with appropriate resources  Description: INTERVENTIONS:  - Identify barriers to discharge w/patient and caregiver  - Arrange for needed discharge resources and transportation as appropriate  - Identify discharge learning needs (meds, wound care,  etc.)  - Arrange for interpretive services to assist at discharge as needed  - Refer to Case Management Department for coordinating discharge planning if the patient needs post-hospital services based on physician/advanced practitioner order or complex needs related to functional status, cognitive ability, or social support system  Outcome: Progressing     Problem: Prexisting or High Potential for Compromised Skin Integrity  Goal: Skin integrity is maintained or improved  Description: INTERVENTIONS:  - Identify patients at risk for skin breakdown  - Assess and monitor skin integrity  - Assess and monitor nutrition and hydration status  - Monitor labs   - Assess for incontinence   - Turn and reposition patient  - Assist with mobility/ambulation  - Relieve pressure over bony prominences  - Avoid friction and shearing  - Provide appropriate hygiene as needed including keeping skin clean and dry  - Evaluate need for skin moisturizer/barrier cream  - Collaborate with interdisciplinary team   - Patient/family teaching  - Consider wound care consult   Outcome: Progressing

## 2024-09-15 NOTE — SPEECH THERAPY NOTE
SLP Discharge Summary    Pt was discharged home w/ family support/supervision on 9/16/2024. Throughout pt's stay on the acute rehab center, focus of ST services targeted cognitive linguistic skills as well as speech intelligibility. Pt was able to demonstrate steady and good progress overall towards both of these tasks to where by time of discharge, pt was able to demonstrate consistent functioning at mod I-supervision level for comprehension, supervision for expression, executive functions and memory.     Summarization of pt's stay as below:    In regards to cognition, pt completed the SL[employer] cognitive assessment upon evaluation. Pt total score was 25/30 which as compared to those with high school education correlates with mild neurocognitive disorder.  Pt presented with the following barriers: decreased STM, working memory, word recall, critical thinking and higher level problem solving thought organization and expressive language/word retrieval which, which impact pt's overall safety, functional cognitive communication skills as well as functional mobility. The following interventions are used to target these barriers, including verbal problem solving task, verbal working memory tasks, visual memory recall tasks, drawing conclusions activities, written sequencing tasks, verbal sequencing tasks, written financial management tasks, tangible money tasks, verbal review of current medications, written review of medications, tangible medication management task, written calendar tasks, tangible scheduling tasks , written health management tasks, verbal health management tasks, visual attention tasks, functional reading tasks, and family education/training, as well as verbal command following activities, written command following activities, reading comprehension at sentence level, reading comprehension at paragraph level, written expression at word level, written expression at sentence level , divergent naming: concrete,  divergent naming: abstract , word deduction with phrase, and word deduction with clue words.     In regards to dysarthria, pt was demonstrating speech intelligibility to be mildly impaired at the sentence/conversational level. Barrier include: distorted speech sounds, decreased breath support and fast rate of speech. Interventions to be targeted include: OME's, verbal repetition of sentence, orthographic repetition of sentences, review of speech strategies and breath/support cueing. Pt was able to demonstrate independence of OME's to where this became an HEP throughout stay on the unit. Of note as pt does fatigue, SLP providing review of need for rest breaks.    Plan was to target and implement cognitive and speech interventions as well as discussing baseline with daughter regarding ADLs and  pt's ability to complete IADL tasks such as medication management, finance management, ability to recognize and solve unsafe situations. It was established that pt would like to focus on targeting higher level cognitive linguistic tasks, but monitoring functional I ADL tasks, such as medication and financial management skills. Also will [place] back to review of speech strategies, OME's to maximize overall speech intelligibility.     Family training/education had been initiated with pt's family (son, DIL, dtr's boyfriend) to where review of ST services was completed in addition to demonstration of how higher level cognitive linguistic skills are still impacted at this time. Pt would benefit from supervision and monitoring in compliance given I ADL tasks, including medication management, finances and calendar/scheduling skills upon discharge. Of note, repeat FT was completed w/ pt's son again day prior to discharge to where the following topics were reviewed: Pt's son asking SLP about tasks/activities which can be completed w/ pt at home, which pt was able to initiate conversation about completing OME's targeting lips, face and  tongue muscles. SLP educating son that there is a handout w/ these exercises, reviewed the recommendations for completing each exercise 10x/each and a total of 2-3 times a day. Additionally provided son w/ how SLP had printed out some workbooks which target pt's higher level cognitive skills, which SLP will place into a Speech folder to keep all these items together. SLP showing son examples of deduction puzzles which pt completed throughout sessions, but also providing education to son about how pt does exhibit difficulty given the more complex a task given. Pt will demonstrate mental fatigue which if pt is demonstrating trouble given task, pt should take a break and allow time to not focus on task at hand and then come back to it w/ a fresh set of eyes. Pt and pt's son verbalizing understanding of these recommendations towards HEP activities for cognitive skills. Also, SLP reviewed w/ pt and son current medication list. SLP providing recommendations for continued use of pill box, which pt reports having. SLP providing education for son to complete pill box management and likely to provide reminders to pt to take medications for carryover and compliance. Lastly reviewed w/ both son and pt about how since pt will begin w/ home services, no ST needed but when transitioning to OP, would recommend to continue ST services. At this time, pt is planned for discharge home w/ family support/supervision on 9/16/2024 w/ continued home OT/PT, no home ST but when transition to OP, initiate ST services.

## 2024-09-15 NOTE — DISCHARGE SUMMARY
Discharge Summary - Hospitalist   Name: Maye Lilly 64 y.o. female I MRN: [id number]  Unit/Bed#: -01 I Date of Admission: 8/20/2024   Date of Service: 9/15/2024 I Hospital Day: 26     Assessment & Plan  Acute CVA (cerebrovascular accident) (HCC)  Presented with right sided weakness, slurred speech, headache and vomiting  MRI = left pontine infarct  Neuro felt CVA was from atrial fibrillation in setting of non-compliance with anticoagulation  Currently now on Xarelto 20mg qd, Lipitor 40mg qd  Followup with Neuro as OP.  [facility]  Patient has been progressing with therapy. Family in for training today. Planning discharge home tomorrow  Paroxysmal atrial fibrillation (HCC)  Was seeing cardiologist in [place] and taking Xarelto but she has not seen him in 2 yrs and stopped all her meds >1 yr ago  EKG in the hospital showed NSR  Needs to followup as OP with Cardiology = Dr Beltre  Continue Coreg  Was not placed back on Flecainide  Now on Xarelto, which will be supplied free of charge from [facility]  HTN (hypertension)  Patient was not on any medications prior to admission.  Since admission she has been started on Coreg 25mg BID, Hydralazine 75 mg q8hrs, Nifedipine 60mg daily, Aldactone 25mg daily and Losartan 100mg qd  Followup with IM as OP.  On DC summary was listed to see Megan DORSEY from that group  DIANELYS (obstructive sleep apnea)  In past was on CPAP.  Strongly encourage resuming CPAP usage, especially in light of new stroke.  Hypothyroidism  Continue Levothyroxine 25 mcg qd  This was the dose that she was supposed to be taking at home and they placed her back on it in the hospital on 8/12/24 but there was no TSH drawn.  Had an abnormal TSH of 5.1 on 4/23/20 presumably not on Levothyroxine at that time since thereafter on 2/15/22 was normal  TSH 8/22/24 normal at 3.95  Will continue current dose and have her get a TSH in 4-6 weeks = @9/23  GERD  (gastroesophageal reflux disease)  Continue Protonix  Meningioma (HCC)  Incidental finding   OP followup  Right shoulder pain  New complaint of right shoulder pain on 9/1- much improved at this point  Patient feels this is due to her residual right sided weakness from her recent stroke  Being treated by PMR  Continue PT/OT       Discharge Summary   Maye Lilly 64 y.o. female MRN: [id number]  Unit/Bed#: [facility] 969-01 Encounter: [id number]  Admission Date: 8/20/2024     Discharge Date: 9/16/24    Discharging Provider: Leeann Ordoñez    PCP:  [phone]      HPI: Refer to previous hospitalization for complete record    Summary of [facility] Course: patient was admitted to [facility] for rehab on 8/20/24 after a hospitalization at [facility]. She initially presented to the ED with right sided weakness, slurred speech, headache and vomiting. MRI revealed left pontine infarct.  Per records, patient was supposed to be taking meds at home for HTN and Afib but stopped taking her meds over a year ago because she lost her insurance.   Patient was seen by neurology at [facility]. During her hospitalization she developed diarrhea and tested positve for C diff. She was treated with a course of PO Vancomycin which was completed 8/26/24.  She remains on carvedilol, hydralazine, nifedipine, aldactone and losartan for HTN. She was also started on xarelto for Afib which will be supplied free of charge from [facility].  Per chart she did have an elevated TSH in 2020, unclear is she was taking her levothyroxine at that time. She was started back on levothyroxine 25 mcg daily. She will need repeat TSH with free T4 in 5-6 weeks which is approximately 9/23/24  Patient participated with therapy in rehab, limited by residual weakness in the right arm. Family came in for home training.  She is being discharged home on 9/16/14 with home care services      Problem List/Comorbidities:    No new Assessment & Plan notes have been filed under this hospital  "service since the last note was generated.  Service: Hospitalist      Discharge Physical Examination:  /60 (BP Location: Left arm)   Pulse 69   Temp 97.8 °F (36.6 °C) (Oral)   Resp 17   Ht 5' 4\" (1.626 m)   Wt 125 kg (275 lb 9.2 oz)   SpO2 94%   BMI 47.30 kg/m²     Physical Exam  Vitals reviewed.   Constitutional:       General: She is not in acute distress.     Appearance: She is not ill-appearing or diaphoretic.   HENT:      Head: Normocephalic and atraumatic.      Nose: Nose normal.      Mouth/Throat:      Pharynx: Oropharynx is clear.   Cardiovascular:      Rate and Rhythm: Normal rate.   Pulmonary:      Effort: Pulmonary effort is normal. No respiratory distress.      Breath sounds: Normal breath sounds.   Abdominal:      General: Bowel sounds are normal. There is no distension.      Palpations: Abdomen is soft.      Tenderness: There is no abdominal tenderness.   Musculoskeletal:         General: No signs of injury.   Skin:     General: Skin is warm and dry.   Neurological:      Mental Status: She is alert and oriented to person, place, and time.      Comments: Residual right UE weakness from recent stroke   Psychiatric:         Mood and Affect: Mood normal.         Behavior: Behavior normal.              Significant Findings, Care, Treatment and Services Provided: Acute comprehensive interdisciplinary inpatient rehabilitation including PT, OT, SLP, RN, CM, SW, dietary, psychology, etc.      Physiatry Additions/Comorbidities:    Ms. Myae Lilly is a 65 yo woman with a past medical history notable for HTN and atrial fibrillation who presented to the [facility] on 8/10/24 with acute dysarthria, facial droop and right hemiparesis. A stroke alert was activated, CT head wo contrast was negative for acute findings, CTA of the head/neck showed no evidence of large vessel occlusion and patent cervical carotid and vertebral arteries. MRI of the brain revealed an acute left pontine infarct. Course " complicated by severe hypertension requiring multiple agents to obtain control.      - Acute left pontine ischemic infarct:   Clinically presented on 8/10/24 with right hemiparesis, dysarthria and facial droop  Stroke etiology: HTN, small vessel disease  MR brain on 8/11/24 revealed a small area of acute/subacute ischemia within the central luz to the left of midline without acute hemorrhage  Did not receive tPA/TNKase, did not undergo thrombectomy  DAPT regimen: None  Atorvastatin 40 mg qHs for secondary prevention  Monitor for neurogenic bowel and bladder  Promote regular sleep/wake cycles and proper sleep hygiene  Monitor closely for right hemiparetic shoulder pain and post-stroke pain  Neutral resting wrist splint for the and RLE multipodus boot to be worn at night and while in bed during the day to prevent developing spasticity and contracture  Maintain adequate nutrition, nutrition consult  Avoid sedating and anticholinergic medications as possible  Maintain normotension, s/p permissive hypertension  Outpatient neurovascular neurology follow-up  Outpatient PM&R follow-up for ongoing rehabilitation needs  - Right hemiparetic shoulder pain: in the setting of right sided hemiparesis from stroke, examination consistent with right shoulder impingement, no concerns for adhesive capsulitis currently; taping, PT  - C. Difficile infection: OSH-acquired; vancomycin PO completed 8/26/24  - Paroxysmal atrial fibrillation: carvedilol for rate control, rivaroxaban for embolic stroke risk reduction; outpatient cardiology follow-up  - HTN: was not taking medications at home, reportedly due to cost; carvedilol, hydralazine, nifedipine, spironolactone, losartan; outpatient PCP follow-up  - Hypothyroidism: levothyroxine  - Right knee osteoarthritis: lidocaine patch daily, Voltaren topical QID PRN, tylenol PRN  - Bowel: Monitor closely for neurogenic bowel. Will follow BMs and adjust bowel regimen to target soft, formed stools  q1-2 days, per pt's regular schedule.  - Bladder: Monitor closely for neurogenic bladder. Will follow UOP and encourage spontaneous voids. If unable to void spontaneously, will monitor with PVR bladder scans and initiate ISC regimen.  - Sleep: Practice effective sleep hygiene (e.g., consistent night and morning routine, quiet, dark room at night, no electronic devices/screens in bed, avoid large meals/caffeine before bed)  - VTE prophylaxis: Rivaroxaban    Acute Rehabilitation Center Course: Patient participated in a comprehensive interdisciplinary inpatient rehabilitation program which included involvment of Rehab MD, therapies (PT, OT, and/or SLP), RN, CM, SW, dietary, and psychology services.     Etiologic/Rehabilitation Diagnosis: Impairment of mobility, safety and Activities of Daily Living (ADLs) due to Stroke:  01.2  Right Body Involvement (Left Brain)    Functional Status Upon Admission to [facility]:    Mobility: max assist bed mobility  Transfers: max assist stairs, bed to chair  ADLs: mod assist bathing, dependent UBD/LBD,  min assist eating, total toileting    Functional Status Upon Discharge from [facility]:     Physical Therapy Occupational Therapy Speech Therapy   Weight Bearing Status: Full Weight Bearing  Transfers: Supervision  Bed Mobility: Supervision  Amulation Distance (ft): 150 feet  Ambulation: Supervision  Assistive Device for Ambulation: Ismael Walker  Wheelchair Mobility Distance: 150 ft  Wheelchair Mobility: Supervision  Number of Stairs: 4  Assistive Device for Stairs: Right Hand Rail  Stair Assistance: Min to mod assist   Eating: Supervision (set up)  Grooming: Supervision (set up)  Bathing: Moderate Assistance  Bathing: Moderate Assistance  Upper Body Dressing: Moderate Assistance  Lower Body Dressing: Maximum Assistance  Toileting: Moderate Assistance  Toilet Transfer: Supervision Mode of Communication: Verbal  Speech/Language: Dysarthia (minimal)  Cognition: Exceptions to WNL  Cognition: Decreased  Executive Functions, Decreased Attention, Decreased Comprehension, Decreased Memory  Orientation: Person, Place, Time, Situation       Condition at Discharge: stable     Discharge instructions/Information to patient and family:   See after visit summary for information provided to patient and family.      Provisions for Follow-Up Care:  See after visit summary for information related to follow-up care and any pertinent home health orders.      Future Appointments   Date Time Provider Department Center   9/17/2024  9:00 AM Anu Buchanan, PT [facility]  [facility]     Disposition: See After Visit Summary for discharge disposition information.    Planned Readmission: No    Discharge Statement   I spent 30 minutes or more discharging the patient.  I had direct contact with the patient on the day of discharge. Greater than 50% of the total time was spent examining patient, answering all patient questions, arranging and discussing plan of care with patient and care team as well as directly providing post-discharge instructions. Additional time then spent on discharge activities.    Discharge Medications:  See after visit summary for reconciled discharge medications provided to patient and family.

## 2024-09-15 NOTE — ASSESSMENT & PLAN NOTE
Continue Levothyroxine 25 mcg qd  This was the dose that she was supposed to be taking at home and they placed her back on it in the hospital on 8/12/24 but there was no TSH drawn.  Had an abnormal TSH of 5.1 on 4/23/20 presumably not on Levothyroxine at that time since thereafter on 2/15/22 was normal  TSH 8/22/24 normal at 3.95  Will continue current dose and have her get a TSH in 4-6 weeks = @9/23

## 2024-09-15 NOTE — PROGRESS NOTES
09/15/24 1030   Pain Assessment   Pain Assessment Tool 0-10   Pain Score No Pain   Restrictions/Precautions   Precautions Fall Risk;Contact/isolation;Supervision on toilet/commode   Weight Bearing Restrictions No   ROM Restrictions No   Braces or Orthoses Other (Comment)  (R kaylyn sling)   Sit to Stand   Type of Assistance Needed Supervision   Physical Assistance Level No physical assistance   Comment HW with gait belt and kaylyn sling   Sit to Stand CARE Score 4   Bed-Chair Transfer   Type of Assistance Needed Supervision   Physical Assistance Level No physical assistance   Comment HW with gait belt and kaylyn sling   Chair/Bed-to-Chair Transfer CARE Score 4   Cognition   Overall Cognitive Status WFL   Arousal/Participation Alert;Cooperative   Attention Within functional limits   Orientation Level Oriented X4   Memory Within functional limits   Following Commands Follows one step commands with increased time or repetition   Activity Tolerance   Activity Tolerance Patient tolerated treatment well   Assessment   Treatment Assessment pt engages in 40 minute skilled OT session focsuing on family training with pt and pt's son. see above for full func details. see below for details regarding family training. pt continues to demo slow and steady progress toward OT Goals. continues to remain limited with ADLs/IADLs due to ongoing RUE weakness however motor recovery and ROM is slow improving and anticipate pt being able to use R hand as more of a stabilizer soon as RUE remains non functional at this time. all family training completed. pt remains on track for d/c home tomorrow with home OT services to assess toileting with BSC over toilet in bathroom, assist in assessing counter tops vs coffee table vs tray table for completing RUE AAROM/WBing exercises. continue with OT to focus on D/C ADL retraining, in order to decrease burden of care at d/c.   OT Family training done with: pt and pt's son Kiko   Assessment of family  training pt and son engage in family training focusing on CLOF, recommendations for bathing/dressing and toileting. discussed and edu on donning/doffing R kaylyn sling as well as reviewed use of resting hand splint and multipodus boot. did discuss to son that showering is not an option at this time as transfer bench will not fit in tub/shower/bathroom safely due to size of bathroom. also discussed having home OT/PT assess space around toilet with BSC over top to see if pt can safely enter/exit bathroom with BSC over toilet or if pt will have to use BSC soley as free standing commode. discussed that at this time, pt continues to require assistance for bathing, dressing (especially UE dressing and footwear) however ongoing recommendation made for wearing dresses/house coats for ease and IND with dressing tasks as well as toilet tasks. finally, discussed RUE ROM tasks that can be completed at home including WBing thru extended forearm vs flexed forearm pending height of kitchen counter tops, as well as from seated position on couch with coffee table in front of her; edu on WBing not to be completed unsupervised with pt and son in agreement; discussed use of tray table (no kitchen/dining table present) to assist in table top towel slides with use of pillow case for decreased friction with focus on proximal ROM/strengthening. again, highly recommended home OT services to assess home space and see which exercises pt can complete IND vs with supervision. answered all questions from son at this time, mostly referring to use of RHS and multipodus boot as well as home exercises. finally edu son on donning and doffing kaylyn sling, edu on washing garment and positioning of garment however pt able to verbalize all the above demo'ing good insight and carryover from previous sessions. all family training completed at this time, with no additional questions/concerns.   Prognosis Good   Problem List Decreased strength;Decreased  endurance;Impaired balance;Decreased mobility;Decreased safety awareness;Obesity   Plan   Treatment/Interventions ADL retraining;Functional transfer training;Therapeutic exercise;Endurance training;Patient/family training;Equipment eval/education;Compensatory technique education   OT Therapy Minutes   OT Time In 1030   OT Time Out 1110   OT Total Time (minutes) 40   OT Mode of treatment - Individual (minutes) 40   OT Mode of treatment - Concurrent (minutes) 0   OT Mode of treatment - Group (minutes) 0   OT Mode of treatment - Co-treat (minutes) 0   OT Mode of Treatment - Total time(minutes) 40 minutes   OT Cumulative Minutes 1980   Therapy Time missed   Time missed? No

## 2024-09-15 NOTE — ASSESSMENT & PLAN NOTE
New complaint of right shoulder pain on 9/1- much improved at this point  Patient feels this is due to her residual right sided weakness from her recent stroke  Being treated by PMR  Continue PT/OT

## 2024-09-15 NOTE — PROGRESS NOTES
09/15/24 0830   Pain Assessment   Pain Assessment Tool 0-10   Pain Score No Pain   Restrictions/Precautions   Precautions Fall Risk;Supervision on toilet/commode;Contact/isolation   Weight Bearing Restrictions No   ROM Restrictions No   Braces or Orthoses Other (Comment)  (+sneakers for all functional transfers; sling for transfers)   Cognition   Overall Cognitive Status WFL   Arousal/Participation Alert;Cooperative   Attention Within functional limits   Orientation Level Oriented X4   Memory Within functional limits   Following Commands Follows one step commands with increased time or repetition   Sit to Stand   Type of Assistance Needed Supervision;Adaptive equipment   Comment CS with RW   Sit to Stand CARE Score 4   Bed-Chair Transfer   Type of Assistance Needed Supervision;Adaptive equipment   Comment CS with RW   Chair/Bed-to-Chair Transfer CARE Score 4   Transfer Bed/Chair/Wheelchair   Adaptive Equipment Ismael Walker   Car Transfer   Type of Assistance Needed Physical assistance;Verbal cues;Adaptive equipment   Physical Assistance Level 25% or less   Comment overall CS but assist required to RLE into car only. Son present and performed hands on transfer with pt.   Car Transfer CARE Score 3   Walk 10 Feet   Type of Assistance Needed Supervision;Adaptive equipment   Comment CS with RW   Walk 10 Feet CARE Score 4   Walking 10 Feet on Uneven Surfaces   Type of Assistance Needed Incidental touching;Adaptive equipment   Comment CGA with HW   Walking 10 Feet on Uneven Surfaces CARE Score 4   Ambulation   Primary Mode of Locomotion Prior to Admission Walk   Distance Walked (feet) 20 ft   Assist Device Ismael Walker   Gait Pattern Inconsistant Jasmyne;Slow Jasmyne;Decreased foot clearance;R foot drag;Forward Flexion;R hemiparesis;Wide OJ;Step to;Step through;Improper weight shift;Trendelenburg;Decreased R stance   Limitations Noted In Coordination;Endurance;Heel Strike;Midline  "Orientation;Posture;Safety;Speed;Strength;Swing   Provided Assistance with: Direction   Walk Assist Level Close Supervision   Does the patient walk? 2. Yes   Wheel 50 Feet with Two Turns   Type of Assistance Needed Set-up / clean-up   Wheel 50 Feet with Two Turns CARE Score 5   Wheel 150 Feet   Type of Assistance Needed Set-up / clean-up   Wheel 150 Feet CARE Score 5   Wheelchair mobility   Does the patient use a wheelchair? 1. Yes   Type of Wheelchair Used 1. Manual   Method Right upper extremity;Left lower extremity;Right lower extremity   Assistance Provided For Replace Leg Rest;Remove Leg Rest;Replace armrests;Remove armrests   Curb or Single Stair   Style negotiated Curb   Type of Assistance Needed Physical assistance;Verbal cues;Adaptive equipment   Physical Assistance Level 26%-50%   Comment 4\" and 6\" curb step trialed with son hands on. Per pt's son her HW will fit next to the extra step they purchased. Once on this step pt should be able to reach over to the LHR. Son will place HW to landing so pt has it to amb into home.   1 Step (Curb) CARE Score 3   4 Steps   Type of Assistance Needed Physical assistance;Verbal cues;Adaptive equipment   Physical Assistance Level 26%-50%   Comment VALERIE/MODA ascending and CGA/VALERIE descending L hand on RHR, retro descend. Pt's son hands on.   4 Steps CARE Score 3   12 Steps   Reason if not Attempted Safety concerns   12 Steps CARE Score 88   Stairs   Type Stairs;Curb   # of Steps 5  (+2 curb)   Weight Bearing Precautions Fall Risk   Assist Devices Single Rail;Ismael Walker   Findings Per son there is space for HW to be next to pt when stepping up intermediate step.   Picking Up Object   Reason if not Attempted Safety concerns   Picking Up Object CARE Score 88   Therapeutic Interventions   Strengthening HEP provided to pt including seated LAQ, hip flex, hip ABD/ADD, ankle DF/PF and glute squeezes. Also some basic standing TE's given but educated on only performing with son or " "daughter next to her for safety. Recommended use of counter top with standing LAQ, hip flex, hip ABD and heel raises. Also in packet she was provided with pressure relief education when in the WC.   Assessment   Treatment Assessment Pt and pt's son \"Stavi\" present for FT. Pt performed car transfer into personal car with overall CS but required assist at RLE into car. Pt's son then provided all hands on assist on curb steps and steps. VC's initially needed to hold on to pt and be closer incase she looses balance. Discussed placement of HW with stairs at home. Stavi will place HE to landing once on step and secured with rail. Since it is only 4 steps to landing should be with arms length for him. Also suggested a seat inside the house for pt to sit once inside if she fatigues. Pt is also able to provide VC's to son as needed. Pt anticipates discharge home Tomorrow 9/16/24. Recommendations are HHPT services first 2 weeks then OPPT. Pt is to have CS with any amb/functional transfers in home with HW and can remain I at WC level. Discussed standing throughout the day with HW to relieve some pressure. Based on her current level pt may just stance but not amb if no one present. No further FT is required at this time. All questions and concerns addressed during session.   Problem List Decreased strength;Decreased endurance;Impaired balance;Decreased mobility;Decreased safety awareness;Obesity   Barriers to Discharge Inaccessible home environment   PT Barriers   Functional Limitation Car transfers;Stair negotiation   Plan   Treatment/Interventions Functional transfer training;LE strengthening/ROM;Therapeutic exercise;Gait training;Endurance training   Discharge Recommendation   Equipment Recommended Wheelchair   PT Therapy Minutes   PT Time In 0830   PT Time Out 1000   PT Total Time (minutes) 90   PT Mode of treatment - Individual (minutes) 90   PT Mode of treatment - Concurrent (minutes) 0   PT Mode of treatment - Group " (minutes) 0   PT Mode of treatment - Co-treat (minutes) 0   PT Mode of Treatment - Total time(minutes) 90 minutes   PT Cumulative Minutes 2451   Therapy Time missed   Time missed? No

## 2024-09-15 NOTE — ASSESSMENT & PLAN NOTE
Was seeing cardiologist in [place] and taking Xarelto but she has not seen him in 2 yrs and stopped all her meds >1 yr ago  EKG in the hospital showed NSR  Needs to followup as OP with Cardiology = Dr Beltre  Continue Coreg  Was not placed back on Flecainide  Now on Xarelto, which will be supplied free of charge from [facility]

## 2024-09-16 VITALS
HEIGHT: 64 IN | WEIGHT: 275.57 LBS | DIASTOLIC BLOOD PRESSURE: 60 MMHG | BODY MASS INDEX: 47.05 KG/M2 | TEMPERATURE: 97.8 F | SYSTOLIC BLOOD PRESSURE: 131 MMHG | OXYGEN SATURATION: 94 % | HEART RATE: 68 BPM | RESPIRATION RATE: 16 BRPM

## 2024-09-16 LAB

## 2024-09-16 PROCEDURE — 99239 HOSP IP/OBS DSCHRG MGMT >30: CPT | Performed by: PHYSICIAN ASSISTANT

## 2024-09-16 PROCEDURE — 94760 N-INVAS EAR/PLS OXIMETRY 1: CPT

## 2024-09-16 PROCEDURE — 94660 CPAP INITIATION&MGMT: CPT

## 2024-09-16 RX ORDER — LIDOCAINE 50 MG/G
1 PATCH TOPICAL DAILY PRN
Start: 2024-09-16

## 2024-09-16 RX ADMIN — LOSARTAN POTASSIUM 100 MG: 50 TABLET, FILM COATED ORAL at 09:29

## 2024-09-16 RX ADMIN — LEVOTHYROXINE SODIUM 25 MCG: 25 TABLET ORAL at 05:03

## 2024-09-16 RX ADMIN — CARVEDILOL 25 MG: 25 TABLET, FILM COATED ORAL at 09:29

## 2024-09-16 RX ADMIN — PANTOPRAZOLE SODIUM 40 MG: 20 TABLET, DELAYED RELEASE ORAL at 05:03

## 2024-09-16 RX ADMIN — B-COMPLEX W/ C & FOLIC ACID TAB 1 TABLET: TAB at 09:28

## 2024-09-16 RX ADMIN — SPIRONOLACTONE 25 MG: 25 TABLET, FILM COATED ORAL at 09:28

## 2024-09-16 RX ADMIN — HYDRALAZINE HYDROCHLORIDE 75 MG: 50 TABLET ORAL at 05:03

## 2024-09-16 RX ADMIN — HYDRALAZINE HYDROCHLORIDE 75 MG: 50 TABLET ORAL at 13:01

## 2024-09-16 NOTE — PLAN OF CARE
Problem: PAIN - ADULT  Goal: Verbalizes/displays adequate comfort level or baseline comfort level  Description: Interventions:  - Encourage patient to monitor pain and request assistance  - Assess pain using appropriate pain scale  - Administer analgesics based on type and severity of pain and evaluate response  - Implement non-pharmacological measures as appropriate and evaluate response  - Consider cultural and social influences on pain and pain management  - Notify physician/advanced practitioner if interventions unsuccessful or patient reports new pain  9/16/2024 1416 by Amy Mcnamara RN  Outcome: Completed  9/16/2024 1416 by Amy Mcnamara RN  Outcome: Adequate for Discharge     Problem: INFECTION - ADULT  Goal: Absence or prevention of progression during hospitalization  Description: INTERVENTIONS:  - Assess and monitor for signs and symptoms of infection  - Monitor lab/diagnostic results  - Monitor all insertion sites, i.e. indwelling lines, tubes, and drains  - Monitor endotracheal if appropriate and nasal secretions for changes in amount and color  - [place] appropriate cooling/warming therapies per order  - Administer medications as ordered  - Instruct and encourage patient and family to use good hand hygiene technique  - Identify and instruct in appropriate isolation precautions for identified infection/condition  9/16/2024 1416 by Amy Mcnamara RN  Outcome: Completed  9/16/2024 1416 by Amy Mcnamara RN  Outcome: Adequate for Discharge     Problem: SAFETY ADULT  Goal: Patient will remain free of falls  Description: INTERVENTIONS:  - Educate patient/family on patient safety including physical limitations  - Instruct patient to call for assistance with activity   - Consult OT/PT to assist with strengthening/mobility   - Keep Call bell within reach  - Keep bed low and locked with side rails adjusted as appropriate  - Keep care items and personal belongings within reach  - Initiate and maintain comfort  rounds  - Make Fall Risk Sign visible to staff  - Offer Toileting every  Hours, in advance of need  - Initiate/Maintain alarm  - Obtain necessary fall risk management equipment:   - Apply yellow socks and bracelet for high fall risk patients  - Consider moving patient to room near nurses station  9/16/2024 1416 by Amy Mcnamara RN  Outcome: Completed  9/16/2024 1416 by Amy Mcnamara RN  Outcome: Adequate for Discharge  Goal: Maintain or return to baseline ADL function  Description: INTERVENTIONS:  -  Assess patient's ability to carry out ADLs; assess patient's baseline for ADL function and identify physical deficits which impact ability to perform ADLs (bathing, care of mouth/teeth, toileting, grooming, dressing, etc.)  - Assess/evaluate cause of self-care deficits   - Assess range of motion  - Assess patient's mobility; develop plan if impaired  - Assess patient's need for assistive devices and provide as appropriate  - Encourage maximum independence but intervene and supervise when necessary  - Involve family in performance of ADLs  - Assess for home care needs following discharge   - Consider OT consult to assist with ADL evaluation and planning for discharge  - Provide patient education as appropriate  9/16/2024 1416 by Amy Mcnamara RN  Outcome: Completed  9/16/2024 1416 by Amy Mcnamara RN  Outcome: Adequate for Discharge  Goal: Maintains/Returns to pre admission functional level  Description: INTERVENTIONS:  - Perform AM-PAC 6 Click Basic Mobility/ Daily Activity assessment daily.  - Set and communicate daily mobility goal to care team and patient/family/caregiver.   - Collaborate with rehabilitation services on mobility goals if consulted  - Perform Range of Motion  times a day.  - Reposition patient every  hours.  - Dangle patient  times a day  - Stand patient  times a day  - Ambulate patient  times a day  - Out of bed to chair  times a day   - Out of bed for meals  times a day  - Out of bed for  toileting  - Record patient progress and toleration of activity level   9/16/2024 1416 by Amy Mcnamara RN  Outcome: Completed  9/16/2024 1416 by Amy Mcnamara RN  Outcome: Adequate for Discharge     Problem: DISCHARGE PLANNING  Goal: Discharge to home or other facility with appropriate resources  Description: INTERVENTIONS:  - Identify barriers to discharge w/patient and caregiver  - Arrange for needed discharge resources and transportation as appropriate  - Identify discharge learning needs (meds, wound care, etc.)  - Arrange for interpretive services to assist at discharge as needed  - Refer to Case Management Department for coordinating discharge planning if the patient needs post-hospital services based on physician/advanced practitioner order or complex needs related to functional status, cognitive ability, or social support system  9/16/2024 1416 by Amy Mcnamara RN  Outcome: Completed  9/16/2024 1416 by Amy Mcnamara RN  Outcome: Adequate for Discharge     Problem: Prexisting or High Potential for Compromised Skin Integrity  Goal: Skin integrity is maintained or improved  Description: INTERVENTIONS:  - Identify patients at risk for skin breakdown  - Assess and monitor skin integrity  - Assess and monitor nutrition and hydration status  - Monitor labs   - Assess for incontinence   - Turn and reposition patient  - Assist with mobility/ambulation  - Relieve pressure over bony prominences  - Avoid friction and shearing  - Provide appropriate hygiene as needed including keeping skin clean and dry  - Evaluate need for skin moisturizer/barrier cream  - Collaborate with interdisciplinary team   - Patient/family teaching  - Consider wound care consult   9/16/2024 1416 by Amy Mcnamara RN  Outcome: Completed  9/16/2024 1416 by Amy Mcnamara RN  Outcome: Adequate for Discharge

## 2024-09-16 NOTE — PLAN OF CARE
Problem: PAIN - ADULT  Goal: Verbalizes/displays adequate comfort level or baseline comfort level  Description: Interventions:  - Encourage patient to monitor pain and request assistance  - Assess pain using appropriate pain scale  - Administer analgesics based on type and severity of pain and evaluate response  - Implement non-pharmacological measures as appropriate and evaluate response  - Consider cultural and social influences on pain and pain management  - Notify physician/advanced practitioner if interventions unsuccessful or patient reports new pain  Outcome: Adequate for Discharge     Problem: INFECTION - ADULT  Goal: Absence or prevention of progression during hospitalization  Description: INTERVENTIONS:  - Assess and monitor for signs and symptoms of infection  - Monitor lab/diagnostic results  - Monitor all insertion sites, i.e. indwelling lines, tubes, and drains  - Monitor endotracheal if appropriate and nasal secretions for changes in amount and color  - [place] appropriate cooling/warming therapies per order  - Administer medications as ordered  - Instruct and encourage patient and family to use good hand hygiene technique  - Identify and instruct in appropriate isolation precautions for identified infection/condition  Outcome: Adequate for Discharge     Problem: SAFETY ADULT  Goal: Patient will remain free of falls  Description: INTERVENTIONS:  - Educate patient/family on patient safety including physical limitations  - Instruct patient to call for assistance with activity   - Consult OT/PT to assist with strengthening/mobility   - Keep Call bell within reach  - Keep bed low and locked with side rails adjusted as appropriate  - Keep care items and personal belongings within reach  - Initiate and maintain comfort rounds  - Make Fall Risk Sign visible to staff  - Offer Toileting every  Hours, in advance of need  - Initiate/Maintain alarm  - Obtain necessary fall risk management equipment:   - Apply  yellow socks and bracelet for high fall risk patients  - Consider moving patient to room near nurses station  Outcome: Adequate for Discharge  Goal: Maintain or return to baseline ADL function  Description: INTERVENTIONS:  -  Assess patient's ability to carry out ADLs; assess patient's baseline for ADL function and identify physical deficits which impact ability to perform ADLs (bathing, care of mouth/teeth, toileting, grooming, dressing, etc.)  - Assess/evaluate cause of self-care deficits   - Assess range of motion  - Assess patient's mobility; develop plan if impaired  - Assess patient's need for assistive devices and provide as appropriate  - Encourage maximum independence but intervene and supervise when necessary  - Involve family in performance of ADLs  - Assess for home care needs following discharge   - Consider OT consult to assist with ADL evaluation and planning for discharge  - Provide patient education as appropriate  Outcome: Adequate for Discharge  Goal: Maintains/Returns to pre admission functional level  Description: INTERVENTIONS:  - Perform AM-PAC 6 Click Basic Mobility/ Daily Activity assessment daily.  - Set and communicate daily mobility goal to care team and patient/family/caregiver.   - Collaborate with rehabilitation services on mobility goals if consulted  - Perform Range of Motion  times a day.  - Reposition patient every  hours.  - Dangle patient  times a day  - Stand patient  times a day  - Ambulate patient  times a day  - Out of bed to chair  times a day   - Out of bed for meals  times a day  - Out of bed for toileting  - Record patient progress and toleration of activity level   Outcome: Adequate for Discharge     Problem: DISCHARGE PLANNING  Goal: Discharge to home or other facility with appropriate resources  Description: INTERVENTIONS:  - Identify barriers to discharge w/patient and caregiver  - Arrange for needed discharge resources and transportation as appropriate  - Identify  discharge learning needs (meds, wound care, etc.)  - Arrange for interpretive services to assist at discharge as needed  - Refer to Case Management Department for coordinating discharge planning if the patient needs post-hospital services based on physician/advanced practitioner order or complex needs related to functional status, cognitive ability, or social support system  Outcome: Adequate for Discharge     Problem: Prexisting or High Potential for Compromised Skin Integrity  Goal: Skin integrity is maintained or improved  Description: INTERVENTIONS:  - Identify patients at risk for skin breakdown  - Assess and monitor skin integrity  - Assess and monitor nutrition and hydration status  - Monitor labs   - Assess for incontinence   - Turn and reposition patient  - Assist with mobility/ambulation  - Relieve pressure over bony prominences  - Avoid friction and shearing  - Provide appropriate hygiene as needed including keeping skin clean and dry  - Evaluate need for skin moisturizer/barrier cream  - Collaborate with interdisciplinary team   - Patient/family teaching  - Consider wound care consult   Outcome: Adequate for Discharge

## 2024-09-16 NOTE — DISCHARGE INSTR - AVS FIRST PAGE
"DISCHARGE INSTRUCTIONS: [facility] ACUTE REHABILITATION [place]    Bring these instructions with you to your Outpatient Physician appointments so they can order and follow-up any additional lab work or imaging recommended at time of discharge.    It  is you or your caregivers responsibility to obtain follow-up MEDICATION REFILLS  As indicated through your Primary Care Physician (PCP) and other outpatient specialty provider(s) after discharge.  Please follow-up with your PCP as soon as possible after discharge to set-up follow-up management and when appropriate refills.      You remain a fall and injury risk which could be severe.  - Your risk of fall has decreased however since admission to acute rehab.  Caregiver training has been completed with our staff.  - Appropriate supervision +/- assistance as instructed during your rehab course is recommended to decrease risk of fall and injury.    - Continue skilled therapy as discussed after discharge to further decrease this risk    If you (or your health care proxy) have any questions or concerns regarding your acute rehabilitation stay including issues with medications, rehabilitation, and follow-up plan, please call:          [facility] Acute Rehabilitation Unit in [place] at [phone] or [phone].    Should you develop fevers, chills, new weakness, changes in sensation, difficulty speaking, facial weakness, confusion, shortness of breath, chest pain, or other concerning symptoms please call 911 and/or obtain transportation to nearest ER immediately.    Should you develop feelings of significant hopelessness, severe depression, severe anxiety, or suicide you should call 911 or obtain transportation to nearest ER.    24-7 Nationwide suicide and crisis lifeline call \"109\"  National Suicide Prevention Lifeline is 1-124.131.9735 and is available 24 hrs/7 days a week.   [facility] Crisis [phone] is available 24 hrs/7 days for " mental health  [facility] Crisis [phone] is available 24 hrs/7 days for mental health   [facility] Crisis [phone]    PHYSICIANS to see:  Please see your doctors listed in the follow up providers section of your discharge paperwork, and take the discharge paperwork with you to your appointments.    Home health has been ordered for you:  Your home health agency should reach out to you or your family soon to arrange follow-up.    (If [facility] home health is your provider their phone number is [phone])    If you are unable to get in touch with home health you may contact your  at [phone]. [place]      LAB WORK recommended after discharge:  Follow-up lab work at discretion of your outpatient physicians to be determined at time of your future appointments.  .      Recommend the following labs be drawn by home health nursing (orders has been placed prior to discharge): TSH with reflex to free T4 on or around 9/23/34    These labs must be interpreted by your outpatient primary care physician (PCP) and/or other outpatient specialists as discussed.  It is your (or your caregiver's) responsibility to ensure these labs are drawn and followed up by the appropriate outpatient providers.  Contact these providers after discharge to review labs and adjust management as indicated and to order any additional labs.  Your acute rehab physicians will not be able to follow-up labs once discharged from Acute Rehab setting      Excessive delay in labs and appropriate follow-up could potentially increase your risk of complications which could be severe and even life-threatening.          SKIN CARE INSTRUCTIONS to follow:    Monitor skin for increased redness or breadown and promptly notify your physician should these develop    If instructed while in ARC - be sure you stand +/- walk every 1-2 hours and if advised use appropriate supervision/assistance to optimally offload  your buttock/sacral region.  While seated or lying in bed shift positions and from side to side often.  Can use barrier type cream such as Hydragaurd 2 times per day and as needed.    Turn patient (yourself) fully every 2 hours while in bed.      Due to the following you are at increased risk of skin breakdown/wounds/worsening wounds:  - Impaired mobility  - Impaired nutrition/intake/low albumin  - Medical co-morbidities    Buttocks/Sacrum  Turn as full as possible off sacrum/buttocks every 2 hours  Use Cushion while in chair or wheelchair  Weight shift every 10-15 minutes while in chair  Keep skin clean and dry as possible.  Remove wet or soiled clothing/linens promptly  Use barrier cream or similar 2 times per day   Monitor skin for increased breakdown which you are at risk of and notify nursing, PCP, or other physician providers should this occur right away    Heels/bony edges of feet  Float heels off edge of pillow for added pressure relief.  Monitor heels and bony protuberances and notify physician or nursing for any increased redness, bogginess, or breakdown    WEIGHTBEARING/ACTIVITY PRECAUTIONS to follow:    Weightbearing as tolerated.    Driving restrictions:    You are recommended against driving until cleared by an outpatient physician.      Work restrictions:  You should NOT return to work (if working) until cleared by an outpatient physician.      Alcohol restrictions:  You are recommended to not drink alcohol at this time unless cleared by an outpatient physician.    Smoking restrictions:  You are recommended to not smoke nicotine.  Smoking increases your risk of heart attack, stroke, emphysema/COPD, and lung cancer.      Cannabis restrictions:  You are recommended to not smoke/ingest/consume/use cannabis/marijuana at this time unless cleared by an outpatient physician.    Illicit drug use restrictions:   You should not use cocaine, crack, speed/methamphetamine, heroin, LSD, ecstasy or other illicit  substances as they can increase your risk of injury and medical complication which could be severe and lead to sub-optimal functional recovery.    MEDICATIONS:  Please see a full list of your medications outlined in the After Visit Summary that is attached to these Discharge Instructions.  Please note changes may have been made to your medications please refer to your discharge paperwork for your current medications and take this list with you to all your doctors appointments for your doctors to review.  Please do not resume a home medication unless the medication reconciliation sheet indicates to do so, please do not assume that a medication that you were given a prescription for is the same as a medication you have at home based on both medications having the same name as dosages and frequency may have changed.      Unless specifically noted in your medication list provided to you in your discharge paper work do not resume prior vitamins, minerals, or supplements you may have been taking prior to your hospitalization unless instructed by an outpatient physician in the future.  Discuss with your primary care at next visit if applicable.      NSAID Warning:  Please avoid NSAID (including but not limited to advil, aleve, motrin, naproxen, ibuprofen, mobic, meloxicam, diclofenac etc) medications as NSAID medications may increase your risk of bleeding (which can be life-threatening), stroke, heart attack, developing/worsening GI ulcers.    Blood thinners prescribed to optimize long and short term health and decrease risk of complications but with risks related to bleeding and other complications.    Xarelto (Rivaroxaban) instructions:  You have been prescribed Xarelto (Rivaroxaban).  This medication is used to prevent clots which can cause severe disability and even death.      This medication will need to be managed by your outpatient physician(s) after discharge.  Follow-up with the appropriate provider as soon as  possible to ensure appropriate use.    This is a strong anticoagulant (blood thinner).  It is being recommended for use by you (or your family) to decrease the risk of clotting which can be severely disabling and even life-threatening.  Even when provided as recommended it can cause severe disabling and even life-threatening bleeding.  Too much or too little of this blood thinner further increases your risk of this medication causing serious and even life-threatening complications such as severe bleeding, clots, strokes, and death.  With that said, at this time based on best available evidence and consensus agreement, your physicians recommend you take Xarelto (Rivaroxaban) medication based on your overall risks and benefits in your specific medical situation.      If you (or your family/caregiver) notice black stools, bloody stools, vomit blood, develop new weakness, slurred speech, confusion or have any other concerning symptoms call 911 or obtain transportation to nearest emergency room immediately.      MEDICAL MANAGEMENT AT HOME specific to you: continue new prescriptions as prescribed during your hospitalization/rehab stay. Importance of medication compliance stressed.       Hypertension Management:   Only take the medications prescribed for you at time of discharge - overly high or low blood pressure increases your risk for health complications    Follow-up with PCP/family doctor regularly to ensure blood pressure remains adequately controlled.      Please check your blood pressure prior to taking your blood pressure medications and keep a log that you will bring with you to your follow-up doctors' appointments.    >Please contact your family doctor or cardiologist immediately for a blood pressure below 100/50 and do not take your blood pressure medications until speaking with them.  >Please contact your family doctor or cardiologist as soon as possible for blood pressure greater than 160/100.    Hypotension  (Low blood pressure) Management:   Only take the medications prescribed for you at time of discharge - overly high or low blood pressure increases your risk for health complications  Please check your blood pressure prior to taking your blood pressure medications and keep a log that you will bring with you to your follow-up doctors' appointments.    >Please contact your family doctor or cardiologist immediately for a blood pressure below 100/50 and do not take your blood pressure medications until speaking with them.  >Please contact your family doctor or cardiologist for a systolic blood pressure (top number) below 100.  >Please contact your family doctor or cardiologist as soon as possible for blood pressure greater than 160/100.  >If your lightheadedness is more than usual or you have different symptoms such as increased shortness of breath, chest pain, confusion, difficulty staying awake, or other concerning signs or symptoms obtain transport to nearest emergency room as soon as possible.    >If you develop new lightheadedness or your lightheadedness is more than usual check your blood pressure.   >You can use SHANNA hose stocking and abdominal binder if your having lightheadedness upon standing but you should notify your physician if this is more than usual      Leg edema (swelling):   Notify your physician if you develop increased swelling, pain, or redness in legs or feet.       Acetaminophen (Tylenol) Dosing Warning:    You may have up to 3000 mg of acetaminophen (Tylenol) from all sources spread out over a 24 hours period.  Do not have more than that, as this can increase your risk of liver injury which can be serious.     Opiate medications:  You are to avoid opiate medications unless cleared by physician to resume them in the future.  Taking opiate medications can increase risk of fall, confusion, and injury.      Opiate and muscle relaxant medications:  You are to avoid opiate and muscle relaxant  medications unless cleared by physician to resume them in the future.  Taking opiate and/or muscle relaxant medications can increase risk of fall, confusion, and injury.      For pain - try to use over the counter topicals (creams/gels) as discussed or acetaminophen 1-2 regular or extra-strength Tylenol up to 2-3 times per day.  Could consider heat or ice as well maximum 20 minutes at a time.  Do not use heat or ice if using topicals at the same time.  Notify primary care and/or orthopedics for poorly controlled pain for additional recommendations.      Please note a summary of your hospital stay with relevant information for your doctors will try to be sent to them.  Please confirm with your doctors at your follow up visits that they have received this summary and have them contact [facility] Medical Records if they have not received them along with any other medical records they may require.     Main  [facility] Phone Number:  [phone]

## 2024-09-17 ENCOUNTER — HOME CARE VISIT (OUTPATIENT)
Dept: HOME HEALTH SERVICES | Facility: HOME HEALTHCARE | Age: 64
End: 2024-09-17
Payer: MEDICARE

## 2024-09-17 VITALS — HEART RATE: 68 BPM | SYSTOLIC BLOOD PRESSURE: 138 MMHG | OXYGEN SATURATION: 97 % | DIASTOLIC BLOOD PRESSURE: 58 MMHG

## 2024-09-17 PROCEDURE — 400013 VN SOC

## 2024-09-17 PROCEDURE — G0151 HHCP-SERV OF PT,EA 15 MIN: HCPCS

## 2024-09-17 PROCEDURE — 10330081 VN NO-PAY CLAIM PROCEDURE

## 2024-09-17 NOTE — CASE MANAGEMENT
Team dc summary - pt made great progress and returned home to her daughters with support from her son. Family training was completed with pts son on at least two occasions. Pt received a w/c, and drop arm commode through [email or web address] prior to dc. Pt received a temporary handicap placard. Pts son present for dc instructions and provided transport home.

## 2024-09-17 NOTE — PROGRESS NOTES
09/17/24 1552   Hello, [Guardian’s Name / Patient’s Name], this is [Caller Name] from [facility], and our clinical care team wanted to check on you / your child after your recent visit to the hospital. It will only take 3-5 minutes. Is this a good time?   Discharge Call Type/ Specific Diagnosis: ARC Stroke   [facility] Stroke Follow-up   ARC Stroke Follow-Up Time Frame 5 Day follow up   Call Complete   Attempted Number of Calls 1   Discharge phone call complete? Left Message

## 2024-09-18 ENCOUNTER — HOME CARE VISIT (OUTPATIENT)
Dept: HOME HEALTH SERVICES | Facility: HOME HEALTHCARE | Age: 64
End: 2024-09-18
Payer: MEDICARE

## 2024-09-18 VITALS — OXYGEN SATURATION: 95 % | DIASTOLIC BLOOD PRESSURE: 56 MMHG | HEART RATE: 64 BPM | SYSTOLIC BLOOD PRESSURE: 110 MMHG

## 2024-09-18 LAB
DME PARACHUTE DELIVERY DATE ACTUAL: NORMAL
DME PARACHUTE DELIVERY DATE EXPECTED: NORMAL
DME PARACHUTE DELIVERY DATE REQUESTED: NORMAL
DME PARACHUTE ITEM DESCRIPTION: NORMAL
DME PARACHUTE ORDER STATUS: NORMAL
DME PARACHUTE SUPPLIER NAME: NORMAL
DME PARACHUTE SUPPLIER PHONE: NORMAL

## 2024-09-18 PROCEDURE — G0321 AUDIO-ONLY HHS: HCPCS

## 2024-09-18 PROCEDURE — G0152 HHCP-SERV OF OT,EA 15 MIN: HCPCS | Performed by: OCCUPATIONAL THERAPIST

## 2024-09-18 NOTE — SPEECH THERAPY NOTE
SLP Discharge Summary     Pt was discharged home w/ family support/supervision on 9/16/2024. Throughout pt's stay on the acute rehab center, focus of ST services targeted cognitive linguistic skills as well as speech intelligibility. Pt was able to demonstrate steady and good progress overall towards both of these tasks to where by time of discharge, pt was able to demonstrate consistent functioning at mod I-supervision level for comprehension, supervision for expression, executive functions and memory.      Summarization of pt's stay as below:     In regards to cognition, pt completed the SL[employer]MS cognitive assessment upon evaluation. Pt total score was 25/30 which as compared to those with high school education correlates with mild neurocognitive disorder.  Pt presented with the following barriers: decreased STM, working memory, word recall, critical thinking and higher level problem solving thought organization and expressive language/word retrieval which, which impact pt's overall safety, functional cognitive communication skills as well as functional mobility. The following interventions are used to target these barriers, including verbal problem solving task, verbal working memory tasks, visual memory recall tasks, drawing conclusions activities, written sequencing tasks, verbal sequencing tasks, written financial management tasks, tangible money tasks, verbal review of current medications, written review of medications, tangible medication management task, written calendar tasks, tangible scheduling tasks , written health management tasks, verbal health management tasks, visual attention tasks, functional reading tasks, and family education/training, as well as verbal command following activities, written command following activities, reading comprehension at sentence level, reading comprehension at paragraph level, written expression at word level, written expression at sentence level , divergent naming:  concrete, divergent naming: abstract , word deduction with phrase, and word deduction with clue words.      In regards to dysarthria, pt was demonstrating speech intelligibility to be mildly impaired at the sentence/conversational level. Barrier include: distorted speech sounds, decreased breath support and fast rate of speech. Interventions to be targeted include: OME's, verbal repetition of sentence, orthographic repetition of sentences, review of speech strategies and breath/support cueing. Pt was able to demonstrate independence of OME's to where this became an HEP throughout stay on the unit. Of note as pt does fatigue, SLP providing review of need for rest breaks.     Plan was to target and implement cognitive and speech interventions as well as discussing baseline with daughter regarding ADLs and  pt's ability to complete IADL tasks such as medication management, finance management, ability to recognize and solve unsafe situations. It was established that pt would like to focus on targeting higher level cognitive linguistic tasks, but monitoring functional I ADL tasks, such as medication and financial management skills. Also will [place] back to review of speech strategies, OME's to maximize overall speech intelligibility.      Family training/education had been initiated with pt's family (son, DIL, dtr's boyfriend) to where review of ST services was completed in addition to demonstration of how higher level cognitive linguistic skills are still impacted at this time. Pt would benefit from supervision and monitoring in compliance given I ADL tasks, including medication management, finances and calendar/scheduling skills upon discharge. Of note, repeat FT was completed w/ pt's son again day prior to discharge to where the following topics were reviewed: Pt's son asking SLP about tasks/activities which can be completed w/ pt at home, which pt was able to initiate conversation about completing OME's targeting  lips, face and tongue muscles. SLP educating son that there is a handout w/ these exercises, reviewed the recommendations for completing each exercise 10x/each and a total of 2-3 times a day. Additionally provided son w/ how SLP had printed out some workbooks which target pt's higher level cognitive skills, which SLP will place into a Speech folder to keep all these items together. SLP showing son examples of deduction puzzles which pt completed throughout sessions, but also providing education to son about how pt does exhibit difficulty given the more complex a task given. Pt will demonstrate mental fatigue which if pt is demonstrating trouble given task, pt should take a break and allow time to not focus on task at hand and then come back to it w/ a fresh set of eyes. Pt and pt's son verbalizing understanding of these recommendations towards HEP activities for cognitive skills. Also, SLP reviewed w/ pt and son current medication list. SLP providing recommendations for continued use of pill box, which pt reports having. SLP providing education for son to complete pill box management and likely to provide reminders to pt to take medications for carryover and compliance. Lastly reviewed w/ both son and pt about how since pt will begin w/ home services, no ST needed but when transitioning to OP, would recommend to continue ST services. At this time, pt is planned for discharge home w/ family support/supervision on 9/16/2024 w/ continued home OT/PT, no home ST but when transition to OP, initiate ST services.

## 2024-09-18 NOTE — PROGRESS NOTES
09/18/24 Jalil   Hello, [Guardian’s Name / Patient’s Name], this is [Caller Name] from [facility], and our clinical care team wanted to check on you / your child after your recent visit to the hospital. It will only take 3-5 minutes. Is this a good time?   Discharge Call Type/ Specific Diagnosis: ARC Stroke   ARC Stroke Follow-up   ARC Stroke Follow-Up Time Frame 5 Day follow up   ARC 5 Day Stroke General Follow- up Questions   Patient location at time of call Home   Aspiration Pneumonia No   Other injuries related to stroke No   Were you/ your loved one's discharge instructions clear and understandable No   Are you/ your loved one taking all medications as prescribed? Yes   Do you have questions about your medications? No   Follow up appointments   Follow up appointment with PCP Future appointment scheduled   Is there anything preventing you from keeping this appointment? No   Healthy Lifestyle   Are you participating in ongoing therapy? Yes   Arc discharge phone call follow ups and opportunities   How well do you feel the team did preparing you to return home? Very well   Are there any physicians, nurses, therapists or hospital staff you would like us to recognize for doing a very good job? No   Thank you for taking the time to talk with me about your care and recovery. Do you have any suggestions for us? No   Is there a need for follow up? No   Call Complete   Discharge phone call complete? Complete

## 2024-09-19 ENCOUNTER — HOME CARE VISIT (OUTPATIENT)
Dept: HOME HEALTH SERVICES | Facility: HOME HEALTHCARE | Age: 64
End: 2024-09-19
Payer: MEDICARE

## 2024-09-19 VITALS
OXYGEN SATURATION: 99 % | HEART RATE: 72 BPM | DIASTOLIC BLOOD PRESSURE: 60 MMHG | SYSTOLIC BLOOD PRESSURE: 144 MMHG | RESPIRATION RATE: 20 BRPM

## 2024-09-19 PROCEDURE — G0151 HHCP-SERV OF PT,EA 15 MIN: HCPCS

## 2024-09-20 ENCOUNTER — HOME CARE VISIT (OUTPATIENT)
Dept: HOME HEALTH SERVICES | Facility: HOME HEALTHCARE | Age: 64
End: 2024-09-20
Payer: MEDICARE

## 2024-09-20 VITALS — SYSTOLIC BLOOD PRESSURE: 118 MMHG | HEART RATE: 61 BPM | DIASTOLIC BLOOD PRESSURE: 58 MMHG | OXYGEN SATURATION: 95 %

## 2024-09-20 PROCEDURE — G0153 HHCP-SVS OF S/L PATH,EA 15MN: HCPCS

## 2024-09-20 PROCEDURE — G0152 HHCP-SERV OF OT,EA 15 MIN: HCPCS | Performed by: OCCUPATIONAL THERAPIST

## 2024-09-21 NOTE — CASE COMMUNICATION
This message is informative only.   No action required.     HH ST Eval made 9.20.24    Impression. Moderate dysarthria characterized by limited articulatory postures.particularly on phonemes, t.d.n and l, blends, and multisyllabic words.  Mild.moderate labored diadochokinetic rate.  Oriented x3.  Observed language to be WFL in conversational speech. Pt denied any difficulty swallowing or reflux even though Dx. GERD was reported.    Hear ing.  WFL  Vision.  WFL w glasses    Rec.   Sp tx. 1x1w, 2x2 w  Cont informal eval  Practice assignments given

## 2024-09-23 ENCOUNTER — HOME CARE VISIT (OUTPATIENT)
Dept: HOME HEALTH SERVICES | Facility: HOME HEALTHCARE | Age: 64
End: 2024-09-23
Payer: MEDICARE

## 2024-09-23 VITALS — DIASTOLIC BLOOD PRESSURE: 58 MMHG | SYSTOLIC BLOOD PRESSURE: 118 MMHG | OXYGEN SATURATION: 97 % | HEART RATE: 60 BPM

## 2024-09-23 PROCEDURE — G0180 MD CERTIFICATION HHA PATIENT: HCPCS | Performed by: PHYSICAL MEDICINE & REHABILITATION

## 2024-09-23 PROCEDURE — G0152 HHCP-SERV OF OT,EA 15 MIN: HCPCS | Performed by: OCCUPATIONAL THERAPIST

## 2024-09-24 ENCOUNTER — HOME CARE VISIT (OUTPATIENT)
Dept: HOME HEALTH SERVICES | Facility: HOME HEALTHCARE | Age: 64
End: 2024-09-24
Payer: MEDICARE

## 2024-09-24 VITALS
SYSTOLIC BLOOD PRESSURE: 130 MMHG | OXYGEN SATURATION: 98 % | DIASTOLIC BLOOD PRESSURE: 66 MMHG | RESPIRATION RATE: 20 BRPM | HEART RATE: 60 BPM

## 2024-09-24 PROCEDURE — G0151 HHCP-SERV OF PT,EA 15 MIN: HCPCS

## 2024-09-24 PROCEDURE — G0153 HHCP-SVS OF S/L PATH,EA 15MN: HCPCS

## 2024-09-25 ENCOUNTER — HOME CARE VISIT (OUTPATIENT)
Dept: HOME HEALTH SERVICES | Facility: HOME HEALTHCARE | Age: 64
End: 2024-09-25
Payer: MEDICARE

## 2024-09-25 VITALS — HEART RATE: 64 BPM | DIASTOLIC BLOOD PRESSURE: 62 MMHG | OXYGEN SATURATION: 97 % | SYSTOLIC BLOOD PRESSURE: 122 MMHG

## 2024-09-25 PROCEDURE — G0152 HHCP-SERV OF OT,EA 15 MIN: HCPCS | Performed by: OCCUPATIONAL THERAPIST

## 2024-09-26 ENCOUNTER — HOME CARE VISIT (OUTPATIENT)
Dept: HOME HEALTH SERVICES | Facility: HOME HEALTHCARE | Age: 64
End: 2024-09-26
Payer: MEDICARE

## 2024-09-26 VITALS
OXYGEN SATURATION: 98 % | DIASTOLIC BLOOD PRESSURE: 70 MMHG | RESPIRATION RATE: 20 BRPM | HEART RATE: 68 BPM | SYSTOLIC BLOOD PRESSURE: 124 MMHG

## 2024-09-26 PROCEDURE — G0153 HHCP-SVS OF S/L PATH,EA 15MN: HCPCS

## 2024-09-26 PROCEDURE — G0151 HHCP-SERV OF PT,EA 15 MIN: HCPCS

## 2024-09-26 NOTE — OCCUPATIONAL THERAPY NOTE
Occupational Therapy Discharge Summary:     Pt made good progress throughout rehab stay, from OT standpoint. Pt was D/C home with family support. At time of D/C pt was completing ADLS with mod/max assist and functional transfers with supervision and hemiwalker.    Pt recommended for the following DME: w/c, drop arm BSC.  Family training was completed with pt's children.

## 2024-09-30 ENCOUNTER — HOME CARE VISIT (OUTPATIENT)
Dept: HOME HEALTH SERVICES | Facility: HOME HEALTHCARE | Age: 64
End: 2024-09-30
Payer: MEDICARE

## 2024-09-30 VITALS — DIASTOLIC BLOOD PRESSURE: 78 MMHG | HEART RATE: 67 BPM | SYSTOLIC BLOOD PRESSURE: 122 MMHG | OXYGEN SATURATION: 95 %

## 2024-09-30 PROCEDURE — G0152 HHCP-SERV OF OT,EA 15 MIN: HCPCS | Performed by: OCCUPATIONAL THERAPIST

## 2024-10-01 ENCOUNTER — HOME CARE VISIT (OUTPATIENT)
Dept: HOME HEALTH SERVICES | Facility: HOME HEALTHCARE | Age: 64
End: 2024-10-01
Payer: MEDICARE

## 2024-10-01 ENCOUNTER — HOME CARE VISIT (OUTPATIENT)
Dept: HOME HEALTH SERVICES | Facility: HOME HEALTHCARE | Age: 64
End: 2024-10-01
Payer: COMMERCIAL

## 2024-10-01 VITALS — OXYGEN SATURATION: 97 % | HEART RATE: 65 BPM | DIASTOLIC BLOOD PRESSURE: 76 MMHG | SYSTOLIC BLOOD PRESSURE: 122 MMHG

## 2024-10-01 PROCEDURE — 400013 VN SOC

## 2024-10-01 PROCEDURE — G0153 HHCP-SVS OF S/L PATH,EA 15MN: HCPCS

## 2024-10-01 PROCEDURE — G0151 HHCP-SERV OF PT,EA 15 MIN: HCPCS

## 2024-10-02 ENCOUNTER — TELEPHONE (OUTPATIENT)
Dept: PHYSICAL THERAPY | Facility: CLINIC | Age: 64
End: 2024-10-02

## 2024-10-02 ENCOUNTER — TELEPHONE (OUTPATIENT)
Dept: NEUROLOGY | Facility: CLINIC | Age: 64
End: 2024-10-02

## 2024-10-02 ENCOUNTER — HOME CARE VISIT (OUTPATIENT)
Dept: HOME HEALTH SERVICES | Facility: HOME HEALTHCARE | Age: 64
End: 2024-10-02
Payer: MEDICARE

## 2024-10-02 VITALS — DIASTOLIC BLOOD PRESSURE: 56 MMHG | SYSTOLIC BLOOD PRESSURE: 108 MMHG | HEART RATE: 60 BPM | OXYGEN SATURATION: 95 %

## 2024-10-02 PROCEDURE — G0152 HHCP-SERV OF OT,EA 15 MIN: HCPCS | Performed by: OCCUPATIONAL THERAPIST

## 2024-10-02 NOTE — TELEPHONE ENCOUNTER
Received a fax notification that the [employer] payment assistance program in which Demario is currently enrolled for her (rivaroxaban/Xarelto 20 mg daily with dinner) will be ending in its current form as of 12/31/24. All patients currently enrolled in this program will need to transition to an alternative [employer] and [employer] program prior to the new year to ensure ongoing coverage/assistance. This message is to notify your office of this change and that action is required in order to secure ongoing payment assistance for Demario.     ARC unit  will fax remaining documentation that was sent to us regarding this changes that contain instructions for patient.     Dk De Dios MD  Attending Physician  Physical Medicine and Rehabilitation  [facility]

## 2024-10-02 NOTE — TELEPHONE ENCOUNTER
Received scripts via fax for physical/occupational therapy dx.stroke. I contacted patient to schedule but there was no answer, left message to call back and schedule

## 2024-10-02 NOTE — CASE COMMUNICATION
Chief Complaint   Patient presents with     Medicare Visit       Medication reconciliation completed.    FOOD SECURITY SCREENING QUESTIONS:    The next two questions are to help us understand your food security.  If you are feeling you need any assistance in this area, we have resources available to support you today.    Hunger Vital Signs:  Within the past 12 months we worried whether our food would run out before we got money to buy more. Never  Within the past 12 months the food we bought just didn't last and we didn't have money to get more. Never    Initial BP (!) 142/88 (BP Location: Right arm, Patient Position: Sitting, Cuff Size: Adult Regular)   Pulse 51   Temp 98  F (36.7  C)   Resp 16   Ht 1.829 m (6')   Wt 95.9 kg (211 lb 6.4 oz)   SpO2 98%   BMI 28.67 kg/m   Estimated body mass index is 28.67 kg/m  as calculated from the following:    Height as of this encounter: 1.829 m (6').    Weight as of this encounter: 95.9 kg (211 lb 6.4 oz).       Aletha Wing LPN .......  6/20/2022  8:17 AM   This message is informative only.  No action required.     Pt berny be dced from sp tx 10.3.24 if good progress continues.

## 2024-10-03 ENCOUNTER — HOME CARE VISIT (OUTPATIENT)
Dept: HOME HEALTH SERVICES | Facility: HOME HEALTHCARE | Age: 64
End: 2024-10-03
Payer: MEDICARE

## 2024-10-03 ENCOUNTER — TELEPHONE (OUTPATIENT)
Age: 64
End: 2024-10-03

## 2024-10-03 VITALS
SYSTOLIC BLOOD PRESSURE: 122 MMHG | HEART RATE: 72 BPM | RESPIRATION RATE: 20 BRPM | OXYGEN SATURATION: 99 % | DIASTOLIC BLOOD PRESSURE: 66 MMHG

## 2024-10-03 PROCEDURE — G0151 HHCP-SERV OF PT,EA 15 MIN: HCPCS

## 2024-10-03 PROCEDURE — G0153 HHCP-SVS OF S/L PATH,EA 15MN: HCPCS

## 2024-10-03 NOTE — TELEPHONE ENCOUNTER
Pt calling to schedule with Dr Dk De Dios since being [facility].    Per pt Discharge states f/u as soon as possible - post stroke f/u.      Please assist.

## 2024-10-04 NOTE — CASE COMMUNICATION
This message is informative only.  No action required.     Pt DCed from  ST and [facility] services 10.3.24    Impression.  Mild which is an increase from Moderate. dysarthria characterized by limited articulatory postures.particularly on phonemes, t.d.n and l, blends, and multisyllabic words. Mild.moderate labored diadochokinetic rate.   Oriented x3. Observed language to be WFL in conversational speech. Pt denied any difficulty swallowing o r reflux even though Dx. GERD was reported. Hearing. WFL   Vision. WFL w glasses     Rec.   DC Sp tx due to goals met.   Pt reportedly did not feel the need for Out Pt ST  Continue to Practice assignments given as needed   Refer if change in status

## 2024-10-09 ENCOUNTER — EVALUATION (OUTPATIENT)
Dept: PHYSICAL THERAPY | Facility: CLINIC | Age: 64
End: 2024-10-09
Payer: COMMERCIAL

## 2024-10-09 ENCOUNTER — EVALUATION (OUTPATIENT)
Dept: OCCUPATIONAL THERAPY | Facility: CLINIC | Age: 64
End: 2024-10-09
Payer: COMMERCIAL

## 2024-10-09 DIAGNOSIS — I63.9 LEFT PONTINE CVA (HCC): ICD-10-CM

## 2024-10-09 DIAGNOSIS — I63.9 ACUTE CVA (CEREBROVASCULAR ACCIDENT) (HCC): Primary | ICD-10-CM

## 2024-10-09 DIAGNOSIS — R26.89 BALANCE DISORDER: ICD-10-CM

## 2024-10-09 DIAGNOSIS — R26.9 NEUROLOGIC GAIT DISORDER: Primary | ICD-10-CM

## 2024-10-09 PROCEDURE — 97530 THERAPEUTIC ACTIVITIES: CPT

## 2024-10-09 PROCEDURE — 97112 NEUROMUSCULAR REEDUCATION: CPT

## 2024-10-09 PROCEDURE — 97162 PT EVAL MOD COMPLEX 30 MIN: CPT

## 2024-10-09 PROCEDURE — 97166 OT EVAL MOD COMPLEX 45 MIN: CPT

## 2024-10-09 NOTE — PROGRESS NOTES
"PT Evaluation     Today's date: 10/9/2024  Patient name: Maye Lilly  : 1960  MRN: [id number]  Referring provider: Megan Portillo PA-C  Dx:   Encounter Diagnosis     ICD-10-CM    1. 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 Left pontine CVA (HCC)  I63.9       3. Balance disorder  R26.89           Start Time: 1015  Stop Time: 1100  Total time in clinic (min): 45 minutes      Assessment  Ms. Villavicencio \"Cris\" Vijaya is a pleasant 64 year-old female presenting to [facility] [address] [facility] [place] for initial evaluation of functional mobility s/p L pontine CVA in 2024. She was referred for consultation by Megan Portillo PA-C (PCP). Their past medical history is significant for meningioma, DIANELYS, HTN, and a-fib. Upon examination today, she presents with physiologic impairments of decreased R LE strength most prominently at her R knee flexors and R ankle dorsiflexors contributing towards functional impairments of reduced balance and abnormal R LE movement synergy patterns which contribute to reduced endurance. Objective measure assessments demonstrate impaired mobility, gait speed, endurance, and elevated fall risk. Cris would benefit from skilled physical therapy care and intervention to maximize her functional mobility s/p CVA to facilitate her return to living at home alone independently with LRAD and return to work as a .    Understanding of Dx/Px/POC: Good     Prognosis: Good     Goals  Short-term goals (to be achieved in 4 weeks):  1. Patient will demonstrate independence with their initial home exercise program.   2. Patient will improve 5xSTS to <15.0 seconds demonstrating improved LE strength and power output, while improving safety with sit to stand transfers.  3. Patient will improve TUG to <13.5 seconds using least restrictive assistive device indicating improved functional mobility to maximize independence.      Long-term goals (to be " "achieved in 8 to 12 weeks with additional goals to be incorporated and progressed appropriately pending patient progress):  1. Patient will demonstrate independence with their finalized home exercise program.  2. Patient will improve balance as demonstrated by an increase in FGA by > or equal to 4 points to decrease their risk of falls.   3. Patient will increase SSGS by >0.10 m/s indicating they are walking at a gait speed to allow for greater safety in the community.  4. Patient will increase ABC by MDC of >20% indicating improved balance confidence and self-efficacy.    Plan  Patient would benefit from: skilled physical therapy and skilled occupational therapy  Referral necessary: No  Planned modality interventions: biofeedback     Planned therapy interventions: manual therapy, balance, neuromuscular re-education, patient/caregiver education, stretching, strengthening, therapeutic activities, therapeutic exercise, graded exercise, gait training and home exercise program     Frequency: 3x/week as able.  Duration in weeks: 12  Plan of Care beginning date: 10/9/24  Plan of Care expiration date: 1/17/25  Treatment plan discussed with: Patient        Subjective Evaluation    History of Present Illness  \"Neetza\" presents today s/p L pontine CVA August 2024; subsequent [facility] rehabilitation, home health rehabilitation. In manual WC today.    - Says she walks at home, very comfortable walking at home (hemiwalker). When walking outside she is slower and more cautious, she does want some one with her when outside.    - Was walking L HHA with her therapists at the [facility].    - Worried about her R UE function/return.    - 16 ABEL personal home; 4 ABEL at daughter's home, R HR ascending, descends backwards. Steps are uneven.    - VERY motivated to improve her functional mobility. No AD at baseline pre-CVA    [facility] DC: 9/16/24  Physical Therapy Occupational Therapy Speech Therapy   Weight Bearing Status: Full Weight Bearing  Transfers: " "Supervision  Bed Mobility: Supervision  Amulation Distance (ft): 150 feet  Ambulation: Supervision  Assistive Device for Ambulation: Ismael Walker  Wheelchair Mobility Distance: 150 ft  Wheelchair Mobility: Supervision  Number of Stairs: 4  Assistive Device for Stairs: Right Hand Rail  Stair Assistance: Min to mod assist    Eating: Supervision (set up)  Grooming: Supervision (set up)  Bathing: Moderate Assistance  Bathing: Moderate Assistance  Upper Body Dressing: Moderate Assistance  Lower Body Dressing: Maximum Assistance  Toileting: Moderate Assistance  Toilet Transfer: Supervision Mode of Communication: Verbal  Speech/Language: Dysarthia (minimal)  Cognition: Exceptions to WNL  Cognition: Decreased Executive Functions, Decreased Attention, Decreased Comprehension, Decreased Memory  Orientation: Person, Place, Time, Situation          Per HH DC 10/3/24:  Ambulation Visit Notes: with ismael walker indep short household distances and S to stand by A longer distances.  Unable to achieve 150 ft due to fatigue and enviornmental barriers   Stairs Visit Notes: With family A for appointments and use of ismael walker,  Unable to adapt door frame for additional safety as patient doesnt own home     Ongoing probem     Quality of life: fair     Patient Goals  To return as closely to her PLOF as possible.    Pain  Current pain ratin  At best pain ratin  At worst pain ratin      Employment status:  prior to CVA; on medical leave now - wants to return to work.  Hand dominance: R     Treatments  Previous treatment: ARC and  PT/OT  Current treatment: OP PT and OT        Objective    ABC: 40.63%  5xSTS: Standard chair, L UE from armrest to no support 5/5 times, 19.12s  TUG: Standard chair, hemiwalker, CS, 33.88s   6 MWT: 200' in 4'45\" with hemiwalker CS, request to sit with 1'15\" left, no standing rest breaks.    HR pre 69 BPM   SPO2 pre 96%   HR post 82 BPM  SPO2 post 96%  Gait Speed: 0.25 m/s L hemiwalker         "   Short Term Goal Expiration Date: 11/16/24  Long Term Goal Expiration Date: 1/17/25  POC Expiration Date: 1/17/25        POC expires Unit limit Auth Expiration date PT/OT/ST + Visit Limit?   1/17/25                                                 Visit/Unit Tracking  AUTH Status:  Date  10/9                       Needs auth Used  1                         Remaining   0                             Specialty Daily Treatment Diary  Precautions: Fall risk, past Hx R knee arthritis (can be stiff and swollen at the start of the day)      Manuals 10/9/24 IE                                                                   Neuro Re-Ed  Pt education: neuroplasticity, recovery and compensation s/p CVA, HIGT parameters x10 min                                                                                                             Ther Ex                                                                                                                             Ther Activity                                         Gait Training                                         Modalities

## 2024-10-09 NOTE — PROGRESS NOTES
OCCUPATIONAL THERAPY INITIAL EVALUATION:    10/9/24  Maye Lilly  1960  [id number]  Megan Portillo PA-C   Diagnosis ICD-10-CM Associated Orders   1. Acute CVA (cerebrovascular accident) ([facility])  I63.9             Assessment/Plan    Skilled Analysis:  Maye Lilly is a 64 y.o. female referred to Occupational Therapy s/p Acute CVA (cerebrovascular accident) ([facility]) [I63.9].   Pt participated in skilled OT evaluation and following formalized testing as well as clinical observation, Pt presents with the following areas of deficit:  RUE weakness, decreased pinch and  strength, proximal pain, decreased ROM (AROM & PROM), decreased independence with ADLs and IADLs, and decreased endurance. Pt reports to be wearing shoulder subluxation splint but educated on removing for increased functional movement of RUE. Pt also educated on stroke recovery timeline. Pt will benefit from skilled Occupational Therapy services 2-3x/week for 12 weeks with focus on neuro re-ed, there ex, there act, and ADL/IADL retraining.  Maye Lilly is in agreement with POC.      POC expires Unit limit Auth Expiration date PT/OT/ST + Visit Limit?   01/1/25  N/A TBD BOMN                           Visit/Unit Tracking  AUTH Status:  Date 10/9             BOMN- TBD Used 1              Remaining  7                  Duration in weeks: 12 weeks, 2-3x  Plan of care beginning date: 10/9/24  Plan of care expiration date: 01/1/25   PN #1 due: 11/9/24    Precautions: HTN, GERD, R shoulder pain              GOALS    Short Term Goals (6 weeks)  Strength/Endurance  - Pt will increase R hand functional  with ability to sustain grasp on medium size objects for 5 sec to improve independence with ADLs  - Pt will demo with G tolerance to supine, seated, and in stance exercise x 15 minutes with minimal rest breaks required for increased engagement in life roles and weekly exercise regimen   - Pt will demo with G carryover of HEP to improve functional  progression towards goals in POC and for improved functional use of RUE     AROM/PROM  - Pt will be able to perform 90* degrees shoulder flexion of proximal RUE in supine to demo increased strength and ROM of affected UE for improved ADLs/IADLs  - Pt will be able to perform full range elbow flexion of distal RUE to demo increased strength and ROM of affected UE for improved ADLs/IADLs  - Pt will demo good carryover of clinic and home pain and swelling reduction strategies for improved AROM initiation with functional reach and grasp with ADL function    FMC/GMC  - Pt will be able to actively grasp 2 pegs for initiation of 9HPT demo improved FMC and and pinch.  - Pt will demo improvement with pulling on trousers on Neuro QOL form to with a little difficulty demo improved coordination and precision for FM tasks and ADLs  - Pt will be able to complete thumb opposition to digits 2-4 demo improved motor control and digit ROM  - Pt will demo improved motor learning of constructional and new motor actions for improved b/l coordination and motor planning evident by 50% accuracy for fxnl ADL/IADL performance    Functional Performance  - Pt will increase RUE to semi functional (dependent stabilizer, independent stabilizer, gross assist, semi-functional, functional, fully functional) assist with for ADL/IADL and tabletop tasks for improved functional performance of life roles and salient tasks   - Pt will demo G comprehension of adaptive equipment (long handled reacher/sponge/shoehorn, toileting aid ) use in clinic to improve independence in ADL management in home environment    Modalities  - Pt/family will demo G understanding and carryover of use of home NMES unit as prescribed to inc carryover of ROM and strengthening strategies of R UE      Long Term Goals (12 weeks)  Strength/Endurance  - Pt will be able to successfully complete strength testing demo 2-5 lbs RUE  strength for improved completion of ADLs  - Pt will demo  "with G tolerance to supine, seated, and in stance exercise x 25 minutes with minimal rest breaks required for increased engagement in life roles and weekly exercise regimen     AROM/PROM  - Pt will be able to perform 90* degrees seated shoulder flexion of proximal RUE to demo increased strength and ROM of affected UE for improved ADLs/IADLs  - Pt will demo good carryover of clinic and home pain and swelling reduction strategies for improved AROM initiation with functional reach and grasp with ADL function    FMC/GMC  - Pt will be able to complete 9HPT in < 1 min demo improved FMC and coordination.   - Pt will demo improvement with pulling on trousers on Neuro QOL form to with no difficulty demo improved coordination and precision for FM tasks and ADLs  - Pt will be able to complete thumb opposition to all digits demo improved coordination and functional use of RUE.   - Pt will demo improved motor learning of constructional and new motor actions for improved b/l coordination and motor planning evident by 75% accuracy for fxnl ADL/IADL performance    Functional Performance  - Pt will increase RUE to functional (dependent stabilizer, independent stabilizer, gross assist, semi-functional, functional, fully functional) assist with for ADL/IADL and tabletop tasks for improved functional performance of life roles and salient tasks     Modalities  - Pt will tolerate BIONESS/NMES/IASTM for improved motor and sensory performance for overall improved strength, tone reduction, and sensation to inc safety and engagement with ADL/IADL function      Subjective    PATIENT GOAL: \"Grab something, pull up my pants, wipe myself, and writing/signing my name\"    Patient-Specific Functional Scale   Task is scored 0 (unable to perform activity) to 10 (ability to perform activity independently)    Activity Date: Date:   ADLs     2.  Grasp/use of R hand     3.   ROM      4.   Strength and endurance         HISTORY OF PRESENT ILLNESS:     Pt " is a 64 y.o. female who was referred to Occupational Therapy s/p Acute CVA (cerebrovascular accident) ([facility]) [I63.9].  Pt reports that the day her stroke happened she was at work. She felt that the ground was moving but thought it could have been due to her forgetting her glasses's and straining her eyes. She also ended up throwing up multiple times and left work. When she got home she went to sleep. The next morning she still was not feeling right, called out of work and drove herself to the hospital because she felt weak overall. She continued to feel weak in the hospital but was still able to move her UE and LE. She then was sent for an MRI, and when she came back to get into bed is when she realized she was unable to and no longer had function of her R upper and lower extremity. She then spent 3 weeks at the [facility] and then 3 weeks with home care. She does not feel difficulty with sensation but rather just weak and heavy. Pt wears a shoulder subluxation splint for comfort but due to large size feels it is not doing much. At this time she is on medical leave from work as a . She currently does not drive. She lives with her daughter and her son occasionally comes in and out. Her daughter works full time and so does her son but he works from home. Pt reports that she does not have difficulty with bed mobility or toileting when using the commode. She is able to don and doff her pants off her hips but will sometimes need help. If she is alone, she has a dressing stick to help pull them up on her R side. She reports that she bathroom and shower are very small, and for the time being is showering at a friends house with a walk in/ADA shower due to a past family member who was using it. She will only use her bathroom toilet when someone is home to help her with hygiene due to small space, otherwise will use commode. She occasionally needs assistance with UB dressing for shirts but wears sun dresses at home that she  "reports are not difficult.     Per chart review  on 24, \"Ms. Maye Lilly is a 65 yo woman with a past medical history notable for HTN and atrial fibrillation who presented to the [facility] on 8/10/24 with acute dysarthria, facial droop and right hemiparesis. A stroke alert was activated, CT head wo contrast was negative for acute findings, CTA of the head/neck showed no evidence of large vessel occlusion and patent cervical carotid and vertebral arteries. MRI of the brain revealed an acute left pontine infarct. Course complicated by severe hypertension requiring multiple agents to obtain control.   - Acute left pontine ischemic infarct: Clinically presented on 8/10/24 with right hemiparesis, dysarthria and facial droop, Stroke etiology: HTN, small vessel disease, MR brain on 24 revealed a small area of acute/subacute ischemia within the central luz to the left of midline without acute hemorrhage\"      PMH:   Past Medical History:   Diagnosis Date    Atrial fibrillation (HCC)     Disease of thyroid gland     Hypertension          Pain Levels   Restin    With Activity:  0      Objective    Impairment Observations:            DANIEL COLLINS Comments           UPPER EXTREMITY FUNCTION   Impaired Intact Dominant Hand: R     /PINCH STRENGTH             Dynamometer    - Gross Grasp unable 40 lbs low   Pinch Meter     - Pincer Unable  10 lbs low    - Tripod Unable 12 lbs low    - Lateral Unable   15 lbs low     AROM (Seated)             Scapula   - Retraction/Protraction  - Elevation/Depression  - Upward/Downward rotation      Shoulder FF 1/4  WFL     Shoulder Ext 1/2  WFL     Shoulder internal rotation       Shoulder external rotation       Shoulder Abd 1/4  WFL     Shoulder Add 1/2  WFL     Horizontal Abd   WFL     Horizontal Add   WFL     Elbow Flex 1/2  WFL     Elbow Ext 3/4  WFL     Pronation Full  WFL  Resting position   Supination 1/2  WFL     Wrist Flex Neutral   WFL     Wrist Ext Neutral   WFL   "   Digit Flex 1/2  WFL     Digit Ex 1/2  WFL  Stiffness and swelling noted   Composite Grasp 1/2  WFL     Hook Grasp   WFL     Subluxation  Pain         AROM (Supine)      DNT 10/9/24       Shoulder FF        Shoulder Ext        Shoulder ABD        Shoulder ADD        Horizontal ABD        Horizontal ADD        Elbow Flex        Elbow Ext        Pronation        Supination        Wrist Flex        Wrist Ext        Digit Flex        Digit Ext          PROM (Seated position)           Shoulder FF 3/4    Pain   Shoulder Ext 1/2      Shoulder ABD 1/2       Shoulder ADD Full       Horizontal ABD      Horizontal ADD        Elbow Flex 3/4       Elbow Ext Full       Wrist Flex Full       Wrist Ext Full       Digit Flex 1/2       Digit Ext Full     Supination 3/4     Pronation Full     Resting positin      MMT             Shoulder FF 2+/5 5/5    Shoulder Ext 2+/5 5/5    Shoulder Abduction 2+/5 5/5    Shoulder Adduction 2+/5 5/5    Elbow Flex 2+/5 5/5    Elbow Ext 2+/5 5/5    Wrist Flex 2+/5 5/5    Wrist Ext 2+/5 5/5    Gross Grasp 2+/5 5/5      SENSATION      Monofilament Testing  Normal: 2.83 mm    Diminished light touch: 3.61 mm    Diminished protective sensation: 4.31 mm    Loss of protective sensation: 4.56    Complete loss of sensation / deep pressure: 6.65 DNT DNT    Sharp / Dull       Proprioception      Hot/Cold Temp      Stereognosis         COORDINATION      Opposition Able to touch thumb to index on lateral aspect  WFL    Finger to Nose Unable WFL    Rapid Alternating Movement Unable WFL    9 Hole Peg Test Unable 24 seconds abnormal   Fxnl Dexterity Test Unable  26 seconds abnormal   Michael Hand Function Test         - Handwriting  seconds  seconds      TONE: MODIFIED CINDY SCALE      No increase in muscle tone (0)      Slight Increase in muscle tone with catch and release or min resist at end range (1)      Slight Increase in muscle tone with catch and release, followed by min resistance through remainder of  range (1+)      Increased muscle tone through full range, able to be moved easily (2)      Considerable increase in tone, difficult to move (3)      Rigid in Flexion/Extension (4)                ADL Simulation- 10/9/24 DNT  Donning of pants-   seconds with  assist  Donning t-shirt/jacket-    seconds with  assist         Neuro QOL  Upper Extremity Function (Fine motor, ADL):     Are you able to turn a key in a lock? With a little difficulty  Are you able to brush your teeth? Without any difficulty  Are you able to make a phone call using a touch tone or key-pad? Without any difficulty  Are you able to  coins from a table top? Without any difficulty  Are you are able to write with a pen or pencil With much difficulty  Are you able to open and close a zipper? Unable to do  Are you able to wash and dry your body? With a little difficulty  Are you able to shampoo your hair? Without any difficulty  Are you able to open previously opened jars? With some difficulty  Are you able to hold a plate full of food? With a little difficulty  Are you able to pull on trousers? With some difficulty  Are you able to button your shirt? With much difficulty  Are you able to trim your fingernails? Unable to do  Are you able to cut your toe nails? Unable to do  Are you able to bend down and  clothing from the floor? With some difficulty  How much DIFFICULTY do you currently have using a spoon to eat a meal? No difficulty  How much DIFFICULTY do you currently have putting on a pullover shirt? A little difficulty  How much DIFFICULTY do you currently have taking off a pullover shirt? Some difficulty  How much DIFFICULTY do you currently have removing wrappings from small objects? A little difficulty  How much DIFFUCULTY do you currently have opening medications or vitamin containers (e.g. childproof containers, small bottles)? Can't do      OTHER PLANNED THERAPY INTERVENTIONS:   Supine, seated, and in stance neuro  re-ed  Task-Oriented Training  Tricep AG  NMES/FES  Cryotherapy for tone reduction  Hot Packs for pain  TENS for pain  FMC/prehension  GMC  Proximal to distal teaming  Timed Trials  Manual tx  IASTM  Hand to target  Sensory re-ed (Cutaneous/Proprioceptive)  Seated functional reach: crossing midline  Supine place and hold  WBearing strategies   Closed chain activities  Open chain activities  Mirror Therapy  HEP  Orthotic Development

## 2024-10-09 NOTE — LETTER
October 9, 2024    Megan Portillo PA-C  [address] [facility] [address]    Patient: Maye Lilly   YOB: 1960   Date of Visit: 10/9/2024     Encounter Diagnosis     ICD-10-CM    1. Acute CVA (cerebrovascular accident) (HCC)  I63.9           Dear Dr. Portillo:    Thank you for your recent referral of Maye Lilly. Please review the attached evaluation summary from Maye's recent visit.     Please verify that you agree with the plan of care by signing the attached order.     If you have any questions or concerns, please do not hesitate to call.     I sincerely appreciate the opportunity to share in the care of one of your patients and hope to have another opportunity to work with you in the near future.     Sincerely,    Shahana Mendez, OT      Referring Provider:     I certify that I have read the below Plan of Care and certify the need for these services furnished under this plan of treatment while under my care.                    Megan Portillo PA-C  [address] [facility] [address]  Via Fax: [phone]        OCCUPATIONAL THERAPY INITIAL EVALUATION:    10/9/24  Maye Lilly  1960  [id number]  Megan Portillo PA-C   Diagnosis ICD-10-CM Associated Orders   1. Acute CVA (cerebrovascular accident) (HCC)  I63.9             Assessment/Plan    Skilled Analysis:  Maye Lilly is a 64 y.o. female referred to Occupational Therapy s/p Acute CVA (cerebrovascular accident) (HCC) [I63.9].   Pt participated in skilled OT evaluation and following formalized testing as well as clinical observation, Pt presents with the following areas of deficit:  RUE weakness, decreased pinch and  strength, proximal pain, decreased ROM (AROM & PROM), decreased independence with ADLs and IADLs, and decreased endurance. Pt reports to be wearing shoulder subluxation splint but educated on removing for increased functional movement of RUE. Pt also educated on stroke recovery timeline. Pt will  benefit from skilled Occupational Therapy services 2-3x/week for 12 weeks with focus on neuro re-ed, there ex, there act, and ADL/IADL retraining.  Maye Vijaya is in agreement with POC.      POC expires Unit limit Auth Expiration date PT/OT/ST + Visit Limit?   01/1/25  N/A TBD BOMN                           Visit/Unit Tracking  AUTH Status:  Date 10/9             BOMN- TBD Used 1              Remaining  7                  Duration in weeks: 12 weeks, 2-3x  Plan of care beginning date: 10/9/24  Plan of care expiration date: 01/1/25   PN #1 due: 11/9/24    Precautions: HTN, GERD, R shoulder pain              GOALS    Short Term Goals (6 weeks)  Strength/Endurance  - Pt will increase R hand functional  with ability to sustain grasp on medium size objects for 5 sec to improve independence with ADLs  - Pt will demo with G tolerance to supine, seated, and in stance exercise x 15 minutes with minimal rest breaks required for increased engagement in life roles and weekly exercise regimen   - Pt will demo with G carryover of HEP to improve functional progression towards goals in POC and for improved functional use of RUE     AROM/PROM  - Pt will be able to perform 90* degrees shoulder flexion of proximal RUE in supine to demo increased strength and ROM of affected UE for improved ADLs/IADLs  - Pt will be able to perform full range elbow flexion of distal RUE to demo increased strength and ROM of affected UE for improved ADLs/IADLs  - Pt will demo good carryover of clinic and home pain and swelling reduction strategies for improved AROM initiation with functional reach and grasp with ADL function    FMC/GMC  - Pt will be able to actively grasp 2 pegs for initiation of 9HPT demo improved FMC and and pinch.  - Pt will demo improvement with pulling on trousers on Neuro QOL form to with a little difficulty demo improved coordination and precision for FM tasks and ADLs  - Pt will be able to complete thumb opposition to  digits 2-4 demo improved motor control and digit ROM  - Pt will demo improved motor learning of constructional and new motor actions for improved b/l coordination and motor planning evident by 50% accuracy for fxnl ADL/IADL performance    Functional Performance  - Pt will increase RUE to semi functional (dependent stabilizer, independent stabilizer, gross assist, semi-functional, functional, fully functional) assist with for ADL/IADL and tabletop tasks for improved functional performance of life roles and salient tasks   - Pt will demo G comprehension of adaptive equipment (long handled reacher/sponge/shoehorn, toileting aid ) use in clinic to improve independence in ADL management in home environment    Modalities  - Pt/family will demo G understanding and carryover of use of home NMES unit as prescribed to inc carryover of ROM and strengthening strategies of R UE      Long Term Goals (12 weeks)  Strength/Endurance  - Pt will be able to successfully complete strength testing demo 2-5 lbs RUE  strength for improved completion of ADLs  - Pt will demo with G tolerance to supine, seated, and in stance exercise x 25 minutes with minimal rest breaks required for increased engagement in life roles and weekly exercise regimen     AROM/PROM  - Pt will be able to perform 90* degrees seated shoulder flexion of proximal RUE to demo increased strength and ROM of affected UE for improved ADLs/IADLs  - Pt will demo good carryover of clinic and home pain and swelling reduction strategies for improved AROM initiation with functional reach and grasp with ADL function    FMC/GMC  - Pt will be able to complete 9HPT in < 1 min demo improved FMC and coordination.   - Pt will demo improvement with pulling on trousers on Neuro QOL form to with no difficulty demo improved coordination and precision for FM tasks and ADLs  - Pt will be able to complete thumb opposition to all digits demo improved coordination and functional use of RUE.  "  - Pt will demo improved motor learning of constructional and new motor actions for improved b/l coordination and motor planning evident by 75% accuracy for fxnl ADL/IADL performance    Functional Performance  - Pt will increase RUE to functional (dependent stabilizer, independent stabilizer, gross assist, semi-functional, functional, fully functional) assist with for ADL/IADL and tabletop tasks for improved functional performance of life roles and salient tasks     Modalities  - Pt will tolerate BIONESS/NMES/IASTM for improved motor and sensory performance for overall improved strength, tone reduction, and sensation to inc safety and engagement with ADL/IADL function      Subjective    PATIENT GOAL: \"Grab something, pull up my pants, wipe myself, and writing/signing my name\"    Patient-Specific Functional Scale   Task is scored 0 (unable to perform activity) to 10 (ability to perform activity independently)    Activity Date: Date:   ADLs     2.  Grasp/use of R hand     3.   ROM      4.   Strength and endurance         HISTORY OF PRESENT ILLNESS:     Pt is a 64 y.o. female who was referred to Occupational Therapy s/p Acute CVA (cerebrovascular accident) ([facility], [facility]) [I63.9].  Pt reports that the day her stroke happened she was at work. She felt that the ground was moving but thought it could have been due to her forgetting her glasses's and straining her eyes. She also ended up throwing up multiple times and left work. When she got home she went to sleep. The next morning she still was not feeling right, called out of work and drove herself to the hospital because she felt weak overall. She continued to feel weak in the hospital but was still able to move her UE and LE. She then was sent for an MRI, and when she came back to get into bed is when she realized she was unable to and no longer had function of her R upper and lower extremity. She then spent 3 weeks at the [facility] and then 3 weeks with home care. She does not feel " "difficulty with sensation but rather just weak and heavy. Pt wears a shoulder subluxation splint for comfort but due to large size feels it is not doing much. At this time she is on medical leave from work as a . She currently does not drive. She lives with her daughter and her son occasionally comes in and out. Her daughter works full time and so does her son but he works from home. Pt reports that she does not have difficulty with bed mobility or toileting when using the commode. She is able to don and doff her pants off her hips but will sometimes need help. If she is alone, she has a dressing stick to help pull them up on her R side. She reports that she bathroom and shower are very small, and for the time being is showering at a friends house with a walk in/ADA shower due to a past family member who was using it. She will only use her bathroom toilet when someone is home to help her with hygiene due to small space, otherwise will use commode. She occasionally needs assistance with UB dressing for shirts but wears sun dresses at home that she reports are not difficult.     Per chart review  on 8/20/24, \"Ms. Maye Lilly is a 65 yo woman with a past medical history notable for HTN and atrial fibrillation who presented to the [facility] on 8/10/24 with acute dysarthria, facial droop and right hemiparesis. A stroke alert was activated, CT head wo contrast was negative for acute findings, CTA of the head/neck showed no evidence of large vessel occlusion and patent cervical carotid and vertebral arteries. MRI of the brain revealed an acute left pontine infarct. Course complicated by severe hypertension requiring multiple agents to obtain control.   - Acute left pontine ischemic infarct: Clinically presented on 8/10/24 with right hemiparesis, dysarthria and facial droop, Stroke etiology: HTN, small vessel disease, MR brain on 8/11/24 revealed a small area of acute/subacute ischemia within the central luz to the " "left of midline without acute hemorrhage\"      PMH:   Past Medical History:   Diagnosis Date   • Atrial fibrillation (HCC)    • Disease of thyroid gland    • Hypertension          Pain Levels   Restin    With Activity:  0      Objective    Impairment Observations:            DANIEL COLLINS Comments           UPPER EXTREMITY FUNCTION   Impaired Intact Dominant Hand: R     /PINCH STRENGTH             Dynamometer    - Gross Grasp unable 40 lbs low   Pinch Meter     - Pincer Unable  10 lbs low    - Tripod Unable 12 lbs low    - Lateral Unable   15 lbs low     AROM (Seated)             Scapula   - Retraction/Protraction  - Elevation/Depression  - Upward/Downward rotation      Shoulder FF 1/4  WFL     Shoulder Ext 1/2  WFL     Shoulder internal rotation       Shoulder external rotation       Shoulder Abd 1/4  WFL     Shoulder Add 1/2  WFL     Horizontal Abd   WFL     Horizontal Add   WFL     Elbow Flex 1/2  WFL     Elbow Ext 3/4  WFL     Pronation Full  WFL  Resting position   Supination 1/2  WFL     Wrist Flex Neutral   WFL     Wrist Ext Neutral   WFL     Digit Flex 1/2  WFL     Digit Ex 1/2  WFL  Stiffness and swelling noted   Composite Grasp 1/2  WFL     Hook Grasp   WFL     Subluxation  Pain         AROM (Supine)      DNT 10/9/24       Shoulder FF        Shoulder Ext        Shoulder ABD        Shoulder ADD        Horizontal ABD        Horizontal ADD        Elbow Flex        Elbow Ext        Pronation        Supination        Wrist Flex        Wrist Ext        Digit Flex        Digit Ext          PROM (Seated position)           Shoulder FF 3/4    Pain   Shoulder Ext 1/2      Shoulder ABD 1/2       Shoulder ADD Full       Horizontal ABD      Horizontal ADD        Elbow Flex 3/4       Elbow Ext Full       Wrist Flex Full       Wrist Ext Full       Digit Flex 1/2       Digit Ext Full     Supination 3/4     Pronation Full     Resting positin      MMT             Shoulder FF 2+/5 5/5    Shoulder Ext 2+/5 5/5    Shoulder " Abduction 2+/5 5/5    Shoulder Adduction 2+/5 5/5    Elbow Flex 2+/5 5/5    Elbow Ext 2+/5 5/5    Wrist Flex 2+/5 5/5    Wrist Ext 2+/5 5/5    Gross Grasp 2+/5 5/5      SENSATION      Monofilament Testing  Normal: 2.83 mm    Diminished light touch: 3.61 mm    Diminished protective sensation: 4.31 mm    Loss of protective sensation: 4.56    Complete loss of sensation / deep pressure: 6.65 DNT DNT    Sharp / Dull       Proprioception      Hot/Cold Temp      Stereognosis         COORDINATION      Opposition Able to touch thumb to index on lateral aspect  WFL    Finger to Nose Unable WFL    Rapid Alternating Movement Unable WFL    9 Hole Peg Test Unable 24 seconds abnormal   Fxnl Dexterity Test Unable  26 seconds abnormal   Michael Hand Function Test         - Handwriting  seconds  seconds      TONE: MODIFIED CINDY SCALE      No increase in muscle tone (0)      Slight Increase in muscle tone with catch and release or min resist at end range (1)      Slight Increase in muscle tone with catch and release, followed by min resistance through remainder of range (1+)      Increased muscle tone through full range, able to be moved easily (2)      Considerable increase in tone, difficult to move (3)      Rigid in Flexion/Extension (4)                ADL Simulation- 10/9/24 DNT  Donning of pants-   seconds with  assist  Donning t-shirt/jacket-    seconds with  assist         Neuro QOL  Upper Extremity Function (Fine motor, ADL):     Are you able to turn a key in a lock? With a little difficulty  Are you able to brush your teeth? Without any difficulty  Are you able to make a phone call using a touch tone or key-pad? Without any difficulty  Are you able to  coins from a table top? Without any difficulty  Are you are able to write with a pen or pencil With much difficulty  Are you able to open and close a zipper? Unable to do  Are you able to wash and dry your body? With a little difficulty  Are you able to shampoo your  hair? Without any difficulty  Are you able to open previously opened jars? With some difficulty  Are you able to hold a plate full of food? With a little difficulty  Are you able to pull on trousers? With some difficulty  Are you able to button your shirt? With much difficulty  Are you able to trim your fingernails? Unable to do  Are you able to cut your toe nails? Unable to do  Are you able to bend down and  clothing from the floor? With some difficulty  How much DIFFICULTY do you currently have using a spoon to eat a meal? No difficulty  How much DIFFICULTY do you currently have putting on a pullover shirt? A little difficulty  How much DIFFICULTY do you currently have taking off a pullover shirt? Some difficulty  How much DIFFICULTY do you currently have removing wrappings from small objects? A little difficulty  How much DIFFUCULTY do you currently have opening medications or vitamin containers (e.g. childproof containers, small bottles)? Can't do      OTHER PLANNED THERAPY INTERVENTIONS:   Supine, seated, and in stance neuro re-ed  Task-Oriented Training  Tricep AG  NMES/FES  Cryotherapy for tone reduction  Hot Packs for pain  TENS for pain  FMC/prehension  GMC  Proximal to distal teaming  Timed Trials  Manual tx  IASTM  Hand to target  Sensory re-ed (Cutaneous/Proprioceptive)  Seated functional reach: crossing midline  Supine place and hold  WBearing strategies   Closed chain activities  Open chain activities  Mirror Therapy  HEP  Orthotic Development

## 2024-10-09 NOTE — LETTER
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 Left pontine CVA (HCC)  I63.9       3. Balance disorder  R26.89           Dear Dr. Portillo:    Thank you for your recent referral of Maye Lilly. Please review the attached evaluation summary from Maye's recent visit.     Please verify that you agree with the plan of care by signing the attached order.     If you have any questions or concerns, please do not hesitate to call.     I sincerely appreciate the opportunity to share in the care of one of your patients and hope to have another opportunity to work with you in the near future.       Sincerely,    Janes Razo, PT      Referring Provider:      I certify that I have read the below Plan of Care and certify the need for these services furnished under this plan of treatment while under my care.                    Megan Portillo PA-C  [address] [facility] [address]  Via Fax: [phone]          PT Evaluation     Today's date: 10/9/2024  Patient name: Maye Lilly  : 1960  MRN: [id number]  Referring provider: Megan Portillo PA-C  Dx:   Encounter Diagnosis     ICD-10-CM    1. 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 Left pontine CVA (HCC)  I63.9       3. Balance disorder  R26.89           Start Time: 1015  Stop Time: 1100  Total time in clinic (min): 45 minutes      Assessment  Ms. Villavicencio \"Neetza\" Vijaya is a pleasant 64 year-old female presenting to [facility] [address] [place] [address] [facility] [place] for initial evaluation of functional mobility s/p L pontine CVA in 2024. She was referred for consultation by Megan Portillo PA-C (PCP). Their past medical history is significant for meningioma, DIANELYS, HTN, and a-fib. Upon examination today, she presents with " physiologic impairments of decreased R LE strength most prominently at her R knee flexors and R ankle dorsiflexors contributing towards functional impairments of reduced balance and abnormal R LE movement synergy patterns which contribute to reduced endurance. Objective measure assessments demonstrate impaired mobility, gait speed, endurance, and elevated fall risk. Cris would benefit from skilled physical therapy care and intervention to maximize her functional mobility s/p CVA to facilitate her return to living at home alone independently with LRAD and return to work as a .    Understanding of Dx/Px/POC: Good     Prognosis: Good     Goals  Short-term goals (to be achieved in 4 weeks):  1. Patient will demonstrate independence with their initial home exercise program.   2. Patient will improve 5xSTS to <15.0 seconds demonstrating improved LE strength and power output, while improving safety with sit to stand transfers.  3. Patient will improve TUG to <13.5 seconds using least restrictive assistive device indicating improved functional mobility to maximize independence.      Long-term goals (to be achieved in 8 to 12 weeks with additional goals to be incorporated and progressed appropriately pending patient progress):  1. Patient will demonstrate independence with their finalized home exercise program.  2. Patient will improve balance as demonstrated by an increase in FGA by > or equal to 4 points to decrease their risk of falls.   3. Patient will increase SSGS by >0.10 m/s indicating they are walking at a gait speed to allow for greater safety in the community.  4. Patient will increase ABC by MDC of >20% indicating improved balance confidence and self-efficacy.    Plan  Patient would benefit from: skilled physical therapy and skilled occupational therapy  Referral necessary: No  Planned modality interventions: biofeedback     Planned therapy interventions: manual therapy, balance, neuromuscular  "re-education, patient/caregiver education, stretching, strengthening, therapeutic activities, therapeutic exercise, graded exercise, gait training and home exercise program     Frequency: 3x/week as able.  Duration in weeks: 12  Plan of Care beginning date: 10/9/24  Plan of Care expiration date: 1/17/25  Treatment plan discussed with: Patient        Subjective Evaluation    History of Present Illness  \"Neetza\" presents today s/p L pontine CVA August 2024; subsequent [facility] rehabilitation, home health rehabilitation. In manual WC today.    - Says she walks at home, very comfortable walking at home (hemiwalker). When walking outside she is slower and more cautious, she does want some one with her when outside.    - Was walking L HHA with her therapists at the [facility].    - Worried about her R UE function/return.    - 16 ABEL personal home; 4 ABEL at daughter's home, R HR ascending, descends backwards. Steps are uneven.    - VERY motivated to improve her functional mobility. No AD at baseline pre-CVA    [facility] DC: 9/16/24  Physical Therapy Occupational Therapy Speech Therapy   Weight Bearing Status: Full Weight Bearing  Transfers: Supervision  Bed Mobility: Supervision  Amulation Distance (ft): 150 feet  Ambulation: Supervision  Assistive Device for Ambulation: Ismael Walker  Wheelchair Mobility Distance: 150 ft  Wheelchair Mobility: Supervision  Number of Stairs: 4  Assistive Device for Stairs: Right Hand Rail  Stair Assistance: Min to mod assist    Eating: Supervision (set up)  Grooming: Supervision (set up)  Bathing: Moderate Assistance  Bathing: Moderate Assistance  Upper Body Dressing: Moderate Assistance  Lower Body Dressing: Maximum Assistance  Toileting: Moderate Assistance  Toilet Transfer: Supervision Mode of Communication: Verbal  Speech/Language: Dysarthia (minimal)  Cognition: Exceptions to WNL  Cognition: Decreased Executive Functions, Decreased Attention, Decreased Comprehension, Decreased Memory  Orientation: Person, " "Place, Time, Situation          Per  DC 10/3/24:  Ambulation Visit Notes: with kaylyn walker indep short household distances and S to stand by A longer distances.  Unable to achieve 150 ft due to fatigue and enviornmental barriers   Stairs Visit Notes: With family A for appointments and use of kaylyn walker,  Unable to adapt door frame for additional safety as patient doesnt own home     Ongoing probem     Quality of life: fair     Patient Goals  To return as closely to her PLOF as possible.    Pain  Current pain ratin  At best pain ratin  At worst pain ratin      Employment status:  prior to CVA; on medical leave now - wants to return to work.  Hand dominance: R     Treatments  Previous treatment: [facility] and  PT/OT  Current treatment: OP PT and OT        Objective    ABC: 40.63%  5xSTS: Standard chair, L UE from armrest to no support 5/5 times, 19.12s  TUG: Standard chair, hemiwalker, CS, 33.88s   6 MWT: 200' in 4'45\" with hemiwalker CS, request to sit with 1'15\" left, no standing rest breaks.    HR pre 69 BPM   SPO2 pre 96%   HR post 82 BPM  SPO2 post 96%  Gait Speed: 0.25 m/s L hemiwalker           Short Term Goal Expiration Date: 24  Long Term Goal Expiration Date: 25  POC Expiration Date: 25        POC expires Unit limit Auth Expiration date PT/OT/ST + Visit Limit?   25                                                 Visit/Unit Tracking  AUTH Status:  Date  10/9                       Needs auth Used  1                         Remaining   0                             Specialty Daily Treatment Diary  Precautions: Fall risk, past Hx R knee arthritis (can be stiff and swollen at the start of the day)      Manuals 10/9/24 IE                                                                   Neuro Re-Ed  Pt education: neuroplasticity, recovery and compensation s/p CVA, HIGT parameters x10 min                                                                                           "                   Ther Ex                                                                                                                             Ther Activity                                         Gait Training                                         Modalities

## 2024-10-10 ENCOUNTER — OFFICE VISIT (OUTPATIENT)
Dept: PHYSICAL THERAPY | Facility: CLINIC | Age: 64
End: 2024-10-10
Payer: COMMERCIAL

## 2024-10-10 DIAGNOSIS — R26.89 BALANCE DISORDER: ICD-10-CM

## 2024-10-10 DIAGNOSIS — R26.9 NEUROLOGIC GAIT DISORDER: ICD-10-CM

## 2024-10-10 DIAGNOSIS — I63.9 LEFT PONTINE CVA (HCC): Primary | ICD-10-CM

## 2024-10-10 PROCEDURE — 97112 NEUROMUSCULAR REEDUCATION: CPT

## 2024-10-10 NOTE — CASE COMMUNICATION
Medication discrepancies or Major drug interactionsnone  Abnormal clinical findings None  This report is informational only, no response is needed  [facility] has Admitted your patient to Home Health service with the following disciplines: PT and OT  Patient stated goals of care to get strength on my right side, be able to do my adls and return to my own home  Potential barriers to goal achievement  Obesity  Primary focus of home he alth care:Neurological  Anticipated visit pattern and next visit date 2x2wks   Thank you for allowing us to participate in the care of your patient.      Misty Germain PT

## 2024-10-11 ENCOUNTER — OFFICE VISIT (OUTPATIENT)
Dept: OCCUPATIONAL THERAPY | Facility: CLINIC | Age: 64
End: 2024-10-11
Payer: COMMERCIAL

## 2024-10-11 ENCOUNTER — OFFICE VISIT (OUTPATIENT)
Dept: PHYSICAL THERAPY | Facility: CLINIC | Age: 64
End: 2024-10-11
Payer: COMMERCIAL

## 2024-10-11 DIAGNOSIS — I63.9 ACUTE CVA (CEREBROVASCULAR ACCIDENT) (HCC): Primary | ICD-10-CM

## 2024-10-11 DIAGNOSIS — R26.9 NEUROLOGIC GAIT DISORDER: ICD-10-CM

## 2024-10-11 DIAGNOSIS — R26.89 BALANCE DISORDER: ICD-10-CM

## 2024-10-11 DIAGNOSIS — I63.9 LEFT PONTINE CVA (HCC): Primary | ICD-10-CM

## 2024-10-11 PROCEDURE — 97112 NEUROMUSCULAR REEDUCATION: CPT

## 2024-10-11 PROCEDURE — 97530 THERAPEUTIC ACTIVITIES: CPT

## 2024-10-11 NOTE — PROGRESS NOTES
"Occupational Therapy Daily Note:    Today's date: 10/11/2024  Patient name: Maye Lilly  : 1960  MRN: [id number]  Referring provider: Megan Portillo PA-C  Dx:   Encounter Diagnosis   Name Primary?    Acute CVA (cerebrovascular accident) (HCC) Yes          POC expires Unit limit Auth Expiration date PT/OT/ST + Visit Limit?   25  N/A TBD BOMN                           Visit/Unit Tracking  AUTH Status:  Date 10/9 10/11            BOMN- TBD Used 1 2             Remaining  7 6                 Duration in weeks: 12 weeks, 2-3x  Plan of care beginning date: 10/9/24  Plan of care expiration date: 25   PN #1 due: 24    Precautions: HTN, GERD, R shoulder pain         Subjective: \"I appreciate you explaining why I shouldn't wear the harness\".     Objective: See treatment below per POC.     Neuro Re-ed: Activity focusing on NMRE w/pt tolerating electrical stimulation w/pad placement to infra supra spinatus and ant/middle delt facilitating sublux reduction.   AROM <1/4 end range in FF, PROM provided with pain reported <1/2 end range.    Sidelying: PROM to scap in all planes, tolerable pain in protraction, no pain reported in all other planes.  Educated pt on towel glides in sidelying w/pt completing 10 reps, AROM in protraction/retraction w/min stabilization provided to elbow.     Ktape applied to GH joint providing pain reduction and stabilization 2* weakness and sublux.      Thera Act:  Educated pt on sublux providing understanding of pain source and purpose of therapies to improve and strengthen GH joint for pain reduction.  Educated pt on proper body positioning for RUE, shoulder hikes for rotator cuff strengthening and seated table glides improving proximal tone and improving AROM.   Pt able to nadine OH shirt w/assist for clothing mngt to pull shirt over back.     Assessment: Tolerated treatment well. Patient would benefit from continued skilled OT.    Plan: Continued skilled OT per POC, pt to " bring in shoulder harness next session to be assessed by OT for effectiveness.

## 2024-10-11 NOTE — PROGRESS NOTES
"Daily Note     Today's date: 10/11/2024  Patient name: Maye Lilly  : 1960  MRN: [id number]  Referring provider: Megan Portillo PA-C  Dx:   Encounter Diagnosis     ICD-10-CM    1. Left pontine CVA (HCC)  I63.9       2. 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 Balance disorder  R26.89             Start Time: 09  Stop Time: 1015  Total time in clinic (min): 45 minutes    Subjective: Doing well today! Felt challenged by her session yesterday but is motivated to continue today! No reports of adverse soreness or pain from last session.      Objective: See treatment diary below      Assessment: Continued progression of HIGT to challenge all subcomponents of gait. Was able to challenge herself and rapidly progress her mobilities with SPC use as a light touch cue. Plan to trial MAGDALENA PRADO.      Plan: Continue per plan of care.      Short Term Goal Expiration Date: 24  Long Term Goal Expiration Date: 25  POC Expiration Date: 25        POC expires Unit limit Auth Expiration date PT/OT/ST + Visit Limit?   25                                                 Visit/Unit Tracking  AUTH Status:  Date  10/9  10/10  10/11                   Needs auth Used  1  2  3                     Remaining   0  -1  -2                         Specialty Daily Treatment Diary  Precautions: Fall risk, past Hx R knee arthritis (can be stiff and swollen at the start of the day)      Manuals 10/9/24 IE  10/10  10/11/24                                                               Neuro Re-Ed  Pt education: neuroplasticity, recovery and compensation s/p CVA, HIGT parameters x10 min            HIGT    SOLO    1/4 track x2 laps SPC use      2 laps 5# AW RLE SPC    1 lap SPC 7.5# AW    Solo  L hemiwalker 14' length  X1 lap fwd  L SPC  14' length  X2 laps fwd  Add: 7.5# BL AW, timed feedback  2x1 lap fwd  1) 1:18.65  2) 1:11.90  Side stepping  L SPC   7.5# BL AW  14' length    Blaze pods  4, random, over 10' length  1'30\", " dark blue  L SPC no AW  1) 5 taps  2) 5 taps with more turns    Bwd amb  14' length  L SPC no AW  X1 lap    Fwd amb w/A-P prpl TB resist no AW  L SPC  14' length           hurdles    SOLO    SPC   5#AW RLE    Over 3 SPC canes  X3 laps          step taps    SOLO    4'' step    3x10 LLE taps                                                                 Ther Ex                                                                                                                             Ther Activity                                         Gait Training                                         Modalities

## 2024-10-14 ENCOUNTER — OFFICE VISIT (OUTPATIENT)
Dept: PHYSICAL THERAPY | Facility: CLINIC | Age: 64
End: 2024-10-14
Payer: COMMERCIAL

## 2024-10-14 ENCOUNTER — OFFICE VISIT (OUTPATIENT)
Dept: OCCUPATIONAL THERAPY | Facility: CLINIC | Age: 64
End: 2024-10-14
Payer: COMMERCIAL

## 2024-10-14 DIAGNOSIS — I63.9 LEFT PONTINE CVA (HCC): Primary | ICD-10-CM

## 2024-10-14 DIAGNOSIS — R26.89 BALANCE DISORDER: ICD-10-CM

## 2024-10-14 DIAGNOSIS — R26.9 NEUROLOGIC GAIT DISORDER: ICD-10-CM

## 2024-10-14 DIAGNOSIS — I63.9 ACUTE CVA (CEREBROVASCULAR ACCIDENT) (HCC): Primary | ICD-10-CM

## 2024-10-14 PROCEDURE — 97112 NEUROMUSCULAR REEDUCATION: CPT

## 2024-10-14 NOTE — PROGRESS NOTES
"Occupational Therapy Daily Note:    Today's date: 10/14/2024  Patient name: Maye Lilly  : 1960  MRN: [id number]  Referring provider: Megan Portillo PA-C  Dx:   Encounter Diagnosis   Name Primary?    Acute CVA (cerebrovascular accident) (HCC) Yes            POC expires Unit limit Auth Expiration date PT/OT/ST + Visit Limit?   25  N/A TBD BOMN                           Visit/Unit Tracking  AUTH Status:  Date 10/9 10/11 10/14           BOMN- TBD Used 1 2 3            Remaining  7 6 5                Duration in weeks: 12 weeks, 2-3x  Plan of care beginning date: 10/9/24  Plan of care expiration date: 25   PN #1 due: 24    Precautions: HTN, GERD, R shoulder pain         Subjective: \"I really just want to get this hand moving.\"    Objective: See treatment below per POC.     Neuro Re-ed: Trialed supine PROM in all planes with Pt unable to tolerate pain reporting feels like her arm is being pulled down. Transitioned to sidelying position for shoulder PROM. Increased pain at elbow extension and shoulder flexion above 90*. Pt then seated EOM, completing 10x shoulder shrugs and protraction/retraction with understanding to complete at home.     Seated EOM, with UE ranger Pt completing R FF and stretch 15x with 2 sec holds at end range. Increased time to work up to full elbow extension due to pain.     KT tape applied to R shoulder from last session, still in tact. E-stim donned to wrist and digit flexors with good tolerance. With stim applied, Pt complete foam block/towel squeezes with forarm positioned on wedge for support. Pt then with wrist and hand on table anterior, educated on and completed towel scrunches/un scrunch for increased digit flex/ext. Focus on digit extension when stim donned for reciprocal inhibition and increased active movement.     Pt able to nadine OH shirt w/assist for clothing mngt to pull shirt over back at end of session.     Assessment: Tolerated treatment well. Pt brought " in shoulder harness with educated for smaller size due to looseness and decreased effectiveness. To bring in new sling when ordered. Patient would benefit from continued skilled OT.    Plan: Continued skilled OT per POC.

## 2024-10-14 NOTE — PROGRESS NOTES
Daily Note     Today's date: 10/14/2024  Patient name: Maye Lilly  : 1960  MRN: [id number]  Referring provider: Megan Portillo PA-C  Dx:   Encounter Diagnosis     ICD-10-CM    1. Left pontine CVA (HCC)  I63.9       2. 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 Balance disorder  R26.89           Start Time: 1020  Stop Time: 1100  Total time in clinic (min): 40 minutes    Subjective: pt reports she has been having some curling of her toes that she has noticed when she is walking that started over the last few days      Objective: See treatment diary below      Assessment: Tolerated treatment well. Patient would benefit from continued PT pt demonstrates inc difficulty with RLE hip abduction as compared to hip addiction as seen w dec step length and dec confidence reported  side stepping. Improving confidence with use of SPC and improved speed observed today. Pt continues to make initial contact with lateral boarder of foot, discussed pros and cons of bracing to address initial contact and improve ankle stability vs training w/o bracing. AW included for bwd walking today with good tolerance however pt did report inc fatigue.      Plan: Continue per plan of care.      Short Term Goal Expiration Date: 24  Long Term Goal Expiration Date: 25  POC Expiration Date: 25        POC expires Unit limit Auth Expiration date PT/OT/ST + Visit Limit?   25                                                 Visit/Unit Tracking  AUTH Status:  Date  10/9  10/10  10/11  10/14                 Needs auth Used  1  2  3  4                   Remaining   0  -1  -2  -3                       Specialty Daily Treatment Diary  Precautions: Fall risk, past Hx R knee arthritis (can be stiff and swollen at the start of the day)      Manuals 10/9/24 IE  10/10  10/11/24  10/14                                                             Neuro Re-Ed  Pt education: neuroplasticity, recovery and compensation s/p CVA, HIGT  "parameters x10 min            HIGT    SOLO    1/4 track x2 laps SPC use      2 laps 5# AW RLE SPC    1 lap SPC 7.5# AW    Solo  L hemiwalker 14' length  X1 lap fwd  L SPC  14' length  X2 laps fwd  Add: 7.5# BL AW, timed feedback  2x1 lap fwd  1) 1:18.65  2) 1:11.90  Side stepping  L SPC   7.5# BL AW  14' length    Blaze pods  4, random, over 10' length  1'30\", dark blue  L SPC no AW  1) 5 taps  2) 5 taps with more turns    Bwd amb  14' length  L SPC no AW  X1 lap    Fwd amb w/A-P prpl TB resist no AW  L SPC  14' length     solo    1/2 track  BL 7.5# AW SPC  X2 laps      Side stepping  L SPC   7.5# BL AW  14' length x2 laps     Bwd amb  14' length  L SPC BL 7.5#  X1 lap      hurdles    SOLO    SPC   5#AW RLE    Over 3 SPC canes  X3 laps          step taps    SOLO    4'' step    3x10 LLE taps                                                                 Ther Ex                                                                                                                             Ther Activity                                         Gait Training                                         Modalities                                                     "

## 2024-10-17 ENCOUNTER — OFFICE VISIT (OUTPATIENT)
Dept: OCCUPATIONAL THERAPY | Facility: CLINIC | Age: 64
End: 2024-10-17
Payer: COMMERCIAL

## 2024-10-17 ENCOUNTER — OFFICE VISIT (OUTPATIENT)
Dept: PHYSICAL THERAPY | Facility: CLINIC | Age: 64
End: 2024-10-17
Payer: COMMERCIAL

## 2024-10-17 DIAGNOSIS — R26.89 BALANCE DISORDER: ICD-10-CM

## 2024-10-17 DIAGNOSIS — R26.9 NEUROLOGIC GAIT DISORDER: Primary | ICD-10-CM

## 2024-10-17 DIAGNOSIS — I63.9 LEFT PONTINE CVA (HCC): ICD-10-CM

## 2024-10-17 DIAGNOSIS — I63.9 ACUTE CVA (CEREBROVASCULAR ACCIDENT) (HCC): Primary | ICD-10-CM

## 2024-10-17 PROCEDURE — 97112 NEUROMUSCULAR REEDUCATION: CPT

## 2024-10-17 PROCEDURE — 97110 THERAPEUTIC EXERCISES: CPT

## 2024-10-17 PROCEDURE — 97150 GROUP THERAPEUTIC PROCEDURES: CPT

## 2024-10-17 PROCEDURE — 97140 MANUAL THERAPY 1/> REGIONS: CPT

## 2024-10-17 NOTE — PROGRESS NOTES
Daily Note     Today's date: 10/17/2024  Patient name: Maye Lilly  : 1960  MRN: [id number]  Referring provider: Megan Portillo PA-C  Dx:   Encounter Diagnosis     ICD-10-CM    1. 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 Left pontine CVA (HCC)  I63.9       3. Balance disorder  R26.89           Start Time: 1000  Stop Time: 1045  Total time in clinic (min): 45 minutes    Subjective: Doing well today! Received from OT.      Objective: See treatment diary below    Group: [id number]  1:1 w/PT: [id number]    Assessment: Trialed 6# BL AW resistance per pt's request of 7.5# being a little too difficult. Included 1# cuff weight resistance to toes of R foot to challenge DF and step clearance. Doing well with participation in HIGT that has been focused on SLS/trunk stability.      Plan: Continue per plan of care.  Progress treatment as tolerated.   Plan to initiate sessions with TM for neuroplastic primer. Propulsion training. Gait speed training.     Short Term Goal Expiration Date: 24  Long Term Goal Expiration Date: 25  POC Expiration Date: 25        POC expires Unit limit Auth Expiration date PT/OT/ST + Visit Limit?   25                                             Visit/Unit Tracking  AUTH Status:  Date 10/17                   16 Used 5                     Remaining  11                         Specialty Daily Treatment Diary  Precautions: Fall risk, past Hx R knee arthritis (can be stiff and swollen at the start of the day)      Manuals 10/9/24 IE  10/10  10/11/24  10/14 10/17/24                                                       Neuro Re-Ed  Pt education: neuroplasticity, recovery and compensation s/p CVA, HIGT parameters x10 min           HIGT    SOLO    1/4 track x2 laps SPC use      2 laps 5# AW RLE SPC    1 lap SPC 7.5# AW    Solo  L hemiwalker 14' length  X1 lap fwd  L SPC  14' length  X2 laps fwd  Add: 7.5# BL AW, timed feedback  2x1 lap fwd  1) 1:18.65  2)  "1:11.90  Side stepping  L SPC   7.5# BL AW  14' length    Blaze pods  4, random, over 10' length  1'30\", dark blue  L SPC no AW  1) 5 taps  2) 5 taps with more turns    Bwd amb  14' length  L SPC no AW  X1 lap    Fwd amb w/A-P prpl TB resist no AW  L SPC  14' length     solo    1/2 track  BL 7.5# AW SPC  X2 laps      Side stepping  L SPC   7.5# BL AW  14' length x2 laps     Bwd amb  14' length  L SPC BL 7.5#  X1 lap TM  Solo  L UE  0.3 mph x10 min  Cues for stepping L foot past R foot    Solo  L SPC  6# L AW  5# R AW with 1# cuff weight taped to toes of R foot  14' length  X3 laps fwd  X2 laps lat      hurdles    SOLO    SPC   5#AW RLE    Over 3 SPC canes  X3 laps         step taps    SOLO    4'' step    3x10 LLE taps                                                            Ther Ex                                                                                                                    Ther Activity                                      Gait Training                                      Modalities                                                    "

## 2024-10-17 NOTE — PROGRESS NOTES
Occupational Therapy Daily Note:    Today's date: 10/17/2024  Patient name: Maye Lilly  : 1960  MRN: [id number]  Referring provider: Megan Portillo PA-C  Dx:   Encounter Diagnosis   Name Primary?    Acute CVA (cerebrovascular accident) (HCC) Yes            POC expires Unit limit Auth Expiration date PT/OT/ST + Visit Limit?   25  N/A TBD BOMN                           Visit/Unit Tracking  AUTH Status:  Date 10/9 10/11 10/14 10/17          BOMN- TBD Used 1 2 3 4           Remaining  7 6 5 4               Duration in weeks: 12 weeks, 2-3x  Plan of care beginning date: 10/9/24  Plan of care expiration date: 25   PN #1 due: 24    Precautions: HTN, GERD, R shoulder pain         Subjective: I couldn't do that before. (Wrist extension)    Objective: See treatment below per POC. Pt participated in skilled OT this date with focus to increased ROM, decreased pain/discomfort, HEP review/education, and overall activity tolerance/endurance/engagement, for increased safety and engagement in ADL/IADL tasks.     Neuro Re-ed: Pt able to doff sweatshirt w/no A for increased access to arm for stimulation placement and KT tape removal.     Therapist initiating session by removing KT tape this date by wetting it first for decreased discomfort. Tape was starting to fall off at this time.     While in supine, pt tolerated Neuromuscular electrical stimulation to ant/med delt and supraspinatus/pos delt for should support and stability during PROM work manually. Therapist also applying MH for increased ROM and blood flow, as well as decreased discomfort during PROM. Pt able to complete x 10 bicep curls while in supine for AROM, with therapist then engaging in PROM/PLLS to RUE while in supine. Pt tolerating well with min pain. Pt able to reach 50%-75% ROM in planes other than ER. Pt then completing x 10 wrist extensions with increased success and no pain. Therapist also completing vibration over shoulder over heat  this date for increased input and decreased discomfort.     Assessment: Tolerated treatment well. Pt tolerated session very well with minimal pain during manual stretching at times, controlled by relaxation and attention to the same. Pt started and finished session with no pain.  Patient would benefit from continued skilled OT.    Plan: Continued skilled OT per POC.

## 2024-10-22 ENCOUNTER — OFFICE VISIT (OUTPATIENT)
Dept: PHYSICAL THERAPY | Facility: CLINIC | Age: 64
End: 2024-10-22
Payer: COMMERCIAL

## 2024-10-22 ENCOUNTER — OFFICE VISIT (OUTPATIENT)
Dept: OCCUPATIONAL THERAPY | Facility: CLINIC | Age: 64
End: 2024-10-22
Payer: COMMERCIAL

## 2024-10-22 DIAGNOSIS — I63.9 LEFT PONTINE CVA (HCC): ICD-10-CM

## 2024-10-22 DIAGNOSIS — R26.9 NEUROLOGIC GAIT DISORDER: Primary | ICD-10-CM

## 2024-10-22 DIAGNOSIS — I63.9 ACUTE CVA (CEREBROVASCULAR ACCIDENT) (HCC): Primary | ICD-10-CM

## 2024-10-22 DIAGNOSIS — R26.89 BALANCE DISORDER: ICD-10-CM

## 2024-10-22 PROCEDURE — 97112 NEUROMUSCULAR REEDUCATION: CPT

## 2024-10-22 PROCEDURE — 97150 GROUP THERAPEUTIC PROCEDURES: CPT

## 2024-10-22 NOTE — PROGRESS NOTES
"Occupational Therapy Daily Note:    Today's date: 10/22/2024  Patient name: Maye Lilly  : 1960  MRN: [id number]  Referring provider: Megan Portillo PA-C  Dx:   Encounter Diagnosis   Name Primary?    Acute CVA (cerebrovascular accident) (HCC) Yes          POC expires Unit limit Auth Expiration date PT/OT/ST + Visit Limit?   25  N/A TBD BOMN                           Visit/Unit Tracking  AUTH Status:  Date 10/9 10/11 10/14 10/17 10/22         BOMN- TBD Used 1 2 3 4 5          Remaining  7 6 5 4 3              Duration in weeks: 12 weeks, 2-3x  Plan of care beginning date: 10/9/24  Plan of care expiration date: 25   PN #1 due: 24    Precautions: HTN, GERD, R shoulder pain         Subjective: \"This is really great, I couldn't do much of it before\"    Objective: See treatment below per POC. Pt participated in skilled OT this date with focus to increased ROM, decreased pain/discomfort, HEP review/education, and overall activity tolerance/endurance/engagement, for increased safety and engagement in ADL/IADL tasks.     Neuro Re-ed: While in supine, pt tolerated Neuromuscular electrical stimulation to supraspinatus/pos delt for shoulder support and stability during PROM work manually with therapist then engaging in PROM/PLLS to RUE while in supine. Pt tolerating well with min pain at end range ~ 90* FF. Pt reports significant pain in ER.     Pt then completing FF with no wt dowel. R hand cobanned to dowel for support due to decreased  strength. Pt required tactile and verbal cues for elbow extension with slow and controlled movements going up and down. Therapist assisting with flexion but pt able to complete shoulder extension with no A just provided guidance for slow movements.     Ended session with Pt seated EOM, e stim still applied to shoulder. With foam noodles placed on L side pt grabbing with L hand, bringing to midline of body, then using R hand to reach into midline extend digits " and then flex to grasp noodle. Once pt with good grasp, extending elbow and reaching forward to place into bucket on lower surface.       Assessment: Tolerated treatment well. Pt tolerated session very well with minimal pain during manual stretching at times, controlled by relaxation and distraction during. Pt started and finished session with no pain unless movement past tolerance.  Patient would benefit from continued skilled OT.    Plan: Continued skilled OT per POC.

## 2024-10-22 NOTE — PROGRESS NOTES
Daily Note     Today's date: 10/22/2024  Patient name: Maye Lilly  : 1960  MRN: [id number]  Referring provider: Megan Portillo PA-C  Dx:   Encounter Diagnosis     ICD-10-CM    1. 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 Left pontine CVA (HCC)  I63.9       3. Balance disorder  R26.89           Start Time: 1130  Stop Time: 1230  Total time in clinic (min): 60 minutes    Subjective: Doing well today! No new c/o. Ambulatory with hemiwalker. Is working on her side stepping at home!      Objective: See treatment diary below    Group: [id number]  1:1 w/PT: [id number]    Assessment: Was able to rapidly progress stepping practice with no UE support then over variable surfaces on grass wit SPC support first time!       Plan: Continue per plan of care.  Progress treatment as tolerated.   Trial stair negotiation NV.     Short Term Goal Expiration Date: 24  Long Term Goal Expiration Date: 25  POC Expiration Date: 25        POC expires Unit limit Auth Expiration date PT/OT/ST + Visit Limit?   25                                             Visit/Unit Tracking  AUTH Status:  Date 10/17 10/22                  16 Used 5 6                    Remaining  11 10                        Specialty Daily Treatment Diary  Precautions: Fall risk, past Hx R knee arthritis (can be stiff and swollen at the start of the day)      Manuals 10/22/24  10/10  10/11/24  10/14 10/17/24                                                   Neuro Re-Ed             HIGT Solo  L SPC  14' length  X4 laps fwd   No AD  14' length  2X1 lap fwd  X2 laps fwd at end of session    Outdoors:  Sidewalk R SPC  X110' total  Grass   X100' total    SOLO    1/4 track x2 laps SPC use      2 laps 5# AW RLE SPC    1 lap SPC 7.5# AW    Solo  L hemiwalker 14' length  X1 lap fwd  L SPC  14' length  X2 laps fwd  Add: 7.5# BL AW, timed feedback  2x1 lap fwd  1) 1:18.65  2) 1:11.90  Side stepping  L SPC   7.5# BL AW  14' length    Blaze  "pods  4, random, over 10' length  1'30\", dark blue  L SPC no AW  1) 5 taps  2) 5 taps with more turns    Bwd amb  14' length  L SPC no AW  X1 lap    Fwd amb w/A-P prpl TB resist no AW  L SPC  14' length     solo    1/2 track  BL 7.5# AW SPC  X2 laps      Side stepping  L SPC   7.5# BL AW  14' length x2 laps     Bwd amb  14' length  L SPC BL 7.5#  X1 lap TM  Solo  L UE  0.3 mph x10 min  Cues for stepping L foot past R foot    Solo  L SPC  6# L AW  5# R AW with 1# cuff weight taped to toes of R foot  14' length  X3 laps fwd  X2 laps lat      hurdles   SOLO    SPC   5#AW RLE    Over 3 SPC canes  X3 laps         step taps   SOLO    4'' step    3x10 LLE taps                                                        Ther Ex                                                                                                           Ther Activity                                   Gait Training                                     Modalities                                                    "

## 2024-10-24 ENCOUNTER — OFFICE VISIT (OUTPATIENT)
Dept: OCCUPATIONAL THERAPY | Facility: CLINIC | Age: 64
End: 2024-10-24
Payer: COMMERCIAL

## 2024-10-24 ENCOUNTER — OFFICE VISIT (OUTPATIENT)
Dept: PHYSICAL THERAPY | Facility: CLINIC | Age: 64
End: 2024-10-24
Payer: COMMERCIAL

## 2024-10-24 DIAGNOSIS — I63.9 LEFT PONTINE CVA (HCC): ICD-10-CM

## 2024-10-24 DIAGNOSIS — R26.9 NEUROLOGIC GAIT DISORDER: Primary | ICD-10-CM

## 2024-10-24 DIAGNOSIS — I63.9 ACUTE CVA (CEREBROVASCULAR ACCIDENT) (HCC): Primary | ICD-10-CM

## 2024-10-24 DIAGNOSIS — R26.89 BALANCE DISORDER: ICD-10-CM

## 2024-10-24 PROCEDURE — 97112 NEUROMUSCULAR REEDUCATION: CPT

## 2024-10-24 PROCEDURE — 97150 GROUP THERAPEUTIC PROCEDURES: CPT

## 2024-10-24 NOTE — PROGRESS NOTES
"Occupational Therapy Daily Note:    Today's date: 10/24/2024  Patient name: Maye Lilly  : 1960  MRN: [id number]  Referring provider: Megan Portillo PA-C  Dx:   Encounter Diagnosis   Name Primary?    Acute CVA (cerebrovascular accident) (HCC) Yes            POC expires Unit limit Auth Expiration date PT/OT/ST + Visit Limit?   25  N/A TBD BOMN                           Visit/Unit Tracking  AUTH Status:  Date 10/9 10/11 10/14 10/17 10/22 10/24        BOMN- TBD Used 1 2 3 4 5 6         Remaining  7 6 5 4 3 2             Duration in weeks: 12 weeks, 2-3x  Plan of care beginning date: 10/9/24  Plan of care expiration date: 25   PN #1 due: 24    Precautions: HTN, GERD, R shoulder pain         Subjective: \"This is really great, I couldn't do much of it before\"    Objective: See treatment below per POC. Pt participated in skilled OT this date with focus to increased ROM, decreased pain/discomfort, HEP review/education, and overall activity tolerance/endurance/engagement, for increased safety and engagement in ADL/IADL tasks.     Neuro Re-ed: Sidelying: PROM to scap in all planes (upward downward rotation, elevation depression, and protraction retraction), tolerable pain in protraction, no pain reported in all other planes.      While in supine, pt tolerated Neuromuscular electrical stimulation to anterior and middle delt for shoulder and stability during PROM with therapist engaging PLLS to RUE to PT tolerance at about 90* FF, 90* abd, and minimal at ER with significant pain noted in ER.      Continued in supine, with NMES to R ant. And middle delt as well as wrist and finger flexors working on digit flexion. Increased time taken here for PROM and PLLS to digits simultaneously as well as each individual digit. Pt reported increased pain when digits placed in flexion simultaneously.    Educated Pt to continue with exercises for elevation/depression and scap retraction/protraction as well as " continued stretch of digits with 5 sec holds if able to tolerate.     Assessment: Tolerated treatment well. Pt tolerated session very well with minimal/moderate pain during manual stretching at times specifically for ER and digit flexion, somewhat controlled by relaxation and distraction during.  Patient would benefit from continued skilled OT.    Plan: Continued skilled OT per POC. KT tape applied to shoulder for support and digits

## 2024-10-24 NOTE — PROGRESS NOTES
Daily Note     Today's date: 10/24/2024  Patient name: Maye Lilly  : 1960  MRN: [id number]  Referring provider: Megan Portillo PA-C  Dx:   Encounter Diagnosis     ICD-10-CM    1. 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 Left pontine CVA (HCC)  I63.9       3. Balance disorder  R26.89           Start Time: 845  Stop Time: 930  Total time in clinic (min): 45 minutes    Subjective: Doing well today! No new c/o. Ambulatory with hemiwalker.       Objective: See treatment diary below    1:1 w/PT:  Group:   Self-directed:     Assessment: Focused on stair negotiation today with various configurations first specific to home setup where railing is only available on R side when ascending and pt ascends fwds and descends backwards. For reciprocity and R-sided engagement, progressed to fwd negotiation ascending/descending with BUE/railing use and reciprocal stepping with relatively good control! Continued HIGT with no AD progressing to multidirectional stepping practice.      Plan: Continue per plan of care.  Progress treatment as tolerated.        Short Term Goal Expiration Date: 24  Long Term Goal Expiration Date: 25  POC Expiration Date: 25        POC expires Unit limit Auth Expiration date PT/OT/ST + Visit Limit?   25                                             Visit/Unit Tracking  AUTH Status:  Date 10/17 10/22 10/24                 16 Used 5 6 7                   Remaining  11 10 9                       Specialty Daily Treatment Diary  Precautions: Fall risk, past Hx R knee arthritis (can be stiff and swollen at the start of the day)      Manuals 10/22/24 10/24/24  10/11/24  10/14 10/17/24                                               Neuro Re-Ed            HIGT Solo  L SPC  14' length  X4 laps fwd   No AD  14' length  2X1 lap fwd  X2 laps fwd at end of session    Outdoors:  Sidewalk R SPC  X110' total  Grass   X100' total   Solo    L SPC   x30'  "fwd  x28' fwd    Stair negotiation  Trainer steps  4x6\"  L UE R HR step-to ascending/descending lead L LE ascend fwd, R LE descend bwd  X1 round  L LE lead fwd ascend, same descend save for x1 L LE lead descend bwd x1 step    X2 rounds UE on each respective railing, step-to ascend lead L LE fwd, descend lead R LE fwd    X3 rounds BUE reciprocal   Fwd ascending/descending    No AD  14' length  X1 lap fwd  X1 lap lat  X1 lap bwd          Solo  L hemiwalker 14' length  X1 lap fwd  L SPC  14' length  X2 laps fwd  Add: 7.5# BL AW, timed feedback  2x1 lap fwd  1) 1:18.65  2) 1:11.90  Side stepping  L SPC   7.5# BL AW  14' length    Blaze pods  4, random, over 10' length  1'30\", dark blue  L SPC no AW  1) 5 taps  2) 5 taps with more turns    Bwd amb  14' length  L SPC no AW  X1 lap    Fwd amb w/A-P prpl TB resist no AW  L SPC  14' length     solo    1/2 track  BL 7.5# AW SPC  X2 laps      Side stepping  L SPC   7.5# BL AW  14' length x2 laps     Bwd amb  14' length  L SPC BL 7.5#  X1 lap TM  Solo  L UE  0.3 mph x10 min  Cues for stepping L foot past R foot    Solo  L SPC  6# L AW  5# R AW with 1# cuff weight taped to toes of R foot  14' length  X3 laps fwd  X2 laps lat      hurdles           step taps                                                      Ther Ex                                                                                                  Ther Activity                                Gait Training                                  Modalities                                                   "

## 2024-10-25 ENCOUNTER — OFFICE VISIT (OUTPATIENT)
Dept: PHYSICAL THERAPY | Facility: CLINIC | Age: 64
End: 2024-10-25
Payer: COMMERCIAL

## 2024-10-25 ENCOUNTER — OFFICE VISIT (OUTPATIENT)
Dept: OCCUPATIONAL THERAPY | Facility: CLINIC | Age: 64
End: 2024-10-25
Payer: COMMERCIAL

## 2024-10-25 DIAGNOSIS — R26.89 BALANCE DISORDER: ICD-10-CM

## 2024-10-25 DIAGNOSIS — R26.9 NEUROLOGIC GAIT DISORDER: Primary | ICD-10-CM

## 2024-10-25 DIAGNOSIS — I63.9 ACUTE CVA (CEREBROVASCULAR ACCIDENT) (HCC): Primary | ICD-10-CM

## 2024-10-25 DIAGNOSIS — I63.9 LEFT PONTINE CVA (HCC): ICD-10-CM

## 2024-10-25 PROCEDURE — 97150 GROUP THERAPEUTIC PROCEDURES: CPT

## 2024-10-25 PROCEDURE — 97112 NEUROMUSCULAR REEDUCATION: CPT

## 2024-10-25 NOTE — PROGRESS NOTES
"Occupational Therapy Daily Note:    Today's date: 10/25/2024  Patient name: Maye Lilly  : 1960  MRN: [id number]  Referring provider: Megan Portillo PA-C  Dx:   Encounter Diagnosis   Name Primary?    Acute CVA (cerebrovascular accident) (HCC) Yes                POC expires Unit limit Auth Expiration date PT/OT/ST + Visit Limit?   25  N/A TBD BOMN                           Visit/Unit Tracking  AUTH Status:  Date 10/9 10/11 10/14 10/17 10/22 10/24 10/25       BOMN- TBD Used 1 2 3 4 5 6 7        Remaining  7 6 5 4 3 2 1            Duration in weeks: 12 weeks, 2-3x  Plan of care beginning date: 10/9/24  Plan of care expiration date: 25   PN #1 due: 24    Precautions: HTN, GERD, R shoulder pain     Subjective: \"My elbow hurts in the beginning then after we're going a while it feels better\".     Objective: See treatment below per POC.   Session focus edema and sublux reduction, pain mngt and proximal stability.      Neuro Re-ed: Supine on mat, pt tolerated Tens promoting edema reduction for 10 min w/pad placement to distal forearm for edema reduction followed by retrograde massage.  Pt tolerated electrical stimulation w/pad placement to infra/supra spinatus and ant/middle delt focusing on sublux reduction and muscle facilitation for proximal stabilization. AAROM performed proximal distal, pt able to bring RUE into FF with distal stabilization. PROM in elbow flex/ext, wrist flex/ext and digits.  Pt unable to tolerate IR/ER this session.      Pt fitted for edema glove w/wearing education provided, pt not to wear during hours of sleep, bathing and feeding if glove impedes pts fxl engagement.   K-tape applied to hand and forearm in basket weave pattern for edema mngt and to GH joint for stabilization.     Assessment: Tolerated treatment well. Education provided to pt regarding kinesiology tape with review of s/s and safe removal of tape to maintain skin integrity.  Improvement noted this session " in proximal active movement. Pt able to sustain shoulder hike during taping.  Patient would benefit from continued skilled OT.    Plan: Continued skilled OT per POC    10/25:  [id number]  [id number]

## 2024-10-25 NOTE — PROGRESS NOTES
"Daily Note     Today's date: 10/25/2024  Patient name: Maye Lilly  : 1960  MRN: [id number]  Referring provider: Megan Portillo PA-C  Dx:   Encounter Diagnosis     ICD-10-CM    1. 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 Balance disorder  R26.89       3. Left pontine CVA (HCC)  I63.9           Start Time: 1005  Stop Time: 1110  Total time in clinic (min): 65 minutes    Subjective: Doing well today! No new c/o. Received from OT.       Objective: See treatment diary below        Assessment: Continued stepping practice with introduction of vestibular integration, compliant surfaces, and physical dual tasks engaging R UE. Overall sufficiently challenged today; most difficulties engaging R LE for appropriate step amplitude to clear hurdles.      Plan: Continue per plan of care.  Progress treatment as tolerated.        Short Term Goal Expiration Date: 24  Long Term Goal Expiration Date: 25  POC Expiration Date: 25        POC expires Unit limit Auth Expiration date PT/OT/ST + Visit Limit?   25                                             Visit/Unit Tracking  AUTH Status:  Date 10/17 10/22 10/24 10/25                16 Used 5 6 7 8                  Remaining  11 10 9 8                      Specialty Daily Treatment Diary  Precautions: Fall risk, past Hx R knee arthritis (can be stiff and swollen at the start of the day)      Manuals 10/22/24 10/24/24 10/25/24  10/14 10/17/24                                           Neuro Re-Ed           HIGT Solo  L SPC  14' length  X4 laps fwd   No AD  14' length  2X1 lap fwd  X2 laps fwd at end of session    Outdoors:  Sidewalk R SPC  X110' total  Grass   X100' total   Solo    L SPC   x30' fwd  x28' fwd    Stair negotiation  Trainer steps  4x6\"  L UE R HR step-to ascending/descending lead L LE ascend fwd, R LE descend bwd  X1 round  L LE lead fwd ascend, same descend save for x1 L LE lead descend bwd x1 step    X2 rounds UE on each respective " "railing, step-to ascend lead L LE fwd, descend lead R LE fwd    X3 rounds BUE reciprocal   Fwd ascending/descending    No AD  14' length  X1 lap fwd  X1 lap lat  X1 lap bwd         Solo    No AD  X14'  16' length  2x1 lap fwd    L SPC  16' length  W/HT x1 lap fwd  W/HN x1 lap fwd  X1 lap lat    X4 0.5\" hurdles L SPC    Stepping onto/off from x4 airex pads  L SPC  2x1 length fwd leading L LE ascending/R LE descending fwd    16' length  BUE carrying balloon  X2 laps fwd     50' length   L HHA paced by PT encouraged to walk as fast as possible  X3 laps fwd    X75' fwd SPC  solo    1/2 track  BL 7.5# AW SPC  X2 laps      Side stepping  L SPC   7.5# BL AW  14' length x2 laps     Bwd amb  14' length  L SPC BL 7.5#  X1 lap TM  Solo  L UE  0.3 mph x10 min  Cues for stepping L foot past R foot    Solo  L SPC  6# L AW  5# R AW with 1# cuff weight taped to toes of R foot  14' length  X3 laps fwd  X2 laps lat      hurdles          step taps                                                 Ther Ex                                                                                         Ther Activity                             Gait Training                               Modalities                                                   "

## 2024-10-28 ENCOUNTER — OFFICE VISIT (OUTPATIENT)
Dept: PHYSICAL THERAPY | Facility: CLINIC | Age: 64
End: 2024-10-28
Payer: COMMERCIAL

## 2024-10-28 ENCOUNTER — OFFICE VISIT (OUTPATIENT)
Dept: OCCUPATIONAL THERAPY | Facility: CLINIC | Age: 64
End: 2024-10-28
Payer: COMMERCIAL

## 2024-10-28 DIAGNOSIS — R26.89 BALANCE DISORDER: ICD-10-CM

## 2024-10-28 DIAGNOSIS — I63.9 LEFT PONTINE CVA (HCC): ICD-10-CM

## 2024-10-28 DIAGNOSIS — R26.9 NEUROLOGIC GAIT DISORDER: Primary | ICD-10-CM

## 2024-10-28 DIAGNOSIS — I63.9 ACUTE CVA (CEREBROVASCULAR ACCIDENT) (HCC): Primary | ICD-10-CM

## 2024-10-28 PROCEDURE — 97112 NEUROMUSCULAR REEDUCATION: CPT

## 2024-10-28 NOTE — PROGRESS NOTES
"Occupational Therapy Daily Note:    Today's date: 10/28/2024  Patient name: Maye Lilly  : 1960  MRN: [id number]  Referring provider: Megan Portillo PA-C  Dx:   Encounter Diagnosis   Name Primary?    Acute CVA (cerebrovascular accident) (HCC) Yes                  POC expires Unit limit Auth Expiration date PT/OT/ST + Visit Limit?   25  N/A 24 BOMN                           Visit/Unit Tracking  20 Approved- 10/9/24- 24 Date 10/9 10/11 10/14 10/17 10/22 10/24 10/25 10/28       Used 1 2 3 4 5 6 7 8       Remaining  19 18 17 16 15 14 13 12           Duration in weeks: 12 weeks, 2-3x  Plan of care beginning date: 10/9/24  Plan of care expiration date: 25   PN #1 due: 24    Precautions: HTN, GERD, R shoulder pain     Subjective: \"My elbow hurts in the beginning then after we're going a while it feels better\".     Objective: See treatment below per POC.   Session focus edema and sublux reduction, pain mngt and proximal stability.      Neuro Re-ed: Supine on mat, pt tolerated NMES applied to shoulder anterior and middle delt as well as wrist extensors for digit and wrist extension. Started with PROM to shoulder in all planes to Pt tolerance. Pt then providing assist, so completing most with AAROM. Once Pt able to tolerate stretching with increased FF ROM, Pt completing place and holds. Difficulty with sustaining hold in 90* FF while in supine requiring assist from therapist. Pt completing 5 with AROM for shoulder extension and then 5x with added error for extension.    Pt working on active digit flexion when good understanding of finger flexion when stim is on. Pt completing throughout stretching and movements noted above.     Assessment: Tolerated treatment well. Pt with good understanding of Estim, would benefit from at home NMES to assist in completion of functional tasks. Patient would benefit from continued skilled OT.    Plan: Continued skilled OT per POC  "

## 2024-10-28 NOTE — PROGRESS NOTES
Daily Note     Today's date: 10/28/2024  Patient name: Maye Lilly  : 1960  MRN: [id number]  Referring provider: Megan Portillo PA-C  Dx:   Encounter Diagnosis     ICD-10-CM    1. 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 Balance disorder  R26.89       3. Left pontine CVA (HCC)  I63.9           Start Time: 1100  Stop Time: 1145  Total time in clinic (min): 45 minutes    Subjective: pt reports she is a little tired today following OT but remains motivated for exercises      Objective: See treatment diary below      Assessment: Tolerated treatment well. Patient would benefit from continued PT PT adjusts height of ts personal SPC and provides edu correct height measured to outside of wrist. Pt ambulated 3 laps w SPC at correct height with good form. Pt able to navigate stairs w Ue use reciprocally with intermittent step too observed w descent. Pt trailed RUE use on rail LUE on SPC for step navigation as at home railing is unilaterally on R side. W SPC use pt performed step too. PT stabilizes airex pad with RLE step up due to difficulty clearing airex pad.       Plan: Continue per plan of care.      Short Term Goal Expiration Date: 24  Long Term Goal Expiration Date: 25  POC Expiration Date: 25        POC expires Unit limit Auth Expiration date PT/OT/ST + Visit Limit?   25                                             Visit/Unit Tracking  AUTH Status:  Date 10/17 10/22 10/24 10/25 10/28               16 Used 5 6 7 8 9                 Remaining  11 10 9 8 7                     Specialty Daily Treatment Diary  Precautions: Fall risk, past Hx R knee arthritis (can be stiff and swollen at the start of the day)      Manuals 10/22/24 10/24/24 10/25/24 10/28 10/17/24                                       Neuro Re-Ed          HIGT Solo  L SPC  14' length  X4 laps fwd   No AD  14' length  2X1 lap fwd  X2 laps fwd at end of session    Outdoors:  Sidewalk R SPC  X110' total  Grass   X100'  "total   Solo    L SPC   x30' fwd  x28' fwd    Stair negotiation  Trainer steps  4x6\"  L UE R HR step-to ascending/descending lead L LE ascend fwd, R LE descend bwd  X1 round  L LE lead fwd ascend, same descend save for x1 L LE lead descend bwd x1 step    X2 rounds UE on each respective railing, step-to ascend lead L LE fwd, descend lead R LE fwd    X3 rounds BUE reciprocal   Fwd ascending/descending    No AD  14' length  X1 lap fwd  X1 lap lat  X1 lap bwd         Solo    No AD  X14'  16' length  2x1 lap fwd    L SPC  16' length  W/HT x1 lap fwd  W/HN x1 lap fwd  X1 lap lat    X4 0.5\" hurdles L SPC    Stepping onto/off from x4 airex pads  L SPC  2x1 length fwd leading L LE ascending/R LE descending fwd    16' length  BUE carrying balloon  X2 laps fwd     50' length   L HHA paced by PT encouraged to walk as fast as possible  X3 laps fwd    X75' fwd SPC Solo    1/4 track length SPC after personal SPC height adjustment.   X3 laps    Steps ( stairs) 4 6''  Reciprocal   X3 laps BUE use on rail  X2 laps RUE rail LUE SPC    16' length  BUE carrying balloon  X2 laps fwd     Complaint surface  Up and over 3 airex x1.5 laps TM  Solo  L UE  0.3 mph x10 min  Cues for stepping L foot past R foot    Solo  L SPC  6# L AW  5# R AW with 1# cuff weight taped to toes of R foot  14' length  X3 laps fwd  X2 laps lat      hurdles         step taps                                            Ther Ex                                                                                Ther Activity                          Gait Training                            Modalities                                                  "

## 2024-10-30 ENCOUNTER — OFFICE VISIT (OUTPATIENT)
Dept: OCCUPATIONAL THERAPY | Facility: CLINIC | Age: 64
End: 2024-10-30
Payer: COMMERCIAL

## 2024-10-30 ENCOUNTER — OFFICE VISIT (OUTPATIENT)
Dept: PHYSICAL THERAPY | Facility: CLINIC | Age: 64
End: 2024-10-30
Payer: COMMERCIAL

## 2024-10-30 DIAGNOSIS — I63.9 ACUTE CVA (CEREBROVASCULAR ACCIDENT) (HCC): Primary | ICD-10-CM

## 2024-10-30 DIAGNOSIS — R26.9 NEUROLOGIC GAIT DISORDER: Primary | ICD-10-CM

## 2024-10-30 DIAGNOSIS — I63.9 LEFT PONTINE CVA (HCC): ICD-10-CM

## 2024-10-30 DIAGNOSIS — R26.89 BALANCE DISORDER: ICD-10-CM

## 2024-10-30 PROCEDURE — 97112 NEUROMUSCULAR REEDUCATION: CPT

## 2024-10-30 PROCEDURE — 97116 GAIT TRAINING THERAPY: CPT

## 2024-10-30 NOTE — PROGRESS NOTES
"Occupational Therapy Daily Note:    Today's date: 10/30/2024  Patient name: Maye Lilly  : 1960  MRN: [id number]  Referring provider: Megan Portillo PA-C  Dx:   Encounter Diagnosis   Name Primary?    Acute CVA (cerebrovascular accident) (HCC) Yes          POC expires Unit limit Auth Expiration date PT/OT/ST + Visit Limit?   25  N/A 24 BOMN                           Visit/Unit Tracking  20 Approved- 10/9/24- 24 Date 10/9 10/11 10/14 10/17 10/22 10/24 10/25 10/28 10/30      Used 1 2 3 4 5 6 7 8 9      Remaining  19 18 17 16 15 14 13 12 11          Duration in weeks: 12 weeks, 2-3x  Plan of care beginning date: 10/9/24  Plan of care expiration date: 25   PN #1 due: 24    Precautions: HTN, GERD, R shoulder pain         Subjective: \"This is good, I couldn't move my hand at all in the beginning\".     Objective: See treatment below.     Neuro Re-ed: Supine on mat, pt tolerated NMES w/pad placement to infra/supra spinatus and ant/middle delt focusing on sublux reduction and muscle facilitation for proximal stabilization. AAROM performed proximal distal, pt able to bring RUE into FF with distal stabilization. AROM in elbow flex/ext, PROM in wrist flex/ext and digits.  Pt unable to tolerate IR/ER this session.  In sidelying, pt tolerated PROM in elevation, depression, circumduction and protraction/retraction.     Session concluded with NMES applied to distal forearm and dorsum of hand promoting wrist/digit extension.  Pt participated in fxl motor pattern of elbow ext/flex with foam disc retrieval w/spherical grasp placing midline on belly.     Assessment: Tolerated treatment well. Improvement noted in edema and motor movement this session providing pt the ability to open/close hand, and flex/extend elbow during fxl reaching task. Weakness evident however, pt able to grasp, transfer and place foam disc >75% of time.   Patient would benefit from continued skilled OT.    Plan: Continued " skilled OT per POC

## 2024-10-30 NOTE — PROGRESS NOTES
"Daily Note     Today's date: 10/30/2024  Patient name: Maye Lilly  : 1960  MRN: [id number]  Referring provider: Megan Portillo PA-C  Dx:   Encounter Diagnosis     ICD-10-CM    1. 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 Balance disorder  R26.89       3. Left pontine CVA (HCC)  I63.9             Start Time: 1015  Stop Time: 1100  Total time in clinic (min): 45 minutes    Subjective: Doing well! Ambulatory with SPC! Received from OT.      Objective: See treatment diary below      Assessment: Continued HIGT with reduced UE support. Challenged increasing R SLS time with step taps that were progressed to no UE support but did demonstrate carryover to improved R SLS control with fwd and lateral stepping. Plan to continue to work on amb with no AD, step taps and step ups NV.      Plan: Continue per plan of care.      Short Term Goal Expiration Date: 24  Long Term Goal Expiration Date: 25  POC Expiration Date: 25        POC expires Unit limit Auth Expiration date PT/OT/ST + Visit Limit?   25                                             Visit/Unit Tracking  AUTH Status:  Date 10/17 10/22 10/24 10/25 10/28  10/30             16 Used 5 6 7 8 9  10               Remaining  11 10 9 8 7  6                   Specialty Daily Treatment Diary  Precautions: Fall risk, past Hx R knee arthritis (can be stiff and swollen at the start of the day)      Manuals 10/22/24 10/24/24 10/25/24 10/28 10/30/24                                       Neuro Re-Ed          HIGT Solo  L SPC  14' length  X4 laps fwd   No AD  14' length  2X1 lap fwd  X2 laps fwd at end of session    Outdoors:  Sidewalk R SPC  X110' total  Grass   X100' total   Solo    L SPC   x30' fwd  x28' fwd    Stair negotiation  Trainer steps  4x6\"  L UE R HR step-to ascending/descending lead L LE ascend fwd, R LE descend bwd  X1 round  L LE lead fwd ascend, same descend save for x1 L LE lead descend bwd x1 step    X2 rounds UE on each " "respective railing, step-to ascend lead L LE fwd, descend lead R LE fwd    X3 rounds BUE reciprocal   Fwd ascending/descending    No AD  14' length  X1 lap fwd  X1 lap lat  X1 lap bwd         Solo    No AD  X14'  16' length  2x1 lap fwd    L SPC  16' length  W/HT x1 lap fwd  W/HN x1 lap fwd  X1 lap lat    X4 0.5\" hurdles L SPC    Stepping onto/off from x4 airex pads  L SPC  2x1 length fwd leading L LE ascending/R LE descending fwd    16' length  BUE carrying balloon  X2 laps fwd     50' length   L HHA paced by PT encouraged to walk as fast as possible  X3 laps fwd    X75' fwd SPC Solo    1/4 track length SPC after personal SPC height adjustment.   X3 laps    Steps ( stairs) 4 6''  Reciprocal   X3 laps BUE use on rail  X2 laps RUE rail LUE SPC    16' length  BUE carrying balloon  X2 laps fwd     Complaint surface  Up and over 3 airex x1.5 laps Solo    No AD  15' length  X2 laps fwd  X1 lap lat  X1 lap lat s/p propulsion training    Propulsion:  L SPC with prpl TB A->P resistance at waist  15' length  X3 laps fwd    Dowel step-overs (4)  L SPC  Step-to equal practice both sides  X2 laps fwd          hurdles         step taps     Solo  L SPC   4\" WB R LE  Progressively less L UE support  X10  No AD  x3                                       Ther Ex                                                                                Ther Activity                          Gait Training         Indoors      SPC  2x30' fwd               Modalities                                                  "

## 2024-10-31 ENCOUNTER — OFFICE VISIT (OUTPATIENT)
Dept: PHYSICAL THERAPY | Facility: CLINIC | Age: 64
End: 2024-10-31
Payer: COMMERCIAL

## 2024-10-31 ENCOUNTER — EVALUATION (OUTPATIENT)
Dept: OCCUPATIONAL THERAPY | Facility: CLINIC | Age: 64
End: 2024-10-31
Payer: COMMERCIAL

## 2024-10-31 DIAGNOSIS — I63.9 LEFT PONTINE CVA (HCC): ICD-10-CM

## 2024-10-31 DIAGNOSIS — R26.89 BALANCE DISORDER: ICD-10-CM

## 2024-10-31 DIAGNOSIS — I63.9 ACUTE CVA (CEREBROVASCULAR ACCIDENT) (HCC): Primary | ICD-10-CM

## 2024-10-31 DIAGNOSIS — R26.9 NEUROLOGIC GAIT DISORDER: Primary | ICD-10-CM

## 2024-10-31 PROCEDURE — 97112 NEUROMUSCULAR REEDUCATION: CPT

## 2024-10-31 NOTE — PROGRESS NOTES
"Occupational Therapy Daily Note:    Today's date: 10/31/2024  Patient name: Maye Lilly  : 1960  MRN: [id number]  Referring provider: Megan Portillo PA-C  Dx:   Encounter Diagnosis   Name Primary?    Acute CVA (cerebrovascular accident) (HCC) Yes        POC expires Unit limit Auth Expiration date PT/OT/ST + Visit Limit?   25  N/A 24 BOMN                           Visit/Unit Tracking  20 Approved- 10/9/24- 24 Date 10/9 10/11 10/14 10/17 10/22 10/24 10/25 10/28 10/30 10/31     Used 1 2 3 4 5 6 7 8 9 10     Remaining  19 18 17 16 15 14 13 12 11 10         Duration in weeks: 12 weeks, 2-3x  Plan of care beginning date: 10/9/24  Plan of care expiration date: 25   PN #1 due: 24    Precautions: HTN, GERD, R shoulder pain         Subjective: \"Everyday little by little I am moving it more than I was when I was in the hospital\"    Objective: See treatment below.     Neuro Re-ed: Supine on mat, pt tolerated NMES w/pad placement to infra/supra spinatus and ant/middle delt focusing on sublux reduction and muscle facilitation for proximal stabilization. AAROM performed proximal distal, pt able to bring RUE into 90*  FF with distal stabilization and minimal pain noted. AROM in elbow flex/ext, with Pt completing bicep curls holding 1# wt 3x10 in supine with elbow positioned on wedge for support. PROM in wrist flex/ext and digits.  Pt able to tolerate minimal IR/ER this session.      Assessment: Tolerated treatment well. Improvement noted in edema and motor movement this session providing pt the ability to open/close hand, and maintain  on wt positioned in R hand for bicep curls. Continued weakness evident, however increased tolerance to ROM. Pt educated on stretching and movement at home over the weekend as much as able. Patient would benefit from continued skilled OT.    Plan: Continued skilled OT per POC    "

## 2024-10-31 NOTE — PROGRESS NOTES
Daily Note     Today's date: 10/31/2024  Patient name: Maye Lilly  : 1960  MRN: [id number]  Referring provider: Megan Portillo PA-C  Dx:   Encounter Diagnosis     ICD-10-CM    1. 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 Balance disorder  R26.89       3. Left pontine CVA (HCC)  I63.9             Start Time: 0845  Stop Time: 0930  Total time in clinic (min): 45 minutes    Subjective: Doing well! Ambulatory with SPC! Received from OT.      Objective: See treatment diary below      Assessment: Continued HIGT with reduced UE support. Was able to progressively improve fastest perceived walking speeds using OPTIMAL Theory. Was able to complete dowel step-overs for the first time with no AD!!!! Demonstrated more difficulties and instability leading with R LE versus L LE; may benefit from bracing in this context - will consider PLS AFO trial NV to determine if there are any other significant changes or improvements in gait speed/quality.      Plan: Continue per plan of care.      Short Term Goal Expiration Date: 24  Long Term Goal Expiration Date: 25  POC Expiration Date: 25        POC expires Unit limit Auth Expiration date PT/OT/ST + Visit Limit?   25                                             Visit/Unit Tracking  AUTH Status:  Date 10/17 10/22 10/24 10/25 10/28  10/30  10/31           16 Used 5 6 7 8 9  10  11             Remaining  11 10 9 8 7  6  5                 Specialty Daily Treatment Diary  Precautions: Fall risk, past Hx R knee arthritis (can be stiff and swollen at the start of the day)      Manuals 10/31/24 10/24/24 10/25/24 10/28 10/30/24                                       Neuro Re-Ed          HIGT Solo  No AD  16' length  X2 laps fwd  Add 5 # BL AW  2x1 lap fwd  Doff AW  Walking as fast as possible from sitting to sitting, timed  1) 1 minute 6.79s  2) 58.72s  3) 53.22s  X1 lap lat  X4 dowel step-overs  X4 leading L LE, x2 leading R LE Solo    L SPC   x30'  "fwd  x28' fwd    Stair negotiation  Trainer steps  4x6\"  L UE R HR step-to ascending/descending lead L LE ascend fwd, R LE descend bwd  X1 round  L LE lead fwd ascend, same descend save for x1 L LE lead descend bwd x1 step    X2 rounds UE on each respective railing, step-to ascend lead L LE fwd, descend lead R LE fwd    X3 rounds BUE reciprocal   Fwd ascending/descending    No AD  14' length  X1 lap fwd  X1 lap lat  X1 lap bwd         Solo    No AD  X14'  16' length  2x1 lap fwd    L SPC  16' length  W/HT x1 lap fwd  W/HN x1 lap fwd  X1 lap lat    X4 0.5\" hurdles L SPC    Stepping onto/off from x4 airex pads  L SPC  2x1 length fwd leading L LE ascending/R LE descending fwd    16' length  BUE carrying balloon  X2 laps fwd     50' length   L HHA paced by PT encouraged to walk as fast as possible  X3 laps fwd    X75' fwd SPC Solo    1/4 track length SPC after personal SPC height adjustment.   X3 laps    Steps ( stairs) 4 6''  Reciprocal   X3 laps BUE use on rail  X2 laps RUE rail LUE SPC    16' length  BUE carrying balloon  X2 laps fwd     Complaint surface  Up and over 3 airex x1.5 laps Solo    No AD  15' length  X2 laps fwd  X1 lap lat  X1 lap lat s/p propulsion training    Propulsion:  L SPC with prpl TB A->P resistance at waist  15' length  X3 laps fwd    Dowel step-overs (4)  L SPC  Step-to equal practice both sides  X2 laps fwd          hurdles         step taps     Solo  L SPC   4\" WB R LE  Progressively less L UE support  X10  No AD  x3                                       Ther Ex                                                                                Ther Activity                          Gait Training         Indoors     SPC  2x30' fwd               Modalities                                                  "

## 2024-11-05 ENCOUNTER — OFFICE VISIT (OUTPATIENT)
Dept: PHYSICAL THERAPY | Facility: CLINIC | Age: 64
End: 2024-11-05
Payer: COMMERCIAL

## 2024-11-05 ENCOUNTER — OFFICE VISIT (OUTPATIENT)
Dept: OCCUPATIONAL THERAPY | Facility: CLINIC | Age: 64
End: 2024-11-05
Payer: COMMERCIAL

## 2024-11-05 DIAGNOSIS — R26.89 BALANCE DISORDER: ICD-10-CM

## 2024-11-05 DIAGNOSIS — I63.9 LEFT PONTINE CVA (HCC): ICD-10-CM

## 2024-11-05 DIAGNOSIS — I63.9 ACUTE CVA (CEREBROVASCULAR ACCIDENT) (HCC): Primary | ICD-10-CM

## 2024-11-05 DIAGNOSIS — R26.9 NEUROLOGIC GAIT DISORDER: Primary | ICD-10-CM

## 2024-11-05 PROCEDURE — 97112 NEUROMUSCULAR REEDUCATION: CPT

## 2024-11-05 NOTE — PROGRESS NOTES
Daily Note     Today's date: 2024  Patient name: Maye Lilly  : 1960  MRN: [id number]  Referring provider: Megan Portillo PA-C  Dx:   Encounter Diagnosis     ICD-10-CM    1. 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 Balance disorder  R26.89       3. Left pontine CVA (HCC)  I63.9               Start Time: 1002  Stop Time: 1045  Total time in clinic (min): 43 minutes    Subjective: No new reports. Reports she doesn't feel comfortable not using the cane today. Reports she needs to go up 16 steps today and is a little nervous, doesn't want to be too tired.      Objective: See treatment diary below      Assessment: Tolerated treatment session well. Was able to perform walking on TM during session with increased speed compared to other trial (prior 0.3mph, today 1st trial at 0.5mph and second trial at 0.7mph). Attempted to perform 6-inch hurdles with SPC, however reported she could not lift leg high enough to perform. Performed stepping over canes, increased difficulty with leading with L LE, tends to have R LE turn into ER prior to lifting R LE. Continue to progress as tolerated.      Plan: Continue per plan of care.      Short Term Goal Expiration Date: 24  Long Term Goal Expiration Date: 25  POC Expiration Date: 25        POC expires Unit limit Auth Expiration date PT/OT/ST + Visit Limit?   25                                             Visit/Unit Tracking  AUTH Status:  Date 10/17 10/22 10/24 10/25 10/28  10/30  10/31  11/5         16 Used 5 6 7 8 9  10  11  12           Remaining  11 10 9 8 7  6  5  4               Specialty Daily Treatment Diary  Precautions: Fall risk, past Hx R knee arthritis (can be stiff and swollen at the start of the day)      Manuals 10/31/24 10/24/24 10/25/24 10/28 10/30/24 11/5                                           Neuro Re-Ed           HIGT Solo  No AD  16' length  X2 laps fwd  Add 5 # BL AW  2x1 lap fwd  Doff AW  Walking as fast as  "possible from sitting to sitting, timed  1) 1 minute 6.79s  2) 58.72s  3) 53.22s  X1 lap lat  X4 dowel step-overs  X4 leading L LE, x2 leading R LE Solo    L SPC   x30' fwd  x28' fwd    Stair negotiation  Trainer steps  4x6\"  L UE R HR step-to ascending/descending lead L LE ascend fwd, R LE descend bwd  X1 round  L LE lead fwd ascend, same descend save for x1 L LE lead descend bwd x1 step    X2 rounds UE on each respective railing, step-to ascend lead L LE fwd, descend lead R LE fwd    X3 rounds BUE reciprocal   Fwd ascending/descending    No AD  14' length  X1 lap fwd  X1 lap lat  X1 lap bwd         Solo    No AD  X14'  16' length  2x1 lap fwd    L SPC  16' length  W/HT x1 lap fwd  W/HN x1 lap fwd  X1 lap lat    X4 0.5\" hurdles L SPC    Stepping onto/off from x4 airex pads  L SPC  2x1 length fwd leading L LE ascending/R LE descending fwd    16' length  BUE carrying balloon  X2 laps fwd     50' length   L HHA paced by PT encouraged to walk as fast as possible  X3 laps fwd    X75' fwd SPC Solo    1/4 track length SPC after personal SPC height adjustment.   X3 laps    Steps ( stairs) 4 6''  Reciprocal   X3 laps BUE use on rail  X2 laps RUE rail LUE SPC    16' length  BUE carrying balloon  X2 laps fwd     Complaint surface  Up and over 3 airex x1.5 laps Solo    No AD  15' length  X2 laps fwd  X1 lap lat  X1 lap lat s/p propulsion training    Propulsion:  L SPC with prpl TB A->P resistance at waist  15' length  X3 laps fwd    Dowel step-overs (4)  L SPC  Step-to equal practice both sides  X2 laps fwd       SOLO    TM with BUE support 0.5mph x 7 minutes  Seated rest break  0.7mph x 5 minutes    Amb without AD 1/4 lap x 2 laps (seated rest break between)    Cane step overs (4) with L SPC x 2 reps, alternating leading leg    Ascending TM forward with single UE support, descending backwards with single UE support    hurdles          step taps     Solo  L SPC   4\" WB R LE  Progressively less L UE support  X10  No " AD  x3                                            Ther Ex                                                                                         Ther Activity                             Gait Training          Indoors     SPC  2x30' fwd                 Modalities

## 2024-11-05 NOTE — PROGRESS NOTES
"Occupational Therapy Daily Note:    Today's date: 2024  Patient name: Maye Lilly  : 1960  MRN: [id number]  Referring provider: Megan Portillo PA-C  Dx:   Encounter Diagnosis   Name Primary?    Acute CVA (cerebrovascular accident) (HCC) Yes          POC expires Unit limit Auth Expiration date PT/OT/ST + Visit Limit?   25  N/A 24 BOMN                           Visit/Unit Tracking  20 Approved- 10/9/24- 24 Date 10/9 10/11 10/14 10/17 10/22 10/24 10/25 10/28 10/30 10/31 11/5    Used 1 2 3 4 5 6 7 8 9 10 11    Remaining  19 18 17 16 15 14 13 12 11 10 9        Duration in weeks: 12 weeks, 2-3x  Plan of care beginning date: 10/9/24  Plan of care expiration date: 25   PN #1 due: 24    Precautions: HTN, GERD, R shoulder pain         Subjective: \"Rossana been trying to move at home, I definitely move my elbow which is good.    Objective: See treatment below.     Neuro Re-ed: Supine on mat, pt tolerated NMES w/pad placement to infra/supra spinatus and ant/middle delt focusing on sublux reduction and muscle facilitation for proximal stabilization. AAROM performed proximal distal, pt able to bring RUE into 90*  FF with distal stabilization and minimal pain noted. AROM in elbow flex/ext, PROM in wrist flex/ext and digits.  Pt able to tolerate minimal IR/ER this session with pain noted in ER.    Pt seated EOM with e-stim still applied as noted above, reaching with RUE into abd to  foam pieces and then reach anterior to place onto wooden dowels positioned on chair. Pt with increased elbow extension and good ability to achieve AROM FF ~ 80*. Trialed with dowels placed on table but difficulty due to decreased ROM. Pt with good sustained  on each foam piece, with use of L hand to adjust in hand as needed prior to placing on dowel. Pt removing 4 pieces off dowels at end of sessio, with increased difficulty to grasp and pull up/off.     Assessment: Tolerated treatment well. Pt to " continue stretching and movement at home as much as able. Patient would benefit from continued skilled OT.    Plan: Continued skilled OT per POC

## 2024-11-07 ENCOUNTER — EVALUATION (OUTPATIENT)
Dept: OCCUPATIONAL THERAPY | Facility: CLINIC | Age: 64
End: 2024-11-07
Payer: COMMERCIAL

## 2024-11-07 ENCOUNTER — EVALUATION (OUTPATIENT)
Dept: PHYSICAL THERAPY | Facility: CLINIC | Age: 64
End: 2024-11-07
Payer: COMMERCIAL

## 2024-11-07 DIAGNOSIS — R26.89 BALANCE DISORDER: ICD-10-CM

## 2024-11-07 DIAGNOSIS — I63.9 ACUTE CVA (CEREBROVASCULAR ACCIDENT) (HCC): Primary | ICD-10-CM

## 2024-11-07 DIAGNOSIS — I63.9 LEFT PONTINE CVA (HCC): ICD-10-CM

## 2024-11-07 DIAGNOSIS — R26.9 NEUROLOGIC GAIT DISORDER: Primary | ICD-10-CM

## 2024-11-07 PROCEDURE — 97116 GAIT TRAINING THERAPY: CPT

## 2024-11-07 PROCEDURE — 97112 NEUROMUSCULAR REEDUCATION: CPT

## 2024-11-07 NOTE — PROGRESS NOTES
"Progress Note     Today's date: 2024  Patient name: Maye Lilly  : 1960  MRN: [id number]  Referring provider: Megan Portillo PA-C  Dx:   Encounter Diagnosis     ICD-10-CM    1. 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 Balance disorder  R26.89       3. Left pontine CVA (HCC)  I63.9           Start Time: 0945  Stop Time: 1030  Total time in clinic (min): 45 minutes    Subjective: Doing well today! Was able to concur her 16 steps yesterday! Ambulatory with SPC.    Patient Goals  To return as closely to her PLOF as possible. \"I want my right foot to be able to lift higher.\"     Pain  Current pain ratin  At best pain ratin  At worst pain ratin    Objective: See treatment diary below      ABC: 40.63%  PN 24: 51.88%    5xSTS: Standard chair, L UE from armrest to no support 5/5 times, 19.12s  PN 24: Standard chair, unsupported, L UE from armrest to no support 5/5 times, 13.93s    TUG: Standard chair, hemiwalker, CS, 33.88s   PN 24: Standard chair, L SPC, CS, 28.22s    6 MWT: 200' in 4'45\" with hemiwalker CS, request to sit with 1'15\" left, no standing rest breaks.         PN 24:    HR pre 71 BPM              SPO2 pre 95%              HR post 76 BPM  SPO2 post 96%    Gait Speed: 0.25 m/s L hemiwalker  PN 24: 0.29 m/s L SPC      Assessment: Progress assessment completed s/p 1 month PT noting clinically significant improvements in mobility and power output with accompanying improvements in balance per progression in LRAD use from hemiwalker to SPC. Will continue current POC focused on HIGT to maximize neuroplastic recovery in the presence of CVA.    Goals  Short-term goals (to be achieved in 4 weeks):  1. Patient will demonstrate independence with their initial home exercise program. - met  2. Patient will improve 5xSTS to <15.0 seconds demonstrating improved LE strength and power output, while improving safety with sit to stand transfers. - met  3. Patient will improve " TUG to <13.5 seconds using least restrictive assistive device indicating improved functional mobility to maximize independence. - ongoing     Long-term goals (to be achieved in 8 to 12 weeks with additional goals to be incorporated and progressed appropriately pending patient progress):  1. Patient will demonstrate independence with their finalized home exercise program.  2. Patient will improve balance as demonstrated by an increase in FGA by > or equal to 4 points to decrease their risk of falls.   3. Patient will increase SSGS by >0.10 m/s indicating they are walking at a gait speed to allow for greater safety in the community.  4. Patient will increase ABC by MDC of >20% indicating improved balance confidence and self-efficacy.     Plan  Patient would benefit from: skilled physical therapy and skilled occupational therapy  Referral necessary: No  Planned modality interventions: biofeedback     Planned therapy interventions: manual therapy, balance, neuromuscular re-education, patient/caregiver education, stretching, strengthening, therapeutic activities, therapeutic exercise, graded exercise, gait training and home exercise program     Frequency: 3x/week as able.  Duration in weeks: 12  Plan of Care beginning date: 10/9/24  Plan of Care expiration date: 1/17/25  Treatment plan discussed with: Patient         Short Term Goal Expiration Date: 11/16/24  Long Term Goal Expiration Date: 1/17/25  POC Expiration Date: 1/17/25        POC expires Unit limit Auth Expiration date PT/OT/ST + Visit Limit?   1/17/25 12/4/24                                             Visit/Unit Tracking  AUTH Status:  Date 10/17 10/22 10/24 10/25 10/28  10/30  10/31  11/5  11/7       16 Used 5 6 7 8 9  10  11  12  13         Remaining  11 10 9 8 7  6  5  4  3             Specialty Daily Treatment Diary  Precautions: Fall risk, past Hx R knee arthritis (can be stiff and swollen at the start of the day)      Manuals 11/7/24 11/5           "                                 Neuro Re-Ed  ABC         HIGT      SOLO    TM with BUE support 0.5mph x 7 minutes  Seated rest break  0.7mph x 5 minutes    Amb without AD 1/4 lap x 2 laps (seated rest break between)    Cane step overs (4) with L SPC x 2 reps, alternating leading leg    Ascending TM forward with single UE support, descending backwards with single UE support    hurdles          step taps // bars  L UE  WB L LE, stepping R LE with physical constraint for circumduction  4\" step  2x20 fwd    HEP                                                Ther Ex                                                                                         Ther Activity                             Gait Training          Indoors Objective measure testing                  Modalities                                                    "

## 2024-11-07 NOTE — PROGRESS NOTES
OCCUPATIONAL THERAPY PROGRESS NOTE:    11/7/24  Maye Lilly  1960  [id number]  Megan Portillo PA-C   Diagnosis ICD-10-CM Associated Orders   1. Acute CVA (cerebrovascular accident) (Formerly [facility])  I63.9           Assessment/Plan    Skilled Analysis:  Maye Lilly is a 64 y.o. female referred to Occupational Therapy s/p Acute CVA (cerebrovascular accident) (Formerly [facility]) [I63.9].   Pt participated in skilled OT progress note and following formalized testing as well as clinical observation, Pt presents with the following areas of deficit:  RUE weakness, decreased pinch and  strength, proximal pain, decreased ROM (AROM & PROM), decreased independence with ADLs and IADLs, and decreased endurance. Pt continues to be wearing shoudler subluxation splint with right fit as needed during walking tasks for increased support. She also has been wearing compression glove on R hand for decreased swelling with good compliance to wear schedule with noted improvement in edema. Pt continues to be educated on stroke recovery timeline. Pt will benefit from continued skilled Occupational Therapy services 2-3x/week for 8 more weeks with focus on neuro re-ed, there ex, there act, and ADL/IADL retraining.  Maye Lilly is in agreement with POC.      POC expires Unit limit Auth Expiration date PT/OT/ST + Visit Limit?   01/1/25  N/A 12/4/24 BOMN                           Visit/Unit Tracking  20 Approved- 10/9/24- 12/4/24 Date 10/9 10/11 10/14 10/17 10/22 10/24 10/25 10/28 10/30 10/31 11/5    Used 1 2 3 4 5 6 7 8 9 10 11    Remaining  19 18 17 16 15 14 13 12 11 10 9     Visit/Unit Tracking  20 Approved- 10/9/24- 12/4/24 Date 11/7              Used 12              Remaining  8               Precautions: HTN, GERD, R shoulder pain              GOALS    Short Term Goals (6 weeks)  Strength/Endurance  - Pt will increase R hand functional  with ability to sustain grasp on medium size objects for 5 sec to improve independence with ADLs  PROGRESSING  - Pt will demo with G tolerance to supine, seated, and in stance exercise x 15 minutes with minimal rest breaks required for increased engagement in life roles and weekly exercise regimen PROGRESSING  - Pt will demo with G carryover of HEP to improve functional progression towards goals in POC and for improved functional use of RUE PROGRESSING    AROM/PROM  - Pt will be able to perform 90* degrees shoulder flexion of proximal RUE in supine to demo increased strength and ROM of affected UE for improved ADLs/IADLs PROGRESSING  - Pt will be able to perform full range elbow flexion of distal RUE to demo increased strength and ROM of affected UE for improved ADLs/IADLs PARTIALLY MET  - Pt will demo good carryover of clinic and home pain and swelling reduction strategies for improved AROM initiation with functional reach and grasp with ADL function PROGRESSING    FMC/GMC  - Pt will be able to actively grasp 2 pegs for initiation of 9HPT demo improved FMC and and pinch.PROGRESSING  - Pt will demo improvement with pulling on trousers on Neuro QOL form to with a little difficulty demo improved coordination and precision for FM tasks and ADLsPROGRESSING  - Pt will be able to complete thumb opposition to digits 2-4 demo improved motor control and digit ROM PARTIALLY MET; can oppose to digits 2-3  - Pt will demo improved motor learning of constructional and new motor actions for improved b/l coordination and motor planning evident by 50% accuracy for fxnl ADL/IADL performance PROGRESSING    Functional Performance  - Pt will increase RUE to semi functional (dependent stabilizer, independent stabilizer, gross assist, semi-functional, functional, fully functional) assist with for ADL/IADL and tabletop tasks for improved functional performance of life roles and salient tasks PROGRESSING  - Pt will demo G comprehension of adaptive equipment (long handled reacher/sponge/shoehorn, toileting aid ) use in clinic to improve  independence in ADL management in home environment PROGRESSING    Modalities  - Pt/family will demo G understanding and carryover of use of home NMES unit as prescribed to inc carryover of ROM and strengthening strategies of R UE PROGRESSING      Long Term Goals (12 weeks)  Strength/Endurance  - Pt will be able to successfully complete strength testing demo 2-5 lbs RUE  strength for improved completion of ADLs PROGRESSING  - Pt will demo with G tolerance to supine, seated, and in stance exercise x 25 minutes with minimal rest breaks required for increased engagement in life roles and weekly exercise regimen  PROGRESSING    AROM/PROM  - Pt will be able to perform 90* degrees seated shoulder flexion of proximal RUE to demo increased strength and ROM of affected UE for improved ADLs/IADLs PROGRESSING  - Pt will demo good carryover of clinic and home pain and swelling reduction strategies for improved AROM initiation with functional reach and grasp with ADL function PROGRESSING    FMC/GMC  - Pt will be able to complete 9HPT in < 1 min demo improved FMC and coordination. PROGRESSING  - Pt will demo improvement with pulling on trousers on Neuro QOL form to with no difficulty demo improved coordination and precision for FM tasks and ADLs PROGRESSING  - Pt will be able to complete thumb opposition to all digits demo improved coordination and functional use of RUE.  PROGRESSING  - Pt will demo improved motor learning of constructional and new motor actions for improved b/l coordination and motor planning evident by 75% accuracy for fxnl ADL/IADL performance PROGRESSING    Functional Performance  - Pt will increase RUE to functional (dependent stabilizer, independent stabilizer, gross assist, semi-functional, functional, fully functional) assist with for ADL/IADL and tabletop tasks for improved functional performance of life roles and salient tasks PROGRESSING    Modalities  - Pt will tolerate BIONESS/NMES/IASTM for  "improved motor and sensory performance for overall improved strength, tone reduction, and sensation to inc safety and engagement with ADL/IADL function PROGRESSING      Subjective    PATIENT GOAL: \"Grab something, pull up my pants, wipe myself, and writing/signing my name\"    Patient-Specific Functional Scale   Task is scored 0 (unable to perform activity) to 10 (ability to perform activity independently)    Activity Date: Date:   ADLs     2.  Grasp/use of R hand     3.   ROM      4.   Strength and endurance         HISTORY OF PRESENT ILLNESS:     Pt is a 64 y.o. female who was referred to Occupational Therapy s/p Acute CVA (cerebrovascular accident) ([facility]) [I63.9].  Pt reports that the day her stroke happened she was at work. She felt that the ground was moving but thought it could have been due to her forgetting her glasses's and straining her eyes. She also ended up throwing up multiple times and left work. When she got home she went to sleep. The next morning she still was not feeling right, called out of work and drove herself to the hospital because she felt weak overall. She continued to feel weak in the hospital but was still able to move her UE and LE. She then was sent for an MRI, and when she came back to get into bed is when she realized she was unable to and no longer had function of her R upper and lower extremity. She then spent 3 weeks at the [facility] and then 3 weeks with home care. She does not feel difficulty with sensation but rather just weak and heavy. Pt wears a shoulder subluxation splint for comfort but due to large size feels it is not doing much. At this time she is on medical leave from work as a . She currently does not drive. She lives with her daughter and her son occasionally comes in and out. Her daughter works full time and so does her son but he works from home. Pt reports that she does not have difficulty with bed mobility or toileting when using the commode. She is able to don " "and doff her pants off her hips but will sometimes need help. If she is alone, she has a dressing stick to help pull them up on her R side. She reports that she bathroom and shower are very small, and for the time being is showering at a friends house with a walk in/ADA shower due to a past family member who was using it. She will only use her bathroom toilet when someone is home to help her with hygiene due to small space, otherwise will use commode. She occasionally needs assistance with UB dressing for shirts but wears sun dresses at home that she reports are not difficult.     Per chart review  on 24, \"Ms. Maye Lilly is a 65 yo woman with a past medical history notable for HTN and atrial fibrillation who presented to the [facility] on 8/10/24 with acute dysarthria, facial droop and right hemiparesis. A stroke alert was activated, CT head wo contrast was negative for acute findings, CTA of the head/neck showed no evidence of large vessel occlusion and patent cervical carotid and vertebral arteries. MRI of the brain revealed an acute left pontine infarct. Course complicated by severe hypertension requiring multiple agents to obtain control.   - Acute left pontine ischemic infarct: Clinically presented on 8/10/24 with right hemiparesis, dysarthria and facial droop, Stroke etiology: HTN, small vessel disease, MR brain on 24 revealed a small area of acute/subacute ischemia within the central luz to the left of midline without acute hemorrhage\"      PMH:   Past Medical History:   Diagnosis Date    Atrial fibrillation (HCC)     Disease of thyroid gland     Hypertension          Pain Levels   Restin    With Activity:  0      Objective    Impairment Observations:            DANIEL COLLINS Comments           UPPER EXTREMITY FUNCTION   Impaired Intact Dominant Hand: R     /PINCH STRENGTH             Dynamometer    - Gross Grasp unable 40 lbs low   Pinch Meter     - Pincer Unable  10 lbs low    - Tripod Unable " 12 lbs low    - Lateral 2lbs   15 lbs low  R improved     AROM (Seated)             Scapula   - Retraction/Protraction  - Elevation/Depression  - Upward/Downward rotation      Shoulder FF 45* WFL  Improved   Shoulder Ext 50* WFL  Improved   Shoulder internal rotation  3/4  Improved   Shoulder external rotation  1/4  Improved   Shoulder Abd 65* WFL  Improved   Shoulder Add 3/4 WFL  Improved   Horizontal Abd   WFL     Horizontal Add   WFL     Elbow Flex 3/4 WFL  Improved   Elbow Ext Full   WFL     Pronation Full  WFL  Resting position   Supination Near Full  WFL  Improved   Wrist Flex 1/2   WFL  Improved   Wrist Ext Neutral   WFL     Digit Flex 1/2  WFL  Improved   Digit Ex Near Full  WFL     Composite Grasp 1/2 WFL  Remained   Hook Grasp   WFL     Subluxation  Pain    Slight but improved     AROM (Supine)            Shoulder FF 15*       Shoulder Ext  Near full      Shoulder ABD 75*       Shoulder ADD        Horizontal ABD        Horizontal ADD        Elbow Flex  3/4      Elbow Ext Full       Pronation Full    Resting position   Supination 1/2       Wrist Flex        Wrist Ext        Digit Flex        Digit Ext          PROM (Supine position)           Shoulder FF 90*   Pain   Shoulder Ext Full      Shoulder ABD 90*      Shoulder ADD Full       Horizontal ABD      Horizontal ADD        Elbow Flex 3/4       Elbow Ext Full       Wrist Flex Near Full       Wrist Ext Near Full        Digit Flex 3/4    Slight swelling but improved   Digit Ext Full     Supination 3/4     Pronation Full     Resting positin      MMT             Shoulder FF 2+/5 5/5    Shoulder Ext 2+/5 5/5    Shoulder Abduction 2+/5 5/5    Shoulder Adduction 2+/5 5/5    Elbow Flex 2+/5 5/5    Elbow Ext 2+/5 5/5    Wrist Flex 2+/5 5/5    Wrist Ext 2+/5 5/5    Gross Grasp 2+/5 5/5      SENSATION      Monofilament Testing  Normal: 2.83 mm    Diminished light touch: 3.61 mm    Diminished protective sensation: 4.31 mm    Loss of protective sensation:  4.56    Complete loss of sensation / deep pressure: 6.65 DNT DNT    Sharp / Dull       Proprioception      Hot/Cold Temp      Stereognosis         COORDINATION      Opposition Able to touch thumb to index and middle on lateral aspect  WFL    Finger to Nose Unable WFL    Rapid Alternating Movement Unable WFL    9 Hole Peg Test Unable 24 seconds abnormal   Fxnl Dexterity Test Unable  26 seconds abnormal   Michael Hand Function Test         - Handwriting  seconds  seconds      TONE: MODIFIED CINDY SCALE      No increase in muscle tone (0) Biceps, triceps, wrist flexors/extensors, and digits flex/ext     Slight Increase in muscle tone with catch and release or min resist at end range (1)      Slight Increase in muscle tone with catch and release, followed by min resistance through remainder of range (1+)      Increased muscle tone through full range, able to be moved easily (2)      Considerable increase in tone, difficult to move (3)      Rigid in Flexion/Extension (4)                ADL Simulation- 11/7/24 DNT   Donning t-shirt/jacket-    seconds with  assist         Neuro QOL  Upper Extremity Function (Fine motor, ADL):     Are you able to turn a key in a lock? With a little difficulty  Are you able to brush your teeth? Without any difficulty  Are you able to make a phone call using a touch tone or key-pad? Without any difficulty  Are you able to  coins from a table top? Without any difficulty  Are you are able to write with a pen or pencil With much difficulty  Are you able to open and close a zipper? Unable to do  Are you able to wash and dry your body? With a little difficulty  Are you able to shampoo your hair? Without any difficulty  Are you able to open previously opened jars? With some difficulty  Are you able to hold a plate full of food? With a little difficulty  Are you able to pull on trousers? With some difficulty  Are you able to button your shirt? With much difficulty  Are you able to trim your  fingernails? Unable to do  Are you able to cut your toe nails? Unable to do  Are you able to bend down and  clothing from the floor? With some difficulty  How much DIFFICULTY do you currently have using a spoon to eat a meal? No difficulty  How much DIFFICULTY do you currently have putting on a pullover shirt? A little difficulty  How much DIFFICULTY do you currently have taking off a pullover shirt? Some difficulty  How much DIFFICULTY do you currently have removing wrappings from small objects? A little difficulty  How much DIFFUCULTY do you currently have opening medications or vitamin containers (e.g. childproof containers, small bottles)? Can't do      OTHER PLANNED THERAPY INTERVENTIONS:   Supine, seated, and in stance neuro re-ed  Task-Oriented Training  Tricep AG  NMES/FES  Cryotherapy for tone reduction  Hot Packs for pain  TENS for pain  FMC/prehension  GMC  Proximal to distal teaming  Timed Trials  Manual tx  IASTM  Hand to target  Sensory re-ed (Cutaneous/Proprioceptive)  Seated functional reach: crossing midline  Supine place and hold  WBearing strategies   Closed chain activities  Open chain activities  Mirror Therapy  HEP  Orthotic Development    Todays Treatment: Pt supine on mat with activity focusing on AROM of RUE proximally and distally. Pt reaching to grasp bean bag with FF, and then reach across body for shoulder adduction to give to L hand. Pt with moderate drops due to poor sustained grasp. Therapist then providing stabilization at elbow for reaching and able to sustain grasp more actively. Transitioned to Pt seated ROM with reaching in all planes to grasp and give to L hand. Pt able to achieve 45* AROM FF in all planes and full range elbow extension when reaching. Rest breaks taken as needed.

## 2024-11-08 ENCOUNTER — OFFICE VISIT (OUTPATIENT)
Dept: OCCUPATIONAL THERAPY | Facility: CLINIC | Age: 64
End: 2024-11-08
Payer: COMMERCIAL

## 2024-11-08 ENCOUNTER — OFFICE VISIT (OUTPATIENT)
Dept: PHYSICAL THERAPY | Facility: CLINIC | Age: 64
End: 2024-11-08
Payer: COMMERCIAL

## 2024-11-08 DIAGNOSIS — I63.9 ACUTE CVA (CEREBROVASCULAR ACCIDENT) (HCC): Primary | ICD-10-CM

## 2024-11-08 DIAGNOSIS — I63.9 LEFT PONTINE CVA (HCC): ICD-10-CM

## 2024-11-08 DIAGNOSIS — R26.89 BALANCE DISORDER: ICD-10-CM

## 2024-11-08 DIAGNOSIS — R26.9 NEUROLOGIC GAIT DISORDER: Primary | ICD-10-CM

## 2024-11-08 PROCEDURE — 97110 THERAPEUTIC EXERCISES: CPT

## 2024-11-08 PROCEDURE — 97112 NEUROMUSCULAR REEDUCATION: CPT

## 2024-11-08 NOTE — PROGRESS NOTES
"Daily Note     Today's date: 2024  Patient name: Maye Lilly  : 1960  MRN: [id number]  Referring provider: Megan Portillo PA-C  Dx:   Encounter Diagnosis     ICD-10-CM    1. 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 Balance disorder  R26.89       3. Left pontine CVA (HCC)  I63.9                      Subjective: Demario states that she is doing ok today. Not 100% confident with the SPC. She finds that her L shoulder is bothering her a bit more than normal--just achy.      Objective: See treatment diary below      Assessment: Demario tolerated treatment well. Unable to complete treadmill at faster speed of 0.7 mph today for more than 2 minutes due to UE and overall fatigue. She was able to complete step up and overs with R LE leading, supporting improved concentric strength in R LE. She did have 1 instance of controlling the eccentric phase with her R LE and will continue to progress this within session. Minor R hip circumduction movement was noted to fully clear and accurately place her R foot on the 4\" step; however, improved with repetition. Plan to add 5# ankle weight to step taps next session.       Plan: Continue per plan of care.  Progress treatment as tolerated.       Short Term Goal Expiration Date: 24  Long Term Goal Expiration Date: 25  POC Expiration Date: 25        POC expires Unit limit Auth Expiration date PT/OT/ST + Visit Limit?   25                                             Visit/Unit Tracking  AUTH Status:  Date 10/17 10/22 10/24 10/25 10/28  10/30  10/31  11/5  11/7  11/8     16 Used 5 6 7 8 9  10  11  12  13  14       Remaining  11 10 9 8 7  6  5  4  3  2           Specialty Daily Treatment Diary  Precautions: Fall risk, past Hx R knee arthritis (can be stiff and swollen at the start of the day)      Manuals 24                                           Neuro Re-Ed  ABC         HIGT  SOLO    TM with BUE support  0.5 mph x5 " "minutes  Seated rest break  0.5 mph x1 min  0.7 mph x2 min  0.5 mph x2 min    TM total 10 min    Step ups with L hemiwalker  Up and over with R LE leading ascending and descending to 4\" step x10 reps    SOLO    TM with BUE support 0.5mph x 7 minutes  Seated rest break  0.7mph x 5 minutes    Amb without AD 1/4 lap x 2 laps (seated rest break between)    Cane step overs (4) with L SPC x 2 reps, alternating leading leg    Ascending TM forward with single UE support, descending backwards with single UE support    hurdles          step taps // bars  L UE  WB L LE, stepping R LE with physical constraint for circumduction  4\" step  2x20 fwd    HEP Solo with L hemiwalking    X10 ea LE for R foot clearance & R stance phase                                               Ther Ex                                                                                         Ther Activity                             Gait Training          Indoors Objective measure testing                  Modalities                                                      "

## 2024-11-08 NOTE — PROGRESS NOTES
"Occupational Therapy Daily Note:    Today's date: 2024  Patient name: Maye Lilly  : 1960  MRN: [id number]  Referring provider: Megan Portillo PA-C  Dx:   Encounter Diagnosis   Name Primary?    Acute CVA (cerebrovascular accident) (HCC) Yes        POC expires Unit limit Auth Expiration date PT/OT/ST + Visit Limit?   25  N/A 24 BOMN                           Visit/Unit Tracking  20 Approved- 10/9/24- 24 Date 10/9 10/11 10/14 10/17 10/22 10/24 10/25 10/28 10/30 10/31 11/5    Used 1 2 3 4 5 6 7 8 9 10 11    Remaining  19 18 17 16 15 14 13 12 11 10 9     Visit/Unit Tracking  20 Approved- 10/9/24- 24 Date              Used 12 13             Remaining  8 7              Precautions: HTN, GERD, R shoulder pain   Access Code: [email or web address]        Subjective: \"I dont feel too sore from yesterday but it was good\"    Objective: See treatment below.     Neuro Re-ed: Pt supine on mat with e-stim applied to RUE infra/supraspinatus and delt for increases shoulder support and ROM. With saebo glide, pt grasping and reaching into FF. Pt required support at elbow for AROM and holding of saebo glide pole up higher for movement. Pt completed AAROM 10x and then AROM 5x3 with focus on elbow extension for support of UE with fair movement and decreased body compensation. Transitioned to seated EOM with saebo glide for AROM. Pt with F  on glide able to achieve ~60* FF.    There ex: Pt educated on and worked through HEP with Yellow theraputty for increased  and digit flexion/extension with R hand. Pt good understanding for directions and to complete at home.  Access Code: [email or web address]  URL: [email or web address]  Date: 2024  Prepared by: Shahana Mendez    Exercises  - Thumb Opposition with Putty  - 1 x daily - 7 x weekly - 3 sets - 10 reps  - Rolling Putty on Table  - 1 x daily - 7 x weekly - 3 sets - 10 reps  - Putty Squeezes  - 1 x daily - 7 x weekly - 3 sets - 10 " reps    Assessment: Tolerated treatment well. Pt to continue stretching and movement at home as much as able. Patient would benefit from continued skilled OT.    Plan: Continued skilled OT per POC

## 2024-11-12 ENCOUNTER — OFFICE VISIT (OUTPATIENT)
Dept: OCCUPATIONAL THERAPY | Facility: CLINIC | Age: 64
End: 2024-11-12
Payer: COMMERCIAL

## 2024-11-12 ENCOUNTER — OFFICE VISIT (OUTPATIENT)
Dept: PHYSICAL THERAPY | Facility: CLINIC | Age: 64
End: 2024-11-12
Payer: COMMERCIAL

## 2024-11-12 DIAGNOSIS — R26.89 BALANCE DISORDER: ICD-10-CM

## 2024-11-12 DIAGNOSIS — R26.9 NEUROLOGIC GAIT DISORDER: Primary | ICD-10-CM

## 2024-11-12 DIAGNOSIS — I63.9 ACUTE CVA (CEREBROVASCULAR ACCIDENT) (HCC): Primary | ICD-10-CM

## 2024-11-12 DIAGNOSIS — I63.9 LEFT PONTINE CVA (HCC): ICD-10-CM

## 2024-11-12 PROCEDURE — 97112 NEUROMUSCULAR REEDUCATION: CPT

## 2024-11-12 NOTE — PROGRESS NOTES
"Occupational Therapy Daily Note:    Today's date: 2024  Patient name: Maye Lilly  : 1960  MRN: [id number]  Referring provider: Megan Portillo PA-C  Dx:   Encounter Diagnosis   Name Primary?    Acute CVA (cerebrovascular accident) (HCC) Yes       Start Time: 1145  Stop Time: 1230  Total time in clinic (min): 45 minutes    POC expires Unit limit Auth Expiration date PT/OT/ST + Visit Limit?   25  N/A 24 BOMN                           Visit/Unit Tracking  20 Approved- 10/9/24- 24 Date 10/9 10/11 10/14 10/17 10/22 10/24 10/25 10/28 10/30 10/31 11/5    Used 1 2 3 4 5 6 7 8 9 10 11    Remaining  19 18 17 16 15 14 13 12 11 10 9     Visit/Unit Tracking  20 Approved- 10/9/24- 24 Date             Used 12 13 14            Remaining  8 7 6             Duration in weeks: 12 weeks, 2-3x  Plan of care beginning date: 10/9/24  Plan of care expiration date: 25   PN #1 due: 24    Precautions: HTN, GERD, R shoulder pain       Subjective: \"I can feel this arm getting stronger, the muscle is working\".     Objective: See treatment below.  Session focusing on NMRE, proximal strength/stability, coordination, body awareness, intrinsic strengthening and overall activity endurance.      Neuro Re-ed: Supine on mat, pt tolerated NMES w/pad placement to infra/supra spinatus and ant/middle delt focusing on sublux reduction and muscle for proximal stabilization, tone reduction and FF. AROM to >1/4 range FF x10, AAROM to >1/2 end range progressing with passive stretch w/resistance provided descending into shoulder extension completing 15 reps.  Supine on mat using 3# tbar pt completed 10 reps in FF demo F proximal stability and  strength.     In side-lying pt participated in retrieval/placement of foam disc progressing through fxl movement pattern front>back facilitating IR w/fxl reach for posterior disc placement clearing hip>50 % of time. Pt utilized spherical grasp during foam " disc retrieval.      Assessment: Tolerated treatment well. Pt reported minimal pain during OT session. Improvement noted in lat activation as noted in scapular push ups.  Patient would benefit from continued skilled OT.    Plan: Continued skilled OT per POC

## 2024-11-12 NOTE — PROGRESS NOTES
Daily Note     Today's date: 2024  Patient name: Maye Lilly  : 1960  MRN: [id number]  Referring provider: Megan Portillo PA-C  Dx:   Encounter Diagnosis     ICD-10-CM    1. 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 Balance disorder  R26.89       3. Left pontine CVA (HCC)  I63.9                      Subjective: Demario states that she is doing alright today. Nothing new to report.      Objective: See treatment diary below      Assessment: Demario tolerated treatment well. Ankle weight was added to treadmill to increase proprioceptive input and muscular work load. She was able to maintain good step height with ankle weight but shorter intervals more so due to shoulder fatigue. Weight was increased for step taps with more difficulty achieving clearance and only 1 instance of catching her foot on the edge upon returning it to the ground. She would benefit from continued skilled PT to progress her mobility and strength.      Plan: Continue per plan of care.  Progress treatment as tolerated.       Short Term Goal Expiration Date: 24  Long Term Goal Expiration Date: 25  POC Expiration Date: 25        POC expires Unit limit Auth Expiration date PT/OT/ST + Visit Limit?   25                                             Visit/Unit Tracking  AUTH Status:  Date 10/17 10/22 10/24 10/25 10/28  10/30  10/31  11/5  11/7  11/8  11/12   16 Used 5 6 7 8 9  10  11  12  13 PN  14  15     Remaining  11 10 9 8 7  6  5  4  3  2  1         Specialty Daily Treatment Diary  Precautions: Fall risk, past Hx R knee arthritis (can be stiff and swollen at the start of the day)      Manuals 24                                           Neuro Re-Ed  ABC         HIGT  SOLO    TM with BUE support  0.5 mph x5 minutes  Seated rest break  0.5 mph x1 min  0.7 mph x2 min  0.5 mph x2 min    TM total 10 min    Step ups with L hemiwalker  Up and over with R LE leading ascending and descending  "to 4\" step x10 reps SOLO    TM with BUE support 3# R AW  0.5 mph x4 min  Seated rest break (due to shoulder fatigue)  0.5 mph x4 min  Seated rest break  0.5 mph x2 min      1/4 length with no AD  5# R AW  2X2 laps  No AW  X2 laps         SOLO    TM with BUE support 0.5mph x 7 minutes  Seated rest break  0.7mph x 5 minutes    Amb without AD 1/4 lap x 2 laps (seated rest break between)    Cane step overs (4) with L SPC x 2 reps, alternating leading leg    Ascending TM forward with single UE support, descending backwards with single UE support    hurdles          step taps // bars  L UE  WB L LE, stepping R LE with physical constraint for circumduction  4\" step  2x20 fwd    HEP Solo with L hemiwalking    X10 ea LE for R foot clearance & R stance phase Solo with L SPC  5# R AW    2x10 ea LE for R foot clearance & R stance phase                                              Ther Ex                                                                                         Ther Activity                             Gait Training          Indoors Objective measure testing                  Modalities                                                        "

## 2024-11-13 ENCOUNTER — OFFICE VISIT (OUTPATIENT)
Dept: OCCUPATIONAL THERAPY | Facility: CLINIC | Age: 64
End: 2024-11-13
Payer: COMMERCIAL

## 2024-11-13 ENCOUNTER — OFFICE VISIT (OUTPATIENT)
Dept: PHYSICAL THERAPY | Facility: CLINIC | Age: 64
End: 2024-11-13
Payer: COMMERCIAL

## 2024-11-13 DIAGNOSIS — R26.89 BALANCE DISORDER: ICD-10-CM

## 2024-11-13 DIAGNOSIS — I63.9 ACUTE CVA (CEREBROVASCULAR ACCIDENT) (HCC): Primary | ICD-10-CM

## 2024-11-13 DIAGNOSIS — I63.9 LEFT PONTINE CVA (HCC): ICD-10-CM

## 2024-11-13 DIAGNOSIS — R26.9 NEUROLOGIC GAIT DISORDER: Primary | ICD-10-CM

## 2024-11-13 PROCEDURE — 97110 THERAPEUTIC EXERCISES: CPT

## 2024-11-13 PROCEDURE — 97112 NEUROMUSCULAR REEDUCATION: CPT

## 2024-11-13 PROCEDURE — 97530 THERAPEUTIC ACTIVITIES: CPT

## 2024-11-13 NOTE — PROGRESS NOTES
"Occupational Therapy Daily Note:    Today's date: 2024  Patient name: Maye Lilly  : 1960  MRN: [id number]  Referring provider: Megan Portillo PA-C  Dx:   Encounter Diagnosis   Name Primary?    Acute CVA (cerebrovascular accident) (HCC) Yes       Start Time: 1046  Stop Time: 1145  Total time in clinic (min): 59 minutes    POC expires Unit limit Auth Expiration date PT/OT/ST + Visit Limit?   25  N/A 24 BOMN                           Visit/Unit Tracking  20 Approved- 10/9/24- 24 Date 10/9 10/11 10/14 10/17 10/22 10/24 10/25 10/28 10/30 10/31 11    Used 1 2 3 4 5 6 7 8 9 10 11    Remaining  19 18 17 16 15 14 13 12 11 10 9     Visit/Unit Tracking  20 Approved- 10/9/24- 24 Date            Used 12 13 14 15           Remaining  8 7 6 5            Duration in weeks: 12 weeks, 2-3x  Plan of care beginning date: 10/9/24  Plan of care expiration date: 25   PN #1 due: 24    Precautions: HTN, GERD, R shoulder pain       Subjective: \"Im mostly using my right arm and just helping a little with my left.  I think that's good for strengthening\".     Objective: See treatment below.  Session focusing on proximal strength/stability, coordination, open/closed hand, body awareness, intrinsic strengthening and overall activity endurance improving safety and indep in ADL/IADL mngt.      Neuro Re-ed: Supine on mat, pt tolerated NMES w/pad placement to infra/supra spinatus and ant/middle delt focusing on sublux reduction and muscle for proximal stabilization, tone reduction and FF. AROM to >1/4 range FF x10, AAROM to >1/2 end range progressing with passive stretch w/resistance provided descending into shoulder extension completing 15 reps, pt then competed 15 shoulder push ups, AROM.  Elbow flex/ext and wrist flex/ext, x15.      Thera Ex: Pt tolerated electrical stimulation with pad placement to infra/supra spinatus and ant/middle delt while at same time pt engaged in " UBE x 5 min prograde x 5 min retrograde at w/o resist focusing on increasing proximal RUE strength, cardiopulmonary endurance and sustained grasp.  Pt held on loosely with left hand facilitating movement with RUE.      Theract: Seated EOM pt participated in foam block placement with targeted demands to vertical dowels positioned midline of pt.  Pt retrieved foam discs in all planes facilitating elbow flex/ext and fxl fwd reaching.  Droppage <25% of time.      Assessment: Tolerated treatment well. Patient would benefit from continued skilled OT.    Plan: Continued skilled OT per POC

## 2024-11-13 NOTE — PROGRESS NOTES
Daily Note     Today's date: 2024  Patient name: Maye Lilly  : 1960  MRN: [id number]  Referring provider: Megan Portillo PA-C  Dx:   Encounter Diagnosis     ICD-10-CM    1. 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 Balance disorder  R26.89       3. Left pontine CVA (HCC)  I63.9           Start Time: 1145  Stop Time: 1230  Total time in clinic (min): 45 minutes    Subjective: She is doing well. No new falls. Continuing to use cane.       Objective: See treatment diary below      Assessment: Tolerated treatment well. Continued per primary therapist POC. Emphasis on reduction of UE support overground amb. Trying to build endurance, fluidity of step pattern in overground. Patient requested to defer treadmill. Multidirectional stepping with reduced UE support during today's session. Patient demonstrated fatigue post treatment, exhibited good technique with therapeutic exercises, and would benefit from continued PT      Plan: Continue per plan of care.  Progress treatment as tolerated.       Short Term Goal Expiration Date: 24  Long Term Goal Expiration Date: 25  POC Expiration Date: 25        POC expires Unit limit Auth Expiration date PT/OT/ST + Visit Limit?   25                                             Visit/Unit Tracking  AUTH Status:  Date 10/17 10/22 10/24 10/25 10/28  10/30  10/31  11/5  11/7  11/8  11/12   16 Used 5 6 7 8 9  10  11  12  13 PN  14  15     Remaining  11 10 9 8 7  6  5  4  3  2  1         Specialty Daily Treatment Diary  Precautions: Fall risk, past Hx R knee arthritis (can be stiff and swollen at the start of the day)      Manuals 24                                           Neuro Re-Ed  ABC         HIGT  SOLO    TM with BUE support  0.5 mph x5 minutes  Seated rest break  0.5 mph x1 min  0.7 mph x2 min  0.5 mph x2 min    TM total 10 min    Step ups with L hemiwalker  Up and over with R LE leading ascending and  "descending to 4\" step x10 reps SOLO    TM with BUE support 3# R AW  0.5 mph x4 min  Seated rest break (due to shoulder fatigue)  0.5 mph x4 min  Seated rest break  0.5 mph x2 min      1/4 length with no AD  5# R AW  2X2 laps  No AW  X2 laps       SOLO 1/4 length     L SPC x2 laps    No AD 2x2 laps     No A 2x2 laps #5 RAW     Fwd/bwd amb 2x2 laps     Lat no AD (HHA available if needed) x2 laps       SOLO    TM with BUE support 0.5mph x 7 minutes  Seated rest break  0.7mph x 5 minutes    Amb without AD 1/4 lap x 2 laps (seated rest break between)    Cane step overs (4) with L SPC x 2 reps, alternating leading leg    Ascending TM forward with single UE support, descending backwards with single UE support    hurdles          step taps // bars  L UE  WB L LE, stepping R LE with physical constraint for circumduction  4\" step  2x20 fwd    HEP Solo with L hemiwalking    X10 ea LE for R foot clearance & R stance phase Solo with L SPC  5# R AW    2x10 ea LE for R foot clearance & R stance phase //bars 2x10 ea LE     Step up and over 2x10 L up //bar                                              Ther Ex                                                                                         Ther Activity                             Gait Training          Indoors Objective measure testing                  Modalities                                                          "

## 2024-11-15 ENCOUNTER — OFFICE VISIT (OUTPATIENT)
Dept: PHYSICAL THERAPY | Facility: CLINIC | Age: 64
End: 2024-11-15
Payer: COMMERCIAL

## 2024-11-15 ENCOUNTER — OFFICE VISIT (OUTPATIENT)
Dept: OCCUPATIONAL THERAPY | Facility: CLINIC | Age: 64
End: 2024-11-15
Payer: COMMERCIAL

## 2024-11-15 DIAGNOSIS — I63.9 LEFT PONTINE CVA (HCC): ICD-10-CM

## 2024-11-15 DIAGNOSIS — I63.9 ACUTE CVA (CEREBROVASCULAR ACCIDENT) (HCC): Primary | ICD-10-CM

## 2024-11-15 DIAGNOSIS — R26.9 NEUROLOGIC GAIT DISORDER: Primary | ICD-10-CM

## 2024-11-15 DIAGNOSIS — R26.89 BALANCE DISORDER: ICD-10-CM

## 2024-11-15 PROCEDURE — 97110 THERAPEUTIC EXERCISES: CPT

## 2024-11-15 PROCEDURE — 97112 NEUROMUSCULAR REEDUCATION: CPT

## 2024-11-15 NOTE — PROGRESS NOTES
"Occupational Therapy Daily Note:    Today's date: 11/15/2024  Patient name: Maye Lilly  : 1960  MRN: [id number]  Referring provider: Megan Portillo PA-C  Dx:   Encounter Diagnosis   Name Primary?    Acute CVA (cerebrovascular accident) (HCC) Yes       Start Time: 1020  Stop Time: 1100  Total time in clinic (min): 40 minutes    POC expires Unit limit Auth Expiration date PT/OT/ST + Visit Limit?   25  N/A 24 BOMN                           Visit/Unit Tracking  20 Approved- 10/9/24- 24 Date 10/9 10/11 10/14 10/17 10/22 10/24 10/25 10/28 10/30 10/31 11/5    Used 1 2 3 4 5 6 7 8 9 10 11    Remaining  19 18 17 16 15 14 13 12 11 10 9     Visit/Unit Tracking  20 Approved- 10/9/24- 24 Date 11/7 11/8 11/12 11/13 11/15          Used 12 13 14 15 16          Remaining  8 7 6 5 4           Duration in weeks: 12 weeks, 2-3x  Plan of care beginning date: 10/9/24  Plan of care expiration date: 25   PN #1 due: 24    Precautions: HTN, GERD, R shoulder pain      Subjective: :I just can't go back much farther without the pain\".     Objective: See treatment below.  Session focusing on proximal strength/stability, coordination, open/closed hand, pinch , body awareness, intrinsic strengthening and overall activity endurance improving safety and indep in ADL/IADL mngt.      Neuro Re-ed: Supine on mat, pt tolerated NMES w/pad placement to infra/supra spinatus and ant/middle delt focusing on sublux reduction and muscle activation for proximal stabilization/strength, tone reduction w/FF AAROM to >1/4 range shoulder flexion x10, AAROM to >1/2 end range progressing with passive stretch w/resistance, shoulder adduction w/2 reps shoulder pushups provided descending into shoulder extension completing 10 reps.         Pt engaged in bead stringing activity implementing B integration stabilizing lace in left hand, retrieving and threading large wooden beads with right hand, pt then stabilized lace with " right pincer grasp to allow large beads to move down lace.  Pt able to retreive and stabilize beads b/t digits w/o droppage.     Thera Ex: Pt completed 20 chest press using 10# Tbar w/o stabilization provided to RUE during active movement.      Assessment: Tolerated treatment well. Pt continues to progress in motor relearning, strengthening RUE increasing engagement in fxl reaching, grasping and pinching.  Patient would benefit from continued skilled OT.    Plan: Continued skilled OT per POC

## 2024-11-15 NOTE — PROGRESS NOTES
Daily Note     Today's date: 11/15/2024  Patient name: Maye Lilly  : 1960  MRN: [id number]  Referring provider: Megan Portillo PA-C  Dx:   Encounter Diagnosis     ICD-10-CM    1. 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 Balance disorder  R26.89       3. Left pontine CVA (HCC)  I63.9           Start Time: 1104  Stop Time: 1145  Total time in clinic (min): 41 minutes    Subjective: Reports taht she is doing okay. Doing her exercises while she is at home. Feeling like she is improving slowly but steadily.       Objective: See treatment diary below      Assessment: Tolerated treatment well. Continued per primary therapist POC. Intermittent difficulties with step up and overs only using SPC. Using timed laps to try and increase gait speed with various levels of support in addition to trying to drive up RPE levels. RPE >6 throughout work periods. Continues to have confidence troubles with obstacles that are raised above ground. Requested to do Nu-Step end of session. Patient demonstrated fatigue post treatment, exhibited good technique with therapeutic exercises, and would benefit from continued PT      Plan: Continue per plan of care.  Progress treatment as tolerated.       Short Term Goal Expiration Date: 24  Long Term Goal Expiration Date: 25  POC Expiration Date: 25        POC expires Unit limit Auth Expiration date PT/OT/ST + Visit Limit?   25                                             Visit/Unit Tracking  AUTH Status:  Date 11/15              16 (11/15-25) Used 1                Remaining  15                    Specialty Daily Treatment Diary  Precautions: Fall risk, past Hx R knee arthritis (can be stiff and swollen at the start of the day)      Manuals 11/7/24 11/8 11/12 11/13 11/15 11/5                                           Neuro Re-Ed  ABC         HIGT  SOLO    TM with BUE support  0.5 mph x5 minutes  Seated rest break  0.5 mph x1 min  0.7 mph x2 min  0.5  "mph x2 min    TM total 10 min    Step ups with L hemiwalker  Up and over with R LE leading ascending and descending to 4\" step x10 reps SOLO    TM with BUE support 3# R AW  0.5 mph x4 min  Seated rest break (due to shoulder fatigue)  0.5 mph x4 min  Seated rest break  0.5 mph x2 min      1/4 length with no AD  5# R AW  2X2 laps  No AW  X2 laps       SOLO 1/4 length     L SPC x2 laps    No AD 2x2 laps     No A 2x2 laps #5 RAW     Fwd/bwd amb 2x2 laps     Lat no AD (HHA available if needed) x2 laps      SOLO 1/4 length     L SPC x2 laps timed (30.7 seconds best)     No AD 2x2 laps timed (38 best)     L SPC #5 RAW x2 laps (31.3 sec best)     L SPC #5 flat hurdles 2x laps    No AD flat hurdles 1x lap   SOLO    TM with BUE support 0.5mph x 7 minutes  Seated rest break  0.7mph x 5 minutes    Amb without AD 1/4 lap x 2 laps (seated rest break between)    Cane step overs (4) with L SPC x 2 reps, alternating leading leg    Ascending TM forward with single UE support, descending backwards with single UE support    hurdles          step taps // bars  L UE  WB L LE, stepping R LE with physical constraint for circumduction  4\" step  2x20 fwd    HEP Solo with L hemiwalking    X10 ea LE for R foot clearance & R stance phase Solo with L SPC  5# R AW    2x10 ea LE for R foot clearance & R stance phase //bars 2x10 ea LE     Step up and over 2x10 L up //bar  //bars 2x10 ea LE     Step up and over 2x10 L up //bar                                             Ther Ex          Nu step      5' L7 SPM > 60                                                                           Ther Activity                             Gait Training          Indoors Objective measure testing                  Modalities                                                            "

## 2024-11-19 ENCOUNTER — OFFICE VISIT (OUTPATIENT)
Dept: OCCUPATIONAL THERAPY | Facility: CLINIC | Age: 64
End: 2024-11-19
Payer: COMMERCIAL

## 2024-11-19 ENCOUNTER — OFFICE VISIT (OUTPATIENT)
Dept: PHYSICAL THERAPY | Facility: CLINIC | Age: 64
End: 2024-11-19
Payer: COMMERCIAL

## 2024-11-19 DIAGNOSIS — I63.9 ACUTE CVA (CEREBROVASCULAR ACCIDENT) (HCC): Primary | ICD-10-CM

## 2024-11-19 DIAGNOSIS — R26.89 BALANCE DISORDER: ICD-10-CM

## 2024-11-19 DIAGNOSIS — I63.9 LEFT PONTINE CVA (HCC): ICD-10-CM

## 2024-11-19 DIAGNOSIS — R26.9 NEUROLOGIC GAIT DISORDER: Primary | ICD-10-CM

## 2024-11-19 PROCEDURE — 97112 NEUROMUSCULAR REEDUCATION: CPT

## 2024-11-19 PROCEDURE — 97110 THERAPEUTIC EXERCISES: CPT

## 2024-11-19 NOTE — PROGRESS NOTES
"Occupational Therapy Daily Note:    Today's date: 2024  Patient name: Maye Lilly  : 1960  MRN: [id number]  Referring provider: Megan Portillo PA-C  Dx:   Encounter Diagnosis   Name Primary?    Acute CVA (cerebrovascular accident) (HCC) Yes       Start Time: 1100  Stop Time: 1200  Total time in clinic (min): 60 minutes    POC expires Unit limit Auth Expiration date PT/OT/ST + Visit Limit?   25  N/A 24 BOMN                           Visit/Unit Tracking  20 Approved- 10/9/24- 24 Date 10/9 10/11 10/14 10/17 10/22 10/24 10/25 10/28 10/30 10/31 11/5    Used 1 2 3 4 5 6 7 8 9 10 11    Remaining  19 18 17 16 15 14 13 12 11 10 9     Visit/Unit Tracking  20 Approved- 10/9/24- 24 Date 11/7 11/8 11/12 11/13 11/15 11/19         Used 12 13 14 15 16 17         Remaining  8 7 6 5 4 3          Duration in weeks: 12 weeks, 2-3x  Plan of care beginning date: 10/9/24  Plan of care expiration date: 25   PN #1 due: 24    Precautions: HTN, GERD, R shoulder pain      Subjective: \"The stretching you do really helps.  I can feel my arm getting stronger\".     Objective: See treatment below.     Neuro Re-ed: Supine on mat, pt tolerated NMES w/pad placement to infra/supra spinatus and ant/middle delt focusing on sublux reduction and muscle activation for proximal stabilization/strength, tone reduction w/FF AROM to <1/2 shoulder flexion x15,  progressing with passive stretch stabilized >1/2 end range, shoulder pushups x10 w/stabilization to hand and elbow, resistance provided descending into shoulder extension completing 10 reps.       Seated EOM with stim placement same as above, pt retrieved foam blocks using tongs and placing to container positioned on ipsilateral side.  Pt noted with droppage due to decreased coordination, container elevated on 4\" step improving target accuracy.      Thera Ex: Pt completed 15 reps of FF with 5 sec place and hold (5 reps w/ 4# tbar and 10 reps w/3# tbar).    "   Assessment: Tolerated treatment well. Pt continues to improve in pain reduction and fxl use of RUE.  Patient would benefit from continued skilled OT.    Plan: Continued skilled OT per POC focusing on use of utensils improving fxl use of RUE.

## 2024-11-19 NOTE — PROGRESS NOTES
"Daily Note     Today's date: 2024  Patient name: Maye Lilly  : 1960  MRN: [id number]  Referring provider: Megan Portillo PA-C  Dx:   Encounter Diagnosis     ICD-10-CM    1. 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 Balance disorder  R26.89       3. Left pontine CVA (HCC)  I63.9           Start Time: 1015  Stop Time: 1115  Total time in clinic (min): 60 minutes    Subjective: Doing well today! Working on stepping up and down on her stairs with her daughter. Able to be seen for 60 versus 45 minutes today - agreeable to doing so to maximize her benefits from physical therapy.    Objective: See treatment diary below      Assessment: Progressed stepping interventions as part of HIGT demonstrating significant improvements in R LE neuromuscular control with stability, step amplitude, power output, and eccentric loading. R ankle stability appears to be continuing to improve as seen by improved control with compliant surface negotiation. Very challenged with stepping up/out with R LE with merna negotiation laterally - will work on lateral step taps at home and in the clinic.       Plan: Continue per plan of care.  Progress treatment as tolerated.   Attempt 6\" step up/down with SPC this week as able leading with alt LE's ascend/descend.     Short Term Goal Expiration Date: 24  Long Term Goal Expiration Date: 25  POC Expiration Date: 25        POC expires Unit limit Auth Expiration date PT/OT/ST + Visit Limit?   25                                   Visit/Unit Tracking  AUTH Status:  Date 11/15 11/19             16 (11/15-25) Used 1 2               Remaining  15 14                   Specialty Daily Treatment Diary  Precautions: Fall risk, past Hx R knee arthritis (can be stiff and swollen at the start of the day)      Manuals 11/7/24 11/8 11/12 11/13 11/15 11/19/24                                           Neuro Re-Ed  ABC         HIGT  SOLO    TM " "with BUE support  0.5 mph x5 minutes  Seated rest break  0.5 mph x1 min  0.7 mph x2 min  0.5 mph x2 min    TM total 10 min    Step ups with L hemiwalker  Up and over with R LE leading ascending and descending to 4\" step x10 reps SOLO    TM with BUE support 3# R AW  0.5 mph x4 min  Seated rest break (due to shoulder fatigue)  0.5 mph x4 min  Seated rest break  0.5 mph x2 min      1/4 length with no AD  5# R AW  2X2 laps  No AW  X2 laps       SOLO 1/4 length     L SPC x2 laps    No AD 2x2 laps     No A 2x2 laps #5 RAW     Fwd/bwd amb 2x2 laps     Lat no AD (HHA available if needed) x2 laps      SOLO 1/4 length     L SPC x2 laps timed (30.7 seconds best)     No AD 2x2 laps timed (38 best)     L SPC #5 RAW x2 laps (31.3 sec best)     L SPC #5 flat hurdles 2x laps    No AD flat hurdles 1x lap   Solo  18' length    No AD  X2 laps fwd  X1 lap lat    Step up fwd/down fwd  L SPC  4\" step  L LE lead ascend, R LE lead descend x6 rounds  R LE lead ascend, L LE lead descend x4 rounds    Hurdles (8x4-6\")  L SPC  Step-to equal practice each LE  X1 lap fwd    Attempted x4 to R/L ea with L UE railing support    Airex pads (4)  Stepping up/down fwd equal practice BL LE  L SPC  X2 laps       hurdles          step taps // bars  L UE  WB L LE, stepping R LE with physical constraint for circumduction  4\" step  2x20 fwd    HEP Solo with L hemiwalking    X10 ea LE for R foot clearance & R stance phase Solo with L SPC  5# R AW    2x10 ea LE for R foot clearance & R stance phase //bars 2x10 ea LE     Step up and over 2x10 L up //bar  //bars 2x10 ea LE     Step up and over 2x10 L up //bar  Solo  L SPC  4\" x10 ea LE fwd  6\" x10 ea LE fwd                                               Ther Ex          Nu step      5' L7 SPM > 60                                                                           Ther Activity                             Gait Training          Indoors Objective measure testing                  Modalities                    "

## 2024-11-20 ENCOUNTER — OFFICE VISIT (OUTPATIENT)
Dept: PHYSICAL THERAPY | Facility: CLINIC | Age: 64
End: 2024-11-20
Payer: COMMERCIAL

## 2024-11-20 ENCOUNTER — OFFICE VISIT (OUTPATIENT)
Dept: OCCUPATIONAL THERAPY | Facility: CLINIC | Age: 64
End: 2024-11-20
Payer: COMMERCIAL

## 2024-11-20 DIAGNOSIS — R26.89 BALANCE DISORDER: ICD-10-CM

## 2024-11-20 DIAGNOSIS — I63.9 ACUTE CVA (CEREBROVASCULAR ACCIDENT) (HCC): Primary | ICD-10-CM

## 2024-11-20 DIAGNOSIS — I63.9 LEFT PONTINE CVA (HCC): ICD-10-CM

## 2024-11-20 DIAGNOSIS — R26.9 NEUROLOGIC GAIT DISORDER: Primary | ICD-10-CM

## 2024-11-20 PROCEDURE — 97150 GROUP THERAPEUTIC PROCEDURES: CPT

## 2024-11-20 PROCEDURE — 97112 NEUROMUSCULAR REEDUCATION: CPT

## 2024-11-20 NOTE — PROGRESS NOTES
"Daily Note     Today's date: 2024  Patient name: Maye Lilly  : 1960  MRN: [id number]  Referring provider: Megan Portillo PA-C  Dx:   Encounter Diagnosis     ICD-10-CM    1. 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 Balance disorder  R26.89       3. Left pontine CVA (HCC)  I63.9           Start Time: 1015  Stop Time: 1100  Total time in clinic (min): 45 minutes    Subjective: Doing well today - a little tired/sore from yesterday's session. Plans to trial lateral step taps at home tomorrow with railing support.      Objective: See treatment diary below    1:1 w/PT: [id number]  Group: [id number]  Self-directed: [id number]    Assessment: Trialed lateral step taps and lateral merna stepping with L UE support on parallel bar to assist with stability. Step amplitude and control of external rotation of R LE was challenged with 6\" step height; may consider 4\" step NV. Will continue to practice with availability of step heights at home. Somewhat more fatigued today likely from influence of yesterday's session.       Plan: Continue per plan of care.  Progress treatment as tolerated.   Attempt 6\" step up/down with SPC this week as able leading with alt LE's ascend/descend.     Short Term Goal Expiration Date: 24  Long Term Goal Expiration Date: 25  POC Expiration Date: 25        POC expires Unit limit Auth Expiration date PT/OT/ST + Visit Limit?   25                                   Visit/Unit Tracking  AUTH Status:  Date 11/15 11/19 11/20            16 (11/15-25) Used 1 2 3              Remaining  15 14 13                  Specialty Daily Treatment Diary  Precautions: Fall risk, past Hx R knee arthritis (can be stiff and swollen at the start of the day)      Manuals 11/20/24 11/8 11/12 11/13 11/15 11/19/24                                           Neuro Re-Ed           HIGT Solo  16' length  X2 laps fwd  2x1 lap lat  X1 length fwd    Solo  // bars  L " "UE  Lat merna step-overs  3x4-6\", ext cues for R foot target  X2 laps  6\" lateral step taps x10 ea   SOLO    TM with BUE support  0.5 mph x5 minutes  Seated rest break  0.5 mph x1 min  0.7 mph x2 min  0.5 mph x2 min    TM total 10 min    Step ups with L hemiwalker  Up and over with R LE leading ascending and descending to 4\" step x10 reps SOLO    TM with BUE support 3# R AW  0.5 mph x4 min  Seated rest break (due to shoulder fatigue)  0.5 mph x4 min  Seated rest break  0.5 mph x2 min      1/4 length with no AD  5# R AW  2X2 laps  No AW  X2 laps       SOLO 1/4 length     L SPC x2 laps    No AD 2x2 laps     No A 2x2 laps #5 RAW     Fwd/bwd amb 2x2 laps     Lat no AD (HHA available if needed) x2 laps      SOLO 1/4 length     L SPC x2 laps timed (30.7 seconds best)     No AD 2x2 laps timed (38 best)     L SPC #5 RAW x2 laps (31.3 sec best)     L SPC #5 flat hurdles 2x laps    No AD flat hurdles 1x lap   Solo  18' length    No AD  X2 laps fwd  X1 lap lat    Step up fwd/down fwd  L SPC  4\" step  L LE lead ascend, R LE lead descend x6 rounds  R LE lead ascend, L LE lead descend x4 rounds    Hurdles (8x4-6\")  L SPC  Step-to equal practice each LE  X1 lap fwd    Attempted x4 to R/L ea with L UE railing support    Airex pads (4)  Stepping up/down fwd equal practice BL LE  L SPC  X2 laps       hurdles          step taps  Solo with L hemiwalking    X10 ea LE for R foot clearance & R stance phase Solo with L SPC  5# R AW    2x10 ea LE for R foot clearance & R stance phase //bars 2x10 ea LE     Step up and over 2x10 L up //bar  //bars 2x10 ea LE     Step up and over 2x10 L up //bar  Solo  L SPC  4\" x10 ea LE fwd  6\" x10 ea LE fwd                                               Ther Ex          Nu step      5' L7 SPM > 60                                                                           Ther Activity                             Gait Training          Indoors                   Modalities                                   "

## 2024-11-20 NOTE — PROGRESS NOTES
"Occupational Therapy Daily Note:    Today's date: 2024  Patient name: Maye Lilly  : 1960  MRN: [id number]  Referring provider: Megan Portillo PA-C  Dx:   Encounter Diagnosis   Name Primary?    Acute CVA (cerebrovascular accident) (HCC) Yes                    POC expires Unit limit Auth Expiration date PT/OT/ST + Visit Limit?   25  N/A 24 BOMN                           Visit/Unit Tracking   Approved- 10/9/24- 24 Date 10/9 10/11 10/14 10/17 10/22 10/24 10/25 10/28 10/30 10/31 11/5    Used 1 2 3 4 5 6 7 8 9 10 11    Remaining  19 18 17 16 15 14 13 12 11 10 9     Visit/Unit Tracking   Approved- 10/9/24- 24 Date   PN 11/8 11/12 11/13 11/15 11/19 11/20        Used 12 13 14 15 16 17 18        Remaining  8 7 6 5 4 3 2         Duration in weeks: 12 weeks, 2-3x  Plan of care beginning date: 10/9/24  Plan of care expiration date: 25   PN #1 due: 24    Precautions: HTN, GERD, R shoulder pain      Subjective: \"The stretching you do really helps.  I can feel my arm getting stronger\".     Objective: See treatment below.     Neuro Re-ed: Supine on mat, AAROM/PROM proximal to distal with good tolerance to end range. Pt with slight pain noted at near full end range FF and elbow flexion.     Pt then tolerated STM to forearm and digits with increased stretch to digits due to noted swelling. Pt educated on continuing to use pillows and wedge for elevation when sleeping to help with decreased in swelling and to continuing moving hand as much as possible.     Seated EOM pt retrieved rings on ROM arch bringing from R <>L 2x each. Positioned on box for lower surface. Trialed higher surface but difficulty with shoulder FF. Ended with lower on box for increased functional reach to lower surface with elbow extension to grab and bring over for last two sets. Good use of digits to grasp and readjust fingers for sustained grasp.        Assessment: Tolerated treatment well. Pt continues to " improve in pain reduction and fxl use of RUE. Pt reports that she has been able to dress herself independently and trying to use R hand to help as much as possible as well as using AD as needed (sock aide).  Patient would benefit from continued skilled OT.    Plan: Continued skilled OT per POC focusing on use of utensils improving fxl use of RUE.

## 2024-11-22 ENCOUNTER — OFFICE VISIT (OUTPATIENT)
Dept: OCCUPATIONAL THERAPY | Facility: CLINIC | Age: 64
End: 2024-11-22
Payer: COMMERCIAL

## 2024-11-22 ENCOUNTER — TELEPHONE (OUTPATIENT)
Dept: NEUROLOGY | Facility: CLINIC | Age: 64
End: 2024-11-22

## 2024-11-22 ENCOUNTER — OFFICE VISIT (OUTPATIENT)
Dept: PHYSICAL THERAPY | Facility: CLINIC | Age: 64
End: 2024-11-22
Payer: COMMERCIAL

## 2024-11-22 DIAGNOSIS — R26.9 NEUROLOGIC GAIT DISORDER: Primary | ICD-10-CM

## 2024-11-22 DIAGNOSIS — R26.89 BALANCE DISORDER: ICD-10-CM

## 2024-11-22 DIAGNOSIS — I63.9 ACUTE CVA (CEREBROVASCULAR ACCIDENT) (HCC): Primary | ICD-10-CM

## 2024-11-22 DIAGNOSIS — I63.9 LEFT PONTINE CVA (HCC): ICD-10-CM

## 2024-11-22 PROCEDURE — 97112 NEUROMUSCULAR REEDUCATION: CPT

## 2024-11-22 NOTE — PROGRESS NOTES
"Occupational Therapy Daily Note:    Today's date: 2024  Patient name: Maye Lilly  : 1960  MRN: [id number]  Referring provider: Megan Portillo PA-C  Dx:   Encounter Diagnosis   Name Primary?    Acute CVA (cerebrovascular accident) (HCC) Yes                      POC expires Unit limit Auth Expiration date PT/OT/ST + Visit Limit?   25  N/A 24 BOMN                           Visit/Unit Tracking   Approved- 10/9/24- 24 Date 10/9 10/11 10/14 10/17 10/22 10/24 10/25 10/28 10/30 10/31 11    Used 1 2 3 4 5 6 7 8 9 10 11    Remaining  19 18 17 16 15 14 13 12 11 10 9     Visit/Unit Tracking   Approved- 10/9/24- 24 Date   PN 11/8 11/12 11/13 11/15 11/19 11/20 11/22       Used 12 13 14 15 16 17 18 10       Remaining  8 7 6 5 4 3 2 1        Duration in weeks: 12 weeks, 2-3x  Plan of care beginning date: 10/9/24  Plan of care expiration date: 25   PN #1 due: 24    Precautions: HTN, GERD, R shoulder pain      Subjective: \"I dont know why it is swelling more, I added the wedge back when I sleep.\"    Objective: See treatment below.     Neuro Re-ed: Seated EOM with e-stim applied to RUE wrist and digit flexors for focus on functional use of R hand with grasp and release tasks. Pt grasping foam blocks and bringing to other side of table. Pt maintaining grasp while stim is on and then focus on extension when still is off. Good understanding of on off time for increased ability to complete grasp/release task. Pt able to sustain grasp with no noted drops of blocks throughout task. Trialed same task with BB x 8 and pt able to sustain demo increased use of .     Assessment: Tolerated treatment well. Pt with increased noted edema in R hand this date. Pt reports that she has added the wedge for increased elevation and support when sleeping. Educated pt to add pillow under hand on arm rest of recliner when sitting at home for more elevation throughout the day. Patient would " benefit from continued skilled OT.    Plan: Continued skilled OT per POC focusing on use of utensils improving fxl use of RUE.

## 2024-11-22 NOTE — PROGRESS NOTES
"Daily Note     Today's date: 2024  Patient name: Maey Lilly  : 1960  MRN: [id number]  Referring provider: Megan Portillo PA-C  Dx:   Encounter Diagnosis     ICD-10-CM    1. 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 Balance disorder  R26.89       3. Left pontine CVA (HCC)  I63.9           Start Time: 1015  Stop Time: 1100  Total time in clinic (min): 45 minutes    Subjective: pt reprots she is doing well today and is able to \"do a lot more than she was able to do last time I saw her\"      Objective: See treatment diary below      Assessment: Tolerated treatment well. Patient would benefit from continued PT pt able to perform step taps w LUE on SPC rather than // bar for inc challenge and dec stability as compared to bar use. Pt able to complete all taps w 6'' step no 4'' step required w fatigue. Pt utilized BUE on // bars for merna navigation with inc lateral lean observed and minimal circumduction utilized to clear merna.  // bar use for step ups no sig errors observed w step ups.      Plan: Continue per plan of care.      Short Term Goal Expiration Date: 24  Long Term Goal Expiration Date: 25  POC Expiration Date: 25        POC expires Unit limit Auth Expiration date PT/OT/ST + Visit Limit?   25                                   Visit/Unit Tracking  AUTH Status:  Date 11/15 11/19 11/20 11/22           16 (11/15-25) Used 1 2 3 4             Remaining  15 14 13 12                 Specialty Daily Treatment Diary  Precautions: Fall risk, past Hx R knee arthritis (can be stiff and swollen at the start of the day)      Manuals 11/20/24 11/22 11/12 11/13 11/15 11/19/24                                           Neuro Re-Ed           HIGT Solo  16' length  X2 laps fwd  2x1 lap lat  X1 length fwd    Solo  // bars  L UE  Lat merna step-overs  3x4-6\", ext cues for R foot target  X2 laps  6\" lateral step taps x10 ea   SOLO  No AD  16''  3  track " "laps x2 rounds    Hurdles 4 6''  X3 laps fwd   X3 laps lateral BUE use   SOLO    TM with BUE support 3# R AW  0.5 mph x4 min  Seated rest break (due to shoulder fatigue)  0.5 mph x4 min  Seated rest break  0.5 mph x2 min      1/4 length with no AD  5# R AW  2X2 laps  No AW  X2 laps       SOLO 1/4 length     L SPC x2 laps    No AD 2x2 laps     No A 2x2 laps #5 RAW     Fwd/bwd amb 2x2 laps     Lat no AD (HHA available if needed) x2 laps      SOLO 1/4 length     L SPC x2 laps timed (30.7 seconds best)     No AD 2x2 laps timed (38 best)     L SPC #5 RAW x2 laps (31.3 sec best)     L SPC #5 flat hurdles 2x laps    No AD flat hurdles 1x lap   Solo  18' length    No AD  X2 laps fwd  X1 lap lat    Step up fwd/down fwd  L SPC  4\" step  L LE lead ascend, R LE lead descend x6 rounds  R LE lead ascend, L LE lead descend x4 rounds    Hurdles (8x4-6\")  L SPC  Step-to equal practice each LE  X1 lap fwd    Attempted x4 to R/L ea with L UE railing support    Airex pads (4)  Stepping up/down fwd equal practice BL LE  L SPC  X2 laps       hurdles          step taps  CGA via SOLO in // bars    6'' step  2x10 ea LE  L SPC use no // bar use Solo with L SPC  5# R AW    2x10 ea LE for R foot clearance & R stance phase //bars 2x10 ea LE     Step up and over 2x10 L up //bar  //bars 2x10 ea LE     Step up and over 2x10 L up //bar  Solo  L SPC  4\" x10 ea LE fwd  6\" x10 ea LE fwd                                               Ther Ex          Nu step      5' L7 SPM > 60                                                                           Ther Activity                             Gait Training          Indoors                   Modalities                                                            "

## 2024-11-25 ENCOUNTER — OFFICE VISIT (OUTPATIENT)
Dept: OCCUPATIONAL THERAPY | Facility: CLINIC | Age: 64
End: 2024-11-25
Payer: COMMERCIAL

## 2024-11-25 ENCOUNTER — OFFICE VISIT (OUTPATIENT)
Dept: PHYSICAL THERAPY | Facility: CLINIC | Age: 64
End: 2024-11-25
Payer: COMMERCIAL

## 2024-11-25 DIAGNOSIS — I63.9 LEFT PONTINE CVA (HCC): ICD-10-CM

## 2024-11-25 DIAGNOSIS — R26.89 BALANCE DISORDER: ICD-10-CM

## 2024-11-25 DIAGNOSIS — R26.9 NEUROLOGIC GAIT DISORDER: Primary | ICD-10-CM

## 2024-11-25 DIAGNOSIS — I63.9 ACUTE CVA (CEREBROVASCULAR ACCIDENT) (HCC): Primary | ICD-10-CM

## 2024-11-25 PROCEDURE — 97112 NEUROMUSCULAR REEDUCATION: CPT

## 2024-11-25 PROCEDURE — 97150 GROUP THERAPEUTIC PROCEDURES: CPT

## 2024-11-25 NOTE — PROGRESS NOTES
"Occupational Therapy Daily Note:    Today's date: 2024  Patient name: Maye Lilly  : 1960  MRN: [id number]  Referring provider: Megan Portillo PA-C  Dx:   Encounter Diagnosis   Name Primary?    Acute CVA (cerebrovascular accident) (HCC) Yes                POC expires Unit limit Auth Expiration date PT/OT/ST + Visit Limit?   25  N/A 24 BOMN                           Visit/Unit Tracking   Approved- 10/9/24- 24 Date 10/9 10/11 10/14 10/17 10/22 10/24 10/25 10/28 10/30 10/31 11/5    Used 1 2 3 4 5 6 7 8 9 10 11    Remaining  19 18 17 16 15 14 13 12 11 10 9     Visit/Unit Tracking   Approved- 10/9/24- 24 Date   PN 11/8 11/12 11/13 11/15 11/19 11/20 11/22 11/25      Used 12 13 14 15 16 17 18 19 20      Remaining  8 7 6 5 4 3 2 1 0       Duration in weeks: 12 weeks, 2-3x  Plan of care beginning date: 10/9/24  Plan of care expiration date: 25   PN #1 due: 24    Precautions: HTN, GERD, R shoulder pain      Subjective: \"I just have to do things a couple times and then itll warm up and wont be painful\"    Objective: See treatment below.     Neuro Re-ed: Supine on mat with e-stim applied to RUE ant/middle delt and supra/infra for increased shoulder FF and Abd. Pt completing dowel front raises with 3# for 30x each. Therapist then adding 1.5# CW to RUE. Removing dowel working on AROM/AAROM with place and holds in FF. Pt able to achieve about 100* FF with support due to nervousness. Pt then holding for about 10 sec and completing slow and controlled movement into shoulder extension. Pt with good control requiring no assistance.     Pt then seated EOM with e-stim continued to be applied as same above. Pt completing 3x10 towel glides on table anterior in forward flexion and shoulder abd. Pt able to achieve 90* FF and about 75* Abd. Pt reports to have slight pain noted at end ranges but after completing a couple times able to increase ROM.      Assessment: Tolerated treatment " well. Pt educated to complete towel glides at home as well as complete laundry folding while seated to incorporate more functional movement of RUE. Patient would benefit from continued skilled OT.    Plan: Continued skilled OT per POC focusing on use of utensils improving fxl use of RUE.

## 2024-11-26 ENCOUNTER — OFFICE VISIT (OUTPATIENT)
Dept: OCCUPATIONAL THERAPY | Facility: CLINIC | Age: 64
End: 2024-11-26
Payer: COMMERCIAL

## 2024-11-26 ENCOUNTER — OFFICE VISIT (OUTPATIENT)
Dept: PHYSICAL THERAPY | Facility: CLINIC | Age: 64
End: 2024-11-26
Payer: COMMERCIAL

## 2024-11-26 DIAGNOSIS — I63.9 LEFT PONTINE CVA (HCC): ICD-10-CM

## 2024-11-26 DIAGNOSIS — R26.9 NEUROLOGIC GAIT DISORDER: Primary | ICD-10-CM

## 2024-11-26 DIAGNOSIS — I63.9 ACUTE CVA (CEREBROVASCULAR ACCIDENT) (HCC): Primary | ICD-10-CM

## 2024-11-26 DIAGNOSIS — R26.89 BALANCE DISORDER: ICD-10-CM

## 2024-11-26 PROCEDURE — 97112 NEUROMUSCULAR REEDUCATION: CPT

## 2024-11-26 NOTE — PROGRESS NOTES
"Occupational Therapy Daily Note:    Today's date: 2024  Patient name: Maye Lilly  : 1960  MRN: [id number]  Referring provider: Megan Portillo PA-C  Dx:   Encounter Diagnosis   Name Primary?    Acute CVA (cerebrovascular accident) (HCC) Yes                POC expires Unit limit Auth Expiration date PT/OT/ST + Visit Limit?   25  N/A 24 BOMN                           Visit/Unit Tracking   Approved- 10/9/24- 24 Date 10/9 10/11 10/14 10/17 10/22 10/24 10/25 10/28 10/30 10/31 11/5    Used 1 2 3 4 5 6 7 8 9 10 11    Remaining  19 18 17 16 15 14 13 12 11 10 9     Visit/Unit Tracking   Approved- 10/9/24- 24 Date   PN 11/8 11/12 11/13 11/15 11/19 11/20 11/22 11/25 11/26     Used 12 13 14 15 16 17 18 19 20 21     Remaining  8 7 6 5 4 3 2 1 0 TBD       Duration in weeks: 12 weeks, 2-3x  Plan of care beginning date: 10/9/24  Plan of care expiration date: 25   PN #1 due: 24    Precautions: HTN, GERD, R shoulder pain      Subjective: \"I just have to do things a couple times and then itll warm up and wont be painful\"    Objective: See treatment below.     Neuro Re-ed: Supine on mat with e-stim applied to RUE ant/middle delt and supra/infra for increased shoulder FF and Abd. Pt completing dowel front raises with 3# for 30x each. Therapist spending extra time with e-stim this date due to stop working. Removed e-stim and continued end of task with AAROM and use of dowel. Good slow and controlled movement for descending shoulder extension this date.    Pt then seated EOM completing red flex bar in supination and pronation. Increased difficulty for supination 2x10 and pronation 3x10.    Ended session seated EOM working with wooden character blocks/shapes. Pt reaching into bag with R hand to grasp block, then bring to table and place into correct cut out. Pt instructed to try to manipulate each piece in hand but with increased droppage noted. Pt then using table to assist with in " hand manipulation and L hand with stabilization of each board piece.       Assessment: Tolerated treatment well. Pt to continue stretching at home this date. Good use of RUE during functional tasks as able. Patient would benefit from continued skilled OT.    Plan: Continued skilled OT per POC focusing on use of utensils improving fxl use of RUE.

## 2024-11-26 NOTE — PROGRESS NOTES
"Daily Note     Today's date: 2024  Patient name: Maye Lilly  : 1960  MRN: [id number]  Referring provider: Megan Portillo PA-C  Dx:   Encounter Diagnosis     ICD-10-CM    1. 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 Balance disorder  R26.89       3. Left pontine CVA (HCC)  I63.9           Start Time: 0930  Stop Time: 1015  Total time in clinic (min): 45 minutes    Subjective: Doing well this morning despite some R knee soreness for which she took tylenol - says this soreness has been a chronic issue before her CVA. Received from OT.      Objective: See treatment diary below      Assessment: Session focused on continuation of high-intensity stepping practice. Addressed R LE SLS stability and step amplitude challenges with merna negotiation. Due to fatigue likely influenced by close proximity of sessions between last night and this morning R LE felt more fatigued and did require L UE support when weightbearing. Was receptive toward attempts to minimize UE WB in R LE stance phase by use of holding foam ball in L UE to press into // bars.      Plan: Continue per plan of care.      Short Term Goal Expiration Date: 24  Long Term Goal Expiration Date: 25  POC Expiration Date: 25        POC expires Unit limit Auth Expiration date PT/OT/ST + Visit Limit?   25                                   Visit/Unit Tracking  AUTH Status:  Date 11/15 11/19 11/20 11/22 11/25 11/26         16 (11/15-25) Used 1 2 3 4 5 6           Remaining  15 14 13 12 11 10               Specialty Daily Treatment Diary  Precautions: Fall risk, past Hx R knee arthritis (can be stiff and swollen at the start of the day)      Manuals 11/20/24 11/22 11/25/24 11/26/24 11/15 11/19/24                                           Neuro Re-Ed           HIGT Solo  16' length  X2 laps fwd  2x1 lap lat  X1 length fwd    Solo  // bars  L UE  Lat merna step-overs  3x4-6\", ext cues for R foot " "target  X2 laps  6\" lateral step taps x10 ea   SOLO  No AD  16''  3 1/4 track laps x2 rounds    Hurdles 4 6''  X3 laps fwd   X3 laps lateral BUE use   Solo  No AD  42' length  X2 laps fwd    Stepping up onto/down from airex pads (4)  L SPC  UL LE focus/length  X2 laps fwd    Trainer steps (4x6\")  4# R AW  BUE  Recip as possible  X4 rounds ascending/descending fwd  4# BL AW  Same as above  X4 rounds  L UE SPC, R UE HR step-to pattern with safe sequence ascending/descending x3 rounds   Solo  No AD    20' length x1 lap fwd  40' length  X1 lap fwd  All consec    12' length  X2 laps lat    Solo  // bars (16')  Hurdles (3x4\", 2x5\")  Alt LE step-to, no UE when WB L LE, L UE support while holding foam ball for pressure feedback when WB R LE  X3 laps fwd  X2 hurdles progressed to 6-7\"  X3 laps fwd  X2 laps fwd SOLO 1/4 length     L SPC x2 laps timed (30.7 seconds best)     No AD 2x2 laps timed (38 best)     L SPC #5 RAW x2 laps (31.3 sec best)     L SPC #5 flat hurdles 2x laps    No AD flat hurdles 1x lap   Solo  18' length    No AD  X2 laps fwd  X1 lap lat    Step up fwd/down fwd  L SPC  4\" step  L LE lead ascend, R LE lead descend x6 rounds  R LE lead ascend, L LE lead descend x4 rounds    Hurdles (8x4-6\")  L SPC  Step-to equal practice each LE  X1 lap fwd    Attempted x4 to R/L ea with L UE railing support    Airex pads (4)  Stepping up/down fwd equal practice BL LE  L SPC  X2 laps       hurdles          step taps  CGA via SOLO in // bars    6'' step  2x10 ea LE  L SPC use no // bar use  Solo  // bars  L UE  6\" reverse step tap with R LE WB L LE to fatigue //bars 2x10 ea LE     Step up and over 2x10 L up //bar  Solo  L SPC  4\" x10 ea LE fwd  6\" x10 ea LE fwd                                               Ther Ex          Nu step      5' L7 SPM > 60                                                                           Ther Activity                             Gait Training          Indoors                   Modalities   "

## 2024-11-27 ENCOUNTER — OFFICE VISIT (OUTPATIENT)
Dept: OCCUPATIONAL THERAPY | Facility: CLINIC | Age: 64
End: 2024-11-27
Payer: COMMERCIAL

## 2024-11-27 ENCOUNTER — OFFICE VISIT (OUTPATIENT)
Dept: PHYSICAL THERAPY | Facility: CLINIC | Age: 64
End: 2024-11-27
Payer: COMMERCIAL

## 2024-11-27 DIAGNOSIS — I63.9 ACUTE CVA (CEREBROVASCULAR ACCIDENT) (HCC): Primary | ICD-10-CM

## 2024-11-27 DIAGNOSIS — R26.9 NEUROLOGIC GAIT DISORDER: Primary | ICD-10-CM

## 2024-11-27 DIAGNOSIS — I63.9 LEFT PONTINE CVA (HCC): ICD-10-CM

## 2024-11-27 DIAGNOSIS — R26.89 BALANCE DISORDER: ICD-10-CM

## 2024-11-27 PROCEDURE — 97112 NEUROMUSCULAR REEDUCATION: CPT

## 2024-11-27 NOTE — PROGRESS NOTES
"Occupational Therapy Daily Note:    Today's date: 2024  Patient name: Maye Lilly  : 1960  MRN: [id number]  Referring provider: Megan Portillo PA-C  Dx:   Encounter Diagnosis   Name Primary?    Acute CVA (cerebrovascular accident) (HCC) Yes            POC expires Unit limit Auth Expiration date PT/OT/ST + Visit Limit?   25  N/A 24 BOMN                           Visit/Unit Tracking   Approved- 10/9/24- 24 Date 10/9 10/11 10/14 10/17 10/22 10/24 10/25 10/28 10/30 10/31 11/5    Used 1 2 3 4 5 6 7 8 9 10 11    Remaining  19 18 17 16 15 14 13 12 11 10 9     Visit/Unit Tracking   Approved- 10/9/24- 24 Date   PN 11/8 11/12 11/13 11/15 11/19 11/20 11/22 11/25 11/26     Used 12 13 14 15 16 17 18 19 20 21     Remaining  8 7 6 5 4 3 2 1 0 TBD       Duration in weeks: 12 weeks, 2-3x  Plan of care beginning date: 10/9/24  Plan of care expiration date: 25   PN #1 due: 24    Precautions: HTN, GERD, R shoulder pain        Subjective: \"I can feel the stretch on my elbow\".     Objective: See treatment below.  Session focusing on proximal strength/stability, coordination, open/closed hand, pinch , body awareness, intrinsic strengthening and overall activity endurance improving safety and indep in ADL/IADL mngt.      Neuro Re-ed: Supine on mat, pt tolerated NMES w/pad placement to infra/supra spinatus and ant/middle delt focusing on sublux reduction and muscle activation for proximal stabilization/strength, tone reduction w/FF AROM to <1/2 shoulder flexion 3x10 using 1# tbar,  elbow flex/ext using 1# tbar increasing dripping abilities w/elbow extension.     Seated EOM with stim placement same as above, pt retrieved small foam blocks stabilizing large bowl in left hand, using large serving spoon to place foam blocks to container positioned midline of pt.  Pt noted with G gripping abilities to stabilize spoon as evidenced by no droppage.   Pt then retrieved and placed foam " blocks into container opposing D1 alternating to D2-D5. Pt racquel to sustain grasp opposing D1-4.     Assessment: Tolerated treatment well. Improvement in shoulder extension as pt is able to descend RUE to mat demo-G control during active movement. Patient would benefit from continued skilled OT.    Plan: Continued skilled OT per POC

## 2024-11-27 NOTE — PROGRESS NOTES
"Daily Note     Today's date: 2024  Patient name: Maye Lilly  : 1960  MRN: [id number]  Referring provider: Megan Portillo PA-C  Dx:   Encounter Diagnosis     ICD-10-CM    1. 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 Balance disorder  R26.89       3. Left pontine CVA (HCC)  I63.9           Start Time: 1400  Stop Time: 1445  Total time in clinic (min): 45 minutes    Subjective: Doing well this morning despite some R knee soreness for which she took tylenol - says this soreness has been a chronic issue before her CVA. Received from OT.      Objective: See treatment diary below      Assessment: Session focused on continuation of high-intensity stepping practice. Addressed R LE SLS stability and step amplitude challenges with merna negotiation. Due to fatigue likely influenced by close proximity of sessions between last night and this morning R LE felt more fatigued and did require L UE support when weightbearing. Was receptive toward attempts to minimize UE WB in R LE stance phase by use of holding foam ball in L UE to press into // bars.      Plan: Continue per plan of care.      Short Term Goal Expiration Date: 24  Long Term Goal Expiration Date: 25  POC Expiration Date: 25        POC expires Unit limit Auth Expiration date PT/OT/ST + Visit Limit?   25                                   Visit/Unit Tracking  AUTH Status:  Date 11/15 11/19 11/20 11/22 11/25 11/26         16 (11/15-25) Used 1 2 3 4 5 6           Remaining  15 14 13 12 11 10               Specialty Daily Treatment Diary  Precautions: Fall risk, past Hx R knee arthritis (can be stiff and swollen at the start of the day)      Manuals 24                                           Neuro Re-Ed           HIGT Solo  16' length  X2 laps fwd  2x1 lap lat  X1 length fwd    Solo  // bars  L UE  Lat merna step-overs  3x4-6\", ext cues for R foot " "target  X2 laps  6\" lateral step taps x10 ea   SOLO  No AD  16''  3 1/4 track laps x2 rounds    Hurdles 4 6''  X3 laps fwd   X3 laps lateral BUE use   Solo  No AD  42' length  X2 laps fwd    Stepping up onto/down from airex pads (4)  L SPC  UL LE focus/length  X2 laps fwd    Trainer steps (4x6\")  4# R AW  BUE  Recip as possible  X4 rounds ascending/descending fwd  4# BL AW  Same as above  X4 rounds  L UE SPC, R UE HR step-to pattern with safe sequence ascending/descending x3 rounds   Solo  No AD    20' length x1 lap fwd  40' length  X1 lap fwd  All consec    12' length  X2 laps lat    Solo  // bars (16')  Hurdles (3x4\", 2x5\")  Alt LE step-to, no UE when WB L LE, L UE support while holding foam ball for pressure feedback when WB R LE  X3 laps fwd  X2 hurdles progressed to 6-7\"  X3 laps fwd  X2 laps fwd Solo  No AD  3# BL AW  16' length  Stacking x3 1\" blocks using R UE thumb/2nd/3rd/4th/5th digit pincer grasp at either end  X5 rounds  X4 rounds  X1 round unstacking all at either end (104 R UE stacking/pincer movements total), 10 laps fwd total    16' length  3# BL AW beach ball catch/toss x1 lap fwd  Doff AW  X1 lap fwd Solo  18' length    No AD  X2 laps fwd  X1 lap lat    Step up fwd/down fwd  L SPC  4\" step  L LE lead ascend, R LE lead descend x6 rounds  R LE lead ascend, L LE lead descend x4 rounds    Hurdles (8x4-6\")  L SPC  Step-to equal practice each LE  X1 lap fwd    Attempted x4 to R/L ea with L UE railing support    Airex pads (4)  Stepping up/down fwd equal practice BL LE  L SPC  X2 laps       hurdles          step taps  CGA via SOLO in // bars    6'' step  2x10 ea LE  L SPC use no // bar use  Solo  // bars  L UE  6\" reverse step tap with R LE WB L LE to fatigue  Solo  L SPC  4\" x10 ea LE fwd  6\" x10 ea LE fwd                                               Ther Ex          Nu step                                                                                Ther Activity                             Gait " Training          Indoors                   Modalities

## 2024-12-02 ENCOUNTER — OFFICE VISIT (OUTPATIENT)
Dept: PHYSICAL THERAPY | Facility: CLINIC | Age: 64
End: 2024-12-02
Payer: COMMERCIAL

## 2024-12-02 ENCOUNTER — OFFICE VISIT (OUTPATIENT)
Dept: OCCUPATIONAL THERAPY | Facility: CLINIC | Age: 64
End: 2024-12-02
Payer: COMMERCIAL

## 2024-12-02 DIAGNOSIS — R26.89 BALANCE DISORDER: ICD-10-CM

## 2024-12-02 DIAGNOSIS — R26.9 NEUROLOGIC GAIT DISORDER: Primary | ICD-10-CM

## 2024-12-02 DIAGNOSIS — I63.9 ACUTE CVA (CEREBROVASCULAR ACCIDENT) (HCC): Primary | ICD-10-CM

## 2024-12-02 DIAGNOSIS — I63.9 LEFT PONTINE CVA (HCC): ICD-10-CM

## 2024-12-02 PROCEDURE — 97112 NEUROMUSCULAR REEDUCATION: CPT

## 2024-12-02 NOTE — PROGRESS NOTES
"Occupational Therapy Daily Note:    Today's date: 2024  Patient name: Maye Lilly  : 1960  MRN: [id number]  Referring provider: Megan Portillo PA-C  Dx:   Encounter Diagnosis   Name Primary?    Acute CVA (cerebrovascular accident) (HCC) Yes              POC expires Unit limit Auth Expiration date PT/OT/ST + Visit Limit?   25  6 PT/OT 24 BOMN   25                        Visit/Unit Tracking  20 Approved- 10/9/24- 24 Date   PN 11/8 11/12 11/13 11/15 11/19 11/20 11/22 11/25      Used 12 13 14 15 16 17 18 19 20      Remaining  8 7 6 5 4 3 2 1 0     Visit/Unit Tracking  8 Approved- (5 remaining) -24 Date              Used 1 2             Remaining  5 4              Duration in weeks: 12 weeks, 2-3x  Plan of care beginning date: 10/9/24  Plan of care expiration date: 25   PN #1 due: 24    Precautions: HTN, GERD, R shoulder pain        Subjective: \"Its definitely moving much more, I mean I stretch it in the morning before I get out of bed\"    Objective: See treatment below.  Session focusing on proximal strength/stability, coordination, open/closed hand, pinch , body awareness, intrinsic strengthening and overall activity endurance improving safety and indep in ADL/IADL mngt.      Neuro Re-ed: Supine on mat, pt tolerated NMES w/pad placement to infra/supra spinatus and ant/middle delt focusing on sublux reduction and muscle activation for proximal stabilization/strength, tone reduction w/FF AROM to <1/2 shoulder flexion 10x, and elbow flex/ext using 10x, then AAROM abd/add 10x.    Seated EOM with stim placement same as above, pt using R hand with tongs to retrieved small foam blocks from table and then place into bucket on R side for increased abd. Pt with difficulty, switching to reaching anteriorly for focus on elbow extension during functional task. sPt noted with G gripping abilities to stabilize tongs as evidenced by no droppage.  Pt " then retrieved and placed foam blocks into container opposing using gross grasp reaching to L side of table and then placing into container on mat on R side. Slight body compensation throughout movement, corrected with occasional verbal cues.     Ended session with Pt seated EOM completing bicep curls with 1# dumbbell. Therapist stabilizing elbow on side of body due to body compensation for elbow flexion movement. Decreased stabilization provided for last two sets. Pt completed 3x10 with good sustained  on dumbbell throughout task.     Pt in stance near mat donning front open sweatshirt and jacket using kaylyn dressing technique. Pt able to complete with no assist required but unable to zipper, reporting that she tries at home but difficult.    Assessment: Tolerated treatment well. Pt demo-G control during active movement for shoulder flex/extension. Patient would benefit from continued skilled OT.    Plan: Continued skilled OT per POC

## 2024-12-02 NOTE — PROGRESS NOTES
"Daily Note     Today's date: 2024  Patient name: Maye Lilly  : 1960  MRN: [id number]  Referring provider: Megan Portillo PA-C  Dx:   Encounter Diagnosis     ICD-10-CM    1. 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 Balance disorder  R26.89       3. Left pontine CVA (HCC)  I63.9           Start Time: 0930  Stop Time: 1015  Total time in clinic (min): 45 minutes    Subjective: Doing well this morning!  Went out to a restaurant with her children this weekend - was the first time sitting in a regular chair in public. Says the reverse R LE/ L LE 6\" step taps are very challenging.      Objective: See treatment diary below      Assessment: Session focused on continuation of high-intensity stepping practice. Challenged R LE step amplitude with progression to stepping over hurdles backwards leading with her R LE.      Plan: Continue per plan of care.  Progress note during next visit.  Progress treatment as tolerated.       Short Term Goal Expiration Date: 24  Long Term Goal Expiration Date: 25  POC Expiration Date: 25        POC expires Unit limit Auth Expiration date PT/OT/ST + Visit Limit?   25                                   Visit/Unit Tracking  AUTH Status:  Date       12/2        16 (11/15-25) Used       8          Remaining        8              Specialty Daily Treatment Diary  Precautions: Fall risk, past Hx R knee arthritis (can be stiff and swollen at the start of the day)      Manuals 24                                           Neuro Re-Ed           HIGT Solo  16' length  X2 laps fwd  2x1 lap lat  X1 length fwd    Solo  // bars  L UE  Lat merna step-overs  3x4-6\", ext cues for R foot target  X2 laps  6\" lateral step taps x10 ea   SOLO  No AD  16''  3 1/4 track laps x2 rounds    Hurdles 4 6''  X3 laps fwd   X3 laps lateral BUE use   Solo  No AD  42' length  X2 laps fwd    Stepping up " "onto/down from airex pads (4)  L SPC  UL LE focus/length  X2 laps fwd    Trainer steps (4x6\")  4# R AW  BUE  Recip as possible  X4 rounds ascending/descending fwd  4# BL AW  Same as above  X4 rounds  L UE SPC, R UE HR step-to pattern with safe sequence ascending/descending x3 rounds   Solo  No AD    20' length x1 lap fwd  40' length  X1 lap fwd  All consec    12' length  X2 laps lat    Solo  // bars (16')  Hurdles (3x4\", 2x5\")  Alt LE step-to, no UE when WB L LE, L UE support while holding foam ball for pressure feedback when WB R LE  X3 laps fwd  X2 hurdles progressed to 6-7\"  X3 laps fwd  X2 laps fwd Solo  No AD  3# BL AW  16' length  Stacking x3 1\" blocks using R UE thumb/2nd/3rd/4th/5th digit pincer grasp at either end  X5 rounds  X4 rounds  X1 round unstacking all at either end (104 R UE stacking/pincer movements total), 10 laps fwd total    16' length  3# BL AW beach ball catch/toss x1 lap fwd  Doff AW  X1 lap fwd Solo  No AD  16' length  X3 laps fwd  X2 laps lat    Solo  // bars (10')  4-5\" hurdles (4)  L UE  Reverse step-overs  L LE lead x1 length  R LE Lead x3 lengths  X3 laps    Solo  No AD  16' length  Timed walking as fast as possible  X2 rounds from standing  X2 rounds from sitting        hurdles          step taps  CGA via SOLO in // bars    6'' step  2x10 ea LE  L SPC use no // bar use  Solo  // bars  L UE  6\" reverse step tap with R LE WB L LE to fatigue  Solo  // bars  L UE   4\" reverse step taps to fatigue ea LE                                            Ther Ex          Nu step                                                                                Ther Activity                             Gait Training          Indoors                   Modalities                                                            "

## 2024-12-04 ENCOUNTER — OFFICE VISIT (OUTPATIENT)
Dept: OCCUPATIONAL THERAPY | Facility: CLINIC | Age: 64
End: 2024-12-04
Payer: COMMERCIAL

## 2024-12-04 ENCOUNTER — EVALUATION (OUTPATIENT)
Dept: PHYSICAL THERAPY | Facility: CLINIC | Age: 64
End: 2024-12-04
Payer: COMMERCIAL

## 2024-12-04 DIAGNOSIS — I63.9 ACUTE CVA (CEREBROVASCULAR ACCIDENT) (HCC): Primary | ICD-10-CM

## 2024-12-04 DIAGNOSIS — R26.9 NEUROLOGIC GAIT DISORDER: Primary | ICD-10-CM

## 2024-12-04 DIAGNOSIS — R26.89 BALANCE DISORDER: ICD-10-CM

## 2024-12-04 DIAGNOSIS — I63.9 LEFT PONTINE CVA (HCC): ICD-10-CM

## 2024-12-04 PROCEDURE — 97150 GROUP THERAPEUTIC PROCEDURES: CPT

## 2024-12-04 PROCEDURE — 97116 GAIT TRAINING THERAPY: CPT

## 2024-12-04 PROCEDURE — 97112 NEUROMUSCULAR REEDUCATION: CPT

## 2024-12-04 NOTE — PROGRESS NOTES
"Occupational Therapy Daily Note:    Today's date: 2024  Patient name: Maye Lilly  : 1960  MRN: [id number]  Referring provider: Megan Portillo PA-C  Dx:   Encounter Diagnosis   Name Primary?    Acute CVA (cerebrovascular accident) (HCC) Yes       Start Time: 1015  Stop Time: 1100  Total time in clinic (min): 45 minutes    POC expires Unit limit Auth Expiration date PT/OT/ST + Visit Limit?   25  6 PT/OT 24 BOMN   25                        Visit/Unit Tracking  20 Approved- 10/9/24- 24 Date   PN 11/8 11/12 11/13 11/15 11/19 11/20 11/22 11/25      Used 12 13 14 15 16 17 18 19 20      Remaining  8 7 6 5 4 3 2 1 0     Visit/Unit Tracking  8 Approved-24 Date            Used 1 2 3 4           Remaining  7 6 5 4            Duration in weeks: 12 weeks, 2-3x  Plan of care beginning date: 10/9/24  Plan of care expiration date: 25   PN #1 due: 24    Precautions: HTN, GERD, R shoulder pain      Subjective: \"I do this at home and its helping\". (Strengthening/stretching exercises)    Objective: See treatment below.  Session focusing on proximal strength/stability, coordination, open/closed hand, pinch , body awareness, intrinsic strengthening and overall activity endurance improving safety and indep in ADL/IADL mngt.      Neuro Re-ed: In unsupported stance pt engaged in BB transfer from left>right using standard spatula retrieving in supination and placing to target in pronation. Tables positioned waist high promoting shoulder flex (<1/2 end range), abduction and proximal stability during fxl task.  Pt able to navigate spatula to scoop BB using left hand for assist <50% of time.     Session concluded with pt assembling small 16 piece puzzle x4 promoting coordination and pinch patterns.  Pt able to grasp spatula handle w/o droppage during BB transfer and manipulate small puzzle pieces w/droppage <25% of time. No noted LOB in stance. "     Thera Ex: Supine on mat, pt engaged in therx using 1# tbar in FF w/place and hold increasing AROM with reps achieving near 3/4 end range.   Pt then completed fwd/bwd rows 2x10.      Assessment: Tolerated treatment well. Pt continues to progress in proximal AROM, hand strength and FM abilities as evidenced by increased proximal stability and AROM >1/2 end range w/o report of pain.   Patient would benefit from continued skilled OT.    Plan: Continued skilled OT per POC    12/04:  [id number]:1  [id number]

## 2024-12-04 NOTE — PROGRESS NOTES
"Progress Note     Today's date: 2024  Patient name: Maye Lilly  : 1960  MRN: [id number]  Referring provider: Megan Portillo PA-C  Dx:   Encounter Diagnosis     ICD-10-CM    1. 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 Balance disorder  R26.89       3. Left pontine CVA (HCC)  I63.9             Start Time: 0930  Stop Time: 1015  Total time in clinic (min): 45 minutes    Subjective: Doing well this morning!  Went out to a restaurant with her children this weekend - was the first time sitting in a regular chair in public. Says the reverse R LE/ L LE 6\" step taps are very challenging.    Patient Goals  To return as closely to her PLOF as possible. \"I want my right foot to be able to lift higher.\"     Pain  Current pain ratin  At best pain ratin  At worst pain ratin     Objective: See treatment diary below        ABC: 40.63%  PN 24: 51.88%  PN 24: 55.63%     5xSTS: Standard chair, L UE from armrest to no support 5/5 times, 19.12s  PN 24: Standard chair, unsupported, L UE from armrest to no support 5/5 times, 13.93s  PN 24: Standard chair, unsupported, NO UE SUPPORT 15.12s     TUG: Standard chair, hemiwalker, CS, 33.88s   PN 24: Standard chair, L SPC, CS, 28.22s  PN 24: Standard chair, L SPC, CS, 22.57s - trialed again with no AD 22.53s      6 MWT: 200' in 4'45\" with hemiwalker CS, request to sit with 1'15\" left, no standing rest breaks.         PN 24:              HR pre 71 BPM              SPO2 pre 95%              HR post 76 BPM  SPO2 post 96%    PN 24: 486 ft SPC     Gait Speed: 0.25 m/s L hemiwalker  PN 24: 0.29 m/s L SPC  PN 24: 0.48 m/s L SPC        Assessment: Progress assessment completed s/p 2 months of PT noting clinically significant improvements in mobility, power output with accompanying improvements in balance per progression in LRAD use from SPC to no AD, and additional clinically significant improvements in gait speed and " endurance. Will continue current POC focused on HIGT to maximize neuroplastic recovery in the presence of CVA.     Goals  Short-term goals (to be achieved in 4 weeks):  1. Patient will demonstrate independence with their initial home exercise program. - met  2. Patient will improve 5xSTS to <15.0 seconds demonstrating improved LE strength and power output, while improving safety with sit to stand transfers. - met  3. Patient will improve TUG to <13.5 seconds using least restrictive assistive device indicating improved functional mobility to maximize independence. - ongoing     Long-term goals (to be achieved in 8 to 12 weeks with additional goals to be incorporated and progressed appropriately pending patient progress):  1. Patient will demonstrate independence with their finalized home exercise program. - ongoing  2. Patient will improve balance as demonstrated by an increase in FGA by > or equal to 4 points to decrease their risk of falls. - to be assessed.  3. Patient will increase SSGS by >0.10 m/s indicating they are walking at a gait speed to allow for greater safety in the community. - met  4. Patient will increase ABC by MDC of >20% indicating improved balance confidence and self-efficacy. - not met but slight improvements made; may be in part due to the high-level functional components of the ABC.    New 4-week goals as of 12/4/24 to reduce risk for falls and maximize overall mobility levels.   Improve TUG by MCID.  Improve 5xSTS by MCID.  Improve gait speed by MCID.  Improve 6 MWT by 10%.     Plan: Continue current POC.  Patient would benefit from: skilled physical therapy and skilled occupational therapy  Referral necessary: No  Planned modality interventions: biofeedback     Planned therapy interventions: manual therapy, balance, neuromuscular re-education, patient/caregiver education, stretching, strengthening, therapeutic activities, therapeutic exercise, graded exercise, gait training and home  "exercise program     Frequency: 3x/week as able.  Duration in weeks: 12  Plan of Care beginning date: 10/9/24  Plan of Care expiration date: 1/17/25  Treatment plan discussed with: Patient           Short Term Goal Expiration Date: 11/16/24  Long Term Goal Expiration Date: 1/17/25  POC Expiration Date: 1/17/25        POC expires Unit limit Auth Expiration date PT/OT/ST + Visit Limit?   1/17/25 12/4/24 1/17/25 2/7/25                                   Visit/Unit Tracking  AUTH Status:  Date       12/2 12/4       16 (11/15-2/7/25) Used       8 9         Remaining        8 7             Specialty Daily Treatment Diary  Precautions: Fall risk, past Hx R knee arthritis (can be stiff and swollen at the start of the day)      Manuals 12/4/24 11/22 11/25/24 11/26/24 11/27/24 12/2/24                                           Neuro Re-Ed  ABC         HIGT  SOLO  No AD  16''  3 1/4 track laps x2 rounds    Hurdles 4 6''  X3 laps fwd   X3 laps lateral BUE use   Solo  No AD  42' length  X2 laps fwd    Stepping up onto/down from airex pads (4)  L SPC  UL LE focus/length  X2 laps fwd    Trainer steps (4x6\")  4# R AW  BUE  Recip as possible  X4 rounds ascending/descending fwd  4# BL AW  Same as above  X4 rounds  L UE SPC, R UE HR step-to pattern with safe sequence ascending/descending x3 rounds   Solo  No AD    20' length x1 lap fwd  40' length  X1 lap fwd  All consec    12' length  X2 laps lat    Solo  // bars (16')  Hurdles (3x4\", 2x5\")  Alt LE step-to, no UE when WB L LE, L UE support while holding foam ball for pressure feedback when WB R LE  X3 laps fwd  X2 hurdles progressed to 6-7\"  X3 laps fwd  X2 laps fwd Solo  No AD  3# BL AW  16' length  Stacking x3 1\" blocks using R UE thumb/2nd/3rd/4th/5th digit pincer grasp at either end  X5 rounds  X4 rounds  X1 round unstacking all at either end (104 R UE stacking/pincer movements total), 10 laps fwd total    16' length  3# BL AW beach ball catch/toss x1 lap fwd  Doff AW  X1 " "lap fwd Solo  No AD  16' length  X3 laps fwd  X2 laps lat    Solo  // bars (10')  4-5\" hurdles (4)  L UE  Reverse step-overs  L LE lead x1 length  R LE Lead x3 lengths  X3 laps    Solo  No AD  16' length  Timed walking as fast as possible  X2 rounds from standing  X2 rounds from sitting        hurdles          step taps  CGA via SOLO in // bars    6'' step  2x10 ea LE  L SPC use no // bar use  Solo  // bars  L UE  6\" reverse step tap with R LE WB L LE to fatigue  Solo  // bars  L UE   4\" reverse step taps to fatigue ea LE                                            Ther Ex          Nu step                                                                                Ther Activity                             Gait Training 6 MWT, 5xSTS, TUG, Gait speed         Indoors                   Modalities                                                            "

## 2024-12-06 ENCOUNTER — OFFICE VISIT (OUTPATIENT)
Dept: OCCUPATIONAL THERAPY | Facility: CLINIC | Age: 64
End: 2024-12-06
Payer: COMMERCIAL

## 2024-12-06 ENCOUNTER — OFFICE VISIT (OUTPATIENT)
Dept: NEUROLOGY | Facility: CLINIC | Age: 64
End: 2024-12-06
Payer: MEDICARE

## 2024-12-06 ENCOUNTER — OFFICE VISIT (OUTPATIENT)
Dept: PHYSICAL THERAPY | Facility: CLINIC | Age: 64
End: 2024-12-06
Payer: COMMERCIAL

## 2024-12-06 VITALS
SYSTOLIC BLOOD PRESSURE: 112 MMHG | HEART RATE: 73 BPM | WEIGHT: 259.4 LBS | BODY MASS INDEX: 44.53 KG/M2 | TEMPERATURE: 97.7 F | OXYGEN SATURATION: 95 % | DIASTOLIC BLOOD PRESSURE: 54 MMHG

## 2024-12-06 DIAGNOSIS — I63.9 ACUTE CVA (CEREBROVASCULAR ACCIDENT) (HCC): Primary | ICD-10-CM

## 2024-12-06 DIAGNOSIS — G81.91 RIGHT HEMIPARESIS (HCC): ICD-10-CM

## 2024-12-06 DIAGNOSIS — I63.9 LEFT PONTINE STROKE (HCC): Primary | ICD-10-CM

## 2024-12-06 DIAGNOSIS — R26.9 NEUROLOGIC GAIT DISORDER: Primary | ICD-10-CM

## 2024-12-06 DIAGNOSIS — R26.89 BALANCE DISORDER: ICD-10-CM

## 2024-12-06 DIAGNOSIS — R26.9 IMPAIRED GAIT: ICD-10-CM

## 2024-12-06 DIAGNOSIS — I63.9 LEFT PONTINE CVA (HCC): ICD-10-CM

## 2024-12-06 PROBLEM — M25.511 RIGHT SHOULDER PAIN: Status: RESOLVED | Noted: 2024-09-01 | Resolved: 2024-12-06

## 2024-12-06 PROCEDURE — 99215 OFFICE O/P EST HI 40 MIN: CPT | Performed by: PHYSICAL MEDICINE & REHABILITATION

## 2024-12-06 PROCEDURE — 97112 NEUROMUSCULAR REEDUCATION: CPT

## 2024-12-06 PROCEDURE — 97110 THERAPEUTIC EXERCISES: CPT

## 2024-12-06 RX ORDER — RIBOFLAVIN (VITAMIN B2) 100 MG
100 TABLET ORAL DAILY
COMMUNITY

## 2024-12-06 RX ORDER — ZINC GLUCONATE 50 MG
50 TABLET ORAL DAILY
COMMUNITY

## 2024-12-06 NOTE — PROGRESS NOTES
Daily Note     Today's date: 2024  Patient name: Maye Lilly  : 1960  MRN: [id number]  Referring provider: Megan Portillo PA-C  Dx:   Encounter Diagnosis     ICD-10-CM    1. 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 Balance disorder  R26.89       3. Left pontine CVA (HCC)  I63.9           Start Time: 933  Stop Time: 1015  Total time in clinic (min): 42 minutes    Subjective: pt reports she feels she is improving in all aspects with her biggest struggle remaining being lifting the RLE high such as w hurdles and step ups.       Objective: See treatment diary below      Assessment: Tolerated treatment well. Patient would benefit from continued PT  focus of session on inc height of RLE lift. pt demonstrates inc difficulty w hurdles leading w LLE w RLE as trailing limb. SPC use required for merna clearance. Pt able ascend and descend leading with both LLE and RLE w steps, inc difficulty ascending w RLE and descending w LLE as compared to contralateral pattern. Pt able to inc speed on TM within session to 0.7 m/s. Pt reports improving confidence in step ups with second lap performed. Pt reprots needing to rest on TM due to inc pain in shoulder rather than fatigue in the leg.        Plan: Continue per plan of care.      Short Term Goal Expiration Date: 24  Long Term Goal Expiration Date: 25  POC Expiration Date: 25        POC expires Unit limit Auth Expiration date PT/OT/ST + Visit Limit?   25                                   Visit/Unit Tracking  AUTH Status:  Date             16 (11/15-25) Used       8 9 10        Remaining        8 7 6            Specialty Daily Treatment Diary  Precautions: Fall risk, past Hx R knee arthritis (can be stiff and swollen at the start of the day)      Manuals 24                                           Neuro Re-Ed  ABC         HIGT  SOLO  SPC 6 6''  Medical Week 2 Survey      Responses   Facility patient discharged from?  [facility]   Does the patient have one of the following disease processes/diagnoses(primary or secondary)?  Other   Week 2 attempt successful?  Yes   Call start time  1556   Discharge diagnosis  Cancer uncertain whether primary or metastatic    Call end time  1558   Meds reviewed with patient/caregiver?  Yes   Is the patient having any side effects they believe may be caused by any medication additions or changes?  No   Does the patient have all medications ordered at discharge?  Yes   Is the patient taking all medications as directed (includes completed medication regime)?  Yes   Does the patient have a primary care provider?   Yes   Does the patient have an appointment with their PCP within 7 days of discharge?  N/A   Has the patient kept scheduled appointments due by today?  Yes   Comments  [facility] Will [facility] biopsy tomorrow.   Has home health visited the patient within 72 hours of discharge?  N/A   Psychosocial issues?  No   Did the patient receive a copy of their discharge instructions?  Yes   Nursing interventions  Reviewed instructions with patient   What is the patient's perception of their health status since discharge?  Improving   Is the patient/caregiver able to teach back signs and symptoms related to disease process for when to call PCP?  Yes   Is the patient/caregiver able to teach back signs and symptoms related to disease process for when to call 911?  Yes   Additional teach back comments  No questions today. He is fairly educated on his appts and meds.   Week 2 Call Completed?  Yes          Kevin Benavidez RN   "hurdles  X2 laps fwd    2 6'' steps, 1 4'' step SPC use x 2 laps fwd    Stepping up onto/down from airex pads (4)  L SPC  UL LE focus/length  X2 laps fwd    TM 0.5-0.7 mph BUE use  X5 min Solo  No AD  42' length  X2 laps fwd    Stepping up onto/down from airex pads (4)  L SPC  UL LE focus/length  X2 laps fwd    Trainer steps (4x6\")  4# R AW  BUE  Recip as possible  X4 rounds ascending/descending fwd  4# BL AW  Same as above  X4 rounds  L UE SPC, R UE HR step-to pattern with safe sequence ascending/descending x3 rounds   Solo  No AD    20' length x1 lap fwd  40' length  X1 lap fwd  All consec    12' length  X2 laps lat    Solo  // bars (16')  Hurdles (3x4\", 2x5\")  Alt LE step-to, no UE when WB L LE, L UE support while holding foam ball for pressure feedback when WB R LE  X3 laps fwd  X2 hurdles progressed to 6-7\"  X3 laps fwd  X2 laps fwd Solo  No AD  3# BL AW  16' length  Stacking x3 1\" blocks using R UE thumb/2nd/3rd/4th/5th digit pincer grasp at either end  X5 rounds  X4 rounds  X1 round unstacking all at either end (104 R UE stacking/pincer movements total), 10 laps fwd total    16' length  3# BL AW beach ball catch/toss x1 lap fwd  Doff AW  X1 lap fwd Solo  No AD  16' length  X3 laps fwd  X2 laps lat    Solo  // bars (10')  4-5\" hurdles (4)  L UE  Reverse step-overs  L LE lead x1 length  R LE Lead x3 lengths  X3 laps    Solo  No AD  16' length  Timed walking as fast as possible  X2 rounds from standing  X2 rounds from sitting        hurdles          step taps    Solo  // bars  L UE  6\" reverse step tap with R LE WB L LE to fatigue  Solo  // bars  L UE   4\" reverse step taps to fatigue ea LE                                            Ther Ex          Nu step                                                                                Ther Activity                             Gait Training 6 MWT, 5xSTS, TUG, Gait speed         Indoors                   Modalities                                 "

## 2024-12-06 NOTE — PROGRESS NOTES
"Occupational Therapy Daily Note:    Today's date: 2024  Patient name: Maye Lilly  : 1960  MRN: [id number]  Referring provider: Megan Portlilo PA-C  Dx:   Encounter Diagnosis   Name Primary?    Acute CVA (cerebrovascular accident) (HCC) Yes                    POC expires Unit limit Auth Expiration date PT/OT/ST + Visit Limit?   25  6 PT/OT 24 BOMN   25                        Visit/Unit Tracking  20 Approved- 10/9/24- 24 Date   PN 11/8 11/12 11/13 11/15 11/19 11/20 11/22 11/25      Used 12 13 14 15 16 17 18 19 20      Remaining  8 7 6 5 4 3 2 1 0     Visit/Unit Tracking  8 Approved-24 Date           Used 1 2 3 4 5          Remaining  7 6 5 4 3           Duration in weeks: 12 weeks, 2-3x  Plan of care beginning date: 10/9/24  Plan of care expiration date: 25   PN #1 due: 24    Precautions: HTN, GERD, R shoulder pain   Access Code: [email or web address]      Subjective: \"Im thinking about getting a stim unit for home. My doctor said I shouldn't go through insurance because it wont be covered but they are cheap on amazon\"    Objective: See treatment below.  Session focusing on proximal strength/stability, coordination, open/closed hand, pinch , body awareness, intrinsic strengthening and overall activity endurance improving safety and indep in ADL/IADL mngt.      Neuro Re-ed: Started session with Pt seated EOM tolerating AAROM/PROM shoulder FF and abd to ~ 90* in each plane. Pt with noted pain in shoulder abd due to subluxation. Therapist stabilizing joint during PROM and pt able to tolerate with decreased pain. 10x each plane.     Pt seated EOM with R hand using wrist maze. Pt educated to remove stabilization of arm on body for increased AG with activity as well as focus on using wrist in all planes (flex/ext/deviations) rather than shaking to get bead from one side of maze to another. Pt completed 10x with increased focus for " activity for proper use and target of wrist movements. Pt with increased wrist flexion towards end of activity.     Pt then using wrist roll on table for increased stretch in wrist flex/ext, digit extension, forearm stretch and FF stretch. Pt complete 3x10 with 3-4 sec holds at each end range.     Thera Ex: Pt seated EOM using yellow resistive clip with R hand tripod grasp to  small foam cubes, reach to L side of table and place into bucket. Pt using L hand to stabilize each cube prior to picking up. Pt with good tolerance then switching between tripod with index/middle and middle/ring. Pt educated if have binder clip to practice with pinching at home. Increase time to complete pinch and release task with 25 cubes.     Pt with addedd activity to HEP with putty program. Pt with good understanding and provided hand out to follow and complete at home.   - Key Pinch with Putty  - 1 x daily - 7 x weekly - 3 sets - 10 reps    Pt donning jacket in stance at end of session with supervision.    Assessment: Tolerated treatment well. Pt continues to progress in proximal AROM, hand strength and FM abilities as evidenced by increased proximal stability and AROM >1/2 end range w/o report of pain. Pt educated to continue with HEP.   Patient would benefit from continued skilled OT.    Plan: Continued skilled OT per POC

## 2024-12-06 NOTE — PROGRESS NOTES
"Physical Medicine & Rehabilitation New Patient Evaluation  Maye Lilly 64 y.o. female  ?  REASON FOR CONSULTATION: Functional evaluation, post-stroke, post-ARC  ?  PRINCIPAL NEUROLOGIC DIAGNOSIS: Left pontine ischemic stroke  ?  HISTORY OF ILLNESS:   Ms. Villavicencio \"Demario\" Vijaya is a 65 yo woman with a history of HTN and atrial fibrillation who suffered an acute, ischemic stroke involving her left luz on 8/10/24. Her stroke was secondary to HTN and small vessel disease. She was admitted to the [facility] for post-stroke acute rehabilitation from 8/10/24 to 9/16/24, at which time she was discharged home.     General function:     - Equipment/supplies: SPC for in-home and community ambulation; high-rise commode (used only PRN at night-time for emergencies); transport WC for long-distance community mobility; LE sock donning tool  - Function: Independent with mobility, transfers and ADLs  - Therapies: Outpatient PT and OT  - Splinting: None currently  - Work: Not currently  - Driving: Not currently, interested in returning  - Pain: No current concerns; on ARC had some issues with right shoulder discomfort, currently no pain or concerns, improved  - Bowel: No current concerns  - Bladder: No current concerns  - Skin: No current concerns; no pressure injury concerns; no breakdown  - Sleep: No current concerns  - Referrals/follow-ups: Neurovascular neurology pending; completed follow-up with PCP and cardiology    Safety concerns regarding living situations and safety at home: None currently  Risk of falls: Yes, but no falls since time of discharge, no injuries   ?  Review of Diagnostic Studies:  No recent imaging to review.   ?  ?   Past Medical History:   Diagnosis Date    Atrial fibrillation (HCC)     Disease of thyroid gland     Hypertension      History reviewed. No pertinent surgical history.  Social History     Socioeconomic History    Marital status:      Spouse name: Not on file    Number of children: Not on " Addended by: SHANDA ZAPATA on: 1/4/2021 11:08 AM     Modules accepted: Level of Service     file    Years of education: Not on file    Highest education level: Not on file   Occupational History    Not on file   Tobacco Use    Smoking status: Never    Smokeless tobacco: Never   Vaping Use    Vaping status: Never Used   Substance and Sexual Activity    Alcohol use: Yes     Comment: social    Drug use: Never    Sexual activity: Not on file   Other Topics Concern    Not on file   Social History Narrative    Not on file     Social Drivers of Health     Financial Resource Strain: Low Risk  (8/10/2024)    Received from [facility]    Overall Financial Resource Strain (CARDIA)     Difficulty of Paying Living Expenses: Not hard at all   Food Insecurity: No Food Insecurity (9/16/2024)    Nursing - Inadequate Food Risk Classification     Worried About Running Out of Food in the Last Year: Never true     Ran Out of Food in the Last Year: Never true     Ran Out of Food in the Last Year: Not on file   Transportation Needs: No Transportation Needs (10/3/2024)    OASIS : Transportation     Lack of Transportation (Medical): No     Lack of Transportation (Non-Medical): No     Patient Unable or Declines to Respond: No   Physical Activity: Not on file   Stress: Not on file   Social Connections: Not on file   Intimate Partner Violence: Not At Risk (8/10/2024)    Received from [facility]    Humiliation, Afraid, Rape, and Kick questionnaire     Fear of Current or Ex-Partner: No     Emotionally Abused: No     Physically Abused: No     Sexually Abused: No   Housing Stability: Low Risk  (9/16/2024)    Housing Stability Vital Sign     Unable to Pay for Housing in the Last Year: No     Number of Times Moved in the Last Year: 0     Homeless in the Last Year: No   Recent Concern: Housing Stability - High Risk (8/10/2024)    Received from [facility]    Housing Stability Vital Sign     Unable to Pay for Housing in the Last Year: Yes     Number of Times Moved in the Last Year: 1      Homeless in the Last Year: No     History reviewed. No pertinent family history.      PHYSICAL EXAMINATION:  /54 (BP Location: Right arm, Patient Position: Sitting, Cuff Size: Adult)   Pulse 73   Temp 97.7 °F (36.5 °C) (Temporal)   Wt 118 kg (259 lb 6.4 oz)   SpO2 95%   BMI 44.53 kg/m²     Gen: No acute distress, Well-nourished, well-appearing.  HEENT: Moist mucus membranes, Normocephalic/Atraumatic  Heme/Extr: No edema/clubbing/cyanosis  Pulmonary: Non-labored breathing on room air  : No dexter  GI: non-distended.   Integumentary: Skin is warm, dry. No rashes or ulcers.  MSK: Right shoulder evaluated, without gross deformity. AROM limited due to weakness in the right shoulder, able to flex and abduct to approximately 90 degrees. PROM is full, painless and smooth without significant resistance.   Neuro:    Cognitive/Behavioral: The patient was alert and oriented with normal language, memory, and praxis. Formal neuropsychological testing was not performed today. The patient did not exhibit signs of pathological anxiety or depression during the interview and examination.  ?  Cranial Nerves II-XII grossly intact  ?  Exam  MMT (R/L): B 4/5; T 4/5; WE 4/5; FAbd 4/5; FF 3/5; HF 4/5; KE 4/5; DF 4/5; Ankle eversion 3/5; Ankle inversion 4/5; PF 4/5    Muscle tone:  No increased muscle tone in the right hemibody.   ?  Sensory: Light touch intact throughout the right upper and lower extremity.   ?  Gait: Standard gait was observed. SPC was required for safe ambulation. SPC held with the LUE. A right hemiparetic gait is observed, with slow maria t, prolonged right sided swing phase as well as prolonged stance phase on the left. Good heel-toe progression, no toe-catching on right sided swing phase. Hips and knees appear level.     ASSESSMENT:  Diagnoses and all orders for this visit:    Left pontine stroke (HCC)  -     Ambulatory referral to Orthotist/Prosthetist; Future  -     Orthotics B/L    Right hemiparesis  (HCC)  -     Ambulatory referral to Orthotist/Prosthetist; Future  -     Orthotics B/L    Impaired gait  -     Ambulatory referral to Orthotist/Prosthetist; Future  -     Orthotics B/L      PLAN:  1. Oral antispasticity medications: None recommended at this time.  2. Physical/occupational therapy: Continue outpatient PT and OT. Discharged from SLP after reaching long-term goals.   3. Splinting/Bracing: Recommend trial of RLE ToeOFF, light-weight, carbon fiber AFO for assistance with steps/hurdles/uneven surfaces. May not require device in the long-term depending on RLE ankle dorsiflexion motor recovery, however would potentially benefit now and can be trialed with PT.  4. OK for NMES, TENS unit. Will discuss OTC TENS brands with therapy to order online.   5. She is interested in eventually returning to driving. Discussed gradual return, and recommending [facility] evaluation when patient feels ready to start. Discussed this with patient and daughter today, will fax information to [facility].     Return in about 6 months (around 6/6/2025).  ?  *I have spent 50 minutes with Patient and family today in which greater than 50% of this time was spent in counseling/coordination of care regarding Prognosis, Patient and family education, Importance of tx compliance, Risk factor reductions, Impressions, Counseling / Coordination of care, Documenting in the medical record, Reviewing / ordering tests, medicine, procedures  , Obtaining or reviewing history  , and Communicating with other healthcare professionals .  ?  Dk De Dios MD  Attending Physician  Physical Medicine and Rehabilitation  [facility]

## 2024-12-06 NOTE — PATIENT INSTRUCTIONS
[email or web address]  Phone: [phone]  Fax: [phone]    - [facility], Ph [phone] ([place]), Ph [phone] ([place]), Ph [phone] ([place])  - [facility], Ph [phone] ([place]), Ph [phone] ([place])  - [employer], Ph [phone]  - [facility], Ph [phone]  - [employer] [facility] (Malcom Russell [facility]), Ph [phone], Fax [phone]

## 2024-12-06 NOTE — PROGRESS NOTES
Name: Maye Lilly      : 1960      MRN: [id number]  Encounter Provider: Dk De Dios MD  Encounter Date: 2024   Encounter department: [facility]  :  History of Present Illness   HPI  Review of Systems   Constitutional:  Negative for appetite change, fatigue and fever.   HENT: Negative.  Negative for hearing loss, tinnitus, trouble swallowing and voice change.    Eyes: Negative.  Negative for photophobia, pain and visual disturbance.   Respiratory: Negative.  Negative for shortness of breath.    Cardiovascular: Negative.  Negative for palpitations.   Gastrointestinal: Negative.  Negative for nausea and vomiting.   Endocrine: Negative.  Negative for cold intolerance.   Genitourinary: Negative.  Negative for dysuria, frequency and urgency.   Musculoskeletal:  Positive for gait problem. Negative for back pain, myalgias, neck pain and neck stiffness.   Skin: Negative.  Negative for rash.   Allergic/Immunologic: Negative.    Neurological:  Positive for weakness. Negative for dizziness, tremors, seizures, syncope, facial asymmetry, speech difficulty, light-headedness, numbness and headaches.   Hematological: Negative.  Does not bruise/bleed easily.   Psychiatric/Behavioral: Negative.  Negative for confusion, hallucinations and sleep disturbance.     I have personally reviewed the MA's review of systems and made changes as necessary.    Objective   /54 (BP Location: Right arm, Patient Position: Sitting, Cuff Size: Adult)   Pulse 73   Temp 97.7 °F (36.5 °C) (Temporal)   Wt 118 kg (259 lb 6.4 oz)   SpO2 95%   BMI 44.53 kg/m²

## 2024-12-09 ENCOUNTER — OFFICE VISIT (OUTPATIENT)
Dept: PHYSICAL THERAPY | Facility: CLINIC | Age: 64
End: 2024-12-09
Payer: COMMERCIAL

## 2024-12-09 ENCOUNTER — EVALUATION (OUTPATIENT)
Dept: OCCUPATIONAL THERAPY | Facility: CLINIC | Age: 64
End: 2024-12-09
Payer: COMMERCIAL

## 2024-12-09 DIAGNOSIS — R26.89 BALANCE DISORDER: ICD-10-CM

## 2024-12-09 DIAGNOSIS — R26.9 NEUROLOGIC GAIT DISORDER: Primary | ICD-10-CM

## 2024-12-09 DIAGNOSIS — I63.9 LEFT PONTINE CVA (HCC): ICD-10-CM

## 2024-12-09 DIAGNOSIS — I63.9 ACUTE CVA (CEREBROVASCULAR ACCIDENT) (HCC): Primary | ICD-10-CM

## 2024-12-09 PROCEDURE — 97112 NEUROMUSCULAR REEDUCATION: CPT

## 2024-12-09 PROCEDURE — 97110 THERAPEUTIC EXERCISES: CPT

## 2024-12-09 NOTE — PROGRESS NOTES
OCCUPATIONAL THERAPY PROGRESS NOTE:    11/7/24  Maye Lilly  1960  [id number]  Megan Portillo PA-C   Diagnosis ICD-10-CM Associated Orders   1. Acute CVA (cerebrovascular accident) ([facility])  I63.9             Assessment/Plan    Skilled Analysis:  Maye Lilly is a 64 y.o. female referred to Occupational Therapy s/p Acute CVA (cerebrovascular accident) ([facility]) [I63.9].   Pt participated in skilled OT progress note and following formalized testing as well as clinical observation, Pt presents with the following areas of deficit:  RUE weakness, decreased pinch and  strength, proximal pain, decreased ROM (AROM & PROM), decreased independence with ADLs and IADLs, and decreased endurance. Pt reports that she has stopped wearing shoulder subluxation splint as much. She does still report some pain in shoulder with movement but has improved significantly. She also has been wearing compression glove on R hand for decreased swelling with good compliance to wear schedule with noted improvement in edema. Pt has demo good compliance with HEPs provided at this time as well as continues with daily stretching at home both in supine and when seated. Pt hs improved overall with AROM as well as  and pinch testing this date able to be completed. Pt continues to be educated on stroke recovery timeline. Pt will benefit from continued skilled Occupational Therapy services 2-3x/week for 8 more weeks with focus on neuro re-ed, there ex, there act, and ADL/IADL retraining.  Maye Lilly is in agreement with POC.      POC expires Unit limit Auth Expiration date PT/OT/ST + Visit Limit?   01/1/25  6 PT/OT 12/4/24 BOMN   1/1/25 12/24/24                        Visit/Unit Tracking  20 Approved- 10/9/24- 12/4/24 Date 11/7  PN 11/8 11/12 11/13 11/15 11/19 11/20 11/22 11/25      Used 12 13 14 15 16 17 18 19 20      Remaining  8 7 6 5 4 3 2 1 0     Visit/Unit Tracking  8 Approved-12/24/24 Date 11/26 11/27 12/2 12/04 12/6 12/9  PN          Used 1 2 3 4 5 6         Remaining  7 6 5 4 3 2          Duration in weeks: 12 weeks, 2-3x  Plan of care beginning date: 10/9/24  Plan of care expiration date: 01/1/25   Re-eval: 1/1/25    Precautions: HTN, GERD, R shoulder pain              GOALS    Short Term Goals (6 weeks)  Strength/Endurance  - Pt will increase R hand functional  with ability to sustain grasp on medium size objects for 5 sec to improve independence with ADLs PROGRESSING  - Pt will demo with G tolerance to supine, seated, and in stance exercise x 15 minutes with minimal rest breaks required for increased engagement in life roles and weekly exercise regimen PROGRESSING  - Pt will demo with G carryover of HEP to improve functional progression towards goals in POC and for improved functional use of RUE PROGRESSING    AROM/PROM  - Pt will be able to perform 90* degrees shoulder flexion of proximal RUE in supine to demo increased strength and ROM of affected UE for improved ADLs/IADLs PROGRESSING; 80* seated   - Pt will be able to perform full range elbow flexion of distal RUE to demo increased strength and ROM of affected UE for improved ADLs/IADLs PARTIALLY MET  - Pt will demo good carryover of clinic and home pain and swelling reduction strategies for improved AROM initiation with functional reach and grasp with ADL function PROGRESSING    FMC/GMC  - Pt will be able to actively grasp 2 pegs for initiation of 9HPT demo improved FMC and and pinch. GOAL MET   - Pt will demo improvement with pulling on trousers on Neuro QOL form to with a little difficulty demo improved coordination and precision for FM tasks and ADLs GOAL MET  - Pt will be able to complete thumb opposition to digits 2-4 demo improved motor control and digit ROM GOAL MET; can oppose to digits 2-4  - Pt will demo improved motor learning of constructional and new motor actions for improved b/l coordination and motor planning evident by 50% accuracy for fxnl ADL/IADL  performance PROGRESSING    Functional Performance  - Pt will increase RUE to semi functional (dependent stabilizer, independent stabilizer, gross assist, semi-functional, functional, fully functional) assist with for ADL/IADL and tabletop tasks for improved functional performance of life roles and salient tasks PROGRESSING  - Pt will demo G comprehension of adaptive equipment (long handled reacher/sponge/shoehorn, toileting aid ) use in clinic to improve independence in ADL management in home environment GOAL MET; using sock aid and was using dressing stock    Modalities  - Pt/family will demo G understanding and carryover of use of home NMES unit as prescribed to inc carryover of ROM and strengthening strategies of R UE PROGRESSING      Long Term Goals (12 weeks)  Strength/Endurance  - Pt will be able to successfully complete strength testing demo 2-5 lbs RUE  strength for improved completion of ADLs PROGRESSING  - Pt will demo with G tolerance to supine, seated, and in stance exercise x 25 minutes with minimal rest breaks required for increased engagement in life roles and weekly exercise regimen  PROGRESSING    AROM/PROM  - Pt will be able to perform 90* degrees seated shoulder flexion of proximal RUE to demo increased strength and ROM of affected UE for improved ADLs/IADLs PROGRESSING  - Pt will demo good carryover of clinic and home pain and swelling reduction strategies for improved AROM initiation with functional reach and grasp with ADL function PROGRESSING    FMC/GMC  - Pt will be able to complete 9HPT in < 1 min demo improved FMC and coordination. PROGRESSING  - Pt will demo improvement with pulling on trousers on Neuro QOL form to with no difficulty demo improved coordination and precision for FM tasks and ADLs PROGRESSING  - Pt will be able to complete thumb opposition to all digits demo improved coordination and functional use of RUE.  PROGRESSING  - Pt will demo improved motor learning of  "constructional and new motor actions for improved b/l coordination and motor planning evident by 75% accuracy for fxnl ADL/IADL performance PROGRESSING    Functional Performance  - Pt will increase RUE to functional (dependent stabilizer, independent stabilizer, gross assist, semi-functional, functional, fully functional) assist with for ADL/IADL and tabletop tasks for improved functional performance of life roles and salient tasks PROGRESSING    Modalities  - Pt will tolerate BIONESS/NMES/IASTM for improved motor and sensory performance for overall improved strength, tone reduction, and sensation to inc safety and engagement with ADL/IADL function PROGRESSING      Subjective    PATIENT GOAL: \"Grab something, pull up my pants, wipe myself, and writing/signing my name\"    Patient-Specific Functional Scale   Task is scored 0 (unable to perform activity) to 10 (ability to perform activity independently)    Activity Date: Date:   ADLs     2.  Grasp/use of R hand     3.   ROM      4.   Strength and endurance         HISTORY OF PRESENT ILLNESS:     Pt is a 64 y.o. female who was referred to Occupational Therapy s/p Acute CVA (cerebrovascular accident) ([facility]) [I63.9].  Pt reports that the day her stroke happened she was at work. She felt that the ground was moving but thought it could have been due to her forgetting her glasses's and straining her eyes. She also ended up throwing up multiple times and left work. When she got home she went to sleep. The next morning she still was not feeling right, called out of work and drove herself to the hospital because she felt weak overall. She continued to feel weak in the hospital but was still able to move her UE and LE. She then was sent for an MRI, and when she came back to get into bed is when she realized she was unable to and no longer had function of her R upper and lower extremity. She then spent 3 weeks at the [facility] and then 3 weeks with home care. She does not feel " "difficulty with sensation but rather just weak and heavy. Pt wears a shoulder subluxation splint for comfort but due to large size feels it is not doing much. At this time she is on medical leave from work as a . She currently does not drive. She lives with her daughter and her son occasionally comes in and out. Her daughter works full time and so does her son but he works from home. Pt reports that she does not have difficulty with bed mobility or toileting when using the commode. She is able to don and doff her pants off her hips but will sometimes need help. If she is alone, she has a dressing stick to help pull them up on her R side. She reports that she bathroom and shower are very small, and for the time being is showering at a friends house with a walk in/ADA shower due to a past family member who was using it. She will only use her bathroom toilet when someone is home to help her with hygiene due to small space, otherwise will use commode. She occasionally needs assistance with UB dressing for shirts but wears sun dresses at home that she reports are not difficult.     Per chart review  on 8/20/24, \"Ms. Maye Lilly is a 65 yo woman with a past medical history notable for HTN and atrial fibrillation who presented to the [facility] on 8/10/24 with acute dysarthria, facial droop and right hemiparesis. A stroke alert was activated, CT head wo contrast was negative for acute findings, CTA of the head/neck showed no evidence of large vessel occlusion and patent cervical carotid and vertebral arteries. MRI of the brain revealed an acute left pontine infarct. Course complicated by severe hypertension requiring multiple agents to obtain control.   - Acute left pontine ischemic infarct: Clinically presented on 8/10/24 with right hemiparesis, dysarthria and facial droop, Stroke etiology: HTN, small vessel disease, MR brain on 8/11/24 revealed a small area of acute/subacute ischemia within the central luz to the " "left of midline without acute hemorrhage\"      PMH:   Past Medical History:   Diagnosis Date    Acute CVA (cerebrovascular accident) (HCC) 2024    Atrial fibrillation (HCC)     Disease of thyroid gland     Hypertension          Pain Levels   Restin    With Activity:  0      Objective    Impairment Observations:            DANIEL Kruse           UPPER EXTREMITY FUNCTION   Impaired Intact Dominant Hand: R     /PINCH STRENGTH             Dynamometer    - Gross Grasp 2 lbs 40 lbs low  R- improved x 2 lbs   Pinch Meter     - Pincer 3 lbs  11 lbs low  L improved x 1 lb  R- improved x 3 lbs    - Tripod 4 lbs 12 lbs low  R- improved x 4 lbs    - Lateral 6 lbs   15 lbs low  R improved x 4 lbs     AROM (Seated)             Scapula   - Retraction/Protraction  - Elevation/Depression  - Upward/Downward rotation      Shoulder FF 80* WFL  Improved   Shoulder Ext 50* WFL  Remained    Shoulder internal rotation  Full  Improved   Shoulder external rotation  3/4  Improved   Shoulder Abd 70* WFL  Improved   Shoulder Add 3/4 WFL  Improved   Horizontal Abd   WFL     Horizontal Add   WFL     Elbow Flex 3/4 WFL  Improved   Elbow Ext Full   WFL     Pronation Full  WFL     Supination Near Full  WFL  Improved   Wrist Flex 3/4  WFL  Improved   Wrist Ext Neutral   WFL  remained   Digit Flex 3/4 WFL  Improved   Digit Ex Full WFL  Improved    Composite Grasp 1/2 WFL  Remained   Hook Grasp   WFL     Subluxation  Pain    Slight but improved     AROM (Supine)            Shoulder *      Shoulder Ext  Near full      Shoulder ABD 75*    Remained   Shoulder ADD        Horizontal ABD        Horizontal ADD        Elbow Flex  3/4      Elbow Ext Full       Pronation Full    Resting position   Supination Near full       Wrist Flex  Near full      Wrist Ext 3/4       Digit Flex 3/4       Digit Ext  Full        PROM (Supine position)           Shoulder FF 90*   Pain   Shoulder Ext Full      Shoulder ABD 90*      Shoulder ADD Full     "   Horizontal ABD      Horizontal ADD        Elbow Flex 3/4       Elbow Ext Full       Wrist Flex Near Full       Wrist Ext Near Full        Digit Flex 3/4    Slight swelling but improved   Digit Ext Full     Supination 3/4     Pronation Full     Resting positin      MMT             Shoulder FF 3/5 5/5 R improved   Shoulder Ext 3/5 5/5 R improved   Shoulder Abduction 2+/5 5/5 R improved   Shoulder Adduction 2+/5 5/5 R improved   Elbow Flex 4/5 5/5 R improved   Elbow Ext 4/5 5/5 R improved   Wrist Flex 3+/5 5/5 R improved   Wrist Ext 3/5 5/5 R improved   Gross Grasp 3/5 5/5 R improved     SENSATION      Monofilament Testing  Normal: 2.83 mm    Diminished light touch: 3.61 mm    Diminished protective sensation: 4.31 mm    Loss of protective sensation: 4.56    Complete loss of sensation / deep pressure: 6.65 DNT DNT    Sharp / Dull       Proprioception      Hot/Cold Temp      Stereognosis         COORDINATION      Opposition Able to touch thumb to index and middle on lateral aspect  WFL    Finger to Nose Unable WFL    Rapid Alternating Movement Unable WFL    9 Hole Peg Test 1 min 52 sec  24 seconds abnormal  R- improved; able to complete    Fxnl Dexterity Test Unable  26 seconds abnormal  DNT 12/9/24   Michael Hand Function Test         - Handwriting  seconds  seconds      TONE: MODIFIED CINDY SCALE      No increase in muscle tone (0) Biceps, triceps,wrist extensors, and digits flex/ext     Slight Increase in muscle tone with catch and release or min resist at end range (1) Wrist extensors      Slight Increase in muscle tone with catch and release, followed by min resistance through remainder of range (1+)      Increased muscle tone through full range, able to be moved easily (2)      Considerable increase in tone, difficult to move (3)      Rigid in Flexion/Extension (4)                  Neuro QOL  Upper Extremity Function (Fine motor, ADL):     Are you able to turn a key in a lock? With a little difficulty  Are you  able to brush your teeth? Without any difficulty  Are you able to make a phone call using a touch tone or key-pad? Without any difficulty  Are you able to  coins from a table top? Without any difficulty  Are you are able to write with a pen or pencil With much difficulty  Are you able to open and close a zipper? Unable to do  Are you able to wash and dry your body? With a little difficulty  Are you able to shampoo your hair? Without any difficulty  Are you able to open previously opened jars? With some difficulty  Are you able to hold a plate full of food? With a little difficulty  Are you able to pull on trousers? With a little difficulty  Are you able to button your shirt? With much difficulty  Are you able to trim your fingernails? Unable to do  Are you able to cut your toe nails? Unable to do  Are you able to bend down and  clothing from the floor? With some difficulty  How much DIFFICULTY do you currently have using a spoon to eat a meal? No difficulty  How much DIFFICULTY do you currently have putting on a pullover shirt? A little difficulty  How much DIFFICULTY do you currently have taking off a pullover shirt? Some difficulty  How much DIFFICULTY do you currently have removing wrappings from small objects? A little difficulty  How much DIFFUCULTY do you currently have opening medications or vitamin containers (e.g. childproof containers, small bottles)? Can't do      OTHER PLANNED THERAPY INTERVENTIONS:   Supine, seated, and in stance neuro re-ed  Task-Oriented Training  Tricep AG  NMES/FES  Cryotherapy for tone reduction  Hot Packs for pain  TENS for pain  FMC/prehension  GMC  Proximal to distal teaming  Timed Trials  Manual tx  IASTM  Hand to target  Sensory re-ed (Cutaneous/Proprioceptive)  Seated functional reach: crossing midline  Supine place and hold  WBearing strategies   Closed chain activities  Open chain activities  Mirror Therapy  HEP  Orthotic Development    Todays Treatment:  Supine on mat, pt engaged in therx using 2# tbar in FF w/place and hold increasing AROM with reps achieving near 3/4 end range 3x10.   Pt then completed fwd/bwd rows 3x10; Pt with added 1# CW applied to RUE during last set of each.

## 2024-12-09 NOTE — PROGRESS NOTES
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 Balance disorder  R26.89       3. Left pontine CVA (HCC)  I63.9                      Subjective: \" She worked me really well last time. I was sore and tired. \"       Objective: See treatment diary below      Assessment: Tolerated treatment well. Patient would benefit from continued PT . Feels she is less dependant on SPC. Improved RLE clearance with second lap of hurdles which she feels is her most challenging activity. Required less and shorter rest breaks.       Plan: Continue per plan of care.      Short Term Goal Expiration Date: 24  Long Term Goal Expiration Date: 25  POC Expiration Date: 25        POC expires Unit limit Auth Expiration date PT/OT/ST + Visit Limit?   25                                   Visit/Unit Tracking  AUTH Status:  Date            16 (11/15-25) Used       8 9 10 11       Remaining        8 7 6 5           Specialty Daily Treatment Diary  Precautions: Fall risk, past Hx R knee arthritis (can be stiff and swollen at the start of the day)      Manuals 24                                           Neuro Re-Ed  ABC         HIGT  SOLO  SPC 6 6'' hurdles  X2 laps fwd    2 6'' steps, 1 4'' step SPC use x 2 laps fwd    Stepping up onto/down from airex pads (4)  L SPC  UL LE focus/length  X2 laps fwd    TM 0.5-0.7 mph BUE use  X5 min SOLO   SPC 6 6\" hurdles  X2 laps fwd    2 6\" steps, 1 4\" step SPC use x 2 laps fwd      Stepping up/onto/down from airex pads (4) L SPC UL LE  Focus/length  X2 laps fwd      TM 0.7 mph  BUE use x5 min  Solo  No AD  3# BL AW  16' length  Stacking x3 1\" blocks using R UE thumb/2nd/3rd/4th/5th digit pincer grasp at either end  X5 " "rounds  X4 rounds  X1 round unstacking all at either end (104 R UE stacking/pincer movements total), 10 laps fwd total    16' length  3# BL AW beach ball catch/toss x1 lap fwd  Doff AW  X1 lap fwd Solo  No AD  16' length  X3 laps fwd  X2 laps lat    Solo  // bars (10')  4-5\" hurdles (4)  L UE  Reverse step-overs  L LE lead x1 length  R LE Lead x3 lengths  X3 laps    Solo  No AD  16' length  Timed walking as fast as possible  X2 rounds from standing  X2 rounds from sitting        hurdles          step taps      Solo  // bars  L UE   4\" reverse step taps to fatigue ea LE                                            Ther Ex          Nu step                                                                                Ther Activity                             Gait Training 6 MWT, 5xSTS, TUG, Gait speed         Indoors                   Modalities                                                              "

## 2024-12-11 ENCOUNTER — OFFICE VISIT (OUTPATIENT)
Dept: PHYSICAL THERAPY | Facility: CLINIC | Age: 64
End: 2024-12-11
Payer: COMMERCIAL

## 2024-12-11 ENCOUNTER — OFFICE VISIT (OUTPATIENT)
Dept: OCCUPATIONAL THERAPY | Facility: CLINIC | Age: 64
End: 2024-12-11
Payer: COMMERCIAL

## 2024-12-11 DIAGNOSIS — R26.9 NEUROLOGIC GAIT DISORDER: Primary | ICD-10-CM

## 2024-12-11 DIAGNOSIS — I63.9 ACUTE CVA (CEREBROVASCULAR ACCIDENT) (HCC): Primary | ICD-10-CM

## 2024-12-11 DIAGNOSIS — R26.89 BALANCE DISORDER: ICD-10-CM

## 2024-12-11 DIAGNOSIS — I63.9 LEFT PONTINE CVA (HCC): ICD-10-CM

## 2024-12-11 PROCEDURE — 97112 NEUROMUSCULAR REEDUCATION: CPT

## 2024-12-11 PROCEDURE — 97116 GAIT TRAINING THERAPY: CPT

## 2024-12-11 NOTE — PROGRESS NOTES
Daily Note     Today's date: 2024  Patient name: Maye Lilly  : 1960  MRN: [id number]  Referring provider: Megan Portillo PA-C  Dx:   Encounter Diagnosis     ICD-10-CM    1. 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 Balance disorder  R26.89       3. Left pontine CVA (HCC)  I63.9           Start Time: 09  Stop Time: 1015  Total time in clinic (min): 55 minutes    Subjective: Reports that she is doing well. Has felt like things are going well in therapy.       Objective: See treatment diary below      Assessment: Tolerated treatment well. Continued per primary therapist POC. Initiated session in parallel bars for warm up, per patient request, hurdles in the parallel bars today to agin more confidence while performing them. Had more difficulty with lateral stepping hurdles, going towards the L. Able to trial bouts on treadmill interspersed by rest break to allow for greater total time. Patient demonstrated fatigue post treatment, exhibited good technique with therapeutic exercises, and would benefit from continued PT      Plan: Continue per plan of care.  Progress treatment as tolerated.       Short Term Goal Expiration Date: 24  Long Term Goal Expiration Date: 25  POC Expiration Date: 25        POC expires Unit limit Auth Expiration date PT/OT/ST + Visit Limit?   25                                   Visit/Unit Tracking  AUTH Status:  Date           16 (11/15-25) Used       8 9 10 11 12      Remaining        8 7 6 5 4          Specialty Daily Treatment Diary  Precautions: Fall risk, past Hx R knee arthritis (can be stiff and swollen at the start of the day)      Manuals 24                                           Neuro Re-Ed  ABC         HIGT  SOLO  SPC 6 6'' hurdles  X2 laps fwd    2 6'' steps, 1 4'' step SPC use x 2 laps fwd    Stepping up onto/down from airex pads (4)  L SPC  UL  "LE focus/length  X2 laps fwd    TM 0.5-0.7 mph BUE use  X5 min SOLO   SPC 6 6\" hurdles  X2 laps fwd    2 6\" steps, 1 4\" step SPC use x 2 laps fwd      Stepping up/onto/down from airex pads (4) L SPC UL LE  Focus/length  X2 laps fwd      TM 0.7 mph  BUE use x5 min //bar step taps 6\" 30x ea     //bar hurdles but with SPC no hand on bars 3x1 laps fwd and lat (7 hurdles) (6/10 RPE)     Stepping up onto [place] (medium) x30 ea side UUE //bar     Solo  No AD  16' length  X3 laps fwd  X2 laps lat    Solo  // bars (10')  4-5\" hurdles (4)  L UE  Reverse step-overs  L LE lead x1 length  R LE Lead x3 lengths  X3 laps    Solo  No AD  16' length  Timed walking as fast as possible  X2 rounds from standing  X2 rounds from sitting        hurdles          step taps      Solo  // bars  L UE   4\" reverse step taps to fatigue ea LE                                            Ther Ex          Nu step                                                                                Ther Activity                             Gait Training 6 MWT, 5xSTS, TUG, Gait speed   SOLO TM 0.7 mph 2x3 mins bouts (6 mins tot)       Indoors                   Modalities                                                                "

## 2024-12-11 NOTE — PROGRESS NOTES
"Occupational Therapy Daily Note:    Today's date: 2024  Patient name: Maye Lilly  : 1960  MRN: [id number]  Referring provider: Megan Portillo PA-C  Dx:   Encounter Diagnosis   Name Primary?    Acute CVA (cerebrovascular accident) (HCC) Yes                POC expires Unit limit Auth Expiration date PT/OT/ST + Visit Limit?   25  6 PT/OT 24 BOMN   25                        Visit/Unit Tracking  20 Approved- 10/9/24- 24 Date   PN 11/8 11/12 11/13 11/15 11/19 11/20 11/22 11/25      Used 12 13 14 15 16 17 18 19 20      Remaining  8 7 6 5 4 3 2 1 0     Visit/Unit Tracking  8 Approved-24 Date   PN         Used 1 2 3 4 5 6 7        Remaining  7 6 5 4 3 2 1         Duration in weeks: 12 weeks, 2-3x  Plan of care beginning date: 10/9/24  Plan of care expiration date: 25   Re-eval: 25    Precautions: HTN, GERD, R shoulder pain   Access Code: [email or web address]      Subjective: \"I need to push through and do as much as I can\"    Objective: See treatment below.  Session focusing on proximal strength/stability, coordination, open/closed hand, pinch , body awareness, intrinsic strengthening and overall activity endurance improving safety and indep in ADL/IADL mngt.      Neuro Re-ed: Supine on mat, pt completed FF AROM to 1/2-3/4 shoulder flexion 3x10 using 1# tbar with good tolerance. Pt then with added 2# CW to RUE completed FF with 2# tbar for added difficulty. Pt with good ability to complete movements with slow and controlled for increased muscle activation. Pt taking rest breaks as needed.     Started pt supine on mat for AROM FF reaching and gross grasp, pt reaching overhead to grasp foam piece and then slowly into shoulder ext to place on wooden dowels being held anterior. Pt demo fatigue with FF movements, then switching to seated EOM adducting to reach to L side to grasp foam pieces and then Abd to reach all the way to R to " place on wooden dowels. Pt with increased AROM in abd noted while completing activity and good gross/sustained grasp throughout.     Pt seated EOM with R hand using wrist maze. Pt educated to remove stabilization of arm on body for increased AG with activity as well as focus on using wrist in all planes (flex/ext/deviations) rather than shaking to get bead from one side of maze to another. Pt completed 105 with increased focus for activity for proper use and target of wrist movements.     Assessment: Tolerated treatment well. Pt continues to progress in proximal AROM, hand strength and FM abilities as evidenced by increased proximal stability and AROM >1/2 end range w/o report of pain.  Patient would benefit from continued skilled OT.    Plan: Continued skilled OT per POC

## 2024-12-13 ENCOUNTER — OFFICE VISIT (OUTPATIENT)
Dept: OCCUPATIONAL THERAPY | Facility: CLINIC | Age: 64
End: 2024-12-13
Payer: COMMERCIAL

## 2024-12-13 ENCOUNTER — OFFICE VISIT (OUTPATIENT)
Dept: PHYSICAL THERAPY | Facility: CLINIC | Age: 64
End: 2024-12-13
Payer: COMMERCIAL

## 2024-12-13 DIAGNOSIS — I63.9 LEFT PONTINE CVA (HCC): ICD-10-CM

## 2024-12-13 DIAGNOSIS — R26.89 BALANCE DISORDER: ICD-10-CM

## 2024-12-13 DIAGNOSIS — R26.9 NEUROLOGIC GAIT DISORDER: Primary | ICD-10-CM

## 2024-12-13 DIAGNOSIS — I63.9 ACUTE CVA (CEREBROVASCULAR ACCIDENT) (HCC): Primary | ICD-10-CM

## 2024-12-13 PROCEDURE — 97116 GAIT TRAINING THERAPY: CPT

## 2024-12-13 PROCEDURE — 97110 THERAPEUTIC EXERCISES: CPT

## 2024-12-13 PROCEDURE — 97112 NEUROMUSCULAR REEDUCATION: CPT

## 2024-12-13 NOTE — PROGRESS NOTES
Daily Note     Today's date: 2024  Patient name: Maye Lilly  : 1960  MRN: [id number]  Referring provider: Megan Portillo PA-C  Dx:   Encounter Diagnosis     ICD-10-CM    1. 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 Balance disorder  R26.89       3. Left pontine CVA (HCC)  I63.9             Start Time: 0930  Stop Time: 1015  Total time in clinic (min): 45 minutes    Subject : Pt reports she cont to feel she improving during ambulation at home using SPC.      Objective: See treatment diary below      Assessment: Tolerated treatment well. Continued per primary therapist POC. Initiated session in parallel bars for warm up, per patient request, hurdles in the parallel bars today to agin more confidence while performing them.  Pt improving  a little better with lateral stepping hurdles, going towards the L then last session .Patient demonstrated fatigue post treatment, exhibited good technique with therapeutic exercises, and would benefit from continued PT      Plan: Continue per plan of care.  Progress treatment as tolerated.       Short Term Goal Expiration Date: 24  Long Term Goal Expiration Date: 25  POC Expiration Date: 25        POC expires Unit limit Auth Expiration date PT/OT/ST + Visit Limit?   25                                   Visit/Unit Tracking  AUTH Status:  Date          16 (11/15-25) Used       8 9 10 11 12 13     Remaining        8 7 6 5 4 3         Specialty Daily Treatment Diary  Precautions: Fall risk, past Hx R knee arthritis (can be stiff and swollen at the start of the day)      Manuals 24                                           Neuro Re-Ed  ABC         HIGT  SOLO  SPC 6 6'' hurdles  X2 laps fwd    2 6'' steps, 1 4'' step SPC use x 2 laps fwd    Stepping up onto/down from airex pads (4)  L SPC  UL LE focus/length  X2 laps fwd    TM 0.5-0.7 mph BUE  "use  X5 min SOLO   SPC 6 6\" hurdles  X2 laps fwd    2 6\" steps, 1 4\" step SPC use x 2 laps fwd      Stepping up/onto/down from airex pads (4) L SPC UL LE  Focus/length  X2 laps fwd      TM 0.7 mph  BUE use x5 min //bar step taps 6\" 30x ea     //bar hurdles but with SPC no hand on bars 3x1 laps fwd and lat (7 hurdles) (6/10 RPE)     Stepping up onto [place] (medium) x30 ea side UUE //bar    // bar step taps 6\" 30x ea     //bar hurdles but with SPC no hand on bars 3x1 laps fwd and lat (7 hurdles) (6/10 RPE)     Stepping up on airex balance pad 4 pads x2 sets with SPC in // bars    //bars 6 inch box 4inch box and then 6inch box x2 sets     with SPC  Solo  No AD  16' length  X3 laps fwd  X2 laps lat    Solo  // bars (10')  4-5\" hurdles (4)  L UE  Reverse step-overs  L LE lead x1 length  R LE Lead x3 lengths  X3 laps    Solo  No AD  16' length  Timed walking as fast as possible  X2 rounds from standing  X2 rounds from sitting        hurdles          step taps      Solo  // bars  L UE   4\" reverse step taps to fatigue ea LE                                            Ther Ex          Nu step                                                                                Ther Activity                             Gait Training 6 MWT, 5xSTS, TUG, Gait speed   SOLO TM 0.7 mph 2x3 mins bouts (6 mins tot)  Walking w/o solo just gait belt no assist /stand by in rouse way 80 feet     Indoors                   Modalities                                                                "

## 2024-12-13 NOTE — PROGRESS NOTES
"Occupational Therapy Daily Note:    Today's date: 2024  Patient name: Maye Lilly  : 1960  MRN: [id number]  Referring provider: Megan Portillo PA-C  Dx:   Encounter Diagnosis   Name Primary?    Acute CVA (cerebrovascular accident) (HCC) Yes                  POC expires Unit limit Auth Expiration date PT/OT/ST + Visit Limit?   25  6 PT/OT 24 BOMN   25                        Visit/Unit Tracking  20 Approved- 10/9/24- 24 Date   PN 11/8 11/12 11/13 11/15 11/19 11/20 11/22 11/25      Used 12 13 14 15 16 17 18 19 20      Remaining  8 7 6 5 4 3 2 1 0     Visit/Unit Tracking  8 Approved-24 Date   PN        Used 1 2 3 4 5 6 7 8       Remaining  7 6 5 4 3 2 1 0        Duration in weeks: 12 weeks, 2-3x  Plan of care beginning date: 10/9/24  Plan of care expiration date: 25   Re-eval: 25    Precautions: HTN, GERD, R shoulder pain   Access Code: [email or web address]      Subjective: \"I feel like If I do this bike all the time itll really help me, with a good stretch and use of my shoulder\"    Objective: See treatment below.  Session focusing on proximal strength/stability, coordination, open/closed hand, pinch , body awareness, intrinsic strengthening and overall activity endurance improving safety and indep in ADL/IADL mngt.      Neuro Re-ed: Pt seated EOM completing AROM with RUE for shoulder FF and wrist and digit AROM for grasp and release. Pt using R hand to grasp velcro card on mat positioned on R side and then reach anterior to place on velcro card board positioned on slant board. Pt with increased difficulty FF to reach top of board with slight assist from L hand for stabilization but overall able to complete with increased time.    There ex: UBE x 4 min prograde x 4 min retrograde w/o resist focusing on increasing proximal RUE strength, cardiopulmonary endurance, AROM, and sustained . Dycem placed on handle for " sustained .      Assessment: Tolerated treatment well. Pt continues to progress in proximal AROM, hand strength and FM abilities as evidenced by increased proximal stability and AROM >1/2 end range w/o report of pain.  Patient would benefit from continued skilled OT.    Plan: Continued skilled OT per POC

## 2024-12-16 ENCOUNTER — OFFICE VISIT (OUTPATIENT)
Dept: OCCUPATIONAL THERAPY | Facility: CLINIC | Age: 64
End: 2024-12-16
Payer: COMMERCIAL

## 2024-12-16 ENCOUNTER — OFFICE VISIT (OUTPATIENT)
Dept: PHYSICAL THERAPY | Facility: CLINIC | Age: 64
End: 2024-12-16
Payer: COMMERCIAL

## 2024-12-16 DIAGNOSIS — R26.9 NEUROLOGIC GAIT DISORDER: Primary | ICD-10-CM

## 2024-12-16 DIAGNOSIS — R26.89 BALANCE DISORDER: ICD-10-CM

## 2024-12-16 DIAGNOSIS — I63.9 ACUTE CVA (CEREBROVASCULAR ACCIDENT) (HCC): Primary | ICD-10-CM

## 2024-12-16 DIAGNOSIS — I63.9 LEFT PONTINE CVA (HCC): ICD-10-CM

## 2024-12-16 PROCEDURE — 97112 NEUROMUSCULAR REEDUCATION: CPT

## 2024-12-16 PROCEDURE — 97110 THERAPEUTIC EXERCISES: CPT

## 2024-12-16 NOTE — PROGRESS NOTES
Daily Note     Today's date: 2024  Patient name: Maye Lilly  : 1960  MRN: [id number]  Referring provider: Megan Portillo PA-C  Dx:   Encounter Diagnosis     ICD-10-CM    1. 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 Balance disorder  R26.89       3. Left pontine CVA (HCC)  I63.9           Start Time: 09  Stop Time: 1015  Total time in clinic (min): 45 minutes    Subjective: Doing well today! Notices her R knee muscles need more strengthening for better control when standing on her R LE to lift her L LE. Notes in the last week she has been able to lift her R LE higher than before!      Objective: See treatment diary below      Assessment: Focused on challenging R LE SLS control and step amplitude with merna negotiation and R knee muscle eccentric control per subjective intake with retro step-downs. Was able to progress merna negotiation with 3# BL AW and step height further challenging R LE recruitment. Plan to progress hurdles to SPC NV or consider // bar use with finger touch/no UE WB PRN.      Plan: Continue per plan of care.  Progress treatment as tolerated.       Short Term Goal Expiration Date: 24  Long Term Goal Expiration Date: 25  POC Expiration Date: 25        POC expires Unit limit Auth Expiration date PT/OT/ST + Visit Limit?   25                                   Visit/Unit Tracking  AUTH Status:  Date 16 (11/15-25) Used 14                Remaining  2                    Specialty Daily Treatment Diary  Precautions: Fall risk, past Hx R knee arthritis (can be stiff and swollen at the start of the day)      Manuals 24                                           Neuro Re-Ed  ABC         HIGT  SOLO  SPC 6 6'' hurdles  X2 laps fwd    2 6'' steps, 1 4'' step SPC use x 2 laps fwd    Stepping up onto/down from airex pads (4)  L SPC  UL LE focus/length  X2 laps fwd    TM 0.5-0.7  "mph BUE use  X5 min SOLO   SPC 6 6\" hurdles  X2 laps fwd    2 6\" steps, 1 4\" step SPC use x 2 laps fwd      Stepping up/onto/down from airex pads (4) L SPC UL LE  Focus/length  X2 laps fwd      TM 0.7 mph  BUE use x5 min //bar step taps 6\" 30x ea     //bar hurdles but with SPC no hand on bars 3x1 laps fwd and lat (7 hurdles) (6/10 RPE)     Stepping up onto [place] (medium) x30 ea side UUE //bar    // bar step taps 6\" 30x ea     //bar hurdles but with SPC no hand on bars 3x1 laps fwd and lat (7 hurdles) (6/10 RPE)     Stepping up on airex balance pad 4 pads x2 sets with SPC in // bars    //bars 6 inch box 4inch box and then 6inch box x2 sets     with SPC  // bars (10')  Solo  L UE  4-5\" hurdles (5) Alt LE step-to   X1 lap  Add: 3# BL AW  X4 laps    End of session  3# BL AW  No AD  14' length  X1 lap fwd  X1 lap bwd  X1 lap lat         hurdles          step taps          R Knee Control      For carryover to R SLS stability:    // bars  Solo  3# BL AW  L UE (with goal of reducing UE WB)  4\" fwd step up lead R LE, retro step down lead L LE  X15     6\"  x15                                   Ther Ex          Nu step                                                                                Ther Activity                             Gait Training 6 MWT, 5xSTS, TUG, Gait speed   SOLO TM 0.7 mph 2x3 mins bouts (6 mins tot)  Walking w/o solo just gait belt no assist /stand by in rouse way 80 feet     Indoors                   Modalities                                                                  "

## 2024-12-16 NOTE — PROGRESS NOTES
"Occupational Therapy Daily Note:    Today's date: 2024  Patient name: Maye Lilly  : 1960  MRN: [id number]  Referring provider: Megan Portillo PA-C  Dx:   Encounter Diagnosis   Name Primary?    Acute CVA (cerebrovascular accident) (HCC) Yes                POC expires Unit limit Auth Expiration date PT/OT/ST + Visit Limit?   25  6 PT/OT 24 BOMN   25                        Visit/Unit Tracking  20 Approved- 10/9/24- 24 Date   PN 11/8 11/12 11/13 11/15 11/19 11/20 11/22 11/25      Used 12 13 14 15 16 17 18 19 20      Remaining  8 7 6 5 4 3 2 1 0     Visit/Unit Tracking  8 Approved-24 Date   PN       Used 1 2 3 4 5 6 7 8 9      Remaining  7 6 5 4 3 2 1 0 TBD       Duration in weeks: 12 weeks, 2-3x  Plan of care beginning date: 10/9/24  Plan of care expiration date: 25   Re-eval: 25    Precautions: HTN, GERD, R shoulder pain   Access Code: [email or web address]      Subjective: \"I have been really stretching at home\"    Objective: See treatment below.  Session focusing on proximal strength/stability, coordination, open/closed hand, pinch , body awareness, intrinsic strengthening and overall activity endurance improving safety and indep in ADL/IADL mngt.      There ex: UBE x 5 min prograde x 5 min retrograde w/o resist focusing on increasing proximal RUE strength, cardiopulmonary endurance, AROM, and sustained . Dycem placed on handle for sustained .      Pt seated EOM provided red theraputty to continue with HEP at home. Pt working through one round of exercises with added thumb adduction demo good ability and tolerance to increased resistance.     Pt seated EOM then completing red power web for focus in increased digit and wrist extension, 3x10 with 3-5 sec holds at end range. Pt reports slight wrist pain at start of activity but then able to tolerance once completing a few reps     Ended session with pt " completing 5lb green digiflex with 3 sec holds at end range with focus on incorporation of flexion of all digits.     Assessment: Tolerated treatment well. Pt continues to progress in proximal AROM, hand strength and FM abilities as evidenced by increased proximal stability and AROM >1/2 end range w/o report of pain. Pt provided hand out of wted bars for carry over of supine FF stretching. Patient would benefit from continued skilled OT.    Plan: Continued skilled OT per POC

## 2024-12-17 ENCOUNTER — OFFICE VISIT (OUTPATIENT)
Dept: OCCUPATIONAL THERAPY | Facility: CLINIC | Age: 64
End: 2024-12-17
Payer: COMMERCIAL

## 2024-12-17 ENCOUNTER — OFFICE VISIT (OUTPATIENT)
Dept: PHYSICAL THERAPY | Facility: CLINIC | Age: 64
End: 2024-12-17
Payer: COMMERCIAL

## 2024-12-17 DIAGNOSIS — R26.9 NEUROLOGIC GAIT DISORDER: Primary | ICD-10-CM

## 2024-12-17 DIAGNOSIS — I63.9 ACUTE CVA (CEREBROVASCULAR ACCIDENT) (HCC): Primary | ICD-10-CM

## 2024-12-17 DIAGNOSIS — R26.89 BALANCE DISORDER: ICD-10-CM

## 2024-12-17 PROCEDURE — 97112 NEUROMUSCULAR REEDUCATION: CPT

## 2024-12-17 PROCEDURE — 97110 THERAPEUTIC EXERCISES: CPT

## 2024-12-17 NOTE — PROGRESS NOTES
"Occupational Therapy Daily Note:    Today's date: 2024  Patient name: Maye Lilly  : 1960  MRN: [id number]  Referring provider: Megan Portillo PA-C  Dx:   Encounter Diagnosis   Name Primary?    Acute CVA (cerebrovascular accident) (HCC) Yes                POC expires Unit limit Auth Expiration date PT/OT/ST + Visit Limit?   25  6 PT/OT 24 BOMN   25                        Visit/Unit Tracking  20 Approved- 10/9/24- 24 Date   PN 11/8 11/12 11/13 11/15 11/19 11/20 11/22 11/25      Used 12 13 14 15 16 17 18 19 20      Remaining  8 7 6 5 4 3 2 1 0     Visit/Unit Tracking  8 Approved-24 Date   PN      Used 1 2 3 4 5 6 7 8 9 10     Remaining  7 6 5 4 3 2 1 0 TBD TBD      Duration in weeks: 12 weeks, 2-3x  Plan of care beginning date: 10/9/24  Plan of care expiration date: 25   Re-eval: 25    Precautions: HTN, GERD, R shoulder pain   Access Code: [email or web address]      Subjective: \"I think I found the same bars we use here to order and use at home\"    Objective: See treatment below.  Session focusing on proximal strength/stability, coordination, open/closed hand, pinch , body awareness, intrinsic strengthening and overall activity endurance improving safety and indep in ADL/IADL mngt.      Neuro re-ed: Pt supine on mat with wedge positioned underneath. Pt started with completing 5x AROM FF and extension with good control achieving > 1/2 ROM. PT then completed 2x10 sets of AROM FF/Ext with added error for increased resistance. Pt able to tolerance more resistance when going into ext. Decreased error added in FF with focus on control of movement and decreased body compensation.    There ex: Pt seated EOM working on distal control and functional use of  and pinch strength with yellow/red therapin. Pt instructed to use clip to  pom poms and reach to table to place into correct color corresponding " container. Pt the upgraded half way through task to red therapain with ability to complete. Once pom poms placed in each color, pt then removing pom poms from 3/6 containers in same fashion.     Assessment: Tolerated treatment well. Pt continues to progress in proximal AROM, hand strength and FM abilities as evidenced by increased proximal stability and AROM >1/2 end range w/o report of pain. Patient would benefit from continued skilled OT.    Plan: Continued skilled OT per POC

## 2024-12-17 NOTE — PROGRESS NOTES
Daily Note     Today's date: 2024  Patient name: Maye Lilly  : 1960  MRN: [id number]  Referring provider: Megan Portillo PA-C  Dx:   Encounter Diagnosis     ICD-10-CM    1. 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 Balance disorder  R26.89           Start Time: 930  Stop Time: 1015  Total time in clinic (min): 45 minutes    Subjective: pt reports she is comfortable doing exercises outside of the // bars today and has noticed she is using her effected arm more often recently      Objective: See treatment diary below      Assessment: Tolerated treatment well. Patient would benefit from continued PT hurdles performed outside of the bars today w UE on a decreasingly stable surface of SPC as compared to // bar. Pt performs w dec speed however good form. On initial stairs lap pt utilizes forearm on stair railing however on subsequent laps HH on railing only. Ascent w RLE descent e LLE for inc challenge to effected side. Complaint surface included fro ankle strategies.       Plan: Continue per plan of care.      Short Term Goal Expiration Date: 24  Long Term Goal Expiration Date: 25  POC Expiration Date: 25        POC expires Unit limit Auth Expiration date PT/OT/ST + Visit Limit?   25                                   Visit/Unit Tracking  AUTH Status:  Date              16 (11/15-25) Used 14 15               Remaining  2 1                   Specialty Daily Treatment Diary  Precautions: Fall risk, past Hx R knee arthritis (can be stiff and swollen at the start of the day)      Manuals 24                                           Neuro Re-Ed           HIGT SOLO  SPC 6 6'' hurdles  X2 laps    Trainer steps LUE use  X3 laps up and over (6'' steps)    Solo complaint surface on and off 3 airex w SPC  X3 laps    Walking no AD  X3  laps 20 ft SOLO  SPC 6 6'' hurdles  X2 laps fwd    2 6'' steps, 1 4''  "step SPC use x 2 laps fwd    Stepping up onto/down from airex pads (4)  L SPC  UL LE focus/length  X2 laps fwd    TM 0.5-0.7 mph BUE use  X5 min SOLO   SPC 6 6\" hurdles  X2 laps fwd    2 6\" steps, 1 4\" step SPC use x 2 laps fwd      Stepping up/onto/down from airex pads (4) L SPC UL LE  Focus/length  X2 laps fwd      TM 0.7 mph  BUE use x5 min //bar step taps 6\" 30x ea     //bar hurdles but with SPC no hand on bars 3x1 laps fwd and lat (7 hurdles) (6/10 RPE)     Stepping up onto [place] (medium) x30 ea side UUE //bar    // bar step taps 6\" 30x ea     //bar hurdles but with SPC no hand on bars 3x1 laps fwd and lat (7 hurdles) (6/10 RPE)     Stepping up on airex balance pad 4 pads x2 sets with SPC in // bars    //bars 6 inch box 4inch box and then 6inch box x2 sets     with SPC  // bars (10')  Solo  L UE  4-5\" hurdles (5) Alt LE step-to   X1 lap  Add: 3# BL AW  X4 laps    End of session  3# BL AW  No AD  14' length  X1 lap fwd  X1 lap bwd  X1 lap lat         hurdles          step taps          R Knee Control      For carryover to R SLS stability:    // bars  Solo  3# BL AW  L UE (with goal of reducing UE WB)  4\" fwd step up lead R LE, retro step down lead L LE  X15     6\"  x15                                   Ther Ex          Nu step                                                                                Ther Activity                             Gait Training    SOLO TM 0.7 mph 2x3 mins bouts (6 mins tot)  Walking w/o solo just gait belt no assist /stand by in rouse way 80 feet     Indoors                   Modalities                                                                    "

## 2024-12-20 ENCOUNTER — OFFICE VISIT (OUTPATIENT)
Dept: OCCUPATIONAL THERAPY | Facility: CLINIC | Age: 64
End: 2024-12-20
Payer: COMMERCIAL

## 2024-12-20 ENCOUNTER — OFFICE VISIT (OUTPATIENT)
Dept: PHYSICAL THERAPY | Facility: CLINIC | Age: 64
End: 2024-12-20
Payer: COMMERCIAL

## 2024-12-20 DIAGNOSIS — I63.9 ACUTE CVA (CEREBROVASCULAR ACCIDENT) (HCC): Primary | ICD-10-CM

## 2024-12-20 DIAGNOSIS — R26.89 BALANCE DISORDER: ICD-10-CM

## 2024-12-20 DIAGNOSIS — I63.9 LEFT PONTINE CVA (HCC): ICD-10-CM

## 2024-12-20 DIAGNOSIS — R26.9 NEUROLOGIC GAIT DISORDER: Primary | ICD-10-CM

## 2024-12-20 PROCEDURE — 97112 NEUROMUSCULAR REEDUCATION: CPT

## 2024-12-20 PROCEDURE — 97110 THERAPEUTIC EXERCISES: CPT

## 2024-12-20 NOTE — PROGRESS NOTES
"Daily Note     Today's date: 2024  Patient name: Maye Lilly  : 1960  MRN: [id number]  Referring provider: Megan Portillo PA-C  Dx:   Encounter Diagnosis     ICD-10-CM    1. 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 Balance disorder  R26.89       3. Left pontine CVA (HCC)  I63.9           Start Time: 0930  Stop Time: 1015  Total time in clinic (min): 45 minutes    Subjective: Doing well today! No new c/o.      Objective: See treatment diary below      Assessment: Continued HIGT to maximize recovery s/p CVA. Was able to progress from // bars today with SPC and ankle cuff weight resistance with merna negotiation but still lacks functional R hip flexion for appropriate obstacle clearance over 4 inches contributing to fall risk factors.      Plan: Continue per plan of care.  Progress treatment as tolerated.       Short Term Goal Expiration Date: 24  Long Term Goal Expiration Date: 25  POC Expiration Date: 25        POC expires Unit limit Auth Expiration date PT/OT/ST + Visit Limit?   25                                   Visit/Unit Tracking  AUTH Status:  Date              NEEDS AUTH (11/15-25) Used 14 15 16              Remaining  2 1 0                  Specialty Daily Treatment Diary  Precautions: Fall risk, past Hx R knee arthritis (can be stiff and swollen at the start of the day)      Manuals 24                                           Neuro Re-Ed           HIGT SOLO  SPC 6 6'' hurdles  X2 laps    Trainer steps LUE use  X3 laps up and over (6'' steps)    Solo complaint surface on and off 3 airex w SPC  X3 laps    Walking no AD  X3  laps 20 ft Solo  16' length  X1 lap fwd  X1 lap lat  X1 lap bwd    3# BL AW  L SPC  Hurdles (3 4\", 3 6\" alternating)  Lead L LE fwd attempt x1 length fwd  6x4\" hurdles  Equal stepping practice with LE's   X3 laps fwd    Doff AW  No SPC  Airex pads " "(5)  Stepping up to/down from foam with R LE WB focus x3 laps      SOLO   SPC 6 6\" hurdles  X2 laps fwd    2 6\" steps, 1 4\" step SPC use x 2 laps fwd      Stepping up/onto/down from airex pads (4) L SPC UL LE  Focus/length  X2 laps fwd      TM 0.7 mph  BUE use x5 min //bar step taps 6\" 30x ea     //bar hurdles but with SPC no hand on bars 3x1 laps fwd and lat (7 hurdles) (6/10 RPE)     Stepping up onto [place] (medium) x30 ea side UUE //bar    // bar step taps 6\" 30x ea     //bar hurdles but with SPC no hand on bars 3x1 laps fwd and lat (7 hurdles) (6/10 RPE)     Stepping up on airex balance pad 4 pads x2 sets with SPC in // bars    //bars 6 inch box 4inch box and then 6inch box x2 sets     with SPC  // bars (10')  Solo  L UE  4-5\" hurdles (5) Alt LE step-to   X1 lap  Add: 3# BL AW  X4 laps    End of session  3# BL AW  No AD  14' length  X1 lap fwd  X1 lap bwd  X1 lap lat         hurdles          step taps          R Knee Control      For carryover to R SLS stability:    // bars  Solo  3# BL AW  L UE (with goal of reducing UE WB)  4\" fwd step up lead R LE, retro step down lead L LE  X15     6\"  x15                                   Ther Ex          Nu step                                                                                Ther Activity                             Gait Training    SOLO TM 0.7 mph 2x3 mins bouts (6 mins tot)  Walking w/o solo just gait belt no assist /stand by in rouse way 80 feet     Indoors                   Modalities                                                                      "

## 2024-12-20 NOTE — PROGRESS NOTES
"Occupational Therapy Daily Note:    Today's date: 2024  Patient name: Maye Lilly  : 1960  MRN: [id number]  Referring provider: Megan Portillo PA-C  Dx:   Encounter Diagnosis   Name Primary?    Acute CVA (cerebrovascular accident) (HCC) Yes                  POC expires Unit limit Auth Expiration date PT/OT/ST + Visit Limit?   25  6 PT/OT 24 BOMN   1/1/25  12/24/24    1/1/25  3/10/2                  Visit/Unit Tracking  20 Approved- 10/9/24- 24 Date   PN 11/8 11/12 11/13 11/15 11/19 11/20 11/22 11/25      Used 12 13 14 15 16 17 18 19 20      Remaining  8 7 6 5 4 3 2 1 0     Visit/Unit Tracking  8 Approved-24 Date   PN     Used 1 2 3 4 5 6 7 8    Remaining  7 6 5 4 3 2 1 0     Visit/Unit Tracking  6 Approved-24-3/10/25 Date             Used 5 4 3            Remaining  1 2 3             Duration in weeks: 12 weeks, 2-3x  Plan of care beginning date: 10/9/24  Plan of care expiration date: 25   Re-eval: 25    Precautions: HTN, GERD, R shoulder pain   Access Code: [email or web address]      Subjective: \"I really feel like having a stim unit at home will help me with my hand\" Pt educated on use of stim unit and provided hand out per request    Objective: See treatment below.  Session focusing on proximal strength/stability, coordination, open/closed hand, pinch , body awareness, intrinsic strengthening and overall activity endurance improving safety and indep in ADL/IADL mngt.      Neuro re-ed: Pt seated EOM with activity focusing on grasp/release and increased digit ROM. Started with stim on wrist and digit flexors, however removed due to impeding active movement that Pt has. Pt then working on in hand manipulation with small marbles. Pt instructed to use R hand to grasp 2-3 marbles with pincer grasp, then manipulate into palm of hand. Once placed, pt stabilizing in hand with ulnar aspect of hand and then " translating from palm to digits to place onto green peg board. Pt completing about 20 marbles and then removing in same fashion. Slight shaking and use of gravity towards end of activity due to fatigue.     There ex: UBE x 5 min prograde x 5 min retrograde at no resist focusing on increasing proximal RUE strength, cardiopulmonary endurance, AROM, and sustained  with dycem placed for increased assistance.      Assessment: Tolerated treatment well. Pt continue working on active grasp and release throughout next sessions. Patient would benefit from continued skilled OT.    Plan: Continued skilled OT per POC

## 2024-12-23 ENCOUNTER — OFFICE VISIT (OUTPATIENT)
Dept: OCCUPATIONAL THERAPY | Facility: CLINIC | Age: 64
End: 2024-12-23
Payer: COMMERCIAL

## 2024-12-23 ENCOUNTER — OFFICE VISIT (OUTPATIENT)
Dept: PHYSICAL THERAPY | Facility: CLINIC | Age: 64
End: 2024-12-23
Payer: COMMERCIAL

## 2024-12-23 DIAGNOSIS — R26.89 BALANCE DISORDER: ICD-10-CM

## 2024-12-23 DIAGNOSIS — R26.9 NEUROLOGIC GAIT DISORDER: Primary | ICD-10-CM

## 2024-12-23 DIAGNOSIS — I63.9 ACUTE CVA (CEREBROVASCULAR ACCIDENT) (HCC): Primary | ICD-10-CM

## 2024-12-23 DIAGNOSIS — I63.9 LEFT PONTINE CVA (HCC): ICD-10-CM

## 2024-12-23 PROCEDURE — 97112 NEUROMUSCULAR REEDUCATION: CPT

## 2024-12-23 PROCEDURE — 97110 THERAPEUTIC EXERCISES: CPT

## 2024-12-23 NOTE — PROGRESS NOTES
"Occupational Therapy Daily Note:    Today's date: 2024  Patient name: Maye Lilly  : 1960  MRN: [id number]  Referring provider: Megan Portillo PA-C  Dx:   Encounter Diagnosis   Name Primary?    Acute CVA (cerebrovascular accident) (HCC) Yes                POC expires Unit limit Auth Expiration date PT/OT/ST + Visit Limit?   25  6 PT/OT 24 BOMN   1/1/25  12/24/24    1/1/25  3/10/2                  Visit/Unit Tracking  20 Approved- 10/9/24- 24 Date   PN 11/8 11/12 11/13 11/15 11/19 11/20 11/22 11/25      Used 12 13 14 15 16 17 18 19 20      Remaining  8 7 6 5 4 3 2 1 0     Visit/Unit Tracking  8 Approved-24 Date   PN     Used 1 2 3 4 5 6 7 8    Remaining  7 6 5 4 3 2 1 0     Visit/Unit Tracking  6 Approved-24-3/10/25 Date            Used 5 4 3 2           Remaining  1 2 3 1            Duration in weeks: 12 weeks, 2-3x  Plan of care beginning date: 10/9/24  Plan of care expiration date: 25   Re-eval: 25    Precautions: HTN, GERD, R shoulder pain   Access Code: [email or web address]      Subjective: \"I can't believe the stretching at home I do is helping a lot, its not much but its helping\"    Objective: See treatment below.  Session focusing on proximal strength/stability, coordination, open/closed hand, pinch , body awareness, intrinsic strengthening and overall activity endurance improving safety and indep in ADL/IADL mngt.      There ex: Pt engaged in therex utilizing 4# tbar supine, focusing on proximal strength/endurance,  strength improving fxl reach/gripping abilities increasing indep in home mngt and self care tasks. Pt completed, fwd rows with 4# bar 3x10 and then bwd rows with 3# bar, 3x10 achieving near full range w/o report of pain. Pt then completing AROM FF and ABD to > 1/2 range with slow and controlled movements into extension from end range of FF position.     Pt then seated EOM " completing towel scrunches with RUE for increased digit ROM flex/ext. Pt with focus on moving each digit to grasp towel and then once able to have full amount in hand, pt completing hand squeezes 5x. Overall good fxl movement of digits with slight difficulty with thumb AROM.    Assessment: Tolerated treatment well. Pt continue working on active grasp and release throughout next sessions. Patient would benefit from continued skilled OT.    Plan: Continued skilled OT per POC

## 2024-12-23 NOTE — PROGRESS NOTES
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 Balance disorder  R26.89       3. Left pontine CVA (HCC)  I63.9           Start Time: 0930  Stop Time: 1015  Total time in clinic (min): 45 minutes    Subjective: Doing well today! Walking at home a little with no AD and is surprised how well she does.      Objective: See treatment diary below      Assessment: Continued HIGT intensity progression with 5# BL AW, minimal UE support, and step amplitude demands. Noting improvements in SLS stability and stepping reactions. Challenged with increasing R LE step amplitude as trailing LE clearing obstacles.      Plan: Continue per plan of care.  Progress treatment as tolerated.       Short Term Goal Expiration Date: 24  Long Term Goal Expiration Date: 25  POC Expiration Date: 25        POC expires Unit limit Auth Expiration date PT/OT/ST + Visit Limit?   25                                   Visit/Unit Tracking  AUTH Status:  Date            Awaiting auth Used 14 15 16 1             Remaining  2 1 0                  Specialty Daily Treatment Diary  Precautions: Fall risk, past Hx R knee arthritis (can be stiff and swollen at the start of the day)      Manuals 24                                           Neuro Re-Ed           HIGT SOLO  SPC 6 6'' hurdles  X2 laps    Trainer steps LUE use  X3 laps up and over (6'' steps)    Solo complaint surface on and off 3 airex w SPC  X3 laps    Walking no AD  X3  laps 20 ft Solo  16' length  X1 lap fwd  X1 lap lat  X1 lap bwd    3# BL AW  L SPC  Hurdles (3 4\", 3 6\" alternating)  Lead L LE fwd attempt x1 length fwd  6x4\" hurdles  Equal stepping practice with LE's   X3 laps fwd    Doff AW  No " "SPC  Airex pads (5)  Stepping up to/down from foam with R LE WB focus x3 laps      Solo  5# BL AW  No AD  16' length  X1 lap fwd  X1 lap bwd  X1 lap lat    Pool noodles (6)  5# BL AW  Step-to lead R LE no UE  X1 length fwd  Step-to lead L LE L SPC  X1 length fwd  X4 rounds - focus on external cues for R LE step amplitude, especially when trailing LE    Side stepping L SPC hover  5# BL AW  16' length   X2 laps //bar step taps 6\" 30x ea     //bar hurdles but with SPC no hand on bars 3x1 laps fwd and lat (7 hurdles) (6/10 RPE)     Stepping up onto [place] (medium) x30 ea side UUE //bar    // bar step taps 6\" 30x ea     //bar hurdles but with SPC no hand on bars 3x1 laps fwd and lat (7 hurdles) (6/10 RPE)     Stepping up on airex balance pad 4 pads x2 sets with SPC in // bars    //bars 6 inch box 4inch box and then 6inch box x2 sets     with SPC  // bars (10')  Solo  L UE  4-5\" hurdles (5) Alt LE step-to   X1 lap  Add: 3# BL AW  X4 laps    End of session  3# BL AW  No AD  14' length  X1 lap fwd  X1 lap bwd  X1 lap lat         hurdles          step taps          R Knee Control   Solo  STS from standard chair, L LE elevated on 2\" step, R UE from/to armrest  2x5   For carryover to R SLS stability:    // bars  Solo  3# BL AW  L UE (with goal of reducing UE WB)  4\" fwd step up lead R LE, retro step down lead L LE  X15     6\"  x15                                   Ther Ex          Nu step                                                                                Ther Activity                             Gait Training    SOLO TM 0.7 mph 2x3 mins bouts (6 mins tot)  Walking w/o solo just gait belt no assist /stand by in rouse way 80 feet     Indoors                   Modalities                                                                        "

## 2024-12-27 ENCOUNTER — OFFICE VISIT (OUTPATIENT)
Dept: OCCUPATIONAL THERAPY | Facility: CLINIC | Age: 64
End: 2024-12-27
Payer: COMMERCIAL

## 2024-12-27 ENCOUNTER — OFFICE VISIT (OUTPATIENT)
Dept: PHYSICAL THERAPY | Facility: CLINIC | Age: 64
End: 2024-12-27
Payer: COMMERCIAL

## 2024-12-27 DIAGNOSIS — R26.89 BALANCE DISORDER: ICD-10-CM

## 2024-12-27 DIAGNOSIS — R26.9 NEUROLOGIC GAIT DISORDER: Primary | ICD-10-CM

## 2024-12-27 DIAGNOSIS — I63.9 LEFT PONTINE CVA (HCC): ICD-10-CM

## 2024-12-27 DIAGNOSIS — I63.9 ACUTE CVA (CEREBROVASCULAR ACCIDENT) (HCC): Primary | ICD-10-CM

## 2024-12-27 PROCEDURE — 97112 NEUROMUSCULAR REEDUCATION: CPT

## 2024-12-27 PROCEDURE — 97530 THERAPEUTIC ACTIVITIES: CPT

## 2024-12-27 NOTE — PROGRESS NOTES
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 Balance disorder  R26.89       3. Left pontine CVA (HCC)  I63.9           Start Time: 0915  Stop Time: 1015  Total time in clinic (min): 60 minutes    Subjective: Doing well today! Had a nice [place]. Came 15 min early so able to be seen for additional time.      Objective: See treatment diary below      Assessment: Returned to compliant surface stepping practice to challenge SLS and trunk stability. Notes that she is putting less weight through her SPC with airex pad stepping, as well as no longer curling her toes here for balance. Was able to progress to foam beam negotiation and complete lateral stepping without SPC support, as well as river rock negotiation!      Plan: Continue per plan of care.  Progress treatment as tolerated.       Short Term Goal Expiration Date: 24  Long Term Goal Expiration Date: 25  POC Expiration Date: 25        POC expires Unit limit Auth Expiration date PT/OT/ST + Visit Limit?   25                         Visit/Unit Tracking  AUTH Status:  Date           4 Used 14 15 16 1 2            Remaining  2 1 0 3 2                Specialty Daily Treatment Diary  Precautions: Fall risk, past Hx R knee arthritis (can be stiff and swollen at the start of the day)      Manuals 24                                           Neuro Re-Ed           HIGT SOLO  SPC 6 6'' hurdles  X2 laps    Trainer steps LUE use  X3 laps up and over (6'' steps)    Solo complaint surface on and off 3 airex w SPC  X3 laps    Walking no AD  X3  laps 20 ft Solo  16' length  X1 lap fwd  X1 lap lat  X1 lap bwd    3# BL AW  L SPC  Hurdles (3 4\", 3 " "6\" alternating)  Lead L LE fwd attempt x1 length fwd  6x4\" hurdles  Equal stepping practice with LE's   X3 laps fwd    Doff AW  No SPC  Airex pads (5)  Stepping up to/down from foam with R LE WB focus x3 laps      Solo  5# BL AW  No AD  16' length  X1 lap fwd  X1 lap bwd  X1 lap lat    Pool noodles (6)  5# BL AW  Step-to lead R LE no UE  X1 length fwd  Step-to lead L LE L SPC  X1 length fwd  X4 rounds - focus on external cues for R LE step amplitude, especially when trailing LE    Side stepping L SPC hover  5# BL AW  16' length   X2 laps Solo  No AD  17' length  X2 laps side stepping    X5 airex pads 1.5' apart, SPC PRN stepping up/down fwd lead UL LE/length  3x1 lap    River rocks  8' length  X5 small  X5 medium, same ea side, L SPC  2x1 lap fwd    No AD  17' length  3 Kg MB BUE carry at abdomen  X2 laps fwd  X1 lap bwd       // bar step taps 6\" 30x ea     //bar hurdles but with SPC no hand on bars 3x1 laps fwd and lat (7 hurdles) (6/10 RPE)     Stepping up on airex balance pad 4 pads x2 sets with SPC in // bars    //bars 6 inch box 4inch box and then 6inch box x2 sets     with SPC  // bars (10')  Solo  L UE  4-5\" hurdles (5) Alt LE step-to   X1 lap  Add: 3# BL AW  X4 laps    End of session  3# BL AW  No AD  14' length  X1 lap fwd  X1 lap bwd  X1 lap lat         hurdles          step taps          R Knee Control   Solo  STS from standard chair, L LE elevated on 2\" step, R UE from/to armrest  2x5   For carryover to R SLS stability:    // bars  Solo  3# BL AW  L UE (with goal of reducing UE WB)  4\" fwd step up lead R LE, retro step down lead L LE  X15     6\"  x15                                   Ther Ex          Nu step                                                                                Ther Activity                             Gait Training     Walking w/o solo just gait belt no assist /stand by in rouse way 80 feet     Indoors                   Modalities                                 "

## 2024-12-27 NOTE — PROGRESS NOTES
"Occupational Therapy Daily Note:    Today's date: 2024  Patient name: Maye Lilly  : 1960  MRN: [id number]  Referring provider: Megan Portillo PA-C  Dx:   Encounter Diagnosis   Name Primary?    Acute CVA (cerebrovascular accident) (HCC) Yes                POC expires Unit limit Auth Expiration date PT/OT/ST + Visit Limit?   25  6 PT/OT 24 BOMN   1/1/25  12/24/24    1/1/25  3/10/24 6 approved                 Visit/Unit Tracking  20 Approved- 10/9/24- 24 Date   PN 11/8 11/12 11/13 11/15 11/19 11/20 11/22 11/25      Used 12 13 14 15 16 17 18 19 20      Remaining  8 7 6 5 4 3 2 1 0     Visit/Unit Tracking  8 Approved-24 Date   PN     Used 1 2 3 4 5 6 7 8    Remaining  7 6 5 4 3 2 1 0     Visit/Unit Tracking  6 Approved-24-3/10/25 Date           Used 5 4 3 2 1          Remaining  1 2 3 4 5           Duration in weeks: 12 weeks, 2-3x  Plan of care beginning date: 10/9/24  Plan of care expiration date: 25   Re-eval: 25    Precautions: HTN, GERD, R shoulder pain   Access Code: [email or web address]      Subjective: \"Yeah when I go to the bank I have to give them all of my information because I can not sign my name\"    Objective: See treatment below.  Session focusing on proximal strength/stability, coordination, open/closed hand, pinch , body awareness, intrinsic strengthening and overall activity endurance improving safety and indep in ADL/IADL mngt.      Neuro re-ed: Pt in unsupported stance pt engaged in cone  transfer from left>right using RUE retrieving in pronation and placing to target on top of one another in supination. Tables positioned waist high promoting shoulder flex (<1/2 end range), abduction and proximal stability during fxl task.  Pt with 3 noted drops throughout task completing 2x.     Pt then seated EOM completing 10x AROM shoulder FF 1/2 range, ABD <1/2 range, protraction and " shoulder shrugs.    There Act:  Pt then engaged in writing task with pencil and build up foam added. Started with tracing lines, cross, and diagonals. Once completed pt moving on to swirls. Pt with 50-75% line adherence for all tracing with goof concentration to task. Pt provided with worksheets to complete at home with writing words.     Assessment: Tolerated treatment well. Pt continue working on active grasp and release throughout next sessions. Patient would benefit from continued skilled OT.    Plan: Continued skilled OT per POC

## 2024-12-30 ENCOUNTER — OFFICE VISIT (OUTPATIENT)
Dept: PHYSICAL THERAPY | Facility: CLINIC | Age: 64
End: 2024-12-30
Payer: COMMERCIAL

## 2024-12-30 ENCOUNTER — OFFICE VISIT (OUTPATIENT)
Dept: OCCUPATIONAL THERAPY | Facility: CLINIC | Age: 64
End: 2024-12-30
Payer: COMMERCIAL

## 2024-12-30 DIAGNOSIS — R26.89 BALANCE DISORDER: ICD-10-CM

## 2024-12-30 DIAGNOSIS — I63.9 ACUTE CVA (CEREBROVASCULAR ACCIDENT) (HCC): Primary | ICD-10-CM

## 2024-12-30 DIAGNOSIS — I63.9 LEFT PONTINE CVA (HCC): ICD-10-CM

## 2024-12-30 DIAGNOSIS — R26.9 NEUROLOGIC GAIT DISORDER: Primary | ICD-10-CM

## 2024-12-30 PROCEDURE — 97112 NEUROMUSCULAR REEDUCATION: CPT

## 2024-12-30 PROCEDURE — 97110 THERAPEUTIC EXERCISES: CPT

## 2024-12-30 NOTE — PROGRESS NOTES
"Occupational Therapy Daily Note:    Today's date: 2024  Patient name: Maye Lilly  : 1960  MRN: [id number]  Referring provider: Megan Portillo PA-C  Dx:   Encounter Diagnosis   Name Primary?    Acute CVA (cerebrovascular accident) (HCC) Yes                  POC expires Unit limit Auth Expiration date PT/OT/ST + Visit Limit?   25  6 PT/OT 24 BOMN   1/1/25  12/24/24    1/1/25  3/10/24 6 approved                 Visit/Unit Tracking  8 Approved-24 Date   PN     Used 1 2 3 4 5 6 7 8    Remaining  7 6 5 4 3 2 1 0     Visit/Unit Tracking  6 Approved-24-3/10/25 Date          Used 1 2 3 4 5 6         Remaining  5 4 3 2 1 0          Duration in weeks: 12 weeks, 2-3x  Plan of care beginning date: 10/9/24  Plan of care expiration date: 25   Re-eval: 25    Precautions: HTN, GERD, R shoulder pain   Access Code: [email or web address]      Subjective:  \"I want to be able to do this at home by myself, but it's hard.\"  (Side lying ER)     Objective: See treatment below.  Session focusing on proximal strength/stability, coordination, open/closed hand, pinch , body awareness, intrinsic strengthening and overall activity endurance improving safety and indep in ADL/IADL mngt.      There ex:   GH rotational mobilizations to R shoulder for pain reduction with ROM (possibly subacromial pain).      With 3 lb wrist weight donned, pt completed RUE flexion <> extension with thumb up; Performed repetitively without pain and stabilizing arm throughout entire movement.     Side lying ER's without weight, stabilizing arm into movement for improved posterior chain strength and GH joint control.  OT assisting to externally rotate past end range for slight stretch.    Side lying protraction <> retraction on block for improved scap mobility with OT guiding scap along the way.   Shoulder flexion <> extension on block, guiding scap into " upward / downward rotation      Neuro Re-ed:  Facilitated RUE coordination, proximal to distal teaming, hand to target, sustained R  strength with 3 yellow bands applied. Pt reaching in 3 different planes (extension, abduction, flexion) to ~45-60* of flexion or extension and crossing midline to place to container.  Pt side stepping as needed.    Concluded with picking up and closing shower curtain rings x8 then closing rings for improved R hand strength.    Assessment: Tolerated treatment well. Pt demo dec proprioception and RUE control requiring stabilization and tactile cues to sustain forearm positioning to neutral during movements.  Pt demo slight pain to end range of shoulder ER, would continue to benefit strengthening during this movement. Pt demo fatigue with sustained grasping note by dropping of blocks near end of activity and/or with conversation 2/2 dec proprioception in hand.   Patient would benefit from continued skilled OT.    Plan: Continued skilled OT per POC

## 2024-12-30 NOTE — PROGRESS NOTES
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 Balance disorder  R26.89       3. Left pontine CVA (HCC)  I63.9           Start Time: 1000  Stop Time: 1045  Total time in clinic (min): 45 minutes    Subjective: Doing well today!       Objective: See treatment diary below      Assessment: Continued with foam beam and river rock stepping practice progressing intensity with addition of 3# BL AW. With this configuration required use of SPC for more support but lessened with repeated practice.      Plan: Continue per plan of care.  Progress treatment as tolerated.       Short Term Goal Expiration Date: 24  Long Term Goal Expiration Date: 25  POC Expiration Date: 25        POC expires Unit limit Auth Expiration date PT/OT/ST + Visit Limit?   25                         Visit/Unit Tracking  AUTH Status:  Date          4 Used 14 15 16 1 2 3           Remaining  2 1 0 3 2 1               Specialty Daily Treatment Diary  Precautions: Fall risk, past Hx R knee arthritis (can be stiff and swollen at the start of the day)      Manuals 24                                           Neuro Re-Ed           HIGT SOLO  SPC 6 6'' hurdles  X2 laps    Trainer steps LUE use  X3 laps up and over (6'' steps)    Solo complaint surface on and off 3 airex w SPC  X3 laps    Walking no AD  X3  laps 20 ft Solo  16' length  X1 lap fwd  X1 lap lat  X1 lap bwd    3# BL AW  L SPC  Hurdles (3 4\", 3 6\" alternating)  Lead L LE fwd attempt x1 length fwd  6x4\" hurdles  Equal stepping practice with LE's   X3 laps fwd    Doff AW  No SPC  Airex pads (5)  Stepping up to/down from foam with R LE WB focus x3 laps      " "Solo  5# BL AW  No AD  16' length  X1 lap fwd  X1 lap bwd  X1 lap lat    Pool noodles (6)  5# BL AW  Step-to lead R LE no UE  X1 length fwd  Step-to lead L LE L SPC  X1 length fwd  X4 rounds - focus on external cues for R LE step amplitude, especially when trailing LE    Side stepping L SPC hover  5# BL AW  16' length   X2 laps Solo  No AD  17' length  X2 laps side stepping    X5 airex pads 1.5' apart,   SPC PRN stepping up/down fwd lead UL LE/length  3x1 lap    [place]iver rocks  8' length  X5 small  X4 medium, same ea side, L SPC  2x1 lap fwd    No AD  17' length  3 Kg MB BUE carry at abdomen  X2 laps fwd  X1 lap bwd       Solo  No AD  3# BL AW  X120' fwd    Foam beams x2 in length  L SPC PRN  3# BL AW  X1 lap lat  Doff AW  X1 lap lat    River rocks  9' length  X5 small  X4 medium, same ea side, L SPC PRN  X1 lap fwd  Add 3# BL AW  X1 lap fwd     No AD 14' length  3# BL AW  3 Kg MB BUE carry at abdomen  X3 laps fwd           // bars (10')  Solo  L UE  4-5\" hurdles (5) Alt LE step-to   X1 lap  Add: 3# BL AW  X4 laps    End of session  3# BL AW  No AD  14' length  X1 lap fwd  X1 lap bwd  X1 lap lat         hurdles          step taps          R Knee Control   Solo  STS from standard chair, L LE elevated on 2\" step, R UE from/to armrest  2x5   For carryover to R SLS stability:    // bars  Solo  3# BL AW  L UE (with goal of reducing UE WB)  4\" fwd step up lead R LE, retro step down lead L LE  X15     6\"  x15                                   Ther Ex          Nu step                                                                                Ther Activity                             Gait Training          Indoors                   Modalities                                                                          "

## 2024-12-31 ENCOUNTER — APPOINTMENT (OUTPATIENT)
Dept: OCCUPATIONAL THERAPY | Facility: CLINIC | Age: 64
End: 2024-12-31
Payer: COMMERCIAL

## 2024-12-31 ENCOUNTER — APPOINTMENT (OUTPATIENT)
Dept: PHYSICAL THERAPY | Facility: CLINIC | Age: 64
End: 2024-12-31
Payer: COMMERCIAL

## 2025-01-03 ENCOUNTER — EVALUATION (OUTPATIENT)
Dept: OCCUPATIONAL THERAPY | Facility: CLINIC | Age: 65
End: 2025-01-03
Payer: COMMERCIAL

## 2025-01-03 ENCOUNTER — OFFICE VISIT (OUTPATIENT)
Dept: PHYSICAL THERAPY | Facility: CLINIC | Age: 65
End: 2025-01-03
Payer: COMMERCIAL

## 2025-01-03 DIAGNOSIS — R26.9 NEUROLOGIC GAIT DISORDER: Primary | ICD-10-CM

## 2025-01-03 DIAGNOSIS — I63.9 ACUTE CVA (CEREBROVASCULAR ACCIDENT) (HCC): Primary | ICD-10-CM

## 2025-01-03 DIAGNOSIS — R26.89 BALANCE DISORDER: ICD-10-CM

## 2025-01-03 DIAGNOSIS — I63.9 LEFT PONTINE CVA (HCC): ICD-10-CM

## 2025-01-03 PROCEDURE — 97150 GROUP THERAPEUTIC PROCEDURES: CPT

## 2025-01-03 PROCEDURE — 97112 NEUROMUSCULAR REEDUCATION: CPT

## 2025-01-03 PROCEDURE — 97168 OT RE-EVAL EST PLAN CARE: CPT

## 2025-01-03 PROCEDURE — 97530 THERAPEUTIC ACTIVITIES: CPT

## 2025-01-03 PROCEDURE — 97116 GAIT TRAINING THERAPY: CPT

## 2025-01-03 NOTE — LETTER
"January 3, 2025    Megan Portillo PA-C  [address]  [facility] [address]    Patient: Maye Lilly   YOB: 1960   Date of Visit: 1/3/2025     Encounter Diagnosis     ICD-10-CM    1. 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 Balance disorder  R26.89       3. Left pontine CVA (HCC)  I63.9           Dear Dr. Portillo:    Thank you for your recent referral of Maye Lilly. Please review the attached evaluation summary from Maye's recent visit.     Please verify that you agree with the plan of care by signing the attached order.     If you have any questions or concerns, please do not hesitate to call.     I sincerely appreciate the opportunity to share in the care of one of your patients and hope to have another opportunity to work with you in the near future.       Sincerely,    Janes Razo PT      Referring Provider:      I certify that I have read the below Plan of Care and certify the need for these services furnished under this plan of treatment while under my care.                    Megan Portillo PA-C  [address] [facility] [address]  Via Fax: [phone]          Re-Evaluation    Today's date: 1/3/2025  Patient name: Maye Lilly  : 1960  MRN: [id number]  Referring provider: Megan Portillo PA-C  Dx:   Encounter Diagnosis     ICD-10-CM    1. 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 Balance disorder  R26.89       3. Left pontine CVA (HCC)  I63.9             Start Time: 1100  Stop Time: 1145  Total time in clinic (min): 45 minutes    Subjective: Doing well today!     Patient Goals  To return as closely to her PLOF as possible. \"I want my right foot to be able to lift higher.\"     Pain  Current pain ratin  At best pain ratin  At worst pain ratin    Objective: See treatment diary below    Group: [id number]  1:1 w/PT: [id number]    ABC: 40.63%  PN 24: 51.88%  PN 24: 55.63%  RE 1/3/25: 65.63%     5xSTS: Standard chair, L UE from armrest to no support " "5/5 times, 19.12s  PN 11/7/24: Standard chair, unsupported, L UE from armrest to no support 5/5 times, 13.93s  PN 12/4/24: Standard chair, unsupported, NO UE SUPPORT 15.12s  RE 1/3/25: Standard chair, unsupported, no UE support 11.50s     TUG: Standard chair, hemiwalker, CS, 33.88s   PN 11/7/24: Standard chair, L SPC, CS, 28.22s  PN 12/4/24: Standard chair, L SPC, CS, 22.57s - trialed again with no AD 22.53s   RE 1/3/25: Standard chair L SPC, CS 15.75s - trialed again no AD 15.16s     6 MWT: 200' in 4'45\" with hemiwalker CS, request to sit with 1'15\" left, no standing rest breaks.         PN 11/7/24: ...  PN 12/4/24: 486 ft SPC  RE 1/3/25: 500 ft NO AD     Gait Speed: 0.25 m/s L hemiwalker  PN 11/7/24: 0.29 m/s L SPC  PN 12/4/24: 0.48 m/s L SPC  RE 1/3/25: 0.60 m/s no AD        Assessment: Re-evaluation completed s/p 3 months of PT post-CVA to maximize neuroplastic recovery. Demonstrates clinically significant improvements in LE power output, mobility, gait speed, endurance, balance, and balance confidence. Based on neuroplastic principles and timeline for post-CVA recovery, Cris will benefit from extension of her current POC by 3 additional months to continue to fulfill the timeline for post-CVA recovery in the first 12 months after her CVA. Plan to incorporate slip  and backwards walking on an incline via TM to improve motor programming versus current movement synergy with stepping and maximize R LE strength and step amplitude.      Goals  Short-term goals (to be achieved in 4 weeks):  1. Patient will demonstrate independence with their initial home exercise program. - met  2. Patient will improve 5xSTS to <15.0 seconds demonstrating improved LE strength and power output, while improving safety with sit to stand transfers. - met  3. Patient will improve TUG to <13.5 seconds using least restrictive assistive device indicating improved functional mobility to maximize independence. - ongoing     Long-term " goals (to be achieved in 8 to 12 weeks with additional goals to be incorporated and progressed appropriately pending patient progress):  1. Patient will demonstrate independence with their finalized home exercise program. - ongoing  2. Patient will improve balance as demonstrated by an increase in FGA by > or equal to 4 points to decrease their risk of falls. - to be assessed.  3. Patient will increase SSGS by >0.10 m/s indicating they are walking at a gait speed to allow for greater safety in the community. - met  4. Patient will increase ABC by MDC of >20% indicating improved balance confidence and self-efficacy. - not met but slight improvements made; may be in part due to the high-level functional components of the ABC.     New 4-week goals as of 12/4/24 to reduce risk for falls and maximize overall mobility levels.   Improve TUG by MCID. - met  Improve 5xSTS by MCID. - met  Improve gait speed by MCID. - met  Improve 6 MWT by 10%. - met    New 4-week goals as of 1/3/25 to reduce risk for falls and maximize overall mobility levels.   Improve TUG by MCID.   Improve 5xSTS by MCID.  Improve gait speed by MCID.   Improve 6 MWT by 10%.     Plan: Extend current POC by 3 months.  Patient would benefit from: skilled physical therapy and skilled occupational therapy  Referral necessary: No  Planned modality interventions: biofeedback     Planned therapy interventions: manual therapy, balance, neuromuscular re-education, patient/caregiver education, stretching, strengthening, therapeutic activities, therapeutic exercise, graded exercise, gait training and home exercise program     Frequency: 3x/week as able.  Duration in weeks: 12  Plan of Care beginning date: 1/3/25  Plan of Care expiration date: 4/4/25  Treatment plan discussed with: Patient          POC Expiration Date: 4/4/25        POC expires Unit limit Auth Expiration date PT/OT/ST + Visit Limit?   1/17/25 12/4/24 1/17/25 2/7/25 1/17/25 1/20/25    "   4/4/24 1/20/25               Visit/Unit Tracking  AUTH Status:  Date 12/16 12/17 12/20 12/23 12/27 12/30 1/3        Needs auth Used 14 15 16 1 2 3 4          Remaining  2 1 0 3 2 1 0              Specialty Daily Treatment Diary  Precautions: Fall risk, past Hx R knee arthritis (can be stiff and swollen at the start of the day)      Manuals 12/17 12/20/24 12/23/24 12/27/24 12/30/24 1/3/25                                           Neuro Re-Ed       Pt education; post-CVA recovery timeline, neuroplastic principles, maximization of recovery in first 12 months s/p CVA x10 min.    HIGT SOLO  SPC 6 6'' hurdles  X2 laps    Trainer steps LUE use  X3 laps up and over (6'' steps)    Solo complaint surface on and off 3 airex w SPC  X3 laps    Walking no AD  X3  laps 20 ft Solo  16' length  X1 lap fwd  X1 lap lat  X1 lap bwd    3# BL AW  L SPC  Hurdles (3 4\", 3 6\" alternating)  Lead L LE fwd attempt x1 length fwd  6x4\" hurdles  Equal stepping practice with LE's   X3 laps fwd    Doff AW  No SPC  Airex pads (5)  Stepping up to/down from foam with R LE WB focus x3 laps      Solo  5# BL AW  No AD  16' length  X1 lap fwd  X1 lap bwd  X1 lap lat    Pool noodles (6)  5# BL AW  Step-to lead R LE no UE  X1 length fwd  Step-to lead L LE L SPC  X1 length fwd  X4 rounds - focus on external cues for R LE step amplitude, especially when trailing LE    Side stepping L SPC hover  5# BL AW  16' length   X2 laps Solo  No AD  17' length  X2 laps side stepping    X5 airex pads 1.5' apart,   SPC PRN stepping up/down fwd lead UL LE/length  3x1 lap    River rocks  8' length  X5 small  X4 medium, same ea side, L SPC  2x1 lap fwd    No AD  17' length  3 Kg MB BUE carry at abdomen  X2 laps fwd  X1 lap bwd       Solo  No AD  3# BL AW  X120' fwd    Foam beams x2 in length  L SPC PRN  3# BL AW  X1 lap lat  Doff AW  X1 lap lat    River rocks  9' length  X5 small  X4 medium, same ea side, L SPC PRN  X1 lap fwd  Add 3# BL AW  X1 lap fwd     No AD 14' " "length  3# BL AW  3 Kg MB BUE carry at abdomen  X3 laps fwd               hurdles          step taps          R Knee Control   Solo  STS from standard chair, L LE elevated on 2\" step, R UE from/to armrest  2x5                                    Ther Ex          Nu step                                                                                Ther Activity                             Gait Training      Objective measure testing    Indoors                   Modalities                                                                                          "

## 2025-01-03 NOTE — PROGRESS NOTES
"Re-Evaluation    Today's date: 1/3/2025  Patient name: Maye Lilly  : 1960  MRN: [id number]  Referring provider: Megan Portillo PA-C  Dx:   Encounter Diagnosis     ICD-10-CM    1. 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 Balance disorder  R26.89       3. Left pontine CVA (HCC)  I63.9             Start Time: 1100  Stop Time: 1145  Total time in clinic (min): 45 minutes    Subjective: Doing well today!     Patient Goals  To return as closely to her PLOF as possible. \"I want my right foot to be able to lift higher.\"     Pain  Current pain ratin  At best pain ratin  At worst pain ratin    Objective: See treatment diary below    Group: [id number]  1:1 w/PT: [id number]    ABC: 40.63%  PN 24: 51.88%  PN 24: 55.63%  RE 1/3/25: 65.63%     5xSTS: Standard chair, L UE from armrest to no support 5/5 times, 19.12s  PN 24: Standard chair, unsupported, L UE from armrest to no support 5/5 times, 13.93s  PN 24: Standard chair, unsupported, NO UE SUPPORT 15.12s  RE 1/3/25: Standard chair, unsupported, no UE support 11.50s     TUG: Standard chair, hemiwalker, CS, 33.88s   PN 24: Standard chair, L SPC, CS, 28.22s  PN 24: Standard chair, L SPC, CS, 22.57s - trialed again with no AD 22.53s   RE 1/3/25: Standard chair L SPC, CS 15.75s - trialed again no AD 15.16s     6 MWT: 200' in 4'45\" with hemiwalker CS, request to sit with 1'15\" left, no standing rest breaks.         PN 24: ...  PN 24: 486 ft SPC  RE 1/3/25: 500 ft NO AD     Gait Speed: 0.25 m/s L hemiwalker  PN 24: 0.29 m/s L SPC  PN 24: 0.48 m/s L SPC  RE 1/3/25: 0.60 m/s no AD        Assessment: Re-evaluation completed s/p 3 months of PT post-CVA to maximize neuroplastic recovery. Demonstrates clinically significant improvements in LE power output, mobility, gait speed, endurance, balance, and balance confidence. Based on neuroplastic principles and timeline for post-CVA recovery, Cris will benefit from " extension of her current POC by 3 additional months to continue to fulfill the timeline for post-CVA recovery in the first 12 months after her CVA. Plan to incorporate slip  and backwards walking on an incline via TM to improve motor programming versus current movement synergy with stepping and maximize R LE strength and step amplitude.      Goals  Short-term goals (to be achieved in 4 weeks):  1. Patient will demonstrate independence with their initial home exercise program. - met  2. Patient will improve 5xSTS to <15.0 seconds demonstrating improved LE strength and power output, while improving safety with sit to stand transfers. - met  3. Patient will improve TUG to <13.5 seconds using least restrictive assistive device indicating improved functional mobility to maximize independence. - ongoing     Long-term goals (to be achieved in 8 to 12 weeks with additional goals to be incorporated and progressed appropriately pending patient progress):  1. Patient will demonstrate independence with their finalized home exercise program. - ongoing  2. Patient will improve balance as demonstrated by an increase in FGA by > or equal to 4 points to decrease their risk of falls. - to be assessed.  3. Patient will increase SSGS by >0.10 m/s indicating they are walking at a gait speed to allow for greater safety in the community. - met  4. Patient will increase ABC by MDC of >20% indicating improved balance confidence and self-efficacy. - not met but slight improvements made; may be in part due to the high-level functional components of the ABC.     New 4-week goals as of 12/4/24 to reduce risk for falls and maximize overall mobility levels.   Improve TUG by MCID. - met  Improve 5xSTS by MCID. - met  Improve gait speed by MCID. - met  Improve 6 MWT by 10%. - met    New 4-week goals as of 1/3/25 to reduce risk for falls and maximize overall mobility levels.   Improve TUG by MCID.   Improve 5xSTS by MCID.  Improve gait speed  "by MCID.   Improve 6 MWT by 10%.     Plan: Extend current POC by 3 months.  Patient would benefit from: skilled physical therapy and skilled occupational therapy  Referral necessary: No  Planned modality interventions: biofeedback     Planned therapy interventions: manual therapy, balance, neuromuscular re-education, patient/caregiver education, stretching, strengthening, therapeutic activities, therapeutic exercise, graded exercise, gait training and home exercise program     Frequency: 3x/week as able.  Duration in weeks: 12  Plan of Care beginning date: 1/3/25  Plan of Care expiration date: 4/4/25  Treatment plan discussed with: Patient          POC Expiration Date: 4/4/25        POC expires Unit limit Auth Expiration date PT/OT/ST + Visit Limit?   1/17/25 12/4/24 1/17/25 2/7/25 1/17/25 1/20/25 4/4/24 1/20/25               Visit/Unit Tracking  AUTH Status:  Date 12/16 12/17 12/20 12/23 12/27 12/30 1/3        Needs auth Used 14 15 16 1 2 3 4          Remaining  2 1 0 3 2 1 0              Specialty Daily Treatment Diary  Precautions: Fall risk, past Hx R knee arthritis (can be stiff and swollen at the start of the day)      Manuals 12/17 12/20/24 12/23/24 12/27/24 12/30/24 1/3/25                                           Neuro Re-Ed       Pt education; post-CVA recovery timeline, neuroplastic principles, maximization of recovery in first 12 months s/p CVA x10 min.    HIGT SOLO  SPC 6 6'' hurdles  X2 laps    Trainer steps LUE use  X3 laps up and over (6'' steps)    Solo complaint surface on and off 3 airex w SPC  X3 laps    Walking no AD  X3  laps 20 ft Solo  16' length  X1 lap fwd  X1 lap lat  X1 lap bwd    3# BL AW  L SPC  Hurdles (3 4\", 3 6\" alternating)  Lead L LE fwd attempt x1 length fwd  6x4\" hurdles  Equal stepping practice with LE's   X3 laps fwd    Doff AW  No SPC  Airex pads (5)  Stepping up to/down from foam with R LE WB focus x3 laps      Solo  5# BL AW  No AD  16' length  X1 " "lap fwd  X1 lap bwd  X1 lap lat    Pool noodles (6)  5# BL AW  Step-to lead R LE no UE  X1 length fwd  Step-to lead L LE L SPC  X1 length fwd  X4 rounds - focus on external cues for R LE step amplitude, especially when trailing LE    Side stepping L SPC hover  5# BL AW  16' length   X2 laps Solo  No AD  17' length  X2 laps side stepping    X5 airex pads 1.5' apart,   SPC PRN stepping up/down fwd lead UL LE/length  3x1 lap    River rocks  8' length  X5 small  X4 medium, same ea side, L SPC  2x1 lap fwd    No AD  17' length  3 Kg MB BUE carry at abdomen  X2 laps fwd  X1 lap bwd       Solo  No AD  3# BL AW  X120' fwd    Foam beams x2 in length  L SPC PRN  3# BL AW  X1 lap lat  Doff AW  X1 lap lat    River rocks  9' length  X5 small  X4 medium, same ea side, L SPC PRN  X1 lap fwd  Add 3# BL AW  X1 lap fwd     No AD 14' length  3# BL AW  3 Kg MB BUE carry at abdomen  X3 laps fwd               hurdles          step taps          R Knee Control   Solo  STS from standard chair, L LE elevated on 2\" step, R UE from/to armrest  2x5                                    Ther Ex          Nu step                                                                                Ther Activity                             Gait Training      Objective measure testing    Indoors                   Modalities                                                                          "

## 2025-01-03 NOTE — LETTER
January 3, 2025    No Recipients    Patient: Maye Lilly   YOB: 1960   Date of Visit: 1/3/2025     Encounter Diagnosis     ICD-10-CM    1. Acute CVA (cerebrovascular accident) ([facility])  I63.9           Dear Dr. Portillo:    Thank you for your recent referral of Maye Lilly. Please review the attached evaluation summary from Maye's recent visit.     Please verify that you agree with the plan of care by signing the attached order.     If you have any questions or concerns, please do not hesitate to call.     I sincerely appreciate the opportunity to share in the care of one of your patients and hope to have another opportunity to work with you in the near future.     Sincerely,    Shahana Mendez, OT      Referring Provider:     I certify that I have read the below Plan of Care and certify the need for these services furnished under this plan of treatment while under my care.                    Megan Portillo PA-C  [employer]  [place] [id number]  Via Fax: [phone]        OCCUPATIONAL THERAPY RE-EVALUATION:    1/3/25  Maye Lilly  1960  [id number]  Megan Portillo PA-C   Diagnosis ICD-10-CM Associated Orders   1. Acute CVA (cerebrovascular accident) ([facility])  I63.9           Assessment/Plan    Skilled Analysis:  Maye Lilly is a 64 y.o. female referred to Occupational Therapy s/p Acute CVA (cerebrovascular accident) ([facility]) [I63.9].   Pt participated in skilled OT re-evaluation and following formalized testing as well as clinical observation, Pt presents with the following areas of deficit:  RUE weakness, decreased pinch and  strength, proximal pain, decreased ROM (AROM & PROM), decreased independence with ADLs and IADLs, and decreased endurance. Pt shoulder pain has improved significantly since the start of therapy. Pt with good compliance and carry over of at home stretching, ROM, and HEP completing daily both in supine and seated as able proximally and distally. Pt has  demo improvements overall in AROM for shoulder, elbow, and digits. Pt also demo improved strength proximally during MMT and /pinch with distal testing. Pt has started with handwriting tasks to increase improvement in signing name, with good stability and use of thicker pen/pencil. Pt with limited wrist ROM in extension noted this date, however improved from prior sessions. Pt continues to be educated on stroke recovery timeline throughout session. She reports that she has been able to get herself dressed and ready without assistance as well as it taking less time than it was prior. Pt will benefit from continued skilled Occupational Therapy services 2-3x/week for 12 weeks with focus on neuro re-ed, there ex, there act, and ADL/IADL retraining.  Ourania Vijaya is in agreement with POC.    Today's Treatment: Pt provided with worksheets to complete at home with writing single words and lines.    POC expires Unit limit Auth Expiration date PT/OT/ST + Visit Limit?   01/1/25  6 PT/OT 12/4/24 BOMN   1/1/25  12/24/24    1/1/25  3/10/24 6 approved                 Visit/Unit Tracking  8 Approved-12/24/24 Date 11/26 11/27 12/2 12/04 12/6 12/9  PN 12/11 12/13    Used 1 2 3 4 5 6 7 8    Remaining  7 6 5 4 3 2 1 0     Visit/Unit Tracking  6 Approved-12/16/24-3/10/25 Date 12/16 12/17 12/20 12/23 12/27 12/30 1/3        Used 1 2 3 4 5 6 7        Remaining  5 4 3 2 1 0 TBD         Duration in weeks: 12 weeks, 2-3x  Plan of care beginning date: 1/3/25  Plan of care expiration date: 3/28/25  PN #1: 2/3/25    Precautions: HTN, GERD, R shoulder pain     Access Code: [email or web address]               GOALS    Short Term Goals (6 weeks)  Strength/Endurance  - Pt will increase R hand functional  with ability to sustain grasp on medium size objects for 5 sec to improve independence with ADLs GOAL MET  - Pt will demo with G tolerance to supine, seated, and in stance exercise x 15 minutes with minimal rest breaks required for increased  engagement in life roles and weekly exercise regimen CONTINUE  - Pt will demo with G carryover of HEP to improve functional progression towards goals in POC and for improved functional use of RUE GOAL MET; continue    AROM/PROM  - Pt will be able to perform 90* degrees shoulder flexion of proximal RUE in supine to demo increased strength and ROM of affected UE for improved ADLs/IADLs PROGRESSING; 115*  - Pt will be able to perform full range elbow flexion of distal RUE to demo increased strength and ROM of affected UE for improved ADLs/IADLs PARTIALLY MET  - Pt will demo good carryover of clinic and home pain and swelling reduction strategies for improved AROM initiation with functional reach and grasp with ADL function GOAL MET; wedge under arm/hand when sleeping.    FMC/GMC  - Pt will be able to actively grasp 2 pegs for initiation of 9HPT demo improved FMC and and pinch. GOAL MET   - Pt will demo improvement with pulling on trousers on Neuro QOL form to with a little difficulty demo improved coordination and precision for FM tasks and ADLs GOAL MET  - Pt will be able to complete thumb opposition to digits 2-4 demo improved motor control and digit ROM GOAL MET; can oppose to digits 2-4  - Pt will demo improved motor learning of constructional and new motor actions for improved b/l coordination and motor planning evident by 50% accuracy for fxnl ADL/IADL performance PROGRESSING    Functional Performance  - Pt will increase RUE to semi functional (dependent stabilizer, independent stabilizer, gross assist, semi-functional, functional, fully functional) assist with for ADL/IADL and tabletop tasks for improved functional performance of life roles and salient tasks GOAL MET   - Pt will demo G comprehension of adaptive equipment (long handled reacher/sponge/shoehorn, toileting aid ) use in clinic to improve independence in ADL management in home environment GOAL MET; using sock aid and was using dressing  stock    Modalities  - Pt/family will demo G understanding and carryover of use of home NMES unit as prescribed to inc carryover of ROM and strengthening strategies of R UE PROGRESSING; ordered and will bring in for education when it comes in       Long Term Goals (12 weeks)  Strength/Endurance  - Pt will be able to successfully complete strength testing demo 2-5 lbs RUE  strength for improved completion of ADLs GOAL MET  - Pt will demo with G tolerance to supine, seated, and in stance exercise x 25 minutes with minimal rest breaks required for increased engagement in life roles and weekly exercise regimen  PROGRESSING    AROM/PROM  - Pt will be able to perform 90* degrees seated shoulder flexion of proximal RUE to demo increased strength and ROM of affected UE for improved ADLs/IADLs GOAL MET  - Pt will demo good carryover of clinic and home pain and swelling reduction strategies for improved AROM initiation with functional reach and grasp with ADL function GOAL MET    FMC/GMC  - Pt will be able to complete 9HPT in < 1 min demo improved FMC and coordination. GOAL MET  - Pt will demo improvement with pulling on trousers on Neuro QOL form to with no difficulty demo improved coordination and precision for FM tasks and ADLs GOAL MET  - Pt will be able to complete thumb opposition to all digits demo improved coordination and functional use of RUE.  PROGRESSING  - Pt will demo improved motor learning of constructional and new motor actions for improved b/l coordination and motor planning evident by 75% accuracy for fxnl ADL/IADL performance PROGRESSING    Functional Performance  - Pt will increase RUE to functional (dependent stabilizer, independent stabilizer, gross assist, semi-functional, functional, fully functional) assist with for ADL/IADL and tabletop tasks for improved functional performance of life roles and salient tasks PROGRESSING    Modalities  - Pt will tolerate BIONESS/NMES/IASTM for improved motor and  "sensory performance for overall improved strength, tone reduction, and sensation to inc safety and engagement with ADL/IADL function PROGRESSING      Subjective    PATIENT GOAL: \"Grab something, pull up my pants, wipe myself, and writing/signing my name\"    Patient-Specific Functional Scale   Task is scored 0 (unable to perform activity) to 10 (ability to perform activity independently)    Activity Date: Date:   ADLs     2.  Grasp/use of R hand     3.   ROM      4.   Strength and endurance         HISTORY OF PRESENT ILLNESS:     Pt is a 64 y.o. female who was referred to Occupational Therapy s/p Acute CVA (cerebrovascular accident) ([facility]) [I63.9].  Pt reports that the day her stroke happened she was at work. She felt that the ground was moving but thought it could have been due to her forgetting her glasses's and straining her eyes. She also ended up throwing up multiple times and left work. When she got home she went to sleep. The next morning she still was not feeling right, called out of work and drove herself to the hospital because she felt weak overall. She continued to feel weak in the hospital but was still able to move her UE and LE. She then was sent for an MRI, and when she came back to get into bed is when she realized she was unable to and no longer had function of her R upper and lower extremity. She then spent 3 weeks at the [facility] and then 3 weeks with home care. She does not feel difficulty with sensation but rather just weak and heavy. Pt wears a shoulder subluxation splint for comfort but due to large size feels it is not doing much. At this time she is on medical leave from work as a . She currently does not drive. She lives with her daughter and her son occasionally comes in and out. Her daughter works full time and so does her son but he works from home. Pt reports that she does not have difficulty with bed mobility or toileting when using the commode. She is able to don and doff her pants " "off her hips but will sometimes need help. If she is alone, she has a dressing stick to help pull them up on her R side. She reports that she bathroom and shower are very small, and for the time being is showering at a friends house with a walk in/ADA shower due to a past family member who was using it. She will only use her bathroom toilet when someone is home to help her with hygiene due to small space, otherwise will use commode. She occasionally needs assistance with UB dressing for shirts but wears sun dresses at home that she reports are not difficult.     Per chart review  on 24, \"Ms. Maye Lilly is a 65 yo woman with a past medical history notable for HTN and atrial fibrillation who presented to the [facility] on 8/10/24 with acute dysarthria, facial droop and right hemiparesis. A stroke alert was activated, CT head wo contrast was negative for acute findings, CTA of the head/neck showed no evidence of large vessel occlusion and patent cervical carotid and vertebral arteries. MRI of the brain revealed an acute left pontine infarct. Course complicated by severe hypertension requiring multiple agents to obtain control.   - Acute left pontine ischemic infarct: Clinically presented on 8/10/24 with right hemiparesis, dysarthria and facial droop, Stroke etiology: HTN, small vessel disease, MR brain on 24 revealed a small area of acute/subacute ischemia within the central luz to the left of midline without acute hemorrhage\"      PMH:   Past Medical History:   Diagnosis Date    Acute CVA (cerebrovascular accident) (HCC) 2024    Atrial fibrillation (HCC)     Disease of thyroid gland     Hypertension          Pain Levels   Restin    With Activity:  0      Objective    Impairment Observations:            DANIEL COLLINS Comments           UPPER EXTREMITY FUNCTION   Impaired Intact Dominant Hand: R     /PINCH STRENGTH             Dynamometer    - Gross Grasp 5 lbs 60 lbs low  R- improved x 3 lbs  L- " improved x 20 lbs   Pinch Meter     - Pincer 4 lbs  11 lbs low  L improved x 1 lb  R- improved x 1 lbs    - Tripod 5 lbs 12 lbs low  R- improved x 1 lbs  L- remained     - Lateral 8 lbs   15 lbs low  R improved x 2 lbs  L- improved      AROM (Seated)             Scapula   - Retraction/Protraction  - Elevation/Depression  - Upward/Downward rotation      Shoulder * WFL  Improved   Shoulder Ext 57* WFL  Remained    Shoulder internal rotation  Full  Remained   Shoulder external rotation  3/4  Remained   Shoulder Abd 70* WFL  Remained   Shoulder Add 3/4 WFL  Remained   Horizontal Abd   WFL     Horizontal Add   WFL     Elbow Flex 3/4 WFL  Remained   Elbow Ext Full   WFL     Pronation Full  WFL     Supination Near  Full  WFL  Remained   Wrist Flex 3/4  WFL  Remained   Wrist Ext A little past neutral   WFL  Improved   Digit Flex 3/4 WFL  Improved   Digit Ex Full WFL  Remained    Composite Grasp 3/4 WFL  Improved   Hook Grasp   WFL     Subluxation     Improved     AROM (Supine)            Shoulder *   improved   Shoulder Ext  Near full      Shoulder ABD 92*    Improved    Shoulder ADD        Horizontal ABD        Horizontal ADD        Elbow Flex  3/4      Elbow Ext Full       Pronation Full       Supination Near full       Wrist Flex  Near full      Wrist Ext 3/4       Digit Flex 3/4       Digit Ext  Full        PROM (Supine position)           Shoulder *   Slight pain   Shoulder Ext Full      Shoulder ABD 90*      Shoulder ADD Full       Horizontal ABD      Horizontal ADD        Elbow Flex 3/4       Elbow Ext Full       Wrist Flex Near Full       Wrist Ext Near Full        Digit Flex 3/4    Slight swelling but improved   Digit Ext Full     Supination 3/4     Pronation Full     Resting positin      MMT             Shoulder FF 3+/5 5/5 R improved   Shoulder Ext 4/5 5/5 R improved   Shoulder Abduction 3/5 5/5 R improved   Shoulder Adduction 3/5 5/5 R improved   Elbow Flex 4/5 5/5 R improved   Elbow Ext 4/5  5/5 R improved   Wrist Flex 3+/5 5/5 R improved   Wrist Ext 3+/5 5/5 R improved   Gross Grasp 3/5 5/5 R improved     SENSATION      Monofilament Testing  Normal: 2.83 mm    Diminished light touch: 3.61 mm    Diminished protective sensation: 4.31 mm    Loss of protective sensation: 4.56    Complete loss of sensation / deep pressure: 6.65 DNT DNT    Sharp / Dull       Proprioception      Hot/Cold Temp      Stereognosis         COORDINATION      Opposition Able to touch thumb to index, middle, and ring on lateral aspect  WFL Difficulty to oppose to D5   Finger to Nose Slowed WFL Able to complete with slight head compensation and slowed 2/2 decreased ROM   Rapid Alternating Movement Slowed, slightly limited supination WFL Improved   9 Hole Peg Test 51 sec  24 seconds abnormal  R- improved x 61 sec    Fxnl Dexterity Test Unable  26 seconds abnormal   Michael Hand Function Test         - Handwriting 40 seconds   Good fluidity of movement; poor line adherence and sizing     TONE: MODIFIED CINDY SCALE      No increase in muscle tone (0) Biceps, triceps,wrist extensors, and digits flex/ext  Remained    Slight Increase in muscle tone with catch and release or min resist at end range (1) Wrist extensors   Remained   Slight Increase in muscle tone with catch and release, followed by min resistance through remainder of range (1+)      Increased muscle tone through full range, able to be moved easily (2)      Considerable increase in tone, difficult to move (3)      Rigid in Flexion/Extension (4)                  Neuro QOL  Upper Extremity Function (Fine motor, ADL):     Are you able to turn a key in a lock? With a little difficulty  Are you able to brush your teeth? Without any difficulty  Are you able to make a phone call using a touch tone or key-pad? Without any difficulty  Are you able to  coins from a table top? Without any difficulty  Are you are able to write with a pen or pencil With some difficulty- improved    Are you able to open and close a zipper? With some difficulty- improved for jackets; still difficult for bra  Are you able to wash and dry your body? With a little difficulty  Are you able to shampoo your hair? Without any difficulty  Are you able to open previously opened jars? With a little difficulty- improved; if not tightly squeezed  Are you able to hold a plate full of food? With a little difficulty  Are you able to pull on trousers? Without any difficulty  Are you able to button your shirt? With a little difficulty- improved  Are you able to trim your fingernails? Unable to do  Are you able to cut your toe nails? Unable to do  Are you able to bend down and  clothing from the floor? Without any difficulty  How much DIFFICULTY do you currently have using a spoon to eat a meal? No difficulty  How much DIFFICULTY do you currently have putting on a pullover shirt? No difficulty  How much DIFFICULTY do you currently have taking off a pullover shirt? A little difficulty- improved   How much DIFFICULTY do you currently have removing wrappings from small objects? No difficulty  How much DIFFUCULTY do you currently have opening medications or vitamin containers (e.g. childproof containers, small bottles)? Some difficulty- improved; safety ones difficult, non safety no issue      OTHER PLANNED THERAPY INTERVENTIONS:   Supine, seated, and in stance neuro re-ed  Task-Oriented Training  Tricep AG  NMES/FES  Cryotherapy for tone reduction  Hot Packs for pain  TENS for pain  FMC/prehension  GMC  Proximal to distal teaming  Timed Trials  Manual tx  IASTM  Hand to target  Sensory re-ed (Cutaneous/Proprioceptive)  Seated functional reach: crossing midline  Supine place and hold  WBearing strategies   Closed chain activities  Open chain activities  Mirror Therapy  HEP  Orthotic Development

## 2025-01-03 NOTE — PROGRESS NOTES
OCCUPATIONAL THERAPY RE-EVALUATION:    1/3/25  Maye Lilly  1960  [id number]  Megan Portillo PA-C   Diagnosis ICD-10-CM Associated Orders   1. Acute CVA (cerebrovascular accident) ([facility])  I63.9           Assessment/Plan    Skilled Analysis:  Maye Lilly is a 64 y.o. female referred to Occupational Therapy s/p Acute CVA (cerebrovascular accident) ([facility]) [I63.9].   Pt participated in skilled OT re-evaluation and following formalized testing as well as clinical observation, Pt presents with the following areas of deficit:  RUE weakness, decreased pinch and  strength, proximal pain, decreased ROM (AROM & PROM), decreased independence with ADLs and IADLs, and decreased endurance. Pt shoulder pain has improved significantly since the start of therapy. Pt with good compliance and carry over of at home stretching, ROM, and HEP completing daily both in supine and seated as able proximally and distally. Pt has demo improvements overall in AROM for shoulder, elbow, and digits. Pt also demo improved strength proximally during MMT and /pinch with distal testing. Pt has started with handwriting tasks to increase improvement in signing name, with good stability and use of thicker pen/pencil. Pt with limited wrist ROM in extension noted this date, however improved from prior sessions. Pt continues to be educated on stroke recovery timeline throughout session. She reports that she has been able to get herself dressed and ready without assistance as well as it taking less time than it was prior. Pt will benefit from continued skilled Occupational Therapy services 2-3x/week for 12 weeks with focus on neuro re-ed, there ex, there act, and ADL/IADL retraining.  Maye Lilly is in agreement with POC.    Today's Treatment: Pt provided with worksheets to complete at home with writing single words and lines.    POC expires Unit limit Auth Expiration date PT/OT/ST + Visit Limit?   01/1/25  6 PT/OT 12/4/24 ARSENIO    1/1/25  12/24/24    1/1/25  3/10/24 6 approved                 Visit/Unit Tracking  8 Approved-12/24/24 Date 11/26 11/27 12/2 12/04 12/6 12/9  PN 12/11 12/13    Used 1 2 3 4 5 6 7 8    Remaining  7 6 5 4 3 2 1 0     Visit/Unit Tracking  6 Approved-12/16/24-3/10/25 Date 12/16 12/17 12/20 12/23 12/27 12/30 1/3        Used 1 2 3 4 5 6 7        Remaining  5 4 3 2 1 0 TBD         Duration in weeks: 12 weeks, 2-3x  Plan of care beginning date: 1/3/25  Plan of care expiration date: 3/28/25  PN #1: 2/3/25    Precautions: HTN, GERD, R shoulder pain     Access Code: [email or web address]               GOALS    Short Term Goals (6 weeks)  Strength/Endurance  - Pt will increase R hand functional  with ability to sustain grasp on medium size objects for 5 sec to improve independence with ADLs GOAL MET  - Pt will demo with G tolerance to supine, seated, and in stance exercise x 15 minutes with minimal rest breaks required for increased engagement in life roles and weekly exercise regimen CONTINUE  - Pt will demo with G carryover of HEP to improve functional progression towards goals in POC and for improved functional use of RUE GOAL MET; continue    AROM/PROM  - Pt will be able to perform 90* degrees shoulder flexion of proximal RUE in supine to demo increased strength and ROM of affected UE for improved ADLs/IADLs PROGRESSING; 115*  - Pt will be able to perform full range elbow flexion of distal RUE to demo increased strength and ROM of affected UE for improved ADLs/IADLs PARTIALLY MET  - Pt will demo good carryover of clinic and home pain and swelling reduction strategies for improved AROM initiation with functional reach and grasp with ADL function GOAL MET; wedge under arm/hand when sleeping.    FMC/GMC  - Pt will be able to actively grasp 2 pegs for initiation of 9HPT demo improved FMC and and pinch. GOAL MET   - Pt will demo improvement with pulling on trousers on Neuro QOL form to with a little difficulty demo improved  coordination and precision for FM tasks and ADLs GOAL MET  - Pt will be able to complete thumb opposition to digits 2-4 demo improved motor control and digit ROM GOAL MET; can oppose to digits 2-4  - Pt will demo improved motor learning of constructional and new motor actions for improved b/l coordination and motor planning evident by 50% accuracy for fxnl ADL/IADL performance PROGRESSING    Functional Performance  - Pt will increase RUE to semi functional (dependent stabilizer, independent stabilizer, gross assist, semi-functional, functional, fully functional) assist with for ADL/IADL and tabletop tasks for improved functional performance of life roles and salient tasks GOAL MET   - Pt will demo G comprehension of adaptive equipment (long handled reacher/sponge/shoehorn, toileting aid ) use in clinic to improve independence in ADL management in home environment GOAL MET; using sock aid and was using dressing stock    Modalities  - Pt/family will demo G understanding and carryover of use of home NMES unit as prescribed to inc carryover of ROM and strengthening strategies of R UE PROGRESSING; ordered and will bring in for education when it comes in       Long Term Goals (12 weeks)  Strength/Endurance  - Pt will be able to successfully complete strength testing demo 2-5 lbs RUE  strength for improved completion of ADLs GOAL MET  - Pt will demo with G tolerance to supine, seated, and in stance exercise x 25 minutes with minimal rest breaks required for increased engagement in life roles and weekly exercise regimen  PROGRESSING    AROM/PROM  - Pt will be able to perform 90* degrees seated shoulder flexion of proximal RUE to demo increased strength and ROM of affected UE for improved ADLs/IADLs GOAL MET  - Pt will demo good carryover of clinic and home pain and swelling reduction strategies for improved AROM initiation with functional reach and grasp with ADL function GOAL MET    FMC/GMC  - Pt will be able to  "complete 9HPT in < 1 min demo improved FMC and coordination. GOAL MET  - Pt will demo improvement with pulling on trousers on Neuro QOL form to with no difficulty demo improved coordination and precision for FM tasks and ADLs GOAL MET  - Pt will be able to complete thumb opposition to all digits demo improved coordination and functional use of RUE.  PROGRESSING  - Pt will demo improved motor learning of constructional and new motor actions for improved b/l coordination and motor planning evident by 75% accuracy for fxnl ADL/IADL performance PROGRESSING    Functional Performance  - Pt will increase RUE to functional (dependent stabilizer, independent stabilizer, gross assist, semi-functional, functional, fully functional) assist with for ADL/IADL and tabletop tasks for improved functional performance of life roles and salient tasks PROGRESSING    Modalities  - Pt will tolerate BIONESS/NMES/IASTM for improved motor and sensory performance for overall improved strength, tone reduction, and sensation to inc safety and engagement with ADL/IADL function PROGRESSING      Subjective    PATIENT GOAL: \"Grab something, pull up my pants, wipe myself, and writing/signing my name\"    Patient-Specific Functional Scale   Task is scored 0 (unable to perform activity) to 10 (ability to perform activity independently)    Activity Date: Date:   ADLs     2.  Grasp/use of R hand     3.   ROM      4.   Strength and endurance         HISTORY OF PRESENT ILLNESS:     Pt is a 64 y.o. female who was referred to Occupational Therapy s/p Acute CVA (cerebrovascular accident) ([facility]) [I63.9].  Pt reports that the day her stroke happened she was at work. She felt that the ground was moving but thought it could have been due to her forgetting her glasses's and straining her eyes. She also ended up throwing up multiple times and left work. When she got home she went to sleep. The next morning she still was not feeling right, called out of work and " "drove herself to the hospital because she felt weak overall. She continued to feel weak in the hospital but was still able to move her UE and LE. She then was sent for an MRI, and when she came back to get into bed is when she realized she was unable to and no longer had function of her R upper and lower extremity. She then spent 3 weeks at the [facility] and then 3 weeks with home care. She does not feel difficulty with sensation but rather just weak and heavy. Pt wears a shoulder subluxation splint for comfort but due to large size feels it is not doing much. At this time she is on medical leave from work as a . She currently does not drive. She lives with her daughter and her son occasionally comes in and out. Her daughter works full time and so does her son but he works from home. Pt reports that she does not have difficulty with bed mobility or toileting when using the commode. She is able to don and doff her pants off her hips but will sometimes need help. If she is alone, she has a dressing stick to help pull them up on her R side. She reports that she bathroom and shower are very small, and for the time being is showering at a friends house with a walk in/ADA shower due to a past family member who was using it. She will only use her bathroom toilet when someone is home to help her with hygiene due to small space, otherwise will use commode. She occasionally needs assistance with UB dressing for shirts but wears sun dresses at home that she reports are not difficult.     Per chart review  on 8/20/24, \"Ms. Maye Lilly is a 63 yo woman with a past medical history notable for HTN and atrial fibrillation who presented to the [facility] on 8/10/24 with acute dysarthria, facial droop and right hemiparesis. A stroke alert was activated, CT head wo contrast was negative for acute findings, CTA of the head/neck showed no evidence of large vessel occlusion and patent cervical carotid and vertebral arteries. MRI of the " "brain revealed an acute left pontine infarct. Course complicated by severe hypertension requiring multiple agents to obtain control.   - Acute left pontine ischemic infarct: Clinically presented on 8/10/24 with right hemiparesis, dysarthria and facial droop, Stroke etiology: HTN, small vessel disease, MR brain on 24 revealed a small area of acute/subacute ischemia within the central luz to the left of midline without acute hemorrhage\"      PMH:   Past Medical History:   Diagnosis Date    Acute CVA (cerebrovascular accident) (HCC) 2024    Atrial fibrillation (HCC)     Disease of thyroid gland     Hypertension          Pain Levels   Restin    With Activity:  0      Objective    Impairment Observations:            DANIEL Kruse           UPPER EXTREMITY FUNCTION   Impaired Intact Dominant Hand: R     /PINCH STRENGTH             Dynamometer    - Gross Grasp 5 lbs 60 lbs low  R- improved x 3 lbs  L- improved x 20 lbs   Pinch Meter     - Pincer 4 lbs  11 lbs low  L improved x 1 lb  R- improved x 1 lbs    - Tripod 5 lbs 12 lbs low  R- improved x 1 lbs  L- remained     - Lateral 8 lbs   15 lbs low  R improved x 2 lbs  L- improved      AROM (Seated)             Scapula   - Retraction/Protraction  - Elevation/Depression  - Upward/Downward rotation      Shoulder * WFL  Improved   Shoulder Ext 57* WFL  Remained    Shoulder internal rotation  Full  Remained   Shoulder external rotation  3/4  Remained   Shoulder Abd 70* WFL  Remained   Shoulder Add 3/4 WFL  Remained   Horizontal Abd   WFL     Horizontal Add   WFL     Elbow Flex 3/4 WFL  Remained   Elbow Ext Full   WFL     Pronation Full  WFL     Supination Near  Full  WFL  Remained   Wrist Flex 3/4  WFL  Remained   Wrist Ext A little past neutral   WFL  Improved   Digit Flex 3/4 WFL  Improved   Digit Ex Full WFL  Remained    Composite Grasp 3/4 WFL  Improved   Hook Grasp   WFL     Subluxation     Improved     AROM (Supine)            Shoulder * "   improved   Shoulder Ext  Near full      Shoulder ABD 92*    Improved    Shoulder ADD        Horizontal ABD        Horizontal ADD        Elbow Flex  3/4      Elbow Ext Full       Pronation Full       Supination Near full       Wrist Flex  Near full      Wrist Ext 3/4       Digit Flex 3/4       Digit Ext  Full        PROM (Supine position)           Shoulder *   Slight pain   Shoulder Ext Full      Shoulder ABD 90*      Shoulder ADD Full       Horizontal ABD      Horizontal ADD        Elbow Flex 3/4       Elbow Ext Full       Wrist Flex Near Full       Wrist Ext Near Full        Digit Flex 3/4    Slight swelling but improved   Digit Ext Full     Supination 3/4     Pronation Full     Resting positin      MMT             Shoulder FF 3+/5 5/5 R improved   Shoulder Ext 4/5 5/5 R improved   Shoulder Abduction 3/5 5/5 R improved   Shoulder Adduction 3/5 5/5 R improved   Elbow Flex 4/5 5/5 R improved   Elbow Ext 4/5 5/5 R improved   Wrist Flex 3+/5 5/5 R improved   Wrist Ext 3+/5 5/5 R improved   Gross Grasp 3/5 5/5 R improved     SENSATION      Monofilament Testing  Normal: 2.83 mm    Diminished light touch: 3.61 mm    Diminished protective sensation: 4.31 mm    Loss of protective sensation: 4.56    Complete loss of sensation / deep pressure: 6.65 DNT DNT    Sharp / Dull       Proprioception      Hot/Cold Temp      Stereognosis         COORDINATION      Opposition Able to touch thumb to index, middle, and ring on lateral aspect  WFL Difficulty to oppose to D5   Finger to Nose Slowed WFL Able to complete with slight head compensation and slowed 2/2 decreased ROM   Rapid Alternating Movement Slowed, slightly limited supination WFL Improved   9 Hole Peg Test 51 sec  24 seconds abnormal  R- improved x 61 sec    Fxnl Dexterity Test Unable  26 seconds abnormal   Michael Hand Function Test         - Handwriting 40 seconds   Good fluidity of movement; poor line adherence and sizing     TONE: MODIFIED CINDY SCALE      No  increase in muscle tone (0) Biceps, triceps,wrist extensors, and digits flex/ext  Remained    Slight Increase in muscle tone with catch and release or min resist at end range (1) Wrist extensors   Remained   Slight Increase in muscle tone with catch and release, followed by min resistance through remainder of range (1+)      Increased muscle tone through full range, able to be moved easily (2)      Considerable increase in tone, difficult to move (3)      Rigid in Flexion/Extension (4)                  Neuro QOL  Upper Extremity Function (Fine motor, ADL):     Are you able to turn a key in a lock? With a little difficulty  Are you able to brush your teeth? Without any difficulty  Are you able to make a phone call using a touch tone or key-pad? Without any difficulty  Are you able to  coins from a table top? Without any difficulty  Are you are able to write with a pen or pencil With some difficulty- improved   Are you able to open and close a zipper? With some difficulty- improved for jackets; still difficult for bra  Are you able to wash and dry your body? With a little difficulty  Are you able to shampoo your hair? Without any difficulty  Are you able to open previously opened jars? With a little difficulty- improved; if not tightly squeezed  Are you able to hold a plate full of food? With a little difficulty  Are you able to pull on trousers? Without any difficulty  Are you able to button your shirt? With a little difficulty- improved  Are you able to trim your fingernails? Unable to do  Are you able to cut your toe nails? Unable to do  Are you able to bend down and  clothing from the floor? Without any difficulty  How much DIFFICULTY do you currently have using a spoon to eat a meal? No difficulty  How much DIFFICULTY do you currently have putting on a pullover shirt? No difficulty  How much DIFFICULTY do you currently have taking off a pullover shirt? A little difficulty- improved   How much  DIFFICULTY do you currently have removing wrappings from small objects? No difficulty  How much DIFFUCULTY do you currently have opening medications or vitamin containers (e.g. childproof containers, small bottles)? Some difficulty- improved; safety ones difficult, non safety no issue      OTHER PLANNED THERAPY INTERVENTIONS:   Supine, seated, and in stance neuro re-ed  Task-Oriented Training  Tricep AG  NMES/FES  Cryotherapy for tone reduction  Hot Packs for pain  TENS for pain  FMC/prehension  GMC  Proximal to distal teaming  Timed Trials  Manual tx  IASTM  Hand to target  Sensory re-ed (Cutaneous/Proprioceptive)  Seated functional reach: crossing midline  Supine place and hold  WBearing strategies   Closed chain activities  Open chain activities  Mirror Therapy  HEP  Orthotic Development

## 2025-01-06 ENCOUNTER — OFFICE VISIT (OUTPATIENT)
Dept: OCCUPATIONAL THERAPY | Facility: CLINIC | Age: 65
End: 2025-01-06
Payer: COMMERCIAL

## 2025-01-06 ENCOUNTER — OFFICE VISIT (OUTPATIENT)
Dept: PHYSICAL THERAPY | Facility: CLINIC | Age: 65
End: 2025-01-06
Payer: COMMERCIAL

## 2025-01-06 DIAGNOSIS — R26.89 BALANCE DISORDER: ICD-10-CM

## 2025-01-06 DIAGNOSIS — I63.9 ACUTE CVA (CEREBROVASCULAR ACCIDENT) (HCC): Primary | ICD-10-CM

## 2025-01-06 DIAGNOSIS — R26.9 NEUROLOGIC GAIT DISORDER: Primary | ICD-10-CM

## 2025-01-06 DIAGNOSIS — I63.9 LEFT PONTINE CVA (HCC): ICD-10-CM

## 2025-01-06 PROCEDURE — 97110 THERAPEUTIC EXERCISES: CPT

## 2025-01-06 PROCEDURE — 97112 NEUROMUSCULAR REEDUCATION: CPT

## 2025-01-06 NOTE — PROGRESS NOTES
Daily Note     Today's date: 2025  Patient name: Maye Lilly  : 1960  MRN: [id number]  Referring provider: Megan Portillo PA-C  Dx:   Encounter Diagnosis     ICD-10-CM    1. 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 Balance disorder  R26.89       3. Left pontine CVA (HCC)  I63.9           Start Time: 1000  Stop Time: 1045  Total time in clinic (min): 45 minutes    Subjective: Doing well today! Received from OT. Ambulatory with SPC.      Objective: See treatment diary below      Assessment: Increased stepping practice intensity with 7.5# BL AW and 5# BL AW versus 3# BL AW for step amplitude, SLS stability, and trunk stability. Incorporated grade on TM and resisted TB ambulation to challenge propulsion. Was able to meet intensity progressions successfully throughout session save for challenges clearing pool noodles with R LE with 7.5# BL AW leading to success with 5# BL AW.      Plan: Continue per plan of care.  Progress treatment as tolerated.   Backwards ambulation on TM with grade NV.     POC Expiration Date: 25        POC expires Unit limit Auth Expiration date PT/OT/ST + Visit Limit?   25               Visit/Unit Tracking  AUTH Status:  Date 12/16 12/17 12/20 12/23 12/27 12/30 1/3 1/6       Needs auth Used 14 15 16 1 2 3 4 5         Remaining  2 1 0 3 2 1 0 0             Specialty Daily Treatment Diary  Precautions: Fall risk, past Hx R knee arthritis (can be stiff and swollen at the start of the day)      Manuals 1/6/25 12/20/24 12/23/24 12/27/24 12/30/24 1/3/25                                           Neuro Re-Ed       Pt education; post-CVA recovery timeline, neuroplastic principles, maximization of recovery in first 12 months s/p CVA x10 min.    HIGT TM  Solo  BUE  Focus on R foot clearance and propulsion  0.9 mph  8% grade  X5 min  Seated break  0.9 mph  8% grade  X5 min    Solo  No AD7.5# BL AW  12'  "length  2x1 lap    Solo  SPC PRN  Pool noodle step-overs (6)  5# BL AW (attempted 7.5# BL AW) X1 lap fwd alt LE    Solo  Backwards amb with A-P resistance from PT with theraband  5# BL AW  No AD  12' length  X2 laps       Solo  16' length  X1 lap fwd  X1 lap lat  X1 lap bwd    3# BL AW  L SPC  Hurdles (3 4\", 3 6\" alternating)  Lead L LE fwd attempt x1 length fwd  6x4\" hurdles  Equal stepping practice with LE's   X3 laps fwd    Doff AW  No SPC  Airex pads (5)  Stepping up to/down from foam with R LE WB focus x3 laps      Solo  5# BL AW  No AD  16' length  X1 lap fwd  X1 lap bwd  X1 lap lat    Pool noodles (6)  5# BL AW  Step-to lead R LE no UE  X1 length fwd  Step-to lead L LE L SPC  X1 length fwd  X4 rounds - focus on external cues for R LE step amplitude, especially when trailing LE    Side stepping L SPC hover  5# BL AW  16' length   X2 laps Solo  No AD  17' length  X2 laps side stepping    X5 airex pads 1.5' apart,   SPC PRN stepping up/down fwd lead UL LE/length  3x1 lap    River rocks  8' length  X5 small  X4 medium, same ea side, L SPC  2x1 lap fwd    No AD  17' length  3 Kg MB BUE carry at abdomen  X2 laps fwd  X1 lap bwd       Solo  No AD  3# BL AW  X120' fwd    Foam beams x2 in length  L SPC PRN  3# BL AW  X1 lap lat  Doff AW  X1 lap lat    River rocks  9' length  X5 small  X4 medium, same ea side, L SPC PRN  X1 lap fwd  Add 3# BL AW  X1 lap fwd     No AD 14' length  3# BL AW  3 Kg MB BUE carry at abdomen  X3 laps fwd               hurdles          step taps          R Knee Control   Solo  STS from standard chair, L LE elevated on 2\" step, R UE from/to armrest  2x5                                    Ther Ex          Nu step                                                                                Ther Activity                             Gait Training      Objective measure testing    Indoors                   Modalities                                                                            "

## 2025-01-06 NOTE — PROGRESS NOTES
"Occupational Therapy Daily Note:    Today's date: 2025  Patient name: Maye Lilly  : 1960  MRN: [id number]  Referring provider: Megan Portillo PA-C  Dx:   Encounter Diagnosis   Name Primary?    Acute CVA (cerebrovascular accident) (HCC) Yes                  POC expires Unit limit Auth Expiration date PT/OT/ST + Visit Limit?   25  6 PT/OT 24 BOMN   1/1/25  12/24/24    1/1/25  3/10/24 6 approved                 Visit/Unit Tracking  8 Approved-24 Date   PN     Used 1 2 3 4 5 6 7 8    Remaining  7 6 5 4 3 2 1 0     Visit/Unit Tracking  6 Approved-24-3/10/25 Date 12/16 12/17 12/20 12/23 12/27 12/30 1/3 1/6       Used 1 2 3 4 5 6 7 8       Remaining  5 4 3 2 1 0 TBD TBD        Duration in weeks: 12 weeks, 2-3x  Plan of care beginning date: 1/3/25  Plan of care expiration date: 3/28/25  PN #1: 2/3/25    Precautions: HTN, GERD, R shoulder pain   Access Code: [email or web address]      Subjective:  \"About how long until you think maybe I can live alone.\" Pt educated and dicussed recovery timeline and safety.    Objective: See treatment below.  Session focusing on proximal strength/stability, coordination, open/closed hand, pinch , body awareness, intrinsic strengthening and overall activity endurance improving safety and indep in ADL/IADL mngt.      There ex: UBE x 5 min prograde x 5 min retrograde at no resist focusing on increasing proximal RUE strength, cardiopulmonary endurance, AROM, and sustained . Pt with good  throughout and able to self adjust as needed on handle.     Pt in stance at table with activity focusing on digit extension, wrist extension, and digit flexion. Pt alternating between open palm and fist pushing down into PW placed over bowl for increased resistance. Pt complete 25x with short rest break in between sets. Pt educated to complete wrist extension activities on table (WB on table while in stance before " sitting)    Neuro Re-ed:  Facilitated RUE coordination, proximal to distal teaming, hand to target, sustained R  strength with red band on applied to resistive tongs. Pt reaching in 3 different planes (extension, abduction, flexion) to ~45-90* of flexion or abd and crossing midline to  foam discs, place onto wooden dowels on table and the remove and place to container positioned on R side.  Rest breaks taken as needed with noted fatigue proximally.     Assessment: Tolerated treatment well. Pt demo fatigue with sustained grasping and reaching note by dropping of foam discs near end of activity.   Patient would benefit from continued skilled OT.    Plan: Continued skilled OT per POC

## 2025-01-08 ENCOUNTER — OFFICE VISIT (OUTPATIENT)
Dept: PHYSICAL THERAPY | Facility: CLINIC | Age: 65
End: 2025-01-08
Payer: COMMERCIAL

## 2025-01-08 ENCOUNTER — OFFICE VISIT (OUTPATIENT)
Dept: OCCUPATIONAL THERAPY | Facility: CLINIC | Age: 65
End: 2025-01-08
Payer: COMMERCIAL

## 2025-01-08 DIAGNOSIS — R26.9 NEUROLOGIC GAIT DISORDER: Primary | ICD-10-CM

## 2025-01-08 DIAGNOSIS — R26.89 BALANCE DISORDER: ICD-10-CM

## 2025-01-08 DIAGNOSIS — I63.9 ACUTE CVA (CEREBROVASCULAR ACCIDENT) (HCC): Primary | ICD-10-CM

## 2025-01-08 PROCEDURE — 97112 NEUROMUSCULAR REEDUCATION: CPT

## 2025-01-08 PROCEDURE — 97110 THERAPEUTIC EXERCISES: CPT

## 2025-01-08 NOTE — PROGRESS NOTES
"Occupational Therapy Daily Note:    Today's date: 2025  Patient name: Maye Lilly  : 1960  MRN: [id number]  Referring provider: Megan Portillo PA-C  Dx:   Encounter Diagnosis   Name Primary?    Acute CVA (cerebrovascular accident) (HCC) Yes     POC expires Unit limit Auth Expiration date PT/OT/ST + Visit Limit?   3/28/25 4 25 8                             Visit/Unit Tracking  8 Approved-24 Date   PN     Used 1 2 3 4 5 6 7 8    Remaining  7 6 5 4 3 2 1 0     Visit/Unit Tracking  8 visits approved Date 12/16 12/17 12/20 12/23 12/27 12/30 1/3 1/6 1/8     1/3/25-25 Used 1 2 3 4 5 6 1 2 3      Remaining  5 4 3 2 1 0 7 6 5       Duration in weeks: 12 weeks, 2-3x  Plan of care beginning date: 1/3/25  Plan of care expiration date: 3/28/25  PN #1: 2/3/25    Precautions: HTN, GERD, R shoulder pain   Access Code: [email or web address]        Subjective: \"I have never been able to do this before, to keep it going for 5 min\".  (UEB)    Objective: See treatment below.  Session focusing on proximal strength/stability, coordination, open/closed hand, grasping strength, body awareness, intrinsic strengthening and overall activity endurance improving safety and indep in ADL/IADL mngt.     Neuro Re-ed: In partially supported static stance pt engaged in scooping task w/serving spoon positioned in left hand scooping from one bowl and placing to another for meal preparation simulation task and standing tolerance for sink-side activities.      Thera Ex: Pt engaged in UBE x 5 min prograde x 5 min retrograde w/o resist focusing on increasing proximal RUE strength, cardiopulmonary endurance and sustained grasp.  Pt able to sustain speed and  on handle bars to keep UEB in motion throughout 5 min intervals. (No timer needed).  Pt able to sustain grasp during ball squeeze to open ball during retrieval/placement of pegs, difficulty w/depth of squeeze after placement removing " ball from large peg.  Pt utilized left hand to assist with shifting of ball in right hand 2* fatigue.     Session concluded with pt engaging in grasp/release activity using munchie ball for large peg retrieval and placement to target board       Assessment: Tolerated treatment well. Improvement noted in gripping abilities this session and endurance during standing activities. Patient would benefit from continued skilled OT.    Plan: Continued skilled OT per POC

## 2025-01-08 NOTE — PROGRESS NOTES
Daily Note     Today's date: 2025  Patient name: Maye Lilly  : 1960  MRN: [id number]  Referring provider: Megan Portillo PA-C  Dx:   Encounter Diagnosis     ICD-10-CM    1. 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 Balance disorder  R26.89           Start Time: 0930  Stop Time: 1015  Total time in clinic (min): 45 minutes    Subjective: Doing well today! Nothing new to report since last session.      Objective: See treatment diary below      Assessment: Challenged R LE flexion/extension coordination with backwards stepping on TM with grade as well as retro airex pad step-ups. Challenged with progressing step amplitude to achieve higher R foot clearance with this setup. Added retro R LE step taps to HEP.      Plan: Continue per plan of care.  Progress treatment as tolerated.        POC Expiration Date: 25        POC expires Unit limit Auth Expiration date PT/OT/ST + Visit Limit?   25               Visit/Unit Tracking  AUTH Status:  Date 12/16 12/17 12/20 12/23 12/27 12/30 1/3 1/6 1/8      Needs auth Used 14 15 16 1 2 3 4 5 6        Remaining  2 1 0 3 2 1 0 0             Specialty Daily Treatment Diary  Precautions: Fall risk, past Hx R knee arthritis (can be stiff and swollen at the start of the day)      Manuals 1/6/25 1/8/25 12/23/24 12/27/24 12/30/24 1/3/25                                           Neuro Re-Ed       Pt education; post-CVA recovery timeline, neuroplastic principles, maximization of recovery in first 12 months s/p CVA x10 min.    HIGT TM  Solo  BUE  Focus on R foot clearance and propulsion  0.9 mph  8% grade  X5 min  Seated break  0.9 mph  8% grade  X5 min    Solo  No AD7.5# BL AW  12' length  2x1 lap    Solo  SPC PRN  Pool noodle step-overs (6)  5# BL AW (attempted 7.5# BL AW) X1 lap fwd alt LE    Solo  Backwards amb with A-P resistance from PT with theraband  5# BL AW  No AD  12' length  X2  "laps       TM  Solo  BUE  Bwd amb (facing backwards)  0.3 mph 5% grade with TB in front as external cue for positioning  2x5 min    Solo  SPC PRN  4\" hurdles (4)  Attempted retro step-overs then lateral, increased R LE fatigue.    Solo  Foam beams x2 in length  SPC PRN  Side stepping  X1 lap    Solo  Airex pads retro stepping  SPC PRN  Alt lead LE by length  2x1 lap   Solo  5# BL AW  No AD  16' length  X1 lap fwd  X1 lap bwd  X1 lap lat    Pool noodles (6)  5# BL AW  Step-to lead R LE no UE  X1 length fwd  Step-to lead L LE L SPC  X1 length fwd  X4 rounds - focus on external cues for R LE step amplitude, especially when trailing LE    Side stepping L SPC hover  5# BL AW  16' length   X2 laps Solo  No AD  17' length  X2 laps side stepping    X5 airex pads 1.5' apart,   SPC PRN stepping up/down fwd lead UL LE/length  3x1 lap    River rocks  8' length  X5 small  X4 medium, same ea side, L SPC  2x1 lap fwd    No AD  17' length  3 Kg MB BUE carry at abdomen  X2 laps fwd  X1 lap bwd       Solo  No AD  3# BL AW  X120' fwd    Foam beams x2 in length  L SPC PRN  3# BL AW  X1 lap lat  Doff AW  X1 lap lat    [place]iver [place]  9' length  X5 small  X4 medium, same ea side, L SPC PRN  X1 lap fwd  Add 3# BL AW  X1 lap fwd     No AD 14' length  3# BL AW  3 Kg MB BUE carry at abdomen  X3 laps fwd               hurdles          step taps          R Knee Control   Solo  STS from standard chair, L LE elevated on 2\" step, R UE from/to armrest  2x5                                    Ther Ex          Nu step                                                                                Ther Activity                             Gait Training      Objective measure testing    Indoors                   Modalities                                                                            "

## 2025-01-10 ENCOUNTER — OFFICE VISIT (OUTPATIENT)
Dept: PHYSICAL THERAPY | Facility: CLINIC | Age: 65
End: 2025-01-10
Payer: COMMERCIAL

## 2025-01-10 ENCOUNTER — OFFICE VISIT (OUTPATIENT)
Dept: OCCUPATIONAL THERAPY | Facility: CLINIC | Age: 65
End: 2025-01-10
Payer: COMMERCIAL

## 2025-01-10 DIAGNOSIS — R26.89 BALANCE DISORDER: ICD-10-CM

## 2025-01-10 DIAGNOSIS — I63.9 ACUTE CVA (CEREBROVASCULAR ACCIDENT) (HCC): Primary | ICD-10-CM

## 2025-01-10 DIAGNOSIS — R26.9 NEUROLOGIC GAIT DISORDER: Primary | ICD-10-CM

## 2025-01-10 DIAGNOSIS — I63.9 LEFT PONTINE CVA (HCC): ICD-10-CM

## 2025-01-10 PROCEDURE — 97112 NEUROMUSCULAR REEDUCATION: CPT

## 2025-01-10 PROCEDURE — 97110 THERAPEUTIC EXERCISES: CPT

## 2025-01-10 NOTE — PROGRESS NOTES
Daily Note     Today's date: 1/10/2025  Patient name: Maye Lilly  : 1960  MRN: [id number]  Referring provider: Megan Portillo PA-C  Dx:   Encounter Diagnosis     ICD-10-CM    1. 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 Balance disorder  R26.89       3. Left pontine CVA (HCC)  I63.9           Start Time: 1015  Stop Time: 1100  Total time in clinic (min): 45 minutes    Subjective: Doing well today! Nothing new to report since last session. Going to [place] Monday! Plans to walk with the shopping cart!      Objective: See treatment diary below      Assessment: Continued progression of backward stepping intensity demands with increased TM grade and speed; demonstrated improved R LE step amplitude with knee flexion and carryover to backwards negotiation of airex pads.       Plan: Continue per plan of care.  Progress treatment as tolerated.        POC Expiration Date: 25        POC expires Unit limit Auth Expiration date PT/OT/ST + Visit Limit?   1/17/25    12/4/24     1/17/25    2/7/25      1/17/25    1/20/25      4/4/24    2/3/25               Visit/Unit Tracking  AUTH Status:  Date 1/10              8 Used 2                Remaining  6                    Specialty Daily Treatment Diary  Precautions: Fall risk, past Hx R knee arthritis (can be stiff and swollen at the start of the day)      Manuals 1/6/25 1/8/25 1/10/25 12/27/24 12/30/24 1/3/25                                           Neuro Re-Ed       Pt education; post-CVA recovery timeline, neuroplastic principles, maximization of recovery in first 12 months s/p CVA x10 min.    HIGT TM  Solo  BUE  Focus on R foot clearance and propulsion  0.9 mph  8% grade  X5 min  Seated break  0.9 mph  8% grade  X5 min    Solo  No AD7.5# BL AW  12' length  2x1 lap    Solo  SPC PRN  Pool noodle step-overs (6)  5# BL AW (attempted 7.5# BL AW) X1 lap fwd alt LE    Solo  Backwards amb with A-P resistance from PT with theraband  5# BL AW  No AD  12' length  X2  "laps       TM  Solo  BUE  Bwd amb (facing backwards)  0.3 mph 5% grade with TB in front as external cue for positioning  2x5 min    Solo  SPC PRN  4\" hurdles (4)  Attempted retro step-overs then lateral, increased R LE fatigue.    Solo  Foam beams x2 in length  SPC PRN  Side stepping  X1 lap    Solo  Airex pads retro stepping  SPC PRN  Alt lead LE by length  2x1 lap   TM  Solo  BUE  Bwd amb (facing backwards)  0.3 mph 8% grade  With TB in front for external cue for positioning  X5 min  0.4 mph 8% grade x5 min    Solo  Airex pads (5) retro stepping  SPC PRN  Alt lead LE by length  2x1 lap    Solo  No AD  Fwd amb as fast as possible   X100'   Solo  No AD  17' length  X2 laps side stepping    X5 airex pads 1.5' apart,   SPC PRN stepping up/down fwd lead UL LE/length  3x1 lap    [place]iver [place]  8' length  X5 small  X4 medium, same ea side, L SPC  2x1 lap fwd    No AD  17' length  3 Kg MB BUE carry at abdomen  X2 laps fwd  X1 lap bwd       Solo  No AD  3# BL AW  X120' fwd    Foam beams x2 in length  L SPC PRN  3# BL AW  X1 lap lat  Doff AW  X1 lap lat    [place]iver rocks  9' length  X5 small  X4 medium, same ea side, L SPC PRN  X1 lap fwd  Add 3# BL AW  X1 lap fwd     No AD 14' length  3# BL AW  3 Kg MB BUE carry at abdomen  X3 laps fwd               hurdles          step taps          R Knee Control                                       Ther Ex          Nu step                                                                                Ther Activity                             Gait Training      Objective measure testing    Indoors                   Modalities                                                                           "

## 2025-01-10 NOTE — PROGRESS NOTES
"Occupational Therapy Daily Note:    Today's date: 1/10/2025  Patient name: Maye Lilly  : 1960  MRN: [id number]  Referring provider: Megan Portillo PA-C  Dx:   Encounter Diagnosis   Name Primary?    Acute CVA (cerebrovascular accident) (HCC) Yes       POC expires Unit limit Auth Expiration date PT/OT/ST + Visit Limit?   3/28/25 4 25 8                             Visit/Unit Tracking  8 Approved-24 Date   PN     Used 1 2 3 4 5 6 7 8    Remaining  7 6 5 4 3 2 1 0     Visit/Unit Tracking  8 visits approved Date 12/16 12/17 12/20 12/23 12/27 12/30 1/3 1/6 1/8 1/10    1/3/25-25 Used 1 2 3 4 5 6 1 2 3 4     Remaining  5 4 3 2 1 0 7 6 5 4      Duration in weeks: 12 weeks, 2-3x  Plan of care beginning date: 1/3/25  Plan of care expiration date: 3/28/25  PN #1: 2/3/25    Precautions: HTN, GERD, R shoulder pain   Access Code: [email or web address]        Subjective: \"I got the bars at home, I started with the 3# 1x a day but I think I am going to do 2x a day\"    Objective: See treatment below.  Session focusing on proximal strength/stability, coordination, open/closed hand, grasping strength, body awareness, intrinsic strengthening and overall activity endurance improving safety and indep in ADL/IADL mngt.     Neuro Re-ed: Pt seated EOM with bean bags place on L side. Pt instructed to grasp BB with L hand reach overhead to midline with BUE and grasp with R hand to then place into bucket positioned on R side for increased ABD/ADD. Pt able to achieve near full range R shoulder abd with moderate rest breaks throughout.     Thera Ex: Pt engaged in UBE x 5 min prograde x 5 min retrograde w/o resist focusing on increasing proximal RUE strength, cardiopulmonary endurance and sustained grasp.  Pt able to sustain speed and  on handle bars to keep UEB in motion throughout 5 min intervals. (No timer needed).      Pt seated EOM with 1# dumbbell in R hand completing elbow " flexion and extension as well as red flex bar supination and pronation with 2-3 sec holds; 3 x 10 with good tolerance. Ended session complete wrist maze with RUE for 2 minutes while seated.    Assessment: Tolerated treatment well. Pt demo increased endurance during exercise and standing activities. Patient would benefit from continued skilled OT.    Plan: Continued skilled OT per POC

## 2025-01-13 ENCOUNTER — OFFICE VISIT (OUTPATIENT)
Dept: PHYSICAL THERAPY | Facility: CLINIC | Age: 65
End: 2025-01-13
Payer: COMMERCIAL

## 2025-01-13 ENCOUNTER — OFFICE VISIT (OUTPATIENT)
Dept: OCCUPATIONAL THERAPY | Facility: CLINIC | Age: 65
End: 2025-01-13
Payer: COMMERCIAL

## 2025-01-13 DIAGNOSIS — R26.9 NEUROLOGIC GAIT DISORDER: Primary | ICD-10-CM

## 2025-01-13 DIAGNOSIS — I63.9 LEFT PONTINE CVA (HCC): ICD-10-CM

## 2025-01-13 DIAGNOSIS — R26.89 BALANCE DISORDER: ICD-10-CM

## 2025-01-13 DIAGNOSIS — I63.9 ACUTE CVA (CEREBROVASCULAR ACCIDENT) (HCC): Primary | ICD-10-CM

## 2025-01-13 PROCEDURE — 97112 NEUROMUSCULAR REEDUCATION: CPT

## 2025-01-13 PROCEDURE — 97110 THERAPEUTIC EXERCISES: CPT

## 2025-01-13 PROCEDURE — 97150 GROUP THERAPEUTIC PROCEDURES: CPT

## 2025-01-13 NOTE — PROGRESS NOTES
Daily Note     Today's date: 2025  Patient name: Maye Lilly  : 1960  MRN: [id number]  Referring provider: Megan Portillo PA-C  Dx:   Encounter Diagnosis     ICD-10-CM    1. 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 Balance disorder  R26.89       3. Left pontine CVA (HCC)  I63.9           Start Time: 1015  Stop Time: 1100  Total time in clinic (min): 45 minutes    Subjective: Doing well today! Excited to go to [place] this afternoon!      Objective: See treatment diary below    Group: [id number]  1:1 w/PT: [id number]    Assessment: Continued progression of backward stepping intensity with increased duration of stepping practice on TM. Incorporated new honeycomb stepping exercise to encourage increases in step/stride length bilaterally. Required SPC to assist with balance and exercise efficacy with this configuration stepping backwards.       Plan: Continue per plan of care.  Progress treatment as tolerated.        POC Expiration Date: 25        POC expires Unit limit Auth Expiration date PT/OT/ST + Visit Limit?   1/17/25    12/4/24     1/17/25    2/7/25      1/17/25    1/20/25      4/4/24    2/3/25               Visit/Unit Tracking  AUTH Status:  Date 1/10 1/13             8 Used 2 3               Remaining  6 5                   Specialty Daily Treatment Diary  Precautions: Fall risk, past Hx R knee arthritis (can be stiff and swollen at the start of the day)      Manuals 1/6/25 1/8/25 1/10/25 1/13/25 12/30/24 1/3/25                                           Neuro Re-Ed       Pt education; post-CVA recovery timeline, neuroplastic principles, maximization of recovery in first 12 months s/p CVA x10 min.    HIGT TM  Solo  BUE  Focus on R foot clearance and propulsion  0.9 mph  8% grade  X5 min  Seated break  0.9 mph  8% grade  X5 min    Solo  No AD7.5# BL AW  12' length  2x1 lap    Solo  SPC PRN  Pool noodle step-overs (6)  5# BL AW (attempted 7.5# BL AW) X1 lap fwd alt LE    Solo  Backwards amb  "with A-P resistance from PT with theraband  5# BL AW  No AD  12' length  X2 laps       TM  Solo  BUE  Bwd amb (facing backwards)  0.3 mph 5% grade with TB in front as external cue for positioning  2x5 min    Solo  SPC PRN  4\" hurdles (4)  Attempted retro step-overs then lateral, increased R LE fatigue.    Solo  Foam beams x2 in length  SPC PRN  Side stepping  X1 lap    Solo  Airex pads retro stepping  SPC PRN  Alt lead LE by length  2x1 lap   TM  Solo  BUE  Bwd amb (facing backwards)  0.3 mph 8% grade  With TB in front for external cue for positioning  X5 min  0.4 mph 8% grade x5 min    Solo  Airex pads (5) retro stepping  SPC PRN  Alt lead LE by length  2x1 lap    Solo  No AD  Fwd amb as fast as possible   X100'   TM  Solo  BUE  Bwd amb (facing backwards)  0.4 mph 8% grade  With TB in front for external cue for positioning  X5 min  0.4 mph 8% grade x8 min    Solo  Honeycomb stepping  No AD  X1 lap fwd  SPC  X1 lap bwd   Alt LE lead by length  Solo  No AD  3# BL AW  X120' fwd    Foam beams x2 in length  L SPC PRN  3# BL AW  X1 lap lat  Doff AW  X1 lap lat    [place]iver [place]  9' length  X5 small  X4 medium, same ea side, L SPC PRN  X1 lap fwd  Add 3# BL AW  X1 lap fwd     No AD 14' length  3# BL AW  3 Kg MB BUE carry at abdomen  X3 laps fwd               hurdles          step taps          R Knee Control                                       Ther Ex          Nu step                                                                                Ther Activity                             Gait Training      Objective measure testing    Indoors                   Modalities                                                                           "

## 2025-01-13 NOTE — PROGRESS NOTES
"Occupational Therapy Daily Note:    Today's date: 2025  Patient name: Maye Lilly  : 1960  MRN: [id number]  Referring provider: Megan Portillo PA-C  Dx:   Encounter Diagnosis   Name Primary?    Acute CVA (cerebrovascular accident) (HCC) Yes         POC expires Unit limit Auth Expiration date PT/OT/ST + Visit Limit?   3/28/25 4 25 8                             Visit/Unit Tracking  8 Approved-24 Date   PN     Used 1 2 3 4 5 6 7 8    Remaining  7 6 5 4 3 2 1 0     Visit/Unit Tracking  8 visits approved Date 12/16 12/17 12/20 12/23 12/27 12/30 1/3 1/6 1/8 1/10 1/13   1/3/25-25 Used 1 2 3 4 5 6 1 2 3 4 5    Remaining  5 4 3 2 1 0 7 6 5 4 3     Duration in weeks: 12 weeks, 2-3x  Plan of care beginning date: 1/3/25  Plan of care expiration date: 3/28/25  PN #1: 2/3/25    Precautions: HTN, GERD, R shoulder pain   Access Code: [email or web address]        Subjective: \"I took a break over the weekend with doing things at home because its been a lot but I'm ready to get back into it this week.\"    Objective: See treatment below.  Session focusing on proximal strength/stability, coordination, open/closed hand, grasping strength, body awareness, intrinsic strengthening and overall activity endurance improving safety and indep in ADL/IADL mngt.     Neuro Re-ed: Pt educated on and trialed quadruped on mat for increased WB/WS with RUE. Pt with difficulty positioning in quadruped with RLE, transitioned to completing task in prone. Completed upward dog movement in prone with moderate assistance for positioning of RUE and elbow extension. Pt able to complete 5x with 5 sec holds at end range for 4/5 and then 10 sec hold for last one completed. Pt with increased fatigue noted at end of session with fair tolerance. Mod assist with bed mobility to get on and off mat table.     Thera Ex: Pt engaged in UBE x 5 min prograde x 5 min retrograde w/o resist focusing on increasing " proximal RUE strength, cardiopulmonary endurance and sustained grasp.  Pt able to sustain speed and  on handle bars to keep UEB in motion throughout 5 min intervals. (No timer needed).      Ended session completing orange wrist maze with RUE for 4 minutes while seated EOM.    Assessment: Tolerated treatment well. Pt demo increased endurance during exercise and standing activities. Patient would benefit from continued skilled OT.    Plan: Continued skilled OT per POC

## 2025-01-15 ENCOUNTER — OFFICE VISIT (OUTPATIENT)
Dept: OCCUPATIONAL THERAPY | Facility: CLINIC | Age: 65
End: 2025-01-15
Payer: COMMERCIAL

## 2025-01-15 ENCOUNTER — OFFICE VISIT (OUTPATIENT)
Dept: PHYSICAL THERAPY | Facility: CLINIC | Age: 65
End: 2025-01-15
Payer: COMMERCIAL

## 2025-01-15 DIAGNOSIS — R26.89 BALANCE DISORDER: ICD-10-CM

## 2025-01-15 DIAGNOSIS — I63.9 ACUTE CVA (CEREBROVASCULAR ACCIDENT) (HCC): Primary | ICD-10-CM

## 2025-01-15 DIAGNOSIS — R26.9 NEUROLOGIC GAIT DISORDER: Primary | ICD-10-CM

## 2025-01-15 PROCEDURE — 97112 NEUROMUSCULAR REEDUCATION: CPT

## 2025-01-15 PROCEDURE — 97110 THERAPEUTIC EXERCISES: CPT

## 2025-01-15 NOTE — PROGRESS NOTES
"Daily Note     Today's date: 1/15/2025  Patient name: Maye Lilly  : 1960  MRN: [id number]  Referring provider: Megan Portillo PA-C  Dx:   Encounter Diagnosis     ICD-10-CM    1. 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 Balance disorder  R26.89           Start Time: 1145  Stop Time: 1230  Total time in clinic (min): 45 minutes    Subjective: pt reports she had a little work out before therapy today walking around [place].      Objective: See treatment diary below      Assessment: Tolerated treatment well. Patient would benefit from continued PT emphasis on knee flexion w hamstring activation today w added \"bottom\" kick walks and inclined bwd walking for inc hip extension and knee flexion required. Difficulty noted with kick walks with minimal activation observed however pt does improve w inc duration of activity. Pt utilizes some hip hiking for clearance of affected limb. 4'' step added to bwd walking up and over. P demonstrated some difficulty but was able to complete utilizing SPC      Plan: Continue per plan of care.      POC Expiration Date: 25        POC expires Unit limit Auth Expiration date PT/OT/ST + Visit Limit?   1/17/25    12/4/24     1/17/25    2/7/25      1/17/25    1/20/25      4/4/24    2/3/25               Visit/Unit Tracking  AUTH Status:  Date 1/10 1/13 1/15            8 Used 2 3 4              Remaining  6 5 4                  Specialty Daily Treatment Diary  Precautions: Fall risk, past Hx R knee arthritis (can be stiff and swollen at the start of the day)      Manuals 1/6/25 1/8/25 1/10/25 1/13/25 1/15 1/3/25                                           Neuro Re-Ed       Pt education; post-CVA recovery timeline, neuroplastic principles, maximization of recovery in first 12 months s/p CVA x10 min.    HIGT TM  Solo  BUE  Focus on R foot clearance and propulsion  0.9 mph  8% grade  X5 min  Seated break  0.9 mph  8% grade  X5 min    Solo  No AD7.5# BL AW  12' length  2x1 " "lap    Solo  SPC PRN  Pool noodle step-overs (6)  5# BL AW (attempted 7.5# BL AW) X1 lap fwd alt LE    Solo  Backwards amb with A-P resistance from PT with theraband  5# BL AW  No AD  12' length  X2 laps       TM  Solo  BUE  Bwd amb (facing backwards)  0.3 mph 5% grade with TB in front as external cue for positioning  2x5 min    Solo  SPC PRN  4\" hurdles (4)  Attempted retro step-overs then lateral, increased R LE fatigue.    Solo  Foam beams x2 in length  SPC PRN  Side stepping  X1 lap    Solo  Airex pads retro stepping  SPC PRN  Alt lead LE by length  2x1 lap   TM  Solo  BUE  Bwd amb (facing backwards)  0.3 mph 8% grade  With TB in front for external cue for positioning  X5 min  0.4 mph 8% grade x5 min    Solo  Airex pads (5) retro stepping  SPC PRN  Alt lead LE by length  2x1 lap    Solo  No AD  Fwd amb as fast as possible   X100'   TM  Solo  BUE  Bwd amb (facing backwards)  0.4 mph 8% grade  With TB in front for external cue for positioning  X5 min  0.4 mph 8% grade x8 min    Solo  Honeycomb stepping  No AD  X1 lap fwd  SPC  X1 lap bwd   Alt LE lead by length  But kick walks for inc hamstring   activation SOLO x2 laps 1/4 track    TM   SOLO  BUE  Bwd amb (facing backwards)  0.4 mph 8% grade  5 min x2 bouts    Bwd walk up and over 2 airex and one 4'' step  X2 laps       hurdles          step taps          R Knee Control                                       Ther Ex          Nu step                                                                                Ther Activity                             Gait Training      Objective measure testing    Indoors                   Modalities                                                                             "

## 2025-01-15 NOTE — PROGRESS NOTES
"Occupational Therapy Daily Note:    Today's date: 1/15/2025  Patient name: Maye Lilly  : 1960  MRN: [id number]  Referring provider: Megan Portillo PA-C  Dx:   Encounter Diagnosis   Name Primary?    Acute CVA (cerebrovascular accident) (HCC) Yes           POC expires Unit limit Auth Expiration date PT/OT/ST + Visit Limit?   3/28/25 4 25 8                             Visit/Unit Tracking  8 Approved-24 Date   PN     Used 1 2 3 4 5 6 7 8    Remaining  7 6 5 4 3 2 1 0     Visit/Unit Tracking  8 visits approved Date 12/16 12/17 12/20 12/23 12/27 12/30 1/3 1/6 1/8 1/10 1/13   1/3/25-25 Used 1 2 3 4 5 6 1 2 3 4 5    Remaining  5 4 3 2 1 0 7 6 5 4 3     Visit/Unit Tracking  8 visits approved Date 1/15             1/3/25-25 Used 6              Remaining  2               Duration in weeks: 12 weeks, 2-3x  Plan of care beginning date: 1/3/25  Plan of care expiration date: 3/28/25  PN #1: 2/3/25    Precautions: HTN, GERD, R shoulder pain   Access Code: [email or web address]        Subjective: \"I know its good for me, but its hard. I just didn't expect it\"    Objective: See treatment below.  Session focusing on proximal strength/stability, coordination, open/closed hand, grasping strength, body awareness, intrinsic strengthening and overall activity endurance improving safety and indep in ADL/IADL mngt.     Neuro Re-ed: Pt completed quadruped on mat for increased WB/WS with RUE. Pt with difficulty positioning in quadruped with RLE, transitioned to completing task in prone. Completed upward dog movement in prone with moderate assistance for positioning of RUE and elbow extension for first half. Pt able to complete 10x with 10 sec holds at end range. Pt with increased fatigue noted at end of task but increased ability to complete. Mod assist with bed mobility to get on and off mat table.     Thera Ex: Pt engaged in UBE x 5 min prograde x 5 min retrograde 1.2 resist " focusing on increasing proximal RUE strength, cardiopulmonary endurance and sustained grasp.  Pt able to sustain speed and  on handle bars to keep UEB in motion throughout 5 min intervals. (No timer needed).      Ended session seated at table, working on digit flexion and extension, with resistive clips. Pt instructed to use R hand to grasp clips in order (yellow, red, green, blue) and place on metal horizontal bar. Pt able to complete 12 clips with increased difficulty for blue, only able to put on 2. Pt then removing in same fashion with increased ease.     Assessment: Tolerated treatment well. Pt demo increased endurance during exercise and standing activities. Continue working on WB/WS activities and functional grasp/release.  Patient would benefit from continued skilled OT.    Plan: Continued skilled OT per POC

## 2025-01-17 ENCOUNTER — OFFICE VISIT (OUTPATIENT)
Dept: PHYSICAL THERAPY | Facility: CLINIC | Age: 65
End: 2025-01-17
Payer: COMMERCIAL

## 2025-01-17 ENCOUNTER — OFFICE VISIT (OUTPATIENT)
Dept: OCCUPATIONAL THERAPY | Facility: CLINIC | Age: 65
End: 2025-01-17
Payer: COMMERCIAL

## 2025-01-17 DIAGNOSIS — R26.89 BALANCE DISORDER: ICD-10-CM

## 2025-01-17 DIAGNOSIS — I63.9 ACUTE CVA (CEREBROVASCULAR ACCIDENT) (HCC): Primary | ICD-10-CM

## 2025-01-17 DIAGNOSIS — R26.9 NEUROLOGIC GAIT DISORDER: Primary | ICD-10-CM

## 2025-01-17 DIAGNOSIS — I63.9 LEFT PONTINE CVA (HCC): ICD-10-CM

## 2025-01-17 PROCEDURE — 97112 NEUROMUSCULAR REEDUCATION: CPT

## 2025-01-17 NOTE — PROGRESS NOTES
"Daily Note     Today's date: 2025  Patient name: Maye Lilly  : 1960  MRN: [id number]  Referring provider: Megan Portillo PA-C  Dx:   Encounter Diagnosis     ICD-10-CM    1. 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 Balance disorder  R26.89       3. Left pontine CVA (HCC)  I63.9           Start Time: 1015  Stop Time: 1100  Total time in clinic (min): 45 minutes    Subjective: Doing well today with nothing new to report.      Objective: See treatment diary below      Assessment:   Focused on high-intensity stepping practice s/p CVA.  Noted improvements with R foot clearance with retro stepping with foam and 4\" block compared to last session.  Attempted compliant incline negotiation but was too unstable to complete today. Will need to work on more propulsion training.      Plan: Continue per plan of care.  Progress treatment as tolerated.       POC Expiration Date: 25        POC expires Unit limit Auth Expiration date PT/OT/ST + Visit Limit?   1/17/25    12/4/24     1/17/25    2/7/25      1/17/25    1/20/25      4/4/24    2/3/25               Visit/Unit Tracking  AUTH Status:  Date 1/10 1/13 1/15            8 Used 2 3 4              Remaining  6 5 4                  Specialty Daily Treatment Diary  Precautions: Fall risk, past Hx R knee arthritis (can be stiff and swollen at the start of the day)      Manuals 1/6/25 1/8/25 1/10/25 1/13/25 1/15 1/17/25                                           Neuro Re-Ed           HIGT TM  Solo  BUE  Focus on R foot clearance and propulsion  0.9 mph  8% grade  X5 min  Seated break  0.9 mph  8% grade  X5 min    Solo  No AD7.5# BL AW  12' length  2x1 lap    Solo  SPC PRN  Pool noodle step-overs (6)  5# BL AW (attempted 7.5# BL AW) X1 lap fwd alt LE    Solo  Backwards amb with A-P resistance from PT with theraband  5# BL AW  No AD  12' length  X2 laps       TM  Solo  BUE  Bwd amb (facing backwards)  0.3 mph 5% grade with TB in front as external cue for " "positioning  2x5 min    Solo  SPC PRN  4\" hurdles (4)  Attempted retro step-overs then lateral, increased R LE fatigue.    Solo  Foam beams x2 in length  SPC PRN  Side stepping  X1 lap    Solo  Airex pads retro stepping  SPC PRN  Alt lead LE by length  2x1 lap   TM  Solo  BUE  Bwd amb (facing backwards)  0.3 mph 8% grade  With TB in front for external cue for positioning  X5 min  0.4 mph 8% grade x5 min    Solo  Airex pads (5) retro stepping  SPC PRN  Alt lead LE by length  2x1 lap    Solo  No AD  Fwd amb as fast as possible   X100'   TM  Solo  BUE  Bwd amb (facing backwards)  0.4 mph 8% grade  With TB in front for external cue for positioning  X5 min  0.4 mph 8% grade x8 min    Solo  Honeycomb stepping  No AD  X1 lap fwd  SPC  X1 lap bwd   Alt LE lead by length  But kick walks for inc hamstring   activation SOLO x2 laps 1/4 track    TM   SOLO  BUE  Bwd amb (facing backwards)  0.4 mph 8% grade  5 min x2 bouts    Bwd walk up and over 2 airex and one 4'' step  X2 laps   Solo  SPC  Butt kickers backwards  12' length  X1 lap  Blue TB posterior resistance at R ankle with backwards ambulation  12'' length  X2 laps    Solo  Airex (4) 4\" box (1)  Stepping up/down backwards over 12' length  SPC  Alt lead LE by length  2x1 lap    Solo  Wedge walk-up  SPC  Attempted    Solo  X2 foam beams in length  SPC PRN  Side stepping   X1 lap    hurdles          step taps          R Knee Control                                       Ther Ex          Nu step                                                                                Ther Activity                             Gait Training          Indoors                   Modalities                                                                               "

## 2025-01-17 NOTE — PROGRESS NOTES
"Occupational Therapy Daily Note:    Today's date: 2025  Patient name: Maye Lilly  : 1960  MRN: [id number]  Referring provider: Megan Portillo PA-C  Dx:   Encounter Diagnosis   Name Primary?    Acute CVA (cerebrovascular accident) (HCC) Yes             POC expires Unit limit Auth Expiration date PT/OT/ST + Visit Limit?   3/28/25 4 25 8                             Visit/Unit Tracking  8 Approved-24 Date   PN     Used 1 2 3 4 5 6 7 8    Remaining  7 6 5 4 3 2 1 0     Visit/Unit Tracking  8 visits approved Date 12/16 12/17 12/20 12/23 12/27 12/30 1/3 1/6 1/8 1/10 1/13   1/3/25-25 Used 1 2 3 4 5 6 1 2 3 4 5    Remaining  5 4 3 2 1 0 7 6 5 4 3     Visit/Unit Tracking  8 visits approved Date 1/15 1/17            1/3/25-25 Used 6 7             Remaining  2 1              Duration in weeks: 12 weeks, 2-3x  Plan of care beginning date: 1/3/25  Plan of care expiration date: 3/28/25  PN #1: 2/3/25    Precautions: HTN, GERD, R shoulder pain   Access Code: [email or web address]        Subjective: \"I really want to work on bending my knee and my hip to help with walking too because I've been having trouble with that, so this could help.     Objective: See treatment below.  Session focusing on proximal strength/stability, coordination, open/closed hand, grasping strength, body awareness, intrinsic strengthening and overall activity endurance improving safety and indep in ADL/IADL mngt.     Neuro Re-ed: Pt completed quadruped on mat for increased WB/WS with RUE. Pt with increased ability for positioning in quadruped with RLE, able to sustain position for 1 min 15 sec with 3x WS forward and back. Pt then transitioned to completing task in prone. Completed upward dog movement in prone with no assistance for positioning of RUE and elbow extension. Pt able to complete 10x with 10 sec holds at end range. Pt with increased fatigue noted at end of task but increased " ability to complete. min assist with bed mobility to get from prone > supine > seated EOM.     Pt then seated EOM, working on function hand use and reaching with RUE. Pt with parquetry puzzle positioned on stant board anterior and puzzle pieces on R side of Pt. Pt reaching into bucket to grab piece and then place onto velcro board on correct spot. Pt with assist from L hand for stabilization of board and occasional assist to place each piece. Pt finishing with good tolerance overall.       Assessment: Tolerated treatment well. Pt demo increased endurance during exercise and standing activities. Continue working quadruped and WB/WS activities and functional grasp/release.  Patient would benefit from continued skilled OT.    Plan: Continued skilled OT per POC

## 2025-01-20 ENCOUNTER — APPOINTMENT (OUTPATIENT)
Dept: OCCUPATIONAL THERAPY | Facility: CLINIC | Age: 65
End: 2025-01-20
Payer: COMMERCIAL

## 2025-01-20 ENCOUNTER — APPOINTMENT (OUTPATIENT)
Dept: PHYSICAL THERAPY | Facility: CLINIC | Age: 65
End: 2025-01-20
Payer: COMMERCIAL

## 2025-01-21 ENCOUNTER — OFFICE VISIT (OUTPATIENT)
Dept: PHYSICAL THERAPY | Facility: CLINIC | Age: 65
End: 2025-01-21
Payer: COMMERCIAL

## 2025-01-21 ENCOUNTER — OFFICE VISIT (OUTPATIENT)
Dept: OCCUPATIONAL THERAPY | Facility: CLINIC | Age: 65
End: 2025-01-21
Payer: COMMERCIAL

## 2025-01-21 DIAGNOSIS — R26.89 BALANCE DISORDER: ICD-10-CM

## 2025-01-21 DIAGNOSIS — I63.9 ACUTE CVA (CEREBROVASCULAR ACCIDENT) (HCC): Primary | ICD-10-CM

## 2025-01-21 DIAGNOSIS — I63.9 LEFT PONTINE CVA (HCC): ICD-10-CM

## 2025-01-21 DIAGNOSIS — R26.9 NEUROLOGIC GAIT DISORDER: Primary | ICD-10-CM

## 2025-01-21 PROCEDURE — 97112 NEUROMUSCULAR REEDUCATION: CPT

## 2025-01-21 PROCEDURE — 97110 THERAPEUTIC EXERCISES: CPT

## 2025-01-21 NOTE — PROGRESS NOTES
Daily Note     Today's date: 2025  Patient name: Maye Lilly  : 1960  MRN: [id number]  Referring provider: Megan Portillo PA-C  Dx:   Encounter Diagnosis     ICD-10-CM    1. 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 Balance disorder  R26.89       3. Left pontine CVA (HCC)  I63.9           Start Time: 1330  Stop Time: 1415  Total time in clinic (min): 45 minutes    Subjective: Doing well today with nothing new to report. Walked around [employer] and [employer] last week!      Objective: See treatment diary below      Assessment:   Focused on high-intensity stepping practice s/p CVA with emphasis on propulsion and R foot clearance.  Was able to progress TM grade fowards and backwards highlighting improvements in stepping intensity tolerance.  Foam wedge negotiation significantly challenged R LE step amplitude and length both forwards and backwards with compliant surface serving as an external constraint for R LE.      Plan: Continue per plan of care.  Progress treatment as tolerated.   15% TM grade NV.     POC Expiration Date: 25        POC expires Unit limit Auth Expiration date PT/OT/ST + Visit Limit?   1/17/25    12/4/24     1/17/25    2/7/25      1/17/25    1/20/25      4/4/24    2/3/25               Visit/Unit Tracking  AUTH Status:  Date 1/10 1/13 1/15            8 Used 2 3 4              Remaining  6 5 4                  Specialty Daily Treatment Diary  Precautions: Fall risk, past Hx R knee arthritis (can be stiff and swollen at the start of the day)      Manuals 1/21/25 1/8/25 1/10/25 1/13/25 1/15 1/17/25                                           Neuro Re-Ed           HIGT TM  Solo  BUE  Bwd amb (facing backwards)  0.4 mph 8% grade x1 min  0.5 mph 8% grade x1.5 min  0.5 mph 12% grade x3.5 min  (6 min total)  0.8 mph 12% grade  X5 min    Solo  // bars (BUE)  Stepping up/down x2 30 deg foam wedges 4' total in length  Fwd recip x4 laps  Bwd recip  2x2 laps     TM  Solo  BUE  Bwd amb (facing  "backwards)  0.3 mph 5% grade with TB in front as external cue for positioning  2x5 min    Solo  SPC PRN  4\" hurdles (4)  Attempted retro step-overs then lateral, increased R LE fatigue.    Solo  Foam beams x2 in length  SPC PRN  Side stepping  X1 lap    Solo  Airex pads retro stepping  SPC PRN  Alt lead LE by length  2x1 lap   TM  Solo  BUE  Bwd amb (facing backwards)  0.3 mph 8% grade  With TB in front for external cue for positioning  X5 min  0.4 mph 8% grade x5 min    Solo  Airex pads (5) retro stepping  SPC PRN  Alt lead LE by length  2x1 lap    Solo  No AD  Fwd amb as fast as possible   X100'   TM  Solo  BUE  Bwd amb (facing backwards)  0.4 mph 8% grade  With TB in front for external cue for positioning  X5 min  0.4 mph 8% grade x8 min    Solo  Honeycomb stepping  No AD  X1 lap fwd  SPC  X1 lap bwd   Alt LE lead by length  But kick walks for inc hamstring   activation SOLO x2 laps 1/4 track    TM   SOLO  BUE  Bwd amb (facing backwards)  0.4 mph 8% grade  5 min x2 bouts    Bwd walk up and over 2 airex and one 4'' step  X2 laps   Solo  SPC  Butt kickers backwards  12' length  X1 lap  Blue TB posterior resistance at R ankle with backwards ambulation  12'' length  X2 laps    Solo  Airex (4) 4\" box (1)  Stepping up/down backwards over 12' length  SPC  Alt lead LE by length  2x1 lap    Solo  Wedge walk-up  SPC  Attempted    Solo  X2 foam beams in length  SPC PRN  Side stepping   X1 lap    hurdles          step taps          R Knee Control                                       Ther Ex          Nu step                                                                                Ther Activity                             Gait Training          Indoors                   Modalities                                                                               "

## 2025-01-21 NOTE — PROGRESS NOTES
"Occupational Therapy Daily Note:    Today's date: 2025  Patient name: Maye Lilly  : 1960  MRN: [id number]  Referring provider: Megan Portillo PA-C  Dx:   Encounter Diagnosis   Name Primary?    Acute CVA (cerebrovascular accident) (HCC) Yes           POC expires Unit limit Auth Expiration date PT/OT/ST + Visit Limit?   3/28/25 4 25 8                             Visit/Unit Tracking  8 Approved-24 Date   PN     Used 1 2 3 4 5 6 7 8    Remaining  7 6 5 4 3 2 1 0     Visit/Unit Tracking  8 visits approved Date 12/16 12/17 12/20 12/23 12/27 12/30 1/3 1/6 1/8 1/10 1/13   1/3/25-25 Used 1 2 3 4 5 6 1 2 3 4 5    Remaining  5 4 3 2 1 0 7 6 5 4 3     Visit/Unit Tracking  8 visits approved Date 1/15 1/17 1/21           1/3/25-25 Used 6 7 8            Remaining  2 1 0             Duration in weeks: 12 weeks, 2-3x  Plan of care beginning date: 1/3/25  Plan of care expiration date: 3/28/25  PN #1: 2/3/25    Precautions: HTN, GERD, R shoulder pain   Access Code: [email or web address]        Subjective: \"I can feel the muscles working\" Pt in sidelying/upward dog position    Objective: See treatment below.  Session focusing on proximal strength/stability, coordination, open/closed hand, grasping strength, body awareness, intrinsic strengthening and overall activity endurance improving safety and indep in ADL/IADL mngt.     Neuro Re-ed: Pt  completing task in prone. Completed upward dog movement in prone with no assistance for positioning of RUE and elbow extension. Pt able to complete 10x with 10 sec holds at end range. Pt with increased fatigue noted at end of task but increased ability to complete. min assist with bed mobility to get from prone > supine > seated EOM.     Pt then in sidelying with increased ability to stabilize on RUE forearm. Completed 2x for 3 min each with rest in between. Pt completing pixy cube design with R hand to manipulate cubes and the " L hand to  and place into container for following design.     There ex: Pt seated EOM completing fist punches into green P with focus on pushing for increased digit flexion. Pt completing for 5 min with no rest breaks.     Assessment: Tolerated treatment well. Pt demo increased endurance during exercise and standing activities. Continue working quadruped and WB/WS activities and functional grasp/release.  Patient would benefit from continued skilled OT.    Plan: Continued skilled OT per POC

## 2025-01-22 ENCOUNTER — APPOINTMENT (OUTPATIENT)
Dept: OCCUPATIONAL THERAPY | Facility: CLINIC | Age: 65
End: 2025-01-22
Payer: COMMERCIAL

## 2025-01-22 ENCOUNTER — APPOINTMENT (OUTPATIENT)
Dept: PHYSICAL THERAPY | Facility: CLINIC | Age: 65
End: 2025-01-22
Payer: COMMERCIAL

## 2025-01-23 ENCOUNTER — OFFICE VISIT (OUTPATIENT)
Dept: PHYSICAL THERAPY | Facility: CLINIC | Age: 65
End: 2025-01-23
Payer: COMMERCIAL

## 2025-01-23 ENCOUNTER — OFFICE VISIT (OUTPATIENT)
Dept: OCCUPATIONAL THERAPY | Facility: CLINIC | Age: 65
End: 2025-01-23
Payer: COMMERCIAL

## 2025-01-23 DIAGNOSIS — R26.9 NEUROLOGIC GAIT DISORDER: Primary | ICD-10-CM

## 2025-01-23 DIAGNOSIS — I63.9 ACUTE CVA (CEREBROVASCULAR ACCIDENT) (HCC): Primary | ICD-10-CM

## 2025-01-23 DIAGNOSIS — R26.89 BALANCE DISORDER: ICD-10-CM

## 2025-01-23 DIAGNOSIS — I63.9 LEFT PONTINE CVA (HCC): ICD-10-CM

## 2025-01-23 PROCEDURE — 97110 THERAPEUTIC EXERCISES: CPT

## 2025-01-23 PROCEDURE — 97112 NEUROMUSCULAR REEDUCATION: CPT

## 2025-01-23 NOTE — PROGRESS NOTES
"Occupational Therapy Daily Note:    Today's date: 2025  Patient name: Maye Lilly  : 1960  MRN: [id number]  Referring provider: Megan Portillo PA-C  Dx:   Encounter Diagnosis   Name Primary?    Acute CVA (cerebrovascular accident) (HCC) Yes     POC expires Unit limit Auth Expiration date PT/OT/ST + Visit Limit?   3/28/25 4 25 8                             Visit/Unit Tracking  8 Approved-24 Date   PN     Used 1 2 3 4 5 6 7 8    Remaining  7 6 5 4 3 2 1 0     Visit/Unit Tracking  8 visits approved Date 12/16 12/17 12/20 12/23 12/27 12/30 1/3 1/6 1/8 1/10 1/13   1/3/25-25 Used 1 2 3 4 5 6 1 2 3 4 5    Remaining  5 4 3 2 1 0 7 6 5 4 3     Visit/Unit Tracking  8 visits approved Date 1/15 1/17 1/21 1/23          1/3/25-25 Used 6 7 8 9           Remaining  2 1 0 0            Duration in weeks: 12 weeks, 2-3x  Plan of care beginning date: 1/3/25  Plan of care expiration date: 3/28/25  PN #1: 2/3/25    Precautions: HTN, GERD, R shoulder pain   Access Code: [email or web address]           Subjective: \"See I was ready to stop and you said 5 more.  I could do it\".     Objective: See treatment below.  Session focusing on proximal strength/stability, coordination, open/closed hand, grasping strength, body awareness, intrinsic strengthening and overall activity endurance improving safety and indep in ADL/IADL mngt.     Neuro Re-ed: With unilateral support in stance, pt engaged in fxl reaching task squatting to low surface for small foam puzzle piece retrieval, ascending in standing position, pt positioned and placed to target board.  Activity positioned on slant board midline of pt and puzzle pieces placed on low surface to right of pt facilitating crossing midline. No LOB throughout task, droppage x1.     Thera Ex: Pt engaged in UBE x 5 min prograde x 5 min retrograde increasing resist to 1.5 focusing on increasing proximal RUE strength, cardiopulmonary " endurance and sustained grasp.  Pt able to sustain speed and  on handle bars to keep UEB in motion throughout 5 min intervals. Pt able to sustain speed throughout task.     Seated, EOM pt completed therex using 1# DB for bicep curls followed by 3# tbar with added resistance of theraband stabilized by therapist completing protraction/retraction, x15 anteriorly and posteriorly.  Pt noted with G symmetrical movements b/t BUE during exercise.      Assessment: Tolerated treatment well. Pt continues to improve in activity endurance, pinch patterns and proximal strength/stability.  Patient would benefit from continued skilled OT.    Plan: Continued skilled OT per POC

## 2025-01-23 NOTE — PROGRESS NOTES
Daily Note     Today's date: 2025  Patient name: Maye Lilly  : 1960  MRN: [id number]  Referring provider: Megan Portillo PA-C  Dx:   Encounter Diagnosis     ICD-10-CM    1. 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 Balance disorder  R26.89       3. Left pontine CVA (HCC)  I63.9           Start Time: 0930  Stop Time: 1015  Total time in clinic (min): 45 minutes    Subjective: Doing well today with nothing new to report.       Objective: See treatment diary below      Assessment:   Focused on high-intensity stepping practice s/p CVA with emphasis on propulsion and R foot clearance.  Progressed foam wedge negotiation with addition of 3# BL AW further challenging R LE hip flexion/extension and paired knee flexion efficacy.  Will continue to benefit from HIT to maximize neuroplastic recovery in 12-month timeline s/p CVA.      Plan: Continue per plan of care.  Progress treatment as tolerated.   15% TM grade NV.     POC Expiration Date: 25        POC expires Unit limit Auth Expiration date PT/OT/ST + Visit Limit?   1/17/25    12/4/24     1/17/25    2/7/25      1/17/25    1/20/25      4/4/24    2/3/25               Visit/Unit Tracking  AUTH Status:  Date               Needs auth Used 8                Remaining  0                    Specialty Daily Treatment Diary  Precautions: Fall risk, past Hx R knee arthritis (can be stiff and swollen at the start of the day)      Manuals 1/21/25 1/23/25 1/10/25 1/13/25 1/15 1/17/25                                           Neuro Re-Ed           HIGT TM  Solo  BUE  Bwd amb (facing backwards)  0.4 mph 8% grade x1 min  0.5 mph 8% grade x1.5 min  0.5 mph 12% grade x3.5 min  (6 min total)  0.8 mph 12% grade  X5 min    Solo  // bars (BUE)  Stepping up/down x2 30 deg foam wedges 4' total in length  Fwd recip x4 laps  Bwd recip  2x2 laps     Solo  7.5# BL AW   16' length  X3 laps lat no UE    Solo  SPC  3# BL AW  Airex pad step up/down bwd  X2 laps over  "16'    Solo  // bars BUE  X2 30 deg wedges together stepping up/down fwd/bwd by length over 4'  X3 rounds  X3 rounds   TM  Solo  BUE  Bwd amb (facing backwards)  0.3 mph 8% grade  With TB in front for external cue for positioning  X5 min  0.4 mph 8% grade x5 min    Solo  Airex pads (5) retro stepping  SPC PRN  Alt lead LE by length  2x1 lap    Solo  No AD  Fwd amb as fast as possible   X100'   TM  Solo  BUE  Bwd amb (facing backwards)  0.4 mph 8% grade  With TB in front for external cue for positioning  X5 min  0.4 mph 8% grade x8 min    Solo  Honeycomb stepping  No AD  X1 lap fwd  SPC  X1 lap bwd   Alt LE lead by length  But kick walks for inc hamstring   activation SOLO x2 laps 1/4 track    TM   SOLO  BUE  Bwd amb (facing backwards)  0.4 mph 8% grade  5 min x2 bouts    Bwd walk up and over 2 airex and one 4'' step  X2 laps   Solo  SPC  Butt kickers backwards  12' length  X1 lap  Blue TB posterior resistance at R ankle with backwards ambulation  12'' length  X2 laps    Solo  Airex (4) 4\" box (1)  Stepping up/down backwards over 12' length  SPC  Alt lead LE by length  2x1 lap    Solo  Wedge walk-up  SPC  Attempted    Solo  X2 foam beams in length  SPC PRN  Side stepping   X1 lap    hurdles          step taps          R Knee Control                                       Ther Ex          Nu step                                                                                Ther Activity                             Gait Training          Indoors                   Modalities                                                                               "

## 2025-01-24 ENCOUNTER — OFFICE VISIT (OUTPATIENT)
Dept: PHYSICAL THERAPY | Facility: CLINIC | Age: 65
End: 2025-01-24
Payer: COMMERCIAL

## 2025-01-24 ENCOUNTER — EVALUATION (OUTPATIENT)
Dept: OCCUPATIONAL THERAPY | Facility: CLINIC | Age: 65
End: 2025-01-24
Payer: COMMERCIAL

## 2025-01-24 DIAGNOSIS — R26.89 BALANCE DISORDER: ICD-10-CM

## 2025-01-24 DIAGNOSIS — R26.9 NEUROLOGIC GAIT DISORDER: Primary | ICD-10-CM

## 2025-01-24 DIAGNOSIS — I63.9 LEFT PONTINE CVA (HCC): ICD-10-CM

## 2025-01-24 DIAGNOSIS — I63.9 ACUTE CVA (CEREBROVASCULAR ACCIDENT) (HCC): Primary | ICD-10-CM

## 2025-01-24 PROCEDURE — 97112 NEUROMUSCULAR REEDUCATION: CPT

## 2025-01-24 PROCEDURE — 97110 THERAPEUTIC EXERCISES: CPT

## 2025-01-24 NOTE — PROGRESS NOTES
"Daily Note     Today's date: 2025  Patient name: Maye Lilly  : 1960  MRN: [id number]  Referring provider: Megan Portillo PA-C  Dx:   Encounter Diagnosis     ICD-10-CM    1. 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 Balance disorder  R26.89       3. Left pontine CVA (HCC)  I63.9           Start Time: 1100  Stop Time: 1145  Total time in clinic (min): 45 minutes    Subjective: Doing well today - says her R knee is feeling a little more weak today - may be in part due to the intense workout last session.      Objective: See treatment diary below      Assessment:   Focused on high-intensity stepping practice s/p CVA with emphasis on propulsion and R foot clearance.  Progressed TM grade demonstrating ability to still clear R foot which also challenged R LE propulsion.  Noted SIGNIFICANT carryover of R foot clearance with retro stepping with airex pad and 4\" step negotiation! Likely seen from training on foam wedges last two sessions.        Plan: Continue per plan of care.  Progress treatment as tolerated.        POC Expiration Date: 25        POC expires Unit limit Auth Expiration date PT/OT/ST + Visit Limit?   1/17/25    12/4/24     1/17/25    2/7/25      1/17/25    1/20/25      4/4/24    2/3/25               Visit/Unit Tracking  AUTH Status:  Date              Needs auth Used 8 1               Remaining  0                    Specialty Daily Treatment Diary  Precautions: Fall risk, past Hx R knee arthritis (can be stiff and swollen at the start of the day)      Manuals 1/21/25 1/23/25 1/24/25 1/13/25 1/15 1/17/25                                           Neuro Re-Ed           HIGT TM  Solo  BUE  Bwd amb (facing backwards)  0.4 mph 8% grade x1 min  0.5 mph 8% grade x1.5 min  0.5 mph 12% grade x3.5 min  (6 min total)  0.8 mph 12% grade  X5 min    Solo  // bars (BUE)  Stepping up/down x2 30 deg foam wedges 4' total in length  Fwd recip x4 laps  Bwd recip  2x2 laps     Solo  7.5# BL AW   16' " "length  X3 laps lat no UE    Solo  SPC  3# BL AW  Airex pad step up/down bwd  X2 laps over 16'    Solo  // bars BUE  X2 30 deg wedges together stepping up/down fwd/bwd by length over 4'  X3 rounds  X3 rounds   TM  Solo  BUE  0.8 mph  8% grade  X5 min  0.8 mph  15% grade  X5 min      Solo  SPC   Airex pads (4)  4\" step (1)  Retro stepping Alt lead LE by length  2x1 lap    Solo  No AD  16' length  Walking fwd as fast as possible, timed by lap  1) 20.13s  2) 16.31s  3) 14.87s TM  Solo  BUE  Bwd amb (facing backwards)  0.4 mph 8% grade  With TB in front for external cue for positioning  X5 min  0.4 mph 8% grade x8 min    Solo  Honeycomb stepping  No AD  X1 lap fwd  SPC  X1 lap bwd   Alt LE lead by length  But kick walks for inc hamstring   activation SOLO x2 laps 1/4 track    TM   SOLO  BUE  Bwd amb (facing backwards)  0.4 mph 8% grade  5 min x2 bouts    Bwd walk up and over 2 airex and one 4'' step  X2 laps   Solo  SPC  Butt kickers backwards  12' length  X1 lap  Blue TB posterior resistance at R ankle with backwards ambulation  12'' length  X2 laps    Solo  Airex (4) 4\" box (1)  Stepping up/down backwards over 12' length  SPC  Alt lead LE by length  2x1 lap    Solo  Wedge walk-up  SPC  Attempted    Solo  X2 foam beams in length  SPC PRN  Side stepping   X1 lap    hurdles          step taps          R Knee Control                                       Ther Ex          Nu step                                                                                Ther Activity                             Gait Training          Indoors                   Modalities                                                                               "

## 2025-01-24 NOTE — PROGRESS NOTES
"Occupational Therapy Daily Note:    Today's date: 2025  Patient name: Maye Lilly  : 1960  MRN: [id number]  Referring provider: Megan Portillo PA-C  Dx:   Encounter Diagnosis   Name Primary?    Acute CVA (cerebrovascular accident) (HCC) Yes       POC expires Unit limit Auth Expiration date PT/OT/ST + Visit Limit?   3/28/25 4 25 8                             Visit/Unit Tracking  8 Approved-24 Date   PN     Used 1 2 3 4 5 6 7 8    Remaining  7 6 5 4 3 2 1 0     Visit/Unit Tracking  8 visits approved Date 12/16 12/17 12/20 12/23 12/27 12/30 1/3 1/6 1/8 1/10 1/13   1/3/25-25 Used 1 2 3 4 5 6 1 2 3 4 5    Remaining  5 4 3 2 1 0 7 6 5 4 3     Visit/Unit Tracking  8 visits approved Date 1/15 1/17 1/21 1/23 1/24         1/3/25-25 Used 6 7 8 9 10          Remaining  2 1 0 0 TBD           Duration in weeks: 12 weeks, 2-3x  Plan of care beginning date: 1/3/25  Plan of care expiration date: 3/28/25  PN #1: 2/3/25    Precautions: HTN, GERD, R shoulder pain   Access Code: [email or web address]           Subjective: \"ill just keep going to do my exercises, ill need to switch between 1# and 2#s for some of them.\"    Objective: See treatment below.  Session focusing on proximal strength/stability, coordination, open/closed hand, grasping strength, body awareness, intrinsic strengthening and overall activity endurance improving safety and indep in ADL/IADL mngt.     Thera Ex: Seated, EOM pt completed therex using 1# and 2# Wt dowel for  Shoulder FF, and extension from FF, rows CW/CCW, and bicep curls 3x10 for each. Pt often starting with 1# bar and then ending last set with 2# due to proper positioning and fatigue. Pt noted with G symmetrical movements b/t BUE during exercise.     Pt then seated EOM completing 3x10 sets of scap retraction/protraction as well as shoulder shrugs (elevation/depression) with holds at end range.     Ended session, trialing and providing " pt with upgraded HEP with green theraputty and adding in MCP flexion pushes into putty for increased digit flexion and composite fist.     Assessment: Tolerated treatment well. Pt continues to improve in activity endurance, pinch patterns and proximal strength/stability.  Patient would benefit from continued skilled OT.    Plan: Continued skilled OT per POC

## 2025-01-27 ENCOUNTER — OFFICE VISIT (OUTPATIENT)
Dept: OCCUPATIONAL THERAPY | Facility: CLINIC | Age: 65
End: 2025-01-27
Payer: COMMERCIAL

## 2025-01-27 ENCOUNTER — OFFICE VISIT (OUTPATIENT)
Dept: PHYSICAL THERAPY | Facility: CLINIC | Age: 65
End: 2025-01-27
Payer: COMMERCIAL

## 2025-01-27 DIAGNOSIS — R26.89 BALANCE DISORDER: ICD-10-CM

## 2025-01-27 DIAGNOSIS — R26.9 NEUROLOGIC GAIT DISORDER: Primary | ICD-10-CM

## 2025-01-27 DIAGNOSIS — I63.9 ACUTE CVA (CEREBROVASCULAR ACCIDENT) (HCC): Primary | ICD-10-CM

## 2025-01-27 DIAGNOSIS — I63.9 LEFT PONTINE CVA (HCC): ICD-10-CM

## 2025-01-27 PROCEDURE — 97112 NEUROMUSCULAR REEDUCATION: CPT

## 2025-01-27 PROCEDURE — 97110 THERAPEUTIC EXERCISES: CPT

## 2025-01-27 NOTE — PROGRESS NOTES
"Progress Note     Today's date: 2025  Patient name: Maye Lilly  : 1960  MRN: [id number]  Referring provider: Megan Portillo PA-C  Dx:   Encounter Diagnosis     ICD-10-CM    1. 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 Balance disorder  R26.89       3. Left pontine CVA (HCC)  I63.9           Start Time: 0930  Stop Time: 1015  Total time in clinic (min): 45 minutes    Subjective: Doing well today!      Patient Goals  To return as closely to her PLOF as possible. \"I want my right foot to be able to lift higher.\"     Pain  Current pain ratin  At best pain ratin  At worst pain ratin     Objective: See treatment diary below        ABC: 40.63%  PN 24: 51.88%  PN 24: 55.63%  RE 1/3/25: 65.63%  PN 25: 73.13%     5xSTS: Standard chair, L UE from armrest to no support 5/5 times, 19.12s  PN 24: Standard chair, unsupported, L UE from armrest to no support 5/5 times, 13.93s  PN 24: Standard chair, unsupported, NO UE SUPPORT 15.12s  RE 1/3/25: Standard chair, unsupported, no UE support 11.50s  PN 25: Standard chair, unsupported, no UE support 10.79s     TUG: Standard chair, hemiwalker, CS, 33.88s   PN 24: Standard chair, L SPC, CS, 28.22s  PN 24: Standard chair, L SPC, CS, 22.57s - trialed again with no AD 22.53s   RE 1/3/25: Standard chair L SPC, CS 15.75s - trialed again no AD 15.16s  PN 25: Standard chair L SPC, CS 12.56s - trialed again no AD 13.10s      6 MWT: 200' in 4'45\" with hemiwalker CS, request to sit with 1'15\" left, no standing rest breaks.         PN 24: ...  PN 24: 486 ft SPC  RE 1/3/25: 500 ft NO AD  PN 25: 668 ft NO AD     Gait Speed: 0.25 m/s L hemiwalker  PN 24: 0.29 m/s L SPC  PN 24: 0.48 m/s L SPC  RE 1/3/25: 0.60 m/s no AD  PN 25: 0.71 m/s no AD        Assessment: Progress assessment completed s/p 4 months of PT post-CVA to maximize neuroplastic recovery. Demonstrates ongoing improvements in LE power " output, clinically significant improvements in mobility, gait speed, endurance, balance, and additional improvements balance confidence. Based on neuroplastic principles and timeline for post-CVA recovery, Cris will continue to benefit from skilled PT care to continue to maximize recovery within the timeline for post-CVA recovery in the first 12 months after her CVA.     Goals  Short-term goals (to be achieved in 4 weeks):  1. Patient will demonstrate independence with their initial home exercise program. - met  2. Patient will improve 5xSTS to <15.0 seconds demonstrating improved LE strength and power output, while improving safety with sit to stand transfers. - met  3. Patient will improve TUG to <13.5 seconds using least restrictive assistive device indicating improved functional mobility to maximize independence. - ongoing     Long-term goals (to be achieved in 8 to 12 weeks with additional goals to be incorporated and progressed appropriately pending patient progress):  1. Patient will demonstrate independence with their finalized home exercise program. - ongoing  2. Patient will improve balance as demonstrated by an increase in FGA by > or equal to 4 points to decrease their risk of falls. - to be assessed.  3. Patient will increase SSGS by >0.10 m/s indicating they are walking at a gait speed to allow for greater safety in the community. - met  4. Patient will increase ABC by MDC of >20% indicating improved balance confidence and self-efficacy. - not met but slight improvements made; may be in part due to the high-level functional components of the ABC.     New 4-week goals as of 12/4/24 to reduce risk for falls and maximize overall mobility levels.   Improve TUG by MCID. - met  Improve 5xSTS by MCID. - met  Improve gait speed by MCID. - met  Improve 6 MWT by 10%. - met     New 4-week goals as of 1/3/25 to reduce risk for falls and maximize overall mobility levels.   Improve TUG by MCID. - met  Improve  5xSTS by MCID. - nearly met  Improve gait speed by MCID. - met  Improve 6 MWT by 10%. -met    New 4-week goals as of 1/27/25 to reduce risk for falls and maximize overall mobility levels.   Improve TUG by MCID.   Improve 5xSTS by MCID.   Improve gait speed by MCID.   Improve 6 MWT by 10%.     Plan: Continue current POC.  Patient would benefit from: skilled physical therapy and skilled occupational therapy  Referral necessary: No  Planned modality interventions: biofeedback     Planned therapy interventions: manual therapy, balance, neuromuscular re-education, patient/caregiver education, stretching, strengthening, therapeutic activities, therapeutic exercise, graded exercise, gait training and home exercise program     Frequency: 3x/week as able.  Duration in weeks: 12  Plan of Care beginning date: 1/3/25  Plan of Care expiration date: 4/4/25  Treatment plan discussed with: Patient     POC Expiration Date: 4/4/25        POC expires Unit limit Auth Expiration date PT/OT/ST + Visit Limit?   1/17/25    12/4/24     1/17/25    2/7/25      1/17/25    1/20/25      4/4/24    2/3/25               Visit/Unit Tracking  AUTH Status:  Date 1/23 1/24 1/27            Needs auth Used 8 1 2              Remaining  0                    Specialty Daily Treatment Diary  Precautions: Fall risk, past Hx R knee arthritis (can be stiff and swollen at the start of the day)      Manuals 1/21/25 1/23/25 1/24/25 1/27/25 1/15 1/17/25                                           Neuro Re-Ed     ABC  TUG no AD  6 MWT no AD      HIGT TM  Solo  BUE  Bwd amb (facing backwards)  0.4 mph 8% grade x1 min  0.5 mph 8% grade x1.5 min  0.5 mph 12% grade x3.5 min  (6 min total)  0.8 mph 12% grade  X5 min    Solo  // bars (BUE)  Stepping up/down x2 30 deg foam wedges 4' total in length  Fwd recip x4 laps  Bwd recip  2x2 laps     Solo  7.5# BL AW   16' length  X3 laps lat no UE    Solo  SPC  3# BL AW  Airex pad step up/down bwd  X2 laps over 16'    Solo  // bars  "BUE  X2 30 deg wedges together stepping up/down fwd/bwd by length over 4'  X3 rounds  X3 rounds   TM  Solo  BUE  0.8 mph  8% grade  X5 min  0.8 mph  15% grade  X5 min      Solo  SPC   Airex pads (4)  4\" step (1)  Retro stepping Alt lead LE by length  2x1 lap    Solo  No AD  16' length  Walking fwd as fast as possible, timed by lap  1) 20.13s  2) 16.31s  3) 14.87s TM  Solo  BUE  0.6 mph 15% grade backwards  X5 min But kick walks for inc hamstring   activation SOLO x2 laps 1/4 track    TM   SOLO  BUE  Bwd amb (facing backwards)  0.4 mph 8% grade  5 min x2 bouts    Bwd walk up and over 2 airex and one 4'' step  X2 laps   Solo  SPC  Butt kickers backwards  12' length  X1 lap  Blue TB posterior resistance at R ankle with backwards ambulation  12'' length  X2 laps    Solo  Airex (4) 4\" box (1)  Stepping up/down backwards over 12' length  SPC  Alt lead LE by length  2x1 lap    Solo  Wedge walk-up  SPC  Attempted    Solo  X2 foam beams in length  SPC PRN  Side stepping   X1 lap    hurdles          step taps          R Knee Control                                       Ther Ex          Nu step                                                                                Ther Activity                             Gait Training          Indoors                   Modalities                                                                               "

## 2025-01-27 NOTE — PROGRESS NOTES
"Occupational Therapy Daily Note:    Today's date: 2025  Patient name: Maye Lilly  : 1960  MRN: [id number]  Referring provider: Megan Portillo PA-C  Dx:   Encounter Diagnosis   Name Primary?    Acute CVA (cerebrovascular accident) (HCC) Yes       POC expires Unit limit Auth Expiration date PT/OT/ST + Visit Limit?   3/28/25 4 NRE 25 8                             Visit/Unit Tracking  8 Approved-24 Date   PN     Used 1 2 3 4 5 6 7 8    Remaining  7 6 5 4 3 2 1 0     Visit/Unit Tracking  8 visits approved Date 12/16 12/17 12/20 12/23 12/27 12/30 1/3 1/6 1/8 1/10 1/13   1/3/25-25 Used 1 2 3 4 5 6 1 2 3 4 5    Remaining  5 4 3 2 1 0 7 6 5 4 3     Visit/Unit Tracking  8 visits approved Date 1/15 1/17 1/21 1/23 1/24 1/27        1/3/25-25 Used 6 7 8 9 10 11         Remaining  2 1 0 0 TBD TBD          Duration in weeks: 12 weeks, 2-3x  Plan of care beginning date: 1/3/25  Plan of care expiration date: 3/28/25  PN #1: 2/3/25    Precautions: HTN, GERD, R shoulder pain   Access Code: [email or web address]             Subjective: \"I tried using the green at home but I had to switch to red, I'm going to go back and forth\" Pt in regards to completing putty program at home.    Objective: See treatment below.  Session focusing on proximal strength/stability, coordination, open/closed hand, grasping strength, body awareness, intrinsic strengthening and overall activity endurance improving safety and indep in ADL/IADL mngt.     Neuro Re-ed: Working on full fist with digit ROM, pt R hand cobanned in fist position with HP applied for 5 min. Once removed, pt with increased digit flexion and near full fist noted. Pt then completing 15x towel scrunches with increased squeeze at end range.     Thera Ex: Pt engaged in UBE x 5 min prograde x 5 min retrograde increasing resist to 1.5 focusing on increasing proximal RUE strength, cardiopulmonary endurance and sustained grasp.  " Pt able to sustain speed and  on handle bars to keep UEB in motion throughout 5 min intervals. Pt able to sustain speed throughout task.     Started in supine, pt completing FF with added resistance from theraband with therapist anterior. Pt with increased difficulty with resistance for shoulder and elbow ROM, completed 10x. Then Pt seated EOM pt completed therex using 3# tbar with added resistance of theraband stabilized by therapist completing protraction/retraction, 3x10 anteriorly and posteriorly.  Pt noted with G symmetrical movements b/t BUE during exercise.      Assessment: Tolerated treatment well. Pt continues to improve in activity endurance, pinch patterns and proximal strength/stability.  Patient would benefit from continued skilled OT.    Plan: Continued skilled OT per POC

## 2025-01-28 ENCOUNTER — OFFICE VISIT (OUTPATIENT)
Dept: PHYSICAL THERAPY | Facility: CLINIC | Age: 65
End: 2025-01-28
Payer: COMMERCIAL

## 2025-01-28 ENCOUNTER — OFFICE VISIT (OUTPATIENT)
Dept: OCCUPATIONAL THERAPY | Facility: CLINIC | Age: 65
End: 2025-01-28
Payer: COMMERCIAL

## 2025-01-28 DIAGNOSIS — R26.9 NEUROLOGIC GAIT DISORDER: Primary | ICD-10-CM

## 2025-01-28 DIAGNOSIS — R26.89 BALANCE DISORDER: ICD-10-CM

## 2025-01-28 DIAGNOSIS — I63.9 ACUTE CVA (CEREBROVASCULAR ACCIDENT) (HCC): Primary | ICD-10-CM

## 2025-01-28 DIAGNOSIS — I63.9 LEFT PONTINE CVA (HCC): ICD-10-CM

## 2025-01-28 PROCEDURE — 97112 NEUROMUSCULAR REEDUCATION: CPT

## 2025-01-28 PROCEDURE — 97530 THERAPEUTIC ACTIVITIES: CPT

## 2025-01-28 NOTE — PROGRESS NOTES
"Daily Note     Today's date: 2025  Patient name: Maye Lilly  : 1960  MRN: [id number]  Referring provider: Megan Portillo PA-C  Dx:   Encounter Diagnosis     ICD-10-CM    1. 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 Balance disorder  R26.89       3. Left pontine CVA (HCC)  I63.9           Start Time: 1030  Stop Time: 1115  Total time in clinic (min): 45 minutes    Subjective: Doing well this morning; received from OT - going with her daughter to [email or web address] this afternoon.      Objective: See treatment diary below      Assessment: Continued HIGT with progression of retro step-ups with BL LE with 6\" step - was able to clear and accomplish at first with R LE but with fatigue was unable to - still able to clear airex pads with retro stepping with significant improvement!      Plan: Continue per plan of care.  Progress treatment as tolerated.       POC Expiration Date: 25        POC expires Unit limit Auth Expiration date PT/OT/ST + Visit Limit?   1/17/25    12/4/24     1/17/25    2/7/25      1/17/25    1/20/25      4/4/24    2/3/25               Visit/Unit Tracking  AUTH Status:  Date            Needs auth Used 8 1 2 3             Remaining  0                    Specialty Daily Treatment Diary  Precautions: Fall risk, past Hx R knee arthritis (can be stiff and swollen at the start of the day)      Manuals 25                                           Neuro Re-Ed     ABC  TUG no AD  6 MWT no AD      HIGT TM  Solo  BUE  Bwd amb (facing backwards)  0.4 mph 8% grade x1 min  0.5 mph 8% grade x1.5 min  0.5 mph 12% grade x3.5 min  (6 min total)  0.8 mph 12% grade  X5 min    Solo  // bars (BUE)  Stepping up/down x2 30 deg foam wedges 4' total in length  Fwd recip x4 laps  Bwd recip  2x2 laps     Solo  7.5# BL AW   16' length  X3 laps lat no UE    Solo  SPC  3# BL AW  Airex pad step up/down bwd  X2 laps over 16'    Solo  // bars BUE  X2 30 deg " "wedges together stepping up/down fwd/bwd by length over 4'  X3 rounds  X3 rounds   TM  Solo  BUE  0.8 mph  8% grade  X5 min  0.8 mph  15% grade  X5 min      Solo  SPC   Airex pads (4)  4\" step (1)  Retro stepping Alt lead LE by length  2x1 lap    Solo  No AD  16' length  Walking fwd as fast as possible, timed by lap  1) 20.13s  2) 16.31s  3) 14.87s TM  Solo  BUE  0.6 mph 15% grade backwards  X5 min TM  Solo  BUE  0.8 mph   15% grade  X5 min fwd  X5 min fwd    Solo  SPC   Retro step-ups  Airex pad  6\" step  Alt LE lead ascend, descend with R LE ecc control  X2 laps  Add second 6\" step  X1 round (attempted lead R LE ascend but fatigued, was still able to do on airex)    Retro speed stepping 12' length no AD  1) 59.44s  2) 58.81s  3) 57.55s  Solo  SPC  Butt kickers backwards  12' length  X1 lap  Blue TB posterior resistance at R ankle with backwards ambulation  12'' length  X2 laps    Solo  Airex (4) 4\" box (1)  Stepping up/down backwards over 12' length  SPC  Alt lead LE by length  2x1 lap    Solo  Wedge walk-up  SPC  Attempted    Solo  X2 foam beams in length  SPC PRN  Side stepping   X1 lap    hurdles          step taps          R Knee Control                                       Ther Ex          Nu step                                                                                Ther Activity                             Gait Training          Indoors                   Modalities                                                                                 "

## 2025-01-28 NOTE — PROGRESS NOTES
"Occupational Therapy Daily Note:    Today's date: 2025  Patient name: Maye Lilly  : 1960  MRN: [id number]  Referring provider: Megan Portillo PA-C  Dx:   Encounter Diagnosis   Name Primary?    Acute CVA (cerebrovascular accident) (HCC) Yes         POC expires Unit limit Auth Expiration date PT/OT/ST + Visit Limit?   3/28/25 4 NRE 25 8                             Visit/Unit Tracking  8 Approved-24 Date   PN     Used 1 2 3 4 5 6 7 8    Remaining  7 6 5 4 3 2 1 0     Visit/Unit Tracking  8 visits approved Date 12/16 12/17 12/20 12/23 12/27 12/30 1/3 1/6 1/8 1/10 1/13   1/3/25-25 Used 1 2 3 4 5 6 1 2 3 4 5    Remaining  5 4 3 2 1 0 7 6 5 4 3     Visit/Unit Tracking  8 visits approved Date 1/15 1/17 1/21 1/23 1/24 1/27 1/28       1/3/25-25 Used 6 7 8 9 10 11 12        Remaining  2 1 0 0 TBD TBD TBD         Duration in weeks: 12 weeks, 2-3x  Plan of care beginning date: 1/3/25  Plan of care expiration date: 3/28/25  PN #1: 25    Precautions: HTN, GERD, R shoulder pain   Access Code: [email or web address]             Subjective: \"I really was not excited to try this again today, but now I'm happy that I was able to do it and do it better than last time\"    Objective: See treatment below.  Session focusing on proximal strength/stability, coordination, open/closed hand, grasping strength, body awareness, intrinsic strengthening and overall activity endurance improving safety and indep in ADL/IADL mngt.     Neuro Re-ed: Pt positioned in quadruped on all 4s with no assistance to get into position as well as maintain positioning. Pt in Quadruped for 2 min 32 sec while completing body shifts forward and back and side to side. Pt reaching 3x with R hand to touch therapists hand with full elbow ROM. Trialed reaching with L hand to do the same to work on R side strength and stability, however pt able to only lift quickly 2x due to fatigue.     Pt then " seated EOM completing catch with beach ball for focus on dynamic reaching and duel tasking with use of RUE. Pt reaching to catch ball in all planes with increased difficulty noted when reaching into FF above 90*. Good ability for reaching in lower planes and adjusting.     There act: Pt seated EOM with working on handwriting and hand strength. With slant board on table on 2nd notch, pt completing dot art. Pt with good precision for filling in each dot, with fatigue noted towards end of completion with decreased line adherence.      Assessment: Tolerated treatment well. Pt continues to improve in activity endurance, handwriting, pinch patterns and proximal strength/stability.  Patient would benefit from continued skilled OT.    Plan: Continued skilled OT per POC

## 2025-01-29 ENCOUNTER — OFFICE VISIT (OUTPATIENT)
Dept: PHYSICAL THERAPY | Facility: CLINIC | Age: 65
End: 2025-01-29
Payer: COMMERCIAL

## 2025-01-29 ENCOUNTER — OFFICE VISIT (OUTPATIENT)
Dept: OCCUPATIONAL THERAPY | Facility: CLINIC | Age: 65
End: 2025-01-29
Payer: COMMERCIAL

## 2025-01-29 DIAGNOSIS — I63.9 LEFT PONTINE CVA (HCC): ICD-10-CM

## 2025-01-29 DIAGNOSIS — R26.9 NEUROLOGIC GAIT DISORDER: Primary | ICD-10-CM

## 2025-01-29 DIAGNOSIS — I63.9 ACUTE CVA (CEREBROVASCULAR ACCIDENT) (HCC): Primary | ICD-10-CM

## 2025-01-29 DIAGNOSIS — R26.89 BALANCE DISORDER: ICD-10-CM

## 2025-01-29 PROCEDURE — 97112 NEUROMUSCULAR REEDUCATION: CPT

## 2025-01-29 PROCEDURE — 97110 THERAPEUTIC EXERCISES: CPT

## 2025-01-29 NOTE — PROGRESS NOTES
"Occupational Therapy Daily Note:    Today's date: 2025  Patient name: Maye Lilly  : 1960  MRN: [id number]  Referring provider: Megan Portillo PA-C  Dx:   Encounter Diagnosis   Name Primary?    Acute CVA (cerebrovascular accident) (HCC) Yes           POC expires Unit limit Auth Expiration date PT/OT/ST + Visit Limit?   3/28/25 4 NRE 25 8   3/28/25  3/6/25 6 approved                       Visit/Unit Tracking  8 Approved-24 Date   PN     Used 1 2 3 4 5 6 7 8    Remaining  7 6 5 4 3 2 1 0     Visit/Unit Tracking  8 visits approved Date 12/16 12/17 12/20 12/23 12/27 12/30 1/3 1/6 1/8 1/10 1/13   1/3/25-25 Used 1 2 3 4 5 6 1 2 3 4 5    Remaining  5 4 3 2 1 0 7 6 5 4 3     Visit/Unit Tracking  8 visits approved Date 1/15 1/17 1/21 1/23 1/24 1/27 1/28 1/29      1/3/25-25 Used 6 7 8 9 10 11 12 13       Remaining  2 1 0 0 TBD TBD TBD TBD      Visit/Unit Tracking  6 visits approved Date 25- 3/6/25 Used 1 2 3 4 5       Remaining  5 4 3 2 1          Duration in weeks: 12 weeks, 2-3x  Plan of care beginning date: 1/3/25  Plan of care expiration date: 3/28/25  PN #1: 25    Precautions: HTN, GERD, R shoulder pain   Access Code: [email or web address]             Subjective: \"I made it walking around 3TIER, I just sat at the end when they were checking out\"    Objective: See treatment below.  Session focusing on proximal strength/stability, coordination, open/closed hand, grasping strength, body awareness, intrinsic strengthening and overall activity endurance improving safety and indep in ADL/IADL mngt.     Neuro Re-ed: Pt positioned in quadruped on all 4s with no assistance to get into position as well as maintain positioning. Pt in Quadruped for 3 min 30 sec while completing body shifts forward and back and side to side. Pt reaching 5x with R hand to touch therapists hand with full elbow ROM. Trialed reaching with L hand " to do the same to work on R side strength and stability, however pt able to only lift quickly 2x due to fatigue. Pt then in sidelying on R side for increased shoulder stability and strength for 2 min.     Working on full fist with digit ROM, pt R hand cobanned in fist position with HP applied for 5 min. Once removed, pt with increased digit flexion and near full fist noted. Pt then completing 10x supination and pronation with hammer then graded to plastic spoon due to difficulty with wt.  Decreased noted control with difficulty when in supination.     There ex: Pt then seated EOM completing supination and pronation with green flex bar 3x10. Pt with increased difficulty for supination with decreased ROM noted towards end of sets due to fatigue.      Assessment: Tolerated treatment well.  Patient would benefit from continued skilled OT.    Plan: Continued skilled OT per POC

## 2025-01-29 NOTE — PROGRESS NOTES
Daily Note     Today's date: 2025  Patient name: Maye Lilly  : 1960  MRN: [id number]  Referring provider: Megan Portillo PA-C  Dx:   Encounter Diagnosis     ICD-10-CM    1. 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 Balance disorder  R26.89       3. Left pontine CVA (HCC)  I63.9           Start Time: 1115  Stop Time: 1200  Total time in clinic (min): 45 minutes    Subjective: pt reports she continues to have the most difficulty w knee flexion both w hip flexion and hip extension      Objective: See treatment diary below      Assessment: Tolerated treatment well. Patient would benefit from continued PT hip hiking observed w bwd walking for assistance w clearance of RLE. Inc L lateral lean to clear RLE w bwd incline navigation, observed to a lesser degree w bwd decline navigation. Inc difficulty w RLE leading retro step ups as compared to LLE however pt did not require multiple attempts on any of the step up laps. Pt able to perfrom lateral hurdles w RUE (affected) as only external support.      Plan: Continue per plan of care.      POC Expiration Date: 25        POC expires Unit limit Auth Expiration date PT/OT/ST + Visit Limit?   1/17/25    12/4/24     1/17/25    2/7/25      1/17/25    1/20/25      4/4/24    2/3/25               Visit/Unit Tracking  AUTH Status:  Date           Needs auth Used 8 1 2 3 4            Remaining  0                    Specialty Daily Treatment Diary  Precautions: Fall risk, past Hx R knee arthritis (can be stiff and swollen at the start of the day)      Manuals 25                                           Neuro Re-Ed     ABC  TUG no AD  6 MWT no AD      HIGT TM  Solo  BUE  Bwd amb (facing backwards)  0.4 mph 8% grade x1 min  0.5 mph 8% grade x1.5 min  0.5 mph 12% grade x3.5 min  (6 min total)  0.8 mph 12% grade  X5 min    Solo  // bars (BUE)  Stepping up/down x2 30 deg foam wedges 4' total in  "length  Fwd recip x4 laps  Bwd recip  2x2 laps     Solo  7.5# BL AW   16' length  X3 laps lat no UE    Solo  SPC  3# BL AW  Airex pad step up/down bwd  X2 laps over 16'    Solo  // bars BUE  X2 30 deg wedges together stepping up/down fwd/bwd by length over 4'  X3 rounds  X3 rounds   TM  Solo  BUE  0.8 mph  8% grade  X5 min  0.8 mph  15% grade  X5 min      Solo  SPC   Airex pads (4)  4\" step (1)  Retro stepping Alt lead LE by length  2x1 lap    Solo  No AD  16' length  Walking fwd as fast as possible, timed by lap  1) 20.13s  2) 16.31s  3) 14.87s TM  Solo  BUE  0.6 mph 15% grade backwards  X5 min TM  Solo  BUE  0.8 mph   15% grade  X5 min fwd  X5 min fwd    Solo  SPC   Retro step-ups  Airex pad  6\" step  Alt LE lead ascend, descend with R LE ecc control  X2 laps  Add second 6\" step  X1 round (attempted lead R LE ascend but fatigued, was still able to do on airex)    Retro speed stepping 12' length no AD  1) 59.44s  2) 58.81s  3) 57.55s  TM  Solo  BUE  0.6 mph 15% grade backwards  X5 min    Fwd bwd over incline wedges  X5 laps in // bars BUE    Retro step ups  2 6'' box  2 airex  X2 laps in // bars     Lateral merna navigation 6''  X2 laps RUE in bars    hurdles          step taps          R Knee Control                                       Ther Ex          Nu step                                                                                Ther Activity                             Gait Training          Indoors                   Modalities                                                                                   "

## 2025-01-31 ENCOUNTER — APPOINTMENT (OUTPATIENT)
Dept: PHYSICAL THERAPY | Facility: CLINIC | Age: 65
End: 2025-01-31
Payer: COMMERCIAL

## 2025-01-31 ENCOUNTER — APPOINTMENT (OUTPATIENT)
Dept: OCCUPATIONAL THERAPY | Facility: CLINIC | Age: 65
End: 2025-01-31
Payer: COMMERCIAL

## 2025-02-05 ENCOUNTER — OFFICE VISIT (OUTPATIENT)
Dept: OCCUPATIONAL THERAPY | Facility: CLINIC | Age: 65
End: 2025-02-05
Payer: COMMERCIAL

## 2025-02-05 ENCOUNTER — TELEPHONE (OUTPATIENT)
Dept: NEUROLOGY | Facility: CLINIC | Age: 65
End: 2025-02-05

## 2025-02-05 ENCOUNTER — OFFICE VISIT (OUTPATIENT)
Dept: PHYSICAL THERAPY | Facility: CLINIC | Age: 65
End: 2025-02-05
Payer: COMMERCIAL

## 2025-02-05 DIAGNOSIS — I63.9 ACUTE CVA (CEREBROVASCULAR ACCIDENT) (HCC): Primary | ICD-10-CM

## 2025-02-05 DIAGNOSIS — R26.89 BALANCE DISORDER: ICD-10-CM

## 2025-02-05 DIAGNOSIS — R26.9 NEUROLOGIC GAIT DISORDER: Primary | ICD-10-CM

## 2025-02-05 DIAGNOSIS — I63.9 LEFT PONTINE CVA (HCC): ICD-10-CM

## 2025-02-05 DIAGNOSIS — I63.9 LEFT PONTINE STROKE (HCC): Primary | ICD-10-CM

## 2025-02-05 DIAGNOSIS — R26.9 IMPAIRED GAIT: ICD-10-CM

## 2025-02-05 DIAGNOSIS — G81.91 RIGHT HEMIPARESIS (HCC): ICD-10-CM

## 2025-02-05 PROCEDURE — 97112 NEUROMUSCULAR REEDUCATION: CPT

## 2025-02-05 PROCEDURE — 97110 THERAPEUTIC EXERCISES: CPT

## 2025-02-05 NOTE — PROGRESS NOTES
"Daily Note     Today's date: 2025  Patient name: Maye Lilly  : 1960  MRN: [id number]  Referring provider: Megan Portillo PA-C  Dx:   Encounter Diagnosis     ICD-10-CM    1. 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 Balance disorder  R26.89       3. Left pontine CVA (HCC)  I63.9           Start Time: 1015  Stop Time: 1115  Total time in clinic (min): 60 minutes    Subjective: Doing well this morning received from OT with nothing new to report.      Objective: See treatment diary below      Assessment: Challenged stepping practice frequency with increased R LE hip and knee flexion demands with both forward and backwards stepping variations minimizing seated rest breaks. Challenged maximizing step accuracy with retro cone tap stepping.       Plan: Continue per plan of care.      POC Expiration Date: 25        POC expires Unit limit Auth Expiration date PT/OT/ST + Visit Limit?   1/17/25    12/4/24     1/17/25    2/7/25      1/17/25    1/20/25      4/4/24    2/3/25               Visit/Unit Tracking  AUTH Status:  Date          Needs auth Used 8 1 2 3 4 5           Remaining  0                    Specialty Daily Treatment Diary  Precautions: Fall risk, past Hx R knee arthritis (can be stiff and swollen at the start of the day)      Manuals 25                                           Neuro Re-Ed     ABC  TUG no AD  6 MWT no AD      HIGT Solo  Wedge negotiation fwd/bwd (x2 wedges) fwd/bwd by length BUE PRN  X4 rounds w/fast fwd walk over 50'  X2 rounds consec  Add: 3# BL AW  2X1 round    Solo  SPC PRN  3# BL AW  7 cone weaves (2' apart)  With 6 small cones on either side, tapping with respective LE    Solo  No AW  SPC PRN  4\" merna retro step-over Solo  7.5# BL AW   16' length  X3 laps lat no UE    Solo  SPC  3# BL AW  Airex pad step up/down bwd  X2 laps over 16'    Solo  // bars BUE  X2 30 deg wedges together stepping up/down " "fwd/bwd by length over 4'  X3 rounds  X3 rounds   TM  Solo  BUE  0.8 mph  8% grade  X5 min  0.8 mph  15% grade  X5 min      Solo  SPC   Airex pads (4)  4\" step (1)  Retro stepping Alt lead LE by length  2x1 lap    Solo  No AD  16' length  Walking fwd as fast as possible, timed by lap  1) 20.13s  2) 16.31s  3) 14.87s TM  Solo  BUE  0.6 mph 15% grade backwards  X5 min TM  Solo  BUE  0.8 mph   15% grade  X5 min fwd  X5 min fwd    Solo  SPC   Retro step-ups  Airex pad  6\" step  Alt LE lead ascend, descend with R LE ecc control  X2 laps  Add second 6\" step  X1 round (attempted lead R LE ascend but fatigued, was still able to do on airex)    Retro speed stepping 12' length no AD  1) 59.44s  2) 58.81s  3) 57.55s  TM  Solo  BUE  0.6 mph 15% grade backwards  X5 min    Fwd bwd over incline wedges  X5 laps in // bars BUE    Retro step ups  2 6'' box  2 airex  X2 laps in // bars     Lateral merna navigation 6''  X2 laps RUE in bars    hurdles          step taps          R Knee Control                                       Ther Ex          Nu step                                                                                Ther Activity                             Gait Training          Indoors                   Modalities                                                                                   "

## 2025-02-05 NOTE — TELEPHONE ENCOUNTER
Pt stopped in today stating she needs dr lott to put in a referrals occupational therapy. For an evaluation to see if she can drive. Pt said you can give her a call if you have any question. Thank you

## 2025-02-05 NOTE — PROGRESS NOTES
"Occupational Therapy Daily Note:    Today's date: 2025  Patient name: Maye Lilly  : 1960  MRN: [id number]  Referring provider: Megan Portillo PA-C  Dx:   Encounter Diagnosis   Name Primary?    Acute CVA (cerebrovascular accident) (HCC) Yes     POC expires Unit limit Auth Expiration date PT/OT/ST + Visit Limit?   3/28/25 4 NRE 25 8   3/28/25  3/6/25 6 approved                       Visit/Unit Tracking  8 Approved-24 Date   PN     Used 1 2 3 4 5 6 7 8    Remaining  7 6 5 4 3 2 1 0     Visit/Unit Tracking  8 visits approved Date 12/16 12/17 12/20 12/23 12/27 12/30 1/3 1/6 1/8 1/10 1/13   1/3/25-25 Used 1 2 3 4 5 6 1 2 3 4 5    Remaining  5 4 3 2 1 0 7 6 5 4 3     Visit/Unit Tracking  8 visits approved Date 1/15 1/17 1/21 1/23 1/24 1/27 1/28 1/29      1/3/25-25 Used 6 7 8 9 10 11 12 13       Remaining  2 1 0 0 TBD TBD TBD TBD      Visit/Unit Tracking  6 visits approved Date 25- 3/6/25 Used 1 2 3 4 5 6      Remaining  5 4 3 2 1 0         Duration in weeks: 12 weeks, 2-3x  Plan of care beginning date: 1/3/25  Plan of care expiration date: 3/28/25  PN #1: 25    Precautions: HTN, GERD, R shoulder pain   Access Code: [email or web address]           Subjective: \"I can finally get my sports bra zipped for the second day in a row, I'm so happy\".     Objective: See treatment below.   Session focusing on proximal strength/stability, coordination, open/closed hand, grasping strength, body awareness, intrinsic strengthening and overall activity endurance improving safety and indep in ADL/IADL mngt.     Neuro Re-ed: In unsupported stance, pt engaged in fm task w/targeted demands of retrieving and placing squibs tabletop followed by retrieving marbles using yellow resist clip placing to squibs facilitating dual tasking.  Pt initiated task using red resist clip placing about 10 marbles, decreased to yellow clip 2* hand " fatigue completing task w/o droppage.  Pt then removed marbles using pad pinch and placing to container followed by removing squibs and placing to target container.  Pt noted with decreased coordination and proximal instability during marble placement improving significantly with mass practice.       Thera Ex: Seated EOM pt engaged in therex using 3# t-bar stabilized in horizontal plane w/added resistance of blue tband stabilized by therapist, pt completed 2x20 reps in protraction/retraction using t-band anterior and posteriorly.     Assessment: Tolerated treatment well. Pt reported fatigue post session stating she felt her hand had to work harder because she was standing during task. Patient would benefit from continued skilled OT.    Plan: Continued skilled OT per POC

## 2025-02-05 NOTE — TELEPHONE ENCOUNTER
Referral placed.     Dk De Dios MD  Attending Physician  Physical Medicine and Rehabilitation  [facility]

## 2025-02-06 ENCOUNTER — OFFICE VISIT (OUTPATIENT)
Dept: OCCUPATIONAL THERAPY | Facility: CLINIC | Age: 65
End: 2025-02-06
Payer: COMMERCIAL

## 2025-02-06 ENCOUNTER — OFFICE VISIT (OUTPATIENT)
Dept: PHYSICAL THERAPY | Facility: CLINIC | Age: 65
End: 2025-02-06
Payer: COMMERCIAL

## 2025-02-06 DIAGNOSIS — I63.9 ACUTE CVA (CEREBROVASCULAR ACCIDENT) (HCC): Primary | ICD-10-CM

## 2025-02-06 DIAGNOSIS — R26.9 NEUROLOGIC GAIT DISORDER: Primary | ICD-10-CM

## 2025-02-06 DIAGNOSIS — I63.9 LEFT PONTINE CVA (HCC): ICD-10-CM

## 2025-02-06 DIAGNOSIS — R26.89 BALANCE DISORDER: ICD-10-CM

## 2025-02-06 PROCEDURE — 97110 THERAPEUTIC EXERCISES: CPT

## 2025-02-06 PROCEDURE — 97112 NEUROMUSCULAR REEDUCATION: CPT

## 2025-02-06 NOTE — PROGRESS NOTES
"Daily Note     Today's date: 2025  Patient name: Maye Lilly  : 1960  MRN: [id number]  Referring provider: Megan Portillo PA-C  Dx:   Encounter Diagnosis     ICD-10-CM    1. 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 Balance disorder  R26.89       3. Left pontine CVA (HCC)  I63.9           Start Time: 1025  Stop Time: 1120  Total time in clinic (min): 55 minutes    Subjective: Reports that she is doing well. Ready to go to work.       Objective: See treatment diary below      Assessment: Tolerated treatment well. Continued with HIGT style interventions with RPE ranges intention of 6-8/10 through majority of session. Continues with very good clearance above even length ground - but is unable to clear 4\" merna without compensation patterns. Good motivation to participate, but overall is limited some by endurance troubles. Patient demonstrated fatigue post treatment, exhibited good technique with therapeutic exercises, and would benefit from continued PT      Plan: Continue per plan of care.  Progress treatment as tolerated.       POC Expiration Date: 25        POC expires Unit limit Auth Expiration date PT/OT/ST + Visit Limit?   1/17/25    12/4/24     1/17/25    2/7/25      1/17/25    1/20/25      4/4/24    2/3/25               Visit/Unit Tracking  AUTH Status:  Date  2         Needs auth Used 8 1 2 3 4 5 6          Remaining  0                    Specialty Daily Treatment Diary  Precautions: Fall risk, past Hx R knee arthritis (can be stiff and swollen at the start of the day)      Manuals 25 2/6  25                                           Neuro Re-Ed     ABC  TUG no AD  6 MWT no AD      HIGT Solo  Wedge negotiation fwd/bwd (x2 wedges) fwd/bwd by length BUE PRN  X4 rounds w/fast fwd walk over 50'  X2 rounds consec  Add: 3# BL AW  2X1 round    Solo  SPC PRN  3# BL AW  7 cone weaves (2' apart)  With 6 small cones on either side, tapping " "with respective LE    Solo  No AW  SPC PRN  4\" merna retro step-over TM SOLO BUE   0.8 mph 15% fwd   X5 mins  X5 mins     No AD timed trials 1/2 lap with 1 turn   8 reps   Best: 38 seconds     Fwd/bwd shuttle around curve 3 cones/dots   X3 trials not timed     HKM x3 laps     [facility] under LLE no UE sit <> stand 2x10     Up and over 4\" emrna   TM  Solo  BUE  0.8 mph  8% grade  X5 min  0.8 mph  15% grade  X5 min      Solo  SPC   Airex pads (4)  4\" step (1)  Retro stepping Alt lead LE by length  2x1 lap    Solo  No AD  16' length  Walking fwd as fast as possible, timed by lap  1) 20.13s  2) 16.31s  3) 14.87s TM  Solo  BUE  0.6 mph 15% grade backwards  X5 min TM  Solo  BUE  0.8 mph   15% grade  X5 min fwd  X5 min fwd    Solo  SPC   Retro step-ups  Airex pad  6\" step  Alt LE lead ascend, descend with R LE ecc control  X2 laps  Add second 6\" step  X1 round (attempted lead R LE ascend but fatigued, was still able to do on airex)    Retro speed stepping 12' length no AD  1) 59.44s  2) 58.81s  3) 57.55s  TM  Solo  BUE  0.6 mph 15% grade backwards  X5 min    Fwd bwd over incline wedges  X5 laps in // bars BUE    Retro step ups  2 6'' box  2 airex  X2 laps in // bars     Lateral merna navigation 6''  X2 laps RUE in bars    hurdles          step taps          R Knee Control                                       Ther Ex          Nu step                                                                                Ther Activity                             Gait Training          Indoors                   Modalities                                                                                     "

## 2025-02-06 NOTE — PROGRESS NOTES
OCCUPATIONAL THERAPY PROGRESS NOTE #1     2/6/2025  Maye Lilly  1960  [id number]  Megan Portillo PA-C   Diagnosis ICD-10-CM Associated Orders   1. Acute CVA (cerebrovascular accident) ([facility])  I63.9           Assessment/Plan    Skilled Analysis:  Maye Lilly is a 64 y.o. female referred to Occupational Therapy s/p Acute CVA (cerebrovascular accident) ([facility]) [I63.9].   Pt participated in skilled OT PN #1 today, pt attending 2-3x/week for OT.  Pt reports being able to don her sports bra more independently and feels happy about this.  Pt reports getting DB's for at home which are 1.5 lb, 3 lb, and 5 lb weights and she is using all of them for RUE strengthening.  She reports using her R hand more for handwriting and is coloring at home.  She is making more active attempts to use her R hand for washing and combing her hair.  She continues to report soreness of her RUE but significantly less pain with movement.   She is continuing to progress in her RUE exercise programs.   Pt is demo improvement in RUE AROM at shoulder, improved R  strength by 10 lbs, improved FM speed with 9 hole peg test, and ability to perform functional dexterity test today.  She is also demo improvements with writing and fluidity of handwriting skills.     Following formalized testing as well as clinical observation, Pt continues to present with the following areas of deficit:  RUE weakness, tightness in upper traps and supraspinatus, decreased pinch and  strength, proximal pain, decreased ROM (AROM & PROM), decreased independence with ADLs and IADLs, and decreased endurance. Pt shoulder pain has improved significantly since the start of therapy. Pt with good compliance and carry over of at home stretching, ROM, and HEP completing daily both in supine and seated as able proximally and distally.  Pt with limited wrist ROM in extension noted this date, however improved from prior sessions. Pt continues to be educated on stroke  recovery timeline throughout session. She reports that she has been able to get herself dressed and ready without assistance as well as it taking less time than it was prior. Pt will benefit from continued skilled Occupational Therapy services 2-3x/week for 8 more weeks with focus on neuro re-ed, there ex, there act, and ADL/IADL retraining.  Ourania Vijaya is in agreement with POC.      Todays treatment -- Pt holding a 2kg or 4 kg weighted ball, performing B/L UE strengthening and movements of RUE into shoulder diagonal flexion, bicep flexion, tricep extension, chest press, wrist flexion/extension.     Pt tolerated instrument assisted soft tissue massage to RUE supraspinatus and upper traps to decrease pain and tightness of RUE, light manual massage provided by therapist.    Discussed stretching of digits, holding DIP/PIP for 30-45 seconds to improve grasp/mobility for picking up items.          POC expires Unit limit Auth Expiration date PT/OT/ST + Visit Limit?   3/28/25 4 NRE 1/31/25 8   3/28/25    3/6/25 6 approved                                    Visit/Unit Tracking  8 Approved-12/24/24 Date 11/26 11/27 12/2 12/04 12/6 12/9  PN 12/11 12/13     Used 1 2 3 4 5 6 7 8     Remaining  7 6 5 4 3 2 1 0      Visit/Unit Tracking  8 visits approved Date 12/16 12/17 12/20 12/23 12/27 12/30 1/3 1/6 1/8 1/10 1/13   1/3/25-1/31/25 Used 1 2 3 4 5 6 1 2 3 4 5     Remaining  5 4 3 2 1 0 7 6 5 4 3      Visit/Unit Tracking  8 visits approved Date 1/15 1/17 1/21 1/23 1/24 1/27 1/28 1/29         1/3/25-1/31/25 Used 6 7 8 9 10 11 12 13           Remaining  2 1 0 0 TBD TBD TBD TBD         Visit/Unit Tracking  6 visits approved Date 1/23 1/24 1/27 1/28 1/29 2/04 1/23/25- 3/6/25 Used 1 2 3 4 5 6         Remaining  5 4 3 2 1 0             6 visits approved Date 2/6            TBD  Used 1              Remaining  TBD              Duration in weeks: 12 weeks, 2-3x  Plan of care beginning date: 1/3/25  Plan of care expiration date:  3/28/25  PN #2:  3/3/25     Precautions: HTN, GERD, R shoulder pain               GOALS    Short Term Goals (6 weeks)  Strength/Endurance  - Pt will increase R hand functional  with ability to sustain grasp on medium size objects for 5 sec to improve independence with ADLs GOAL MET  - Pt will demo with G tolerance to supine, seated, and in stance exercise x 15 minutes with minimal rest breaks required for increased engagement in life roles and weekly exercise regimen CONTINUE  - Pt will demo with G carryover of HEP to improve functional progression towards goals in POC and for improved functional use of RUE GOAL MET; continue    AROM/PROM  - Pt will be able to perform 90* degrees shoulder flexion of proximal RUE in supine to demo increased strength and ROM of affected UE for improved ADLs/IADLs PROGRESSING; 115*  - Pt will be able to perform full range elbow flexion of distal RUE to demo increased strength and ROM of affected UE for improved ADLs/IADLs PARTIALLY MET  - Pt will demo good carryover of clinic and home pain and swelling reduction strategies for improved AROM initiation with functional reach and grasp with ADL function GOAL MET; wedge under arm/hand when sleeping.    FMC/GMC  - Pt will be able to actively grasp 2 pegs for initiation of 9HPT demo improved FMC and and pinch. GOAL MET   - Pt will demo improvement with pulling on trousers on Neuro QOL form to with a little difficulty demo improved coordination and precision for FM tasks and ADLs GOAL MET  - Pt will be able to complete thumb opposition to digits 2-4 demo improved motor control and digit ROM GOAL MET; can oppose to digits 2-4  - Pt will demo improved motor learning of constructional and new motor actions for improved b/l coordination and motor planning evident by 50% accuracy for fxnl ADL/IADL performance PROGRESSING    Functional Performance  - Pt will increase RUE to semi functional (dependent stabilizer, independent stabilizer, gross  assist, semi-functional, functional, fully functional) assist with for ADL/IADL and tabletop tasks for improved functional performance of life roles and salient tasks GOAL MET   - Pt will demo G comprehension of adaptive equipment (long handled reacher/sponge/shoehorn, toileting aid ) use in clinic to improve independence in ADL management in home environment GOAL MET; using sock aid and was using dressing stock    Modalities  - Pt/family will demo G understanding and carryover of use of home NMES unit as prescribed to inc carryover of ROM and strengthening strategies of R UE PROGRESSING; ordered and will bring in for education when it comes in       Long Term Goals (12 weeks)  Strength/Endurance  - Pt will be able to successfully complete strength testing demo 2-5 lbs RUE  strength for improved completion of ADLs GOAL MET  - Pt will demo with G tolerance to supine, seated, and in stance exercise x 25 minutes with minimal rest breaks required for increased engagement in life roles and weekly exercise regimen  PROGRESSING    AROM/PROM  - Pt will be able to perform 90* degrees seated shoulder flexion of proximal RUE to demo increased strength and ROM of affected UE for improved ADLs/IADLs GOAL MET  - Pt will demo good carryover of clinic and home pain and swelling reduction strategies for improved AROM initiation with functional reach and grasp with ADL function GOAL MET    FMC/GMC  - Pt will be able to complete 9HPT in < 1 min demo improved FMC and coordination. GOAL MET  - Pt will demo improvement with pulling on trousers on Neuro QOL form to with no difficulty demo improved coordination and precision for FM tasks and ADLs GOAL MET  - Pt will be able to complete thumb opposition to all digits demo improved coordination and functional use of RUE.  PROGRESSING  - Pt will demo improved motor learning of constructional and new motor actions for improved b/l coordination and motor planning evident by 75% accuracy for  "fxnl ADL/IADL performance PROGRESSING    Functional Performance  - Pt will increase RUE to functional (dependent stabilizer, independent stabilizer, gross assist, semi-functional, functional, fully functional) assist with for ADL/IADL and tabletop tasks for improved functional performance of life roles and salient tasks PROGRESSING    Modalities  - Pt will tolerate BIONESS/NMES/IASTM for improved motor and sensory performance for overall improved strength, tone reduction, and sensation to inc safety and engagement with ADL/IADL function PROGRESSING      Subjective    PATIENT GOAL: \"Grab something, pull up my pants, wipe myself, and writing/signing my name\"    Patient-Specific Functional Scale   Task is scored 0 (unable to perform activity) to 10 (ability to perform activity independently)    Activity Date: Date:   ADLs     2.  Grasp/use of R hand     3.   ROM      4.   Strength and endurance         HISTORY OF PRESENT ILLNESS:     Pt is a 64 y.o. female who was referred to Occupational Therapy s/p Acute CVA (cerebrovascular accident) ([facility]) [I63.9].  Pt reports that the day her stroke happened she was at work. She felt that the ground was moving but thought it could have been due to her forgetting her glasses's and straining her eyes. She also ended up throwing up multiple times and left work. When she got home she went to sleep. The next morning she still was not feeling right, called out of work and drove herself to the hospital because she felt weak overall. She continued to feel weak in the hospital but was still able to move her UE and LE. She then was sent for an MRI, and when she came back to get into bed is when she realized she was unable to and no longer had function of her R upper and lower extremity. She then spent 3 weeks at the [facility] and then 3 weeks with home care. She does not feel difficulty with sensation but rather just weak and heavy. Pt wears a shoulder subluxation splint for comfort but due to " "large size feels it is not doing much. At this time she is on medical leave from work as a . She currently does not drive. She lives with her daughter and her son occasionally comes in and out. Her daughter works full time and so does her son but he works from home. Pt reports that she does not have difficulty with bed mobility or toileting when using the commode. She is able to don and doff her pants off her hips but will sometimes need help. If she is alone, she has a dressing stick to help pull them up on her R side. She reports that she bathroom and shower are very small, and for the time being is showering at a friends house with a walk in/ADA shower due to a past family member who was using it. She will only use her bathroom toilet when someone is home to help her with hygiene due to small space, otherwise will use commode. She occasionally needs assistance with UB dressing for shirts but wears sun dresses at home that she reports are not difficult.     Per chart review  on 8/20/24, \"Ms. Maye Lilly is a 65 yo woman with a past medical history notable for HTN and atrial fibrillation who presented to the [facility] on 8/10/24 with acute dysarthria, facial droop and right hemiparesis. A stroke alert was activated, CT head wo contrast was negative for acute findings, CTA of the head/neck showed no evidence of large vessel occlusion and patent cervical carotid and vertebral arteries. MRI of the brain revealed an acute left pontine infarct. Course complicated by severe hypertension requiring multiple agents to obtain control.   - Acute left pontine ischemic infarct: Clinically presented on 8/10/24 with right hemiparesis, dysarthria and facial droop, Stroke etiology: HTN, small vessel disease, MR brain on 8/11/24 revealed a small area of acute/subacute ischemia within the central luz to the left of midline without acute hemorrhage\"      PMH:   Past Medical History:   Diagnosis Date    Acute CVA " (cerebrovascular accident) ([facility]) 2024    Atrial fibrillation ([facility])     Disease of thyroid gland     Hypertension          Pain Levels   Restin    With Activity:  0      Objective    Impairment Observations:            DANIEL Kruse           UPPER EXTREMITY FUNCTION   Impaired Intact Dominant Hand: R     /PINCH STRENGTH             Dynamometer    - Gross Grasp 15 lbs 60 lbs low  R- improved x 10 lbs     Pinch Meter     - Pincer 4 lbs  11 lbs low  L - remained   R- improved x 1 lbs    - Tripod 6 lbs 12 lbs low  R- improved x 1 lbs  L- remained     - Lateral 8.5 lbs   15 lbs low  R improved x 1/2 lb  L- improved      AROM (Seated)             Scapula   - Retraction/Protraction  - Elevation/Depression  - Upward/Downward rotation      Shoulder * WFL  Improved by 6*   Shoulder Ext 57* WFL  Remained    Shoulder internal rotation  Full  Remained   Shoulder external rotation  3/4  Remained   Shoulder Abd 100* WFL  Improved 30*   Shoulder Add 3/4 WFL  Remained   Horizontal Abd   WFL     Horizontal Add   WFL     Elbow Flex Full  WFL  R - improved to full motion    Elbow Ext Full   WFL     Pronation Full  WFL     Supination Near  Full  WFL  Remained   Wrist Flex 3/4  WFL  Remained   Wrist Ext A little past neutral   WFL  Improved   Digit Flex 3/4 WFL  Improved    Digit Ex Full WFL  Remained    Composite Grasp 3/4 WFL  Improved   Hook Grasp   WFL     Subluxation     Improved     AROM (Supine)            Shoulder *   improved   Shoulder Ext  Near full      Shoulder ABD 92*    Improved    Shoulder ADD        Horizontal ABD        Horizontal ADD        Elbow Flex  3/4      Elbow Ext Full       Pronation Full       Supination Near full       Wrist Flex  Near full      Wrist Ext 3/4       Digit Flex 3/4       Digit Ext  Full        PROM (Supine position)           Shoulder *   Slight pain   Shoulder Ext Full      Shoulder ABD 90*      Shoulder ADD Full       Horizontal ABD      Horizontal ADD         Elbow Flex 3/4       Elbow Ext Full       Wrist Flex Near Full       Wrist Ext Near Full        Digit Flex 3/4    Slight swelling but improved   Digit Ext Full     Supination 3/4     Pronation Full     Resting positin      MMT             Shoulder FF 3+/5 5/5 R improved   Shoulder Ext 4/5 5/5 R improved   Shoulder Abduction 3/5 5/5 R improved   Shoulder Adduction 3/5 5/5 R improved   Elbow Flex 4/5 5/5 R improved   Elbow Ext 4/5 5/5 R improved   Wrist Flex 3+/5 5/5 R improved   Wrist Ext 3+/5 5/5 R improved   Gross Grasp 3/5 5/5 R improved     SENSATION      Monofilament Testing  Normal: 2.83 mm    Diminished light touch: 3.61 mm    Diminished protective sensation: 4.31 mm    Loss of protective sensation: 4.56    Complete loss of sensation / deep pressure: 6.65 DNT DNT    Sharp / Dull       Proprioception      Hot/Cold Temp      Stereognosis         COORDINATION      Opposition Able to touch thumb to index, middle, and ring on lateral aspect  WFL Difficulty to oppose to D5   Finger to Nose Slowed WFL Able to complete with slight head compensation and slowed 2/2 decreased ROM   Rapid Alternating Movement Slowed, slightly limited supination WFL Improved   9 Hole Peg Test 39 sec  24 seconds abnormal  R- improved speed by 12 seconds   Slight ataxia to board       Fxnl Dexterity Test 65 seconds      26 seconds abnormal  R - IMPROVEMENT from UNABLE.      Notice slight abduction of arm with placement, ataxia (subtle), compensation with occasional supination of forearm and using board        Michael Hand Function Test         - Handwriting 46 seconds   (Fish take air out of the water)      Good fluidity of movement; poor line adherence and sizing     TONE: MODIFIED CINDY SCALE      No increase in muscle tone (0) Biceps, triceps,wrist extensors, and digits flex/ext  Remained    Slight Increase in muscle tone with catch and release or min resist at end range (1)   Wrist flexors    Digit flexors (subtle)       Slight  Increase in muscle tone with catch and release, followed by min resistance through remainder of range (1+)      Increased muscle tone through full range, able to be moved easily (2)      Considerable increase in tone, difficult to move (3)      Rigid in Flexion/Extension (4)                  Neuro QOL  Upper Extremity Function (Fine motor, ADL):     Are you able to turn a key in a lock? With a little difficulty  Are you able to brush your teeth? Without any difficulty  Are you able to make a phone call using a touch tone or key-pad? Without any difficulty  Are you able to  coins from a table top? Without any difficulty  Are you are able to write with a pen or pencil With some difficulty- improved   Are you able to open and close a zipper? With some difficulty- improved for jackets; still difficult for bra  Are you able to wash and dry your body? With a little difficulty  Are you able to shampoo your hair? Without any difficulty  Are you able to open previously opened jars? With a little difficulty- improved; if not tightly squeezed  Are you able to hold a plate full of food? With a little difficulty  Are you able to pull on trousers? Without any difficulty  Are you able to button your shirt? With a little difficulty- improved  Are you able to trim your fingernails? Unable to do  Are you able to cut your toe nails? Unable to do  Are you able to bend down and  clothing from the floor? Without any difficulty  How much DIFFICULTY do you currently have using a spoon to eat a meal? No difficulty  How much DIFFICULTY do you currently have putting on a pullover shirt? No difficulty  How much DIFFICULTY do you currently have taking off a pullover shirt? A little difficulty- improved   How much DIFFICULTY do you currently have removing wrappings from small objects? No difficulty  How much DIFFUCULTY do you currently have opening medications or vitamin containers (e.g. childproof containers, small bottles)? Some  difficulty- improved; safety ones difficult, non safety no issue      OTHER PLANNED THERAPY INTERVENTIONS:   Supine, seated, and in stance neuro re-ed  Task-Oriented Training  Tricep AG  NMES/FES  Cryotherapy for tone reduction  Hot Packs for pain  TENS for pain  FMC/prehension  GMC  Proximal to distal teaming  Timed Trials  Manual tx  IASTM  Hand to target  Sensory re-ed (Cutaneous/Proprioceptive)  Seated functional reach: crossing midline  Supine place and hold  WBearing strategies   Closed chain activities  Open chain activities  Mirror Therapy  HEP  Orthotic Development

## 2025-02-11 ENCOUNTER — APPOINTMENT (OUTPATIENT)
Dept: PHYSICAL THERAPY | Facility: CLINIC | Age: 65
End: 2025-02-11

## 2025-02-11 ENCOUNTER — APPOINTMENT (OUTPATIENT)
Dept: OCCUPATIONAL THERAPY | Facility: CLINIC | Age: 65
End: 2025-02-11

## 2025-02-12 ENCOUNTER — APPOINTMENT (OUTPATIENT)
Dept: OCCUPATIONAL THERAPY | Facility: CLINIC | Age: 65
End: 2025-02-12

## 2025-02-12 ENCOUNTER — APPOINTMENT (OUTPATIENT)
Dept: PHYSICAL THERAPY | Facility: CLINIC | Age: 65
End: 2025-02-12

## 2025-02-17 ENCOUNTER — APPOINTMENT (OUTPATIENT)
Dept: PHYSICAL THERAPY | Facility: CLINIC | Age: 65
End: 2025-02-17

## 2025-02-17 ENCOUNTER — APPOINTMENT (OUTPATIENT)
Dept: OCCUPATIONAL THERAPY | Facility: CLINIC | Age: 65
End: 2025-02-17

## 2025-02-19 ENCOUNTER — APPOINTMENT (OUTPATIENT)
Dept: OCCUPATIONAL THERAPY | Facility: CLINIC | Age: 65
End: 2025-02-19

## 2025-02-19 ENCOUNTER — APPOINTMENT (OUTPATIENT)
Dept: PHYSICAL THERAPY | Facility: CLINIC | Age: 65
End: 2025-02-19

## 2025-02-19 NOTE — PROGRESS NOTES
"Daily Note     Today's date: 2025  Patient name: Maye Lilly  : 1960  MRN: [id number]  Referring provider: Megan Portillo PA-C  Dx: No diagnosis found.               Subjective: ***      Objective: See treatment diary below      Assessment: Tolerated treatment {Tolerated treatment :}. Patient {assessment:}      Plan: {PLAN:[id number]}     POC Expiration Date: 25        POC expires Unit limit Auth Expiration date PT/OT/ST + Visit Limit?   1/17/25    12/4/24     1/17/25    2/7/25      1/17/25    1/20/25      4/4/24    2/3/25               Visit/Unit Tracking  AUTH Status:  Date        Needs auth Used 8 1 2 3 4 5 6          Remaining  0                    Specialty Daily Treatment Diary  Precautions: Fall risk, past Hx R knee arthritis (can be stiff and swollen at the start of the day)      Manuals 25 2/6  25                                           Neuro Re-Ed     ABC  TUG no AD  6 MWT no AD      HIGT Solo  Wedge negotiation fwd/bwd (x2 wedges) fwd/bwd by length BUE PRN  X4 rounds w/fast fwd walk over 50'  X2 rounds consec  Add: 3# BL AW  2X1 round    Solo  SPC PRN  3# BL AW  7 cone weaves (2' apart)  With 6 small cones on either side, tapping with respective LE    Solo  No AW  SPC PRN  4\" merna retro step-over TM SOLO BUE   0.8 mph 15% fwd   X5 mins  X5 mins     No AD timed trials 1/2 lap with 1 turn   8 reps   Best: 38 seconds     Fwd/bwd shuttle around curve 3 cones/dots   X3 trials not timed     HKM x3 laps     [place] under LLE no UE sit <> stand 2x10     Up and over 4\" merna   TM  Solo  BUE  0.8 mph  8% grade  X5 min  0.8 mph  15% grade  X5 min      Solo  SPC   Airex pads (4)  4\" step (1)  Retro stepping Alt lead LE by length  2x1 lap    Solo  No AD  16' length  Walking fwd as fast as possible, timed by lap  1) 20.13s  2) 16.31s  3) 14.87s TM  Solo  BUE  0.6 mph 15% grade backwards  X5 min " "TM  Solo  BUE  0.8 mph   15% grade  X5 min fwd  X5 min fwd    Solo  SPC   Retro step-ups  Airex pad  6\" step  Alt LE lead ascend, descend with R LE ecc control  X2 laps  Add second 6\" step  X1 round (attempted lead R LE ascend but fatigued, was still able to do on airex)    Retro speed stepping 12' length no AD  1) 59.44s  2) 58.81s  3) 57.55s  TM  Solo  BUE  0.6 mph 15% grade backwards  X5 min    Fwd bwd over incline wedges  X5 laps in // bars BUE    Retro step ups  2 6'' box  2 airex  X2 laps in // bars     Lateral merna navigation 6''  X2 laps RUE in bars    hurdles          step taps          R Knee Control                                       Ther Ex          Nu step                                                                                Ther Activity                             Gait Training          Indoors                   Modalities                                                                                       "

## 2025-02-21 ENCOUNTER — APPOINTMENT (OUTPATIENT)
Dept: OCCUPATIONAL THERAPY | Facility: CLINIC | Age: 65
End: 2025-02-21

## 2025-02-21 ENCOUNTER — APPOINTMENT (OUTPATIENT)
Dept: PHYSICAL THERAPY | Facility: CLINIC | Age: 65
End: 2025-02-21

## 2025-02-24 ENCOUNTER — APPOINTMENT (OUTPATIENT)
Dept: PHYSICAL THERAPY | Facility: CLINIC | Age: 65
End: 2025-02-24

## 2025-02-24 DIAGNOSIS — I63.9 LEFT PONTINE CVA (HCC): ICD-10-CM

## 2025-02-24 DIAGNOSIS — R26.9 NEUROLOGIC GAIT DISORDER: Primary | ICD-10-CM

## 2025-02-24 DIAGNOSIS — R26.89 BALANCE DISORDER: ICD-10-CM

## 2025-02-24 NOTE — PROGRESS NOTES
"Daily Note     Today's date: 2025  Patient name: Maye Lilly  : 1960  MRN: [id number]  Referring provider: Megan Portillo PA-C  Dx:   Encounter Diagnosis     ICD-10-CM    1. 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 Balance disorder  R26.89       3. Left pontine CVA (HCC)  I63.9                      Subjective: ***      Objective: See treatment diary below      Assessment: Tolerated treatment {Tolerated treatment :[id number]}. Patient {assessment:[id number]}      Plan: {PLAN:[id number]}     POC Expiration Date: 25        POC expires Unit limit Auth Expiration date PT/OT/ST + Visit Limit?   1/17/25    12/4/24     1/17/25    2/7/25      1/17/25    1/20/25      4/4/24    2/3/25               Visit/Unit Tracking  AUTH Status:  Date          Needs auth Used 8 1 2 3 4 5 6          Remaining  0                    Specialty Daily Treatment Diary  Precautions: Fall risk, past Hx R knee arthritis (can be stiff and swollen at the start of the day)      Manuals 25 2/6  *** 25                                           Neuro Re-Ed     ABC  TUG no AD  6 MWT no AD      HIGT Solo  Wedge negotiation fwd/bwd (x2 wedges) fwd/bwd by length BUE PRN  X4 rounds w/fast fwd walk over 50'  X2 rounds consec  Add: 3# BL AW  2X1 round    Solo  SPC PRN  3# BL AW  7 cone weaves (2' apart)  With 6 small cones on either side, tapping with respective LE    Solo  No AW  SPC PRN  4\" merna retro step-over TM SOLO BUE   0.8 mph 15% fwd   X5 mins  X5 mins     No AD timed trials 1/2 lap with 1 turn   8 reps   Best: 38 seconds     Fwd/bwd shuttle around curve 3 cones/dots   X3 trials not timed     HKM x3 laps     [place] under LLE no UE sit <> stand 2x10     Up and over 4\" merna   TM SOLO  ***    Hurdles fwd/lat  ***    Cone weave ***    Step ups *** TM  Solo  BUE  0.6 mph 15% grade backwards  X5 min TM  Solo  BUE  0.8 mph   15% grade  X5 min fwd  X5 min fwd    Solo  SPC " "  Retro step-ups  Airex pad  6\" step  Alt LE lead ascend, descend with R LE ecc control  X2 laps  Add second 6\" step  X1 round (attempted lead R LE ascend but fatigued, was still able to do on airex)    Retro speed stepping 12' length no AD  1) 59.44s  2) 58.81s  3) 57.55s  TM  Solo  BUE  0.6 mph 15% grade backwards  X5 min    Fwd bwd over incline wedges  X5 laps in // bars BUE    Retro step ups  2 6'' box  2 airex  X2 laps in // bars     Lateral merna navigation 6''  X2 laps RUE in bars    hurdles          step taps          R Knee Control                                       Ther Ex          Nu step                                                                                Ther Activity                             Gait Training          Indoors                   Modalities                                                                                     "

## 2025-02-25 ENCOUNTER — APPOINTMENT (OUTPATIENT)
Dept: PHYSICAL THERAPY | Facility: CLINIC | Age: 65
End: 2025-02-25

## 2025-02-25 ENCOUNTER — APPOINTMENT (OUTPATIENT)
Dept: OCCUPATIONAL THERAPY | Facility: CLINIC | Age: 65
End: 2025-02-25

## 2025-02-28 ENCOUNTER — APPOINTMENT (OUTPATIENT)
Dept: OCCUPATIONAL THERAPY | Facility: CLINIC | Age: 65
End: 2025-02-28

## 2025-02-28 ENCOUNTER — APPOINTMENT (OUTPATIENT)
Dept: PHYSICAL THERAPY | Facility: CLINIC | Age: 65
End: 2025-02-28

## 2025-03-03 ENCOUNTER — APPOINTMENT (OUTPATIENT)
Dept: PHYSICAL THERAPY | Facility: CLINIC | Age: 65
End: 2025-03-03
Payer: COMMERCIAL

## 2025-03-03 ENCOUNTER — TELEPHONE (OUTPATIENT)
Dept: PODIATRY | Facility: CLINIC | Age: 65
End: 2025-03-03

## 2025-03-03 NOTE — TELEPHONE ENCOUNTER
lm for patient to return call with updated insurance... medicare is only part A and patient's  [employer] is coming up plan dates form  10/01/24- 11/30/24

## 2025-03-05 ENCOUNTER — APPOINTMENT (OUTPATIENT)
Dept: PHYSICAL THERAPY | Facility: CLINIC | Age: 65
End: 2025-03-05
Payer: COMMERCIAL

## 2025-03-05 ENCOUNTER — APPOINTMENT (OUTPATIENT)
Dept: OCCUPATIONAL THERAPY | Facility: CLINIC | Age: 65
End: 2025-03-05
Payer: COMMERCIAL

## 2025-03-10 ENCOUNTER — APPOINTMENT (OUTPATIENT)
Dept: PHYSICAL THERAPY | Facility: CLINIC | Age: 65
End: 2025-03-10
Payer: COMMERCIAL

## 2025-03-10 ENCOUNTER — APPOINTMENT (OUTPATIENT)
Dept: OCCUPATIONAL THERAPY | Facility: CLINIC | Age: 65
End: 2025-03-10
Payer: COMMERCIAL

## 2025-03-12 ENCOUNTER — APPOINTMENT (OUTPATIENT)
Dept: OCCUPATIONAL THERAPY | Facility: CLINIC | Age: 65
End: 2025-03-12
Payer: COMMERCIAL

## 2025-03-12 ENCOUNTER — APPOINTMENT (OUTPATIENT)
Dept: PHYSICAL THERAPY | Facility: CLINIC | Age: 65
End: 2025-03-12
Payer: COMMERCIAL

## 2025-03-17 ENCOUNTER — APPOINTMENT (OUTPATIENT)
Dept: OCCUPATIONAL THERAPY | Facility: CLINIC | Age: 65
End: 2025-03-17
Payer: COMMERCIAL

## 2025-03-17 ENCOUNTER — APPOINTMENT (OUTPATIENT)
Dept: PHYSICAL THERAPY | Facility: CLINIC | Age: 65
End: 2025-03-17
Payer: COMMERCIAL

## 2025-03-19 ENCOUNTER — APPOINTMENT (OUTPATIENT)
Dept: OCCUPATIONAL THERAPY | Facility: CLINIC | Age: 65
End: 2025-03-19
Payer: COMMERCIAL

## 2025-03-19 ENCOUNTER — APPOINTMENT (OUTPATIENT)
Dept: PHYSICAL THERAPY | Facility: CLINIC | Age: 65
End: 2025-03-19
Payer: COMMERCIAL

## 2025-03-24 ENCOUNTER — APPOINTMENT (OUTPATIENT)
Dept: PHYSICAL THERAPY | Facility: CLINIC | Age: 65
End: 2025-03-24
Payer: COMMERCIAL

## 2025-03-24 ENCOUNTER — APPOINTMENT (OUTPATIENT)
Dept: OCCUPATIONAL THERAPY | Facility: CLINIC | Age: 65
End: 2025-03-24
Payer: COMMERCIAL

## 2025-03-28 ENCOUNTER — APPOINTMENT (OUTPATIENT)
Dept: OCCUPATIONAL THERAPY | Facility: CLINIC | Age: 65
End: 2025-03-28
Payer: COMMERCIAL

## 2025-03-28 ENCOUNTER — APPOINTMENT (OUTPATIENT)
Dept: PHYSICAL THERAPY | Facility: CLINIC | Age: 65
End: 2025-03-28
Payer: COMMERCIAL

## 2025-04-01 LAB

## 2025-04-04 ENCOUNTER — OFFICE VISIT (OUTPATIENT)
Dept: PODIATRY | Facility: CLINIC | Age: 65
End: 2025-04-04

## 2025-04-04 VITALS — BODY MASS INDEX: 42.78 KG/M2 | HEIGHT: 65 IN | WEIGHT: 256.8 LBS

## 2025-04-04 DIAGNOSIS — L60.2 LONG TOENAIL: Primary | ICD-10-CM

## 2025-04-04 DIAGNOSIS — R26.2 AMBULATORY DYSFUNCTION: ICD-10-CM

## 2025-04-04 DIAGNOSIS — M21.371 RIGHT FOOT DROP: ICD-10-CM

## 2025-04-04 NOTE — PROGRESS NOTES
"Name: Maye Lilly      : 1960      MRN: [id number]  Encounter Provider: Lance Lim DPM  Encounter Date: 2025   Encounter department: [facility] POD[facility]  :  Assessment & Plan  Long toenail  Trimmed nails out of courtesy. NO further acute care.        Ambulatory dysfunction  Reviewed PT/OT notes.       Right foot drop  Recent CVA. RLE foot drop secondary to the stroke. She is in PT/OT and is still seeing improvement which is great. I am hoping she continues to improve. No need to brace at this point.            History of Present Illness   HPI  Maye Lilly is a 64 y.o. female who presents with painful toenails. She had a stoke and can't reach her toenails.       Review of Systems  As stated in HPI, otherwise normal    Medical History Reviewed by provider this encounter:  Tobacco  Allergies  Meds  Problems  Med Hx  Surg Hx  Fam Hx           Objective   Ht 5' 4.5\" (1.638 m)   Wt 116 kg (256 lb 12.8 oz)   BMI 43.40 kg/m²      Physical Exam  Vitals reviewed.   Constitutional:       Appearance: She is obese. She is not ill-appearing.   Cardiovascular:      Pulses:           Dorsalis pedis pulses are 2+ on the right side and 2+ on the left side.        Posterior tibial pulses are 2+ on the right side and 2+ on the left side.   Musculoskeletal:      Right foot: Foot drop present.   Feet:      Right foot:      Protective Sensation:   10 sites sensed.      Toenail Condition: Right toenails are long.      Left foot:      Protective Sensation:   10 sites sensed.      Toenail Condition: Left toenails are long.   Neurological:      Mental Status: She is alert.           "

## 2025-04-07 NOTE — PROGRESS NOTES
"Progress Note     Today's date: 2025  Patient name: Maye Lilly  : 1960  MRN: [id number]  Referring provider: Megan Portillo PA-C  Dx:   No diagnosis found.                 Subjective: Pt reports that things overall have been going well. She does have some knee pain which she is going to f/u w/ ortho about. Has been continuing w/ exercises at home and going on daily walks. Actually feels like endurance is better than the last time she was here but would like to complete testing today to make sure.      Patient Goals   To return as closely to her PLOF as possible. \"I want my right foot to be able to lift higher.\"     Pain  Current pain ratin  At best pain ratin  At worst pain ratin     Objective: See treatment diary below        ABC: 40.63%  PN 24: 51.88%  PN 24: 55.63%  RE 1/3/25: 65.63%  PN 25: 73.13%  PN 25: 85%     5xSTS: Standard chair, L UE from armrest to no support 5/5 times, 19.12s  PN 24: Standard chair, unsupported, L UE from armrest to no support 5/5 times, 13.93s  PN 24: Standard chair, unsupported, NO UE SUPPORT 15.12s  RE 1/3/25: Standard chair, unsupported, no UE support 11.50s  PN 25: Standard chair, unsupported, no UE support 10.79s  PN 25: Standard chair, unsupported, no UE support; 11.3 s      TUG: Standard chair, hemiwalker, CS, 33.88s   PN 24: Standard chair, L SPC, CS, 28.22s  PN 24: Standard chair, L SPC, CS, 22.57s - trialed again with no AD 22.53s   RE 1/3/25: Standard chair L SPC, CS 15.75s - trialed again no AD 15.16s  PN 25: Standard chair L SPC, CS 12.56s - trialed again no AD 13.10s   PN 25: Standard chair L SPC, CS 13.3s - trialed again no AD 12.62s      6 MWT: 200' in 4'45\" with hemiwalker CS, request to sit with 1'15\" left, no standing rest breaks.         PN 24: ...  PN 24: 486 ft SPC  RE 1/3/25: 500 ft NO AD  PN 25: 668 ft NO AD  PN 25: 642 w/ SPC (requested AD due to knee " pain)     Gait Speed: 0.25 m/s L hemiwalker  PN 11/7/24: 0.29 m/s L SPC  PN 12/4/24: 0.48 m/s L SPC  RE 1/3/25: 0.60 m/s no AD  PN 1/27/25: 0.71 m/s no AD  PN 4/7/25: 0.71 m/s no AD        Assessment: Pt presents to OP PT for PN/RE following 2 month absence from PT to assess ability to self manage w/ HEP. Pt reports compliance w/ home exerises and feels that endurance is better now than at d/c from formal PT but would like to complete testing today to make sure. Upon formal re-assessment pt demonstrate maintenance of PLOF with no significant change in objective outcome measures. Gait speed and 6MWT remain below age appropriate norms. Given these findings and neuroplastic principles and timeline for post-CVA recovery pt is recommended for continued skilled therapy intervention 1-2x week to improve level of function, to increase overall quality of life, and to address aforementioned impairments. Pt agreeable to POC.        Goals   Short-term goals (to be achieved in 4 weeks):  1. Patient will demonstrate independence with their initial home exercise program. - met  2. Patient will improve 5xSTS to <15.0 seconds demonstrating improved LE strength and power output, while improving safety with sit to stand transfers. - met  3. Patient will improve TUG to <13.5 seconds using least restrictive assistive device indicating improved functional mobility to maximize independence. - ongoing     Long-term goals (to be achieved in 8 to 12 weeks with additional goals to be incorporated and progressed appropriately pending patient progress):  1. Patient will demonstrate independence with their finalized home exercise program. - ongoing  2. Patient will improve balance as demonstrated by an increase in FGA by > or equal to 4 points to decrease their risk of falls. - to be assessed.  3. Patient will increase SSGS by >0.10 m/s indicating they are walking at a gait speed to allow for greater safety in the community. - met  4. Patient  will increase ABC by MDC of >20% indicating improved balance confidence and self-efficacy. - not met but slight improvements made; may be in part due to the high-level functional components of the ABC.     New 4-week goals as of 12/4/24 to reduce risk for falls and maximize overall mobility levels.   Improve TUG by MCID. - met  Improve 5xSTS by MCID. - met  Improve gait speed by MCID. - met  Improve 6 MWT by 10%. - met     New 4-week goals as of 1/3/25 to reduce risk for falls and maximize overall mobility levels.   Improve TUG by MCID. - met  Improve 5xSTS by MCID. - nearly met  Improve gait speed by MCID. - met  Improve 6 MWT by 10%. -met    New 4-week goals as of 1/27/25 to reduce risk for falls and maximize overall mobility levels.   Improve TUG by MCID.   Improve 5xSTS by MCID.   Improve gait speed by MCID.   Improve 6 MWT by 10%.     Plan: Continue current POC.  Patient would benefit from: skilled physical therapy and skilled occupational therapy  Referral necessary: No  Planned modality interventions: biofeedback     Planned therapy interventions: manual therapy, balance, neuromuscular re-education, patient/caregiver education, stretching, strengthening, therapeutic activities, therapeutic exercise, graded exercise, gait training and home exercise program     Frequency: 3x/week as able.  Duration in weeks: 8  Plan of Care beginning date: 1/3/25   4/9/25  Plan of Care expiration date: 4/4/25 6/6/25  Treatment plan discussed with: Patient     POC Expiration Date: 4/4/25        POC expires Unit limit Auth Expiration date PT/OT/ST + Visit Limit?   1/17/25    12/4/24     1/17/25    2/7/25      1/17/25    1/20/25      4/4/24    2/3/25     6/6/25  9/12/25              Visit/Unit Tracking  AUTH Status:  Date 4/9              Needs auth Used 1                Remaining  5                    Specialty Daily Treatment Diary  Precautions: Fall risk, past Hx R knee arthritis (can be stiff and swollen at the start of the  "day)      Manuals 2/5/25 2/6 4/9                                              Neuro Re-Ed           HIGT Solo  Wedge negotiation fwd/bwd (x2 wedges) fwd/bwd by length BUE PRN  X4 rounds w/fast fwd walk over 50'  X2 rounds consec  Add: 3# BL AW  2X1 round    Solo  SPC PRN  3# BL AW  7 cone weaves (2' apart)  With 6 small cones on either side, tapping with respective LE    Solo  No AW  SPC PRN  4\" merna retro step-over TM SOLO BUE   0.8 mph 15% fwd   X5 mins  X5 mins     No AD timed trials 1/2 lap with 1 turn   8 reps   Best: 38 seconds     Fwd/bwd shuttle around curve 3 cones/dots   X3 trials not timed     HKM x3 laps     [facility] under LLE no UE sit <> stand 2x10     Up and over 4\" merna          hurdles   // bars   1UE   Fwd/lat   X3 laps ea        step taps          R Knee Control                                       Ther Ex          Nu step                                                             RE/outcome measures    Completed                 Ther Activity                             Gait Training          Indoors                   Modalities                                                                                                     "

## 2025-04-09 ENCOUNTER — EVALUATION (OUTPATIENT)
Dept: PHYSICAL THERAPY | Facility: CLINIC | Age: 65
End: 2025-04-09
Payer: COMMERCIAL

## 2025-04-09 ENCOUNTER — EVALUATION (OUTPATIENT)
Dept: OCCUPATIONAL THERAPY | Facility: CLINIC | Age: 65
End: 2025-04-09
Payer: COMMERCIAL

## 2025-04-09 DIAGNOSIS — R26.89 BALANCE DISORDER: ICD-10-CM

## 2025-04-09 DIAGNOSIS — R26.9 NEUROLOGIC GAIT DISORDER: ICD-10-CM

## 2025-04-09 DIAGNOSIS — I63.9 CEREBROVASCULAR ACCIDENT (CVA), UNSPECIFIED MECHANISM (HCC): Primary | ICD-10-CM

## 2025-04-09 DIAGNOSIS — I63.9 LEFT PONTINE CVA (HCC): Primary | ICD-10-CM

## 2025-04-09 PROCEDURE — 97530 THERAPEUTIC ACTIVITIES: CPT

## 2025-04-09 PROCEDURE — 97110 THERAPEUTIC EXERCISES: CPT

## 2025-04-09 PROCEDURE — 97112 NEUROMUSCULAR REEDUCATION: CPT

## 2025-04-09 PROCEDURE — 97168 OT RE-EVAL EST PLAN CARE: CPT

## 2025-04-09 NOTE — LETTER
"2025    Megan Portillo PA-C  [address] [facility] [address]    Patient: Maye Lilly   YOB: 1960   Date of Visit: 2025     Encounter Diagnosis     ICD-10-CM    1. Left pontine CVA (HCC)  I63.9       2. Balance disorder  R26.89       3. 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 Megan Portillo PA-C:    Thank you for your recent referral of Maye Lilly. Please review the attached evaluation summary from Maye's recent visit.     Please verify that you agree with the plan of care by signing the attached order.     If you have any questions or concerns, please do not hesitate to call.     I sincerely appreciate the opportunity to share in the care of one of your patients and hope to have another opportunity to work with you in the near future.       Sincerely,    Albina Rivera, PT      Referring Provider:      I certify that I have read the below Plan of Care and certify the need for these services furnished under this plan of treatment while under my care.                    Megan Portillo PA-C  [address] [facility] [address]  Via Fax: [phone]          Progress Note     Today's date: 2025  Patient name: Maye Lilly  : 1960  MRN: [id number]  Referring provider: Megan Portillo PA-C  Dx:   No diagnosis found.                 Subjective: Pt reports that things overall have been going well. She does have some knee pain which she is going to f/u w/ ortho about. Has been continuing w/ exercises at home and going on daily walks. Actually feels like endurance is better than the last time she was here but would like to complete testing today to make sure.      Patient Goals   To return as closely to her PLOF as possible. \"I want my right foot to be able to lift higher.\"     Pain  Current pain ratin  At best pain ratin  At worst pain ratin     Objective: See treatment diary below        ABC: 40.63%  PN 24: 51.88%  PN 24: " "55.63%  RE 1/3/25: 65.63%  PN 1/27/25: 73.13%  PN 4/7/25: 85%     5xSTS: Standard chair, L UE from armrest to no support 5/5 times, 19.12s  PN 11/7/24: Standard chair, unsupported, L UE from armrest to no support 5/5 times, 13.93s  PN 12/4/24: Standard chair, unsupported, NO UE SUPPORT 15.12s  RE 1/3/25: Standard chair, unsupported, no UE support 11.50s  PN 1/27/25: Standard chair, unsupported, no UE support 10.79s  PN 4/7/25: Standard chair, unsupported, no UE support; 11.3 s      TUG: Standard chair, hemiwalker, CS, 33.88s   PN 11/7/24: Standard chair, L SPC, CS, 28.22s  PN 12/4/24: Standard chair, L SPC, CS, 22.57s - trialed again with no AD 22.53s   RE 1/3/25: Standard chair L SPC, CS 15.75s - trialed again no AD 15.16s  PN 1/27/25: Standard chair L SPC, CS 12.56s - trialed again no AD 13.10s   PN 4/7/25: Standard chair L SPC, CS 13.3s - trialed again no AD 12.62s      6 MWT: 200' in 4'45\" with hemiwalker CS, request to sit with 1'15\" left, no standing rest breaks.         PN 11/7/24: ...  PN 12/4/24: 486 ft SPC  RE 1/3/25: 500 ft NO AD  PN 1/27/25: 668 ft NO AD  PN 4/7/25: 642 w/ SPC (requested AD due to knee pain)     Gait Speed: 0.25 m/s L hemiwalker  PN 11/7/24: 0.29 m/s L SPC  PN 12/4/24: 0.48 m/s L SPC  RE 1/3/25: 0.60 m/s no AD  PN 1/27/25: 0.71 m/s no AD  PN 4/7/25: 0.71 m/s no AD        Assessment: Pt presents to OP PT for PN/RE following 2 month absence from PT to assess ability to self manage w/ HEP. Pt reports compliance w/ home exerises and feels that endurance is better now than at d/c from formal PT but would like to complete testing today to make sure. Upon formal re-assessment pt demonstrate maintenance of PLOF with no significant change in objective outcome measures. Gait speed and 6MWT remain below age appropriate norms. Given these findings and neuroplastic principles and timeline for post-CVA recovery pt is recommended for continued skilled therapy intervention 1-2x week to improve level of " function, to increase overall quality of life, and to address aforementioned impairments. Pt agreeable to POC.        Goals   Short-term goals (to be achieved in 4 weeks):  1. Patient will demonstrate independence with their initial home exercise program. - met  2. Patient will improve 5xSTS to <15.0 seconds demonstrating improved LE strength and power output, while improving safety with sit to stand transfers. - met  3. Patient will improve TUG to <13.5 seconds using least restrictive assistive device indicating improved functional mobility to maximize independence. - ongoing     Long-term goals (to be achieved in 8 to 12 weeks with additional goals to be incorporated and progressed appropriately pending patient progress):  1. Patient will demonstrate independence with their finalized home exercise program. - ongoing  2. Patient will improve balance as demonstrated by an increase in FGA by > or equal to 4 points to decrease their risk of falls. - to be assessed.  3. Patient will increase SSGS by >0.10 m/s indicating they are walking at a gait speed to allow for greater safety in the community. - met  4. Patient will increase ABC by MDC of >20% indicating improved balance confidence and self-efficacy. - not met but slight improvements made; may be in part due to the high-level functional components of the ABC.     New 4-week goals as of 12/4/24 to reduce risk for falls and maximize overall mobility levels.   Improve TUG by MCID. - met  Improve 5xSTS by MCID. - met  Improve gait speed by MCID. - met  Improve 6 MWT by 10%. - met     New 4-week goals as of 1/3/25 to reduce risk for falls and maximize overall mobility levels.   Improve TUG by MCID. - met  Improve 5xSTS by MCID. - nearly met  Improve gait speed by MCID. - met  Improve 6 MWT by 10%. -met    New 4-week goals as of 1/27/25 to reduce risk for falls and maximize overall mobility levels.   Improve TUG by MCID.   Improve 5xSTS by MCID.   Improve gait speed by  "MCID.   Improve 6 MWT by 10%.     Plan: Continue current POC.  Patient would benefit from: skilled physical therapy and skilled occupational therapy  Referral necessary: No  Planned modality interventions: biofeedback     Planned therapy interventions: manual therapy, balance, neuromuscular re-education, patient/caregiver education, stretching, strengthening, therapeutic activities, therapeutic exercise, graded exercise, gait training and home exercise program     Frequency: 3x/week as able.  Duration in weeks: 8  Plan of Care beginning date: 1/3/25   4/9/25  Plan of Care expiration date: 4/4/25 6/6/25  Treatment plan discussed with: Patient     POC Expiration Date: 4/4/25        POC expires Unit limit Auth Expiration date PT/OT/ST + Visit Limit?   1/17/25    12/4/24     1/17/25    2/7/25      1/17/25    1/20/25      4/4/24    2/3/25     6/6/25  9/12/25              Visit/Unit Tracking  AUTH Status:  Date 4/9              Needs auth Used 1                Remaining  5                    Specialty Daily Treatment Diary  Precautions: Fall risk, past Hx R knee arthritis (can be stiff and swollen at the start of the day)      Manuals 2/5/25 2/6 4/9                                              Neuro Re-Ed           HIGT Solo  Wedge negotiation fwd/bwd (x2 wedges) fwd/bwd by length BUE PRN  X4 rounds w/fast fwd walk over 50'  X2 rounds consec  Add: 3# BL AW  2X1 round    Solo  SPC PRN  3# BL AW  7 cone weaves (2' apart)  With 6 small cones on either side, tapping with respective LE    Solo  No AW  SPC PRN  4\" merna retro step-over TM SOLO BUE   0.8 mph 15% fwd   X5 mins  X5 mins     No AD timed trials 1/2 lap with 1 turn   8 reps   Best: 38 seconds     Fwd/bwd shuttle around curve 3 cones/dots   X3 trials not timed     HKM x3 laps     [place] under LLE no UE sit <> stand 2x10     Up and over 4\" merna          hurdles   // bars   1UE   Fwd/lat   X3 laps ea        step taps          R Knee Control                      "                  Ther Ex          Nu step                                                             RE/outcome measures    Completed                 Ther Activity                             Gait Training          Indoors                   Modalities

## 2025-04-09 NOTE — PROGRESS NOTES
OCCUPATIONAL THERAPY RE-EVALUATION:    4/9/25  Maye Lilly  1960  [id number]  Megan Portillo PA-C   Diagnosis ICD-10-CM Associated Orders   1. Cerebrovascular accident (CVA), unspecified mechanism (HCC)  I63.9             Assessment/Plan    Skilled Analysis:  Maye Lilly is a 64 y.o. female referred to Occupational Therapy s/p Cerebrovascular accident (CVA), unspecified mechanism (HCC) [I63.9].   Pt participated in skilled OT Re-evaluation today after about 2 month break from therapy due to insurance issues that Pt had been enduring. Prior to the break, pt was attending 2-3x/week for OT.  Pt reports that she has been completing her HEP while being out of therapy for the first couple weeks and then she had decided to take a break from her exercises. She states that while she may not have been doing exercise, she has been using her R hand much more during functional tasks making things easier for her. She says that she feels her shoulder and lifting her arm are much better, but her biggest complaint is  and functional use of her hand. Pt does have theraputty at home which she has not been using consistently but is looking to get motivated to use it again. Pt reports that she has been able to dress herself with no assistance especially in regards to donning her bra. At first she had to purchase front clipping due to having difficulty donning overhead, however now she is able to complete. Pt has weighted dowels at home that she uses both when laying down and when seated for increased ROM. Pt has stayed active during her time off (weather permitting) and will go on walks and get outside as able. Pt denies all pain and soreness with movement which is significant improvement from IE. Pt has not completed many writing tasks and still reports to be one of her biggest barriers at this point, however She is also demo improvements with writing and fluidity of handwriting skills.     Following formalized  testing as well as clinical observation, Pt continues to present with the following areas of deficit:  RUE weakness, tightness in upper traps and supraspinatus, decreased pinch and  strength, decreased ROM, decreased independence with IADLs, and decreased endurance. Pt shoulder pain has improved significantly to none since the start of therapy. Pt with good compliance and carry over of at home stretching, ROM, and HEP completing daily both in supine and seated as able proximally and distally.  Pt with overall significant improvements in testing this date with 120* FF, 110* ABD, shoulder ext, elbow flex and ext, and wrist flex. Pt continues with deficits with limited wrist ROM in extension noted this date, however improved from prior sessions. Pt continues to be educated on stroke recovery timeline throughout session. Pt educated on fitness to drive evaluation, what it entails, the possible outcomes, and discussing with doctor after taking test. Pt with good understanding and scheduled FTD evaluation for 5/5. Pt will benefit from continued skilled Occupational Therapy services 2x/week for 8 weeks with focus on neuro re-ed, there ex, there act, and ADL/IADL retraining.  Maye Lilly is in agreement with POC.         POC expires Unit limit Auth Expiration date PT/OT/ST + Visit Limit?   3/28/25 4 NRE 1/31/25 8   3/28/25    3/6/25 6 approved                                       Visit/Unit Tracking  6 visits approved Date 1/23 1/24 1/27 1/28 1/29 2/04 1/23/25- 3/6/25 Used 1 2 3 4 5 6         Remaining  5 4 3 2 1 0             6 visits approved Date 2/6            TBD  Used 1              Remaining  TBD              TBD visits approved Date 4/9            TBD  Used 1              Remaining  TBD                  Duration in weeks: 8 weeks, 2-3x  Plan of care beginning date: 4/9/25  Plan of care expiration date: 6/4/25  PN #1: 5/9/25     Precautions: HTN, GERD, R shoulder pain               GOALS    Short Term  Goals (6 weeks)  Strength/Endurance  - Pt will increase R hand functional  with ability to sustain grasp on medium size objects for 5 sec to improve independence with ADLs GOAL MET  - Pt will demo with G tolerance to supine, seated, and in stance exercise x 15 minutes with minimal rest breaks required for increased engagement in life roles and weekly exercise regimen CONTINUE  - Pt will demo with G carryover of HEP to improve functional progression towards goals in POC and for improved functional use of RUE GOAL MET; continue    AROM/PROM  - Pt will be able to perform 90* degrees shoulder flexion of proximal RUE in supine to demo increased strength and ROM of affected UE for improved ADLs/IADLs GOAL MET; 120*  - Pt will be able to perform full range elbow flexion of distal RUE to demo increased strength and ROM of affected UE for improved ADLs/IADLs PARTIALLY MET  - Pt will demo good carryover of clinic and home pain and swelling reduction strategies for improved AROM initiation with functional reach and grasp with ADL function GOAL MET; wedge under arm/hand when sleeping; minimal to no swelling noted    FMC/GMC  - Pt will be able to actively grasp 2 pegs for initiation of 9HPT demo improved FMC and and pinch. GOAL MET   - Pt will demo improvement with pulling on trousers on Neuro QOL form to with a little difficulty demo improved coordination and precision for FM tasks and ADLs GOAL MET  - Pt will be able to complete thumb opposition to digits 2-4 demo improved motor control and digit ROM GOAL MET; can oppose to digits 2-4  - Pt will demo improved motor learning of constructional and new motor actions for improved b/l coordination and motor planning evident by 50% accuracy for fxnl ADL/IADL performance GOAL MET  - Pt will demo ability to complete thumb opposition to D5, for improved FMC and grasp NEW GOAL 4/9/25  -Pt will improve ability to reach and grasp an item in RUE with PSFS score of > 9/10 for improved  proximal to distal teaming and R coordination NEW GOAL 4/9/25      Functional Performance  - Pt will increase RUE to semi functional (dependent stabilizer, independent stabilizer, gross assist, semi-functional, functional, fully functional) assist with for ADL/IADL and tabletop tasks for improved functional performance of life roles and salient tasks GOAL MET   - Pt will demo G comprehension of adaptive equipment (long handled reacher/sponge/shoehorn, toileting aid ) use in clinic to improve independence in ADL management in home environment GOAL MET; able to dress independently  - Pt will improve line adherence when writing a sentence using regular pen for increased ability to complete bill pay and sign documents NEW GOAL 4/9/25    Modalities  - Pt/family will demo G understanding and carryover of use of home NMES unit as prescribed to inc carryover of ROM and strengthening strategies of R UE DISCONTINUE; no applicable      Long Term Goals (12 weeks)  Strength/Endurance  - Pt will be able to successfully complete strength testing demo 2-5 lbs RUE  strength for improved completion of ADLs GOAL MET  - Pt will demo with G tolerance to supine, seated, and in stance exercise x 25 minutes with minimal rest breaks required for increased engagement in life roles and weekly exercise regimen  PROGRESSING    AROM/PROM  - Pt will be able to perform 90* degrees seated shoulder flexion of proximal RUE to demo increased strength and ROM of affected UE for improved ADLs/IADLs GOAL MET  - Pt will demo good carryover of clinic and home pain and swelling reduction strategies for improved AROM initiation with functional reach and grasp with ADL function GOAL MET    FMC/GMC  - Pt will be able to complete 9HPT in < 1 min demo improved FMC and coordination. GOAL MET; 41 seconds   - Pt will demo improvement with pulling on trousers on Neuro QOL form to with no difficulty demo improved coordination and precision for FM tasks and ADLs  "GOAL MET  - Pt will be able to complete thumb opposition to all digits demo improved coordination and functional use of RUE.  PROGRESSING; difficulty to D5  - Pt will demo improved motor learning of constructional and new motor actions for improved b/l coordination and motor planning evident by 75% accuracy for fxnl ADL/IADL performance PROGRESSING    Functional Performance  - Pt will increase RUE to functional (dependent stabilizer, independent stabilizer, gross assist, semi-functional, functional, fully functional) assist with for ADL/IADL and tabletop tasks for improved functional performance of life roles and salient tasks PARTIALLY MET  - Pt will be able to sustain grasp on hairbrush in R hand for increased ability to complete hair combing and IADLs NEW GOAL 4/9/25    Modalities  - Pt will tolerate BIONESS/NMES/IASTM for improved motor and sensory performance for overall improved strength, tone reduction, and sensation to inc safety and engagement with ADL/IADL function GOAL MET      Subjective    PATIENT GOAL: \"Grab something, pull up my pants, wipe myself, and writing/signing my name\"    Patient-Specific Functional Scale   Task is scored 0 (unable to perform activity) to 10 (ability to perform activity independently)    Activity Date: Date:   ADLs 9    2.  Grasp/use of R hand     3.   ROM  Improved    4.   Strength and endurance         HISTORY OF PRESENT ILLNESS:     Pt is a 64 y.o. female who was referred to Occupational Therapy s/p Cerebrovascular accident (CVA), unspecified mechanism (HCC) [I63.9].  Pt reports that the day her stroke happened she was at work. She felt that the ground was moving but thought it could have been due to her forgetting her glasses's and straining her eyes. She also ended up throwing up multiple times and left work. When she got home she went to sleep. The next morning she still was not feeling right, called out of work and drove herself to the hospital because she felt weak " "overall. She continued to feel weak in the hospital but was still able to move her UE and LE. She then was sent for an MRI, and when she came back to get into bed is when she realized she was unable to and no longer had function of her R upper and lower extremity. She then spent 3 weeks at the [facility] and then 3 weeks with home care. She does not feel difficulty with sensation but rather just weak and heavy. Pt wears a shoulder subluxation splint for comfort but due to large size feels it is not doing much. At this time she is on medical leave from work as a . She currently does not drive. She lives with her daughter and her son occasionally comes in and out. Her daughter works full time and so does her son but he works from home. Pt reports that she does not have difficulty with bed mobility or toileting when using the commode. She is able to don and doff her pants off her hips but will sometimes need help. If she is alone, she has a dressing stick to help pull them up on her R side. She reports that she bathroom and shower are very small, and for the time being is showering at a friends house with a walk in/ADA shower due to a past family member who was using it. She will only use her bathroom toilet when someone is home to help her with hygiene due to small space, otherwise will use commode. She occasionally needs assistance with UB dressing for shirts but wears sun dresses at home that she reports are not difficult.     Per chart review  on 8/20/24, \"Ms. Maye Lilly is a 63 yo woman with a past medical history notable for HTN and atrial fibrillation who presented to the [facility] on 8/10/24 with acute dysarthria, facial droop and right hemiparesis. A stroke alert was activated, CT head wo contrast was negative for acute findings, CTA of the head/neck showed no evidence of large vessel occlusion and patent cervical carotid and vertebral arteries. MRI of the brain revealed an acute left pontine infarct. Course " "complicated by severe hypertension requiring multiple agents to obtain control.   - Acute left pontine ischemic infarct: Clinically presented on 8/10/24 with right hemiparesis, dysarthria and facial droop, Stroke etiology: HTN, small vessel disease, MR brain on 24 revealed a small area of acute/subacute ischemia within the central luz to the left of midline without acute hemorrhage\"      PMH:   Past Medical History:   Diagnosis Date    Acute CVA (cerebrovascular accident) (HCC) 2024    Atrial fibrillation (HCC)     Disease of thyroid gland     Hypertension          Pain Levels   Restin    With Activity:  0      Objective    Impairment Observations:            DANIEL Kruse           UPPER EXTREMITY FUNCTION   Impaired Intact Dominant Hand: R     /PINCH STRENGTH             Dynamometer    - Gross Grasp 18 lbs 60 lbs low  R- improved x 3 lbs  L- remained     Pinch Meter     - Pincer 8 lbs  11 lbs low  L - remained   R- improved x 4 lbs    - Tripod 6.5 lbs 12 lbs low  R- improved x 1/2 lbs  L- remained     - Lateral 10 lbs   15 lbs low  R improved x 1.5 lb  L- improved      AROM (Seated)             Scapula   - Retraction/Protraction  - Elevation/Depression  - Upward/Downward rotation      Shoulder * WFL  Improved by 14*   Shoulder Ext Full WFL  Improved    Shoulder internal rotation  Full  Remained   Shoulder external rotation  3/4  Remained   Shoulder Abd 110* WFL  Improved 10*   Shoulder Add Full WFL  Improved   Horizontal Abd   WFL     Horizontal Add   WFL     Elbow Flex Full  WFL  R - improved to full motion    Elbow Ext Full   WFL  WFL   Pronation Full  WFL     Supination Near  Full  WFL  Remained   Wrist Flex 3/4  WFL  Remained   Wrist Ext 1/2   WFL  Improved   Digit Flex 3/4 WFL  Remained    Digit Ex Full WFL  Remained    Composite Grasp 3/4 WFL  Improved   Hook Grasp   WFL     Subluxation     None     AROM (Supine)            Shoulder *   improved   Shoulder Ext  Near full   "    Shoulder ABD 92*    Improved    Shoulder ADD        Horizontal ABD        Horizontal ADD        Elbow Flex  3/4      Elbow Ext Full       Pronation Full       Supination Near full       Wrist Flex  Near full      Wrist Ext 3/4       Digit Flex 3/4       Digit Ext  Full        PROM (Supine position)     DNT 4/9/25- AROM improved      Shoulder *   Slight pain   Shoulder Ext Full      Shoulder ABD 90*      Shoulder ADD Full       Horizontal ABD      Horizontal ADD        Elbow Flex 3/4       Elbow Ext Full       Wrist Flex Near Full       Wrist Ext Near Full        Digit Flex 3/4    Slight swelling but improved   Digit Ext Full     Supination 3/4     Pronation Full     Resting positin      MMT             Shoulder FF 4/5 5/5 R improved   Shoulder Ext 4/5 5/5 R remained   Shoulder Abduction 4/5 5/5 R improved   Shoulder Adduction 4/5 5/5 R improved   Elbow Flex 4+/5 5/5 R improved   Elbow Ext 4+/5 5/5 R improved   Wrist Flex 4/5 5/5 R improved   Wrist Ext 3+/5 5/5 R remained   Gross Grasp 3/5 5/5 R remained     SENSATION      Monofilament Testing  Normal: 2.83 mm    Diminished light touch: 3.61 mm    Diminished protective sensation: 4.31 mm    Loss of protective sensation: 4.56    Complete loss of sensation / deep pressure: 6.65 DNT DNT    Sharp / Dull       Proprioception      Hot/Cold Temp      Stereognosis         COORDINATION      Opposition Able to touch thumb to index, middle, and ring on lateral aspect  WFL Difficulty to oppose to D5   Finger to Nose Slowed WFL Able to complete with slight head compensation and slowed however improved movement overall   Rapid Alternating Movement Slowed, slightly limited supination WFL Improved  Supination to neutral   9 Hole Peg Test 41 sec  26 seconds abnormal  R- declined x 2 seconds  Slight ataxia to board  L- declined x 2 sec       Fxnl Dexterity Test 67 seconds      26 seconds abnormal  R - declined x 2 sec      Notice slight abduction of arm with placement, ataxia  (subtle), compensation with occasional supination of forearm and using board     L- remained       Michael Hand Function Test         - Handwriting 44 seconds   ([place]      Good fluidity of movement; poor line adherence and sizing  Improved time- remained overall with fluidity and sizing     TONE: MODIFIED CINDY SCALE      No increase in muscle tone (0) Biceps, triceps,wrist extensors, and digits flex/ext  Remained    Slight Increase in muscle tone with catch and release or min resist at end range (1)   Wrist flexors    Digit flexors (subtle)    Remained   Slight Increase in muscle tone with catch and release, followed by min resistance through remainder of range (1+)      Increased muscle tone through full range, able to be moved easily (2)      Considerable increase in tone, difficult to move (3)      Rigid in Flexion/Extension (4)                  Neuro QOL  Upper Extremity Function (Fine motor, ADL):     Are you able to turn a key in a lock? Without any difficulty  Are you able to brush your teeth? Without any difficulty  Are you able to make a phone call using a touch tone or key-pad? Without any difficulty  Are you able to  coins from a table top? Without any difficulty  Are you are able to write with a pen or pencil With a little difficulty- improved   Are you able to open and close a zipper? Without any difficulty- improved  Are you able to wash and dry your body? Without any difficulty  Are you able to shampoo your hair? Without any difficulty  Are you able to open previously opened jars? Without any difficulty- improved; if not tightly squeezed  Are you able to hold a plate full of food? Without any difficulty  Are you able to pull on trousers? Without any difficulty  Are you able to button your shirt? With a little difficulty  Are you able to trim your fingernails? Unable to do  Are you able to cut your toe nails? Unable to do  Are you able to bend down and   clothing from the floor? Without any difficulty  How much DIFFICULTY do you currently have using a spoon to eat a meal? No difficulty  How much DIFFICULTY do you currently have putting on a pullover shirt? No difficulty  How much DIFFICULTY do you currently have taking off a pullover shirt? No difficulty- improved   How much DIFFICULTY do you currently have removing wrappings from small objects? No difficulty  How much DIFFUCULTY do you currently have opening medications or vitamin containers (e.g. childproof containers, small bottles)? No difficulty- improved; safety ones difficult, non safety no issue      OTHER PLANNED THERAPY INTERVENTIONS:   Supine, seated, and in stance neuro re-ed  Task-Oriented Training  Tricep AG  NMES/FES  Cryotherapy for tone reduction  Hot Packs for pain  TENS for pain  FMC/prehension  GMC  Proximal to distal teaming  Timed Trials  Manual tx  IASTM  Hand to target  Sensory re-ed (Cutaneous/Proprioceptive)  Seated functional reach: crossing midline  Supine place and hold  WBearing strategies   Closed chain activities  Open chain activities  Mirror Therapy  HEP  Orthotic Development

## 2025-04-10 DIAGNOSIS — M25.561 RIGHT KNEE PAIN, UNSPECIFIED CHRONICITY: Primary | ICD-10-CM

## 2025-04-11 ENCOUNTER — OFFICE VISIT (OUTPATIENT)
Dept: FAMILY MEDICINE CLINIC | Facility: CLINIC | Age: 65
End: 2025-04-11
Payer: COMMERCIAL

## 2025-04-11 VITALS
WEIGHT: 259.3 LBS | SYSTOLIC BLOOD PRESSURE: 124 MMHG | HEIGHT: 65 IN | TEMPERATURE: 96.8 F | HEART RATE: 71 BPM | BODY MASS INDEX: 43.2 KG/M2 | OXYGEN SATURATION: 98 % | DIASTOLIC BLOOD PRESSURE: 78 MMHG | RESPIRATION RATE: 16 BRPM

## 2025-04-11 DIAGNOSIS — I63.9 LEFT PONTINE STROKE (HCC): ICD-10-CM

## 2025-04-11 DIAGNOSIS — I10 PRIMARY HYPERTENSION: Primary | ICD-10-CM

## 2025-04-11 DIAGNOSIS — I48.0 PAROXYSMAL ATRIAL FIBRILLATION (HCC): ICD-10-CM

## 2025-04-11 DIAGNOSIS — G81.91 RIGHT HEMIPARESIS (HCC): ICD-10-CM

## 2025-04-11 PROBLEM — E78.5 HLD (HYPERLIPIDEMIA): Status: ACTIVE | Noted: 2025-04-11

## 2025-04-11 PROCEDURE — 99204 OFFICE O/P NEW MOD 45 MIN: CPT | Performed by: STUDENT IN AN ORGANIZED HEALTH CARE EDUCATION/TRAINING PROGRAM

## 2025-04-11 NOTE — ASSESSMENT & PLAN NOTE
Bp controlled well on current regimen- continue to monitor   Orders:    Ambulatory Referral to Cardiology; Future

## 2025-04-11 NOTE — PROGRESS NOTES
Name: Maye Lilly      : 1960      MRN: [id number]  Encounter Provider: Destiny Santana MD  Encounter Date: 2025   Encounter department: [facility]  :  Assessment & Plan  BMI 40.0-44.9, adult (HCC)  Will refer to weight management for further eval and tx  Orders:    Ambulatory Referral to Weight Management; Future    Right hemiparesis (HCC)  Continues to do PT- with significant improvement in motor function  Orders:    Ambulatory Referral to Neurology; Future    Paroxysmal atrial fibrillation (HCC)  Referral to cardiology placed   Continue with xarelto  Orders:    Ambulatory Referral to Cardiology; Future    Primary hypertension  Bp controlled well on current regimen- continue to monitor   Orders:    Ambulatory Referral to Cardiology; Future    Left pontine stroke (HCC)  Had stroke in aug 2024- since moving in with daughter has not followed with neuro  Referral to neuro placed   Orders:    Ambulatory Referral to Neurology; Future    Pt to return for awv and screening orders - today she refused all     History of Present Illness   Patient is a 64 yr old female who presents to the office to [facility] care. Patient with significant medical history- moved to area after her stroke in 2024 to live with daughter. Has been compliant with meds  Daughter requesting for fmla renewal due to patients health and needing assistance   Patient also interested in weight management referral and discussion on treatment options      Review of Systems   Constitutional:  Negative for appetite change.   HENT:  Negative for congestion.    Respiratory:  Negative for chest tightness and shortness of breath.    Cardiovascular:  Negative for chest pain.   Gastrointestinal:  Negative for abdominal pain.   Genitourinary:  Negative for dysuria.   Neurological:  Negative for dizziness, light-headedness and headaches.       Objective   /78 (Patient Position: Sitting, Cuff Size:  "Standard)   Pulse 71   Temp (!) 96.8 °F (36 °C) (Temporal)   Resp 16   Ht 5' 4.5\" (1.638 m)   Wt 118 kg (259 lb 4.8 oz)   SpO2 98%   BMI 43.82 kg/m²      Physical Exam  Vitals reviewed.   HENT:      Right Ear: Tympanic membrane and ear canal normal.      Left Ear: Tympanic membrane and ear canal normal.   Cardiovascular:      Rate and Rhythm: Normal rate.      Pulses: Normal pulses.      Heart sounds: Normal heart sounds.   Pulmonary:      Effort: Pulmonary effort is normal.      Breath sounds: No wheezing.   Abdominal:      General: Bowel sounds are normal.      Palpations: Abdomen is soft.      Tenderness: There is no abdominal tenderness.   Neurological:      Mental Status: She is alert.   Psychiatric:         Mood and Affect: Mood normal.       Administrative Statements   I have spent a total time of 45 minutes in caring for this patient on the day of the visit/encounter including Risks and benefits of tx options, Instructions for management, Patient and family education, Risk factor reductions, Counseling / Coordination of care, and Obtaining or reviewing history  .  "

## 2025-04-11 NOTE — ASSESSMENT & PLAN NOTE
Continues to do PT- with significant improvement in motor function  Orders:    Ambulatory Referral to Neurology; Future

## 2025-04-11 NOTE — ASSESSMENT & PLAN NOTE
Had stroke in aug 2024- since moving in with daughter has not followed with neuro  Referral to neuro placed   Orders:    Ambulatory Referral to Neurology; Future

## 2025-04-11 NOTE — ASSESSMENT & PLAN NOTE
Referral to cardiology placed   Continue with xarelto  Orders:    Ambulatory Referral to Cardiology; Future

## 2025-04-17 ENCOUNTER — OFFICE VISIT (OUTPATIENT)
Dept: OCCUPATIONAL THERAPY | Facility: CLINIC | Age: 65
End: 2025-04-17
Payer: COMMERCIAL

## 2025-04-17 ENCOUNTER — OFFICE VISIT (OUTPATIENT)
Dept: PHYSICAL THERAPY | Facility: CLINIC | Age: 65
End: 2025-04-17
Payer: COMMERCIAL

## 2025-04-17 DIAGNOSIS — I63.9 LEFT PONTINE CVA (HCC): ICD-10-CM

## 2025-04-17 DIAGNOSIS — R26.9 NEUROLOGIC GAIT DISORDER: Primary | ICD-10-CM

## 2025-04-17 DIAGNOSIS — R26.89 BALANCE DISORDER: ICD-10-CM

## 2025-04-17 DIAGNOSIS — I63.9 CEREBROVASCULAR ACCIDENT (CVA), UNSPECIFIED MECHANISM (HCC): Primary | ICD-10-CM

## 2025-04-17 PROCEDURE — 97112 NEUROMUSCULAR REEDUCATION: CPT

## 2025-04-17 PROCEDURE — 97110 THERAPEUTIC EXERCISES: CPT

## 2025-04-17 PROCEDURE — 97140 MANUAL THERAPY 1/> REGIONS: CPT

## 2025-04-17 NOTE — PROGRESS NOTES
"Daily Note     Today's date: 2025  Patient name: Maye Lilly  : 1960  MRN: [id number]  Referring provider: Megan Portillo PA-C  Dx:   Encounter Diagnosis     ICD-10-CM    1. 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 Balance disorder  R26.89       3. Left pontine CVA (HCC)  I63.9           Start Time: 0845  Stop Time: 0930  Total time in clinic (min): 45 minutes    Subjective: Doing well today! No new c/o. Working on managing her R knee pain.      Objective: See treatment diary below      Assessment: Continued HIGT for CVA recovery - focused on R UE step amplitude and stride length - noted significant improvements in R LE control and balance represented by ability to participate in obstacle negotiation with no AD! Will continue to work on multidirectional step amplitude at upcoming sessions.      Plan: Continue per plan of care.  Progress treatment as tolerated.         POC Expiration Date: 25        POC expires Unit limit Auth Expiration date PT/OT/ST + Visit Limit?   1/17/25    12/4/24     1/17/25    2/7/25      1/17/25    1/20/25      4/4/24    2/3/25     6/6/25  9/12/25              Visit/Unit Tracking  AUTH Status:  Date              Needs auth Used 1 2               Remaining  5                    Specialty Daily Treatment Diary  Precautions: Fall risk, past Hx R knee arthritis (can be stiff and swollen at the start of the day)      Manuals 25                                             Neuro Re-Ed           HIGT Solo  Wedge negotiation fwd/bwd (x2 wedges) fwd/bwd by length BUE PRN  X4 rounds w/fast fwd walk over 50'  X2 rounds consec  Add: 3# BL AW  2X1 round    Solo  SPC PRN  3# BL AW  7 cone weaves (2' apart)  With 6 small cones on either side, tapping with respective LE    Solo  No AW  SPC PRN  4\" merna retro step-over TM SOLO BUE   0.8 mph 15% fwd   X5 mins  X5 mins     No AD timed trials 1/2 lap with 1 turn   8 reps   Best: 38 seconds     Fwd/bwd shuttle " "around curve 3 cones/dots   X3 trials not timed     HKM x3 laps     [place] under LLE no UE sit <> stand 2x10     Up and over 4\" merna    TM  No Solo  BUE  1.2 mph 3->5% grade  X3'45\"  0.6 mph 2% grade  X30\"  1.2 mph 0% grade x45\"  X5 min total    Solo  No AD  2x4\" hurdles, 2xhoneycombs x2 sets  Step-to fwd lead R LE focus on inc step amplitude and stride length  X2 laps fwd    Solo  No AD  Airex pad step-ups  Fwd lead R LE ascend, lead L LE descend (3)  X2 laps fwd  Add 4\" step beneath first Airex pad with L UE SPC PRN that side( did not use for ascent or descent, more instability on descent)  X2 rounds fwd               hurdles   // bars   1UE   Fwd/lat   X3 laps ea        step taps          R Knee Control                                       Ther Ex          Nu step                                                             RE/outcome measures    Completed                 Ther Activity                             Gait Training          Indoors                   Modalities                                                                                                       "

## 2025-04-17 NOTE — PROGRESS NOTES
"Daily Note     Today's date: 2025  Patient name: Maye Lilly  : 1960  MRN: [id number]  Referring provider: Megan Portillo PA-C  Dx:   Encounter Diagnosis     ICD-10-CM    1. Cerebrovascular accident (CVA), unspecified mechanism (HCC)  I63.9           Start Time: 0930  Stop Time: 1015  Total time in clinic (min): 45 minutes      POC expires Unit limit Auth Expiration date PT/OT/ST + Visit Limit?   3/28/25 4 NRE 25 8   3/28/25    3/6/25 6 approved                                       Visit/Unit Tracking  6 visits approved Date 25- 3/6/25 Used 1 2 3 4 5 6         Remaining  5 4 3 2 1 0             6 visits approved Date                  TBD  Used 1                   Remaining  TBD                    TBD visits approved Date                TBD  Used 1  2                 Remaining  TBD  TBD                        Duration in weeks: 8 weeks, 2-3x  Plan of care beginning date: 25  Plan of care expiration date: 25  PN #1: 25    Subjective: \"I'm using my left arm to eat because it's hard to  the utensil with my right hand.\"      Objective: See treatment diary below    Manual: Provided with Size M Edema Glove, 3/4 Length to Address Ongoing Hand Edema  IASTM to Wrist Flexor Musculature & Intrinsic Hand Musculature to Address Soft Tissue Tightness Pre-Tx    Therapeutic Exercise:  Wrist Flexor Stretching, Intrinsic Isolated Digit Stretching  Hook TGE with Digits Interlaced to Address Intrinsic Tightness  Composite Digit Flexion Isolated, Emphasis on MP Flexion First  Platform TGE PROM with PIP/DIP Flexion/Extension  HEP Provided & Practiced    Neuromuscular Re-Education: Provided with Built-Up Handle to Use with Utensils while Feeding  Functional Grasp & Release with Resisted Gripper, 15# Resistance   Mat Height Facilitating Shoulder Abduction to Table Height Facilitating Shoulder Forward Flexion    Assessment: Mild edema remains " through R hand, as well as evident joint stiffness and intrinsic/extrinsic tightness. All of these issues are impacting ability to move into tight composite fist. After addressing through IASTM, stretching, PROM/AROM exercises, able to touch digits to palm, moving into loose composite fist. Emphasized importance of regular use/incorporation of R hand into all daily activity, including eating. Provided with built up handle to use during meals for resumption of R-handed feeding. Tolerated treatment well. Patient would benefit from continued OT      Plan: Continue per plan of care.  Progress treatment as tolerated.

## 2025-04-23 ENCOUNTER — OFFICE VISIT (OUTPATIENT)
Dept: OCCUPATIONAL THERAPY | Facility: CLINIC | Age: 65
End: 2025-04-23
Payer: COMMERCIAL

## 2025-04-23 ENCOUNTER — OFFICE VISIT (OUTPATIENT)
Dept: PHYSICAL THERAPY | Facility: CLINIC | Age: 65
End: 2025-04-23
Payer: COMMERCIAL

## 2025-04-23 DIAGNOSIS — I63.9 LEFT PONTINE CVA (HCC): ICD-10-CM

## 2025-04-23 DIAGNOSIS — I63.9 CEREBROVASCULAR ACCIDENT (CVA), UNSPECIFIED MECHANISM (HCC): Primary | ICD-10-CM

## 2025-04-23 DIAGNOSIS — R26.89 BALANCE DISORDER: ICD-10-CM

## 2025-04-23 DIAGNOSIS — R26.9 NEUROLOGIC GAIT DISORDER: Primary | ICD-10-CM

## 2025-04-23 PROCEDURE — 97530 THERAPEUTIC ACTIVITIES: CPT

## 2025-04-23 PROCEDURE — 97150 GROUP THERAPEUTIC PROCEDURES: CPT

## 2025-04-23 PROCEDURE — 97112 NEUROMUSCULAR REEDUCATION: CPT

## 2025-04-23 NOTE — PROGRESS NOTES
"Daily Note     Today's date: 2025  Patient name: Maye Lilly  : 1960  MRN: [id number]  Referring provider: Megan Portillo PA-C  Dx:   Encounter Diagnosis     ICD-10-CM    1. Cerebrovascular accident (CVA), unspecified mechanism (HCC)  I63.9                    POC expires Unit limit Auth Expiration date PT/OT/ST + Visit Limit?   3/28/25 4 NRE 25 8   3/28/25    3/6/25 6 approved    25 8 Approved                                 AUTH Date              8 approved Used 1  2  3               Remaining  7  6  5                      Duration in weeks: 8 weeks, 2-3x  Plan of care beginning date: 25  Plan of care expiration date: 25  PN #1: 25    Subjective: \"I really want to get the driving test done as soon as possible\"    Objective: See treatment diary below    There act: Started session working through Pt upcoming therapy schedule and adding new appts. Pt providing visual list of dates she can not come, instructed to use R hand to write down dates on piece of paper for therapist to follow. Pt with good legibility of writing however slight difficulty with fluidity and maintaining  of thin pen.    Pt then provided education on upcoming fitness to drive evaluation with questions answered about testing and what the next steps are after the test. Pt then seated in front of BITS boarding using RUE to reach and complete tasks for functional reach and accuracy with cognitive tasks. Pt with overall 100% accuracy to understanding directions and follow through each task during sequencing, pt occasionally missing when tapping number/letter or connecting dots due to proximal fatigue when reaching. Pt completing rotator wheel alternating between number and letter, trail making, and shape tracing.   Rotator wheel: 70% accuracy, 13 min to complete, 15 sec reaction time.    Ended session seated at table working on digit ROM and functional grasp/in-hand manipulation. Pt " instructed to use R hand to gather thin scarf on table using digits to scrunch and bring together. Pt overall good use of R hand, some difficulty with thumb abduction to collect side of scarf. Pt then reaching and placing into container.     Assessment: Tolerated treatment well. Pt instructed to use R hand as much as possible throughout daily routine for increased fxl use and ROM. Decreased edema noted this date. Patient would benefit from continued OT      Plan: Continue per plan of care.  Progress treatment as tolerated.

## 2025-04-23 NOTE — PROGRESS NOTES
"Daily Note     Today's date: 2025  Patient name: Maye Lilly  : 1960  MRN: [id number]  Referring provider: Megan Portillo PA-C  Dx:   Encounter Diagnosis     ICD-10-CM    1. 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 Balance disorder  R26.89       3. Left pontine CVA (HCC)  I63.9           Start Time: 1100  Stop Time: 1145  Total time in clinic (min): 45 minutes    Subjective: Doing well today! Received from OT.      Objective: See treatment diary below    1: w/PT: [id number]  Group: [id number]  Self-directed: [id number]    Assessment: Continued to challenge R LE step amplitude with lateral foam step-ups. Stability improved with repeated practice. Progressed step amplitude demands with  step-ups; will look to continue progression to step-through's.      Plan: Continue per plan of care.  Progress treatment as tolerated.       POC Expiration Date: 25        POC expires Unit limit Auth Expiration date PT/OT/ST + Visit Limit?   1/17/25    12/4/24     1/17/25    2/7/25      1/17/25    1/20/25      4/4/24    2/3/25     6/6/25  9/12/25              Visit/Unit Tracking  AUTH Status:  Date             Needs auth Used 1 2 3              Remaining  5                    Specialty Daily Treatment Diary  Precautions: Fall risk, past Hx R knee arthritis (can be stiff and swollen at the start of the day)      Manuals 25                                            Neuro Re-Ed           HIGT Solo  Wedge negotiation fwd/bwd (x2 wedges) fwd/bwd by length BUE PRN  X4 rounds w/fast fwd walk over 50'  X2 rounds consec  Add: 3# BL AW  2X1 round    Solo  SPC PRN  3# BL AW  7 cone weaves (2' apart)  With 6 small cones on either side, tapping with respective LE    Solo  No AW  SPC PRN  4\" merna retro step-over TM SOLO BUE   0.8 mph 15% fwd   X5 mins  X5 mins     No AD timed trials 1/2 lap with 1 turn   8 reps   Best: 38 seconds     Fwd/bwd shuttle around curve 3 cones/dots " "  X3 trials not timed     HKM x3 laps     [place] under LLE no UE sit <> stand 2x10     Up and over 4\" merna    TM  No Solo  BUE  1.2 mph 3->5% grade  X3'45\"  0.6 mph 2% grade  X30\"  1.2 mph 0% grade x45\"  X5 min total    Solo  No AD  2x4\" hurdles, 2xhoneycombs x2 sets  Step-to fwd lead R LE focus on inc step amplitude and stride length  X2 laps fwd    Solo  No AD  Airex pad step-ups  Fwd lead R LE ascend, lead L LE descend (3)  X2 laps fwd  Add 4\" step beneath first Airex pad with L UE SPC PRN that side( did not use for ascent or descent, more instability on descent)  X2 rounds fwd          TM  No Solo  BUE  1.2 mph  No grade  X10 min!    Solo  No AD  Side stepping up/down Airex pads (5)  2x1 lap    Solo  Trainer steps  BUE  From Airex pad lead R LE fwd to second step, lead L LE retro to Airex pad  x20           hurdles   // bars   1UE   Fwd/lat   X3 laps ea        step taps          R Knee Control                                       Ther Ex          Nu step                                                             RE/outcome measures    Completed                 Ther Activity                             Gait Training          Indoors                   Modalities                                                                                                         "

## 2025-04-24 ENCOUNTER — APPOINTMENT (OUTPATIENT)
Dept: OCCUPATIONAL THERAPY | Facility: CLINIC | Age: 65
End: 2025-04-24
Payer: COMMERCIAL

## 2025-04-24 ENCOUNTER — OFFICE VISIT (OUTPATIENT)
Dept: PHYSICAL THERAPY | Facility: CLINIC | Age: 65
End: 2025-04-24
Payer: COMMERCIAL

## 2025-04-24 ENCOUNTER — OFFICE VISIT (OUTPATIENT)
Dept: OCCUPATIONAL THERAPY | Facility: CLINIC | Age: 65
End: 2025-04-24
Payer: COMMERCIAL

## 2025-04-24 DIAGNOSIS — R26.9 NEUROLOGIC GAIT DISORDER: Primary | ICD-10-CM

## 2025-04-24 DIAGNOSIS — I63.9 CEREBROVASCULAR ACCIDENT (CVA), UNSPECIFIED MECHANISM (HCC): Primary | ICD-10-CM

## 2025-04-24 DIAGNOSIS — R26.89 BALANCE DISORDER: ICD-10-CM

## 2025-04-24 DIAGNOSIS — I63.9 LEFT PONTINE CVA (HCC): ICD-10-CM

## 2025-04-24 PROCEDURE — 97150 GROUP THERAPEUTIC PROCEDURES: CPT

## 2025-04-24 PROCEDURE — 97168 OT RE-EVAL EST PLAN CARE: CPT

## 2025-04-24 PROCEDURE — 96125 COGNITIVE TEST BY HC PRO: CPT

## 2025-04-24 PROCEDURE — 97112 NEUROMUSCULAR REEDUCATION: CPT
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
## 2025-04-24 NOTE — PROGRESS NOTES
"Daily Note     Today's date: 2025  Patient name: Maye Lilly  : 1960  MRN: [id number]  Referring provider: Megan Portillo PA-C  Dx:   Encounter Diagnosis     ICD-10-CM    1. 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 Balance disorder  R26.89       3. Left pontine CVA (HCC)  I63.9           Start Time: 1145  Stop Time: 1230  Total time in clinic (min): 45 minutes    Subjective: Doing well today!      Objective: See treatment diary below    1:1 w/PT: 9260-1078  Group: [id number]  Self-directed: [id number]    Assessment: Was able to tolerate increase in TM intensity via addition of grade to faster speed, as well as negotiate 6\" step with R LE with no UE support for the first time! Working towards stepping over deeper obstacles.      Plan: Continue per plan of care.  Progress treatment as tolerated.       POC Expiration Date: 25        POC expires Unit limit Auth Expiration date PT/OT/ST + Visit Limit?   1/17/25    12/4/24     1/17/25    2/7/25      1/17/25    1/20/25      4/4/24    2/3/25     6/6/25  9/12/25              Visit/Unit Tracking  AUTH Status:  Date            Needs auth Used 1 2 3 4             Remaining  5                    Specialty Daily Treatment Diary  Precautions: Fall risk, past Hx R knee arthritis (can be stiff and swollen at the start of the day)      Manuals 25                                           Neuro Re-Ed           HIGT Solo  Wedge negotiation fwd/bwd (x2 wedges) fwd/bwd by length BUE PRN  X4 rounds w/fast fwd walk over 50'  X2 rounds consec  Add: 3# BL AW  2X1 round    Solo  SPC PRN  3# BL AW  7 cone weaves (2' apart)  With 6 small cones on either side, tapping with respective LE    Solo  No AW  SPC PRN  4\" merna retro step-over TM SOLO BUE   0.8 mph 15% fwd   X5 mins  X5 mins     No AD timed trials 1/2 lap with 1 turn   8 reps   Best: 38 seconds     Fwd/bwd shuttle around curve 3 cones/dots   X3 trials not timed " "    HKM x3 laps     [facility] under LLE no UE sit <> stand 2x10     Up and over 4\" merna    TM  No Solo  BUE  1.2 mph 3->5% grade  X3'45\"  0.6 mph 2% grade  X30\"  1.2 mph 0% grade x45\"  X5 min total    Solo  No AD  2x4\" hurdles, 2xhoneycombs x2 sets  Step-to fwd lead R LE focus on inc step amplitude and stride length  X2 laps fwd    Solo  No AD  Airex pad step-ups  Fwd lead R LE ascend, lead L LE descend (3)  X2 laps fwd  Add 4\" step beneath first Airex pad with L UE SPC PRN that side( did not use for ascent or descent, more instability on descent)  X2 rounds fwd          TM  No Solo  BUE  1.2 mph  No grade  X10 min!    Solo  No AD  Side stepping up/down Airex pads (5)  2x1 lap    Solo  Trainer steps  BUE  From Airex pad lead R LE fwd to second step, lead L LE retro to Airex pad  x20       TM  No  Solo   BUE  1.2 mph  5% grade  2x5 min    Solo  No AD  Up/over 4\" (5.75\" deep) step   6\" (8\" deep) step  Lead R LE fwd  X2 laps  X2 laps    6\" step-ups R LE fwd ascend/L LE descend retro  2x10          hurdles   // bars   1UE   Fwd/lat   X3 laps ea        step taps          R Knee Control                                       Ther Ex          Nu step                                                             RE/outcome measures    Completed                 Ther Activity                             Gait Training          Indoors                   Modalities                                                                                                           "

## 2025-04-28 ENCOUNTER — OFFICE VISIT (OUTPATIENT)
Dept: PHYSICAL THERAPY | Facility: CLINIC | Age: 65
End: 2025-04-28
Payer: COMMERCIAL

## 2025-04-28 DIAGNOSIS — I63.9 LEFT PONTINE CVA (HCC): ICD-10-CM

## 2025-04-28 DIAGNOSIS — R26.89 BALANCE DISORDER: ICD-10-CM

## 2025-04-28 DIAGNOSIS — R26.9 NEUROLOGIC GAIT DISORDER: Primary | ICD-10-CM

## 2025-04-28 PROCEDURE — 97112 NEUROMUSCULAR REEDUCATION: CPT

## 2025-04-28 NOTE — PROGRESS NOTES
"Daily Note     Today's date: 2025  Patient name: Maye Lilly  : 1960  MRN: [id number]  Referring provider: Megan Portillo PA-C  Dx:   Encounter Diagnosis     ICD-10-CM    1. 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 Balance disorder  R26.89       3. Left pontine CVA (HCC)  I63.9           Start Time: 1015  Stop Time: 1100  Total time in clinic (min): 45 minutes    Subjective: Doing well today! Had a nice weekend.      Objective: See treatment diary below      Assessment: Was able to increase 1.2 mph/5% treadmill bout duration by 30s. Challenged R LE step amplitude and power output with varying step configurations and from a compliant surface. Additionally worked on R LE step amplitude with 12\" box step taps and movement constraints to reduce circumduction. Recommend working on R LE retro step height to improve R knee flexion in this direction at upcoming sessions to then carry over to improved overground foot clearance.      Plan: Continue per plan of care.  Progress treatment as tolerated.  Work on retro-stepping at upcoming visits.     POC Expiration Date: 25        POC expires Unit limit Auth Expiration date PT/OT/ST + Visit Limit?   1/17/25    12/4/24     1/17/25    2/7/25      1/17/25    1/20/25      4/4/24    2/3/25     6/6/25  9/12/25              Visit/Unit Tracking  AUTH Status:  Date           Needs auth Used 1 2 3 4 5            Remaining  5                    Specialty Daily Treatment Diary  Precautions: Fall risk, past Hx R knee arthritis (can be stiff and swollen at the start of the day)      Manuals 25                                           Neuro Re-Ed           HIGT TM  No Solo  BUE  1.0 mph 3% x1 min  5% x1.5 min  1.2 mph 5% grade x5.5n min    Solo  6\" step-ups  X12 R LE  Airex tp 7.5\" step  X7 R LE  X5 R LE    12\" R LE step taps (WB L LE) w/5\" hold at top for stretch, added bolster on R side for added movement " "constraint, L UE chair support  X12 fwd    Step-overs: 4\" and 5\" boxes with depth  No AD  R LE lead  X1 lap fwd             TM SOLO BUE   0.8 mph 15% fwd   X5 mins  X5 mins     No AD timed trials 1/2 lap with 1 turn   8 reps   Best: 38 seconds     Fwd/bwd shuttle around curve 3 cones/dots   X3 trials not timed     HKM x3 laps     [facility] under LLE no UE sit <> stand 2x10     Up and over 4\" merna    TM  No Solo  BUE  1.2 mph 3->5% grade  X3'45\"  0.6 mph 2% grade  X30\"  1.2 mph 0% grade x45\"  X5 min total    Solo  No AD  2x4\" hurdles, 2xhoneycombs x2 sets  Step-to fwd lead R LE focus on inc step amplitude and stride length  X2 laps fwd    Solo  No AD  Airex pad step-ups  Fwd lead R LE ascend, lead L LE descend (3)  X2 laps fwd  Add 4\" step beneath first Airex pad with L UE SPC PRN that side( did not use for ascent or descent, more instability on descent)  X2 rounds fwd          TM  No Solo  BUE  1.2 mph  No grade  X10 min!    Solo  No AD  Side stepping up/down Airex pads (5)  2x1 lap    Solo  Trainer steps  BUE  From Airex pad lead R LE fwd to second step, lead L LE retro to Airex pad  x20       TM  No  Solo   BUE  1.2 mph  5% grade  2x5 min    Solo  No AD  Up/over 4\" (5.75\" deep) step   6\" (8\" deep) step  Lead R LE fwd  X2 laps  X2 laps    6\" step-ups R LE fwd ascend/L LE descend retro  2x10          hurdles   // bars   1UE   Fwd/lat   X3 laps ea        step taps          R Knee Control                                       Ther Ex          Nu step                                                             RE/outcome measures    Completed                 Ther Activity                             Gait Training          Indoors                   Modalities                                                                                                             "

## 2025-04-29 ENCOUNTER — OFFICE VISIT (OUTPATIENT)
Dept: OCCUPATIONAL THERAPY | Facility: CLINIC | Age: 65
End: 2025-04-29
Payer: COMMERCIAL

## 2025-04-29 ENCOUNTER — OFFICE VISIT (OUTPATIENT)
Dept: PHYSICAL THERAPY | Facility: CLINIC | Age: 65
End: 2025-04-29
Payer: COMMERCIAL

## 2025-04-29 ENCOUNTER — TELEPHONE (OUTPATIENT)
Age: 65
End: 2025-04-29

## 2025-04-29 DIAGNOSIS — I63.9 CEREBROVASCULAR ACCIDENT (CVA), UNSPECIFIED MECHANISM (HCC): Primary | ICD-10-CM

## 2025-04-29 DIAGNOSIS — I63.9 LEFT PONTINE CVA (HCC): ICD-10-CM

## 2025-04-29 DIAGNOSIS — R26.9 NEUROLOGIC GAIT DISORDER: Primary | ICD-10-CM

## 2025-04-29 DIAGNOSIS — R26.89 BALANCE DISORDER: ICD-10-CM

## 2025-04-29 PROCEDURE — 97112 NEUROMUSCULAR REEDUCATION: CPT | Performed by: PHYSICAL THERAPIST

## 2025-04-29 PROCEDURE — 97112 NEUROMUSCULAR REEDUCATION: CPT

## 2025-04-29 PROCEDURE — 97110 THERAPEUTIC EXERCISES: CPT | Performed by: PHYSICAL THERAPIST

## 2025-04-29 NOTE — PROGRESS NOTES
"Daily Note     Today's date: 2025  Patient name: Maye Lilly  : 1960  MRN: [id number]  Referring provider: Megan Portillo PA-C  Dx:   Encounter Diagnosis     ICD-10-CM    1. Cerebrovascular accident (CVA), unspecified mechanism (HCC)  I63.9                      POC expires Unit limit Auth Expiration date PT/OT/ST + Visit Limit?   3/28/25 4 NRE 25 8   3/28/25    3/6/25 6 approved    25 8 Approved                                 AUTH Date            8 approved Used 1  2  3  4             Remaining  7  6  5  4                    Duration in weeks: 8 weeks, 2-3x  Plan of care beginning date: 25  Plan of care expiration date: 25  PN #1: 25    Subjective: \"Im hoping to hear from my doctor about the driving test, I called them today they said 48 hours to get back to me\"    Objective: See treatment diary below    Neuro re-ed: Pt seated EOM with red theraband CLX donned to RUE and therapist holding band for increased resistance. With mirror board placed anterior and peg board on top, pt instructed to follow design, grasp peg from container, then reach with RUE to place onto peg board in correct spot above 90* FF. Pt with overall good fxl reach and target accuracy with moderate fatigue. Once all pegs placed, pt then using red resistive clip with peg board positioned on table, pt picking up pom pom and reaching to place onto peg. Pt with moderate drops and occasional rearranging of therapin in R hand with L to assist. Pt then removing all with R hand and reaching back into shoulder extension to place into containers.    Assessment: Tolerated treatment well. Pt good tolerance to resistive training this date with moderate fatigue at the end of session. Patient would benefit from continued OT      Plan: Continue per plan of care.  Progress treatment as tolerated.      "

## 2025-04-29 NOTE — PROGRESS NOTES
"Daily Note     Today's date: 2025  Patient name: Maye Lilly  : 1960  MRN: [id number]  Referring provider: Megan Portillo PA-C  Dx:   Encounter Diagnosis     ICD-10-CM    1. 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 Balance disorder  R26.89       3. Left pontine CVA (HCC)  I63.9                      Subjective: received from OT, walked here from [facility] and rested before therapy. Would like to focus on lifting knee and foot to clear objects.       Objective: See treatment diary below      Assessment: Tolerated treatment well. Patient demonstrated fatigue post treatment and would benefit from continued PT, challenged with attempt to progress with AW, which was removed. Appropriately challenged and with improved performance with hurdles with repeated practice         Plan: Continue per plan of care.  Progress treatment as tolerated.       POC Expiration Date: 25        POC expires Unit limit Auth Expiration date PT/OT/ST + Visit Limit?   1/17/25    12/4/24     1/17/25    2/7/25      1/17/25    1/20/25      4/4/24    2/3/25     6/6/25  9/12/25              Visit/Unit Tracking  AUTH Status:  Date          Needs auth Used 1 2 3 4 5 6           Remaining  5     2            Authorized (2025-2025)  Visits Requested Visits Authorized Visits Completed Visits Scheduled   8 8  6 2        Specialty Daily Treatment Diary  Precautions: Fall risk, past Hx R knee arthritis (can be stiff and swollen at the start of the day)      Manuals 25                                           Neuro Re-Ed           HIGT TM  No Solo  BUE  1.0 mph 3% x1 min  5% x1.5 min  1.2 mph 5% grade x5.5n min    Solo  6\" step-ups  X12 R LE  Airex tp 7.5\" step  X7 R LE  X5 R LE    12\" R LE step taps (WB L LE) w/5\" hold at top for stretch, added bolster on R side for added movement constraint, L UE chair support  X12 fwd    Step-overs: 4\" and 5\" boxes with depth  No " "AD  R LE lead  X1 lap fwd             TM   No SOLO BUE   1.2 mph   5% fwd   X6 mins  X5 mins     No AD timed trials 1/2 lap with 1 turn   8 reps   Best: 38 seconds     Fwd/bwd shuttle around curve 3 cones/dots   X3 trials not timed     HKM x3 laps     [facility] under LLE no UE sit <> stand 2x10     Up and over 4\" merna    TM  No Solo  BUE  1.2 mph 3->5% grade  X3'45\"  0.6 mph 2% grade  X30\"  1.2 mph 0% grade x45\"  X5 min total    Solo  No AD  2x4\" hurdles, 2xhoneycombs x2 sets  Step-to fwd lead R LE focus on inc step amplitude and stride length  X2 laps fwd    Solo  No AD  Airex pad step-ups  Fwd lead R LE ascend, lead L LE descend (3)  X2 laps fwd  Add 4\" step beneath first Airex pad with L UE SPC PRN that side( did not use for ascent or descent, more instability on descent)  X2 rounds fwd          TM  No Solo  BUE  1.2 mph  No grade  X10 min!    Solo  No AD  Side stepping up/down Airex pads (5)  2x1 lap    Solo  Trainer steps  BUE  From Airex pad lead R LE fwd to second step, lead L LE retro to Airex pad  x20       TM  No  Solo   BUE  1.2 mph  5% grade  2x5 min    Solo  No AD  Up/over 4\" (5.75\" deep) step   6\" (8\" deep) step  Lead R LE fwd  X2 laps  X2 laps    6\" step-ups R LE fwd ascend/L LE descend retro  2x10          hurdles  Solo step   3# R AW (attempted x 1/2 lap)    0# R AW     R LE fwd leading  X 3 laps     L LE fwd leading  X 2 laps  HHA PRN    Lat x 1 lap ea HHA       // bars   1UE   Fwd/lat   X3 laps ea        step taps  Solo   Standing airex  12in step   Attempted    8in box  R x 20   L x 20   Alt x 15          R Knee Control                                       Ther Ex          Nu step                                                             RE/outcome measures    Completed                 Ther Activity                             Gait Training          Indoors                   Modalities                                                                                                             "

## 2025-05-01 NOTE — TELEPHONE ENCOUNTER
"Good morning,     I reviewed Demario's (Ourania's) testing and she scored within \"low risk\", which is good news! She can safely return to driving at this time. My recommendations are always to start slow, in familiar places, with close supervision from a friend or family member and then very slowly introduce more complex driving (highways/busy streets) as comfort increases and if she and family feel safe doing so after performing in easier settings. I will see Demario again in follow-up on 6/10/25 and we can discuss how it is going.     Thanks!  Dk De Dios MD  Attending Physician  Physical Medicine and Rehabilitation  [facility]      "

## 2025-05-05 ENCOUNTER — OFFICE VISIT (OUTPATIENT)
Dept: PHYSICAL THERAPY | Facility: CLINIC | Age: 65
End: 2025-05-05
Payer: COMMERCIAL

## 2025-05-05 ENCOUNTER — APPOINTMENT (OUTPATIENT)
Dept: OCCUPATIONAL THERAPY | Facility: CLINIC | Age: 65
End: 2025-05-05
Payer: COMMERCIAL

## 2025-05-05 DIAGNOSIS — R26.89 BALANCE DISORDER: ICD-10-CM

## 2025-05-05 DIAGNOSIS — R26.9 NEUROLOGIC GAIT DISORDER: Primary | ICD-10-CM

## 2025-05-05 DIAGNOSIS — I63.9 LEFT PONTINE CVA (HCC): ICD-10-CM

## 2025-05-05 PROCEDURE — 97112 NEUROMUSCULAR REEDUCATION: CPT

## 2025-05-05 NOTE — PROGRESS NOTES
"Daily Note     Today's date: 2025  Patient name: Maye Lilly  : 1960  MRN: [id number]  Referring provider: Megan Portillo PA-C  Dx:   Encounter Diagnosis     ICD-10-CM    1. 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 Balance disorder  R26.89       3. Left pontine CVA (HCC)  I63.9           Start Time: 1230  Stop Time: 1300  Total time in clinic (min): 30 minutes    Subjective: Doing well today! A little late to her session but drove here for the first time!      Objective: See treatment diary below      Assessment: Challenged R LE stepping amplitude with compliant surface and merna negotiations. Used UL HHA and was able to gradually reduce pressure of HHA on both sides with repeated practice.       Plan: Continue per plan of care.  Progress note during next visit.  Progress treatment as tolerated.       POC Expiration Date: 25        POC expires Unit limit Auth Expiration date PT/OT/ST + Visit Limit?   1/17/25    12/4/24     1/17/25    2/7/25      1/17/25    1/20/25      4/4/24    2/3/25     6/6/25  9/12/25              Visit/Unit Tracking  AUTH Status:  Date         Needs auth Used 1 2 3 4 5 6 7          Remaining  5     2 1           Authorized (2025-2025)  Visits Requested Visits Authorized Visits Completed Visits Scheduled   8 8  6 2        Specialty Daily Treatment Diary  Precautions: Fall risk, past Hx R knee arthritis (can be stiff and swollen at the start of the day)      Manuals 25                                           Neuro Re-Ed           HIGT TM  No Solo  BUE  1.0 mph 3% x1 min  5% x1.5 min  1.2 mph 5% grade x5.5n min    Solo  6\" step-ups  X12 R LE  Airex tp 7.5\" step  X7 R LE  X5 R LE    12\" R LE step taps (WB L LE) w/5\" hold at top for stretch, added bolster on R side for added movement constraint, L UE chair support  X12 fwd    Step-overs: 4\" and 5\" boxes with depth  No AD  R LE lead  X1 lap " "fwd             TM   No SOLO BUE   1.2 mph   5% fwd   X6 mins  X5 mins     No AD timed trials 1/2 lap with 1 turn   8 reps   Best: 38 seconds     Fwd/bwd shuttle around curve 3 cones/dots   X3 trials not timed     HKM x3 laps     [place] under LLE no UE sit <> stand 2x10     Up and over 4\" merna   Solo    16' length  Marches  Fwd/bwd by length  2x1 lap    HHA PRN  Airex pad step-overs (5) with 4x6\" hurdles between   X2 laps fwd alt lead LE by length  X2 laps fwd less pressure with HHA  X2 laps fwd even less pressure with HHA   TM  No Solo  BUE  1.2 mph 3->5% grade  X3'45\"  0.6 mph 2% grade  X30\"  1.2 mph 0% grade x45\"  X5 min total    Solo  No AD  2x4\" hurdles, 2xhoneycombs x2 sets  Step-to fwd lead R LE focus on inc step amplitude and stride length  X2 laps fwd    Solo  No AD  Airex pad step-ups  Fwd lead R LE ascend, lead L LE descend (3)  X2 laps fwd  Add 4\" step beneath first Airex pad with L UE SPC PRN that side( did not use for ascent or descent, more instability on descent)  X2 rounds fwd          TM  No Solo  BUE  1.2 mph  No grade  X10 min!    Solo  No AD  Side stepping up/down Airex pads (5)  2x1 lap    Solo  Trainer steps  BUE  From Airex pad lead R LE fwd to second step, lead L LE retro to Airex pad  x20       TM  No  Solo   BUE  1.2 mph  5% grade  2x5 min    Solo  No AD  Up/over 4\" (5.75\" deep) step   6\" (8\" deep) step  Lead R LE fwd  X2 laps  X2 laps    6\" step-ups R LE fwd ascend/L LE descend retro  2x10          hurdles  Solo step   3# R AW (attempted x 1/2 lap)    0# R AW     R LE fwd leading  X 3 laps     L LE fwd leading  X 2 laps  HHA PRN    Lat x 1 lap ea HHA              step taps  Solo   Standing airex  12in step   Attempted    8in box  R x 20   L x 20   Alt x 15          R Knee Control                                       Ther Ex          Nu step                                                             RE/outcome measures                    Ther Activity                           "   Gait Training          Indoors                   Modalities

## 2025-05-06 ENCOUNTER — APPOINTMENT (OUTPATIENT)
Dept: OCCUPATIONAL THERAPY | Facility: CLINIC | Age: 65
End: 2025-05-06
Payer: COMMERCIAL

## 2025-05-08 ENCOUNTER — EVALUATION (OUTPATIENT)
Dept: PHYSICAL THERAPY | Facility: CLINIC | Age: 65
End: 2025-05-08
Payer: COMMERCIAL

## 2025-05-08 ENCOUNTER — OFFICE VISIT (OUTPATIENT)
Dept: OCCUPATIONAL THERAPY | Facility: CLINIC | Age: 65
End: 2025-05-08
Payer: COMMERCIAL

## 2025-05-08 DIAGNOSIS — I63.9 CEREBROVASCULAR ACCIDENT (CVA), UNSPECIFIED MECHANISM (HCC): Primary | ICD-10-CM

## 2025-05-08 DIAGNOSIS — R26.9 NEUROLOGIC GAIT DISORDER: Primary | ICD-10-CM

## 2025-05-08 DIAGNOSIS — R26.89 BALANCE DISORDER: ICD-10-CM

## 2025-05-08 DIAGNOSIS — I63.9 LEFT PONTINE CVA (HCC): ICD-10-CM

## 2025-05-08 PROCEDURE — 97530 THERAPEUTIC ACTIVITIES: CPT

## 2025-05-08 PROCEDURE — 97110 THERAPEUTIC EXERCISES: CPT

## 2025-05-08 PROCEDURE — 97112 NEUROMUSCULAR REEDUCATION: CPT

## 2025-05-08 NOTE — PROGRESS NOTES
"Occupational Therapy Daily Note:    Today's date: 2025  Patient name: Maye Lilly  : 1960  MRN: [id number]  Referring provider: Megan Portillo PA-C  Dx:   Encounter Diagnosis   Name Primary?    Cerebrovascular accident (CVA), unspecified mechanism (HCC) Yes          POC expires Unit limit Auth Expiration date PT/OT/ST + Visit Limit?   3/28/25 4 NRE 25 8   3/28/25    3/6/25 6 approved    25 8 Approved                                 AUTH Date        8 approved Used 1  2  3  4  5  6         Remaining  7  6  5  4  3  2                Duration in weeks: 8 weeks, 2-3x  Plan of care beginning date: 25  Plan of care expiration date: 25  PN #1: 25      Subjective: \"I can drive again. It feels weird but good.\"    Objective: See treatment below.  Session focusing motor skills, UE strength, stability and overall activity endurance improving indep in ADL/IADL mngt.    Neuro Re-ed: In unsupported stance pt engaged in BB toss focusing on dynamic balance, proximal strength/stability, and automaticity of grasp/release. Pt integrated B hands tossing reciprocally with right hand to ball and catching with left.      Thera Ex: Pt engaged in therex using green flex bar in pronation/supination w/5 sec hold followed by green flex ring and green extension band completing 30 reps focusing on intrinsic strengthening and  strength.  Pt completed wrist maze x3 focusing on wrist strength and flexibility.     Thera Act:  Pt engaged in marble placement sequencing alphabet using PW for marble placement opposing D2-5 to D1.  Activity focusing on pinch patterns and digit flexibility improving fm skills for small item closure and container mngt.  Minimal droppage noted opposing D1 to D%    Assessment: Tolerated treatment well. No LOB in stance, pt able to toss BB w/o difficulty.  Frequent droppage noted w/improvement in coordination to toss/catch using left hand to " catch.  Patient would benefit from continued skilled OT.    Plan: Continued skilled OT per POC

## 2025-05-08 NOTE — PROGRESS NOTES
"Progress Note     Today's date: 2025  Patient name: Maye Lilly  : 1960  MRN: [id number]  Referring provider: Megan Portillo PA-C  Dx:   Encounter Diagnosis     ICD-10-CM    1. 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 Balance disorder  R26.89       3. Left pontine CVA (HCC)  I63.9           Start Time: 1600  Stop Time: 1645  Total time in clinic (min): 45 minutes    Subjective: Doing well today! Feels her ability lift her R LE is improving with skilled PT care. Motivated to continue to work on regaining as much function as possible s/p CVA.     Patient Goals   To return as closely to her PLOF as possible. \"I want my right foot to be able to lift higher.\"     Pain  Current pain ratin  At best pain ratin  At worst pain ratin     Objective: See treatment diary below        ABC: 40.63%  PN 24: 51.88%  PN 24: 55.63%  RE 1/3/25: 65.63%  PN 25: 73.13%  PN 25: 85%  PN 25: 89.38%    PSFS: 25  1) Getting in/out of  side of car manipulating R LE: 8/10 efficacy     5xSTS: Standard chair, L UE from armrest to no support 5/5 times, 19.12s  PN 24: Standard chair, unsupported, L UE from armrest to no support 5/5 times, 13.93s  PN 24: Standard chair, unsupported, NO UE SUPPORT 15.12s  RE 1/3/25: Standard chair, unsupported, no UE support 11.50s  PN 25: Standard chair, unsupported, no UE support 10.79s  PN 25: Standard chair, unsupported, no UE support; 11.3 s   PN 25: 10.28s standard chair no UE     TUG: Standard chair, hemiwalker, CS, 33.88s   PN 24: Standard chair, L SPC, CS, 28.22s  PN 24: Standard chair, L SPC, CS, 22.57s - trialed again with no AD 22.53s   RE 1/3/25: Standard chair L SPC, CS 15.75s - trialed again no AD 15.16s  PN 25: Standard chair L SPC, CS 12.56s - trialed again no AD 13.10s   PN 25: Standard chair L SPC, CS 13.3s - trialed again no AD 12.62s   PN 25: 12.22s no AD standard chair first attempt, 11.71s " "second attempt     6 MWT: 200' in 4'45\" with hemiwalker CS, request to sit with 1'15\" left, no standing rest breaks.         PN 11/7/24: ...  PN 12/4/24: 486 ft SPC  RE 1/3/25: 500 ft NO AD  PN 1/27/25: 668 ft NO AD  PN 4/7/25: 642 w/ SPC (requested AD due to knee pain)  PN 5/8/25: 800 ft SPC!     Gait Speed: 0.25 m/s L hemiwalker  PN 11/7/24: 0.29 m/s L SPC  PN 12/4/24: 0.48 m/s L SPC  RE 1/3/25: 0.60 m/s no AD  PN 1/27/25: 0.71 m/s no AD  PN 4/7/25: 0.71 m/s no AD  PN 5/8/25: 0.91 m/s no AD    Four-Square Step Test:  PN 5/8/25: 55.87s with x3 cane contacts no AD CS       5. AUSTIN Quintanilla and DOMINICK Syed (2008). \"The Four Square Step Test is a feasible and valid clinical test of dynamic standing  balance for use in ambulant people poststroke.\" Arch Phys Med Rehabil 89(11): 5056-1653.  6. XIOMARA Smart., Onel SNic Y., TITA Styles, & Ng, S. S. (2013). Reliability and concurrent validity of Four Square Step Test scores in subjects with  chronic stroke: a  study. Archives of physical medicine and Rehabilitation, 94(7), 5097-5584.     Assessment: Progress assessment completed noting improvements in mobility and LE power output with additional clinically significant improvements in gait speed and endurance! Assessed four-square step test as new assessment to quantify balance performance at a higher level; per above sources/references is significantly above stroke cuttoff for fall risk warranting further skilled PT care to maximize functional mobility with stepping control to reduce risk for falls. Will continue per current POC with this considered.        Goals   Short-term goals (to be achieved in 4 weeks):  1. Patient will demonstrate independence with their initial home exercise program. - met  2. Patient will improve 5xSTS to <15.0 seconds demonstrating improved LE strength and power output, while improving safety with sit to stand transfers. - met  3. Patient will improve TUG to <13.5 seconds using least " restrictive assistive device indicating improved functional mobility to maximize independence. - ongoing     Long-term goals (to be achieved in 8 to 12 weeks with additional goals to be incorporated and progressed appropriately pending patient progress):  1. Patient will demonstrate independence with their finalized home exercise program. - ongoing  2. Patient will improve balance as demonstrated by an increase in FGA by > or equal to 4 points to decrease their risk of falls. - to be assessed.  3. Patient will increase SSGS by >0.10 m/s indicating they are walking at a gait speed to allow for greater safety in the community. - met  4. Patient will increase ABC by MDC of >20% indicating improved balance confidence and self-efficacy. - not met but slight improvements made; may be in part due to the high-level functional components of the ABC.     New 4-week goals as of 12/4/24 to reduce risk for falls and maximize overall mobility levels.   Improve TUG by MCID. - met  Improve 5xSTS by MCID. - met  Improve gait speed by MCID. - met  Improve 6 MWT by 10%. - met     New 4-week goals as of 1/3/25 to reduce risk for falls and maximize overall mobility levels.   Improve TUG by MCID. - met  Improve 5xSTS by MCID. - nearly met  Improve gait speed by MCID. - met  Improve 6 MWT by 10%. -met     New 4-week goals as of 1/27/25 to reduce risk for falls and maximize overall mobility levels.   Improve TUG by MCID. - met  Improve 5xSTS by MCID. - met  Improve gait speed by MCID.  - met  Improve 6 MWT by 10%. - met    NEW GOALS AS OF 5/8/25 to be achieved in 4 weeks:  Improve 6 MWT by 10% since 5/8/25.  Improve four-square step test by MCID since 5/8/25.  Report 2/10 point improvement in PSFS rating of getting in and out of  side seat of car     Plan: Continue current POC.  Patient would benefit from: skilled physical therapy and skilled occupational therapy  Referral necessary: No  Planned modality interventions: biofeedback    "  Planned therapy interventions: manual therapy, balance, neuromuscular re-education, patient/caregiver education, stretching, strengthening, therapeutic activities, therapeutic exercise, graded exercise, gait training and home exercise program     Frequency: 3x/week as able.  Duration in weeks: 8  Plan of Care beginning date:  4/9/25  Plan of Care expiration date:   6/6/25  Treatment plan discussed with: Patient  POC Expiration Date: 6/6/25     POC Expiration Date: 6/6/25        POC expires Unit limit Auth Expiration date PT/OT/ST + Visit Limit?   1/17/25    12/4/24     1/17/25    2/7/25      1/17/25    1/20/25      4/4/24    2/3/25     6/6/25  9/12/25              Visit/Unit Tracking  AUTH Status:  Date 4/9 4/17 4/23 4/24 4/28 04/29 5/5 5/8       Needs auth Used 1 2 3 4 5 6 7 8         Remaining  5     2 1 0                 Specialty Daily Treatment Diary  Precautions: Fall risk, past Hx R knee arthritis (can be stiff and swollen at the start of the day)      Manuals 4/28/25 4/29 5/5/25 5/8/25 4/23/25 4/24/25                                           Neuro Re-Ed     Progress assessment objective measure testing    // bars (16')  BUE  6x12\" hurdles  Alt LE step-to fwd  X1 lap        HIGT TM  No Solo  BUE  1.0 mph 3% x1 min  5% x1.5 min  1.2 mph 5% grade x5.5n min    Solo  6\" step-ups  X12 R LE  Airex tp 7.5\" step  X7 R LE  X5 R LE    12\" R LE step taps (WB L LE) w/5\" hold at top for stretch, added bolster on R side for added movement constraint, L UE chair support  X12 fwd    Step-overs: 4\" and 5\" boxes with depth  No AD  R LE lead  X1 lap fwd             TM   No SOLO BUE   1.2 mph   5% fwd   X6 mins  X5 mins     No AD timed trials 1/2 lap with 1 turn   8 reps   Best: 38 seconds     Fwd/bwd shuttle around curve 3 cones/dots   X3 trials not timed     HKM x3 laps     [place] under LLE no UE sit <> stand 2x10     Up and over 4\" merna   Solo    16' length  Marches  Fwd/bwd by length  2x1 lap    HHA PRN  Airex pad " "step-overs (5) with 4x6\" hurdles between   X2 laps fwd alt lead LE by length  X2 laps fwd less pressure with HHA  X2 laps fwd even less pressure with HHA    TM  No Solo  BUE  1.2 mph  No grade  X10 min!    Solo  No AD  Side stepping up/down Airex pads (5)  2x1 lap    Solo  Trainer steps  BUE  From Airex pad lead R LE fwd to second step, lead L LE retro to Airex pad  x20       TM  No  Solo   BUE  1.2 mph  5% grade  2x5 min    Solo  No AD  Up/over 4\" (5.75\" deep) step   6\" (8\" deep) step  Lead R LE fwd  X2 laps  X2 laps    6\" step-ups R LE fwd ascend/L LE descend retro  2x10          hurdles  Solo step   3# R AW (attempted x 1/2 lap)    0# R AW     R LE fwd leading  X 3 laps     L LE fwd leading  X 2 laps  HHA PRN    Lat x 1 lap ea HHA              step taps  Solo   Standing airex  12in step   Attempted    8in box  R x 20   L x 20   Alt x 15          R Knee Control                                       Ther Ex    Progress assessment objective measure testing      Nu step                                                             RE/outcome measures                    Ther Activity                             Gait Training          Indoors                   Modalities                                                                                                                   "

## 2025-05-14 ENCOUNTER — OFFICE VISIT (OUTPATIENT)
Dept: OCCUPATIONAL THERAPY | Facility: CLINIC | Age: 65
End: 2025-05-14
Payer: COMMERCIAL

## 2025-05-14 ENCOUNTER — OFFICE VISIT (OUTPATIENT)
Dept: PHYSICAL THERAPY | Facility: CLINIC | Age: 65
End: 2025-05-14
Payer: COMMERCIAL

## 2025-05-14 DIAGNOSIS — R26.89 BALANCE DISORDER: ICD-10-CM

## 2025-05-14 DIAGNOSIS — I63.9 CEREBROVASCULAR ACCIDENT (CVA), UNSPECIFIED MECHANISM (HCC): Primary | ICD-10-CM

## 2025-05-14 DIAGNOSIS — I63.9 LEFT PONTINE CVA (HCC): ICD-10-CM

## 2025-05-14 DIAGNOSIS — R26.9 NEUROLOGIC GAIT DISORDER: Primary | ICD-10-CM

## 2025-05-14 PROCEDURE — 97112 NEUROMUSCULAR REEDUCATION: CPT

## 2025-05-14 PROCEDURE — 97110 THERAPEUTIC EXERCISES: CPT

## 2025-05-14 NOTE — PROGRESS NOTES
"Occupational Therapy Daily Note:    Today's date: 2025  Patient name: Maye Lilly  : 1960  MRN: [id number]  Referring provider: Megan Portillo PA-C  Dx:   Encounter Diagnosis   Name Primary?    Cerebrovascular accident (CVA), unspecified mechanism (HCC) Yes       Start Time: 1015  Stop Time: 1100  Total time in clinic (min): 45 minutes      3/28/25 4 NRE 25 8   3/28/25    3/6/25 6 approved    25 8 Approved                                 AUTH Date      8 approved Used 1  2  3  4  5  6  7       Remaining  7  6  5  4  3  2  1              Duration in weeks: 8 weeks, 2-3x  Plan of care beginning date: 25  Plan of care expiration date: 25  PN #1: 25    Subjective: \"I think I could use that green bar bending down but not up.\" (blue/green flex bar for pronation)    Objective: See treatment below.  Session focusing motor skills, UE strength, stability and overall activity endurance improving indep in ADL/IADL mngt.    Neuro Re-ed: In stance w/unilateral support, pt engaged in FM task of retrieving monkeys using green resist clip placing to squibs positioned on vertical mirror board followed by retrieving zip ties and placing to monkeys.  (Pt stabilized monkeys/zip ties with left hand using clip to grasp with right hand)Squibs scattered on mirror board >1/2 range FF facilitating fxl high/low reaching.  <50% droppage noted during task.     Thera Ex: Pt engaged in therex using green flex bar in pronation/supination (30 reps)followed by blue flex ring and orange extension band w/5 sec hold completing 15 reps focusing on intrinsic strengthening and  strength.      Assessment: Tolerated treatment well. Pt achieved fxl reaching >90* w/o shoulder hike noted.  G proximal stability as evidenced by distal control.  Patient would benefit from continued skilled OT.    Plan: Continued skilled OT per POC    "

## 2025-05-14 NOTE — PROGRESS NOTES
"Daily Note     Today's date: 2025  Patient name: Maye Lilly  : 1960  MRN: [id number]  Referring provider: Megan Portillo PA-C  Dx:   Encounter Diagnosis     ICD-10-CM    1. 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 Balance disorder  R26.89       3. Left pontine CVA (HCC)  I63.9           Start Time: 1100  Stop Time: 1145  Total time in clinic (min): 45 minutes    Subjective: Doing well today! Received from OT.      Objective: See treatment diary below      Assessment: Was able to progress to stepping up with R LE lead fwd on  steps with no UE support. Attempted with UE support to second  step but lacks enough power to be able to complete successfully and safely.      Plan: Continue per plan of care.  Progress treatment as tolerated.       POC Expiration Date: 25        POC expires Unit limit Auth Expiration date PT/OT/ST + Visit Limit?   1/17/25    12/4/24     1/17/25    2/7/25      1/17/25    1/20/25      4/4/24    2/3/25     6/6/25  9/12/25              Visit/Unit Tracking  AUTH Status:  Date       Needs auth Used 1 2 3 4 5 6 7 8 9        Remaining  5     2 1 0                 Specialty Daily Treatment Diary  Precautions: Fall risk, past Hx R knee arthritis (can be stiff and swollen at the start of the day)      Manuals 25                                           Neuro Re-Ed     Progress assessment objective measure testing    // bars (16')  BUE  6x12\" hurdles  Alt LE step-to fwd  X1 lap        HIGT TM  No Solo  BUE  1.0 mph 3% x1 min  5% x1.5 min  1.2 mph 5% grade x5.5n min    Solo  6\" step-ups  X12 R LE  Airex tp 7.5\" step  X7 R LE  X5 R LE    12\" R LE step taps (WB L LE) w/5\" hold at top for stretch, added bolster on R side for added movement constraint, L UE chair support  X12 fwd    Step-overs: 4\" and 5\" boxes with depth  No AD  R LE lead  X1 lap fwd             TM   No SOLO BUE   1.2 mph   " "5% fwd   X6 mins  X5 mins     No AD timed trials 1/2 lap with 1 turn   8 reps   Best: 38 seconds     Fwd/bwd shuttle around curve 3 cones/dots   X3 trials not timed     HKM x3 laps     [place] under LLE no UE sit <> stand 2x10     Up and over 4\" merna   Solo    16' length  Marches  Fwd/bwd by length  2x1 lap    HHA PRN  Airex pad step-overs (5) with 4x6\" hurdles between   X2 laps fwd alt lead LE by length  X2 laps fwd less pressure with HHA  X2 laps fwd even less pressure with HHA    TM  Solo  BUE  1.3 mph 3% grade   X5 min  X5 min    Solo  SPC PRN  Shoe box step-over's (2) alt lead LE equal parts practice x4 laps fwd    Solo  Trainer Steps  6\" BUE PRN R LE lead ascend fwd L LE lead descend bwd  2X20     TM  No  Solo   BUE  1.2 mph  5% grade  2x5 min    Solo  No AD  Up/over 4\" (5.75\" deep) step   6\" (8\" deep) step  Lead R LE fwd  X2 laps  X2 laps    6\" step-ups R LE fwd ascend/L LE descend retro  2x10          hurdles  Solo step   3# R AW (attempted x 1/2 lap)    0# R AW     R LE fwd leading  X 3 laps     L LE fwd leading  X 2 laps  HHA PRN    Lat x 1 lap ea HHA              step taps  Solo   Standing airex  12in step   Attempted    8in box  R x 20   L x 20   Alt x 15          R Knee Control                                       Ther Ex    Progress assessment objective measure testing      Nu step                                                             RE/outcome measures                    Ther Activity                             Gait Training          Indoors                   Modalities                                                                                                                     "

## 2025-05-15 ENCOUNTER — OFFICE VISIT (OUTPATIENT)
Dept: PHYSICAL THERAPY | Facility: CLINIC | Age: 65
End: 2025-05-15
Payer: COMMERCIAL

## 2025-05-15 ENCOUNTER — OFFICE VISIT (OUTPATIENT)
Dept: OCCUPATIONAL THERAPY | Facility: CLINIC | Age: 65
End: 2025-05-15
Payer: COMMERCIAL

## 2025-05-15 DIAGNOSIS — I63.9 LEFT PONTINE CVA (HCC): ICD-10-CM

## 2025-05-15 DIAGNOSIS — R26.89 BALANCE DISORDER: ICD-10-CM

## 2025-05-15 DIAGNOSIS — I63.9 CEREBROVASCULAR ACCIDENT (CVA), UNSPECIFIED MECHANISM (HCC): Primary | ICD-10-CM

## 2025-05-15 DIAGNOSIS — R26.9 NEUROLOGIC GAIT DISORDER: Primary | ICD-10-CM

## 2025-05-15 PROCEDURE — 97112 NEUROMUSCULAR REEDUCATION: CPT

## 2025-05-15 PROCEDURE — 97110 THERAPEUTIC EXERCISES: CPT

## 2025-05-15 NOTE — PROGRESS NOTES
"Occupational Therapy Daily Note:    Today's date: 5/15/2025  Patient name: Maye Lilly  : 1960  MRN: [id number]  Referring provider: Megan Portillo PA-C  Dx:   Encounter Diagnosis   Name Primary?    Cerebrovascular accident (CVA), unspecified mechanism (HCC) Yes       Start Time: 1430  Stop Time: 1515  Total time in clinic (min): 45 minutes    3/28/25 4 NRE 25 8   3/28/25    3/6/25 6 approved    25 8 Approved                                 AUTH Date      8 approved Used 1  2  3  4  5  6  7       Remaining  7  6  5  4  3  2  1           Visit/Unit Tracking  AUTH Status:  Date              TBD Used 8              Remaining  0                    Duration in weeks: 8 weeks, 2-3x  Plan of care beginning date: 25  Plan of care expiration date: 25  PN #1: 25    Subjective: \"This is a challenge.\"    Objective: See treatment below.  Session focusing motor skills, UE strength, stability and overall activity endurance improving indep in ADL/IADL mngt.    Neuro Re-ed: In unsupported stance, pt engaged in velcro ball toss w/targeted demands focusing on automaticity of grasp/release and arm swing providing dual task w/balance challenge.  Pt completed 2 trials w/target accuracy >50% of time.  Pt then retrieved balls from target using black banded tongs focusing on intrinsic strength. Pt stabilized bucket in left hand and tossed with right hand.     Thera Ex:  Pt engaged in therex using dark green flex bar in pronation/supination (30 reps)followed by blue flex ring and orange extension band w/5 sec hold completing 15 reps focusing on intrinsic strengthening and  strength.     Thera Act:  Session concluded with pt completing parquetry puzzle elevated on slant board facilitating fxl fwd reaching and pinch patterns.  Pt able to retrieve and place puzzle pieces to elevated target board w/droppage 1x.     Assessment: Tolerated treatment well. Blue " putty issued to pt improving hand strength for carryover at home. Pt continues to improve in proximal strength, AROM and fm skills. Patient would benefit from continued skilled OT.    Plan: Continued skilled OT per POC

## 2025-05-15 NOTE — PROGRESS NOTES
"Daily Note     Today's date: 5/15/2025  Patient name: Maye Lilly  : 1960  MRN: [id number]  Referring provider: Megan Portillo PA-C  Dx:   Encounter Diagnosis     ICD-10-CM    1. 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 Balance disorder  R26.89       3. Left pontine CVA (HCC)  I63.9           Start Time: 1515  Stop Time: 1615  Total time in clinic (min): 60 minutes    Subjective: Doing well today! Received from OT.      Objective: See treatment diary below      Assessment: Continued TM duration progression as tolerated. Demonstrated improvements in ability to clear 4\" progressing to 6\" hurdles with R LE while side stepping in either direction following repeated practice. R LE stepping efficacy continues to be challenged, especially with lateral and backwards-stepping.      Plan: Continue per plan of care.  Progress treatment as tolerated.       POC Expiration Date: 25        POC expires Unit limit Auth Expiration date PT/OT/ST + Visit Limit?   1/17/25    12/4/24     1/17/25    2/7/25      1/17/25    1/20/25      4/4/24    2/3/25     6/6/25  9/12/25     6/6/25    6/11/25     Visit/Unit Tracking  AUTH Status:  Date 5/15              3 Used 2                Remaining  1                        Specialty Daily Treatment Diary  Precautions: Fall risk, past Hx R knee arthritis (can be stiff and swollen at the start of the day)      Manuals 4/28/25 4/29 5/5/25 5/8/25 5/14/25 5/15/25                                           Neuro Re-Ed     Progress assessment objective measure testing    // bars (16')  BUE  6x12\" hurdles  Alt LE step-to fwd  X1 lap        HIGT TM  No Solo  BUE  1.0 mph 3% x1 min  5% x1.5 min  1.2 mph 5% grade x5.5n min    Solo  6\" step-ups  X12 R LE  Airex tp 7.5\" step  X7 R LE  X5 R LE    12\" R LE step taps (WB L LE) w/5\" hold at top for stretch, added bolster on R side for added movement constraint, L UE chair support  X12 fwd    Step-overs: 4\" and 5\" boxes with depth  No AD  R LE " "lead  X1 lap fwd             TM   No SOLO BUE   1.2 mph   5% fwd   X6 mins  X5 mins     No AD timed trials 1/2 lap with 1 turn   8 reps   Best: 38 seconds     Fwd/bwd shuttle around curve 3 cones/dots   X3 trials not timed     HKM x3 laps     [place] under LLE no UE sit <> stand 2x10     Up and over 4\" merna   Solo    16' length  Marches  Fwd/bwd by length  2x1 lap    HHA PRN  Airex pad step-overs (5) with 4x6\" hurdles between   X2 laps fwd alt lead LE by length  X2 laps fwd less pressure with HHA  X2 laps fwd even less pressure with HHA    TM  Solo  BUE  1.3 mph 3% grade   X5 min  X5 min    Solo  SPC PRN  Shoe box step-over's (2) alt lead LE equal parts practice x4 laps fwd    Solo  Trainer Steps  6\" BUE PRN R LE lead ascend fwd L LE lead descend bwd  2X20     TM  Solo  BUE  1.3 mph 3% grade  X6 min  X6 min    Solo  SPC PRN  Shoe box step-over's (2) alt lead LE equal parts practice x4 laps fwd    6x4-6\" hurdles   3x1 lap lat    4-Square Step Test practice  Solo  No AD  2x3 rounds          hurdles  Solo step   3# R AW (attempted x 1/2 lap)    0# R AW     R LE fwd leading  X 3 laps     L LE fwd leading  X 2 laps  HHA PRN    Lat x 1 lap ea HHA              step taps  Solo   Standing airex  12in step   Attempted    8in box  R x 20   L x 20   Alt x 15          R Knee Control                                       Ther Ex    Progress assessment objective measure testing      Nu step                                                             RE/outcome measures                    Ther Activity                             Gait Training          Indoors                   Modalities                                                                                                                       "

## 2025-05-21 ENCOUNTER — OFFICE VISIT (OUTPATIENT)
Dept: PHYSICAL THERAPY | Facility: CLINIC | Age: 65
End: 2025-05-21
Payer: COMMERCIAL

## 2025-05-21 ENCOUNTER — EVALUATION (OUTPATIENT)
Dept: OCCUPATIONAL THERAPY | Facility: CLINIC | Age: 65
End: 2025-05-21
Payer: COMMERCIAL

## 2025-05-21 ENCOUNTER — TELEPHONE (OUTPATIENT)
Age: 65
End: 2025-05-21

## 2025-05-21 DIAGNOSIS — R26.89 BALANCE DISORDER: ICD-10-CM

## 2025-05-21 DIAGNOSIS — I63.9 LEFT PONTINE CVA (HCC): ICD-10-CM

## 2025-05-21 DIAGNOSIS — R26.9 NEUROLOGIC GAIT DISORDER: Primary | ICD-10-CM

## 2025-05-21 DIAGNOSIS — I63.9 CEREBROVASCULAR ACCIDENT (CVA), UNSPECIFIED MECHANISM (HCC): Primary | ICD-10-CM

## 2025-05-21 PROCEDURE — 97150 GROUP THERAPEUTIC PROCEDURES: CPT

## 2025-05-21 PROCEDURE — 97530 THERAPEUTIC ACTIVITIES: CPT

## 2025-05-21 PROCEDURE — 97112 NEUROMUSCULAR REEDUCATION: CPT

## 2025-05-21 PROCEDURE — 97110 THERAPEUTIC EXERCISES: CPT

## 2025-05-21 NOTE — PROGRESS NOTES
OCCUPATIONAL THERAPY PROGRESS NOTE:    5/21/25  Maye Lilly  1960  [id number]  Megan Portillo PA-C   Diagnosis ICD-10-CM Associated Orders   1. Cerebrovascular accident (CVA), unspecified mechanism (HCC)  I63.9               Assessment/Plan    Skilled Analysis:  Maye Lilly is a 64 y.o. female referred to Occupational Therapy s/p Cerebrovascular accident (CVA), unspecified mechanism (HCC) [I63.9].   Pt participated in skilled OT progress note today. She continues to says that she feels her shoulder and lifting her arm are much better, but her biggest complaint is  and functional use of her hand. Pt does have theraputty at home which she has not been using consistently but is looking to get motivated to use it again. Pt reports that she has been able to dress herself with no assistance especially in regards to donning her bra. At first she had to purchase front clipping due to having difficulty donning overhead, however now she is able to complete. Pt has weighted dowels at home that she uses both when laying down and when seated for increased ROM. Pt denies all pain and soreness with movement which is significant improvement from IE. Pt has not completed many writing tasks and still reports to be one of her biggest barriers at this point, however She is also demo improvements with writing and fluidity of handwriting skills.     Following formalized testing as well as clinical observation, Pt continues to present with the following areas of deficit:  RUE weakness, tightness in upper traps and supraspinatus, decreased pinch and  strength, decreased ROM, decreased independence with IADLs, and decreased endurance. Pt shoulder pain has improved significantly to none since the start of therapy. Pt with good compliance and carry over of at home stretching, ROM, and HEP completing daily both in supine and seated as able proximally and distally.  Pt with overall significant improvements in testing  this date with 120* FF, 110* ABD, shoulder ext, elbow flex and ext, and wrist flex. Pt continues with deficits with limited wrist ROM in extension noted this date, however improved from prior sessions. Pt with recent FTD evaluation and since then being cleared from doctor and has been driving. She states her challenge at this point is it takes her time to push in her seat belt but she can get it with use from her L hand to assist. Pt will benefit from continued skilled Occupational Therapy services 2x/week for 4 more weeks with focus on neuro re-ed, there ex, there act, and ADL/IADL retraining.  Maye Vijaya is in agreement with POC.    Todays treatment: Pt tolerated instrument assisted soft tissue massage to R hand for decreased tightness and pain and increased ROM for digit flexion and functional grasp.    Pt then seated EOM working on FMC and pinch with coins. Pt instructed to grasp each coin from table top on top of towel due to coins sliding, and then pinch and place into small container slot. Once completed, then Pt completing again picking up 2-3 coins at a time and then placing into palm of hand. Pt then translating from palm to digits and placing into container. Pt with moderate drops and use of gravity to assist.     3/28/25 4 NRE 1/31/25 8   3/28/25    3/6/25 6 approved    6/4/25 6/4/25 8 Approved                                 AUTH Date 4/9 4/17 4/23 4/24 4/29 5/8 5/14     8 approved Used 1  2  3  4  5  6  7       Remaining  7  6  5  4  3  2  1           Visit/Unit Tracking  AUTH Status:  Date 5/14 5/15            TBD Used 8 9             Remaining  0 0                    Duration in weeks: 8 weeks, 2-3x  Plan of care beginning date: 4/9/25  Plan of care expiration date: 6/4/25  RE-EVAL: 6/4/25     Precautions: HTN, GERD, R shoulder pain               GOALS    Short Term Goals (6 weeks)  Strength/Endurance  - Pt will increase R hand functional  with ability to sustain grasp on medium  size objects for 5 sec to improve independence with ADLs GOAL MET  - Pt will demo with G tolerance to supine, seated, and in stance exercise x 15 minutes with minimal rest breaks required for increased engagement in life roles and weekly exercise regimen CONTINUE  - Pt will demo with G carryover of HEP to improve functional progression towards goals in POC and for improved functional use of RUE GOAL MET; continue    AROM/PROM  - Pt will be able to perform 90* degrees shoulder flexion of proximal RUE in supine to demo increased strength and ROM of affected UE for improved ADLs/IADLs GOAL MET; 120*  - Pt will be able to perform full range elbow flexion of distal RUE to demo increased strength and ROM of affected UE for improved ADLs/IADLs PARTIALLY MET  - Pt will demo good carryover of clinic and home pain and swelling reduction strategies for improved AROM initiation with functional reach and grasp with ADL function GOAL MET; wedge under arm/hand when sleeping; minimal to no swelling noted    FMC/GMC  - Pt will be able to actively grasp 2 pegs for initiation of 9HPT demo improved FMC and and pinch. GOAL MET   - Pt will demo improvement with pulling on trousers on Neuro QOL form to with a little difficulty demo improved coordination and precision for FM tasks and ADLs GOAL MET  - Pt will be able to complete thumb opposition to digits 2-4 demo improved motor control and digit ROM GOAL MET; can oppose to digits 2-4  - Pt will demo improved motor learning of constructional and new motor actions for improved b/l coordination and motor planning evident by 50% accuracy for fxnl ADL/IADL performance GOAL MET  - Pt will demo ability to complete thumb opposition to D5, for improved FMC and grasp NEW GOAL 4/9/25  -Pt will improve ability to reach and grasp an item in RUE with PSFS score of > 9/10 for improved proximal to distal teaming and R coordination NEW GOAL 4/9/25      Functional Performance  - Pt will increase RUE to  semi functional (dependent stabilizer, independent stabilizer, gross assist, semi-functional, functional, fully functional) assist with for ADL/IADL and tabletop tasks for improved functional performance of life roles and salient tasks GOAL MET   - Pt will demo G comprehension of adaptive equipment (long handled reacher/sponge/shoehorn, toileting aid ) use in clinic to improve independence in ADL management in home environment GOAL MET; able to dress independently  - Pt will improve line adherence when writing a sentence using regular pen for increased ability to complete bill pay and sign documents NEW GOAL 4/9/25    Modalities  - Pt/family will demo G understanding and carryover of use of home NMES unit as prescribed to inc carryover of ROM and strengthening strategies of R UE DISCONTINUE; no applicable      Long Term Goals (12 weeks)  Strength/Endurance  - Pt will be able to successfully complete strength testing demo 2-5 lbs RUE  strength for improved completion of ADLs GOAL MET  - Pt will demo with G tolerance to supine, seated, and in stance exercise x 25 minutes with minimal rest breaks required for increased engagement in life roles and weekly exercise regimen  PROGRESSING    AROM/PROM  - Pt will be able to perform 90* degrees seated shoulder flexion of proximal RUE to demo increased strength and ROM of affected UE for improved ADLs/IADLs GOAL MET  - Pt will demo good carryover of clinic and home pain and swelling reduction strategies for improved AROM initiation with functional reach and grasp with ADL function GOAL MET    FMC/GMC  - Pt will be able to complete 9HPT in < 1 min demo improved FMC and coordination. GOAL MET; 41 seconds   - Pt will demo improvement with pulling on trousers on Neuro QOL form to with no difficulty demo improved coordination and precision for FM tasks and ADLs GOAL MET  - Pt will be able to complete thumb opposition to all digits demo improved coordination and functional use  "of RUE.  PROGRESSING; difficulty to D5  - Pt will demo improved motor learning of constructional and new motor actions for improved b/l coordination and motor planning evident by 75% accuracy for fxnl ADL/IADL performance PROGRESSING    Functional Performance  - Pt will increase RUE to functional (dependent stabilizer, independent stabilizer, gross assist, semi-functional, functional, fully functional) assist with for ADL/IADL and tabletop tasks for improved functional performance of life roles and salient tasks PARTIALLY MET  - Pt will be able to sustain grasp on hairbrush in R hand for increased ability to complete hair combing and IADLs NEW GOAL 4/9/25    Modalities  - Pt will tolerate BIONESS/NMES/IASTM for improved motor and sensory performance for overall improved strength, tone reduction, and sensation to inc safety and engagement with ADL/IADL function GOAL MET      Subjective    PATIENT GOAL: \"Grab something, pull up my pants, wipe myself, and writing/signing my name\"    Patient-Specific Functional Scale   Task is scored 0 (unable to perform activity) to 10 (ability to perform activity independently)    Activity Date: Date:   ADLs 9    2.  Grasp/use of R hand     3.   ROM  Improved    4.   Strength and endurance         HISTORY OF PRESENT ILLNESS:     Pt is a 64 y.o. female who was referred to Occupational Therapy s/p Cerebrovascular accident (CVA), unspecified mechanism (HCC) [I63.9].  Pt reports that the day her stroke happened she was at work. She felt that the ground was moving but thought it could have been due to her forgetting her glasses's and straining her eyes. She also ended up throwing up multiple times and left work. When she got home she went to sleep. The next morning she still was not feeling right, called out of work and drove herself to the hospital because she felt weak overall. She continued to feel weak in the hospital but was still able to move her UE and LE. She then was sent for an " "MRI, and when she came back to get into bed is when she realized she was unable to and no longer had function of her R upper and lower extremity. She then spent 3 weeks at the [facility] and then 3 weeks with home care. She does not feel difficulty with sensation but rather just weak and heavy. Pt wears a shoulder subluxation splint for comfort but due to large size feels it is not doing much. At this time she is on medical leave from work as a . She currently does not drive. She lives with her daughter and her son occasionally comes in and out. Her daughter works full time and so does her son but he works from home. Pt reports that she does not have difficulty with bed mobility or toileting when using the commode. She is able to don and doff her pants off her hips but will sometimes need help. If she is alone, she has a dressing stick to help pull them up on her R side. She reports that she bathroom and shower are very small, and for the time being is showering at a friends house with a walk in/ADA shower due to a past family member who was using it. She will only use her bathroom toilet when someone is home to help her with hygiene due to small space, otherwise will use commode. She occasionally needs assistance with UB dressing for shirts but wears sun dresses at home that she reports are not difficult.     Per chart review  on 8/20/24, \"Ms. Maye Lilly is a 65 yo woman with a past medical history notable for HTN and atrial fibrillation who presented to the [facility] on 8/10/24 with acute dysarthria, facial droop and right hemiparesis. A stroke alert was activated, CT head wo contrast was negative for acute findings, CTA of the head/neck showed no evidence of large vessel occlusion and patent cervical carotid and vertebral arteries. MRI of the brain revealed an acute left pontine infarct. Course complicated by severe hypertension requiring multiple agents to obtain control.   - Acute left pontine ischemic " "infarct: Clinically presented on 8/10/24 with right hemiparesis, dysarthria and facial droop, Stroke etiology: HTN, small vessel disease, MR brain on 24 revealed a small area of acute/subacute ischemia within the central luz to the left of midline without acute hemorrhage\"      PMH:   Past Medical History:   Diagnosis Date    Acute CVA (cerebrovascular accident) (HCC) 2024    Atrial fibrillation (HCC)     Disease of thyroid gland     Hypertension          Pain Levels   Restin    With Activity:  0      Objective    Impairment Observations:            DANIEL Kruse           UPPER EXTREMITY FUNCTION   Impaired Intact Dominant Hand: R     /PINCH STRENGTH             Dynamometer    - Gross Grasp 21 lbs 60 lbs low  R- improved x 3 lbs  L- remained     Pinch Meter DNT 25    - Pincer 8 lbs  11 lbs low  L - remained   R- improved x 4 lbs    - Tripod 6.5 lbs 12 lbs low  R- improved x 1/2 lbs  L- remained     - Lateral 10 lbs   15 lbs low  R improved x 1.5 lb  L- improved      AROM (Seated)             Scapula   - Retraction/Protraction  - Elevation/Depression  - Upward/Downward rotation      Shoulder * WFL  Remained   Shoulder Ext Full WFL  Improved    Shoulder internal rotation  Full  Remained   Shoulder external rotation  3/4  Remained   Shoulder Abd 110* WFL  Remained   Shoulder Add Full WFL  Improved   Horizontal Abd   WFL     Horizontal Add   WFL     Elbow Flex Full  WFL     Elbow Ext Full   WFL  WFL   Pronation Full  WFL     Supination Near  Full  WFL  Remained   Wrist Flex 3/4  WFL  Remained   Wrist Ext 1/2   WFL  Improved   Digit Flex 3/4 WFL  Remained    Digit Ex Full WFL  Remained    Composite Grasp 3/4 WFL  Improved   Hook Grasp   WFL     Subluxation     None     AROM (Supine)      DNT 25      Shoulder *   improved   Shoulder Ext  Near full      Shoulder ABD 92*    Improved    Shoulder ADD        Horizontal ABD        Horizontal ADD        Elbow Flex  3/4      Elbow " Ext Full       Pronation Full       Supination Near full       Wrist Flex  Near full      Wrist Ext 3/4       Digit Flex 3/4       Digit Ext  Full        PROM (Supine position)    DISCONTINUE 5/21/25      Shoulder *   Slight pain   Shoulder Ext Full      Shoulder ABD 90*      Shoulder ADD Full       Horizontal ABD      Horizontal ADD        Elbow Flex 3/4       Elbow Ext Full       Wrist Flex Near Full       Wrist Ext Near Full        Digit Flex 3/4    Slight swelling but improved   Digit Ext Full     Supination 3/4     Pronation Full     Resting positin      MMT       all remained      Shoulder FF 4/5 5/5    Shoulder Ext 4/5 5/5    Shoulder Abduction 4/5 5/5    Shoulder Adduction 4/5 5/5    Elbow Flex 4+/5 5/5    Elbow Ext 4+/5 5/5    Wrist Flex 4/5 5/5    Wrist Ext 3+/5 5/5    Gross Grasp 3/5 5/5      SENSATION      Monofilament Testing  Normal: 2.83 mm    Diminished light touch: 3.61 mm    Diminished protective sensation: 4.31 mm    Loss of protective sensation: 4.56    Complete loss of sensation / deep pressure: 6.65 DNT DNT    Sharp / Dull       Proprioception      Hot/Cold Temp      Stereognosis         COORDINATION      Opposition WFL  D2-4  Slight graze and lateral aspect of D5  WFL Difficulty to oppose to D5   Finger to Nose Slowed WFL Able to complete with slight head compensation and slowed however improved movement overall   Rapid Alternating Movement Slowed, slightly limited supination WFL Improved  Supination to neutral   9 Hole Peg Test 39 sec  26 seconds abnormal  R- Improved x 2 seconds  Slight ataxia to board         Fxnl Dexterity Test 67 seconds      26 seconds abnormal  DNT  R - declined x 2 sec      Notice slight abduction of arm with placement, ataxia (subtle), compensation with occasional supination of forearm and using board     L- remained       Michael Hand Function Test         - Handwriting 44 seconds   ([place]      Good fluidity of movement; poor line  adherence and sizing  Improved time- remained overall with fluidity and sizing     TONE: MODIFIED CINDY SCALE      No increase in muscle tone (0) Biceps, triceps,wrist extensors, and digits flex/ext  Remained    Slight Increase in muscle tone with catch and release or min resist at end range (1)   Wrist flexors      Slight Increase in muscle tone with catch and release, followed by min resistance through remainder of range (1+)      Increased muscle tone through full range, able to be moved easily (2)      Considerable increase in tone, difficult to move (3)      Rigid in Flexion/Extension (4)                  Neuro QOL  Upper Extremity Function (Fine motor, ADL):     Are you able to turn a key in a lock? Without any difficulty  Are you able to brush your teeth? Without any difficulty  Are you able to make a phone call using a touch tone or key-pad? Without any difficulty  Are you able to  coins from a table top? Without any difficulty  Are you are able to write with a pen or pencil With a little difficulty- improved   Are you able to open and close a zipper? Without any difficulty- improved  Are you able to wash and dry your body? Without any difficulty  Are you able to shampoo your hair? Without any difficulty  Are you able to open previously opened jars? Without any difficulty- improved; if not tightly squeezed  Are you able to hold a plate full of food? Without any difficulty  Are you able to pull on trousers? Without any difficulty  Are you able to button your shirt? With a little difficulty  Are you able to trim your fingernails? Unable to do  Are you able to cut your toe nails? Unable to do  Are you able to bend down and  clothing from the floor? Without any difficulty  How much DIFFICULTY do you currently have using a spoon to eat a meal? No difficulty  How much DIFFICULTY do you currently have putting on a pullover shirt? No difficulty  How much DIFFICULTY do you currently have taking off a  pullover shirt? No difficulty- improved   How much DIFFICULTY do you currently have removing wrappings from small objects? No difficulty  How much DIFFUCULTY do you currently have opening medications or vitamin containers (e.g. childproof containers, small bottles)? No difficulty- improved; safety ones difficult, non safety no issue      OTHER PLANNED THERAPY INTERVENTIONS:   Supine, seated, and in stance neuro re-ed  Task-Oriented Training  Tricep AG  NMES/FES  Cryotherapy for tone reduction  Hot Packs for pain  TENS for pain  FMC/prehension  GMC  Proximal to distal teaming  Timed Trials  Manual tx  IASTM  Hand to target  Sensory re-ed (Cutaneous/Proprioceptive)  Seated functional reach: crossing midline  Supine place and hold  WBearing strategies   Closed chain activities  Open chain activities  Mirror Therapy  HEP  Orthotic Development

## 2025-05-21 NOTE — PROGRESS NOTES
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 Balance disorder  R26.89       3. Left pontine CVA (HCC)  I63.9           Start Time: 1115  Stop Time: 1200  Total time in clinic (min): 45 minutes    Subjective: Doing well today with no new c/o.      Objective: See treatment diary below    1:1 w/PT: [id number]  Group: [id number]  Self-directed:[id number]    Assessment: Progressed TM to no Solo today. Was able to complete longest TM bout yet. Continued to challenge R LE step amplitude with step-ups in // bars again progressed out of Solo.      Plan: Continue per plan of care.  Progress treatment as tolerated.       POC Expiration Date: 25        POC expires Unit limit Auth Expiration date PT/OT/ST + Visit Limit?   1/17/25    12/4/24     1/17/25    2/7/25      1/17/25    1/20/25      4/4/24    2/3/25     6/6/25  9/12/25     6/6/25    6/11/25     Visit/Unit Tracking  AUTH Status:  Date 5/15 5/21             Needs auth Used 2 3               Remaining  1 0                       Specialty Daily Treatment Diary  Precautions: Fall risk, past Hx R knee arthritis (can be stiff and swollen at the start of the day)      Manuals 5/21/25 4/29 5/5/25 5/8/25 5/14/25 5/15/25                                           Neuro Re-Ed     Progress assessment objective measure testing    // bars (16')  BUE  6x12\" hurdles  Alt LE step-to fwd  X1 lap        HIGT TM  No Solo  BUE  1.2 mph no grade  X6 min  1.x2 mph 3% grade  X10 min!    // bars UE PRN  Fwd step-ups R LE lead  2x10 6\"  2x10 8\" TM   No SOLO BUE   1.2 mph   5% fwd   X6 mins  X5 mins     No AD timed trials 1/2 lap with 1 turn   8 reps   Best: 38 seconds     Fwd/bwd shuttle around curve 3 cones/dots   X3 trials not timed     HKM x3 laps     [facility] under LLE no UE sit <> stand 2x10     Up and " "over 4\" merna   Solo    16' length  Marches  Fwd/bwd by length  2x1 lap    HHA PRN  Airex pad step-overs (5) with 4x6\" hurdles between   X2 laps fwd alt lead LE by length  X2 laps fwd less pressure with HHA  X2 laps fwd even less pressure with HHA    TM  Solo  BUE  1.3 mph 3% grade   X5 min  X5 min    Solo  SPC PRN  Shoe box step-over's (2) alt lead LE equal parts practice x4 laps fwd    Solo  Trainer Steps  6\" BUE PRN R LE lead ascend fwd L LE lead descend bwd  2X20     TM  Solo  BUE  1.3 mph 3% grade  X6 min  X6 min    Solo  SPC PRN  Shoe box step-over's (2) alt lead LE equal parts practice x4 laps fwd    6x4-6\" hurdles   3x1 lap lat    4-Square Step Test practice  Solo  No AD  2x3 rounds          hurdles  Solo step   3# R AW (attempted x 1/2 lap)    0# R AW     R LE fwd leading  X 3 laps     L LE fwd leading  X 2 laps  HHA PRN    Lat x 1 lap ea HHA              step taps  Solo   Standing airex  12in step   Attempted    8in box  R x 20   L x 20   Alt x 15          R Knee Control                                       Ther Ex    Progress assessment objective measure testing      Nu step                                                             RE/outcome measures                    Ther Activity                             Gait Training          Indoors                   Modalities                                                                                                                         "

## 2025-05-21 NOTE — TELEPHONE ENCOUNTER
Emailed disability jefe seb. To [email or web address] to sign before he leaves for PTO.

## 2025-05-22 ENCOUNTER — OFFICE VISIT (OUTPATIENT)
Dept: PHYSICAL THERAPY | Facility: CLINIC | Age: 65
End: 2025-05-22
Payer: COMMERCIAL

## 2025-05-22 ENCOUNTER — OFFICE VISIT (OUTPATIENT)
Dept: OCCUPATIONAL THERAPY | Facility: CLINIC | Age: 65
End: 2025-05-22
Payer: COMMERCIAL

## 2025-05-22 DIAGNOSIS — R26.89 BALANCE DISORDER: ICD-10-CM

## 2025-05-22 DIAGNOSIS — R26.9 NEUROLOGIC GAIT DISORDER: Primary | ICD-10-CM

## 2025-05-22 DIAGNOSIS — I63.9 LEFT PONTINE CVA (HCC): ICD-10-CM

## 2025-05-22 DIAGNOSIS — I63.9 CEREBROVASCULAR ACCIDENT (CVA), UNSPECIFIED MECHANISM (HCC): Primary | ICD-10-CM

## 2025-05-22 PROCEDURE — 97150 GROUP THERAPEUTIC PROCEDURES: CPT

## 2025-05-22 PROCEDURE — 97110 THERAPEUTIC EXERCISES: CPT

## 2025-05-22 PROCEDURE — 97112 NEUROMUSCULAR REEDUCATION: CPT

## 2025-05-22 PROCEDURE — 97530 THERAPEUTIC ACTIVITIES: CPT

## 2025-05-22 RX ORDER — NIFEDIPINE 60 MG/1
1 TABLET, EXTENDED RELEASE ORAL DAILY
COMMUNITY
Start: 2025-04-28

## 2025-05-22 NOTE — PROGRESS NOTES
"Daily Note     Today's date: 2025  Patient name: Maye Lilly  : 1960  MRN: [id number]  Referring provider: Megan Portillo PA-C  Dx:   Encounter Diagnosis     ICD-10-CM    1. 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 Balance disorder  R26.89       3. Left pontine CVA (HCC)  I63.9           Start Time: 1100  Stop Time: 1145  Total time in clinic (min): 45 minutes    Subjective: Doing well today! Nothing new to report.       Objective: See treatment diary below    Self-directed: [id number]  Group: [id number]  1:1 w/PT: [id number]    Assessment: Progressed balance demands with R UE HHA PRN over varied step height and surface combinations. Was able to reduce HHA pressure with firm surface 6\" step but still requires this contact and greater pressure to leverage R LE and for improved balance for taller steps and compliant surfaces.      Plan: Continue per plan of care.  Progress treatment as tolerated.       POC Expiration Date: 25        POC expires Unit limit Auth Expiration date PT/OT/ST + Visit Limit?   1/17/25    12/4/24     1/17/25    2/7/25      1/17/25    1/20/25      4/4/24    2/3/25     6/6/25  9/12/25     6/6/25    6/11/25     Visit/Unit Tracking  AUTH Status:  Date 5/15 5/21 5/22            Needs auth Used 2 3 4              Remaining  1 0 -1                      Specialty Daily Treatment Diary  Precautions: Fall risk, past Hx R knee arthritis (can be stiff and swollen at the start of the day)      Manuals 5/21/25 5/22/25 5/5/25 5/8/25 5/14/25 5/15/25                                           Neuro Re-Ed     Progress assessment objective measure testing    // bars (16')  BUE  6x12\" hurdles  Alt LE step-to fwd  X1 lap        HIGT TM  No Solo  BUE  1.2 mph no grade  X6 min  1.x2 mph 3% grade  X10 min!    // bars UE PRN  Fwd step-ups R LE lead  2x10 6\"  2x10 8\" TM  No Solo  BUE  1.2 mph no grade  X10 min    Solo  R HHA PRN  Airex to 8\" step to firm floor to 6\" step to firm floor to Airex on 4\" " "step to firm floor lead alt LE/length  2x2 laps Solo    16' length  Marches  Fwd/bwd by length  2x1 lap    HHA PRN  Airex pad step-overs (5) with 4x6\" hurdles between   X2 laps fwd alt lead LE by length  X2 laps fwd less pressure with HHA  X2 laps fwd even less pressure with HHA    TM  Solo  BUE  1.3 mph 3% grade   X5 min  X5 min    Solo  SPC PRN  Shoe box step-over's (2) alt lead LE equal parts practice x4 laps fwd    Solo  Trainer Steps  6\" BUE PRN R LE lead ascend fwd L LE lead descend bwd  2X20     TM  Solo  BUE  1.3 mph 3% grade  X6 min  X6 min    Solo  SPC PRN  Shoe box step-over's (2) alt lead LE equal parts practice x4 laps fwd    6x4-6\" hurdles   3x1 lap lat    4-Square Step Test practice  Solo  No AD  2x3 rounds          hurdles          step taps          R Knee Control                                       Ther Ex  STS 6# MB BUE x30 from lo-table  Progress assessment objective measure testing      Nu step                                                             RE/outcome measures                    Ther Activity                             Gait Training          Indoors                   Modalities                                                                                                                           "

## 2025-05-22 NOTE — PROGRESS NOTES
"Occupational Therapy Daily Note:    Today's date: 2025  Patient name: Maye Lilly  : 1960  MRN: [id number]  Referring provider: Megan Portillo PA-C  Dx:   Encounter Diagnosis   Name Primary?    Cerebrovascular accident (CVA), unspecified mechanism (HCC) Yes       Start Time: 1015  Stop Time: 1100  Total time in clinic (min): 45 minutes    3/28/25 4 NRE 25 8   3/28/25    3/6/25 6 approved    25 8 Approved                                 AUTH Date      8 approved Used 1  2  3  4  5  6  7       Remaining  7  6  5  4  3  2  1           Visit/Unit Tracking  AUTH Status:  Date 5/14 5/15 5/22           TBD Used 8 1 2            Remaining  0 TBD TBD                   Duration in weeks: 8 weeks, 2-3x  Plan of care beginning date: 25  Plan of care expiration date: 25  RE-EVAL: 25     Precautions: HTN, GERD, R shoulder pain    Subjective: \"This goes better if I stabilize my thumb.  It goes up by itself then.\"  (Extension band exercise)    Objective: See treatment below.  Session focusing motor skills, UE strength, stability and overall activity endurance improving indep in ADL/IADL mngt.    Neuro Re-ed: Pt activity elevated on slant board focusing on fxl reaching, coordination, and intrinsic strengthening w/fxl tool use.  Pt instructed to insert toothpicks into foam mat>small straws over toothpicks>small circular images placed over straw using tweezers for placement. Pt noted with droppage <25% of time improving with reps.     Thera Ex:  Pt engaged in therex using dark green flex bar in pronation, green flex bar in supination (30 reps)followed by blue flex ring and green extension band w/5 sec hold completing 15 reps focusing on intrinsic strengthening and  strength.    Assessment: Tolerated treatment well. F target accuracy during straw placement, improvement in fm placement of small rings over straws using tweezers w/repetitions.  " Patient would benefit from continued skilled OT.    Plan: Continued skilled OT per POC

## 2025-05-27 ENCOUNTER — HOSPITAL ENCOUNTER (OUTPATIENT)
Dept: RADIOLOGY | Facility: HOSPITAL | Age: 65
Discharge: HOME | End: 2025-05-27
Attending: ORTHOPAEDIC SURGERY
Payer: COMMERCIAL

## 2025-05-27 ENCOUNTER — OFFICE VISIT (OUTPATIENT)
Dept: OBGYN CLINIC | Facility: HOSPITAL | Age: 65
End: 2025-05-27
Payer: COMMERCIAL

## 2025-05-27 VITALS — HEIGHT: 65 IN | WEIGHT: 258 LBS | BODY MASS INDEX: 42.99 KG/M2

## 2025-05-27 DIAGNOSIS — M17.11 PRIMARY OSTEOARTHRITIS OF RIGHT KNEE: Primary | ICD-10-CM

## 2025-05-27 DIAGNOSIS — Z86.73 HISTORY OF CVA (CEREBROVASCULAR ACCIDENT): ICD-10-CM

## 2025-05-27 DIAGNOSIS — G89.29 CHRONIC PAIN OF RIGHT KNEE: ICD-10-CM

## 2025-05-27 DIAGNOSIS — M25.561 CHRONIC PAIN OF RIGHT KNEE: ICD-10-CM

## 2025-05-27 DIAGNOSIS — M25.561 RIGHT KNEE PAIN, UNSPECIFIED CHRONICITY: ICD-10-CM

## 2025-05-27 PROCEDURE — 99204 OFFICE O/P NEW MOD 45 MIN: CPT | Performed by: ORTHOPAEDIC SURGERY

## 2025-05-27 PROCEDURE — 73562 X-RAY EXAM OF KNEE 3: CPT

## 2025-05-27 PROCEDURE — 20610 DRAIN/INJ JOINT/BURSA W/O US: CPT

## 2025-05-27 RX ORDER — LIDOCAINE HYDROCHLORIDE 10 MG/ML
4 INJECTION, SOLUTION INFILTRATION; PERINEURAL
Status: COMPLETED | OUTPATIENT
Start: 2025-05-27 | End: 2025-05-27

## 2025-05-27 RX ORDER — BETAMETHASONE SODIUM PHOSPHATE AND BETAMETHASONE ACETATE 3; 3 MG/ML; MG/ML
12 INJECTION, SUSPENSION INTRA-ARTICULAR; INTRALESIONAL; INTRAMUSCULAR; SOFT TISSUE
Status: COMPLETED | OUTPATIENT
Start: 2025-05-27 | End: 2025-05-27

## 2025-05-27 RX ADMIN — LIDOCAINE HYDROCHLORIDE 4 ML: 10 INJECTION, SOLUTION INFILTRATION; PERINEURAL at 08:45

## 2025-05-27 RX ADMIN — BETAMETHASONE SODIUM PHOSPHATE AND BETAMETHASONE ACETATE 12 MG: 3; 3 INJECTION, SUSPENSION INTRA-ARTICULAR; INTRALESIONAL; INTRAMUSCULAR; SOFT TISSUE at 08:45

## 2025-05-27 NOTE — PROGRESS NOTES
Name: Maye Lilly      : 1960       MRN: [id number]   Encounter Provider: Héctor Chu MD   Encounter Date: 25  Encounter department: [facility]     ASSESSMENT & PLAN:  Assessment & Plan  Primary osteoarthritis of right knee  Known osteoarthritis of the right knee, with last form of treatment about 8 to 10 years ago  X-rays taken and examined today indicate significant tricompartmental osteoarthritis and varus alignment  Diagnostics reviewed and physical exam performed.  Diagnosis, treatment options and associated risks were discussed with the patient including no treatment, nonsurgical treatment and potential for surgical intervention.  The patient was given the opportunity to ask questions regarding each.   Treatment options discussed in detail with patient including injections of corticosteroid, viscosupplementation injections, and ultimately total knee arthroplasty  Patient wishes to pursue elective right total knee arthroplasty in the future however at this point in time she wishes to restart injection therapy  Educated in detail regarding guidelines in order to become a candidate for TKA including BMI at 40.0 or below (currently 43.6) to which she expressed understanding  Offered, accepted, provided with injection of corticosteroid to the right knee today which she tolerated well  Prior authorization initiated today for viscosupplementation to be administered to the right knee in about 3 months time  Continue weightbearing activities as tolerated  Orders:  •  Large joint arthrocentesis: R knee  •  Injection Procedure Prior Authorization; Future  •  lidocaine (XYLOCAINE) 1 % injection 4 mL  •  betamethasone acetate-betamethasone sodium phosphate (CELESTONE) injection 12 mg    Chronic pain of right knee    Orders:  •  Large joint arthrocentesis: R knee  •  Injection Procedure Prior Authorization; Future  •  lidocaine (XYLOCAINE) 1 % injection 4  mL  •  betamethasone acetate-betamethasone sodium phosphate (CELESTONE) injection 12 mg    History of CVA (cerebrovascular accident)              To do next visit:  Return in about 3 months (around 8/27/2025) for Right knee.    _____________________________________________________  CHIEF COMPLAINT:  Chief Complaint   Patient presents with   • Right Knee - Pain         SUBJECTIVE:  Maye Lilly is a 64 y.o. female who presents for initial evaluation of her right knee.  Patient has a history of osteoarthritis of the right knee which she explains injections of corticosteroid and viscosupplementation about 8 to 10 years ago.  Past medical history significant for CVA in August 2024 which primarily affected her right side.  She explains continued motor deficit of the right upper and lower extremities however continues working with physical therapy and making good progress.  X-rays taken and examined today indicate tricompartmental arthritis of the right knee.  Treatment options discussed in detail including injections of corticosteroid, viscosupplementation injections, and ultimately right total knee arthroplasty.  Patient is hopeful for TKA in the future however at this point in time she wishes to continue regaining her strength as well as working on her weight in order to become a candidate for surgery.  In the meantime today, she was offered, accepted, provided with injection of corticosteroid to the right knee which she tolerated well.  Prior authorization was initiated for viscosupplementation to be administered to the right knee in about 3 months time.  Pain score 7/10      PAST MEDICAL HISTORY:  Past Medical History[1]    PAST SURGICAL HISTORY:  Past Surgical History[2]    FAMILY HISTORY:  Family History[3]    SOCIAL HISTORY:  Social History[4]    MEDICATIONS:  Current Medications[5]    ALLERGIES:  Allergies[6]    LABS:  HgA1c:   Lab Results   Component Value Date    HGBA1C 5.8 (H) 08/11/2024     BMP:   Lab  "Results   Component Value Date    CALCIUM 9.7 09/12/2024    K 3.7 09/12/2024    CO2 26 09/12/2024     09/12/2024    BUN 16 09/12/2024    CREATININE 0.61 09/12/2024     CBC: No components found for: \"CBC\"    _____________________________________________________  PHYSICAL EXAMINATION:  Vital signs: Ht 5' 4.5\" (1.638 m)   Wt 117 kg (258 lb)   BMI 43.60 kg/m²   General: No acute distress, awake and alert  Psychiatric: Mood and affect appear appropriate  HEENT: Trachea Midline, No torticollis, no apparent facial trauma  Cardiovascular: No audible murmurs; Extremities appear perfused  Pulmonary: No audible wheezing or stridor  Skin: No open lesions; see further details (if any) below    MUSCULOSKELETAL EXAMINATION:  Right Knee Exam     Tenderness   The patient is experiencing tenderness in the lateral joint line and medial joint line.    Range of Motion   Extension:  5   Flexion:  100 (with crepitation and stiffness)     Other   Erythema: absent  Scars: absent  Effusion: no effusion present    Comments:  Extensor mechanism intact  3+ quadricept strength   2+ hamstring strength   2+ hip flexor strength               ___________________________________________________  STUDIES REVIEWED:  I personally reviewed the images obtained in office today and my independent interpretation is as follows:    Right knee x-ray:  Significant tricompartmental osteoarthritis with degenerative changes   Varus alignment with bone-on-bone articulation medially  Bone-on-bone articulation of patellofemoral compartment    Subchondral sclerosis and multiple osteophytes noted        PROCEDURES PERFORMED:    Large joint arthrocentesis: R knee    Performed by: Hemalatha Eric PA-C  Authorized by: Hemalatha Eric PA-C    Universal Protocol:  Consent: Verbal consent obtained  Risks and benefits: risks, benefits and alternatives were discussed  Consent given by: patient  Site marked: the operative site was marked  Supporting Documentation  Indications: " pain     Is this a Visco injection? NoProcedure Details  Location: knee - R knee  Needle size: 22 G  Approach: anterolateral  Medications administered: 12 mg betamethasone acetate-betamethasone sodium phosphate 6 (3-3) mg/mL; 4 mL lidocaine 1 %    Patient tolerance: patient tolerated the procedure well with no immediate complications  Dressing:  Sterile dressing applied            Hemalatha Eric PA-C         [1]  Past Medical History:  Diagnosis Date   • Acute CVA (cerebrovascular accident) (HCC) 2024   • Atrial fibrillation (HCC)    • Disease of thyroid gland    • Hypertension    [2]  Past Surgical History:  Procedure Laterality Date   •  SECTION     • FOOT SURGERY      Remove piece of glass   [3]  No family history on file.[4]  Social History  Tobacco Use   • Smoking status: Never   • Smokeless tobacco: Never   Vaping Use   • Vaping status: Never Used   Substance Use Topics   • Alcohol use: Yes     Comment: Rare since stroke   • Drug use: Never   [5]    Current Outpatient Medications:   •  NIFEdipine (PROCARDIA XL) 60 mg 24 hr tablet, Take 1 tablet by mouth in the morning, Disp: , Rfl:   •  acetaminophen (TYLENOL) 325 mg tablet, Take 2 tablets (650 mg total) by mouth every 6 (six) hours as needed for mild pain, Disp: , Rfl:   •  atorvastatin (LIPITOR) 40 mg tablet, Take 1 tablet (40 mg total) by mouth every evening, Disp: 30 tablet, Rfl: 0  •  carvedilol (COREG) 25 mg tablet, Take 1 tablet (25 mg total) by mouth 2 (two) times a day with meals, Disp: 60 tablet, Rfl: 0  •  Diclofenac Sodium (VOLTAREN) 1 %, Apply 2 g topically 4 (four) times a day as needed (right shoulder and right knee pain), Disp: , Rfl:   •  hydrALAZINE (APRESOLINE) 25 mg tablet, Take 3 tablets (75 mg total) by mouth every 8 (eight) hours, Disp: 90 tablet, Rfl: 0  •  levothyroxine 25 mcg tablet, Take 1 tablet (25 mcg total) by mouth daily in the early morning, Disp: 30 tablet, Rfl: 0  •  losartan (COZAAR) 100 MG tablet, Take 1 tablet  (100 mg total) by mouth daily, Disp: 30 tablet, Rfl: 0  •  NIFEdipine (PROCARDIA XL) 30 mg 24 hr tablet, Take 2 tablets (60 mg total) by mouth daily with dinner, Disp: 30 tablet, Rfl: 0  •  rivaroxaban (XARELTO) 20 mg tablet, Take 1 tablet (20 mg total) by mouth daily with dinner, Disp: 30 tablet, Rfl: 0  •  spironolactone (ALDACTONE) 25 mg tablet, Take 1 tablet (25 mg total) by mouth daily, Disp: 30 tablet, Rfl: 0  No current facility-administered medications for this visit.[6]  No Known Allergies

## 2025-05-28 ENCOUNTER — OFFICE VISIT (OUTPATIENT)
Dept: PHYSICAL THERAPY | Facility: CLINIC | Age: 65
End: 2025-05-28
Payer: COMMERCIAL

## 2025-05-28 ENCOUNTER — APPOINTMENT (OUTPATIENT)
Dept: OCCUPATIONAL THERAPY | Facility: CLINIC | Age: 65
End: 2025-05-28
Payer: COMMERCIAL

## 2025-05-28 DIAGNOSIS — R26.89 BALANCE DISORDER: ICD-10-CM

## 2025-05-28 DIAGNOSIS — I63.9 LEFT PONTINE CVA (HCC): ICD-10-CM

## 2025-05-28 DIAGNOSIS — R26.9 NEUROLOGIC GAIT DISORDER: Primary | ICD-10-CM

## 2025-05-28 PROCEDURE — 97110 THERAPEUTIC EXERCISES: CPT

## 2025-05-28 PROCEDURE — 97112 NEUROMUSCULAR REEDUCATION: CPT

## 2025-05-28 NOTE — PROGRESS NOTES
"Daily Note     Today's date: 2025  Patient name: Maye Lilly  : 1960  MRN: [id number]  Referring provider: Megan Portillo PA-C  Dx:   Encounter Diagnosis     ICD-10-CM    1. 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 Balance disorder  R26.89       3. Left pontine CVA (HCC)  I63.9           Start Time: 1015  Stop Time: 1100  Total time in clinic (min): 45 minutes    Subjective: Doing well today! Saw her orthopedic team for R knee corticosteroid injection yesterday - says she is happy about how much better it feels!      Objective: See treatment diary below    1:1 w/PT for session duration.    Assessment: Improvements noted in step negotiation but challenges seen in balance when ascending with L LE fwd/descending R LE bwd due to extensor posturing of R LE/lack of increased R knee flexion. Discussed programming strategy to work on this via HEP.      Plan: Continue per plan of care.  Progress treatment as tolerated.   TM 1.2 mph 3% grade, lateral 4\" and 6\" step-ups next session.     POC Expiration Date: 25        POC expires Unit limit Auth Expiration date PT/OT/ST + Visit Limit?   1/17/25    12/4/24     1/17/25    2/7/25      1/17/25    1/20/25      4/4/24    2/3/25     6/6/25  9/12/25     6/6/25    6/11/25     Visit/Unit Tracking  AUTH Status:  Date 5/15 5/21 5/22 5/28           Needs auth Used 2 3 4 5             Remaining  1 0 -1 -2                     Specialty Daily Treatment Diary  Precautions: Fall risk, past Hx R knee arthritis (can be stiff and swollen at the start of the day)      Manuals 5/21/25 5/22/25 5/28/25 5/8/25 5/14/25 5/15/25                                           Neuro Re-Ed     Progress assessment objective measure testing    // bars (16')  BUE  6x12\" hurdles  Alt LE step-to fwd  X1 lap        HIGT TM  No Solo  BUE  1.2 mph no grade  X6 min  1.x2 mph 3% grade  X10 min!    // bars UE PRN  Fwd step-ups R LE lead  2x10 6\"  2x10 8\" TM  No Solo  BUE  1.2 mph no grade  X10 " "min    Solo  R HHA PRN  Airex to 8\" step to firm floor to 6\" step to firm floor to Airex on 4\" step to firm floor lead alt LE/length  2x2 laps TM  No Solo  BUE  1.0 mph 3% grade   X10 min    Solo  R HHA PRN    Solo  L UE SPC PRN  6\" step up/down  Airex to 7.5\" step to airex up/down  Alt lead LE ascend/descend fwd  2x2 laps fwd    6\" box SPC PRN lead L LE step-ups fwd ascend/lead R LE retro descend  x10     Lateral step ups SPC PRN  X3/side  TM  Solo  BUE  1.3 mph 3% grade   X5 min  X5 min    Solo  SPC PRN  Shoe box step-over's (2) alt lead LE equal parts practice x4 laps fwd    Solo  Trainer Steps  6\" BUE PRN R LE lead ascend fwd L LE lead descend bwd  2X20     TM  Solo  BUE  1.3 mph 3% grade  X6 min  X6 min    Solo  SPC PRN  Shoe box step-over's (2) alt lead LE equal parts practice x4 laps fwd    6x4-6\" hurdles   3x1 lap lat    4-Square Step Test practice  Solo  No AD  2x3 rounds          hurdles          step taps          R Knee Control                                       Ther Ex  STS 6# MB BUE x30 from lo-table  Progress assessment objective measure testing      Nu step                                                             RE/outcome measures                    Ther Activity                             Gait Training          Indoors                   Modalities                                                                                                                             "

## 2025-05-29 ENCOUNTER — TELEPHONE (OUTPATIENT)
Dept: NEUROLOGY | Facility: CLINIC | Age: 65
End: 2025-05-29

## 2025-05-29 ENCOUNTER — APPOINTMENT (OUTPATIENT)
Dept: PHYSICAL THERAPY | Facility: CLINIC | Age: 65
End: 2025-05-29
Payer: COMMERCIAL

## 2025-05-29 ENCOUNTER — OFFICE VISIT (OUTPATIENT)
Dept: OCCUPATIONAL THERAPY | Facility: CLINIC | Age: 65
End: 2025-05-29
Payer: COMMERCIAL

## 2025-05-29 DIAGNOSIS — I63.9 CEREBROVASCULAR ACCIDENT (CVA), UNSPECIFIED MECHANISM (HCC): Primary | ICD-10-CM

## 2025-05-29 PROCEDURE — 97110 THERAPEUTIC EXERCISES: CPT

## 2025-05-29 NOTE — PROGRESS NOTES
"Occupational Therapy Daily Note:    Today's date: 2025  Patient name: Maye Lilly  : 1960  MRN: [id number]  Referring provider: Megan Portillo PA-C  Dx:   Encounter Diagnosis   Name Primary?    Cerebrovascular accident (CVA), unspecified mechanism (HCC) Yes             3/28/25 4 NRE 25 8   3/28/25    3/6/25 6 approved    25 8 Approved                                 AUTH Date      8 approved Used 1  2  3  4  5  6  7       Remaining  7  6  5  4  3  2  1           Visit/Unit Tracking  AUTH Status:  Date 5/14 5/15 5/21 5/22 5/29         TBD Used 8 1 2 3 4          Remaining  0 3 2 1 0                 Duration in weeks: 8 weeks, 2-3x  Plan of care beginning date: 25  Plan of care expiration date: 25  RE-EVAL: 25     Precautions: HTN, GERD, R shoulder pain     Subjective: \"I think I can join the gym with med B.  I'm going to check that out.\"    Objective: See treatment below.  Session focusing motor skills, UE strength, stability and overall activity endurance improving indep in ADL/IADL mngt.    Thera Ex: Pt engaged in therex using dark green flex bar in pronation, green flex bar in supination (30 reps)followed by blue flex ring and green extension band w/5 sec hold completing 15 reps focusing on intrinsic strengthening and  strength.      Putty pull using thin dowel to move green putty around to locate small beads focusing on intrinsic strength.  Pt stabilized dowel in hand to shift putty and locate beads, keeping dowel in hand pt removed beads with right hand, <25% droppage.     Assessment: Tolerated treatment well. Handout provided to pt regarding hand exercising/strengthening kit for carryover at home. Pt able to sustain  on thin dowel while digging into putty as well as grasp dowel and retrieve beads.  Patient would benefit from continued skilled OT.    Plan: Continued skilled OT per POC    "

## 2025-05-29 NOTE — TELEPHONE ENCOUNTER
Pt stopped into office to see if placard paperwork completed yet. Looks like it was sent over to be signed on 5/21 but does not look completed yet. Pt asked if we could give a call or mychart message when it is completed.

## 2025-06-10 ENCOUNTER — OFFICE VISIT (OUTPATIENT)
Age: 65
End: 2025-06-10
Payer: COMMERCIAL

## 2025-06-10 ENCOUNTER — TELEPHONE (OUTPATIENT)
Age: 65
End: 2025-06-10

## 2025-06-10 VITALS
DIASTOLIC BLOOD PRESSURE: 68 MMHG | TEMPERATURE: 98 F | BODY MASS INDEX: 43.43 KG/M2 | WEIGHT: 257 LBS | OXYGEN SATURATION: 97 % | SYSTOLIC BLOOD PRESSURE: 122 MMHG | HEART RATE: 84 BPM

## 2025-06-10 DIAGNOSIS — I63.9 LEFT PONTINE STROKE (HCC): ICD-10-CM

## 2025-06-10 DIAGNOSIS — G81.91 RIGHT HEMIPARESIS (HCC): ICD-10-CM

## 2025-06-10 DIAGNOSIS — Z74.09 IMPAIRED MOBILITY: ICD-10-CM

## 2025-06-10 DIAGNOSIS — R26.9 NEUROLOGIC GAIT DYSFUNCTION: ICD-10-CM

## 2025-06-10 DIAGNOSIS — Z86.73 HISTORY OF STROKE: Primary | ICD-10-CM

## 2025-06-10 PROCEDURE — 99214 OFFICE O/P EST MOD 30 MIN: CPT | Performed by: PHYSICAL MEDICINE & REHABILITATION

## 2025-06-10 NOTE — PROGRESS NOTES
"Physical Medicine & Rehabilitation Follow-up Evaluation  Maye Lilly 64 y.o. female  ?  REASON FOR CONSULTATION: Functional evaluation, post-stroke, post-ARC  ?  PRINCIPAL NEUROLOGIC DIAGNOSIS: Left pontine ischemic stroke  ?  INITIAL PRESENTING HISTORY OF ILLNESS:   Ms. Villavicencio \"Demario\" Vijaya is a 63 yo woman with a history of HTN and atrial fibrillation who suffered an acute, ischemic stroke involving her left luz on 8/10/24. Her stroke was secondary to HTN and small vessel disease. She was admitted to the [facility] for post-stroke acute rehabilitation from 8/10/24 to 9/16/24, at which time she was discharged home.     General function:     - Equipment/supplies: SPC for in-home and community ambulation; high-rise commode (used only PRN at night-time for emergencies); transport WC for long-distance community mobility; LE sock donning tool  - Function: Independent with mobility, transfers and ADLs  - Therapies: Outpatient PT and OT paused until September  - Splinting: None currently  - Work: Not currently  - Driving: Not currently, interested in returning  - Pain: No current concerns, sees orthopedic surgery for her right knee arthritis which has resulted in some pain as her ambulatory function as improving; on ARC had some issues with right shoulder discomfort, currently no pain or concerns, improved  - Bowel: No current concerns  - Bladder: No current concerns  - Skin: No current concerns; no pressure injury concerns; no breakdown  - Sleep: No current concerns  - Referrals/follow-ups: Neurovascular neurology pending; completed follow-up with PCP and cardiology    Safety concerns regarding living situations and safety at home: None currently  Risk of falls: Yes, but no falls since time of discharge, no injuries     Interval History:  She is overall doing very well and is happy about her ongoing recovery and progress.     Completed fitness to drive evaluation on 4/24/25 with DCAT score of 37 indicating low risk, " and discussed a slow, graduated return to driving based on this result. Today, we discussed her subsequent return to driving which has gone very well, she is feeling confident.     She saw orthopedic surgery for right knee pain secondary to primary osteoarthritis. She is s/p CSI of the right knee on 25 and is planning to follow-up with visco-supplementation in the future. The CSI was very helpful, she reports.     She has not established with an orthotic vendor yet, discussed planning for this.     She is also about to move back to her own home and she is very excited about this.   ?  Review of Diagnostic Studies:  No new relevant imaging to review.   ?  ?   Past Medical History:   Diagnosis Date    Acute CVA (cerebrovascular accident) (HCC) 2024    Atrial fibrillation (HCC)     Disease of thyroid gland     Hypertension      Past Surgical History:   Procedure Laterality Date     SECTION      FOOT SURGERY      Remove piece of glass     Social History     Socioeconomic History    Marital status:      Spouse name: Not on file    Number of children: Not on file    Years of education: Not on file    Highest education level: Not on file   Occupational History    Not on file   Tobacco Use    Smoking status: Never    Smokeless tobacco: Never   Vaping Use    Vaping status: Never Used   Substance and Sexual Activity    Alcohol use: Yes     Comment: Rare since stroke    Drug use: Never    Sexual activity: Not on file   Other Topics Concern    Not on file   Social History Narrative    Not on file     Social Drivers of Health     Financial Resource Strain: Low Risk  (8/10/2024)    Received from [facility]    Overall Financial Resource Strain (CARDIA)     Difficulty of Paying Living Expenses: Not hard at all   Food Insecurity: No Food Insecurity (2024)    Nursing - Inadequate Food Risk Classification     Worried About Running Out of Food in the Last Year: Never true     Ran Out of  Food in the Last Year: Never true     Ran Out of Food in the Last Year: Not on file   Transportation Needs: No Transportation Needs (10/3/2024)    OASIS : Transportation     Lack of Transportation (Medical): No     Lack of Transportation (Non-Medical): No     Patient Unable or Declines to Respond: No   Physical Activity: Not on file   Stress: Not on file   Social Connections: Not on file   Intimate Partner Violence: Not At Risk (8/10/2024)    Received from [facility]    Humiliation, Afraid, Rape, and Kick questionnaire     Within the last year, have you been afraid of your partner or ex-partner?: No     Within the last year, have you been humiliated or emotionally abused in other ways by your partner or ex-partner?: No     Within the last year, have you been kicked, hit, slapped, or otherwise physically hurt by your partner or ex-partner?: No     Within the last year, have you been raped or forced to have any kind of sexual activity by your partner or ex-partner?: No   Housing Stability: Low Risk  (9/16/2024)    Housing Stability Vital Sign     Unable to Pay for Housing in the Last Year: No     Number of Times Moved in the Last Year: 0     Homeless in the Last Year: No   Recent Concern: Housing Stability - High Risk (8/10/2024)    Received from [facility]    Housing Stability Vital Sign     In the last 12 months, was there a time when you were not able to pay the mortgage or rent on time?: Yes     In the past 12 months, how many times have you moved where you were living?: 1     At any time in the past 12 months, were you homeless or living in a shelter (including now)?: No     No family history on file.    PHYSICAL EXAMINATION:  /68 (BP Location: Right arm, Patient Position: Sitting, Cuff Size: Large)   Pulse 84   Temp 98 °F (36.7 °C) (Temporal)   Wt 117 kg (257 lb)   SpO2 97%   BMI 43.43 kg/m²     Gen: No acute distress, Well-nourished, well-appearing.  HEENT:  Moist mucus membranes, Normocephalic/Atraumatic  Heme/Extr: No edema/clubbing/cyanosis  Pulmonary: Non-labored breathing on room air  GI: non-distended.   Integumentary: Skin is warm, dry. No rashes or ulcers.  Neuro:    Cognitive/Behavioral: The patient was alert and oriented with normal language, memory, and praxis. Formal neuropsychological testing was not performed today. The patient did not exhibit signs of pathological anxiety or depression during the interview and examination.  ?  Cranial Nerves II-XII grossly intact  ?  Exam  MMT (R/L): B 4/5; T 4/5; WE 4/5; FAbd 4/5; FF 4/5; HF 4/5; KE 4/5; DF 4/5; PF 4/5    Muscle tone:  No increased muscle tone in the right hemibody.   ?  Sensory: Light touch intact throughout the right upper and lower extremity.   ?  Gait: Standard gait was observed. SPC was required for safe ambulation. SPC held with the LUE. A right hemiparetic gait is observed, with slightly slow maria t, mildly prolonged right sided swing phase as well as prolonged stance phase on the left. Good heel-toe progression, no toe-catching on right sided swing phase. Hips and knees appear level.     ASSESSMENT:  Diagnoses and all orders for this visit:    History of stroke  -     Ambulatory referral to Orthotist/Prosthetist; Future    Impaired mobility  -     Ambulatory referral to Orthotist/Prosthetist; Future    Right hemiparesis (HCC)  -     Ambulatory referral to Orthotist/Prosthetist; Future    Left pontine stroke (HCC)  -     Ambulatory referral to Orthotist/Prosthetist; Future    Neurologic gait dysfunction  -     Ambulatory referral to Orthotist/Prosthetist; Future      PLAN:  1. Oral antispasticity medications: None recommended at this time.  2. Physical/occupational therapy: Continue outpatient PT and OT as per her schedule. Discharged from [facility] after reaching long-term goals.   3. Splinting/Bracing: Continue to recommend trial of RLE light-weight, carbon fiber, single-strut AFO for assistance with  steps/hurdles/uneven surfaces as well as fatigue-related foot drop (in the setting of right hemiparesis) with prolonged ambulation/long community distances. New referral/order placed today as she has not yet established with orthotic vendor.   4. Disability parking placard application completed and to be faxed today.   5. We discussed the Medicare [employer] program, as she would like to attend her local [facility] for more exercise.     Return in about 1 year (around 6/10/2026).  ?  *I have spent 30 minutes with Patient today in which greater than 50% of this time was spent in counseling/coordination of care regarding Prognosis, Patient and family education, Importance of tx compliance, Risk factor reductions, Impressions, Counseling / Coordination of care, Documenting in the medical record, Reviewing / ordering tests, medicine, procedures  , Obtaining or reviewing history  , and Communicating with other healthcare professionals .  ?  Dk De Dios MD  Attending Physician  [facility]

## 2025-06-10 NOTE — PATIENT INSTRUCTIONS
"-  [facility], Ph [phone] ([place]), Ph [phone] ([place]), Ph [phone] ([place])  - [facility], Ph [phone] ([place]), Ph [phone] ([place])  - [facility], Ph [phone]  - [facility], Ph [phone]  - [employer] [facility] (Malcom Russell CPO), Ph [phone], Fax [phone]    Medicare Program, \"Silver Sneakers\" for free or discounted gym membership      "

## 2025-06-10 NOTE — PROGRESS NOTES
Name: Maye Lilly      : 1960      MRN: [id number]  Encounter Provider: kD De Dios MD  Encounter Date: 6/10/2025   Encounter department: [facility] [place]  :  History of Present Illness     Review of Systems   Constitutional: Negative.    HENT: Negative.     Respiratory: Negative.     Gastrointestinal: Negative.    Genitourinary: Negative.      Objective   /68 (BP Location: Right arm, Patient Position: Sitting, Cuff Size: Large)   Pulse 84   Temp 98 °F (36.7 °C) (Temporal)   Wt 117 kg (257 lb)   SpO2 97%   BMI 43.43 kg/m²

## 2025-06-11 ENCOUNTER — TELEPHONE (OUTPATIENT)
Age: 65
End: 2025-06-11

## 2025-06-13 ENCOUNTER — TELEPHONE (OUTPATIENT)
Age: 65
End: 2025-06-13

## 2025-06-25 ENCOUNTER — TELEPHONE (OUTPATIENT)
Age: 65
End: 2025-06-25

## 2025-06-25 NOTE — TELEPHONE ENCOUNTER
Pt called to confirm message was received. However she would like a call back with the time frame of how long it will take to receive her placard. She stated she doesn't want to continue calling our office to follow up , she just wants a time frame of how long it typically takes to receive the placard. She also stated she would like to  a copy of the application today in our office because she has not received it yet through the mail.

## 2025-06-25 NOTE — TELEPHONE ENCOUNTER
LVM for patient explaining to her that I emailed the parking placard seb to [employer] on 6/11 and I cannot give her a date as to when she will receive it.  I instructed her to call [facility] and she will possibly be able to speak to someone that can give her a date of when she will receive it.

## 2025-06-25 NOTE — TELEPHONE ENCOUNTER
LVM for patient stating that I mailed her parking placard application to her home and I also emailed the application to [employer] .  I am sending another copy to her home today.

## 2025-07-14 ENCOUNTER — TELEPHONE (OUTPATIENT)
Age: 65
End: 2025-07-14

## 2025-07-14 ENCOUNTER — OFFICE VISIT (OUTPATIENT)
Age: 65
End: 2025-07-14
Attending: STUDENT IN AN ORGANIZED HEALTH CARE EDUCATION/TRAINING PROGRAM
Payer: COMMERCIAL

## 2025-07-14 VITALS
HEART RATE: 98 BPM | HEIGHT: 63 IN | RESPIRATION RATE: 16 BRPM | SYSTOLIC BLOOD PRESSURE: 120 MMHG | DIASTOLIC BLOOD PRESSURE: 70 MMHG | WEIGHT: 258.2 LBS | TEMPERATURE: 97.2 F | BODY MASS INDEX: 45.75 KG/M2

## 2025-07-14 DIAGNOSIS — I10 PRIMARY HYPERTENSION: ICD-10-CM

## 2025-07-14 DIAGNOSIS — I48.0 PAROXYSMAL ATRIAL FIBRILLATION (HCC): ICD-10-CM

## 2025-07-14 DIAGNOSIS — R73.03 PREDIABETES: ICD-10-CM

## 2025-07-14 DIAGNOSIS — E78.5 HLD (HYPERLIPIDEMIA): ICD-10-CM

## 2025-07-14 DIAGNOSIS — I63.9 LEFT PONTINE STROKE (HCC): ICD-10-CM

## 2025-07-14 DIAGNOSIS — E66.813 OBESITY, CLASS III, BMI 40-49.9 (MORBID OBESITY): Primary | ICD-10-CM

## 2025-07-14 DIAGNOSIS — G47.33 OSA (OBSTRUCTIVE SLEEP APNEA): ICD-10-CM

## 2025-07-14 PROCEDURE — 99204 OFFICE O/P NEW MOD 45 MIN: CPT | Performed by: PHYSICIAN ASSISTANT

## 2025-07-14 NOTE — TELEPHONE ENCOUNTER
Faxed the last office visit notes to [facility] on 7.14.25.  Will scan into patient's chart

## 2025-07-14 NOTE — TELEPHONE ENCOUNTER
Phone call from [facility] requesting last office visit note.  Could you please fax the note to [phone]?    Thank you,  Josefa

## 2025-07-16 ENCOUNTER — TELEPHONE (OUTPATIENT)
Age: 65
End: 2025-07-16

## 2025-07-21 ENCOUNTER — TELEPHONE (OUTPATIENT)
Age: 65
End: 2025-07-21

## 2025-07-21 DIAGNOSIS — E78.5 HLD (HYPERLIPIDEMIA): ICD-10-CM

## 2025-07-21 DIAGNOSIS — I63.9 LEFT PONTINE STROKE (HCC): ICD-10-CM

## 2025-07-21 DIAGNOSIS — I48.0 PAROXYSMAL ATRIAL FIBRILLATION (HCC): ICD-10-CM

## 2025-07-21 DIAGNOSIS — G47.33 OSA (OBSTRUCTIVE SLEEP APNEA): ICD-10-CM

## 2025-07-21 DIAGNOSIS — R73.03 PREDIABETES: ICD-10-CM

## 2025-07-21 DIAGNOSIS — E66.813 OBESITY, CLASS III, BMI 40-49.9 (MORBID OBESITY): ICD-10-CM

## 2025-07-21 DIAGNOSIS — I10 PRIMARY HYPERTENSION: ICD-10-CM

## 2025-07-21 NOTE — TELEPHONE ENCOUNTER
Pt called in to find out the process for her Wegovy since she hasn't heard anything.  I apologize and told her it looks like Taya needs to send a script to the pharmacy and find out if a prior auth is needed and if it is we will submit to ins and once we have a response from ins we will call pt and let her know.  I told her I would ask Taya to start the process and send the script to her pharmacy

## 2025-07-22 ENCOUNTER — NURSE TRIAGE (OUTPATIENT)
Age: 65
End: 2025-07-22

## 2025-07-22 ENCOUNTER — TELEPHONE (OUTPATIENT)
Age: 65
End: 2025-07-22

## 2025-07-22 DIAGNOSIS — E78.5 HLD (HYPERLIPIDEMIA): ICD-10-CM

## 2025-07-22 DIAGNOSIS — G47.33 OSA (OBSTRUCTIVE SLEEP APNEA): ICD-10-CM

## 2025-07-22 DIAGNOSIS — I63.9 LEFT PONTINE STROKE (HCC): ICD-10-CM

## 2025-07-22 DIAGNOSIS — E66.813 OBESITY, CLASS III, BMI 40-49.9 (MORBID OBESITY): ICD-10-CM

## 2025-07-22 DIAGNOSIS — I10 PRIMARY HYPERTENSION: ICD-10-CM

## 2025-07-22 DIAGNOSIS — R73.03 PREDIABETES: ICD-10-CM

## 2025-07-22 NOTE — TELEPHONE ENCOUNTER
Patient called because her RX for Wegovy was [facility].  She wants it sent to [facility] on [address] in [place]. Pharmacy is on her chart. [employer] hub message to [facility] to send to [facility]

## 2025-07-22 NOTE — TELEPHONE ENCOUNTER
PA for Wegovy SUBMITTED to [employer]    via    [x]CMM-KEY: [id number]  []Surescripts-Case ID #    []Availity-Auth ID #  NDC #    []Faxed to plan   []Other website   []Phone call Case ID #      []PA sent as URGENT    All office notes, labs and other pertaining documents and studies sent. Clinical questions answered. Awaiting determination from insurance company.     Turnaround time for your insurance to make a decision on your Prior Authorization can take 7-21 business days.

## 2025-07-22 NOTE — TELEPHONE ENCOUNTER
PA for Wegovy  APPROVED     Date(s) approved 7/22/2025-12/31/2025    Case #    Patient advised by          [][employer] Message  [x]Phone call   []LMOM  []L/M to call office as no active Communication consent on file  []Unable to leave detailed message as VM not approved on Communication consent       Pharmacy advised by    [x]Fax  []Phone call  []Secure Chat    Specialty Pharmacy    []     Approval letter scanned into Media Yes

## 2025-07-22 NOTE — TELEPHONE ENCOUNTER
Regarding: Pharmacy change  ----- Message from Heather GUSMAN sent at 7/22/2025  2:52 PM EDT -----  Patient's rx for Wegovy was sent to [facility] in error.  Please send it to the [facility] on [address] [place].  The pharmacy is on her chart

## 2025-07-22 NOTE — TELEPHONE ENCOUNTER
PT went to go  her Wegovy at [facility] but the medication is on the formulary list and will cost the pt $739.34 out of pocket.  I did call the [facility] with the pt on the line to confirm it was run through her ins.  Pt wants to know if the provider knows of any generic form of the medication that would be cheaper or if there is anyway that she knows of that would be cheaper or a different medication.  I explained the office is closed but I would send a message and someone will get back to her tomorow

## 2025-07-24 NOTE — TELEPHONE ENCOUNTER
Patient was called back. Patient was informed that she can going on Wegovy website to download the coupon. Patient stating that she call her insurance and was informed that her deductible has $1000.00 left to be meet. Patient stating that she will talk it over with her daughter. Patient also stating that she is more than likely going to pay for the medication through her insurance.

## 2025-07-24 NOTE — TELEPHONE ENCOUNTER
Patient called asking about coupons for Wegovy, patient also ask if her daughter brings medication from [place] is cheaper if provider continue treating her. Please call patient at [phone]

## 2025-07-31 ENCOUNTER — TELEPHONE (OUTPATIENT)
Age: 65
End: 2025-07-31

## 2025-08-06 ENCOUNTER — TELEPHONE (OUTPATIENT)
Age: 65
End: 2025-08-06

## 2025-08-12 ENCOUNTER — TELEPHONE (OUTPATIENT)
Age: 65
End: 2025-08-12